# Patient Record
Sex: FEMALE | Race: WHITE | NOT HISPANIC OR LATINO | Employment: FULL TIME | URBAN - METROPOLITAN AREA
[De-identification: names, ages, dates, MRNs, and addresses within clinical notes are randomized per-mention and may not be internally consistent; named-entity substitution may affect disease eponyms.]

---

## 2017-05-30 ENCOUNTER — CONVERSION ENCOUNTER (OUTPATIENT)
Dept: RADIOLOGY | Facility: IMAGING CENTER | Age: 57
End: 2017-05-30

## 2018-05-24 ENCOUNTER — TRANSCRIBE ORDERS (OUTPATIENT)
Dept: NEUROSURGERY | Facility: CLINIC | Age: 58
End: 2018-05-24

## 2018-05-24 DIAGNOSIS — G56.01 CARPAL TUNNEL SYNDROME ON RIGHT: Primary | ICD-10-CM

## 2018-07-20 ENCOUNTER — OFFICE VISIT (OUTPATIENT)
Dept: OBGYN CLINIC | Facility: CLINIC | Age: 58
End: 2018-07-20
Payer: OTHER MISCELLANEOUS

## 2018-07-20 VITALS
BODY MASS INDEX: 27.64 KG/M2 | HEART RATE: 64 BPM | DIASTOLIC BLOOD PRESSURE: 73 MMHG | HEIGHT: 63 IN | WEIGHT: 156 LBS | SYSTOLIC BLOOD PRESSURE: 115 MMHG

## 2018-07-20 DIAGNOSIS — M79.645 PAIN OF LEFT MIDDLE FINGER: ICD-10-CM

## 2018-07-20 DIAGNOSIS — M65.332 TRIGGER FINGER, LEFT MIDDLE FINGER: Primary | ICD-10-CM

## 2018-07-20 PROCEDURE — 20550 NJX 1 TENDON SHEATH/LIGAMENT: CPT

## 2018-07-20 PROCEDURE — 99203 OFFICE O/P NEW LOW 30 MIN: CPT | Performed by: ORTHOPAEDIC SURGERY

## 2018-07-20 RX ORDER — APIXABAN 2.5 MG/1
2.5 TABLET, FILM COATED ORAL
Refills: 0 | Status: ON HOLD | COMMUNITY
Start: 2018-07-19 | End: 2018-11-02

## 2018-07-20 RX ORDER — LIDOCAINE HYDROCHLORIDE 10 MG/ML
0.5 INJECTION, SOLUTION INFILTRATION; PERINEURAL
Status: COMPLETED | OUTPATIENT
Start: 2018-07-20 | End: 2018-07-20

## 2018-07-20 RX ORDER — TRIAMCINOLONE ACETONIDE 40 MG/ML
20 INJECTION, SUSPENSION INTRA-ARTICULAR; INTRAMUSCULAR
Status: COMPLETED | OUTPATIENT
Start: 2018-07-20 | End: 2018-07-20

## 2018-07-20 RX ADMIN — LIDOCAINE HYDROCHLORIDE 0.5 ML: 10 INJECTION, SOLUTION INFILTRATION; PERINEURAL at 11:46

## 2018-07-20 RX ADMIN — TRIAMCINOLONE ACETONIDE 20 MG: 40 INJECTION, SUSPENSION INTRA-ARTICULAR; INTRAMUSCULAR at 11:46

## 2018-07-20 NOTE — PROGRESS NOTES
CHIEF COMPLAINT:  Chief Complaint   Patient presents with    Right Hand - Pain       SUBJECTIVE:  Adi Garcia is a 60y o  year old HD female who presents to the office after an injury at work on 6/14/18 to her left hand that caused her fingers to bend back  Pt complains of clicking and pain to her left long finger  Pt states that when her finger locks and she pulls it up is when she feels the pain  She also complains of numbness and tingling to her right hand and fingers  She wears a wrist brace that she feels helps with the symptoms  She is currently scheduled with Neurology in August regarding her carpal tunnel symptoms  PAST MEDICAL HISTORY:  History reviewed  No pertinent past medical history  PAST SURGICAL HISTORY:  No past surgical history on file  FAMILY HISTORY:  No family history on file  SOCIAL HISTORY:  Social History   Substance Use Topics    Smoking status: Not on file    Smokeless tobacco: Not on file    Alcohol use Not on file       MEDICATIONS:    Current Outpatient Prescriptions:     B Complex Vitamins (VITAMIN B COMPLEX PO), Take 1 capsule by mouth, Disp: , Rfl:     ELIQUIS 2 5 MG, , Disp: , Rfl: 0    ALLERGIES:  Allergies no known allergies    REVIEW OF SYSTEMS:  Review of Systems   Constitutional: Negative for chills, fever and unexpected weight change  HENT: Negative for hearing loss, nosebleeds and sore throat  Eyes: Negative for pain, redness and visual disturbance  Respiratory: Negative for cough, shortness of breath and wheezing  Cardiovascular: Negative for chest pain, palpitations and leg swelling  Gastrointestinal: Negative for abdominal pain, nausea and vomiting  Endocrine: Negative for polydipsia and polyuria  Genitourinary: Negative for dysuria and hematuria  Musculoskeletal: Negative for arthralgias, joint swelling and myalgias  Skin: Negative for rash and wound  Neurological: Negative for dizziness, numbness and headaches  Psychiatric/Behavioral: Negative for decreased concentration, dysphoric mood and suicidal ideas  The patient is not nervous/anxious  LABS:  HgA1c: No results found for: HGBA1C  BMP: No results found for: GLUCOSE, CALCIUM, NA, K, CO2, CL, BUN, CREATININE    _____________________________________________________  PHYSICAL EXAMINATION:  General: well developed and well nourished, alert, oriented times 3 and appears comfortable  Psychiatric: Normal  HEENT: Trachea Midline, No torticollis  Pulmonary: No audible wheezing or respiratory distress   Skin: No masses, erthema, lacerations, fluctation, ulcerations  Neurovascular: Sensation Intact to the Median, Ulnar, Radial Nerve, Motor Intact to the Median, Ulnar, Radial Nerve and Pulses Intact    MUSCULOSKELETAL EXAMINATION:    left long finger:  Positive palpable nodule over the A1 pulley  Positive tenderness to palpation over A1 pulley  Positive catching  Positive clicking     ___________________________________________________  STUDIES REVIEWED:  No studies reviewed         PROCEDURES PERFORMED:  Hand/upper extremity injection  Date/Time: 7/20/2018 11:46 AM  Consent given by: patient  Timeout: Immediately prior to procedure a time out was called to verify the correct patient, procedure, equipment, support staff and site/side marked as required   Supporting Documentation  Indications: pain and tendon swelling   Procedure Details  Condition:trigger finger Location: long finger - L long A1   Ultrasound guidance: no  Medications administered: 0 5 mL lidocaine 1 %; 20 mg triamcinolone acetonide 40 mg/mL  Patient tolerance: patient tolerated the procedure well with no immediate complications  Dressing:  Sterile dressing applied            _____________________________________________________  ASSESSMENT/PLAN:    Assessment:   Trigger Finger  left  long finger    Plan:     Pt may discontinue finger splint   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  Pt may make an apt with us at her leisure for her right carpal tunnel symptoms  Diagnoses and all orders for this visit:    Left Long Finger Trigger Finger        Follow Up:  Return in about 2 weeks (around 8/3/2018)  To Do Next Visit:  Re-evaluation of current issue    General Discussions:     Trigger FInger: The anatomy and physiology of trigger finger was discussed with the patient today in the office  Edema and increased contact pressure within the flexor tendons at the A1 pulley can cause pain, crepitation, and limitation of function  Treatment options include resting MP blocking splints to decrease edema, oral anti-inflammatory medications, home or formal therapy exercises, up to 2 steroid injections within the tendon sheath, or surgical release  While majority of patients do respond to conservative treatment, up to 20% may require surgical release           Scribe Attestation    I,:   [place] am acting as a scribe while in the presence of the attending physician :        I,:   Elijah Nyhan, MD personally performed the services described in this documentation    as scribed in my presence :

## 2018-07-20 NOTE — LETTER
July 20, 2018     Patient: Glo Muñoz   YOB: 1960   Date of Visit: 7/20/2018       To Whom it May Concern:    Glo Muñoz is under my professional care  She was seen in my office on 7/20/2018  She may return to work on light duty today  She may return to work on 7/23/2018 with out limitations  If you have any questions or concerns, please don't hesitate to call           Sincerely,          Bailey Vogel MD        CC: No Recipients

## 2018-08-03 ENCOUNTER — OFFICE VISIT (OUTPATIENT)
Dept: OBGYN CLINIC | Facility: CLINIC | Age: 58
End: 2018-08-03
Payer: OTHER MISCELLANEOUS

## 2018-08-03 VITALS
DIASTOLIC BLOOD PRESSURE: 76 MMHG | SYSTOLIC BLOOD PRESSURE: 113 MMHG | WEIGHT: 155 LBS | BODY MASS INDEX: 27.46 KG/M2 | HEIGHT: 63 IN | HEART RATE: 57 BPM

## 2018-08-03 DIAGNOSIS — M65.332 TRIGGER FINGER, LEFT MIDDLE FINGER: ICD-10-CM

## 2018-08-03 DIAGNOSIS — G56.01 CARPAL TUNNEL SYNDROME ON RIGHT: Primary | ICD-10-CM

## 2018-08-03 PROCEDURE — 99213 OFFICE O/P EST LOW 20 MIN: CPT | Performed by: ORTHOPAEDIC SURGERY

## 2018-08-03 NOTE — PROGRESS NOTES
CHIEF COMPLAINT:  Chief Complaint   Patient presents with    Left Hand - Follow-up       SUBJECTIVE:  Emanuel Nina is a 60y o  year old RHD female who presents to the office for a follow up for her left long finger trigger finger  Pt states that there is still clicking and locking, but the pain is resolved  Pt also has numbness and tingling in her right hand and fingers  Pt wears a brace at night with some relief  PAST MEDICAL HISTORY:  History reviewed  No pertinent past medical history  PAST SURGICAL HISTORY:  History reviewed  No pertinent surgical history  FAMILY HISTORY:  History reviewed  No pertinent family history  SOCIAL HISTORY:  Social History   Substance Use Topics    Smoking status: Never Smoker    Smokeless tobacco: Never Used    Alcohol use Not on file       MEDICATIONS:    Current Outpatient Prescriptions:     B Complex Vitamins (VITAMIN B COMPLEX PO), Take 1 capsule by mouth, Disp: , Rfl:     ELIQUIS 2 5 MG, , Disp: , Rfl: 0    ALLERGIES:  No Known Allergies    REVIEW OF SYSTEMS:  Review of Systems    VITALS:  Vitals:    08/03/18 0909   BP: 113/76   Pulse: 57       LABS:  HgA1c: No results found for: HGBA1C  BMP: No results found for: GLUCOSE, CALCIUM, NA, K, CO2, CL, BUN, CREATININE    _____________________________________________________  PHYSICAL EXAMINATION:  General: well developed and well nourished, alert, oriented times 3 and appears comfortable  Psychiatric: Normal  HEENT: Trachea Midline, No torticollis  Pulmonary: No audible wheezing or respiratory distress   Skin: No masses, erthema, lacerations, fluctation, ulcerations  Neurovascular: Sensation Intact to the Median, Ulnar, Radial Nerve, Motor Intact to the Median, Ulnar, Radial Nerve and Pulses Intact    MUSCULOSKELETAL EXAMINATION:    left long finger:  Positive palpable nodule over the A1 pulley  Negative tenderness to palpation over A1 pulley  Positive catching  Positive clicking        Right Carpal Tunnel Exam:    Negative thenar atrophy  Negative phalen's test  Positive carpal tunnel compression  Negative tinels over median nerve at the wrist   Opposition strength 5/5  Abduction strength 5/5       2 point discrimination is 5 mm throughout with the exception of 7mm on ulnar side of thumb and 6mm on both the ulnar and radial sides of small finger  Range of motion of the neck to the does not exacerbate symptoms in the hand     ___________________________________________________  STUDIES REVIEWED:  No studies reviewed  PROCEDURES PERFORMED:  Procedures  No Procedures performed today    _____________________________________________________  ASSESSMENT/PLAN:    Assessment:   left Trigger Finger  long finger   Right carpal tunnel syndrome    Plan:     Surgical intervention vs conservative treatment was discussed at length today  Pt was offered surgical intervention for both conditions: R ECTR and L LF trigger release  Pt  takes eliquis and was advised to reach out to her cardiologist to make sure she can stop the medication 5 days prior to procedure  Patient is going to think about surgical intervention and get back to the office  Patient to send EMG results to office        Diagnoses and all orders for this visit:    Carpal tunnel syndrome on right    Trigger finger, left middle finger            Follow Up:  Return for when she is ready to schedule surgerty  To Do Next Visit:  Re-evaluation of current issue    Operative Discussions:  Endoscopic Carpal Tunnel Release: The anatomy and physiology of carpal tunnel syndrome was discussed with the patient today  Increase pressure localized under the transverse carpal ligament can cause pain, numbness, tingling, or dysesthesias within the median nerve distribution as well as feelings of fatigue, clumsiness, or awkwardness  These symptoms typically occur at night and worse in the morning upon waking    Eventually, untreated carpal tunnel syndrome can result in weakness and permanent loss of muscle within the thenar compartment of the hand  Treatment options were discussed with the patient  Conservative treatment includes nocturnal resting splints to keep the nerve in a neutral position, ergonomic changes within the work or home environment, activity modification, and tendon gliding exercises  Vitamin B6 one tablet daily over the counter may helpful to reduce symptoms  Steroid injections within the carpal canal can help a majority of patients, however this is often self-limited in a majority of patients  Surgical intervention to divide the transverse carpal ligament typically results in a long-lasting relief of the patient's complaints, with the recurrence rate of less than 1%  The patient has elected to undergo an endoscopic carpal tunnel release  The single incision technique was discussed with the patient, which results in approximately a two-week recovery time less wound complications  In the postoperative period, light activities are allowed immediately, driving is allowed when narcotic medication has stopped, and the bandages may be removed and incision may get wet after 2 days  Heavy activities (lifting more than approximately 10 pounds) will be allowed after follow up appointment in 1-2 weeks  While night symptoms (waking from sleep, pain, and discomfort in the hands) generally improves rapidly, the numbness and tingling as well as the strength will slowly improve over weeks to months depending on the chronicity and severity of the carpal tunnel syndrome  Pillar pain and scar discomfort were discussed with the patient which are self-limiting conditions  The risks of bleeding and infection from the surgery are less than 1%  Risk of recurrence is approximately 0 5%  The risks of nerve injury or nerve damage or damage to the blood vessels is approximately 1 in 1200  The patient has an understanding of the above mentioned discussion  The risks and benefits of the procedure were explained to the patient, which include, but are not limited to: Bleeding, infection, recurrence, pain, scar, damage to tendons, damage to nerves, and damage to blood vessels, failure to give desired results and complications related to anesthesia  These risks, along with alternative conservative treatment options, and postoperative protocols were voiced back and understood by the patient  All questions were answered to the patient's satisfaction  The patient agrees to comply with a standard postoperative protocol, and is willing to proceed  Education was provided via written and auditory forms  There were no barriers to learning  Written handouts regarding wound care, incision and scar care, and general preoperative information was provided to the patient  Prior to surgery, the patient may be requested to stop all anti-inflammatory medications  Prophylactic aspirin, Plavix, and Coumadin may be allowed to be continued  Medications including vitamin E , ginkgo, and fish oil are requested to be stopped approximately one week prior to surgery    Trigger Finger Release: The anatomy and physiology of trigger finger was discussed with the patient today in the office  Edema and increased contact pressure within the flexor tendons at the A1 pulley can cause pain, crepitation, and limitation of function  Treatment options include resting MP blocking splints to decrease edema, oral anti-inflammatory medications, home or formal therapy exercises, up to 2 steroid injections or surgical release  While majority of patients do respond to conservative treatment, up to 20% may require surgical release  The patient has elected release of the trigger finger  The patient has elected to undergo a release of the A1 pulley (trigger finger)  A small incision will be made over the palmar aspect of the hand, the tendon sheath holding the flexor tendons will be released    In the postoperative period, light activities are allowed immediately, driving is allowed when narcotic medication has stopped, and the incision may get wet after 2 days  Heavy activities (lifting more than approximately 10 pounds) will be allowed after the follow up appointment in 1-2 weeks  While the pain and discomfort within the wrist typically improves rapidly, some residual discomfort may be present for up to 6 weeks  The nodule that is typically palpable in the palmar aspect of the hand will not be removed, as this would necessitate removal of a portion of the flexor tendon, however the catching, clicking, and locking should resolve  Approximate success rate is 98%  The risks and benefits of the procedure were explained to the patient, which include, but are not limited to: Bleeding, infection, recurrence, pain, scar, damage to tendons, damage to nerves, and damage to blood vessels, need for future surgery and complications related to anesthesia  If bony work is done, risks also include malunion and nonunion  These risks, along with alternative conservative treatment options, and postoperative protocols were voiced back and understood by the patient  All questions were answered to the patient's satisfaction  The patient agrees to comply with a standard postoperative protocol, and is willing to proceed  Education was provided via written and auditory forms  There were no barriers to learning  Written handouts regarding wound care, incision and scar care, and general preoperative information, as well as risks and benefits were provided to the patient      Scribe Attestation    I,:    am acting as a scribe while in the presence of the attending physician :        I,:    personally performed the services described in this documentation    as scribed in my presence :

## 2018-08-03 NOTE — LETTER
August 3, 2018     Patient: Felicitas Alston   YOB: 1960   Date of Visit: 8/3/2018       To Whom it May Concern:    Felicitas Alston is under my professional care  She was seen in my office on 8/3/2018  She will call our office to schedule apt to schedule her trigger finger surgery when she is ready  Pt may work with no limitations at this time  If you have any questions or concerns, please don't hesitate to call           Sincerely,          David Fraga MD        CC: No Recipients

## 2018-08-07 ENCOUNTER — TELEPHONE (OUTPATIENT)
Dept: NEUROSURGERY | Facility: CLINIC | Age: 58
End: 2018-08-07

## 2018-08-07 NOTE — TELEPHONE ENCOUNTER
08/07/2018-CALLED PT TO RESCHEDULE TODAY'S "NO SHOW" APPT, BUT PT ALREADY SEEING ANOTHER DOCTOR  SHE WILL CALL OUR OFFICE BACK IF SHE WISHES TO RESCHEDULE

## 2018-08-17 ENCOUNTER — OFFICE VISIT (OUTPATIENT)
Dept: OBGYN CLINIC | Facility: CLINIC | Age: 58
End: 2018-08-17
Payer: COMMERCIAL

## 2018-08-17 ENCOUNTER — OFFICE VISIT (OUTPATIENT)
Dept: OBGYN CLINIC | Facility: CLINIC | Age: 58
End: 2018-08-17
Payer: OTHER MISCELLANEOUS

## 2018-08-17 VITALS
WEIGHT: 152 LBS | HEIGHT: 63 IN | HEART RATE: 78 BPM | DIASTOLIC BLOOD PRESSURE: 73 MMHG | SYSTOLIC BLOOD PRESSURE: 110 MMHG | BODY MASS INDEX: 26.93 KG/M2

## 2018-08-17 VITALS
DIASTOLIC BLOOD PRESSURE: 73 MMHG | HEART RATE: 78 BPM | WEIGHT: 152 LBS | HEIGHT: 63 IN | SYSTOLIC BLOOD PRESSURE: 110 MMHG | BODY MASS INDEX: 26.93 KG/M2

## 2018-08-17 DIAGNOSIS — G56.01 CARPAL TUNNEL SYNDROME ON RIGHT: Primary | ICD-10-CM

## 2018-08-17 DIAGNOSIS — G56.01 CARPAL TUNNEL SYNDROME ON RIGHT: ICD-10-CM

## 2018-08-17 DIAGNOSIS — M65.332 TRIGGER MIDDLE FINGER OF LEFT HAND: Primary | ICD-10-CM

## 2018-08-17 PROBLEM — I82.409 DEEP VEIN THROMBOSIS (DVT) OF LOWER EXTREMITY (HCC): Status: ACTIVE | Noted: 2018-08-17

## 2018-08-17 PROCEDURE — 99213 OFFICE O/P EST LOW 20 MIN: CPT | Performed by: ORTHOPAEDIC SURGERY

## 2018-08-17 NOTE — LETTER
August 17, 2018     Patient: Dayna Benitez   YOB: 1960   Date of Visit: 8/17/2018       To Whom it May Concern:    Dayna Benitez is under my professional care  She was seen in my office on 8/17/2018  She has surgery scheduled on 8-29-18 for her left long finger  If you have any questions or concerns, please don't hesitate to call           Sincerely,          Ranjana Covington MD        CC: No Recipients

## 2018-08-17 NOTE — H&P
CHIEF COMPLAINT:  Chief Complaint   Patient presents with    Right Hand - Follow-up       SUBJECTIVE:  Dayna Benitez is a 60y o  year old RHD female who presents to go over EMG and re- evaluation of the right hand for carpal tunnel  She continues to have numbness and tingling in the right hand  The night wrist brace helps somewhat  The patient is also seeing us for a workman's compensation issue with the left hand, under a separate office note with the same date of service  PAST MEDICAL HISTORY:  History reviewed  No pertinent past medical history  PAST SURGICAL HISTORY:  Past Surgical History:   Procedure Laterality Date    CERVICAL DISC SURGERY  2009    PLATES & SCREWS     SECTION      X2    GASTRIC BYPASS  2017    LAP RINYGB SURGERY    KNEE SURGERY Bilateral     OTHER SURGICAL HISTORY      ARM SURGERIES    TONSILLECTOMY         FAMILY HISTORY:  Family History   Problem Relation Age of Onset    Stroke Mother     Alzheimer's disease Mother     Arthritis Mother     Coronary artery disease Mother    [place] Breast cancer Mother     Hypertension Father     Gout Father     Diabetes type II Father     Coronary artery disease Father        SOCIAL HISTORY:  Social History   Substance Use Topics    Smoking status: Light Tobacco Smoker     Types: Cigarettes    Smokeless tobacco: Never Used      Comment: NO PASSIVE SMOKE     Alcohol use Not on file       MEDICATIONS:    Current Outpatient Prescriptions:     B Complex Vitamins (VITAMIN B COMPLEX PO), Take 1 capsule by mouth, Disp: , Rfl:     ELIQUIS 2 5 MG, , Disp: , Rfl: 0    ALLERGIES:  No Known Allergies    REVIEW OF SYSTEMS:  Review of Systems   Constitutional: Negative for chills, fever and unexpected weight change  HENT: Negative for hearing loss, nosebleeds and sore throat  Eyes: Negative for pain, redness and visual disturbance  Respiratory: Negative for cough, shortness of breath and wheezing      Cardiovascular: Negative for chest pain, palpitations and leg swelling  Gastrointestinal: Negative for abdominal pain, nausea and vomiting  Endocrine: Negative for polydipsia and polyuria  Genitourinary: Negative for dysuria and hematuria  Musculoskeletal: Positive for arthralgias  Negative for joint swelling and myalgias  Skin: Negative for rash and wound  Neurological: Positive for numbness  Negative for dizziness and headaches  Psychiatric/Behavioral: Negative for decreased concentration and suicidal ideas  The patient is not nervous/anxious  VITALS:  Vitals:    08/17/18 1217   BP: 110/73   Pulse: 78       LABS:  HgA1c: No results found for: HGBA1C  BMP: No results found for: GLUCOSE, CALCIUM, NA, K, CO2, CL, BUN, CREATININE    _____________________________________________________  PHYSICAL EXAMINATION:  General: well developed and well nourished, alert, oriented times 3 and appears comfortable  Psychiatric: Normal  HEENT: Trachea Midline, No torticollis  Cardiac:  Regular rate and rhythm  Pulmonary: No respiratory distress  Lung sounds clear to auscultation  Skin: No masses, erthema, lacerations, fluctation, ulcerations  Neurovascular: Sensation Intact to the Median, Ulnar, Radial Nerve, Motor Intact to the Median, Ulnar, Radial Nerve and Pulses Intact    MUSCULOSKELETAL EXAMINATION:  Right Carpal Tunnel Exam:    Negative thenar atrophy  Positive phalen's test  Positive carpal tunnel compression  Negative tinels over median nerve at the wrist   Opposition strength 5/5  Abduction strength 5/5       ___________________________________________________  STUDIES REVIEWED:  EMG preformed on 5-17-18 at [facility] [place] showed right median nerve entrapment at flexor retinaculum with moderate severity        PROCEDURES PERFORMED:  Procedures  No Procedures performed today    _____________________________________________________  ASSESSMENT/PLAN:    Right Carpal Tunnel  *Right endoscopic carpal tunnel release  * We will do this at the same time as the trigger finger release (workman's comp issue) as this would be best for the patient to limit exposure to anesthesia  *OT ordered  *Consent obtained  * The patient takes Eliquis for history of DVTs  She has discussed stopping this medication prior to surgery  Her Primary Care provider  Both sergies can be done at the same time and the patient would like to proceed  The patient is aware that she would have limited use of both hands and will have help at home (daughter)  * We would like the patient to stop Eliquis 5 days before surgery  * Surgery medication instructions: You will stop eating and drinking at midnight the night before your surgery, but you may continue to take your normal medications with a small sip of water  In the morning on the day of your surgery, we would like you to take the following medications   Tylenol 500mg one tablet by mouth    After surgery, we would like you to take Tylenol 500 mg one tablet by mouth every 6 hours  (at breakfast, lunch and dinner) for 5-7 days after your surgery  Please take this medication EVERYDAY after surgery for 5-7 days, and not just as needed  Taking this medications after surgery will limit your need for prescription pain medication  We will also prescribe a narcotic pain medication for a limited time after surgery that you can take as needed for moderate or severe pain  Follow Up:  Return after surgery  To Do Next Visit:  Re-evaluation of current issue      Operative Discussions:  Endoscopic Carpal Tunnel Release: The anatomy and physiology of carpal tunnel syndrome was discussed with the patient today  Increase pressure localized under the transverse carpal ligament can cause pain, numbness, tingling, or dysesthesias within the median nerve distribution as well as feelings of fatigue, clumsiness, or awkwardness    These symptoms typically occur at night and worse in the morning upon waking  Eventually, untreated carpal tunnel syndrome can result in weakness and permanent loss of muscle within the thenar compartment of the hand  Treatment options were discussed with the patient  Conservative treatment includes nocturnal resting splints to keep the nerve in a neutral position, ergonomic changes within the work or home environment, activity modification, and tendon gliding exercises  Vitamin B6 one tablet daily over the counter may helpful to reduce symptoms  Steroid injections within the carpal canal can help a majority of patients, however this is often self-limited in a majority of patients  Surgical intervention to divide the transverse carpal ligament typically results in a long-lasting relief of the patient's complaints, with the recurrence rate of less than 1%  The patient has elected to undergo an endoscopic carpal tunnel release  The single incision technique was discussed with the patient, which results in approximately a two-week recovery time less wound complications  In the postoperative period, light activities are allowed immediately, driving is allowed when narcotic medication has stopped, and the bandages may be removed and incision may get wet after 2 days  Heavy activities (lifting more than approximately 10 pounds) will be allowed after follow up appointment in 1-2 weeks  While night symptoms (waking from sleep, pain, and discomfort in the hands) generally improves rapidly, the numbness and tingling as well as the strength will slowly improve over weeks to months depending on the chronicity and severity of the carpal tunnel syndrome  Pillar pain and scar discomfort were discussed with the patient which are self-limiting conditions  The risks of bleeding and infection from the surgery are less than 1%  Risk of recurrence is approximately 0 5%    The risks of nerve injury or nerve damage or damage to the blood vessels is approximately 1 in 1200  The patient has an understanding of the above mentioned discussion  The risks and benefits of the procedure were explained to the patient, which include, but are not limited to: Bleeding, infection, recurrence, pain, scar, damage to tendons, damage to nerves, and damage to blood vessels, failure to give desired results and complications related to anesthesia  These risks, along with alternative conservative treatment options, and postoperative protocols were voiced back and understood by the patient  All questions were answered to the patient's satisfaction  The patient agrees to comply with a standard postoperative protocol, and is willing to proceed  Education was provided via written and auditory forms  There were no barriers to learning  Written handouts regarding wound care, incision and scar care, and general preoperative information was provided to the patient  Prior to surgery, the patient may be requested to stop all anti-inflammatory medications  Prophylactic aspirin, Plavix, and Coumadin may be allowed to be continued  Medications including vitamin E , ginkgo, and fish oil are requested to be stopped approximately one week prior       Scribe Attestation    I,:   Danilo Roland PA-C am acting as a scribe while in the presence of the attending physician :        I,:   Lawyer Neno MD personally performed the services described in this documentation    as scribed in my presence :

## 2018-08-17 NOTE — PROGRESS NOTES
CHIEF COMPLAINT:  Chief Complaint   Patient presents with    Right Hand - Follow-up       SUBJECTIVE:  Aislinn Le is a 60y o  year old RHD female who presents to go over EMG and re- evaluation of the right hand for carpal tunnel  She continues to have numbness and tingling in the right hand  The night wrist brace helps somewhat  The patient is also seeing us for a workman's compensation issue with the left hand, under a separate office note with the same date of service  PAST MEDICAL HISTORY:  History reviewed  No pertinent past medical history  PAST SURGICAL HISTORY:  Past Surgical History:   Procedure Laterality Date    CERVICAL DISC SURGERY  2009    PLATES & SCREWS     SECTION      X2    GASTRIC BYPASS  2017    LAP RINYGB SURGERY    KNEE SURGERY Bilateral     OTHER SURGICAL HISTORY      ARM SURGERIES    TONSILLECTOMY         FAMILY HISTORY:  Family History   Problem Relation Age of Onset    Stroke Mother     Alzheimer's disease Mother     Arthritis Mother     Coronary artery disease Mother    [place] Breast cancer Mother     Hypertension Father     Gout Father     Diabetes type II Father     Coronary artery disease Father        SOCIAL HISTORY:  Social History   Substance Use Topics    Smoking status: Light Tobacco Smoker     Types: Cigarettes    Smokeless tobacco: Never Used      Comment: NO PASSIVE SMOKE     Alcohol use Not on file       MEDICATIONS:    Current Outpatient Prescriptions:     B Complex Vitamins (VITAMIN B COMPLEX PO), Take 1 capsule by mouth, Disp: , Rfl:     ELIQUIS 2 5 MG, , Disp: , Rfl: 0    ALLERGIES:  No Known Allergies    REVIEW OF SYSTEMS:  Review of Systems   Constitutional: Negative for chills, fever and unexpected weight change  HENT: Negative for hearing loss, nosebleeds and sore throat  Eyes: Negative for pain, redness and visual disturbance  Respiratory: Negative for cough, shortness of breath and wheezing      Cardiovascular: Negative for chest pain, palpitations and leg swelling  Gastrointestinal: Negative for abdominal pain, nausea and vomiting  Endocrine: Negative for polydipsia and polyuria  Genitourinary: Negative for dysuria and hematuria  Musculoskeletal: Positive for arthralgias  Negative for joint swelling and myalgias  Skin: Negative for rash and wound  Neurological: Positive for numbness  Negative for dizziness and headaches  Psychiatric/Behavioral: Negative for decreased concentration and suicidal ideas  The patient is not nervous/anxious  VITALS:  Vitals:    08/17/18 1217   BP: 110/73   Pulse: 78       LABS:  HgA1c: No results found for: HGBA1C  BMP: No results found for: GLUCOSE, CALCIUM, NA, K, CO2, CL, BUN, CREATININE    _____________________________________________________  PHYSICAL EXAMINATION:  General: well developed and well nourished, alert, oriented times 3 and appears comfortable  Psychiatric: Normal  HEENT: Trachea Midline, No torticollis  Cardiac:  Regular rate and rhythm  Pulmonary: No respiratory distress  Lung sounds clear to auscultation  Skin: No masses, erthema, lacerations, fluctation, ulcerations  Neurovascular: Sensation Intact to the Median, Ulnar, Radial Nerve, Motor Intact to the Median, Ulnar, Radial Nerve and Pulses Intact    MUSCULOSKELETAL EXAMINATION:  Right Carpal Tunnel Exam:    Negative thenar atrophy  Positive phalen's test  Positive carpal tunnel compression  Negative tinels over median nerve at the wrist   Opposition strength 5/5  Abduction strength 5/5       ___________________________________________________  STUDIES REVIEWED:  EMG preformed on 5-17-18 at [facility] [place] showed right median nerve entrapment at flexor retinaculum with moderate severity        PROCEDURES PERFORMED:  Procedures  No Procedures performed today    _____________________________________________________  ASSESSMENT/PLAN:    Right Carpal Tunnel  *Right endoscopic carpal tunnel release  * We will do this at the same time as the trigger finger release (workman's comp issue) as this would be best for the patient to limit exposure to anesthesia  *OT ordered  *Consent obtained  * The patient takes Eliquis for history of DVTs  She has discussed stopping this medication prior to surgery  Her Primary Care provider  Both sergies can be done at the same time and the patient would like to proceed  The patient is aware that she would have limited use of both hands and will have help at home (daughter)  * We would like the patient to stop Eliquis 5 days before surgery  * Surgery medication instructions: You will stop eating and drinking at midnight the night before your surgery, but you may continue to take your normal medications with a small sip of water  In the morning on the day of your surgery, we would like you to take the following medications   Tylenol 500mg one tablet by mouth    After surgery, we would like you to take Tylenol 500 mg one tablet by mouth every 6 hours  (at breakfast, lunch and dinner) for 5-7 days after your surgery  Please take this medication EVERYDAY after surgery for 5-7 days, and not just as needed  Taking this medications after surgery will limit your need for prescription pain medication  We will also prescribe a narcotic pain medication for a limited time after surgery that you can take as needed for moderate or severe pain  Follow Up:  Return after surgery  To Do Next Visit:  Re-evaluation of current issue      Operative Discussions:  Endoscopic Carpal Tunnel Release: The anatomy and physiology of carpal tunnel syndrome was discussed with the patient today  Increase pressure localized under the transverse carpal ligament can cause pain, numbness, tingling, or dysesthesias within the median nerve distribution as well as feelings of fatigue, clumsiness, or awkwardness    These symptoms typically occur at night and worse in the morning upon waking  Eventually, untreated carpal tunnel syndrome can result in weakness and permanent loss of muscle within the thenar compartment of the hand  Treatment options were discussed with the patient  Conservative treatment includes nocturnal resting splints to keep the nerve in a neutral position, ergonomic changes within the work or home environment, activity modification, and tendon gliding exercises  Vitamin B6 one tablet daily over the counter may helpful to reduce symptoms  Steroid injections within the carpal canal can help a majority of patients, however this is often self-limited in a majority of patients  Surgical intervention to divide the transverse carpal ligament typically results in a long-lasting relief of the patient's complaints, with the recurrence rate of less than 1%  The patient has elected to undergo an endoscopic carpal tunnel release  The single incision technique was discussed with the patient, which results in approximately a two-week recovery time less wound complications  In the postoperative period, light activities are allowed immediately, driving is allowed when narcotic medication has stopped, and the bandages may be removed and incision may get wet after 2 days  Heavy activities (lifting more than approximately 10 pounds) will be allowed after follow up appointment in 1-2 weeks  While night symptoms (waking from sleep, pain, and discomfort in the hands) generally improves rapidly, the numbness and tingling as well as the strength will slowly improve over weeks to months depending on the chronicity and severity of the carpal tunnel syndrome  Pillar pain and scar discomfort were discussed with the patient which are self-limiting conditions  The risks of bleeding and infection from the surgery are less than 1%  Risk of recurrence is approximately 0 5%    The risks of nerve injury or nerve damage or damage to the blood vessels is approximately 1 in 1200  The patient has an understanding of the above mentioned discussion  The risks and benefits of the procedure were explained to the patient, which include, but are not limited to: Bleeding, infection, recurrence, pain, scar, damage to tendons, damage to nerves, and damage to blood vessels, failure to give desired results and complications related to anesthesia  These risks, along with alternative conservative treatment options, and postoperative protocols were voiced back and understood by the patient  All questions were answered to the patient's satisfaction  The patient agrees to comply with a standard postoperative protocol, and is willing to proceed  Education was provided via written and auditory forms  There were no barriers to learning  Written handouts regarding wound care, incision and scar care, and general preoperative information was provided to the patient  Prior to surgery, the patient may be requested to stop all anti-inflammatory medications  Prophylactic aspirin, Plavix, and Coumadin may be allowed to be continued  Medications including vitamin E , ginkgo, and fish oil are requested to be stopped approximately one week prior       Scribe Attestation    I,:   Minnie Nicole PA-C am acting as a scribe while in the presence of the attending physician :        I,:   Bailey Vogel MD personally performed the services described in this documentation    as scribed in my presence :

## 2018-08-17 NOTE — PROGRESS NOTES
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,  in the right upper arm, and  in the left calf  The patient is being seen under her regular insurance for the carpal tunnel and under workmans' compensation for the left long trigger finger  This note is for the [employer] issue  PAST MEDICAL HISTORY:  History reviewed  No pertinent past medical history  PAST SURGICAL HISTORY:  Past Surgical History:   Procedure Laterality Date    CERVICAL DISC SURGERY  2009    PLATES & SCREWS     SECTION      X2    GASTRIC BYPASS  2017    LAP RINYGB SURGERY    KNEE SURGERY Bilateral     OTHER SURGICAL HISTORY      ARM SURGERIES    TONSILLECTOMY         FAMILY HISTORY:  Family History   Problem Relation Age of Onset    Stroke Mother     Alzheimer's disease Mother     Arthritis Mother     Coronary artery disease Mother    [facility] Breast cancer Mother     Hypertension Father     Gout Father     Diabetes type II Father     Coronary artery disease Father        SOCIAL HISTORY:  Social History   Substance Use Topics    Smoking status: Light Tobacco Smoker     Types: Cigarettes    Smokeless tobacco: Never Used      Comment: NO PASSIVE SMOKE     Alcohol use Not on file       MEDICATIONS:    Current Outpatient Prescriptions:     B Complex Vitamins (VITAMIN B COMPLEX PO), Take 1 capsule by mouth, Disp: , Rfl:     ELIQUIS 2 5 MG, , Disp: , Rfl: 0    ALLERGIES:  No Known Allergies    REVIEW OF SYSTEMS:  Review of Systems   Constitutional: Negative for chills, fever and unexpected weight change     HENT: Negative for hearing loss, nosebleeds and sore throat  Eyes: Negative for pain, redness and visual disturbance  Respiratory: Negative for cough, shortness of breath and wheezing  Cardiovascular: Negative for chest pain, palpitations and leg swelling  Gastrointestinal: Negative for abdominal pain, nausea and vomiting  Endocrine: Negative for polydipsia and polyuria  Genitourinary: Negative for dysuria and hematuria  Musculoskeletal: Positive for arthralgias and joint swelling  Negative for myalgias  Skin: Negative for rash and wound  Neurological: Positive for numbness  Negative for dizziness and headaches  Psychiatric/Behavioral: Negative for decreased concentration and suicidal ideas  The patient is not nervous/anxious  VITALS:  Vitals:    08/17/18 1145   BP: 110/73   Pulse: 78       LABS:  HgA1c: No results found for: HGBA1C  BMP: No results found for: GLUCOSE, CALCIUM, NA, K, CO2, CL, BUN, CREATININE    _____________________________________________________  PHYSICAL EXAMINATION:  General: well developed and well nourished, alert, oriented times 3 and appears comfortable  Psychiatric: Normal  HEENT: Trachea Midline, No torticollis  Cardiac:  Regular rate and rhythm  Pulmonary: No respiratory distress  Lung sounds clear to auscultation  Skin: No masses, erthema, lacerations, fluctation, ulcerations  Neurovascular: Sensation Intact to the Median, Ulnar, Radial Nerve, Motor Intact to the Median, Ulnar, Radial Nerve and Pulses Intact    MUSCULOSKELETAL EXAMINATION:  left long finger:  Positive palpable nodule over the A1 pulley  Positive tenderness to palpation over A1 pulley  Positive catching   Positive clicking       ___________________________________________________  STUDIES REVIEWED:  No studies reviewed    PROCEDURES PERFORMED:  Procedures  No Procedures performed today    _____________________________________________________  ASSESSMENT/PLAN:    Left long trigger finger  * Surgery:  Left long trigger finger release  * We will do this at the same time as the carpal tunnel release (non-workman's comp issue, see separate office note) as this would be best for the patient to limit exposure to anesthesia  *Consent obtained  * OT scheduled  She will need OT to help her recover from her surgery  * The patient takes Eliquis for history of DVTs  She has discussed stopping this medication prior to surgery with her Primary Care provider, and her PCP states that it is up to us  We would like the patient to stop Eliquis 5 days before surgery  * The patient is aware that she would have limited use of both hands and will have help at home (daughter)  * The patient has Tylenol at home and does not want me to send a prescription to her pharmacy  Surgery medication instructions: You will stop eating and drinking at midnight the night before your surgery, but you may continue to take your normal medications with a small sip of water  In the morning on the day of your surgery, we would like you to take the following medication   Tylenol 500mg one tablet by mouth    After surgery, we would like you to take Tylenol 500 mg one tablet by mouth every 6 hours  (at breakfast, lunch and dinner) for 5-7 days after your surgery  Please take this medication EVERYDAY after surgery for 5-7 days, and not just as needed  Taking this medications after surgery will limit your need for prescription pain medication  We will also prescribe a narcotic pain medication for a limited time after surgery that you can take as needed for moderate or severe pain  Other orders  -     Diet NPO; Sips with meds; Standing  -     Height and weight upon arrival; Standing  -     Void on call to OR; Standing  -     Insert peripheral IV; Standing      Follow Up:  Return after surgery  To Do Next Visit:  Re-evaluation of current issue      Operative Discussions:  Trigger Finger Release:  The anatomy and physiology of trigger finger was discussed with the patient today in the office  Edema and increased contact pressure within the flexor tendons at the A1 pulley can cause pain, crepitation, and limitation of function  Treatment options include resting MP blocking splints to decrease edema, oral anti-inflammatory medications, home or formal therapy exercises, up to 2 steroid injections or surgical release  While majority of patients do respond to conservative treatment, up to 20% may require surgical release  The patient has elected release of the trigger finger  The patient has elected to undergo a release of the A1 pulley (trigger finger)  A small incision will be made over the palmar aspect of the hand, the tendon sheath holding the flexor tendons will be released  In the postoperative period, light activities are allowed immediately, driving is allowed when narcotic medication has stopped, and the incision may get wet after 2 days  Heavy activities (lifting more than approximately 10 pounds) will be allowed after the follow up appointment in 1-2 weeks  While the pain and discomfort within the wrist typically improves rapidly, some residual discomfort may be present for up to 6 weeks  The nodule that is typically palpable in the palmar aspect of the hand will not be removed, as this would necessitate removal of a portion of the flexor tendon, however the catching, clicking, and locking should resolve  Approximate success rate is 98%  The risks and benefits of the procedure were explained to the patient, which include, but are not limited to: Bleeding, infection, recurrence, pain, scar, damage to tendons, damage to nerves, and damage to blood vessels, need for future surgery and complications related to anesthesia  If bony work is done, risks also include malunion and nonunion    These risks, along with alternative conservative treatment options, and postoperative protocols were voiced back and understood by the patient  All questions were answered to the patient's satisfaction  The patient agrees to comply with a standard postoperative protocol, and is willing to proceed  Education was provided via written and auditory forms  There were no barriers to learning  Written handouts regarding wound care, incision and scar care, and general preoperative information, as well as risks and benefits were provided to the patient      Scribe Attestation    I,:   Farheen Lim PA-C am acting as a scribe while in the presence of the attending physician :        I,:   Lashay Dash MD personally performed the services described in this documentation    as scribed in my presence :

## 2018-08-17 NOTE — PATIENT INSTRUCTIONS
Surgery medication instructions: You will stop eating and drinking at midnight the night before your surgery, but you may continue to take your normal medications with a small sip of water  In the morning on the day of your surgery, we would like you to take the following medication:   Tylenol 500mg one tablet by mouth    After surgery, we would like you to take Tylenol 500 mg one tablet by mouth every 6 hours  (at breakfast, lunch and dinner) for 5-7 days after your surgery  Please take this medication EVERYDAY after surgery for 5-7 days, and not just as needed  Taking this medications after surgery will limit your need for prescription pain medication  We will also prescribe a narcotic pain medication for a limited time after surgery that you can take as needed for moderate or severe pain  The narcotic pain medication may also have Tylenol in it  Please limit Tylenol usage to under 3,000mg a day

## 2018-08-21 ENCOUNTER — TELEPHONE (OUTPATIENT)
Dept: OBGYN CLINIC | Facility: CLINIC | Age: 58
End: 2018-08-21

## 2018-08-23 NOTE — PRE-PROCEDURE INSTRUCTIONS
Pre-Surgery Instructions:   Medication Instructions    B Complex Vitamins (VITAMIN B COMPLEX PO) Patient was instructed by Physician and understands   ELIQUIS 2 5 MG Patient was instructed by Physician and understands

## 2018-09-17 ENCOUNTER — ANESTHESIA EVENT (OUTPATIENT)
Dept: PERIOP | Facility: HOSPITAL | Age: 58
End: 2018-09-17
Payer: COMMERCIAL

## 2018-09-18 ENCOUNTER — ANESTHESIA (OUTPATIENT)
Dept: PERIOP | Facility: HOSPITAL | Age: 58
End: 2018-09-18
Payer: COMMERCIAL

## 2018-09-18 ENCOUNTER — HOSPITAL ENCOUNTER (OUTPATIENT)
Facility: HOSPITAL | Age: 58
Setting detail: OUTPATIENT SURGERY
Discharge: HOME | End: 2018-09-18
Attending: ORTHOPAEDIC SURGERY | Admitting: ORTHOPAEDIC SURGERY
Payer: COMMERCIAL

## 2018-09-18 ENCOUNTER — TELEPHONE (OUTPATIENT)
Dept: OBGYN CLINIC | Facility: CLINIC | Age: 58
End: 2018-09-18

## 2018-09-18 VITALS
HEART RATE: 63 BPM | SYSTOLIC BLOOD PRESSURE: 130 MMHG | RESPIRATION RATE: 15 BRPM | WEIGHT: 157.85 LBS | HEIGHT: 63 IN | OXYGEN SATURATION: 99 % | TEMPERATURE: 99.3 F | BODY MASS INDEX: 27.97 KG/M2 | DIASTOLIC BLOOD PRESSURE: 70 MMHG

## 2018-09-18 DIAGNOSIS — M65.332 TRIGGER MIDDLE FINGER OF LEFT HAND: Primary | ICD-10-CM

## 2018-09-18 PROCEDURE — 26055 INCISE FINGER TENDON SHEATH: CPT | Performed by: ORTHOPAEDIC SURGERY

## 2018-09-18 PROCEDURE — 29848 WRIST ENDOSCOPY/SURGERY: CPT | Performed by: ORTHOPAEDIC SURGERY

## 2018-09-18 RX ORDER — LIDOCAINE HYDROCHLORIDE 10 MG/ML
INJECTION, SOLUTION INFILTRATION; PERINEURAL AS NEEDED
Status: DISCONTINUED | OUTPATIENT
Start: 2018-09-18 | End: 2018-09-18 | Stop reason: SURG

## 2018-09-18 RX ORDER — FENTANYL CITRATE/PF 50 MCG/ML
50 SYRINGE (ML) INJECTION
Status: DISCONTINUED | OUTPATIENT
Start: 2018-09-18 | End: 2018-09-18 | Stop reason: HOSPADM

## 2018-09-18 RX ORDER — PROMETHAZINE HYDROCHLORIDE 25 MG/ML
25 INJECTION, SOLUTION INTRAMUSCULAR; INTRAVENOUS ONCE AS NEEDED
Status: DISCONTINUED | OUTPATIENT
Start: 2018-09-18 | End: 2018-09-18 | Stop reason: HOSPADM

## 2018-09-18 RX ORDER — MELATONIN
5000 3 TIMES DAILY
COMMUNITY

## 2018-09-18 RX ORDER — PROPOFOL 10 MG/ML
INJECTION, EMULSION INTRAVENOUS AS NEEDED
Status: DISCONTINUED | OUTPATIENT
Start: 2018-09-18 | End: 2018-09-18 | Stop reason: SURG

## 2018-09-18 RX ORDER — DIPHENHYDRAMINE HYDROCHLORIDE 50 MG/ML
12.5 INJECTION INTRAMUSCULAR; INTRAVENOUS ONCE AS NEEDED
Status: DISCONTINUED | OUTPATIENT
Start: 2018-09-18 | End: 2018-09-18 | Stop reason: HOSPADM

## 2018-09-18 RX ORDER — PROPOFOL 10 MG/ML
INJECTION, EMULSION INTRAVENOUS CONTINUOUS PRN
Status: DISCONTINUED | OUTPATIENT
Start: 2018-09-18 | End: 2018-09-18

## 2018-09-18 RX ORDER — FENTANYL CITRATE 50 UG/ML
INJECTION, SOLUTION INTRAMUSCULAR; INTRAVENOUS AS NEEDED
Status: DISCONTINUED | OUTPATIENT
Start: 2018-09-18 | End: 2018-09-18 | Stop reason: SURG

## 2018-09-18 RX ORDER — ONDANSETRON 2 MG/ML
4 INJECTION INTRAMUSCULAR; INTRAVENOUS EVERY 6 HOURS PRN
Status: DISCONTINUED | OUTPATIENT
Start: 2018-09-18 | End: 2018-09-18 | Stop reason: HOSPADM

## 2018-09-18 RX ORDER — MEPERIDINE HYDROCHLORIDE 50 MG/ML
12.5 INJECTION INTRAMUSCULAR; INTRAVENOUS; SUBCUTANEOUS ONCE AS NEEDED
Status: DISCONTINUED | OUTPATIENT
Start: 2018-09-18 | End: 2018-09-18 | Stop reason: HOSPADM

## 2018-09-18 RX ORDER — METOCLOPRAMIDE HYDROCHLORIDE 5 MG/ML
10 INJECTION INTRAMUSCULAR; INTRAVENOUS ONCE AS NEEDED
Status: DISCONTINUED | OUTPATIENT
Start: 2018-09-18 | End: 2018-09-18 | Stop reason: HOSPADM

## 2018-09-18 RX ORDER — SODIUM CHLORIDE, SODIUM LACTATE, POTASSIUM CHLORIDE, CALCIUM CHLORIDE 600; 310; 30; 20 MG/100ML; MG/100ML; MG/100ML; MG/100ML
75 INJECTION, SOLUTION INTRAVENOUS CONTINUOUS
Status: DISCONTINUED | OUTPATIENT
Start: 2018-09-18 | End: 2018-09-18 | Stop reason: HOSPADM

## 2018-09-18 RX ORDER — HYDROCODONE BITARTRATE AND ACETAMINOPHEN 5; 325 MG/1; MG/1
1 TABLET ORAL EVERY 6 HOURS PRN
Qty: 20 TABLET | Refills: 0 | Status: SHIPPED | OUTPATIENT
Start: 2018-09-18 | End: 2018-12-24

## 2018-09-18 RX ORDER — MIDAZOLAM HYDROCHLORIDE 1 MG/ML
INJECTION INTRAMUSCULAR; INTRAVENOUS AS NEEDED
Status: DISCONTINUED | OUTPATIENT
Start: 2018-09-18 | End: 2018-09-18 | Stop reason: SURG

## 2018-09-18 RX ORDER — SODIUM CHLORIDE, SODIUM LACTATE, POTASSIUM CHLORIDE, CALCIUM CHLORIDE 600; 310; 30; 20 MG/100ML; MG/100ML; MG/100ML; MG/100ML
INJECTION, SOLUTION INTRAVENOUS CONTINUOUS PRN
Status: DISCONTINUED | OUTPATIENT
Start: 2018-09-18 | End: 2018-09-18

## 2018-09-18 RX ADMIN — FENTANYL CITRATE 25 MCG: 50 INJECTION, SOLUTION INTRAMUSCULAR; INTRAVENOUS at 12:39

## 2018-09-18 RX ADMIN — SODIUM CHLORIDE, SODIUM LACTATE, POTASSIUM CHLORIDE, AND CALCIUM CHLORIDE: .6; .31; .03; .02 INJECTION, SOLUTION INTRAVENOUS at 12:07

## 2018-09-18 RX ADMIN — LIDOCAINE HYDROCHLORIDE 50 MG: 10 INJECTION, SOLUTION INFILTRATION; PERINEURAL at 12:39

## 2018-09-18 RX ADMIN — FENTANYL CITRATE 25 MCG: 50 INJECTION, SOLUTION INTRAMUSCULAR; INTRAVENOUS at 12:51

## 2018-09-18 RX ADMIN — PROPOFOL 100 MCG/KG/MIN: 10 INJECTION, EMULSION INTRAVENOUS at 12:39

## 2018-09-18 RX ADMIN — MIDAZOLAM HYDROCHLORIDE 2 MG: 1 INJECTION, SOLUTION INTRAMUSCULAR; INTRAVENOUS at 12:33

## 2018-09-18 RX ADMIN — PROPOFOL 60 MG: 10 INJECTION, EMULSION INTRAVENOUS at 12:45

## 2018-09-18 NOTE — OP NOTE
PATIENT NAME: Aislinn Le    MEDICAL RECORD NO:  [id number]    PROCEDURE DATE:  18    :  1960    SURGEON:  Alphonso Rhody D Haydee Dandy, M D , Ph D     Sonali Brochure: Libby Florentino DIAGNOSIS:  1) right carpal tunnel syndrome    2) left long finger trigger digit    POSTOPERATIVE DIAGNOSIS:  1) right carpal tunnel syndrome    2) left long finger trigger digit    PROCEDURE PERFORMED:  1) right endoscopic carpal tunnel release  2) left long finger trigger release    ANESTHESIA:  conscious sedation and local    COMPLICATIONS:   none    TOURNIQUET TIME: 3 minutes at 250 mmHg on the right    DISPOSITION: Patient was sent to the PACU in stable condition  INDICATION:  The patient is an 60 y o  female with clinical and/or electrodiagnostic evidence of right carpal tunnel syndrome and left long finger trigger digit     The patient had failed non-operative management and opted for right carpal tunnel release left long finger trigger release  After informing the patient of the risks and benefits of endoscopic carpal tunnel and trigger release, consent was obtained for surgical intervention  PROCEDURE: The patient was identified in the preoperative screening area  The consent form was signed and verified after identifying the correct operative site  The patient was taken to the operating room and underwent conscious sedation and local   The bilateral upper extremities were then prepped and draped in normal sterile fashion with Chlorhexidine solution  First, attention was brought to the left long finger trigger digit  A 1 5cm incision was made centered over the A-1 pulley on the palmar surface of the [address]  [facility] [address]  The subcutaneous tissue was dissected bluntly down to the level of the flexor sheath taking care to protect the neurovascular structures  The A-1 pulley was identified and was released from proximal to distal   Once release was completed the tendons were examined and found to be intact  (Note -  for multiple trigger releases a new sentence should begin:  The remaining digits underwent releases which were performed in a similar manner as above )  Following release(s), the tourniquet was deflated and the digits demonstrated good capillary refill and color  The patient demonstrated flexion and extension of the digit(s) without evidence of triggering  The wound(s) was irrigated with copious amounts of saline solution and was closed with nylon suture in an interrupted horizontal mattress fashion  Findings: There was no significant longitudinal degenerative tears of the FDS tendon  The synovium appeared Normaly thickened  and was left Undisturbed  The A-1 pulley was moderately thickened  Next, attention was turned to the right ECTR  The right arm was then elevated, exsanguinated with an Esmarch and the tourniquet placed about the brachium was insufflated to 250 mmHg  The Esmarch was removed  A transverse incision, approximately 1 cm in length, was made just proximal to the distal wrist crease and just ulnar to the median nerve  The subcutaneous tissue was dissected bluntly down to the level of the forearm fascia  A transverse split in the fascia was created by gently piercing the fascia with the tips of tenotomy scissors  The proximal portion of the fascia was released longitudinally into the forearm using tenotomy scissors  The distal fascia was left intact for insertion of dilators  The carpal canal was then dilated using sequentially increasing sized dilators  Once the canal was adequately dilated, the scope and canula tray were then introduced into the carpal canal  The transverse carpal ligament was covered with a thin layer of synovial tissue on its undersurface  The synovial layer was debrided to expose the transverse fibers and the distal edge of the ligament    Once clear visualization of the transverse carpal ligament was obtained, the knife blade was introduced into the canula tray and the ligament was then released from distal to proximal maintaining complete visualization throughout the procedure  Several passes of the knife blade were necessary in some areas to complete the release  Complete release was verified with the endoscopic camera in place clearly visualizing the  cut edges of the transverse carpal ligament with the overlying volar fatty tissue and palmaris brevis muscle fibers protruding through  The transverse carpal ligament was moderately thickended  The scope and cannula were then removed and the wound was irrigated with copious amounts of saline solution  The tourniquet was released at 3 minutes with good capillary refill and color in the digits  The small incisions were then repaired using #4-0 nylon sutures placed in an interrupted horizontal mattress fashion  The patient tolerated the procedure well  The incision(s) was/were dressed in a sterile soft bandage  There were no complications during the case  The patient was sent to the PACU in stable condition            David Fraga MD  09/18/18  1:03 PM

## 2018-09-18 NOTE — H&P
Eric Marroquin MD   Orthopedic Surgery    Carpal tunnel syndrome on right   Dx    Right Hand - Follow-up; Referred by Referral Self   Reason for Visit    H&P   Arsh Chu PA-C (Physician Assistant) [facility]      CHIEF COMPLAINT:        Chief Complaint   Patient presents with    Right Hand - Follow-up         SUBJECTIVE:  Nehal Kapoor is a 60y o  year old RHD female who presents to go over EMG and re- evaluation of the right hand for carpal tunnel  She continues to have numbness and tingling in the right hand  The night wrist brace helps somewhat  The patient is also seeing us for a workman's compensation issue with the left hand, under a separate office note with the same date of service  PAST MEDICAL HISTORY:  History reviewed  No pertinent past medical history       PAST SURGICAL HISTORY:            Past Surgical History:   Procedure Laterality Date    CERVICAL DISC SURGERY   2009     PLATES & SCREWS     SECTION         X2    GASTRIC BYPASS   2017     LAP RINYGB SURGERY    KNEE SURGERY Bilateral      OTHER SURGICAL HISTORY         ARM SURGERIES    TONSILLECTOMY             FAMILY HISTORY:            Family History   Problem Relation Age of Onset    Stroke Mother      Alzheimer's disease Mother      Arthritis Mother      Coronary artery disease Mother      Breast cancer Mother      Hypertension Father      Gout Father      Diabetes type II Father      Coronary artery disease Father           SOCIAL HISTORY:              Social History   Substance Use Topics    Smoking status: Light Tobacco Smoker       Types: Cigarettes    Smokeless tobacco: Never Used         Comment: NO PASSIVE SMOKE     Alcohol use Not on file         MEDICATIONS:     Current Outpatient Prescriptions:     B Complex Vitamins (VITAMIN B COMPLEX PO), Take 1 capsule by mouth, Disp: , Rfl:     ELIQUIS 2 5 MG, , Disp: , Rfl: 0     ALLERGIES:  No Known Allergies     REVIEW OF SYSTEMS:  Review of Systems   Constitutional: Negative for chills, fever and unexpected weight change  HENT: Negative for hearing loss, nosebleeds and sore throat  Eyes: Negative for pain, redness and visual disturbance  Respiratory: Negative for cough, shortness of breath and wheezing  Cardiovascular: Negative for chest pain, palpitations and leg swelling  Gastrointestinal: Negative for abdominal pain, nausea and vomiting  Endocrine: Negative for polydipsia and polyuria  Genitourinary: Negative for dysuria and hematuria  Musculoskeletal: Positive for arthralgias  Negative for joint swelling and myalgias  Skin: Negative for rash and wound  Neurological: Positive for numbness  Negative for dizziness and headaches  Psychiatric/Behavioral: Negative for decreased concentration and suicidal ideas  The patient is not nervous/anxious  VITALS:        Vitals:     08/17/18 1217   BP: 110/73   Pulse: 78         LABS:  HgA1c: No results found for: HGBA1C  BMP: No results found for: GLUCOSE, CALCIUM, NA, K, CO2, CL, BUN, CREATININE     _____________________________________________________  PHYSICAL EXAMINATION:  General: well developed and well nourished, alert, oriented times 3 and appears comfortable  Psychiatric: Normal  HEENT: Trachea Midline, No torticollis  Cardiac:  Regular rate and rhythm  Pulmonary: No respiratory distress  Lung sounds clear to auscultation  Skin: No masses, erthema, lacerations, fluctation, ulcerations  Neurovascular: Sensation Intact to the Median, Ulnar, Radial Nerve, Motor Intact to the Median, Ulnar, Radial Nerve and Pulses Intact     MUSCULOSKELETAL EXAMINATION:  Right Carpal Tunnel Exam:     Negative thenar atrophy  Positive phalen's test  Positive carpal tunnel compression  Negative tinels over median nerve at the wrist   Opposition strength 5/5    Abduction strength 5/5        ___________________________________________________  STUDIES REVIEWED:  EMG preformed on 5-17-18 at [facility] showed right median nerve entrapment at flexor retinaculum with moderate severity  PROCEDURES PERFORMED:  Procedures  No Procedures performed today     _____________________________________________________  ASSESSMENT/PLAN:     Right Carpal Tunnel  *Right endoscopic carpal tunnel release  * We will do this at the same time as the trigger finger release (workman's comp issue) as this would be best for the patient to limit exposure to anesthesia  *OT ordered  *Consent obtained  * The patient takes Eliquis for history of DVTs  She has discussed stopping this medication prior to surgery  Her Primary Care provider  Both sergies can be done at the same time and the patient would like to proceed  The patient is aware that she would have limited use of both hands and will have help at home (daughter)  * We would like the patient to stop Eliquis 5 days before surgery  * Surgery medication instructions: You will stop eating and drinking at midnight the night before your surgery, but you may continue to take your normal medications with a small sip of water  In the morning on the day of your surgery, we would like you to take the following medications  · Tylenol 500mg one tablet by mouth     After surgery, we would like you to take Tylenol 500 mg one tablet by mouth every 6 hours  (at breakfast, lunch and dinner) for 5-7 days after your surgery  Please take this medication EVERYDAY after surgery for 5-7 days, and not just as needed  Taking this medications after surgery will limit your need for prescription pain medication  We will also prescribe a narcotic pain medication for a limited time after surgery that you can take as needed for moderate or severe pain  Follow Up:  Return after surgery  To Do Next Visit:  Re-evaluation of current issue        Operative Discussions:  Endoscopic Carpal Tunnel Release:    The anatomy and physiology of carpal tunnel syndrome was discussed with the patient today  Increase pressure localized under the transverse carpal ligament can cause pain, numbness, tingling, or dysesthesias within the median nerve distribution as well as feelings of fatigue, clumsiness, or awkwardness  These symptoms typically occur at night and worse in the morning upon waking  Eventually, untreated carpal tunnel syndrome can result in weakness and permanent loss of muscle within the thenar compartment of the hand  Treatment options were discussed with the patient  Conservative treatment includes nocturnal resting splints to keep the nerve in a neutral position, ergonomic changes within the work or home environment, activity modification, and tendon gliding exercises  Vitamin B6 one tablet daily over the counter may helpful to reduce symptoms  Steroid injections within the carpal canal can help a majority of patients, however this is often self-limited in a majority of patients  Surgical intervention to divide the transverse carpal ligament typically results in a long-lasting relief of the patient's complaints, with the recurrence rate of less than 1%  The patient has elected to undergo an endoscopic carpal tunnel release  The single incision technique was discussed with the patient, which results in approximately a two-week recovery time less wound complications  In the postoperative period, light activities are allowed immediately, driving is allowed when narcotic medication has stopped, and the bandages may be removed and incision may get wet after 2 days  Heavy activities (lifting more than approximately 10 pounds) will be allowed after follow up appointment in 1-2 weeks    While night symptoms (waking from sleep, pain, and discomfort in the hands) generally improves rapidly, the numbness and tingling as well as the strength will slowly improve over weeks to months depending on the chronicity and severity of the carpal tunnel syndrome  Pillar pain and scar discomfort were discussed with the patient which are self-limiting conditions  The risks of bleeding and infection from the surgery are less than 1%  Risk of recurrence is approximately 0 5%  The risks of nerve injury or nerve damage or damage to the blood vessels is approximately 1 in 1200  The patient has an understanding of the above mentioned discussion  The risks and benefits of the procedure were explained to the patient, which include, but are not limited to: Bleeding, infection, recurrence, pain, scar, damage to tendons, damage to nerves, and damage to blood vessels, failure to give desired results and complications related to anesthesia  These risks, along with alternative conservative treatment options, and postoperative protocols were voiced back and understood by the patient  All questions were answered to the patient's satisfaction  The patient agrees to comply with a standard postoperative protocol, and is willing to proceed  Education was provided via written and auditory forms  There were no barriers to learning  Written handouts regarding wound care, incision and scar care, and general preoperative information was provided to the patient  Prior to surgery, the patient may be requested to stop all anti-inflammatory medications  Prophylactic aspirin, Plavix, and Coumadin may be allowed to be continued  Medications including vitamin E , ginkgo, and fish oil are requested to be stopped approximately one week prior             Scribe Attestation    I,:   Alondra Yi PA-C am acting as a scribe while in the presence of the attending physician :        I,:   Anson Rader MD personally performed the services described in this documentation    as scribed in my presence :

## 2018-09-18 NOTE — H&P
Sabina Maddox MD   Orthopedic Surgery   Carpal tunnel syndrome on right   Dx   Right Hand - Follow-up; Referred by Referral Self   Reason for Visit    H&P   Tanner Ramírez PA-C (Physician Assistant) [facility]      CHIEF COMPLAINT:      Chief Complaint   Patient presents with    Right Hand - Follow-up         SUBJECTIVE:  Gisele Carey is a 60y o  year old RHD female who presents to go over EMG and re- evaluation of the right hand for carpal tunnel  She continues to have numbness and tingling in the right hand  The night wrist brace helps somewhat  The patient is also seeing us for a workman's compensation issue with the left hand, under a separate office note with the same date of service           PAST MEDICAL HISTORY:  History reviewed   No pertinent past medical history      PAST SURGICAL HISTORY:        Past Surgical History:   Procedure Laterality Date    CERVICAL DISC SURGERY   2009     PLATES & SCREWS     SECTION         X2    GASTRIC BYPASS   2017     LAP RINYGB SURGERY    KNEE SURGERY Bilateral      OTHER SURGICAL HISTORY         ARM SURGERIES    TONSILLECTOMY             FAMILY HISTORY:        Family History   Problem Relation Age of Onset    Stroke Mother      Alzheimer's disease Mother      Arthritis Mother      Coronary artery disease Mother      Breast cancer Mother      Hypertension Father      Gout Father      Diabetes type II Father      Coronary artery disease Father           SOCIAL HISTORY:         Social History   Substance Use Topics    Smoking status: Light Tobacco Smoker       Types: Cigarettes    Smokeless tobacco: Never Used         Comment: NO PASSIVE SMOKE     Alcohol use Not on file         MEDICATIONS:     Current Outpatient Prescriptions:     B Complex Vitamins (VITAMIN B COMPLEX PO), Take 1 capsule by mouth, Disp: , Rfl:     ELIQUIS 2 5 MG, , Disp: , Rfl: 0     ALLERGIES:  No Known Allergies     REVIEW OF SYSTEMS:  Review of Systems   Constitutional: Negative for chills, fever and unexpected weight change  HENT: Negative for hearing loss, nosebleeds and sore throat  Eyes: Negative for pain, redness and visual disturbance  Respiratory: Negative for cough, shortness of breath and wheezing  Cardiovascular: Negative for chest pain, palpitations and leg swelling  Gastrointestinal: Negative for abdominal pain, nausea and vomiting  Endocrine: Negative for polydipsia and polyuria  Genitourinary: Negative for dysuria and hematuria  Musculoskeletal: Positive for arthralgias  Negative for joint swelling and myalgias  Skin: Negative for rash and wound  Neurological: Positive for numbness  Negative for dizziness and headaches  Psychiatric/Behavioral: Negative for decreased concentration and suicidal ideas  The patient is not nervous/anxious           VITALS:      Vitals:     08/17/18 1217   BP: 110/73   Pulse: 78         LABS:  HgA1c: No results found for: HGBA1C  BMP: No results found for: GLUCOSE, CALCIUM, NA, K, CO2, CL, BUN, CREATININE     _____________________________________________________  PHYSICAL EXAMINATION:  General: well developed and well nourished, alert, oriented times 3 and appears comfortable  Psychiatric: Normal  HEENT: Trachea Midline, No torticollis  Cardiac:  Regular rate and rhythm  Pulmonary: No respiratory distress  Lung sounds clear to auscultation  Skin: No masses, erthema, lacerations, fluctation, ulcerations  Neurovascular: Sensation Intact to the Median, Ulnar, Radial Nerve, Motor Intact to the Median, Ulnar, Radial Nerve and Pulses Intact     MUSCULOSKELETAL EXAMINATION:  Right Carpal Tunnel Exam:     Negative thenar atrophy  Positive phalen's test  Positive carpal tunnel compression  Negative tinels over median nerve at the wrist   Opposition strength 5/5    Abduction strength 5/5        ___________________________________________________  STUDIES REVIEWED:  EMG preformed on 5-17-18 at [facility] showed right median nerve entrapment at flexor retinaculum with moderate severity        PROCEDURES PERFORMED:  Procedures  No Procedures performed today     _____________________________________________________  ASSESSMENT/PLAN:     Right Carpal Tunnel  *Right endoscopic carpal tunnel release  * We will do this at the same time as the trigger finger release (workman's comp issue) as this would be best for the patient to limit exposure to anesthesia  *OT ordered  *Consent obtained  * The patient takes Eliquis for history of DVTs  She has discussed stopping this medication prior to surgery  Her Primary Care provider  Both sergies can be done at the same time and the patient would like to proceed  The patient is aware that she would have limited use of both hands and will have help at home (daughter)  * We would like the patient to stop Eliquis 5 days before surgery  * Surgery medication instructions: You will stop eating and drinking at midnight the night before your surgery, but you may continue to take your normal medications with a small sip of water  In the morning on the day of your surgery, we would like you to take the following medications  · Tylenol 500mg one tablet by mouth     After surgery, we would like you to take Tylenol 500 mg one tablet by mouth every 6 hours  (at breakfast, lunch and dinner) for 5-7 days after your surgery  Please take this medication EVERYDAY after surgery for 5-7 days, and not just as needed  Taking this medications after surgery will limit your need for prescription pain medication        We will also prescribe a narcotic pain medication for a limited time after surgery that you can take as needed for moderate or severe pain          Follow Up:  Return after surgery         To Do Next Visit:  Re-evaluation of current issue        Operative Discussions:  Endoscopic Carpal Tunnel Release:    The anatomy and physiology of carpal tunnel syndrome was discussed with the patient today  Increase pressure localized under the transverse carpal ligament can cause pain, numbness, tingling, or dysesthesias within the median nerve distribution as well as feelings of fatigue, clumsiness, or awkwardness  These symptoms typically occur at night and worse in the morning upon waking  Eventually, untreated carpal tunnel syndrome can result in weakness and permanent loss of muscle within the thenar compartment of the hand  Treatment options were discussed with the patient  Conservative treatment includes nocturnal resting splints to keep the nerve in a neutral position, ergonomic changes within the work or home environment, activity modification, and tendon gliding exercises  Vitamin B6 one tablet daily over the counter may helpful to reduce symptoms  Steroid injections within the carpal canal can help a majority of patients, however this is often self-limited in a majority of patients  Surgical intervention to divide the transverse carpal ligament typically results in a long-lasting relief of the patient's complaints, with the recurrence rate of less than 1%  The patient has elected to undergo an endoscopic carpal tunnel release  The single incision technique was discussed with the patient, which results in approximately a two-week recovery time less wound complications  In the postoperative period, light activities are allowed immediately, driving is allowed when narcotic medication has stopped, and the bandages may be removed and incision may get wet after 2 days  Heavy activities (lifting more than approximately 10 pounds) will be allowed after follow up appointment in 1-2 weeks    While night symptoms (waking from sleep, pain, and discomfort in the hands) generally improves rapidly, the numbness and tingling as well as the strength will slowly improve over weeks to months depending on the chronicity and severity of the carpal tunnel syndrome  Pillar pain and scar discomfort were discussed with the patient which are self-limiting conditions  The risks of bleeding and infection from the surgery are less than 1%  Risk of recurrence is approximately 0 5%  The risks of nerve injury or nerve damage or damage to the blood vessels is approximately 1 in 1200  The patient has an understanding of the above mentioned discussion  The risks and benefits of the procedure were explained to the patient, which include, but are not limited to: Bleeding, infection, recurrence, pain, scar, damage to tendons, damage to nerves, and damage to blood vessels, failure to give desired results and complications related to anesthesia  These risks, along with alternative conservative treatment options, and postoperative protocols were voiced back and understood by the patient  All questions were answered to the patient's satisfaction  The patient agrees to comply with a standard postoperative protocol, and is willing to proceed  Education was provided via written and auditory forms  There were no barriers to learning  Written handouts regarding wound care, incision and scar care, and general preoperative information was provided to the patient  Prior to surgery, the patient may be requested to stop all anti-inflammatory medications  Prophylactic aspirin, Plavix, and Coumadin may be allowed to be continued  Medications including vitamin E , ginkgo, and fish oil are requested to be stopped approximately one week prior       Scribe Attestation    I,:   Khadijah Pardo PA-C am acting as a scribe while in the presence of the attending physician :        I,:   Kathya Fields MD personally performed the services described in this documentation    as scribed in my presence  :

## 2018-09-18 NOTE — TELEPHONE ENCOUNTER
I spoke with Nida Wiseman, the surgery scheduler at [facility]  She says that "i authorized both through her insurance for less time out of work    whatever her comp denies will go through to her insurance, she was OK with that    I'm going to have billing try and seperate them best they can  she was OK with it and doing both today  we are going to bill to comp and just send to insurance what they deny "      Dr Letitia Bennett will proceed with both procedures today and write 2 separate surgery notes for today's procedures  He is aware of the plan

## 2018-09-18 NOTE — LETTER
[address]  [facility] [address]  Dept: [phone]    September 18, 2018     Patient: Tiffany Yi   YOB: 1960   Date of Visit: 8/17/2018       To Whom it May Concern:    Tiffany Yi is under my professional care  She was seen in the hospital for surgery on    09/18/18  She may return to work on 10/2/2018  She had surgery for a workman's compensation issue (trigger finger)  If you have any questions or concerns, please don't hesitate to call           Sincerely,          Marylou Steven PA-C

## 2018-09-18 NOTE — DISCHARGE INSTRUCTIONS
Post Operative Instructions    You have had surgery on your arm today, please read and follow the information below:  · Elevate your hand above your elbow during the next 24-48 hours to help with swelling  · Place your hand and arm over your head with motion at your shoulder three times a day  · Do not apply any cream/ointment/oil to your incisions including antibiotics  · Do not soak your hands in standing water (dishwater, tubs, Jacuzzi's, pools, etc ) until given permission (typically 2-3 weeks after injury)    Call the office if you notice any:  · Increased numbness or tingling of your hand or fingers that is not relieved with elevation  · Increasing pain that is not controlled with medication  · Difficulty chewing, breathing, swallowing  · Chest pains or shortness of breath  · Fever over 101 4 degrees  Bandage: Your therapist will remove your bandage at your first therapy appointment  Motion: Move fingers into a fist 5 times a day, DO NOT move any splinted fingers  Weight bearing status: Avoid heavy lifting (>5 pounds) with the extremity that was operated on until follow up appointment  Normal activities of daily living are OK  Ice: Ice for 10 minutes every hour as needed for swelling x 24 hours  Sling: No sling necessary  Pain medication:   After surgery, we would like you to take Tylenol 500 mg one tablet by mouth every 6 hours  (at breakfast, lunch and dinner) for 5-7 days after your surgery  Please take this medication EVERYDAY after surgery for 5-7 days, and not just as needed  Taking this medications after surgery will limit your need for prescription pain medication  We will also prescribe a narcotic pain medication for a limited time after surgery that you can take as needed for moderate or severe pain  The narcotic pain medication may also have Tylenol in it  Please limit Tylenol usage to under 3,000mg a day  Take Tylenol OR Norco, do not take both at the same time  Follow-up Appointment: 7-10 days with Dr Judy Godfrey: See location below of discharge paperwork    Please call the office if you have any questions or concerns regarding your post-operative care

## 2018-09-18 NOTE — ANESTHESIA PREPROCEDURE EVALUATION
Review of Systems/Medical History  Patient summary reviewed        Cardiovascular  DVT   Pulmonary  Smoker cigarette smoker  , Tobacco cessation counseling given ,        GI/Hepatic    Bariatric surgery,        Negative  ROS        Endo/Other  Negative endo/other ROS      GYN  Negative gynecology ROS          Hematology  Negative hematology ROS      Musculoskeletal  Negative musculoskeletal ROS        Neurology  Negative neurology ROS      Psychology   Negative psychology ROS              Physical Exam    Airway      TM Distance: >3 FB  Neck ROM: limited     Dental       Cardiovascular  Cardiovascular exam normal    Pulmonary  Pulmonary exam normal     Other Findings        Anesthesia Plan  ASA Score- 2     Anesthesia Type- IV sedation with anesthesia with ASA Monitors  Additional Monitors:   Airway Plan:         Plan Factors-    Induction- intravenous  Postoperative Plan-     Informed Consent- Anesthetic plan and risks discussed with patient  I personally reviewed this patient with the CRNA  Discussed and agreed on the Anesthesia Plan with the CRNA  Velma Dubose

## 2018-09-18 NOTE — H&P
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 to discuss surgery on her left long trigger finger This is a workman's compensation issue  She continues to have clicking and locking of the left long finger, despite a cortisone injection on 18  She takes eliquis due to history of DVT in  in right leg,  in the right upper arm, and  in the left calf  The patient is being seen under her regular insurance for the carpal tunnel and under workmans' compensation for the left long trigger finger  This note is for the [employer] issue  PAST MEDICAL HISTORY:  History reviewed  No pertinent past medical history  PAST SURGICAL HISTORY:  Past Surgical History:   Procedure Laterality Date    CERVICAL DISC SURGERY  2009    PLATES & SCREWS     SECTION      X2    GASTRIC BYPASS  2017    LAP RINYGB SURGERY    KNEE SURGERY Bilateral     OTHER SURGICAL HISTORY      ARM SURGERIES    TONSILLECTOMY         FAMILY HISTORY:  Family History   Problem Relation Age of Onset    Stroke Mother     Alzheimer's disease Mother     Arthritis Mother     Coronary artery disease Mother    Faisal Shah Breast cancer Mother     Hypertension Father     Gout Father     Diabetes type II Father     Coronary artery disease Father        SOCIAL HISTORY:  Social History   Substance Use Topics    Smoking status: Light Tobacco Smoker     Types: Cigarettes    Smokeless tobacco: Never Used      Comment: NO PASSIVE SMOKE     Alcohol use Not on file       MEDICATIONS:    Current Outpatient Prescriptions:     B Complex Vitamins (VITAMIN B COMPLEX PO), Take 1 capsule by mouth, Disp: , Rfl:     ELIQUIS 2 5 MG, , Disp: , Rfl: 0    ALLERGIES:  No Known Allergies    REVIEW OF SYSTEMS:  Review of Systems   Constitutional: Negative for chills, fever and unexpected weight change     HENT: Negative for hearing loss, nosebleeds and sore throat  Eyes: Negative for pain, redness and visual disturbance  Respiratory: Negative for cough, shortness of breath and wheezing  Cardiovascular: Negative for chest pain, palpitations and leg swelling  Gastrointestinal: Negative for abdominal pain, nausea and vomiting  Endocrine: Negative for polydipsia and polyuria  Genitourinary: Negative for dysuria and hematuria  Musculoskeletal: Positive for arthralgias and joint swelling  Negative for myalgias  Skin: Negative for rash and wound  Neurological: Positive for numbness  Negative for dizziness and headaches  Psychiatric/Behavioral: Negative for decreased concentration and suicidal ideas  The patient is not nervous/anxious  VITALS:  Vitals:    08/17/18 1145   BP: 110/73   Pulse: 78       LABS:  HgA1c: No results found for: HGBA1C  BMP: No results found for: GLUCOSE, CALCIUM, NA, K, CO2, CL, BUN, CREATININE    _____________________________________________________  PHYSICAL EXAMINATION:  General: well developed and well nourished, alert, oriented times 3 and appears comfortable  Psychiatric: Normal  HEENT: Trachea Midline, No torticollis  Cardiac:  Regular rate and rhythm  Pulmonary: No respiratory distress  Lung sounds clear to auscultation  Skin: No masses, erthema, lacerations, fluctation, ulcerations  Neurovascular: Sensation Intact to the Median, Ulnar, Radial Nerve, Motor Intact to the Median, Ulnar, Radial Nerve and Pulses Intact    MUSCULOSKELETAL EXAMINATION:  left long finger:  Positive palpable nodule over the A1 pulley  Positive tenderness to palpation over A1 pulley  Positive catching   Positive clicking       ___________________________________________________  STUDIES REVIEWED:  No studies reviewed    PROCEDURES PERFORMED:  Procedures  No Procedures performed today    _____________________________________________________  ASSESSMENT/PLAN:    Left long trigger finger  * Surgery:  Left long trigger finger release  * We will do this at the same time as the carpal tunnel release (non-workman's comp issue, see separate office note) as this would be best for the patient to limit exposure to anesthesia  *Consent obtained  * OT scheduled  She will need OT to help her recover from her surgery  * The patient takes Eliquis for history of DVTs  She has discussed stopping this medication prior to surgery with her Primary Care provider, and her PCP states that it is up to us  We would like the patient to stop Eliquis 5 days before surgery  * The patient is aware that she would have limited use of both hands and will have help at home (daughter)  * The patient has Tylenol at home and does not want me to send a prescription to her pharmacy  Surgery medication instructions: You will stop eating and drinking at midnight the night before your surgery, but you may continue to take your normal medications with a small sip of water  In the morning on the day of your surgery, we would like you to take the following medication   Tylenol 500mg one tablet by mouth    After surgery, we would like you to take Tylenol 500 mg one tablet by mouth every 6 hours  (at breakfast, lunch and dinner) for 5-7 days after your surgery  Please take this medication EVERYDAY after surgery for 5-7 days, and not just as needed  Taking this medications after surgery will limit your need for prescription pain medication  We will also prescribe a narcotic pain medication for a limited time after surgery that you can take as needed for moderate or severe pain  Other orders  -     Diet NPO; Sips with meds; Standing  -     Height and weight upon arrival; Standing  -     Void on call to OR; Standing  -     Insert peripheral IV; Standing      Follow Up:  Return after surgery  To Do Next Visit:  Re-evaluation of current issue      Operative Discussions:  Trigger Finger Release:  The anatomy and physiology of trigger finger was discussed with the patient today in the office  Edema and increased contact pressure within the flexor tendons at the A1 pulley can cause pain, crepitation, and limitation of function  Treatment options include resting MP blocking splints to decrease edema, oral anti-inflammatory medications, home or formal therapy exercises, up to 2 steroid injections or surgical release  While majority of patients do respond to conservative treatment, up to 20% may require surgical release  The patient has elected release of the trigger finger  The patient has elected to undergo a release of the A1 pulley (trigger finger)  A small incision will be made over the palmar aspect of the hand, the tendon sheath holding the flexor tendons will be released  In the postoperative period, light activities are allowed immediately, driving is allowed when narcotic medication has stopped, and the incision may get wet after 2 days  Heavy activities (lifting more than approximately 10 pounds) will be allowed after the follow up appointment in 1-2 weeks  While the pain and discomfort within the wrist typically improves rapidly, some residual discomfort may be present for up to 6 weeks  The nodule that is typically palpable in the palmar aspect of the hand will not be removed, as this would necessitate removal of a portion of the flexor tendon, however the catching, clicking, and locking should resolve  Approximate success rate is 98%  The risks and benefits of the procedure were explained to the patient, which include, but are not limited to: Bleeding, infection, recurrence, pain, scar, damage to tendons, damage to nerves, and damage to blood vessels, need for future surgery and complications related to anesthesia  If bony work is done, risks also include malunion and nonunion    These risks, along with alternative conservative treatment options, and postoperative protocols were voiced back and understood by the patient  All questions were answered to the patient's satisfaction  The patient agrees to comply with a standard postoperative protocol, and is willing to proceed  Education was provided via written and auditory forms  There were no barriers to learning  Written handouts regarding wound care, incision and scar care, and general preoperative information, as well as risks and benefits were provided to the patient      Scribe Attestation    I,:   Jalen Bustamante PA-C am acting as a scribe while in the presence of the attending physician :        I,:   Guicho Finch MD personally performed the services described in this documentation    as scribed in my presence :

## 2018-09-18 NOTE — ANESTHESIA POSTPROCEDURE EVALUATION
Post-Op Assessment Note      CV Status:  Stable    Mental Status:  Alert and awake    Hydration Status:  Stable    PONV Controlled:  None    Airway Patency:  Patent and adequate    Post Op Vitals Reviewed: Yes          Staff: Anesthesiologist, CRNA           BP   127/65   Temp  97 7   Pulse 68   Resp  14   SpO2   98%

## 2018-09-18 NOTE — H&P
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 who presents to discuss surgery on her left long trigger finger This is a workman's compensation issue  She continues to have clicking and locking of the left long finger, despite a cortisone injection on 18  She takes eliquis due to history of DVT in  in right leg,  in the right upper arm, and  in the left calf  The patient is being seen under her regular insurance for the carpal tunnel and under workmans' compensation for the left long trigger finger  This note is for the [employer] issue  PAST MEDICAL HISTORY:  History reviewed  No pertinent past medical history       PAST SURGICAL HISTORY:        Past Surgical History:   Procedure Laterality Date    CERVICAL DISC SURGERY   2009     PLATES & SCREWS     SECTION         X2    GASTRIC BYPASS   2017     LAP RINYGB SURGERY    KNEE SURGERY Bilateral      OTHER SURGICAL HISTORY         ARM SURGERIES    TONSILLECTOMY             FAMILY HISTORY:        Family History   Problem Relation Age of Onset    Stroke Mother      Alzheimer's disease Mother      Arthritis Mother      Coronary artery disease Mother     Elwyn Serge Breast cancer Mother      Hypertension Father      Gout Father      Diabetes type II Father      Coronary artery disease Father           SOCIAL HISTORY:         Social History   Substance Use Topics    Smoking status: Light Tobacco Smoker       Types: Cigarettes    Smokeless tobacco: Never Used         Comment: NO PASSIVE SMOKE     Alcohol use Not on file         MEDICATIONS:     Current Outpatient Prescriptions:     B Complex Vitamins (VITAMIN B COMPLEX PO), Take 1 capsule by mouth, Disp: , Rfl:     ELIQUIS 2 5 MG, , Disp: , Rfl: 0     ALLERGIES:  No Known Allergies     REVIEW OF SYSTEMS:  Review of Systems   Constitutional: Negative for chills, fever and unexpected weight change  HENT: Negative for hearing loss, nosebleeds and sore throat  Eyes: Negative for pain, redness and visual disturbance  Respiratory: Negative for cough, shortness of breath and wheezing  Cardiovascular: Negative for chest pain, palpitations and leg swelling  Gastrointestinal: Negative for abdominal pain, nausea and vomiting  Endocrine: Negative for polydipsia and polyuria  Genitourinary: Negative for dysuria and hematuria  Musculoskeletal: Positive for arthralgias and joint swelling  Negative for myalgias  Skin: Negative for rash and wound  Neurological: Positive for numbness  Negative for dizziness and headaches  Psychiatric/Behavioral: Negative for decreased concentration and suicidal ideas  The patient is not nervous/anxious  VITALS:      Vitals:     08/17/18 1145   BP: 110/73   Pulse: 78         LABS:  HgA1c: No results found for: HGBA1C  BMP: No results found for: GLUCOSE, CALCIUM, NA, K, CO2, CL, BUN, CREATININE     _____________________________________________________  PHYSICAL EXAMINATION:  General: well developed and well nourished, alert, oriented times 3 and appears comfortable  Psychiatric: Normal  HEENT: Trachea Midline, No torticollis  Cardiac:  Regular rate and rhythm  Pulmonary: No respiratory distress  Lung sounds clear to auscultation  Skin: No masses, erthema, lacerations, fluctation, ulcerations  Neurovascular: Sensation Intact to the Median, Ulnar, Radial Nerve, Motor Intact to the Median, Ulnar, Radial Nerve and Pulses Intact     MUSCULOSKELETAL EXAMINATION:  left long finger:  Positive palpable nodule over the A1 pulley  Positive tenderness to palpation over A1 pulley  Positive catching   Positive clicking        ___________________________________________________  STUDIES REVIEWED:  No studies reviewed     PROCEDURES PERFORMED:  Procedures  No Procedures performed today _____________________________________________________  ASSESSMENT/PLAN:     Left long trigger finger  * Surgery:  Left long trigger finger release  * We will do this at the same time as the carpal tunnel release (non-workman's comp issue, see separate office note) as this would be best for the patient to limit exposure to anesthesia  *Consent obtained  * OT scheduled  She will need OT to help her recover from her surgery  * The patient takes Eliquis for history of DVTs  She has discussed stopping this medication prior to surgery with her Primary Care provider, and her PCP states that it is up to us  We would like the patient to stop Eliquis 5 days before surgery  * The patient is aware that she would have limited use of both hands and will have help at home (daughter)  * The patient has Tylenol at home and does not want me to send a prescription to her pharmacy  Surgery medication instructions: You will stop eating and drinking at midnight the night before your surgery, but you may continue to take your normal medications with a small sip of water  In the morning on the day of your surgery, we would like you to take the following medication  · Tylenol 500mg one tablet by mouth     After surgery, we would like you to take Tylenol 500 mg one tablet by mouth every 6 hours  (at breakfast, lunch and dinner) for 5-7 days after your surgery  Please take this medication EVERYDAY after surgery for 5-7 days, and not just as needed  Taking this medications after surgery will limit your need for prescription pain medication  We will also prescribe a narcotic pain medication for a limited time after surgery that you can take as needed for moderate or severe pain  Other orders  -     Diet NPO; Sips with meds; Standing  -     Height and weight upon arrival; Standing  -     Void on call to OR; Standing  -     Insert peripheral IV; Standing        Follow Up:  Return after surgery          To Do Next Visit:  Re-evaluation of current issue        Operative Discussions:  Trigger Finger Release: The anatomy and physiology of trigger finger was discussed with the patient today in the office  Edema and increased contact pressure within the flexor tendons at the A1 pulley can cause pain, crepitation, and limitation of function  Treatment options include resting MP blocking splints to decrease edema, oral anti-inflammatory medications, home or formal therapy exercises, up to 2 steroid injections or surgical release  While majority of patients do respond to conservative treatment, up to 20% may require surgical release  The patient has elected release of the trigger finger  The patient has elected to undergo a release of the A1 pulley (trigger finger)  A small incision will be made over the palmar aspect of the hand, the tendon sheath holding the flexor tendons will be released  In the postoperative period, light activities are allowed immediately, driving is allowed when narcotic medication has stopped, and the incision may get wet after 2 days  Heavy activities (lifting more than approximately 10 pounds) will be allowed after the follow up appointment in 1-2 weeks  While the pain and discomfort within the wrist typically improves rapidly, some residual discomfort may be present for up to 6 weeks  The nodule that is typically palpable in the palmar aspect of the hand will not be removed, as this would necessitate removal of a portion of the flexor tendon, however the catching, clicking, and locking should resolve  Approximate success rate is 98%  The risks and benefits of the procedure were explained to the patient, which include, but are not limited to: Bleeding, infection, recurrence, pain, scar, damage to tendons, damage to nerves, and damage to blood vessels, need for future surgery and complications related to anesthesia  If bony work is done, risks also include malunion and nonunion    These risks, along with alternative conservative treatment options, and postoperative protocols were voiced back and understood by the patient  All questions were answered to the patient's satisfaction  The patient agrees to comply with a standard postoperative protocol, and is willing to proceed  Education was provided via written and auditory forms  There were no barriers to learning  Written handouts regarding wound care, incision and scar care, and general preoperative information, as well as risks and benefits were provided to the patient            Scribe Attestation    I,:   Francisco Gold PA-C am acting as a scribe while in the presence of the attending physician :        I,:   Waleska Gordon MD personally performed the services described in this documentation    as scribed in my presence :

## 2018-09-21 ENCOUNTER — EVALUATION (OUTPATIENT)
Dept: OCCUPATIONAL THERAPY | Facility: CLINIC | Age: 58
End: 2018-09-21
Payer: COMMERCIAL

## 2018-09-21 DIAGNOSIS — G56.01 RIGHT CARPAL TUNNEL SYNDROME: Primary | ICD-10-CM

## 2018-09-21 DIAGNOSIS — M65.332 TRIGGER MIDDLE FINGER OF LEFT HAND: ICD-10-CM

## 2018-09-21 PROCEDURE — G8991 OTHER PT/OT GOAL STATUS: HCPCS

## 2018-09-21 PROCEDURE — 97110 THERAPEUTIC EXERCISES: CPT

## 2018-09-21 PROCEDURE — 97166 OT EVAL MOD COMPLEX 45 MIN: CPT

## 2018-09-21 PROCEDURE — G8990 OTHER PT/OT CURRENT STATUS: HCPCS

## 2018-09-21 NOTE — PROGRESS NOTES
OT Evaluation     Today's date: 2018  Patient name: Emely Montano  : 1960  MRN: [id number]  Referring provider: Valeria Escalante MD  Dx:   Encounter Diagnosis     ICD-10-CM    1  Right carpal tunnel syndrome G56 01 OT Plan of Care Cert/Re-cert   2  Trigger middle finger of left hand M65 332 OT Plan of Care Cert/Re-cert       Start Time: 1400  Stop Time: 1445  Total time in clinic (min): 45 minutes    Assessment  Impairments: abnormal or restricted ROM, activity intolerance, impaired physical strength, lacks appropriate home exercise program and pain with function    Assessment details: Emely Montano is a 60 y o  female with a diagnosis of right carpal tunnel release and trigger finger release of left middle finger  A moderate complexity evaluation was completed today due to fluctuating pain and moderate amount of difficulty with completing self-care activities and homemaking duties  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  Patient was seen today for dressing change  Both incision sites appear to look healthy  Educated in caring for incision sites  Findings show an impairment with ROM, strength, edema, pain and functional completion of tasks  Patient will benefit from OT services to reach goals and return to work  Understanding of Dx/Px/POC: good   Prognosis: good    Goals  STG's In 2 weeks:  1  Patient will report a decreased pain level of 1/10   2  Patient will improve AROM of MCP flex > 80, PIP to 100, thumb abd > 80,   3  Patient will increase strength of wrist and forearm to 3+/5   4  Edema in right MCP's, DPC, and wrist will decrease by > 5 cm  LTG's In 4 weeks:  1  Patient will report a decreased pain level of 0/10   2  Patient will increase strength of B/L  > 40#, wrist flex/ext >4-/5 for lifting and carrying items  3  Patient will improve AROM of B/L wrist flex/ext > 50, UD > 30, RD > 20 for self-care completion and homemaking      Plan  Patient would benefit from: OT eval and skilled occupational therapy  Planned modality interventions: cryotherapy, fluidotherapy and thermotherapy: hydrocollator packs  Planned therapy interventions: patient education, stretching, strengthening, therapeutic exercise, manual therapy, dressing changes, home exercise program and therapeutic training  Frequency: 2x week  Duration in visits: 8  Duration in weeks: 4  Plan of Care beginning date: 2018  Plan of Care expiration date: 10/21/2018  Treatment plan discussed with: patient        Subjective Evaluation    History of Present Illness  Date of onset: 2018  Date of surgery: 2018  Mechanism of injury: surgery  Mechanism of injury: Rahat Holly is a 60y o  year old HD female who presents to the office after an injury at work on 18 to her left hand that caused her fingers to bend back  Pt complains of clicking and pain to her left long finger  Pt states that when her finger locks and she pulls it up is when she feels the pain  She also complains of numbness and tingling to her right hand and fingers  She wears a wrist brace that she feels helps with the symptoms  She is currently scheduled with Neurology in August regarding her carpal tunnel symptoms  Patient had an EMG study done and indicated CTS       Quality of life: fair    Pain  Current pain ratin  At best pain ratin  At worst pain ratin  Quality: sharp  Relieving factors: ice and rest  Aggravating factors: keyboarding and lifting    Social Support  Steps to enter house: yes  Stairs in house: yes   Lives in: apartment  Lives with: adult children    Hand dominance: right    Treatments  Current treatment: occupational therapy  Patient Goals  Patient goals for therapy: increased strength, independence with ADLs/IADLs, return to work, decreased edema, decreased pain and increased motion          Objective     Active Range of Motion     Left Wrist   Wrist flexion: 60 degrees   Wrist extension: 40 degrees   Radial deviation: 20 degrees Ulnar deviation: 30 degrees     Right Wrist   Wrist flexion: 40 degrees   Wrist extension: 30 degrees   Radial deviation: 10 degrees   Ulnar deviation: 20 degrees     Left Thumb   Flexion     MP: 55 degrees    DIP: 42 degrees  Radial abduction    CMC: 70 degrees    Right Thumb   Flexion     MP: 55    DIP: 60  Radial Abduction    CMC: 90  Opposition: Unable to oppose digit 1 to digit 5    Left Digits    Flexion   Index     MCP: 60    PIP: 90  Middle     MCP: 60    PIP: 90  Ring     MCP: 60    PIP: 90  Little     MCP: 60    PIP: 90  Extension   Index     MCP: 0    PIP: 0    DIP: 0  Middle     MCP: 0    PIP: 0    DIP: 0  Ring     MCP: 0    PIP: 0    DIP: 0  Little     MCP: 0    PIP: 0    DIP: 0    Right Digits   Flexion   Index     MCP: 70    PIP: 90  Middle     MCP: 70    PIP: 90  Ring     MCP: 70    PIP: 90  Little     MCP: 70    PIP: 90  Extension   Index     MCP: 0    PIP: 0    DIP: 0  Middle     MCP: 0    PIP: 0    DIP: 0  Ring     MCP: 0    PIP: 0    DIP: 0  Little     MCP: 0    PIP: 0    DIP: 0    Strength/Myotome Testing     Left Wrist/Hand   Wrist extension: 3+  Wrist flexion: 3+  Radial deviation: 3+  Ulnar deviation: 3+    Right Wrist/Hand   Wrist extension: 3-  Wrist flexion: 3-  Radial deviation: 3-  Ulnar deviation: 3-    Swelling     Left Wrist/Hand   Circumference MCP: 19 5 cm  Circumference wrist: 15 8 cm    Additional Swelling Details  Left DPC 19 cm    Right Wrist/Hand   Circumference MCP: 18 cm  Circumference wrist: 17 cm    Additional Swelling Details  Right DPC 20 7 cm      Flowsheet Rows      Most Recent Value   PT/OT G-Codes   Assessment Type  Evaluation   G code set  Other PT/OT Primary   Other PT Primary Current Status ()  CI   Other PT Primary Goal Status ()  CM        RE-EVAL 10/21/18  Precautions: No lifting over 10#    Daily Treatment Diary     Manual  9/21/18                                                   Exercise Diary  9/21/18       Dressing change completed       ROM MCP flex/ext  PIP flex/ext  DIP flex/ext Hold 5 sec   5  5  5       Finger ABD 5       Thumb ABD  Thumb MP/IP flex/ext 5  3/3       Wrist flex/ext        UD/RD        Sup/pron        Tendon glides                                                                                                            Modalities

## 2018-09-27 NOTE — PROGRESS NOTES
HPI:  Patient is a 60y o  year old *** female *** who presents with chief complaint of {Ortho [id number]}         ROS:   General: No fever, no chills, no weight loss, no weight gain  HEENT:  No loss of hearing, no nose bleeds, no sore throat  Eyes:  No eye pain, no red eyes, no visual disturbance  Respiratory:  No cough, no shortness of breath, no wheezing  Cardiovascular:  No chest pain, no palpitations, no edema  GI: No abdominal pain, no nausea, no vomiting  Endocrine: No frequent urination, no excessive thirst  Urinary:  No dysuria, no hematuria, no incontinence  Musculoskeletal: see HPI and PE  Skin:  No rash, no wounds  Neurological:  No dizziness, no headache, no numbness  Psychiatric:  No difficulty concentrating, no depression, no suicide thoughts, no anxiety  Review of all other systems is negative    PMH:  Past Medical History:   Diagnosis Date    DVT (deep venous thrombosis) ([facility] [address] )        PSH:  Past Surgical History:   Procedure Laterality Date    CERVICAL One Arch Demond SURGERY  2009    PLATES & SCREWS     SECTION      X2    GASTRIC BYPASS  2017    LAP RINYGB SURGERY    KNEE SURGERY Bilateral     OTHER SURGICAL HISTORY      ARM SURGERIES    [place] INCISE FINGER TENDON SHEATH Left 2018    Procedure: LONG TRIGGER FINGER RELEASE;  Surgeon: Oma Dennison MD;  [facility];  Service: Orthopedics    [place] WRIST Tonya Floresann LIG Right 2018    Procedure: ENDOSCOPIC CARPAL TUNNEL RELEASE;  Surgeon: Oma Dennison MD;  [facility];  Service: Orthopedics    TONSILLECTOMY         Medications:  Current Outpatient Prescriptions   Medication Sig Dispense Refill    B Complex Vitamins (VITAMIN B COMPLEX PO) Take 1 capsule by mouth      cholecalciferol (VITAMIN D3) 1,000 units tablet Take 1,000 Units by mouth 2 (two) times a day      ELIQUIS 2 5 MG 2 5 mg    0    HYDROcodone-acetaminophen (NORCO) 5-325 mg per tablet Take 1 tablet by mouth every 6 (six) hours as needed for pain for up to 20 doses For continuation of care Max Daily Amount: 4 tablets 20 tablet 0     No current facility-administered medications for this visit  Allergies:  No Known Allergies    Family History:  Family History   Problem Relation Age of Onset    Stroke Mother     Alzheimer's disease Mother     Arthritis Mother     Coronary artery disease Mother    Jose Rausch Breast cancer Mother    Jose Rausch Cancer Mother     Heart disease Mother     Hypertension Father     Gout Father     Diabetes type II Father     Coronary artery disease Father     Heart disease Father        Social History:  Social History     Occupational History    Not on file  Social History Main Topics    Smoking status: Light Tobacco Smoker     Packs/day: 0 25     Types: Cigarettes    Smokeless tobacco: Never Used      Comment: NO PASSIVE SMOKE     Alcohol use Yes      Comment: very rare social    Drug use: No    Sexual activity: Not Currently       Physical Exam:  General :  Alert, cooperative, no distress, appears stated age  There were no vitals taken for this visit  Head:  Normocephalic, without obvious abnormality, atraumatic   Eyes:  Conjunctiva/corneas clear, EOM's intact,   Ears: Both ears normal appearance, no hearing deficits  Nose: Nares normal, septum midline, no drainage    Neck: Supple,  trachea midline, no adenopathy, no tenderness, no mass   Back:   Symmetric, no curvature, ROM normal, no tenderness   Lungs:   Respirations unlabored   Chest Wall:  No tenderness or deformity   Extremities: Extremities normal, atraumatic, no cyanosis or edema      Pulses: 2+ and symmetric   Skin: Skin color, texture, turgor normal, no rashes or lesions      Neurologic: Normal           Ortho Exam    Imaging Studies: The following imaging studies were reviewed in office today  My findings are noted  Assessment  No diagnosis found        Plan:

## 2018-09-28 ENCOUNTER — OFFICE VISIT (OUTPATIENT)
Dept: OBGYN CLINIC | Facility: CLINIC | Age: 58
End: 2018-09-28

## 2018-09-28 ENCOUNTER — OFFICE VISIT (OUTPATIENT)
Dept: OCCUPATIONAL THERAPY | Facility: CLINIC | Age: 58
End: 2018-09-28
Payer: COMMERCIAL

## 2018-09-28 VITALS
DIASTOLIC BLOOD PRESSURE: 72 MMHG | SYSTOLIC BLOOD PRESSURE: 106 MMHG | WEIGHT: 157 LBS | HEIGHT: 63 IN | HEART RATE: 67 BPM | BODY MASS INDEX: 27.82 KG/M2

## 2018-09-28 DIAGNOSIS — M65.332 TRIGGER FINGER, LEFT MIDDLE FINGER: ICD-10-CM

## 2018-09-28 DIAGNOSIS — G56.01 CARPAL TUNNEL SYNDROME ON RIGHT: Primary | ICD-10-CM

## 2018-09-28 DIAGNOSIS — G56.01 RIGHT CARPAL TUNNEL SYNDROME: Primary | ICD-10-CM

## 2018-09-28 DIAGNOSIS — M65.332 TRIGGER MIDDLE FINGER OF LEFT HAND: ICD-10-CM

## 2018-09-28 PROCEDURE — 99024 POSTOP FOLLOW-UP VISIT: CPT | Performed by: ORTHOPAEDIC SURGERY

## 2018-09-28 PROCEDURE — 97110 THERAPEUTIC EXERCISES: CPT

## 2018-09-28 NOTE — PROGRESS NOTES
Daily Note     Today's date: 2018  Patient name: Katharina Cooper   : 1960  MRN: [id number]  Referring provider: Reva Galvan MD  Dx:   Encounter Diagnosis     ICD-10-CM    1  Right carpal tunnel syndrome G56 01    2  Trigger middle finger of left hand M65 [id number]                   Subjective: " My numbness and tingling is gone "      Objective: See treatment diary below    Manual  18                                                   Exercise Diary  18      Dressing change completed       ROM   MCP flex/ext  PIP flex/ext  DIP flex/ext Hold 5 sec   5  5  5 Hold 5 sec  B/L  10  10  10      Finger ABD 5 10      Thumb ABD  Thumb MP/IP flex/ext 5  3/3 10  10      Wrist flex/ext  5      UD/RD  5      Sup/pron  10      Tendon glides  5 positions 10x      Isometric ex  Finger ABD  Finger flex   B/L hands  3  3                                                                                                  Modalities                                        Assessment: Tolerated treatment well  Patient would benefit from continued OT  Sutures in both sites appear to be intact and healthy  Good understanding and carryover of HEP was evident  Right  strength was 30#  Plan: Continue per plan of care

## 2018-09-28 NOTE — PROGRESS NOTES
SUBJECTIVE:  Earl Lu is a 60y o  year old female who presents for follow up after surgery for right ECTR and left long finger trigger finger release done on  9/18/2018  Today patient has no clicking locking or pain in her left long finger  Pt has some soreness in the palm of her right hand  Pt states that she no longer wakes from sleep due to the numbness and tingling in her right hand and fingers  Pt states that she is very happy with the results of her procedures  VITALS:  Vitals:    09/28/18 1130   BP: 106/72   Pulse: 67       PHYSICAL EXAMINATION:  General: well developed and well nourished, alert, oriented times 3 and appears comfortable  Psychiatric: Normal     MUSCULOSKELETAL EXAMINATION:  Right wrist  Incision: clean and dry intact  Range of Motion: Pt is able to make a full composite fist, no pain with full flexion and extension of the wrist  Neurovascular status: Neuro intact, good cap refill     Left hand   Incision: clean and dry intact  Range of Motion: Pt is able to make a full fist composite  with no pain clicking or locking of her left long finger  Neurovascular status: Neuro intact, good cap refill       STUDIES REVIEWED:  No studies reviewed  PROCEDURES PERFORMED:  Procedures  No Procedures performed today      ASSESSMENT/PLAN:    S/P right ECTR done 9/18/18  * Sutures were removed today without complications  * Pt will follow up as needed    S/P left long finger trigger finger release done 9/18/18  * Sutures were removed today without complications  * Pt will follow up as needed       *pt received a note for work allowing he to return to work on 10/4/18 without restrictions    FOLLOW UP:  Return if symptoms worsen or fail to improve        TO DO AT NEXT VISIT:  Re-evaluation of current issue      Scribe Attestation    I,:   Slime Gee am acting as a scribe while in the presence of the attending physician :        I,:   Stephanie Nino MD personally performed the services described in this documentation    as scribed in my presence :

## 2018-10-30 ENCOUNTER — HOSPITAL ENCOUNTER (INPATIENT)
Facility: HOSPITAL | Age: 58
LOS: 2 days | Discharge: HOME | DRG: 244 | End: 2018-11-02
Attending: EMERGENCY MEDICINE | Admitting: INTERNAL MEDICINE
Payer: COMMERCIAL

## 2018-10-30 ENCOUNTER — APPOINTMENT (EMERGENCY)
Dept: RADIOLOGY | Facility: HOSPITAL | Age: 58
DRG: 244 | End: 2018-10-30
Payer: COMMERCIAL

## 2018-10-30 DIAGNOSIS — R55 SYNCOPE AND COLLAPSE: Primary | ICD-10-CM

## 2018-10-30 DIAGNOSIS — R00.1 BRADYCARDIA: ICD-10-CM

## 2018-10-30 DIAGNOSIS — R10.9 ABDOMINAL PAIN: ICD-10-CM

## 2018-10-30 DIAGNOSIS — I82.409 DEEP VEIN THROMBOSIS (DVT) OF LOWER EXTREMITY (HCC): ICD-10-CM

## 2018-10-30 DIAGNOSIS — I45.5 SINUS PAUSE: ICD-10-CM

## 2018-10-30 LAB
ALBUMIN SERPL BCP-MCNC: 3.4 G/DL (ref 3.5–5)
ALP SERPL-CCNC: 90 U/L (ref 46–116)
ALT SERPL W P-5'-P-CCNC: 22 U/L (ref 12–78)
ANION GAP SERPL CALCULATED.3IONS-SCNC: 4 MMOL/L (ref 4–13)
AST SERPL W P-5'-P-CCNC: 17 U/L (ref 5–45)
BASOPHILS # BLD AUTO: 0.06 THOUSANDS/ΜL (ref 0–0.1)
BASOPHILS NFR BLD AUTO: 1 % (ref 0–1)
BILIRUB SERPL-MCNC: 0.39 MG/DL (ref 0.2–1)
BUN SERPL-MCNC: 14 MG/DL (ref 5–25)
CALCIUM SERPL-MCNC: 8.8 MG/DL (ref 8.3–10.1)
CHLORIDE SERPL-SCNC: 107 MMOL/L (ref 100–108)
CO2 SERPL-SCNC: 28 MMOL/L (ref 21–32)
CREAT SERPL-MCNC: 0.64 MG/DL (ref 0.6–1.3)
EOSINOPHIL # BLD AUTO: 0.22 THOUSAND/ΜL (ref 0–0.61)
EOSINOPHIL NFR BLD AUTO: 3 % (ref 0–6)
ERYTHROCYTE [DISTWIDTH] IN BLOOD BY AUTOMATED COUNT: 12.2 % (ref 11.6–15.1)
GFR SERPL CREATININE-BSD FRML MDRD: 98 ML/MIN/1.73SQ M
GLUCOSE SERPL-MCNC: 167 MG/DL (ref 65–140)
HCT VFR BLD AUTO: 35 % (ref 34.8–46.1)
HGB BLD-MCNC: 11.7 G/DL (ref 11.5–15.4)
IMM GRANULOCYTES # BLD AUTO: 0.02 THOUSAND/UL (ref 0–0.2)
IMM GRANULOCYTES NFR BLD AUTO: 0 % (ref 0–2)
LACTATE SERPL-SCNC: 1.7 MMOL/L (ref 0.5–2)
LIPASE SERPL-CCNC: 223 U/L (ref 73–393)
LYMPHOCYTES # BLD AUTO: 1.94 THOUSANDS/ΜL (ref 0.6–4.47)
LYMPHOCYTES NFR BLD AUTO: 22 % (ref 14–44)
MCH RBC QN AUTO: 32.9 PG (ref 26.8–34.3)
MCHC RBC AUTO-ENTMCNC: 33.4 G/DL (ref 31.4–37.4)
MCV RBC AUTO: 98 FL (ref 82–98)
MONOCYTES # BLD AUTO: 0.42 THOUSAND/ΜL (ref 0.17–1.22)
MONOCYTES NFR BLD AUTO: 5 % (ref 4–12)
NEUTROPHILS # BLD AUTO: 6.08 THOUSANDS/ΜL (ref 1.85–7.62)
NEUTS SEG NFR BLD AUTO: 69 % (ref 43–75)
NRBC BLD AUTO-RTO: 0 /100 WBCS
PLATELET # BLD AUTO: 171 THOUSANDS/UL (ref 149–390)
PMV BLD AUTO: 11.4 FL (ref 8.9–12.7)
POTASSIUM SERPL-SCNC: 3.6 MMOL/L (ref 3.5–5.3)
PROT SERPL-MCNC: 6.9 G/DL (ref 6.4–8.2)
RBC # BLD AUTO: 3.56 MILLION/UL (ref 3.81–5.12)
SODIUM SERPL-SCNC: 139 MMOL/L (ref 136–145)
TROPONIN I SERPL-MCNC: <0.02 NG/ML
WBC # BLD AUTO: 8.74 THOUSAND/UL (ref 4.31–10.16)

## 2018-10-30 PROCEDURE — 84484 ASSAY OF TROPONIN QUANT: CPT | Performed by: EMERGENCY MEDICINE

## 2018-10-30 PROCEDURE — 93005 ELECTROCARDIOGRAM TRACING: CPT

## 2018-10-30 PROCEDURE — 99285 EMERGENCY DEPT VISIT HI MDM: CPT

## 2018-10-30 PROCEDURE — 80053 COMPREHEN METABOLIC PANEL: CPT | Performed by: EMERGENCY MEDICINE

## 2018-10-30 PROCEDURE — 83690 ASSAY OF LIPASE: CPT | Performed by: EMERGENCY MEDICINE

## 2018-10-30 PROCEDURE — 83605 ASSAY OF LACTIC ACID: CPT | Performed by: EMERGENCY MEDICINE

## 2018-10-30 PROCEDURE — 96360 HYDRATION IV INFUSION INIT: CPT

## 2018-10-30 PROCEDURE — 36415 COLL VENOUS BLD VENIPUNCTURE: CPT | Performed by: EMERGENCY MEDICINE

## 2018-10-30 PROCEDURE — 85025 COMPLETE CBC W/AUTO DIFF WBC: CPT | Performed by: EMERGENCY MEDICINE

## 2018-10-30 PROCEDURE — 74177 CT ABD & PELVIS W/CONTRAST: CPT

## 2018-10-30 RX ADMIN — IOHEXOL 100 ML: 350 INJECTION, SOLUTION INTRAVENOUS at 23:31

## 2018-10-30 RX ADMIN — SODIUM CHLORIDE 1000 ML: 0.9 INJECTION, SOLUTION INTRAVENOUS at 22:42

## 2018-10-31 PROBLEM — R93.89 ABNORMAL CT SCAN: Status: ACTIVE | Noted: 2018-10-31

## 2018-10-31 PROBLEM — R55 SYNCOPE: Status: ACTIVE | Noted: 2018-10-31

## 2018-10-31 LAB
ALBUMIN SERPL BCP-MCNC: 3.3 G/DL (ref 3.5–5)
ALP SERPL-CCNC: 83 U/L (ref 46–116)
ALT SERPL W P-5'-P-CCNC: 23 U/L (ref 12–78)
ANION GAP SERPL CALCULATED.3IONS-SCNC: 5 MMOL/L (ref 4–13)
AST SERPL W P-5'-P-CCNC: 17 U/L (ref 5–45)
ATRIAL RATE: 48 BPM
BACTERIA UR QL AUTO: ABNORMAL /HPF
BILIRUB SERPL-MCNC: 0.7 MG/DL (ref 0.2–1)
BILIRUB UR QL STRIP: NEGATIVE
BUN SERPL-MCNC: 12 MG/DL (ref 5–25)
CALCIUM SERPL-MCNC: 8.6 MG/DL (ref 8.3–10.1)
CHLORIDE SERPL-SCNC: 109 MMOL/L (ref 100–108)
CLARITY UR: ABNORMAL
CO2 SERPL-SCNC: 26 MMOL/L (ref 21–32)
COLOR UR: YELLOW
CORTIS SERPL-MCNC: 3.9 UG/DL
CREAT SERPL-MCNC: 0.43 MG/DL (ref 0.6–1.3)
ERYTHROCYTE [DISTWIDTH] IN BLOOD BY AUTOMATED COUNT: 12.4 % (ref 11.6–15.1)
GFR SERPL CREATININE-BSD FRML MDRD: 112 ML/MIN/1.73SQ M
GLUCOSE P FAST SERPL-MCNC: 90 MG/DL (ref 65–99)
GLUCOSE SERPL-MCNC: 90 MG/DL (ref 65–140)
GLUCOSE UR STRIP-MCNC: NEGATIVE MG/DL
HCT VFR BLD AUTO: 33.9 % (ref 34.8–46.1)
HGB BLD-MCNC: 11.2 G/DL (ref 11.5–15.4)
HGB UR QL STRIP.AUTO: ABNORMAL
HYALINE CASTS #/AREA URNS LPF: ABNORMAL /LPF
KETONES UR STRIP-MCNC: NEGATIVE MG/DL
LEUKOCYTE ESTERASE UR QL STRIP: NEGATIVE
MAGNESIUM SERPL-MCNC: 2.1 MG/DL (ref 1.6–2.6)
MCH RBC QN AUTO: 32.3 PG (ref 26.8–34.3)
MCHC RBC AUTO-ENTMCNC: 33 G/DL (ref 31.4–37.4)
MCV RBC AUTO: 98 FL (ref 82–98)
NITRITE UR QL STRIP: NEGATIVE
NON-SQ EPI CELLS URNS QL MICRO: ABNORMAL /HPF
P AXIS: 69 DEGREES
PH UR STRIP.AUTO: 6 [PH] (ref 4.5–8)
PLATELET # BLD AUTO: 177 THOUSANDS/UL (ref 149–390)
PMV BLD AUTO: 11.7 FL (ref 8.9–12.7)
POTASSIUM SERPL-SCNC: 3.7 MMOL/L (ref 3.5–5.3)
PR INTERVAL: 208 MS
PROT SERPL-MCNC: 6.2 G/DL (ref 6.4–8.2)
PROT UR STRIP-MCNC: NEGATIVE MG/DL
QRS AXIS: 70 DEGREES
QRSD INTERVAL: 86 MS
QT INTERVAL: 470 MS
QTC INTERVAL: 419 MS
RBC # BLD AUTO: 3.47 MILLION/UL (ref 3.81–5.12)
RBC #/AREA URNS AUTO: ABNORMAL /HPF
SODIUM SERPL-SCNC: 140 MMOL/L (ref 136–145)
SP GR UR STRIP.AUTO: <=1.005 (ref 1–1.03)
T WAVE AXIS: 53 DEGREES
TROPONIN I SERPL-MCNC: <0.02 NG/ML
TSH SERPL DL<=0.05 MIU/L-ACNC: 1.42 UIU/ML (ref 0.36–3.74)
UROBILINOGEN UR QL STRIP.AUTO: 0.2 E.U./DL
VENTRICULAR RATE: 48 BPM
WBC # BLD AUTO: 7.45 THOUSAND/UL (ref 4.31–10.16)
WBC #/AREA URNS AUTO: ABNORMAL /HPF

## 2018-10-31 PROCEDURE — 90682 RIV4 VACC RECOMBINANT DNA IM: CPT | Performed by: INTERNAL MEDICINE

## 2018-10-31 PROCEDURE — 84443 ASSAY THYROID STIM HORMONE: CPT | Performed by: INTERNAL MEDICINE

## 2018-10-31 PROCEDURE — 83735 ASSAY OF MAGNESIUM: CPT | Performed by: INTERNAL MEDICINE

## 2018-10-31 PROCEDURE — 86803 HEPATITIS C AB TEST: CPT | Performed by: HOSPITALIST

## 2018-10-31 PROCEDURE — 80053 COMPREHEN METABOLIC PANEL: CPT | Performed by: INTERNAL MEDICINE

## 2018-10-31 PROCEDURE — 99220 PR INITIAL OBSERVATION CARE/DAY 70 MINUTES: CPT | Performed by: INTERNAL MEDICINE

## 2018-10-31 PROCEDURE — 99226 PR SBSQ OBSERVATION CARE/DAY 35 MINUTES: CPT | Performed by: NURSE PRACTITIONER

## 2018-10-31 PROCEDURE — 85027 COMPLETE CBC AUTOMATED: CPT | Performed by: INTERNAL MEDICINE

## 2018-10-31 PROCEDURE — 99222 1ST HOSP IP/OBS MODERATE 55: CPT | Performed by: INTERNAL MEDICINE

## 2018-10-31 PROCEDURE — 84484 ASSAY OF TROPONIN QUANT: CPT | Performed by: INTERNAL MEDICINE

## 2018-10-31 PROCEDURE — 81001 URINALYSIS AUTO W/SCOPE: CPT

## 2018-10-31 PROCEDURE — 82533 TOTAL CORTISOL: CPT | Performed by: INTERNAL MEDICINE

## 2018-10-31 PROCEDURE — 99223 1ST HOSP IP/OBS HIGH 75: CPT | Performed by: INTERNAL MEDICINE

## 2018-10-31 PROCEDURE — 93010 ELECTROCARDIOGRAM REPORT: CPT | Performed by: INTERNAL MEDICINE

## 2018-10-31 PROCEDURE — 36415 COLL VENOUS BLD VENIPUNCTURE: CPT | Performed by: INTERNAL MEDICINE

## 2018-10-31 PROCEDURE — 93005 ELECTROCARDIOGRAM TRACING: CPT

## 2018-10-31 RX ORDER — PERPHENAZINE 4 MG/1
16 TABLET, FILM COATED ORAL 2 TIMES DAILY
Status: DISCONTINUED | OUTPATIENT
Start: 2018-10-31 | End: 2018-10-31

## 2018-10-31 RX ORDER — ATROPINE SULFATE 1 MG/ML
0.5 INJECTION, SOLUTION INTRAMUSCULAR; INTRAVENOUS; SUBCUTANEOUS AS NEEDED
Status: DISCONTINUED | OUTPATIENT
Start: 2018-10-31 | End: 2018-11-02 | Stop reason: HOSPADM

## 2018-10-31 RX ORDER — PALIPERIDONE 6 MG/1
6 TABLET, EXTENDED RELEASE ORAL DAILY
Status: DISCONTINUED | OUTPATIENT
Start: 2018-10-31 | End: 2018-10-31

## 2018-10-31 RX ORDER — MIRTAZAPINE 15 MG/1
15 TABLET, FILM COATED ORAL
Status: DISCONTINUED | OUTPATIENT
Start: 2018-10-31 | End: 2018-10-31

## 2018-10-31 RX ORDER — SODIUM CHLORIDE 9 MG/ML
100 INJECTION, SOLUTION INTRAVENOUS CONTINUOUS
Status: DISCONTINUED | OUTPATIENT
Start: 2018-10-31 | End: 2018-10-31

## 2018-10-31 RX ORDER — FERROUS SULFATE 325(65) MG
325 TABLET ORAL
COMMUNITY
End: 2020-06-23 | Stop reason: ALTCHOICE

## 2018-10-31 RX ORDER — HYDROCODONE BITARTRATE AND ACETAMINOPHEN 5; 325 MG/1; MG/1
1 TABLET ORAL EVERY 6 HOURS PRN
Status: DISCONTINUED | OUTPATIENT
Start: 2018-10-31 | End: 2018-11-02 | Stop reason: HOSPADM

## 2018-10-31 RX ORDER — HYDROXYZINE HYDROCHLORIDE 25 MG/1
25 TABLET, FILM COATED ORAL ONCE
Status: DISCONTINUED | OUTPATIENT
Start: 2018-10-31 | End: 2018-10-31

## 2018-10-31 RX ORDER — SODIUM CHLORIDE 9 MG/ML
75 INJECTION, SOLUTION INTRAVENOUS CONTINUOUS
Status: DISCONTINUED | OUTPATIENT
Start: 2018-10-31 | End: 2018-11-01

## 2018-10-31 RX ORDER — FLUPHENAZINE HYDROCHLORIDE 2.5 MG/1
10 TABLET ORAL
Status: DISCONTINUED | OUTPATIENT
Start: 2018-10-31 | End: 2018-10-31

## 2018-10-31 RX ORDER — CLONIDINE HYDROCHLORIDE 0.2 MG/1
0.2 TABLET ORAL ONCE
Status: COMPLETED | OUTPATIENT
Start: 2018-10-31 | End: 2018-10-31

## 2018-10-31 RX ADMIN — SODIUM CHLORIDE 100 ML/HR: 0.9 INJECTION, SOLUTION INTRAVENOUS at 16:55

## 2018-10-31 RX ADMIN — SODIUM CHLORIDE 75 ML/HR: 0.9 INJECTION, SOLUTION INTRAVENOUS at 17:23

## 2018-10-31 RX ADMIN — SODIUM CHLORIDE 100 ML/HR: 0.9 INJECTION, SOLUTION INTRAVENOUS at 05:13

## 2018-10-31 RX ADMIN — CLONIDINE HYDROCHLORIDE 0.2 MG: 0.2 TABLET ORAL at 00:44

## 2018-10-31 RX ADMIN — INFLUENZA A VIRUS A/MICHIGAN/45/2015 (H1N1) RECOMBINANT HEMAGGLUTININ ANTIGEN, INFLUENZA A VIRUS A/SINGAPORE/INFIMH-16-0019/2016 (H3N2) RECOMBINANT HEMAGGLUTININ ANTIGEN, INFLUENZA B VIRUS B/MARYLAND/15/2016 RECOMBINANT HEMAGGLUTININ ANTIGEN, AND INFLUENZA B VIRUS B/PHUKET/3073/2013 RECOMBINANT HEMAGGLUTININ ANTIGEN 0.5 ML: 45; 45; 45; 45 INJECTION INTRAMUSCULAR at 18:44

## 2018-10-31 NOTE — H&P
H&P- Jenna Viramontes Sunday 1960, 60 y o  female MRN: [id number]    Unit/Bed#: ED 29 Encounter: [id number]    Primary Care Provider: Bard Jonah MD   Date and time admitted to hospital: 10/30/2018 10:04 PM        Abnormal CT scan   Assessment & Plan    CT scan showing ? GE? Patient had one episode of vomiting and diarrhea and now is complaining of dull abdominal pain  Will give IVF and monitor with serial abdominal exams  Deep vein thrombosis (DVT) of lower extremity [facility])   Assessment & Plan    Patient has history of DVT in lower extremities as well as upper  No hematology work up as per family  On eliquis and managed by PCP  Will continue with eliquis  * Syncope   Assessment & Plan    Orhtostatics  Echo  Telemetry  Trop x3  VTE Prophylaxis: Heparin  / sequential compression device   Code Status: Full code  POLST: There is no POLST form on file for this patient (pre-hospital)  Discussion with family: Discussed with daughter at bedside  Anticipated Length of Stay:  Patient will be admitted on an Observation basis with an anticipated length of stay of  less 2 midnights  Justification for Hospital Stay: syncope  Total Time for Visit, including Counseling / Coordination of Care: 45 minutes  Greater than 50% of this total time spent on direct patient counseling and coordination of care  Chief Complaint:     Syncope  History of Present Illness:    Jenna Viramontes Sunday is a 60 y o  female who presents with a syncopal episode followed by nausea, vomiting and diarrhea  Patient states that she was at home knitting when she noted some abdominal pain, felt hungry, got something to eat and sat down  After this she does not remember passing out but her daughter states that she heard her head hit the kitchen counter and found her face down on the counter  Daughter claims she was unable to rouse her for "about 5 minutes" and that she did not have a pulse at that time either  Patient denies any preceding or current CP, palpitations or SOB  She does note abdominal pain in epigastrium and LLQ  CT scan shows finding in keeping with gastroenteritis  Review of Systems:    Review of Systems   Constitutional: Negative for activity change, appetite change, chills, diaphoresis, fatigue, fever and unexpected weight change  HENT: Negative  Respiratory: Negative  Cardiovascular: Negative  Gastrointestinal: Positive for abdominal pain, diarrhea and vomiting  Negative for abdominal distention, anal bleeding, blood in stool, constipation, nausea and rectal pain  Endocrine: Negative  Genitourinary: Negative  Musculoskeletal: Negative  Skin: Negative  Neurological: Positive for syncope  Negative for dizziness, tremors, seizures, facial asymmetry, speech difficulty, weakness, light-headedness, numbness and headaches  Psychiatric/Behavioral: Negative  Past Medical and Surgical History:     Past Medical History:   Diagnosis Date    DVT (deep venous thrombosis) ([facility] [address] )        Past Surgical History:   Procedure Laterality Date    CERVICAL One [address] Demond SURGERY  2009    PLATES & SCREWS     SECTION      X2    GASTRIC BYPASS  2017    LAP RINYGB SURGERY    KNEE SURGERY Bilateral     OTHER SURGICAL HISTORY      ARM SURGERIES    [place] INCISE FINGER TENDON SHEATH Left 2018    Procedure: LONG TRIGGER FINGER RELEASE;  Surgeon: Scott Hammond MD;  [facility];  Service: Orthopedics    [place] WRIST Rupal Small LIG Right 2018    Procedure: ENDOSCOPIC CARPAL TUNNEL RELEASE;  Surgeon: Scott Hammond MD;  [facility];  Service: Orthopedics    TONSILLECTOMY         Meds/Allergies:    Prior to Admission medications    Medication Sig Start Date End Date Taking?  Authorizing Provider   B Complex Vitamins (VITAMIN B COMPLEX PO) Take 1 capsule by mouth   Yes Historical Provider, MD   cholecalciferol (VITAMIN D3) 1,000 units tablet Take 1,000 Units by mouth 2 (two) times a day   Yes Historical Provider, MD   ELIQUTEOFILO 2 5 MG 2 5 mg   7/19/18  Yes Historical Provider, MD   HYDROcodone-acetaminophen (NORCO) 5-325 mg per tablet Take 1 tablet by mouth every 6 (six) hours as needed for pain for up to 20 doses For continuation of care Max Daily Amount: 4 tablets 9/18/18  Yes Snou Campo PA-C     I have reviewed home medications with patient personally  Allergies: No Known Allergies    Social History:     Marital Status: /Civil Union   Occupation: works as nursing aid at [employer]  Patient Pre-hospital Living Situation: lives with her daughter  Patient Pre-hospital Level of Mobility: fully mobile  Patient Pre-hospital Diet Restrictions: none  Substance Use History:   History   Alcohol Use    Yes     Comment: very rare social     History   Smoking Status    Light Tobacco Smoker    Packs/day: 0 25    Types: Cigarettes   Smokeless Tobacco    Never Used     Comment: NO PASSIVE SMOKE      History   Drug Use No       Family History:    Family History   Problem Relation Age of Onset    Stroke Mother     Alzheimer's disease Mother     Arthritis Mother     Coronary artery disease Mother    [facility] Breast cancer Mother     Cancer Mother     Heart disease Mother     Hypertension Father     Gout Father     Diabetes type II Father     Coronary artery disease Father     Heart disease Father      "alot of heart problems"  Physical Exam:     Vitals:   Blood Pressure: 116/58 (10/31/18 0143)  Pulse: 66 (10/31/18 0143)  Temperature: 97 5 °F (36 4 °C) (10/30/18 2212)  Temp Source: Oral (10/30/18 2212)  Respirations: 18 (10/31/18 0143)  Weight - Scale: 71 2 kg (156 lb 15 5 oz) (10/30/18 2212)  SpO2: 97 % (10/31/18 0143)    Physical Exam   Constitutional: No distress  HENT:   Head: Normocephalic  Mouth/Throat: No oropharyngeal exudate  Eyes: Right eye exhibits no discharge  Left eye exhibits no discharge   No scleral icterus  Neck: No JVD present  No tracheal deviation present  No thyromegaly present  Cardiovascular: Normal rate  Exam reveals no gallop and no friction rub  No murmur heard  Pulmonary/Chest: No respiratory distress  She has no wheezes  She has no rales  She exhibits no tenderness  Abdominal: Soft  She exhibits no distension and no mass  There is no tenderness  There is no rebound and no guarding  Musculoskeletal: She exhibits no edema, tenderness or deformity  Lymphadenopathy:     She has no cervical adenopathy  Neurological: She is alert  She displays normal reflexes  No cranial nerve deficit  She exhibits normal muscle tone  Coordination normal    Skin: Skin is warm  No rash noted  She is not diaphoretic  No erythema  No pallor  Psychiatric: She has a normal mood and affect  Additional Data:     Lab Results: I have personally reviewed pertinent reports  Results from last 7 days  Lab Units 10/30/18  2239   WBC Thousand/uL 8 74   HEMOGLOBIN g/dL 11 7   HEMATOCRIT % 35 0   PLATELETS Thousands/uL 171   NEUTROS ABS Thousands/µL 6 08   NEUTROS PCT % 69   LYMPHS PCT % 22   MONOS PCT % 5   EOS PCT % 3       Results from last 7 days  Lab Units 10/30/18  2239   SODIUM mmol/L 139   POTASSIUM mmol/L 3 6   CHLORIDE mmol/L 107   CO2 mmol/L 28   BUN mg/dL 14   CREATININE mg/dL 0 64   ANION GAP mmol/L 4   CALCIUM mg/dL 8 8   ALBUMIN g/dL 3 4*   TOTAL BILIRUBIN mg/dL 0 39   ALK PHOS U/L 90   ALT U/L 22   AST U/L 17                   Results from last 7 days  Lab Units 10/30/18  2241   LACTIC ACID mmol/L 1 7       Imaging: I have personally reviewed pertinent reports  CT abdomen pelvis with contrast   Final Result by Vinicius Costa MD (10/30 5129)      Fluid-filled loops of bowel throughout the abdomen in a nonspecific pattern that consent densities seen in the setting of gastroenteritis   There is moderate fluid-filled distention of the excluded portion of the stomach without evidence to suggest    afferent loop obstruction  Otherwise no acute CT abnormality in the abdomen or pelvis to account for the patient's symptoms  Workstation performed: [id number]             EKG, Pathology, and Other Studies Reviewed on Admission:   · EKG: none on chart  Will order  Allscripts / Epic Records Reviewed: Yes     ** Please Note: This note has been constructed using a voice recognition system   **

## 2018-10-31 NOTE — ASSESSMENT & PLAN NOTE
Patient has history of DVT in lower extremities as well as upper  No hematology work up as per family  On eliquis and managed by PCP  Will continue with eliquis

## 2018-10-31 NOTE — UTILIZATION REVIEW
[address] [facility] in the [facility] by Kameron Go for 2017  Network Utilization Review Department  Phone: [phone]; Fax [phone]  ATTENTION: The Network Utilization Review Department is now centralized for our 7 Facilities  Please call with any questions or concerns to [phone] and carefully follow the prompts so that you are directed to the right person  All voicemails are confidential  Fax any determinations, approvals, denials, and requests for initial or continue stay review clinical to [phone]  Due to HIGH CALL volume, it would be easier if you could please send faxed requests to expedite your requests and in part, help us provide discharge notifications faster   /////////////////////////////////////////////////////////////////////////////////////////////////////////////////    Initial Clinical Review    ADMIT OBS  On  10/31  @  0102    CHANGED to INPT status on 10/31  @  [phone]    ED: Date/Time/Mode of Arrival:   ED Arrival Information     Expected Arrival [address] [place] of Arrival Escorted By Service Admission Type    - 10/30/2018 22:03 [facility] [address]    Arrival Complaint    -        Chief Complaint:   Chief Complaint   Patient presents with    Syncope     pt had syncopal episode today and LOC for approx 5 mins per family  pt is reporting abd pain and nausea      History of Illness:    60 y o  female who presents with a syncopal episode followed by nausea, vomiting and diarrhea       Patient states that she was at home knitting when she noted some abdominal pain, felt hungry, got something to eat and sat down  After this she does not remember passing out but her daughter states that she heard her head hit the kitchen counter and found her face down on the counter  Daughter claims she was unable to rouse her for "about 5 minutes" and that she did not have a pulse at that time either  Patient denies any preceding or current CP, palpitations or SOB      ED Vital Signs:   97 5  55   18   105/61  100% on RA        wgt  71 2 kg (156 lb 15 5 oz)    Abnormal Labs/Diagnostic Test Results:   crt  0 64   0 43  Trop  <0 02   <0 02  Wbc  8 74  UA  Sg  <=1 005  ekg -  Sinus alvaro  CT scan shows finding in keeping with gastroenteritis       ED Treatment:   IVF NS 1 liter bolus then continuous @ 100 cc/hr;  Catapres po    Past Medical/Surgical History: Active Ambulatory Problems     Diagnosis Date Noted    Deep vein thrombosis (DVT) of lower extremity ([facility] [address] ) 08/17/2018    Trigger middle finger of left hand 08/17/2018    Carpal tunnel syndrome on right 08/17/2018     Resolved Ambulatory Problems     Diagnosis Date Noted    No Resolved Ambulatory Problems     Past Medical History:   Diagnosis Date    DVT (deep venous thrombosis) ([facility] [address] ) 2012       Admitting Diagnosis: Syncope [R55]    Assessment/Plan:  Abnormal CT scan   Assessment & Plan     CT scan showing ? GE? Patient had one episode of vomiting and diarrhea and now is complaining of dull abdominal pain  Will give IVF and monitor with serial abdominal exams        Deep vein thrombosis (DVT) of lower extremity [facility])   Assessment & Plan     Patient has history of DVT in lower extremities as well as upper  No hematology work up as per family  On eliquis and managed by PCP  Will continue with eliquis        * Syncope   Assessment & Plan     Orhtostatics  Echo  Telemetry  Trop x3           VTE Prophylaxis: Heparin  / sequential compression device     Patient will be admitted on an Observation basis with an anticipated length of stay of  less 2 midnights     Justification for Hospital Stay: syncope       Admission Orders:  Admit telemetry  NPO  Ortho bp's   Echo  Serial trops   Sequential compression device to b/l LE  IVF NS @ 100 cc/hr    Scheduled Meds:   Current Facility-Administered Medications:  atropine 0 5 mg Intravenous PRN Purujit Yana Chester MD    HYDROcodone-acetaminophen 1 tablet Oral Q6H PRN Flakito Garcia MD    nicotine 1 patch Transdermal Daily Flakito Garcia MD    sodium chloride 100 mL/hr Intravenous Continuous Efrain Hannah MD Last Rate: 100 mL/hr (10/31/18 0513)       10/31/2018  HR  56-65    RR 20    106/57   91/52     Tele =  SBrady w/pauses     96% RA sat    CARDIOLOGY  Syncope and sick sinus syndrome - Telemetry demonstrating significant sinus pauses with episodes of lightheadedness  At this point she meets criteria for permanent pacemaker  Electrophysiology has been consulted    We will also check an echocardiogram    -  Maintain transcutaneous pacer pads  - Atropine 0 5mg at bedside  - EP consult for PPM  - Check TSH, lyme  - Check echo to r/o structural disease and check EF  - Hold eliquis  - Maintain NPO  -  Ct    cc/h, possibly dehydrated

## 2018-10-31 NOTE — ED NOTES
Pt reports a new chest pain that she describes as dull and achy  The pt rates the pain 5/10  Pt is AAOx4 at this time  Paged NARENDRA Gaines, EVITA  10/31/18 3512

## 2018-10-31 NOTE — CONSULTS
Consultation - Electrophysiology-Cardiology (EP)   Anamaria Gamboa Sunday 60 y o  female MRN: [id number]  Unit/Bed#: [facility] 628-01 Encounter: [id number]      Inpatient consult to Electrophysiology  Consult performed by: Gertrude Cosby  Consult ordered by: Olivia Bunch          Assessment/Plan   1  SSS   * pt with symptomatic 4 4 second pause on tele, this was at 2AM but she was awake and attempting to eat during the pause when she became light headed   * multiple occasions show HR in the mid 40's this AM around 11 again she was awake for this    * given her hx she likely had a bradycardic event prompting her syncope, she does qualify for a DC PPM   * at this time her EF is unknown with echo pending    * she has a normal renal function    * on no AVNB's    * right handed    * no allergies to contrast dye    * no surgeries to left side of her chest or lymph nodes    * will add on for tomorrow    * keep NPO after midnight    * hold eliquis    * TSH normal    * no ECG abnormalities other then marked sinus alvaro    * no hx of tick bites     2  DVT    * found periop surrounding her gastric bypass    * hold eliquis tonight     3  HTN    History of Present Illness   Physician Requesting Consult: Artis Bojorquez MD  Reason for Consult / Principal Problem: bradycardia     HPI: Anamaria Gamboa Sunday is a 60y o  year old female with a history of morbid obesity s/p gastric bypass, HTN who is HOD#0 after presenting to [facility] ED after a syncopal episode  EP is being consulted for PPM evaluation  One day ago while in her home with her daughter patient walked from the living room to her kitchen looking at the time on the stove finding it to be 830pm  This is the last event she remembers prior to be aroused by her daughter who had just told the patient she sat down at the kitchen counter in a chair, put her head in her arms and became unresponsive   Daughter states that she attempted arousing her mother for a few minutes prior to the patient becoming awake  Daughter and patient deny any tonic clonic movement or post ictal state  No loss of bowel or bladder function  No tongue or lip biting  Patient denies any prodromal symptoms without any LH, flushing, dizziness, SOB  After the event occurred EMS was called bring patient to our facility  Once she was here she was found to be markedly bradycardic and experienced a 4 4 second pause around 2AM while attempting to eat a salad  Patient was lightheaded during this pause  Due to this an EP consult was placed for PPM evaluation  Prior to yesterdays episode this has never occurred in the past for the patient  She does have a family hx of MI's and CAD but no hx of SCD or sarcoidosis that she is aware of  No hx of MI, CAD or prior open heart surgeries  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 and is able to perform her job without any limitations  Review of Systems  ROS as noted above, otherwise 12 point review of systems was performed and is negative         Historical Information   Past Medical History:   Diagnosis Date    DVT (deep venous thrombosis) ([facility] [address] )      Past Surgical History:   Procedure Laterality Date    CERVICAL One [address] SURGERY  2009    PLATES & SCREWS     SECTION      X2    GASTRIC BYPASS  2017    LAP RINYGB SURGERY    KNEE SURGERY Bilateral     OTHER SURGICAL HISTORY      ARM SURGERIES    SD INCISE FINGER TENDON SHEATH Left 2018    Procedure: LONG TRIGGER FINGER RELEASE;  Surgeon: Rupert So MD;  Location: [facility];  Service: Orthopedics    SD WRIST [address] LIG Right 2018    Procedure: ENDOSCOPIC CARPAL TUNNEL RELEASE;  Surgeon: Rupert So MD;  [facility]: [facility];  Service: Orthopedics    TONSILLECTOMY       History   Alcohol Use    1 2 oz/week    1 Glasses of wine, 1 Standard drinks or equivalent per week     Comment: very rare social     History   Drug Use No     History   Smoking Status    Light Tobacco Smoker    Packs/day: 0 25    Types: Cigarettes   Smokeless Tobacco    Never Used     Comment: NO PASSIVE SMOKE      Family History:  Brother -  age 48 MI   Father -  age 59 from cardiac complications   Mother - alive - s/p CABG     Meds/Allergies   Hospital Medications: Current Facility-Administered Medications   Medication Dose Route Frequency    atropine injection 0 5 mg  0 5 mg Intravenous PRN    HYDROcodone-acetaminophen (NORCO) 5-325 mg per tablet 1 tablet  1 tablet Oral Q6H PRN    influenza vaccine, recombinant, quadrivalent (FLUBLOK) IM injection 0 5 mL  0 5 mL Intramuscular Prior to discharge    nicotine (NICODERM CQ) 7 mg/24hr TD 24 hr patch 1 patch  1 patch Transdermal Daily    sodium chloride 0 9 % infusion  75 mL/hr Intravenous Continuous     Home Medications:   Prescriptions Prior to Admission   Medication    B Complex Vitamins (VITAMIN B COMPLEX PO)    cholecalciferol (VITAMIN D3) 1,000 units tablet    ELIQUIS 2 5 MG    ferrous sulfate 325 (65 Fe) mg tablet    HYDROcodone-acetaminophen (NORCO) 5-325 mg per tablet       No Known Allergies    Objective   Vitals: Blood pressure 114/54, pulse 66, temperature 98 1 °F (36 7 °C), resp  rate 16, height 5' 3" (1 6 m), weight 72 kg (158 lb 11 7 oz), SpO2 98 %  Orthostatic Blood Pressures      Most Recent Value   Blood Pressure  114/54 filed at 10/31/2018 1630   Patient Position - Orthostatic VS  Sitting filed at 10/31/2018 1600            Intake/Output Summary (Last 24 hours) at 10/31/18 1743  Last data filed at 10/30/18 2326   Gross per 24 hour   Intake             1300 ml   Output                0 ml   Net             1300 ml       Invasive Devices     Peripheral Intravenous Line            Peripheral IV 10/30/18 Left Antecubital less than 1 day                Physical Exam   Constitutional: She is oriented to person, place, and time  She appears well-developed and well-nourished  HENT:   Head: Normocephalic and atraumatic     Eyes: Pupils are equal, round, and reactive to light  EOM are normal    Neck: Normal range of motion  Neck supple  Cardiovascular: Normal rate and regular rhythm  Pulmonary/Chest: Effort normal and breath sounds normal    Abdominal: Soft  Bowel sounds are normal    Musculoskeletal: Normal range of motion  Neurological: She is alert and oriented to person, place, and time  Skin: Skin is warm and dry  Psychiatric: She has a normal mood and affect  Lab Results: I have personally reviewed pertinent lab results  Results from last 7 days  Lab Units 10/31/18  0438 10/30/18  2239   WBC Thousand/uL 7 45 8 74   HEMOGLOBIN g/dL 11 2* 11 7   HEMATOCRIT % 33 9* 35 0   PLATELETS Thousands/uL 177 171       Results from last 7 days  Lab Units 10/31/18  0438 10/30/18  2239   SODIUM mmol/L 140 139   POTASSIUM mmol/L 3 7 3 6   CHLORIDE mmol/L 109* 107   CO2 mmol/L 26 28   BUN mg/dL 12 14   CREATININE mg/dL 0 43* 0 64   CALCIUM mg/dL 8 6 8 8           Results from last 7 days  Lab Units 10/31/18  0438   MAGNESIUM mg/dL 2 1       Imaging: I have personally reviewed pertinent reports  ECHO: No results found for this or any previous visit      CATH/STRESS TEST:  None     Tele:         EKG:

## 2018-10-31 NOTE — ASSESSMENT & PLAN NOTE
· CT scan showing:  Fluid-filled loops of bowel throughout the abdomen in a nonspecific pattern that consent densities seen in the setting of gastroenteritis  There is moderate fluid-filled distention of the excluded portion of the stomach without evidence to suggest afferent loop obstruction   Reportedly had emesis and diarrhea with dull abd pain on admission  · Patient was NPO for possible pacer since not being done today can eat will monitor for symptoms but currently without abd complaints  · Continue IVF

## 2018-10-31 NOTE — ED NOTES
meds not given to pt, scanned and confirmed with md that ordered on wrong pt     Reina Prior, RN  10/31/18 1417

## 2018-10-31 NOTE — ASSESSMENT & PLAN NOTE
· Suspected secondary to bradycardia with SSS  · Appreciate cardiology consultation  · Plan pacer tomorrow  · Continue close monitoring, pacer pads in place   · Echo pending  · Trop negative x 3  · Electrolytes stable  · VSS  · Close monitoring

## 2018-10-31 NOTE — CONSULTS
Cardiology   Danica Adames Sunday 60 y o  female MRN: [id number]  Unit/Bed#: ED 29 Encounter: [id number]    Assessment/Plan     AssessmentPlan:    >> Sinus pauses : Likely cause of syncope  - Monitor on tele  - Maintain transcutaneous pacer pads  - Atropine 0 5mg at bedside  - Currently asymptomatic  - EP consult for PPM  - Check TSH, lyme  - Check echo to r/o structural disease and check EF  - Check lyme abs  - Hold eliquis  - Maintain NPO    >> Syncope: as above  Echo to rule out other causes    >> Hypotension: Uncertain etiology  Check TSH and consider adrenal insufficiency - defer to medicine  No active signs of bleeding, sepsis  Ct  cc/h, possibly dehydrated    >> Abdominal pain:  Chronic per patient, CT shows gastritis  Defer to IM    >> DVT on eliquis:    Hold Eliquis today incase she needs PPM placement          Consult for:    History of Present Illness   HPI:  Danica Adames Sunday is a 60 y o  female who presents with an episode of loss of consciousness at around 8-8 30 pm tonight  She was sitting at the kitchen table when she lost consciousness, she does not remember what happened but her daughter states that she slumped over and passed out for "several minutes"  Her daughter panicked and called the family doc and family members who told her to call 911  When the patient was brought to the er she had abdominal pain and this was worked up with a CT scan  She also had 2 episodes of pauses on telemetry at ~2:45 am  Her blood pressure was on the low side and she received IVF  Currently she is asymptomatic, other than mild abdominal pain  Does not remember tick bites    Note: Chart says she received clonidine but this is was apparently entered in error, patient did not actually receive it    PMH: DM, HTN, HLD- resolved with gastric bypass 2 years ago   DVTs in both legs and arms prior to her surgery    PSH: Raghav en Y    FH: Mother had an MI, brother had premature CAD, HTN, DM    SH: ~25 py smoker, no etoh or drugs        ROS: Denies chest pain, shortness of breath, palpitations, orthopnea, PND, pedal edema,  presyncope, diaphoresis, nausea/vomiting, remaining ROS negative as above    EKG: sinus bradycardia- no ST-T changes, 2 episodes of 3 second pauses on telemetry          Historical Information   Past Medical History:   Diagnosis Date    DVT (deep venous thrombosis) ([facility] [address] )      Past Surgical History:   Procedure Laterality Date    CERVICAL One [address] SURGERY  2009    PLATES & SCREWS     SECTION      X2    GASTRIC BYPASS  2017    LAP RINYGB SURGERY    KNEE SURGERY Bilateral     OTHER SURGICAL HISTORY      ARM SURGERIES    SC INCISE FINGER TENDON SHEATH Left 2018    Procedure: LONG TRIGGER FINGER RELEASE;  Surgeon: Kandice Machado MD;  [facility];  Service: Orthopedics    SC WRIST [address] LIG Right 2018    Procedure: ENDOSCOPIC CARPAL TUNNEL RELEASE;  Surgeon: Kandice Machado MD;  [facility];  Service: Orthopedics    TONSILLECTOMY       Social History   History   Alcohol Use    Yes     Comment: very rare social     History   Drug Use No     History   Smoking Status    Light Tobacco Smoker    Packs/day: 0 25    Types: Cigarettes   Smokeless Tobacco    Never Used     Comment: NO PASSIVE SMOKE      Family History:   Family History   Problem Relation Age of Onset    Stroke Mother     Alzheimer's disease Mother     Arthritis Mother     Coronary artery disease Mother     Breast cancer Mother     Cancer Mother     Heart disease Mother     Hypertension Father     Gout Father     Diabetes type II Father     Coronary artery disease Father     Heart disease Father        Meds/Allergies   PTA meds:   Prior to Admission Medications   Prescriptions Last Dose Informant Patient Reported? Taking?    B Complex Vitamins (VITAMIN B COMPLEX PO)  Self Yes Yes   Sig: Take 1 capsule by mouth   ELIQUIS 2 5 MG  Self Yes Yes   Si 5 mg HYDROcodone-acetaminophen (NORCO) 5-325 mg per tablet   No Yes   Sig: Take 1 tablet by mouth every 6 (six) hours as needed for pain for up to 20 doses For continuation of care Max Daily Amount: 4 tablets   cholecalciferol (VITAMIN D3) 1,000 units tablet   Yes Yes   Sig: Take 1,000 Units by mouth 2 (two) times a day      Facility-Administered Medications: None     No Known Allergies    Objective   Vitals: Blood pressure 103/55, pulse 65, temperature 97 5 °F (36 4 °C), temperature source Oral, resp  rate (!) 26, weight 71 2 kg (156 lb 15 5 oz), SpO2 98 %  Orthostatic Blood Pressures      Most Recent Value   Blood Pressure  103/55 filed at 10/31/2018 0256   Patient Position - Orthostatic VS  Lying filed at 10/31/2018 0056            Intake/Output Summary (Last 24 hours) at 10/31/18 0411  Last data filed at 10/30/18 2326   Gross per 24 hour   Intake             1300 ml   Output                0 ml   Net             1300 ml       Invasive Devices     Peripheral Intravenous Line            Peripheral IV 10/30/18 Left Antecubital less than 1 day                Review of Systems:  Review of Systems    Physical Exam General:  AO x3, no acute distress  Cardiac:  S1-S2 normal  No murmurs, rubs or gallops, JVP: not seen  Lungs:  Clear to auscultation bilaterally, no wheezing or crackles  Abdomen:  Soft mild tenderness in epigastrium, no rebound, nondistended, positive bowel sounds  Extremities:  Warm, well perfused, pulses palpable, no ulcers or rashes   Skin: solitary red papule over right side of upper back, associated with itching  Neuro: Grossly nonfocal  Psych:  Normal affect      Lab Results: I have personally reviewed pertinent lab results  Imaging: I have personally reviewed pertinent reports  Melinda Valdez MD  Cardiology Fellow    ** Please Note: Fluency DirectDictation voice to text software may have been used in the creation of this document   **

## 2018-10-31 NOTE — ASSESSMENT & PLAN NOTE
CT scan showing ? GE? Patient had one episode of vomiting and diarrhea and now is complaining of dull abdominal pain  Will give IVF and monitor with serial abdominal exams

## 2018-10-31 NOTE — ED PROVIDER NOTES
History  Chief Complaint   Patient presents with    Syncope     pt had syncopal episode today and LOC for approx 5 mins per family  pt is reporting abd pain and nausea      Patient comes in for evaluation of syncopal episode  States was at home and was knitting when she felt abdominal pain; felt maybe she was hungry went to get cheese sat on the stool and her daughter heard her head go against the counter; patient never fell off the stool daughter states was a 5 minutes  With this the patient was not responsive  She states her lips are somewhat cyanotic she could not feel a pulse but does not know CPR so she did not do anything  She states that her mother spontaneously woke up after about 5 minutes; no stigmata of seizure  Patient's only complaint right now is left-sided abdominal pain  Left upper quadrant  Only prior history is gastric bypass prior  Denies any blood in stool  Denies any fevers chills  Notes nausea without vomiting  Notes left-sided abdominal pain that she has never had before  Denies any numbness tingling or weakness  Denies any difficulty with speech  Denies this ever happening before  Denies any cardiac history  On Eliquis            Prior to Admission Medications   Prescriptions Last Dose Informant Patient Reported? Taking?    B Complex Vitamins (VITAMIN B COMPLEX PO)  Self Yes Yes   Sig: Take 1 capsule by mouth   ELIQUIS 2 5 MG  Self Yes Yes   Si 5 mg     HYDROcodone-acetaminophen (NORCO) 5-325 mg per tablet   No Yes   Sig: Take 1 tablet by mouth every 6 (six) hours as needed for pain for up to 20 doses For continuation of care Max Daily Amount: 4 tablets   cholecalciferol (VITAMIN D3) 1,000 units tablet   Yes Yes   Sig: Take 1,000 Units by mouth 2 (two) times a day      Facility-Administered Medications: None       Past Medical History:   Diagnosis Date    DVT (deep venous thrombosis) ([address] )        Past Surgical History:   Procedure Laterality Date    CERVICAL DISC SURGERY  2009    PLATES & SCREWS     SECTION      X2    GASTRIC BYPASS  2017    LAP RINYGB SURGERY    KNEE SURGERY Bilateral     OTHER SURGICAL HISTORY      ARM SURGERIES    NV INCISE FINGER TENDON SHEATH Left 2018    Procedure: LONG TRIGGER FINGER RELEASE;  Surgeon: Lm Rooney MD;  Location: [facility];  Service: Orthopedics    NV WRIST Brett Greening LIG Right 2018    Procedure: ENDOSCOPIC CARPAL TUNNEL RELEASE;  Surgeon: Lm Rooney MD;  Location: [facility];  Service: Orthopedics    TONSILLECTOMY         Family History   Problem Relation Age of Onset    Stroke Mother     Alzheimer's disease Mother     Arthritis Mother     Coronary artery disease Mother     Breast cancer Mother     Cancer Mother     Heart disease Mother     Hypertension Father     Gout Father     Diabetes type II Father     Coronary artery disease Father     Heart disease Father      I have reviewed and agree with the history as documented  Social History   Substance Use Topics    Smoking status: Light Tobacco Smoker     Packs/day: 0 25     Types: Cigarettes    Smokeless tobacco: Never Used      Comment: NO PASSIVE SMOKE     Alcohol use Yes      Comment: very rare social        Review of Systems   Constitutional: Negative for activity change, appetite change, chills and fever  HENT: Negative for congestion, ear pain, rhinorrhea, sore throat and trouble swallowing  Eyes: Negative for photophobia and pain  Respiratory: Negative for cough, chest tightness, shortness of breath and wheezing  Cardiovascular: Positive for chest pain  Negative for palpitations  Gastrointestinal: Positive for abdominal pain and nausea  Negative for abdominal distention, constipation, diarrhea and vomiting  Genitourinary: Negative for difficulty urinating, dysuria, flank pain, hematuria and urgency  Musculoskeletal: Negative for arthralgias, back pain and joint swelling     Skin: Negative for color change, pallor and rash  Neurological: Positive for syncope and light-headedness  Negative for dizziness, seizures, weakness and headaches  Hematological: Negative for adenopathy  Psychiatric/Behavioral: Negative for confusion, hallucinations and self-injury  Physical Exam  ED Triage Vitals   Temperature Pulse Respirations Blood Pressure SpO2   10/30/18 2212 10/30/18 2212 10/30/18 2212 10/30/18 2212 10/30/18 2212   97 5 °F (36 4 °C) 55 18 105/61 100 %      Temp Source Heart Rate Source Patient Position - Orthostatic VS BP Location FiO2 (%)   10/30/18 2212 10/30/18 2212 10/30/18 2325 10/30/18 2325 --   Oral Monitor Lying Right arm       Pain Score       10/30/18 2212       8           Orthostatic Vital Signs  Vitals:    10/31/18 0256 10/31/18 0500 10/31/18 0600 10/31/18 0630   BP: 103/55 100/57 106/57 91/52   Pulse: 65 58 58 56   Patient Position - Orthostatic VS:           Physical Exam   Constitutional: She is oriented to person, place, and time  She appears well-developed and well-nourished  No distress  Slightly pale  Comfortable  Conversant  HENT:   Head: Normocephalic and atraumatic  Mouth/Throat: No oropharyngeal exudate  Eyes: EOM are normal  Right eye exhibits no discharge  Left eye exhibits no discharge  Full EOMI  Neck: Normal range of motion  Neck supple  No tracheal deviation present  No thyromegaly present  Cardiovascular: Normal rate and normal heart sounds  No murmur heard  Pulmonary/Chest: Effort normal and breath sounds normal  No respiratory distress  She has no wheezes  She has no rales  Clear bilateral    Abdominal: Soft  Bowel sounds are normal  She exhibits no distension  There is no tenderness  There is no rebound and no guarding  Moderate left-sided tenderness but no rebound or guarding  Very tender to light palpation   Musculoskeletal: Normal range of motion  She exhibits no edema, tenderness or deformity     No swelling lower extremities   Lymphadenopathy:     She has no cervical adenopathy  Neurological: She is alert and oriented to person, place, and time  No cranial nerve deficit  She exhibits normal muscle tone  GCS 15  Motor 5/5 and symmetrical   No dysarthria  No cranial nerve findings  Skin: Skin is warm  Capillary refill takes less than 2 seconds  No rash noted  She is not diaphoretic  Psychiatric: She has a normal mood and affect   Her behavior is normal        ED Medications  Medications   HYDROcodone-acetaminophen (NORCO) 5-325 mg per tablet 1 tablet (not administered)   nicotine (NICODERM CQ) 7 mg/24hr TD 24 hr patch 1 patch (not administered)   sodium chloride 0 9 % infusion (100 mL/hr Intravenous New Bag 10/31/18 0513)   atropine injection 0 5 mg (not administered)   sodium chloride 0 9 % bolus 1,000 mL (0 mL Intravenous Stopped 10/30/18 2326)   iohexol (OMNIPAQUE) 350 MG/ML injection (MULTI-DOSE) 100 mL (100 mL Intravenous Given 10/30/18 2331)   cloNIDine (CATAPRES) tablet 0 2 mg (0 2 mg Oral Given 10/31/18 0044)       Diagnostic Studies  Results Reviewed     Procedure Component Value Units Date/Time    Troponin I [[id number]]     Lab Status:  No result Specimen:  Blood     Comprehensive metabolic panel [[id number]]  (Abnormal) Collected:  10/31/18 0438    Lab Status:  Final result Specimen:  Blood from Arm, Left Updated:  10/31/18 0557     Sodium 140 mmol/L      Potassium 3 7 mmol/L      Chloride 109 (H) mmol/L      CO2 26 mmol/L      ANION GAP 5 mmol/L      BUN 12 mg/dL      Creatinine 0 43 (L) mg/dL      Glucose 90 mg/dL      Glucose, Fasting 90 mg/dL      Calcium 8 6 mg/dL      AST 17 U/L      ALT 23 U/L      Alkaline Phosphatase 83 U/L      Total Protein 6 2 (L) g/dL      Albumin 3 3 (L) g/dL      Total Bilirubin 0 70 mg/dL      eGFR 112 ml/min/1 73sq m     Narrative:         National Kidney Disease Education Program recommendations are as follows:  GFR calculation is accurate only with a steady state creatinine  Chronic Kidney disease less than 60 ml/min/1 73 sq  meters  Kidney failure less than 15 ml/min/1 73 sq  meters  Magnesium [[id number]]  (Normal) Collected:  10/31/18 0438    Lab Status:  Final result Specimen:  Blood from Arm, Left Updated:  10/31/18 0557     Magnesium 2 1 mg/dL     TSH, 3rd generation with Free T4 reflex [[id number]]  (Normal) Collected:  10/31/18 0446    Lab Status:  Final result Specimen:  Blood from Arm, Left Updated:  10/31/18 0550     TSH 3RD GENERATON 1 420 uIU/mL     Narrative:         Patients undergoing fluorescein dye angiography may retain small amounts of fluorescein in the body for 48-72 hours post procedure  Samples containing fluorescein can produce falsely depressed TSH values  If the patient had this procedure,a specimen should be resubmitted post fluorescein clearance  The recommended reference ranges for TSH during pregnancy are as follows:  First trimester 0 1 to 2 5 uIU/mL  Second trimester  0 2 to 3 0 uIU/mL  Third trimester 0 3 to 3 0 uIU/m      CBC (With Platelets) [[id number]]  (Abnormal) Collected:  10/31/18 0438    Lab Status:  Final result Specimen:  Blood from Arm, Left Updated:  10/31/18 0530     WBC 7 45 Thousand/uL      RBC 3 47 (L) Million/uL      Hemoglobin 11 2 (L) g/dL      Hematocrit 33 9 (L) %      MCV 98 fL      MCH 32 3 pg      MCHC 33 0 g/dL      RDW 12 4 %      Platelets 757 Thousands/uL      MPV 11 7 fL     Cortisol [[id number]] Collected:  10/31/18 0446    Lab Status:   In process Specimen:  Blood from Arm, Left Updated:  10/31/18 0521    Lyme disease, PCR [[id number]] Updated:  10/31/18 0520    Lab Status:  No result Specimen:  Blood from Arm, Left     Urine Microscopic [[id number]]  (Abnormal) Collected:  10/31/18 0139    Lab Status:  Final result Specimen:  Urine Updated:  10/31/18 0206     RBC, UA 2-4 (A) /hpf      WBC, UA 2-4 (A) /hpf      Epithelial Cells None Seen /hpf      Bacteria, UA Moderate (A) /hpf      Hyaline Casts, UA None Seen /lpf     ED Urine Macroscopic [[id number]]  (Abnormal) Collected:  10/31/18 0139    Lab Status:  Final result Specimen:  Urine Updated:  10/31/18 0136     Color, UA Yellow     Clarity, UA Slightly Cloudy     pH, UA 6 0     Leukocytes, UA Negative     Nitrite, UA Negative     Protein, UA Negative mg/dl      Glucose, UA Negative mg/dl      Ketones, UA Negative mg/dl      Urobilinogen, UA 0 2 E U /dl      Bilirubin, UA Negative     Blood, UA Trace (A)     Specific Ider, UA <=1 005    Narrative:       CLINITEK RESULT    Comprehensive metabolic panel [[id number]]  (Abnormal) Collected:  10/30/18 2239    Lab Status:  Final result Specimen:  Blood from Arm, Left Updated:  10/30/18 2308     Sodium 139 mmol/L      Potassium 3 6 mmol/L      Chloride 107 mmol/L      CO2 28 mmol/L      ANION GAP 4 mmol/L      BUN 14 mg/dL      Creatinine 0 64 mg/dL      Glucose 167 (H) mg/dL      Calcium 8 8 mg/dL      AST 17 U/L      ALT 22 U/L      Alkaline Phosphatase 90 U/L      Total Protein 6 9 g/dL      Albumin 3 4 (L) g/dL      Total Bilirubin 0 39 mg/dL      eGFR 98 ml/min/1 73sq m     Narrative:         National Kidney Disease Education Program recommendations are as follows:  GFR calculation is accurate only with a steady state creatinine  Chronic Kidney disease less than 60 ml/min/1 73 sq  meters  Kidney failure less than 15 ml/min/1 73 sq  meters      Lipase [[id number]]  (Normal) Collected:  10/30/18 2239    Lab Status:  Final result Specimen:  Blood from Arm, Left Updated:  10/30/18 2308     Lipase 223 u/L     Troponin I [[id number]]  (Normal) Collected:  10/30/18 2239    Lab Status:  Final result Specimen:  Blood from Arm, Left Updated:  10/30/18 2308     Troponin I <0 02 ng/mL     Lactic acid, plasma [[id number]]  (Normal) Collected:  10/30/18 2241    Lab Status:  Final result Specimen:  Blood from Arm, Left Updated:  10/30/18 2307     LACTIC ACID 1 7 mmol/L     Narrative:         Result may be elevated if tourniquet was used during collection  CBC and differential [[id number]]  (Abnormal) Collected:  10/30/18 2239    Lab Status:  Final result Specimen:  Blood from Arm, Left Updated:  10/30/18 2252     WBC 8 74 Thousand/uL      RBC 3 56 (L) Million/uL      Hemoglobin 11 7 g/dL      Hematocrit 35 0 %      MCV 98 fL      MCH 32 9 pg      MCHC 33 4 g/dL      RDW 12 2 %      MPV 11 4 fL      Platelets 643 Thousands/uL      nRBC 0 /100 WBCs      Neutrophils Relative 69 %      Immat GRANS % 0 %      Lymphocytes Relative 22 %      Monocytes Relative 5 %      Eosinophils Relative 3 %      Basophils Relative 1 %      Neutrophils Absolute 6 08 Thousands/µL      Immature Grans Absolute 0 02 Thousand/uL      Lymphocytes Absolute 1 94 Thousands/µL      Monocytes Absolute 0 42 Thousand/µL      Eosinophils Absolute 0 22 Thousand/µL      Basophils Absolute 0 06 Thousands/µL     POCT urinalysis dipstick [[id number]]     Lab Status:  No result                  CT abdomen pelvis with contrast   Final Result by Xavi Lovell MD (10/30 3759)      Fluid-filled loops of bowel throughout the abdomen in a nonspecific pattern that consent densities seen in the setting of gastroenteritis  There is moderate fluid-filled distention of the excluded portion of the stomach without evidence to suggest    afferent loop obstruction  Otherwise no acute CT abnormality in the abdomen or pelvis to account for the patient's symptoms  Workstation performed: [id number]               Procedures  Procedures      Phone Consults  ED Phone Contact    ED Course  ED Course as of Oct 31 0658   Tue Oct 30, 2018   2245 EKG shows sinus rhythm rate of 51  No acute ST or T-wave finding  Normal R-wave progression  Normal axis                            Initial Sepsis Screening     Row Name 10/31/18 0102                Is the patient's history suggestive of a new or worsening infection?  No  -EP        Suspected source of infection  --        Are two or more of the following signs & symptoms of infection both present and new to the patient? No  -EP        Indicate SIRS criteria  --        If the answer is yes to both questions, suspicion of sepsis is present  --        If severe sepsis is present AND tissue hypoperfusion perists in the hour after fluid resuscitation or lactate > 4, the patient meets criteria for SEPTIC SHOCK  --        Are any of the following organ dysfunction criteria present within 6 hours of suspected infection and SIRS criteria that are NOT considered to be chronic conditions? --        Organ dysfunction  --        Date of presentation of severe sepsis  --        Time of presentation of severe sepsis  --        Tissue hypoperfusion persists in the hour after crystalloid fluid administration, evidenced, by either:  --        Was hypotension present within one hour of the conclusion of crystalloid fluid administration?  --        Date of presentation of septic shock  --        Time of presentation of septic shock  --          User Key  (r) = Recorded By, (t) = Taken By, (c) = Cosigned By    [address] Name Provider Jessica Ellis MD Physician                  MDM  Number of Diagnoses or Management Options  Abdominal pain:   Syncope and collapse:   Diagnosis management comments: Abdominal pain in the setting of syncope  CT scan shows colitis no other findings  Troponin EKG unremarkable  Patient was admitted for telemetry due to syncope  Was called by nurse due to positive approximately 3-4 seconds that I reviewed on telemetry  I did discuss this with Medicine team   Cardiology then came down to bedside to evaluate  Patient admitted to Medicine      CritCare Time    Disposition  Final diagnoses:   Syncope and collapse   Abdominal pain     Time reflects when diagnosis was documented in both MDM as applicable and the Disposition within this note     Time User Action Codes Description Comment    10/31/2018  1:00 AM Jessie Menjivar Add [R55] Syncope and collapse 10/31/2018  1:00 AM Oren Heard Add [R10 9] Abdominal pain     10/31/2018  2:49 AM Nilay Crum Add [R00 1] Bradycardia     10/31/2018  4:27 AM aysha Courtney Melinda Add [I45 5] Sinus pause       ED Disposition     ED Disposition Condition Comment    Admit  Case was discussed with NARENDRA and the patient's admission status was agreed to be Admission Status: observation status to the service of Dr ADAIR   Follow-up Information    None         Patient's Medications   Discharge Prescriptions    No medications on file     No discharge procedures on file  ED Provider  Attending physically available and evaluated [place]  I managed the patient along with the ED Attending      Electronically Signed by         Petar Tamayo DO  10/31/18 6039

## 2018-11-01 ENCOUNTER — ANESTHESIA EVENT (INPATIENT)
Dept: NON INVASIVE DIAGNOSTICS | Facility: HOSPITAL | Age: 58
DRG: 244 | End: 2018-11-01
Payer: COMMERCIAL

## 2018-11-01 ENCOUNTER — APPOINTMENT (INPATIENT)
Dept: RADIOLOGY | Facility: HOSPITAL | Age: 58
DRG: 244 | End: 2018-11-01
Payer: COMMERCIAL

## 2018-11-01 LAB — HCV AB SER QL: NORMAL

## 2018-11-01 PROCEDURE — 71045 X-RAY EXAM CHEST 1 VIEW: CPT

## 2018-11-01 PROCEDURE — C1898 LEAD, PMKR, OTHER THAN TRANS: HCPCS

## 2018-11-01 PROCEDURE — 33208 INSRT HEART PM ATRIAL & VENT: CPT | Performed by: PHYSICIAN ASSISTANT

## 2018-11-01 PROCEDURE — 99232 SBSQ HOSP IP/OBS MODERATE 35: CPT | Performed by: NURSE PRACTITIONER

## 2018-11-01 PROCEDURE — 93005 ELECTROCARDIOGRAM TRACING: CPT

## 2018-11-01 PROCEDURE — C1785 PMKR, DUAL, RATE-RESP: HCPCS

## 2018-11-01 PROCEDURE — 93306 TTE W/DOPPLER COMPLETE: CPT

## 2018-11-01 PROCEDURE — C1892 INTRO/SHEATH,FIXED,PEEL-AWAY: HCPCS | Performed by: PHYSICIAN ASSISTANT

## 2018-11-01 PROCEDURE — 0JH606Z INSERTION OF PACEMAKER, DUAL CHAMBER INTO CHEST SUBCUTANEOUS TISSUE AND FASCIA, OPEN APPROACH: ICD-10-PCS | Performed by: INTERNAL MEDICINE

## 2018-11-01 PROCEDURE — 93306 TTE W/DOPPLER COMPLETE: CPT | Performed by: INTERNAL MEDICINE

## 2018-11-01 PROCEDURE — 02H63JZ INSERTION OF PACEMAKER LEAD INTO RIGHT ATRIUM, PERCUTANEOUS APPROACH: ICD-10-PCS | Performed by: INTERNAL MEDICINE

## 2018-11-01 PROCEDURE — 02HK3JZ INSERTION OF PACEMAKER LEAD INTO RIGHT VENTRICLE, PERCUTANEOUS APPROACH: ICD-10-PCS | Performed by: INTERNAL MEDICINE

## 2018-11-01 PROCEDURE — 33208 INSRT HEART PM ATRIAL & VENT: CPT | Performed by: INTERNAL MEDICINE

## 2018-11-01 RX ORDER — SODIUM CHLORIDE 9 MG/ML
INJECTION, SOLUTION INTRAVENOUS CONTINUOUS PRN
Status: DISCONTINUED | OUTPATIENT
Start: 2018-11-01 | End: 2018-11-02 | Stop reason: SURG

## 2018-11-01 RX ORDER — LIDOCAINE HYDROCHLORIDE 10 MG/ML
INJECTION, SOLUTION INFILTRATION; PERINEURAL CODE/TRAUMA/SEDATION MEDICATION
Status: COMPLETED | OUTPATIENT
Start: 2018-11-01 | End: 2018-11-01

## 2018-11-01 RX ORDER — GENTAMICIN SULFATE 40 MG/ML
INJECTION, SOLUTION INTRAMUSCULAR; INTRAVENOUS CODE/TRAUMA/SEDATION MEDICATION
Status: COMPLETED | OUTPATIENT
Start: 2018-11-01 | End: 2018-11-01

## 2018-11-01 RX ORDER — PROPOFOL 10 MG/ML
INJECTION, EMULSION INTRAVENOUS CONTINUOUS PRN
Status: DISCONTINUED | OUTPATIENT
Start: 2018-11-01 | End: 2018-11-02 | Stop reason: SURG

## 2018-11-01 RX ORDER — ACETAMINOPHEN 325 MG/1
650 TABLET ORAL EVERY 6 HOURS PRN
Status: DISCONTINUED | OUTPATIENT
Start: 2018-11-01 | End: 2018-11-02 | Stop reason: HOSPADM

## 2018-11-01 RX ADMIN — ACETAMINOPHEN 650 MG: 325 TABLET, FILM COATED ORAL at 22:47

## 2018-11-01 RX ADMIN — IOHEXOL 10 ML: 350 INJECTION, SOLUTION INTRAVENOUS at 17:30

## 2018-11-01 RX ADMIN — IOHEXOL 10 ML: 350 INJECTION, SOLUTION INTRAVENOUS at 17:03

## 2018-11-01 RX ADMIN — PROPOFOL 80 MCG/KG/MIN: 10 INJECTION, EMULSION INTRAVENOUS at 16:28

## 2018-11-01 RX ADMIN — HYDROCODONE BITARTRATE AND ACETAMINOPHEN 1 TABLET: 5; 325 TABLET ORAL at 18:38

## 2018-11-01 RX ADMIN — SODIUM CHLORIDE: 9 INJECTION, SOLUTION INTRAVENOUS at 16:22

## 2018-11-01 RX ADMIN — GENTAMICIN SULFATE 80 MG: 40 INJECTION, SOLUTION INTRAMUSCULAR; INTRAVENOUS at 17:38

## 2018-11-01 RX ADMIN — SODIUM CHLORIDE 75 ML/HR: 0.9 INJECTION, SOLUTION INTRAVENOUS at 10:00

## 2018-11-01 RX ADMIN — LIDOCAINE HYDROCHLORIDE 30 ML: 10 INJECTION, SOLUTION INFILTRATION; PERINEURAL at 16:53

## 2018-11-01 RX ADMIN — CEFAZOLIN SODIUM 2000 MG: 2 SOLUTION INTRAVENOUS at 16:27

## 2018-11-01 NOTE — ANESTHESIA POSTPROCEDURE EVALUATION
Post-Op Assessment Note      CV Status:  Stable    Mental Status:  Alert and awake    Hydration Status:  Euvolemic    PONV Controlled:  Controlled    Airway Patency:  Patent    Post Op Vitals Reviewed: Yes          Staff: CRNA, Anesthesiologist           /68 (11/01/18 1810)    Temp      Pulse 72 (11/01/18 1810)   Resp 18 (11/01/18 1810)    SpO2 96 % (11/01/18 1810)

## 2018-11-01 NOTE — PLAN OF CARE
Problem: DISCHARGE PLANNING - CARE MANAGEMENT  Goal: Discharge to post-acute care or home with appropriate resources  INTERVENTIONS:  - Conduct assessment to determine patient/family and health care team treatment goals, and need for post-acute services based on payer coverage, community resources, and patient preferences, and barriers to discharge  - Address psychosocial, clinical, and financial barriers to discharge as identified in assessment in conjunction with the patient/family and health care team  - Arrange appropriate level of post-acute services according to patient's   needs and preference and payer coverage in collaboration with the physician and health care team  - Communicate with and update the patient/family, physician, and health care team regarding progress on the discharge plan  - Arrange appropriate transportation to post-acute venues  - will follow for medically necessary resources on discharge  Outcome: Progressing

## 2018-11-01 NOTE — ASSESSMENT & PLAN NOTE
· Patient has history of DVT in lower extremities as well as upper  · No hematology work up as per family  · On eliquis and managed by PCP  · Will continue with eliquis

## 2018-11-01 NOTE — ED ATTENDING ATTESTATION
Sissy Wall MD, saw and evaluated the patient  I have discussed the patient with the resident/non-physician practitioner and agree with the resident's/non-physician practitioner's findings, Plan of Care, and MDM as documented in the resident's/non-physician practitioner's note, except where noted  All available labs and Radiology studies were reviewed  At this point I agree with the current assessment done in the Emergency Department    I have conducted an independent evaluation of this patient a history and physical is as follows:    THE PATIENT PRESENTS FOR COMPLAINTS OF A SYNCOPAL EPISODE PATIENT STATES SHE DEVELOPED SOME LEFT ABDOMINAL PAIN AND SHE SLUMPED OVER  NO SEIZURE ACTIVITY NO COMPLAINTS OF HEADACHE SHE DID NOT STRIKE HER HEAD NO CHEST PAIN OR PALPITATIONS PAST SURGICAL HISTORY OF GASTRIC BYPASS YEARS AGO  NO VOMITING  EXAM:   Const:   well appearing   NAD     HEENT:  NCAT    sclera anicteric conjunctiva pink   throat clear, MMM    Neck:   supple  no meningismus  no jvd   no bruits  no  midline tenderness   Lungs:   clear  CW non-tender   No creiptation  Heart:   RRR no m/g/r  Normal pulses  Abd:   soft MILD TENDERNESS IN THE LEFT MID ABDOMEN NO PULSATILE MASS nd pos bs   Ext:    normal nontender  No edema  Neruo:   CN 2 -12 intact  motor intact 5/5 sensory intact cerebellar intact       Gait normal    IMPRESSION:   SYNCOPE ABDOMINAL PAIN  PLAN:  CARDIAC WORKUP AND ABD CT       Critical Care Time  CritCare Time    Procedures

## 2018-11-01 NOTE — ASSESSMENT & PLAN NOTE
· CT scan showing:  Fluid-filled loops of bowel throughout the abdomen in a nonspecific pattern that consent densities seen in the setting of gastroenteritis  There is moderate fluid-filled distention of the excluded portion of the stomach without evidence to suggest afferent loop obstruction   Reportedly had emesis and diarrhea with dull abd pain on admission  · Denies symptoms at this time   · Monitor

## 2018-11-01 NOTE — RESTORATIVE TECHNICIAN NOTE
Restorative Specialist Mobility Note       Activity: Other (Comment) (Pt currently off the unit )       Stu BERKOWITZ, Restorative Technician, [employer]

## 2018-11-01 NOTE — SOCIAL WORK
Met with patient and explained CM program and CM role  Resides with daughter in a townhouse, [address], bedroom/bathroom 2nd floor  Independent prior to admission for ADL's and ambulation  Has a prescription plan  [address]  in [place]  She drives  DME: none  Denies [address] or IP rehab utilization  Denies mental health illness, IP or OP psyche care  Denies drug and/or alcohol abuse  Primary contact is daughter [facility] - [phone]  She does not have a POA  Daughter will provide transportation home    CM reviewed d/c planning process including the following: identifying help at home, patient preference for d/c planning needs, Discharge Lounge, [facility] Meds to Bed program, availability of treatment team to discuss questions or concerns patient and/or family may have regarding understanding medications and recognizing signs and symptoms once discharged  CM also encouraged patient to follow up with all recommended appointments after discharge  Patient advised of importance for patient and family to participate in managing patients medical well being  Patient/caregiver received discharge checklist  Content reviewed  Patient/caregiver encouraged to participate in discharge plan of care prior to discharge home

## 2018-11-01 NOTE — PROGRESS NOTES
Progress Note Alcon Salgado 1960, 60 y o  female MRN: [id number]    Unit/Bed#: [facility] 628-01 Encounter: [id number]    Primary Care Provider: Leonidas Martin MD   Date and time admitted to hospital: 10/30/2018 10:04 PM        Abnormal CT scan   Assessment & Plan    · CT scan showing:  Fluid-filled loops of bowel throughout the abdomen in a nonspecific pattern that consent densities seen in the setting of gastroenteritis  There is moderate fluid-filled distention of the excluded portion of the stomach without evidence to suggest afferent loop obstruction  Reportedly had emesis and diarrhea with dull abd pain on admission  · Denies symptoms at this time   · Monitor      Deep vein thrombosis (DVT) of lower extremity (HCC)   Assessment & Plan    · Patient has history of DVT in lower extremities as well as upper  · No hematology work up as per family  · On eliquis and managed by PCP  · Will continue with eliquis  VTE Pharmacologic Prophylaxis:   Pharmacologic: Apixaban (Eliquis)  Mechanical VTE Prophylaxis in Place: Yes    Patient Centered Rounds: I have performed bedside rounds with nursing staff today  Discussions with Specialists or Other Care Team Provider: cardiology    Education and Discussions with Family / Patient: patient    Time Spent for Care: 30 minutes  More than 50% of total time spent on counseling and coordination of care as described above  Current Length of Stay: 1 day(s)    Current Patient Status: Inpatient   Certification Statement: The patient will continue to require additional inpatient hospital stay due to plan for pacer     Discharge Plan: home likely in 24 hours after pacer placed, will need cardiology clearance first    Code Status: Level 1 - Full Code      Subjective:   Denies CP, SOB, HA, dizziness  No N/V  NPO for pacer   telem reviewed no further pauses noted    Objective:     Vitals:   Temp (24hrs), Av 4 °F (36 9 °C), Min:98 1 °F (36 7 °C), Max:99 °F (37 2 °C)    Temp:  [98 1 °F (36 7 °C)-99 °F (37 2 °C)] 99 °F (37 2 °C)  HR:  [44-77] 72  Resp:  [16-20] 18  BP: (105-120)/(60-73) 119/68  SpO2:  [96 %-98 %] 96 %  Body mass index is 28 12 kg/m²  Input and Output Summary (last 24 hours): Intake/Output Summary (Last 24 hours) at 11/01/18 1818  Last data filed at 11/01/18 1806   Gross per 24 hour   Intake             2405 ml   Output             1400 ml   Net             1005 ml       Physical Exam:     Physical Exam   Constitutional: She is oriented to person, place, and time  She appears well-developed and well-nourished  No distress  HENT:   Head: Normocephalic and atraumatic  Mouth/Throat: Oropharynx is clear and moist    Neck: Normal range of motion  Neck supple  Cardiovascular: Regular rhythm and intact distal pulses  Bradycardia present  No murmur heard  Pulmonary/Chest: Effort normal and breath sounds normal  No respiratory distress  Abdominal: Soft  Bowel sounds are normal  She exhibits no distension  There is no tenderness  Musculoskeletal: Normal range of motion  She exhibits no edema  Neurological: She is alert and oriented to person, place, and time  No cranial nerve deficit  Skin: Skin is warm and dry  Psychiatric: She has a normal mood and affect  Her behavior is normal    Vitals reviewed  Additional Data:     Labs:      Results from last 7 days  Lab Units 10/31/18  0438 10/30/18  2239   WBC Thousand/uL 7 45 8 74   HEMOGLOBIN g/dL 11 2* 11 7   HEMATOCRIT % 33 9* 35 0   PLATELETS Thousands/uL 177 171   NEUTROS PCT %  --  69   LYMPHS PCT %  --  22   MONOS PCT %  --  5   EOS PCT %  --  3       Results from last 7 days  Lab Units 10/31/18  0438   POTASSIUM mmol/L 3 7   CHLORIDE mmol/L 109*   CO2 mmol/L 26   BUN mg/dL 12   CREATININE mg/dL 0 43*   CALCIUM mg/dL 8 6   ALK PHOS U/L 83   ALT U/L 23   AST U/L 17           * I Have Reviewed All Lab Data Listed Above  * Additional Pertinent Lab Tests Reviewed:  All Labs For Current Hospital Admission Reviewed    Imaging:    Imaging Reports Reviewed Today Include: echo  Imaging Personally Reviewed by Myself Includes:  none    Recent Cultures (last 7 days):           Last 24 Hours Medication List:     Current Facility-Administered Medications:  atropine 0 5 mg Intravenous PRN Danny Rios MD    HYDROcodone-acetaminophen 1 tablet Oral Q6H PRN Nancy Monroe MD    nicotine 1 patch Transdermal Daily Nancy Monroe MD    sodium chloride 75 mL/hr Intravenous Continuous DANIELA Hall Last Rate: 75 mL/hr (11/01/18 1000)     Facility-Administered Medications Ordered in Other Encounters:  propofol  Intravenous Continuous PRN Amanda Temple CRNA Last Rate: Stopped (11/01/18 1806)   sodium chloride   Continuous PRN Jeffine Maverick, CRNA Last Rate: Stopped (11/01/18 1806)        Today, Patient Was Seen By: DANIELA Hall    ** Please Note: Dictation voice to text software may have been used in the creation of this document   **

## 2018-11-01 NOTE — ANESTHESIA PREPROCEDURE EVALUATION
Review of Systems/Medical History  Patient summary reviewed  Chart reviewed  No history of anesthetic complications     Cardiovascular  EKG reviewed, Exercise tolerance (METS): >4,  Dysrhythmias (symptomatic bradycardia) , DVT   Pulmonary  Smoker cigarette smoker  ,        GI/Hepatic  Negative GI/hepatic ROS          Negative  ROS        Endo/Other  Negative endo/other ROS      GYN  Negative gynecology ROS          Hematology    Coagulation disorder currently taking oral anticoagulants,    Musculoskeletal  Negative musculoskeletal ROS        Neurology  Negative neurology ROS      Psychology   Negative psychology ROS              Physical Exam    Airway    Mallampati score: I  TM Distance: >3 FB  Neck ROM: full     Dental   No notable dental hx     Cardiovascular      Pulmonary      Other Findings        Anesthesia Plan  ASA Score- 3     Anesthesia Type- IV sedation with anesthesia with ASA Monitors  Additional Monitors:   Airway Plan:         Plan Factors- Patient instructed to abstain from smoking on day of procedure  Patient did not smoke on day of surgery  Induction- intravenous  Postoperative Plan- Plan for postoperative opioid use  Informed Consent- Anesthetic plan and risks discussed with patient  I personally reviewed this patient with the CRNA  Discussed and agreed on the Anesthesia Plan with the CRNA  Rico Schofield

## 2018-11-01 NOTE — PROGRESS NOTES
Progress Note Diana Angelo Sunday 1960, 60 y o  female MRN: [id number]    Unit/Bed#: [facility] 628-01 Encounter: [id number]    Primary Care Provider: Zak Gee MD   Date and time admitted to hospital: 10/30/2018 10:04 PM        * Syncope   Assessment & Plan    · Suspected secondary to bradycardia with SSS  · Appreciate cardiology consultation  · Plan pacer tomorrow  · Continue close monitoring, pacer pads in place   · Echo pending  · Trop negative x 3  · Electrolytes stable  · VSS  · Close monitoring       Abnormal CT scan   Assessment & Plan    · CT scan showing:  Fluid-filled loops of bowel throughout the abdomen in a nonspecific pattern that consent densities seen in the setting of gastroenteritis  There is moderate fluid-filled distention of the excluded portion of the stomach without evidence to suggest afferent loop obstruction  Reportedly had emesis and diarrhea with dull abd pain on admission  · Patient was NPO for possible pacer since not being done today can eat will monitor for symptoms but currently without abd complaints  · Continue IVF     Deep vein thrombosis (DVT) of lower extremity ([address] )   Assessment & Plan    · Patient has history of DVT in lower extremities as well as upper  · No hematology work up as per family  · On eliquis and managed by PCP  · Will continue with eliquis  VTE Pharmacologic Prophylaxis:   Pharmacologic: Apixaban (Eliquis)  Mechanical VTE Prophylaxis in Place: No    Patient Centered Rounds: seen in ED, plan of care discussed with nursing    Discussions with Specialists or Other Care Team Provider: cardiology    Education and Discussions with Family / Patient: patient nd family at bedside    Time Spent for Care: 30 minutes  More than 50% of total time spent on counseling and coordination of care as described above      Current Length of Stay: 0 day(s)    Current Patient Status: Inpatient   Certification Statement: will equire another midnight needs a pacemeker    Discharge Plan: home when cleared by cardiology and medically stable    Code Status: Level 1 - Full Code      Subjective:   Reports fatigue  Denies CP or SOB  No further events of syncope noted  family at bedside  telem reviewed 4 sec pause noted cards aware  No abd  Pain  N/V/D    Objective:     Vitals:   Temp (24hrs), Av 8 °F (36 6 °C), Min:97 5 °F (36 4 °C), Max:98 1 °F (36 7 °C)    Temp:  [97 5 °F (36 4 °C)-98 1 °F (36 7 °C)] 98 1 °F (36 7 °C)  HR:  [50-78] 66  Resp:  [12-26] 16  BP: ()/(51-66) 114/54  SpO2:  [94 %-100 %] 98 %  Body mass index is 28 12 kg/m²  Input and Output Summary (last 24 hours): Intake/Output Summary (Last 24 hours) at 10/31/18 2111  Last data filed at 10/31/18 2001   Gross per 24 hour   Intake           2257 5 ml   Output                0 ml   Net           2257 5 ml       Physical Exam:     Physical Exam   Constitutional: She is oriented to person, place, and time  She appears well-developed and well-nourished  No distress  HENT:   Head: Normocephalic and atraumatic  Mouth/Throat: Oropharynx is clear and moist    Eyes: Pupils are equal, round, and reactive to light  Neck: Neck supple  Cardiovascular: Regular rhythm and intact distal pulses  Bradycardia present  Pulmonary/Chest: Effort normal and breath sounds normal    Abdominal: Soft  Bowel sounds are normal  She exhibits no distension  There is no tenderness  Musculoskeletal: Normal range of motion  She exhibits no edema or tenderness  Neurological: She is alert and oriented to person, place, and time  Skin: Skin is warm and dry  No erythema  Psychiatric: She has a normal mood and affect  Her behavior is normal    Vitals reviewed        Additional Data:     Labs:      Results from last 7 days  Lab Units 10/31/18  0438 10/30/18  2239   WBC Thousand/uL 7 45 8 74   HEMOGLOBIN g/dL 11 2* 11 7   HEMATOCRIT % 33 9* 35 0   PLATELETS Thousands/uL 177 171   NEUTROS PCT %  --  69   LYMPHS PCT % --  22   MONOS PCT %  --  5   EOS PCT %  --  3       Results from last 7 days  Lab Units 10/31/18  0438   SODIUM mmol/L 140   POTASSIUM mmol/L 3 7   CHLORIDE mmol/L 109*   CO2 mmol/L 26   BUN mg/dL 12   CREATININE mg/dL 0 43*   CALCIUM mg/dL 8 6   ALK PHOS U/L 83   ALT U/L 23   AST U/L 17           * I Have Reviewed All Lab Data Listed Above  * Additional Pertinent Lab Tests Reviewed: [address] Admission Reviewed    Imaging:    Imaging Reports Reviewed Today Include: CT abd  Imaging Personally Reviewed by Myself Includes:  none    Recent Cultures (last 7 days):           Last 24 Hours Medication List:     Current Facility-Administered Medications:  atropine 0 5 mg Intravenous PRN Debra Aaron MD    Deja Lay ON 2018] cefazolin 2,000 mg Intravenous Once Anthony Perez PA-C    HYDROcodone-acetaminophen 1 tablet Oral Q6H PRN Bea Mark MD    nicotine 1 patch Transdermal Daily Bea Mark MD    sodium chloride 75 mL/hr Intravenous Continuous DANIELA Sood Last Rate: 75 mL/hr (10/31/18 1723)        Today, Patient Was Seen By: DANIELA Massey    ** Please Note: Dictation voice to text software may have been used in the creation of this document   **

## 2018-11-02 ENCOUNTER — APPOINTMENT (INPATIENT)
Dept: RADIOLOGY | Facility: HOSPITAL | Age: 58
DRG: 244 | End: 2018-11-02
Payer: COMMERCIAL

## 2018-11-02 VITALS
HEART RATE: 63 BPM | BODY MASS INDEX: 28.12 KG/M2 | RESPIRATION RATE: 17 BRPM | HEIGHT: 63 IN | WEIGHT: 158.73 LBS | TEMPERATURE: 98.42 F | OXYGEN SATURATION: 99 % | DIASTOLIC BLOOD PRESSURE: 61 MMHG | SYSTOLIC BLOOD PRESSURE: 111 MMHG

## 2018-11-02 PROBLEM — D72.829 LEUKOCYTOSIS: Status: ACTIVE | Noted: 2018-11-02

## 2018-11-02 PROBLEM — I49.5 SICK SINUS SYNDROME (HCC): Status: ACTIVE | Noted: 2018-11-02

## 2018-11-02 LAB
ANION GAP SERPL CALCULATED.3IONS-SCNC: 5 MMOL/L (ref 4–13)
ATRIAL RATE: 60 BPM
BUN SERPL-MCNC: 12 MG/DL (ref 5–25)
CALCIUM SERPL-MCNC: 8.5 MG/DL (ref 8.3–10.1)
CHLORIDE SERPL-SCNC: 111 MMOL/L (ref 100–108)
CO2 SERPL-SCNC: 26 MMOL/L (ref 21–32)
CREAT SERPL-MCNC: 0.45 MG/DL (ref 0.6–1.3)
ERYTHROCYTE [DISTWIDTH] IN BLOOD BY AUTOMATED COUNT: 12.4 % (ref 11.6–15.1)
GFR SERPL CREATININE-BSD FRML MDRD: 110 ML/MIN/1.73SQ M
GLUCOSE SERPL-MCNC: 134 MG/DL (ref 65–140)
HCT VFR BLD AUTO: 32.7 % (ref 34.8–46.1)
HGB BLD-MCNC: 10.7 G/DL (ref 11.5–15.4)
MCH RBC QN AUTO: 32.4 PG (ref 26.8–34.3)
MCHC RBC AUTO-ENTMCNC: 32.7 G/DL (ref 31.4–37.4)
MCV RBC AUTO: 99 FL (ref 82–98)
P AXIS: 27 DEGREES
PLATELET # BLD AUTO: 158 THOUSANDS/UL (ref 149–390)
PMV BLD AUTO: 12.4 FL (ref 8.9–12.7)
POTASSIUM SERPL-SCNC: 3.3 MMOL/L (ref 3.5–5.3)
PR INTERVAL: 222 MS
QRS AXIS: 56 DEGREES
QRSD INTERVAL: 84 MS
QT INTERVAL: 432 MS
QTC INTERVAL: 432 MS
RBC # BLD AUTO: 3.3 MILLION/UL (ref 3.81–5.12)
SODIUM SERPL-SCNC: 142 MMOL/L (ref 136–145)
T WAVE AXIS: 51 DEGREES
VENTRICULAR RATE: 60 BPM
WBC # BLD AUTO: 12.09 THOUSAND/UL (ref 4.31–10.16)

## 2018-11-02 PROCEDURE — 93010 ELECTROCARDIOGRAM REPORT: CPT | Performed by: INTERNAL MEDICINE

## 2018-11-02 PROCEDURE — 99239 HOSP IP/OBS DSCHRG MGMT >30: CPT | Performed by: NURSE PRACTITIONER

## 2018-11-02 PROCEDURE — 80048 BASIC METABOLIC PNL TOTAL CA: CPT | Performed by: PHYSICIAN ASSISTANT

## 2018-11-02 PROCEDURE — 85027 COMPLETE CBC AUTOMATED: CPT | Performed by: PHYSICIAN ASSISTANT

## 2018-11-02 PROCEDURE — 71046 X-RAY EXAM CHEST 2 VIEWS: CPT

## 2018-11-02 RX ORDER — APIXABAN 2.5 MG/1
2.5 TABLET, FILM COATED ORAL 2 TIMES DAILY
Refills: 0
Start: 2018-11-03 | End: 2018-11-19 | Stop reason: SDUPTHER

## 2018-11-02 RX ORDER — POTASSIUM CHLORIDE 20 MEQ/1
40 TABLET, EXTENDED RELEASE ORAL ONCE
Status: COMPLETED | OUTPATIENT
Start: 2018-11-02 | End: 2018-11-02

## 2018-11-02 RX ADMIN — ACETAMINOPHEN 650 MG: 325 TABLET, FILM COATED ORAL at 07:34

## 2018-11-02 RX ADMIN — POTASSIUM CHLORIDE 40 MEQ: 1500 TABLET, EXTENDED RELEASE ORAL at 09:22

## 2018-11-02 RX ADMIN — HYDROCODONE BITARTRATE AND ACETAMINOPHEN 1 TABLET: 5; 325 TABLET ORAL at 08:40

## 2018-11-02 NOTE — ASSESSMENT & PLAN NOTE
· Suspected secondary to bradycardia with SSS  · Cardiology following  · Status post pacemaker  · No further episodes of bradycardia  · Echo EF 50% no regional wall abnormalities   · Trop negative x 3  · Electrolytes stable  · VSS  · Cleared by Cardiology may discharge home

## 2018-11-02 NOTE — ASSESSMENT & PLAN NOTE
· Suspected secondary to bradycardia with SSS  · Cardiology following  · Plan pace maker today  · Continue close monitoring, pacer pads in place   · Echo EF 50% no regional wall abnormalities   · Trop negative x 3  · Electrolytes stable  · VSS  · Close monitoring

## 2018-11-02 NOTE — RESTORATIVE TECHNICIAN NOTE
Restorative Specialist Mobility Note       Activity: Other (Comment), Up ad bucky (Pt is ambulating Independently per RN Lilia )       Bhumi BERKOWITZ, Restorative Technician,

## 2018-11-02 NOTE — DISCHARGE INSTRUCTIONS
START ELIQUIS ON NOVEMBER 3, 2018    Please refer to post pacemaker implantation discharge instructions and restrictions and your pacemaker booklet/temporary card  Keep incision dry for one week  Do not use lotions/powders/creams on incision  Remove outer bandage 48 hours after implantation  Leave underlying steri-strips in place, they will either fall off on their own or will be removed at 2 week follow up appointment  No overhead reaching/pushing/pulling/lifting greater than 5-10lbs with left arm for one month  Please call the office if you notice redness, swelling, bleeding, or drainage from incision or if you develop fevers  AFTER PACEMAKER CARE:    If you have any questions, please call [phone] to speak with a nurse (8:30am-4pm, or [phone] after hours)  For appointments, please call [phone]  WHAT YOU SHOULD KNOW:   A pacemaker is a small, battery-powered device that is placed under your skin in your upper chest area with wires placed through a vein that lead directly into the heart  It helps regulate your heart rate and prevent your heart from beating too slowly                  AFTER YOU LEAVE:     Medicines:     · Pain medicine: You may need medicine to take away or decrease pain  ¨ Learn how to take your medicine  Ask what medicine and how much you should take  Be sure you know how, when, and how often to take it  Usually Over the counter pain medicine is sufficient to control pain (Acetominophen or Ibuprofen) Ask your doctor if you may take these  If this does not control your pain, narcotic pain killers may be prescribed, please call if you need prescription  ¨ Do not wait until the pain is severe before you take your medicine  Tell caregivers if your pain does not decrease  ¨ Pain medicine can make you dizzy or sleepy  Prevent falls by calling someone when you get out of bed or if you need help  Take your medicine as directed    Call your healthcare provider if you think your medicine is not helping or if you have side effects  Tell him if you are allergic to any medicine  Follow up with your cardiologist after your procedure: You will need a follow-up visit approximately 2 weeks after you leave the hospital  Your cardiologist will check your wound and make sure that your pacemaker is working correctly  Follow the instructions to check your pacemaker: Your cardiologist or primary healthcare provider will check your pacemaker and the battery regularly  He will use a computer to check your pacemaker over the telephone or wireless device which will be given to you  Pacemaker batteries usually last 5 to 10 years  The pacemaker unit will be replaced when the battery gets low  This is a simpler procedure than the original one to implant your pacemaker  Wound care:  Keep your incision dry for one week  Sponge/tub baths are preferred, try to avoid a shower for 7 days  Do not use lotions/powders/creams on incision  Remove outer bandage 48 hours after implantation  Leave underlying steri-strips in place, they will either fall off on their own or will be removed at 2 week follow up appointment  Please call the office if you notice redness, swelling, bleeding, or drainage from incision or if you develop fevers  Activity:   · Arm movement and lifting:  Be careful using the arm on the side of your pacemaker  Do not move your arm for the first 24 hours after your procedure  Do not  lift your arm above your shoulder or lift more than 10 pounds for one month after your procedure  Avoid pushing, pulling, or repetitive arm movements for one month  This helps the leads stay in place and helps your wound heal  Ask your caregiver when you can drive after your procedure  You may move your arm side to side without lifting above your shoulder, and do not need to wear a sling at home     · Typically driving is allowed after 1 week post pacemaker if you are stable, however in some cases it may be longer and you should discuss with your doctor before proceeding  · Sports:  Ask your caregiver when it is okay to play tennis, golf, basketball, or any sport that requires you to lift your arms  Do not play full contact sports, such as football, that could damage your pacemaker  Ask your cardiologist or primary healthcare provider how much and what kinds of physical activity are safe for you  Living with a pacemaker:   · Tell all caregivers you have a pacemaker: This includes surgeons, radiologists, and medical technicians  You may want to wear a medical alert ID bracelet or necklace that states that you have a pacemaker  · Carry your pacemaker ID card: Make sure you receive a pacemaker ID card  Carry it with you at all times  It lists important information about your pacemaker  Show it to airport security if you travel  · Avoid electrical interference:  Avoid welding equipment and other equipment with large magnets or electric fields  These things could interfere with how your pacemaker works  Use your cell phone on the ear opposite from your pacemaker  Do not carry your cell phone in your shirt pocket over your chest      · Some Pacemakers are MRI safe  Ask you doctor if it is safe to proceed with MRI and let the radiologist and staff know you have a pacemaker  · Do not touch the skin around your pacemaker: This can cause damage to the lead wires or move the pacemaker unit from where it should be  Contact your cardiologist or primary healthcare provider if:   · The area around your pacemaker has increasing amount of pain after surgery  The pain should improve over first few days after implantation  · The skin around your stitches has increasing redness, swelling, or has drainage  This may mean that you have an infection  · You have a fever  · You have chills, a cough, and feel weak or achy  These are also signs of infection      · Your feet or ankles are more swollen than your baseline  · Your Heart rate is less than 50 beats per minute     Seek care immediately if:   · Your bandage becomes soaked with blood  · Your pacemaker is swelling rapidly    · Your stitches open up  · You feel your heart suddenly beating very slowly or quickly  · You become too weak or dizzy to stand, or you pass out  · Your arm or leg feels warm, tender, and painful  It may look swollen and red  · You have chest pain that does not go away with rest or medicine  · You feel lightheaded, short of breath, and have chest pain  · You cough up blood  © 2014 [address] is for End User's use only and may not be sold, redistributed or otherwise used for commercial purposes  All illustrations and images included in CareNotes® are the copyrighted property of [employer]  or [employer]  The above information is an  only  It is not intended as medical advice for individual conditions or treatments  Talk to your doctor, nurse or pharmacist before following any medical regimen to see if it is safe and effective for you  Pacemaker   WHAT YOU NEED TO KNOW:   A pacemaker is a small, battery-powered device that is implanted into your chest to help regulate your heart rate  DISCHARGE INSTRUCTIONS:   Medicines:   · Pain medicine: You may need medicine to take away or decrease pain  ¨ Learn how to take your medicine  Ask what medicine and how much you should take  Be sure you know how, when, and how often to take it  ¨ Do not wait until the pain is severe before you take your medicine  Tell caregivers if your pain does not decrease  ¨ Pain medicine can make you dizzy or sleepy  Prevent falls by calling someone when you get out of bed or if you need help  · Antibiotics: This medicine is given to fight or prevent an infection caused by bacteria   Always take your antibiotics exactly as ordered by your healthcare provider  Do not stop taking your medicine unless directed by your healthcare provider  Never save antibiotics or take leftover antibiotics that were given to you for another illness  · Take your medicine as directed  Contact your healthcare provider if you think your medicine is not helping or if you have side effects  Tell him or her if you are allergic to any medicine  Keep a list of the medicines, vitamins, and herbs you take  Include the amounts, and when and why you take them  Bring the list or the pill bottles to follow-up visits  Carry your medicine list with you in case of an emergency  Follow up with your cardiologist after your procedure: You may need a follow-up visit 7 to 10 days after you leave the hospital  Your cardiologist will check your wound and make sure that your pacemaker is working correctly  Follow the instructions to check your pacemaker: Your cardiologist or healthcare provider will check your pacemaker and the battery regularly, usually every 3 to 6 months  He may do this in his office  He may also use a computer to check your pacemaker over the telephone between visits  Pacemaker batteries usually last 5 to 8 years  The pacemaker unit will be replaced when the battery gets low  This is a simpler procedure than the original one to implant your pacemaker  Heart rate checks:   · You may need to use a heart monitor at home after your procedure  This device may be called an event monitor, Holter monitor, or mobile telemetry  Monitoring may be done for a period of time, such as a week, or at regular intervals until your heart rhythm is regular  · You may be taught how to check your pulse on your wrist or on your neck  This allows you to monitor how your pacemaker is working  A normal heart beats 50 to 70 times a minute when you are resting  Ask what your pulse should be (your pacemaker's set rate)    Wound care:  Keep your incisions clean and dry for 7 to 10 days after your procedure  Ask your healthcare provider how to care for your incisions and when you can shower or bathe  Activity:   · Arm movement and lifting:  Be careful using the arm on the side of your pacemaker  Do not move your arm for the first 24 hours after your procedure  Do not  lift your arm above your shoulder or lift more than 10 pounds for 6 weeks after your procedure  This helps the leads stay in place and helps your wound heal  Ask your healthcare provider when you can drive after your procedure  · Sports:  Ask your healthcare provider when it is okay to play tennis, golf, basketball, or any sport that requires you to lift your arms  Do not play full contact sports, such as football, that could damage your pacemaker  Ask your cardiologist or healthcare provider how much and what kinds of physical activity are safe for you  Living with a pacemaker:   · Tell all healthcare providers you have a pacemaker: This includes surgeons, radiologists, and medical technicians  You may want to wear a medical alert ID bracelet or necklace that states that you have a pacemaker  · Carry your pacemaker ID card: Make sure you receive a pacemaker ID card  Carry it with you at all times  It lists important information about your pacemaker  Show it to airport security if you travel  · Avoid electrical interference:  Avoid welding equipment, MRI machines, and other equipment with large magnets or electric fields  These things could interfere with how your pacemaker works  Use your cell phone on the ear opposite from your pacemaker  Do not carry your cell phone in your shirt pocket over your chest      · Do not touch the skin around your pacemaker: This can cause damage to the lead wires or move the pacemaker unit from where it should be  Contact your cardiologist or healthcare provider if:   · The area around your pacemaker is painful or tender after surgery       · The skin around your stitches is red, swollen, or has drainage  This may mean that you have an infection  · You have a fever  · You have chills, a cough, and feel weak or achy  These are also signs of infection  · Your feet or ankles are swollen  Seek care immediately if:   · Your bandage becomes soaked with blood  · Your stitches open up  · You feel your heart suddenly beating very slowly or quickly  · You become too weak or dizzy to stand, or you pass out  · Your arm or leg feels warm, tender, and painful  It may look swollen and red  You have chest pain that does not go away with rest or medicine  · You feel lightheaded, short of breath, and have chest pain  You cough up blood  © 2017 [address] Information is for End User's use only and may not be sold, redistributed or otherwise used for commercial purposes  All illustrations and images included in CareNotes® are the copyrighted property of [employer] Inc  or [employer]  The above information is an  only  It is not intended as medical advice for individual conditions or treatments  Talk to your doctor, nurse or pharmacist before following any medical regimen to see if it is safe and effective for you

## 2018-11-02 NOTE — ASSESSMENT & PLAN NOTE
· CT scan showing:  Fluid-filled loops of bowel throughout the abdomen in a nonspecific pattern that consent densities seen in the setting of gastroenteritis  There is moderate fluid-filled distention of the excluded portion of the stomach without evidence to suggest afferent loop obstruction  Reportedly had emesis and diarrhea with dull abd pain on admission  · Denies symptoms at this time, patient does report that she had some abdominal pain prior to admission and episode of diarrhea but she has not had any symptoms since admission  She does have history of gastric bypass 2 years ago    This appears to be incidental findings  · Monitor

## 2018-11-02 NOTE — PROGRESS NOTES
Pt running a slight temp 100 9  Pham Held PA-SAROJ with slim made aware  Pt just received tylenol for headache  No further orders at this time  Will continue to monitor and recheck later

## 2018-11-02 NOTE — ASSESSMENT & PLAN NOTE
· Patient with leukocytosis with a white count of 12 09 post pacemaker insertion  · In addition she had low-grade temp of a 100 9° at midnight, Afebrile this morning  · Patient feels well she is asymptomatic denies any URI symptoms or flu-like symptoms  · Continue Tylenol p r n  encourage oral hydration, encourage coughing and deep breathing suspect this may be atelectasis     Chest x-ray lungs are clear no signs of pneumonia

## 2018-11-02 NOTE — PROGRESS NOTES
DISCHARGE    Patient has attended 2 visits since start of care  Patient's last visit was on 9/28/18  Patient has not returned to clinic for further treatment  Patient self-discharged from therapy  Thank you for the referral  Please refer to patient's last assessment for most recent objective measurements

## 2018-11-02 NOTE — PROGRESS NOTES
Progress Note - Electrophysiology-Cardiology (EP)   Abdi Lim Sunday 60 y o  female MRN: [id number]  Unit/Bed#: [facility] 628-01 Encounter: [id number]      Assessment/Plan:   1  SSS    * s/p DC MDT PPM by Dr Joana Alcantara on 11-2-18   * today his site is soft without any ecchymosis or hematoma    * device was interrogated and found to be in appropriate function    * CXRs reviewed without any PTX with proper lead placement    * we discussed proper post site care to which the patient understood, he was also given written instructions    * pt will f/u in our device clinic in 2 weeks time for device check, this appointment was placed in [facility]      At this time the patient's rhythm issue is completely stable  We will sign off  OK for discharge  Subjective/Objective   Subjective:   Deb Campbell is a 60y o  year old female with a history of morbid obesity s/p gastric bypass, HTN who is HOD#2 after presenting to [facility] ED after a syncopal episode  EP was consulted for PPM evaluation  Since last seen by EP patient underwent DC MDT PPM implant by Dr Joana Alcantara  This AM she is a-pacing on tele and feels much improved with her fatigue  No pain whatsoever       Vitals: /61   Pulse 63   Temp 98 42 °F (36 9 °C)   Resp 17   Ht 5' 3" (1 6 m)   Wt 72 kg (158 lb 11 7 oz)   SpO2 99%   BMI 28 12 kg/m²   Vitals:    10/30/18 2212 10/31/18 1630   Weight: 71 2 kg (156 lb 15 5 oz) 72 kg (158 lb 11 7 oz)     Orthostatic Blood Pressures      Most Recent Value   Blood Pressure  111/61 filed at 11/02/2018 0703   Patient Position - Orthostatic VS  Sitting filed at 10/31/2018 1600            Intake/Output Summary (Last 24 hours) at 11/02/18 1318  Last data filed at 11/02/18 1025   Gross per 24 hour   Intake             1800 ml   Output             2650 ml   Net             -850 ml       Invasive Devices     Peripheral Intravenous Line            Peripheral IV 10/31/18 Left Forearm 1 day    Peripheral IV 10/31/18 Left Hand 1 day          Airway Non-Surgical Airway Oral pharyngeal airway 90 mm less than 1 day                            Scheduled Meds:  Current Facility-Administered Medications:  acetaminophen 650 mg Oral Q6H PRN Pham Hull PA-C   atropine 0 5 mg Intravenous PRN Sukhjinder Gómez MD   HYDROcodone-acetaminophen 1 tablet Oral Q6H PRN Rosy Dueñas MD   nicotine 1 patch Transdermal Daily Rosy Dueñas MD     Continuous Infusions:   PRN Meds:   acetaminophen    atropine    HYDROcodone-acetaminophen    Physical Exam:   GEN: NAD, alert and oriented, well appearing  SKIN: dry without significant lesions or rashes  HEENT: NCAT, PERRL, EOMs intact  NECK: No JVD or carotid bruits appreciated  CARDIOVASCULAR: RRR, normal S1, S2 without murmurs, rubs, or gallops appreciated  LUNGS: Clear to auscultation bilaterally without wheezes, rhonchi, or rales  ABDOMEN: Soft, nontender, nondistended  EXTREMITIES/VASCULAR: perfused without clubbing, cyanosis, or edema b/l  PSYCH: Normal mood and affect  NEURO: CN ll-Xll grossly intact                Lab Results: I have personally reviewed pertinent lab results  Results from last 7 days  Lab Units 11/02/18  0530 10/31/18  0438 10/30/18  2239   WBC Thousand/uL 12 09* 7 45 8 74   HEMOGLOBIN g/dL 10 7* 11 2* 11 7   HEMATOCRIT % 32 7* 33 9* 35 0   PLATELETS Thousands/uL 158 177 171       Results from last 7 days  Lab Units 11/02/18  0530 10/31/18  0438 10/30/18  2239   POTASSIUM mmol/L 3 3* 3 7 3 6   CHLORIDE mmol/L 111* 109* 107   CO2 mmol/L 26 26 28   BUN mg/dL 12 12 14   CREATININE mg/dL 0 45* 0 43* 0 64   CALCIUM mg/dL 8 5 8 6 8 8           Results from last 7 days  Lab Units 10/31/18  0438   MAGNESIUM mg/dL 2 1         Imaging: I have personally reviewed pertinent reports      Results for orders placed during the hospital encounter of 10/30/18   Echo complete with contrast if indicated    [address]  [place], [place] [address]  [phone]    Transthoracic Echocardiogram  2D, M-mode, Doppler, and Color Doppler    Study date:  2018    Patient: Lilly Oneill  MR number: [id number]  Account number: [de-identified]  : 1960  Age: 60 years  Gender: Female  Status: Inpatient  Location: Bedside  Height: 24 8 in  Weight: 343 2 lb  BP: 105/ 60 mmHg    Indications: Syncope, bradycardia  Diagnoses: R55  - Syncope and collapse    Sonographer:  FLAQUITO Astudillo  Primary Physician:  Toby Foster  Referring Physician:  Parag Castillo MD  Group:  [facility]  Cardiology Fellow: Aime Gavin MD  Interpreting Physician:  Randee Ferreira MD    SUMMARY    SUMMARY:  Patient was bradycardic throughout the procedure with heart rates averaging 45 bpm     LEFT VENTRICLE:  Systolic function was at the lower limits of normal  Ejection fraction was estimated to be 50 %  There were no regional wall motion abnormalities  There was no evidence of concentric hypertrophy  MITRAL VALVE:  There was mild regurgitation  TRICUSPID VALVE:  There was mild regurgitation  HISTORY: PRIOR HISTORY: DVT, gastric bypass, current smoker  PROCEDURE: The procedure was performed at the bedside  This was a routine study  The transthoracic approach was used  The study included complete 2D imaging, M-mode, complete spectral Doppler, and color Doppler  The heart rate was 47 bpm,  at the start of the study  Image quality was adequate  LEFT VENTRICLE: Size was normal  Systolic function was at the lower limits of normal  Ejection fraction was estimated to be 50 %  There were no regional wall motion abnormalities  Wall thickness was normal  There was no evidence of  concentric hypertrophy   DOPPLER: Left ventricular diastolic function parameters were normal     RIGHT VENTRICLE: The size was normal  Systolic function was normal  Wall thickness was normal     LEFT ATRIUM: Size was normal     RIGHT ATRIUM: Size was normal     MITRAL VALVE: Valve structure was normal  There was normal leaflet separation  DOPPLER: The transmitral velocity was within the normal range  There was no evidence for stenosis  There was mild regurgitation  AORTIC VALVE: The valve was trileaflet  Leaflets exhibited normal cuspal separation and sclerosis  DOPPLER: Transaortic velocity was within the normal range  There was no evidence for stenosis  There was no regurgitation  TRICUSPID VALVE: The valve structure was normal  There was normal leaflet separation  DOPPLER: The transtricuspid velocity was within the normal range  There was no evidence for stenosis  There was mild regurgitation  Pulmonary artery  systolic pressure was within the normal range  PULMONIC VALVE: Leaflets exhibited normal thickness, no calcification, and normal cuspal separation  DOPPLER: The transpulmonic velocity was within the normal range  There was no regurgitation  PERICARDIUM: There was no pericardial effusion  The pericardium was normal in appearance  AORTA: The root exhibited normal size  SYSTEM MEASUREMENT TABLES    2D  %FS: 28 58 %  Ao Diam: 2 93 cm  EDV(Teich): 84 92 ml  EF(Cube): 63 57 %  EF(Teich): 55 35 %  ESV(Cube): 29 79 ml  ESV(Teich): 37 91 ml  IVSd: 0 9 cm  LA Area: 17 12 cm2  LA Diam: 3 87 cm  LVIDd: 4 34 cm  LVIDs: 3 1 cm  LVPWd: 0 88 cm  RA Area: 17 59 cm2  SI(Cube): 41 92 ml/m2  SI(Teich): 37 91 ml/m2  SV(Cube): 51 98 ml  SV(Teich): 47 01 ml  rv diam: 4 13 cm    CW  AV Env  Ti: 340 11 ms  AV VTI: 31 98 cm  AV Vmax: 1 48 m/s  AV Vmean: 0 94 m/s  AV maxP 74 mmHg  AV meanP 02 mmHg  TR Vmax: 2 08 m/s  TR maxP 23 mmHg    MM  TAPSE: 2 33 cm    PW  E': 0 1 m/s  E/E': 8 55  LVOT Env  Ti: 362 29 ms  LVOT VTI: 23 55 cm  LVOT Vmax: 0 89 m/s  LVOT Vmean: 0 65 m/s  LVOT maxPG: 3 16 mmHg  LVOT meanP 84 mmHg  MV A Rivera: 0 55 m/s  MV Dec Maury: 3 87 m/s2  MV DecT: 217 32 ms  MV E Rivera: 0 84 m/s  MV E/A Ratio: 1 53    [facility]    Prepared and electronically signed by    Jeanine Krause MD  Signed 01-Nov-2018 11:04:01         EKG:

## 2018-11-02 NOTE — ASSESSMENT & PLAN NOTE
· Status post pacemaker placement postop day 1  · Heart rate 70-80s no further bradycardia noted  · Chest x-ray no pneumothorax post insertion  · Patient reports tenderness at site  · Follow-up with Cardiology after discharge

## 2018-11-02 NOTE — DISCHARGE SUMMARY
Discharge- Nathalie Davis Sunday 1960, 60 y o  female MRN: [id number]    Unit/Bed#: [address] 628-01 Encounter: [id number]    Primary Care Provider: Ashely Oneill MD   Date and time admitted to hospital: 10/30/2018 10:04 PM        * Syncope   Assessment & Plan    · Suspected secondary to bradycardia with SSS  · Cardiology following  · Status post pacemaker  · No further episodes of bradycardia  · Echo EF 50% no regional wall abnormalities   · Trop negative x 3  · Electrolytes stable  · VSS  · Cleared by Cardiology may discharge home       Leukocytosis   Assessment & Plan    · Patient with leukocytosis with a white count of 12 09 post pacemaker insertion  · In addition she had low-grade temp of a 100 9° at midnight, Afebrile this morning  · Patient feels well she is asymptomatic denies any URI symptoms or flu-like symptoms  · Continue Tylenol p r n  encourage oral hydration, encourage coughing and deep breathing suspect this may be atelectasis  Chest x-ray lungs are clear no signs of pneumonia     Sick sinus syndrome (HCC)   Assessment & Plan    · Status post pacemaker placement postop day 1  · Heart rate 70-80s no further bradycardia noted  · Chest x-ray no pneumothorax post insertion  · Patient reports tenderness at site  · Follow-up with Cardiology after discharge     Abnormal CT scan   Assessment & Plan    · CT scan showing:  Fluid-filled loops of bowel throughout the abdomen in a nonspecific pattern that consent densities seen in the setting of gastroenteritis  There is moderate fluid-filled distention of the excluded portion of the stomach without evidence to suggest afferent loop obstruction  Reportedly had emesis and diarrhea with dull abd pain on admission  · Denies symptoms at this time, patient does report that she had some abdominal pain prior to admission and episode of diarrhea but she has not had any symptoms since admission  She does have history of gastric bypass 2 years ago    This appears to be incidental findings  · Monitor      Deep vein thrombosis (DVT) of lower extremity (HCC)   Assessment & Plan    · Patient has history of DVT in lower extremities as well as upper  · No hematology work up as per family  · On eliquis and managed by PCP  · Will continue with eliquis  Discharging Physician / Practitioner: Kurt Hall  PCP: Vandana Salgado MD  Admission Date:   Admission Orders     Ordered        10/31/18 1657  Inpatient Admission  Once         10/31/18 0101  Place in Observation (expected length of stay for this patient is less than two midnights)  Once             Discharge Date: 11/02/18    Resolved Problems  Date Reviewed: 11/2/2018    None          Consultations During Hospital Stay:  · Cardiology    Procedures Performed:     · November 1, 2018 dual chamber permanent pacemaker implantation    Significant Findings / Test Results:     Xr Chest Portable  Result Date: 11/2/2018  Narrative: CHEST INDICATION:   Patient s/p Pacemaker/ICD Insertion  COMPARISON:  3/31/2011 EXAM PERFORMED/VIEWS:  XR CHEST PORTABLE FINDINGS:  Left-sided chest wall pacemaker is identified  Pacemaker leads are intact  Cardiomediastinal silhouette appears unremarkable  The lungs are clear  No pneumothorax or pleural effusion  Osseous structures appear within normal limits for patient age  Impression: No pneumothorax  Workstation performed: [id number]     Xr Chest 2 Views  Result Date: 11/2/2018  Narrative: CHEST INDICATION:   Status post pacemaker insertion  COMPARISON:  11/1/2018 EXAM PERFORMED/VIEWS:  XR CHEST PA & LATERAL  The frontal view was performed utilizing dual energy radiographic technique  FINDINGS:  Lateral view limited by superposition of patient's arm  Dual lead left chest wall pacer identified with tips projecting in the region of the right atrium and ventricle  No pneumothorax Cardiomediastinal silhouette appears unremarkable  The lungs are clear  No pneumothorax or pleural effusion  Paravertebral ossifications thoracic spine  Cervical fusion hardware  Impression: No pneumothorax following pacemaker insertion  Workstation performed: [id number]       Ct Abdomen Pelvis With Contrast  Result Date: 10/30/2018  Narrative: CT ABDOMEN AND PELVIS WITH IV CONTRAST INDICATION:   Left lower quadrant pain  Severe left-sided abdominal pain  Gastric bypass surgery  COMPARISON:  February 13, 2015 TECHNIQUE:  CT examination of the abdomen and pelvis was performed  Axial, sagittal, and coronal 2D reformatted images were created from the source data and submitted for interpretation  Radiation dose length product (DLP) for this visit:  422 47 mGy-cm   This examination, like all CT scans performed in the [facility], was performed utilizing techniques to minimize radiation dose exposure, including the use of iterative  reconstruction and automated exposure control  IV Contrast:  100 mL of iohexol (OMNIPAQUE) Enteric Contrast:  Enteric contrast was not administered  FINDINGS: ABDOMEN LOWER CHEST:  Bibasilar atelectasis  LIVER/BILIARY TREE:  Unremarkable  GALLBLADDER:  No calcified gallstones  No pericholecystic inflammatory change  SPLEEN:  Unremarkable  PANCREAS:  Unremarkable  ADRENAL GLANDS:  Unremarkable  KIDNEYS/URETERS:  Unremarkable  No hydronephrosis  STOMACH AND BOWEL:  Surgical changes of Raghav-en-Y gastric bypass surgery are noted  There is a moderate volume of fluid within the excluded portion of the stomach however, duodenum and remainder of the afferent loop are not distended or obstructed  Fluid-filled loops of small bowel are seen throughout the abdomen in a nonspecific pattern that can sometimes be seen in the setting of gastroenteritis  There is no evidence of bowel obstruction  No significant abnormal bowel wall thickening is noted  APPENDIX:  No findings to suggest appendicitis  ABDOMINOPELVIC CAVITY:  No ascites or free intraperitoneal air  No lymphadenopathy   VESSELS: Unremarkable for patient's age  PELVIS REPRODUCTIVE ORGANS:  Unremarkable for patient's age  URINARY BLADDER:  Unremarkable  ABDOMINAL WALL/INGUINAL REGIONS:  Unremarkable  OSSEOUS STRUCTURES:  No acute fracture or destructive osseous lesion  Impression: Fluid-filled loops of bowel throughout the abdomen in a nonspecific pattern that consent densities seen in the setting of gastroenteritis  There is moderate fluid-filled distention of the excluded portion of the stomach without evidence to suggest afferent loop obstruction  Otherwise no acute CT abnormality in the abdomen or pelvis to account for the patient's symptoms  Workstation performed: [id number]   ·     Incidental Findings:   · As noted above under CAT Scan abdomen     Test Results Pending at Discharge (will require follow up): · None     Outpatient Tests Requested:  · None    Complications:  None    Reason for Admission:  Syncope    Hospital Course:     Swati Irizarry Sunday is a 60 y o  female patient who originally presented to the hospital on 10/30/2018 due to episode of syncope at home  Patient was at home sitting at the table her daughter was in the same room her daughter turned around had her back to the patient when she heard a thud turned around and saw her mom with her head on the table  Daughter reported that she was unable to arouse her for about 5 min and also that she did not have a pulse at that time  Prior to the event she did not have any chest pain palpitations or shortness of breath  She did report abdominal pain left side of which was associated with diarrhea  She presented to the emergency department  CAT Scan of the abdomen shows a possible gastroenteritis  She had no further GI symptoms on admission  She denies fever chills  Troponins were negative x3  On telemetry she was noted to have a 4 sec pause, in addition she was noted to be bradycardic in the 40s  Cardiology was consulted    It was suspected that she had syncope secondary to sick sinus syndrome  On November 1st she underwent a dual chamber permanent pacemaker placement  Postprocedure heart rate remained between 70s to 80s  Device was interrogated and noted to be in appropriate function  Chest x-ray did not indicate any pneumothorax  She was given a dose of Ancef preprocedure  Postprocedure she did have a low-grade temperature of a 100 9° and also she had a mild leukocytosis of 12  Patient denied any URI or flu-like symptoms  Chest x-ray did not indicate any pneumonia  She did not have any cough or congestion she denied any urine symptoms  She had been for the most part sleeping and lying in bed for her hospital stay suspect this may be secondary to atelectasis although chest x-ray was clear  Recommend that she continue with coughing and deep breathing exercises along with activity as tolerated  She was considered medically stable for discharge  She will monitor for fever at home continue coughing and deep breathing exercises and ambulate    Please see above list of diagnoses and related plan for additional information  As tolerated  Condition at Discharge: stable     Discharge Day Visit / Exam:     Subjective:  Reports pain/tenderness at left anterior chest pacemaker site  Otherwise denies other types of chest pain, shortness of breath, nausea, vomiting, headache, dizziness  Tolerating diet  Voiding without difficulty  No abdominal pain denies constipation  Vitals: Blood Pressure: 111/61 (11/02/18 0703)  Pulse: 63 (11/02/18 0705)  Temperature: 98 42 °F (36 9 °C) (11/02/18 0703)  Temp Source: Oral (11/02/18 0315)  Respirations: 17 (11/02/18 0030)  Height: 5' 3" (160 cm) (10/31/18 1630)  Weight - Scale: 72 kg (158 lb 11 7 oz) (10/31/18 1630)  SpO2: 99 % (11/02/18 0705)  Exam:   Physical Exam   Constitutional: She is oriented to person, place, and time  She appears well-developed and well-nourished  No distress  HENT:   Head: Normocephalic and atraumatic  Healed scabbed right lower lip   Eyes: Pupils are equal, round, and reactive to light  Neck: Normal range of motion  Neck supple  Cardiovascular: Normal rate, regular rhythm and intact distal pulses  No murmur heard  Pulmonary/Chest: Effort normal and breath sounds normal  No respiratory distress  She has no wheezes  She has no rales  Abdominal: Soft  Bowel sounds are normal  She exhibits no distension  There is no tenderness  Musculoskeletal: Normal range of motion  She exhibits no edema  Neurological: She is alert and oriented to person, place, and time  No cranial nerve deficit  Skin: Skin is warm and dry  Dressing dry and intact left anterior chest   Psychiatric: She has a normal mood and affect  Her behavior is normal    Vitals reviewed  Discussion with Family:     Discharge instructions/Information to patient and family:   See after visit summary for information provided to patient and family  Provisions for Follow-Up Care:  See after visit summary for information related to follow-up care and any pertinent home health orders  Disposition:     Home    For Discharges to [facility] SNF:   · Not Applicable to this Patient - Not Applicable to this Patient    Planned Readmission:  Not anticipated     Discharge Statement:  I spent 40 minutes discharging the patient  This time was spent on the day of discharge  I had direct contact with the patient on the day of discharge  Greater than 50% of the total time was spent examining patient, answering all patient questions, arranging and discussing plan of care with patient as well as directly providing post-discharge instructions  Additional time then spent on discharge activities  Discharge Medications:  See after visit summary for reconciled discharge medications provided to patient and family        ** Please Note: This note has been constructed using a voice recognition system **

## 2018-11-05 LAB
ATRIAL RATE: 51 BPM
P AXIS: 64 DEGREES
PR INTERVAL: 188 MS
QRS AXIS: 56 DEGREES
QRSD INTERVAL: 82 MS
QT INTERVAL: 464 MS
QTC INTERVAL: 427 MS
T WAVE AXIS: 54 DEGREES
VENTRICULAR RATE: 51 BPM

## 2018-11-05 PROCEDURE — 93010 ELECTROCARDIOGRAM REPORT: CPT | Performed by: INTERNAL MEDICINE

## 2018-11-08 ENCOUNTER — HOSPITAL ENCOUNTER (OUTPATIENT)
Dept: RADIOLOGY | Facility: IMAGING CENTER | Age: 58
Discharge: HOME | End: 2018-11-08
Payer: COMMERCIAL

## 2018-11-08 ENCOUNTER — TRANSCRIBE ORDERS (OUTPATIENT)
Dept: ADMINISTRATIVE | Facility: HOSPITAL | Age: 58
End: 2018-11-08

## 2018-11-08 ENCOUNTER — OFFICE VISIT (OUTPATIENT)
Dept: FAMILY MEDICINE CLINIC | Facility: CLINIC | Age: 58
End: 2018-11-08
Payer: COMMERCIAL

## 2018-11-08 ENCOUNTER — TELEPHONE (OUTPATIENT)
Dept: FAMILY MEDICINE CLINIC | Facility: CLINIC | Age: 58
End: 2018-11-08

## 2018-11-08 VITALS
WEIGHT: 157 LBS | HEIGHT: 63 IN | DIASTOLIC BLOOD PRESSURE: 70 MMHG | RESPIRATION RATE: 16 BRPM | SYSTOLIC BLOOD PRESSURE: 110 MMHG | BODY MASS INDEX: 27.82 KG/M2 | OXYGEN SATURATION: 98 % | TEMPERATURE: 96.3 F | HEART RATE: 66 BPM

## 2018-11-08 DIAGNOSIS — R55 SYNCOPE, UNSPECIFIED SYNCOPE TYPE: Primary | ICD-10-CM

## 2018-11-08 DIAGNOSIS — Z09 FOLLOW-UP EXAM: ICD-10-CM

## 2018-11-08 DIAGNOSIS — R22.9 LOCAL SUPERFICIAL SWELLING, MASS OR LUMP: Primary | ICD-10-CM

## 2018-11-08 DIAGNOSIS — R22.9 LOCAL SUPERFICIAL SWELLING, MASS OR LUMP: ICD-10-CM

## 2018-11-08 PROCEDURE — 76705 ECHO EXAM OF ABDOMEN: CPT

## 2018-11-08 PROCEDURE — 99496 TRANSJ CARE MGMT HIGH F2F 7D: CPT | Performed by: FAMILY MEDICINE

## 2018-11-08 NOTE — PROGRESS NOTES
Assessment/Plan:    No problem-specific Assessment & Plan notes found for this encounter  Diagnoses and all orders for this visit:    Syncope, unspecified syncope type  Comments:  Recently diagnosed with bradycardia, , they put the pace make in , she is discharge last week , doing much better    Local superficial swelling, mass or lump  Comments:  / lipoma, will do the u/s and follow that  Orders:  -     Cancel: [address] MSK complete; Future    Follow-up exam  Comments:  recently discharge from hospital    Doing good  Subjective:      Patient ID: Ameena Roberts Sunday is a 60 y o  female  HPI    The following portions of the patient's history were reviewed and updated as appropriate: She  has a past medical history of DVT (deep venous thrombosis) ([facility] [address] ) (2012)  She has No Known Allergies  Review of Systems   Constitutional: Negative  HENT: Negative  Eyes: Negative  Respiratory: Negative  Cardiovascular: Negative  Gastrointestinal: Negative  Genitourinary: Negative  Psychiatric/Behavioral: Negative  Objective:      BP 90/60 (BP Location: Left arm, Patient Position: Sitting, Cuff Size: Large)   Pulse 66   Temp (!) 96 3 °F (35 7 °C) (Tympanic)   Resp 16   Ht 5' 3" (1 6 m)   Wt 71 2 kg (157 lb)   SpO2 98%   BMI 27 81 kg/m²          Physical Exam   Constitutional: She is oriented to person, place, and time  She appears well-developed  HENT:   Head: Normocephalic  Mouth/Throat: Oropharynx is clear and moist    Neck: Normal range of motion  Cardiovascular: Normal rate and regular rhythm  Pace make on the left upper chest, still has the bandage on  Pulmonary/Chest: Effort normal and breath sounds normal    Abdominal: Soft  Bowel sounds are normal    Neurological: She is alert and oriented to person, place, and time  Skin: Skin is warm  Psychiatric: She has a normal mood and affect

## 2018-11-08 NOTE — PROGRESS NOTES
U/s shows that its the cyst not lipoma, just keep an eye if the really become painful that follow with us

## 2018-11-08 NOTE — TELEPHONE ENCOUNTER
----- Message from Uriel Mancia MD sent at 11/8/2018  1:16 PM EST -----  U/s shows that its the cyst not lipoma, just keep an eye if the really become painful that follow with us

## 2018-11-16 ENCOUNTER — IN-CLINIC DEVICE VISIT (OUTPATIENT)
Dept: CARDIOLOGY CLINIC | Facility: CLINIC | Age: 58
End: 2018-11-16

## 2018-11-16 DIAGNOSIS — Z95.0 PRESENCE OF PERMANENT CARDIAC PACEMAKER: ICD-10-CM

## 2018-11-16 DIAGNOSIS — I49.5 SICK SINUS SYNDROME (HCC): Primary | ICD-10-CM

## 2018-11-16 PROCEDURE — 99024 POSTOP FOLLOW-UP VISIT: CPT | Performed by: INTERNAL MEDICINE

## 2018-11-19 DIAGNOSIS — I82.4Y9 DEEP VEIN THROMBOSIS (DVT) OF PROXIMAL LOWER EXTREMITY, UNSPECIFIED CHRONICITY, UNSPECIFIED LATERALITY (HCC): Primary | ICD-10-CM

## 2018-11-19 RX ORDER — APIXABAN 2.5 MG/1
2.5 TABLET, FILM COATED ORAL 2 TIMES DAILY
Qty: 60 TABLET | Refills: 2 | Status: SHIPPED | OUTPATIENT
Start: 2018-11-19 | End: 2019-02-26 | Stop reason: SDUPTHER

## 2018-11-23 ENCOUNTER — APPOINTMENT (EMERGENCY)
Dept: NON INVASIVE DIAGNOSTICS | Facility: HOSPITAL | Age: 58
End: 2018-11-23
Payer: COMMERCIAL

## 2018-11-23 ENCOUNTER — APPOINTMENT (EMERGENCY)
Dept: RADIOLOGY | Facility: HOSPITAL | Age: 58
End: 2018-11-23
Payer: COMMERCIAL

## 2018-11-23 ENCOUNTER — HOSPITAL ENCOUNTER (EMERGENCY)
Facility: HOSPITAL | Age: 58
Discharge: HOME | End: 2018-11-23
Attending: EMERGENCY MEDICINE | Admitting: EMERGENCY MEDICINE
Payer: COMMERCIAL

## 2018-11-23 VITALS
TEMPERATURE: 97.5 F | RESPIRATION RATE: 20 BRPM | BODY MASS INDEX: 28.34 KG/M2 | WEIGHT: 160 LBS | DIASTOLIC BLOOD PRESSURE: 73 MMHG | HEART RATE: 79 BPM | SYSTOLIC BLOOD PRESSURE: 124 MMHG | OXYGEN SATURATION: 99 %

## 2018-11-23 DIAGNOSIS — M79.641 RIGHT HAND PAIN: Primary | ICD-10-CM

## 2018-11-23 DIAGNOSIS — M79.89 SWELLING OF RIGHT HAND: ICD-10-CM

## 2018-11-23 LAB
ANION GAP SERPL CALCULATED.3IONS-SCNC: 6 MMOL/L (ref 4–13)
BASOPHILS # BLD AUTO: 0.08 THOUSANDS/ΜL (ref 0–0.1)
BASOPHILS NFR BLD AUTO: 1 % (ref 0–1)
BUN SERPL-MCNC: 17 MG/DL (ref 5–25)
CALCIUM SERPL-MCNC: 8.6 MG/DL (ref 8.3–10.1)
CHLORIDE SERPL-SCNC: 108 MMOL/L (ref 100–108)
CK SERPL-CCNC: 65 U/L (ref 26–192)
CO2 SERPL-SCNC: 28 MMOL/L (ref 21–32)
CREAT SERPL-MCNC: 0.54 MG/DL (ref 0.6–1.3)
CRP SERPL QL: <3 MG/L
EOSINOPHIL # BLD AUTO: 0.22 THOUSAND/ΜL (ref 0–0.61)
EOSINOPHIL NFR BLD AUTO: 2 % (ref 0–6)
ERYTHROCYTE [DISTWIDTH] IN BLOOD BY AUTOMATED COUNT: 12 % (ref 11.6–15.1)
ERYTHROCYTE [SEDIMENTATION RATE] IN BLOOD: 18 MM/HOUR (ref 0–20)
GFR SERPL CREATININE-BSD FRML MDRD: 104 ML/MIN/1.73SQ M
GLUCOSE SERPL-MCNC: 88 MG/DL (ref 65–140)
HCT VFR BLD AUTO: 35.8 % (ref 34.8–46.1)
HGB BLD-MCNC: 11.8 G/DL (ref 11.5–15.4)
IMM GRANULOCYTES # BLD AUTO: 0.04 THOUSAND/UL (ref 0–0.2)
IMM GRANULOCYTES NFR BLD AUTO: 0 % (ref 0–2)
LACTATE SERPL-SCNC: 0.7 MMOL/L (ref 0.5–2)
LYMPHOCYTES # BLD AUTO: 2.23 THOUSANDS/ΜL (ref 0.6–4.47)
LYMPHOCYTES NFR BLD AUTO: 20 % (ref 14–44)
MCH RBC QN AUTO: 32.8 PG (ref 26.8–34.3)
MCHC RBC AUTO-ENTMCNC: 33 G/DL (ref 31.4–37.4)
MCV RBC AUTO: 99 FL (ref 82–98)
MONOCYTES # BLD AUTO: 0.61 THOUSAND/ΜL (ref 0.17–1.22)
MONOCYTES NFR BLD AUTO: 6 % (ref 4–12)
NEUTROPHILS # BLD AUTO: 7.88 THOUSANDS/ΜL (ref 1.85–7.62)
NEUTS SEG NFR BLD AUTO: 71 % (ref 43–75)
NRBC BLD AUTO-RTO: 0 /100 WBCS
PLATELET # BLD AUTO: 198 THOUSANDS/UL (ref 149–390)
PMV BLD AUTO: 11.3 FL (ref 8.9–12.7)
POTASSIUM SERPL-SCNC: 4.1 MMOL/L (ref 3.5–5.3)
RBC # BLD AUTO: 3.6 MILLION/UL (ref 3.81–5.12)
SODIUM SERPL-SCNC: 142 MMOL/L (ref 136–145)
WBC # BLD AUTO: 11.06 THOUSAND/UL (ref 4.31–10.16)

## 2018-11-23 PROCEDURE — 83605 ASSAY OF LACTIC ACID: CPT | Performed by: EMERGENCY MEDICINE

## 2018-11-23 PROCEDURE — 86140 C-REACTIVE PROTEIN: CPT | Performed by: ORTHOPAEDIC SURGERY

## 2018-11-23 PROCEDURE — 80048 BASIC METABOLIC PNL TOTAL CA: CPT | Performed by: EMERGENCY MEDICINE

## 2018-11-23 PROCEDURE — 85025 COMPLETE CBC W/AUTO DIFF WBC: CPT | Performed by: EMERGENCY MEDICINE

## 2018-11-23 PROCEDURE — 73130 X-RAY EXAM OF HAND: CPT

## 2018-11-23 PROCEDURE — 82550 ASSAY OF CK (CPK): CPT | Performed by: EMERGENCY MEDICINE

## 2018-11-23 PROCEDURE — 93971 EXTREMITY STUDY: CPT | Performed by: SURGERY

## 2018-11-23 PROCEDURE — 36415 COLL VENOUS BLD VENIPUNCTURE: CPT | Performed by: EMERGENCY MEDICINE

## 2018-11-23 PROCEDURE — 99284 EMERGENCY DEPT VISIT MOD MDM: CPT

## 2018-11-23 PROCEDURE — 85652 RBC SED RATE AUTOMATED: CPT | Performed by: ORTHOPAEDIC SURGERY

## 2018-11-23 PROCEDURE — 96372 THER/PROPH/DIAG INJ SC/IM: CPT

## 2018-11-23 PROCEDURE — 93971 EXTREMITY STUDY: CPT

## 2018-11-23 RX ORDER — MORPHINE SULFATE 15 MG/1
7.5 TABLET ORAL EVERY 6 HOURS PRN
Qty: 6 TABLET | Refills: 0 | Status: SHIPPED | OUTPATIENT
Start: 2018-11-23 | End: 2018-12-03

## 2018-11-23 RX ORDER — KETOROLAC TROMETHAMINE 30 MG/ML
15 INJECTION, SOLUTION INTRAMUSCULAR; INTRAVENOUS ONCE
Status: COMPLETED | OUTPATIENT
Start: 2018-11-23 | End: 2018-11-23

## 2018-11-23 RX ADMIN — KETOROLAC TROMETHAMINE 15 MG: 30 INJECTION, SOLUTION INTRAMUSCULAR at 15:38

## 2018-11-23 NOTE — ED ATTENDING ATTESTATION
Suri Romero MD, saw and evaluated the patient  I have discussed the patient with the resident/non-physician practitioner and agree with the resident's/non-physician practitioner's findings, Plan of Care, and MDM as documented in the resident's/non-physician practitioner's note, except where noted  All available labs and Radiology studies were reviewed  At this point I agree with the current assessment done in the Emergency Department    I have conducted an independent evaluation of this patient a history and physical is as follows:    Right hand pain   Wrist to fingers    No injury  Had  Carpal tunnel surgery   Several months ago  On same hand   No injury  fever  On eliquis for prior le dvt  Exam mild swelling  Of right hand  But no redness or warmth   No fusiform swelling of digits         Normal pulses radial and ulnar    Normal cap refill  Normal sensation            Critical Care Time  CritCare Time    Procedures

## 2018-11-23 NOTE — ED PROVIDER NOTES
History  Chief Complaint   Patient presents with    Hand Problem     R hand immobility  +pain -injury     48-year-old female presents for right hand pain  Began yesterday with gradually worsening diffuse right hand pain including the wrist in all 5 digits  Sharp in nature, constant nonradiating  No associated numbness tingling or weakness  Limited use secondary to pain  She is right-hand dominant  She reports mild swelling towards the end of the day from the wrist into all digits on the right hand as well  Did not want to come in yesterday secondary to very to it being a holiday  She comes today with continued pain in the right hand  Took Tylenol 2 hours prior to arrival   She has a history of carpal tunnel release within the last 1 year  States this feels exactly the same  There are no tingling sensations  She attempted to wear her carpal tunnel splint yesterday but had no relief as well  She denies any trauma any fevers any redness to the skin, streaking redness, crush injuries, recent cuts or lacerations or infections to the hand  Prior to Admission Medications   Prescriptions Last Dose Informant Patient Reported? Taking?    B Complex Vitamins (VITAMIN B COMPLEX PO)  Self Yes No   Sig: Take 1 capsule by mouth   ELIQUIS 2 5 MG   No No   Sig: Take 1 tablet (2 5 mg total) by mouth 2 (two) times a day   HYDROcodone-acetaminophen (NORCO) 5-325 mg per tablet   No No   Sig: Take 1 tablet by mouth every 6 (six) hours as needed for pain for up to 20 doses For continuation of care Max Daily Amount: 4 tablets   cholecalciferol (VITAMIN D3) 1,000 units tablet   Yes No   Sig: Take 1,000 Units by mouth 2 (two) times a day   ferrous sulfate 325 (65 Fe) mg tablet   Yes No   Sig: Take 325 mg by mouth daily with breakfast      Facility-Administered Medications: None       Past Medical History:   Diagnosis Date    DVT (deep venous thrombosis) ([facility] [address] ) 2012       Past Surgical History:   Procedure Laterality Date    CARDIAC PACEMAKER PLACEMENT  2018    CERVICAL One [address] [facility] SURGERY  2009    PLATES & SCREWS     SECTION      X2    GASTRIC BYPASS  2017    LAP RINYGB SURGERY    KNEE SURGERY Bilateral     OTHER SURGICAL HISTORY      ARM SURGERIES    SD INCISE FINGER TENDON SHEATH Left 2018    Procedure: LONG TRIGGER FINGER RELEASE;  Surgeon: Maria T Rebolledo MD;  [facility];  Service: Orthopedics    SD WRIST Dirk Soles LIG Right 2018    Procedure: ENDOSCOPIC CARPAL TUNNEL RELEASE;  Surgeon: Maria T Rebolledo MD;  [facility];  Service: Orthopedics    TONSILLECTOMY         Family History   Problem Relation Age of Onset    Stroke Mother     Alzheimer's disease Mother     Arthritis Mother     Coronary artery disease Mother     Breast cancer Mother     Cancer Mother     Heart disease Mother     Hypertension Father     Gout Father     Diabetes type II Father     Coronary artery disease Father     Heart disease Father      I have reviewed and agree with the history as documented  Social History   Substance Use Topics    Smoking status: Former Smoker     Packs/day: 0 25     Types: Cigarettes    Smokeless tobacco: Never Used      Comment: NO PASSIVE SMOKE     Alcohol use 1 2 oz/week     1 Glasses of wine, 1 Standard drinks or equivalent per week      Comment: very rare social        Review of Systems   Constitutional: Negative for chills, fatigue and fever  HENT: Negative for congestion  Eyes: Negative for visual disturbance  Respiratory: Negative for chest tightness and shortness of breath  Cardiovascular: Negative for chest pain and palpitations  Gastrointestinal: Negative for abdominal pain, nausea and vomiting  Musculoskeletal: Positive for arthralgias and joint swelling  Negative for back pain, gait problem, myalgias, neck pain and neck stiffness  Skin: Negative for color change, pallor and rash     Neurological: Negative for dizziness, syncope, weakness, light-headedness, numbness and headaches  Physical Exam  ED Triage Vitals [11/23/18 1406]   Temperature Pulse Respirations Blood Pressure SpO2   97 5 °F (36 4 °C) 87 20 115/64 98 %      Temp Source Heart Rate Source Patient Position - Orthostatic VS BP Location FiO2 (%)   Tympanic Monitor Sitting Left arm --      Pain Score       Worst Possible Pain           Orthostatic Vital Signs  Vitals:    11/23/18 1406 11/23/18 1750   BP: 115/64 124/73   Pulse: 87 79   Patient Position - Orthostatic VS: Sitting Sitting       Physical Exam   Constitutional: She is oriented to person, place, and time  Vital signs are normal  She appears well-developed and well-nourished  She does not appear ill  No distress  HENT:   Head: Normocephalic and atraumatic  Head is without abrasion and without contusion  Right Ear: Tympanic membrane normal    Left Ear: Tympanic membrane normal    Nose: Nose normal    Mouth/Throat: Uvula is midline, oropharynx is clear and moist and mucous membranes are normal    Eyes: Pupils are equal, round, and reactive to light  Conjunctivae and EOM are normal    Neck: Trachea normal, normal range of motion, full passive range of motion without pain and phonation normal  Neck supple  No JVD present  No spinous process tenderness and no muscular tenderness present  Carotid bruit is not present  Normal range of motion present  No thyromegaly present  Cardiovascular: Normal rate, regular rhythm and intact distal pulses  Exam reveals no friction rub  No murmur heard  Cap refill less than 2 seconds  2+ radial pulse bilaterally  Pulmonary/Chest: Effort normal and breath sounds normal  No accessory muscle usage or stridor  No tachypnea  No respiratory distress  She has no decreased breath sounds  She has no wheezes  She has no rhonchi  She has no rales  She exhibits no tenderness, no crepitus, no edema and no retraction  Abdominal: Soft   Normal appearance and bowel sounds are normal  She exhibits no distension  There is no tenderness  There is no rigidity, no rebound, no guarding and no CVA tenderness  Musculoskeletal:        Right wrist: She exhibits swelling  She exhibits normal range of motion, no tenderness, no bony tenderness, no effusion, no crepitus, no deformity and no laceration  Left wrist: Normal         Right hand: She exhibits decreased range of motion, tenderness and swelling  She exhibits no bony tenderness, normal two-point discrimination, normal capillary refill, no deformity and no laceration  Normal sensation noted  Normal strength noted  Left hand: Normal  Normal sensation noted  Normal strength noted  No pain right hand with passive flexion/extension at wrist/fingers  Diffuse swelling in the right hand right wrist   No overlying skin changes  No pain or discomfort or swelling in the right forearm  Normal sensation the median, radial ulnar nerve distribution  Limited range of motion and strength secondary to pain  Compartments of the forearm and hand are soft  Lymphadenopathy:     She has no cervical adenopathy  Neurological: She is alert and oriented to person, place, and time  She has normal strength  No sensory deficit  GCS eye subscore is 4  GCS verbal subscore is 5  GCS motor subscore is 6  Normal sensation to median, radial and ulnar nerve distribution  Limited active flexion extension secondary to pain in the right hand  Mild reproduction of pain tineals testing on R   Skin: Skin is warm, dry and intact  No rash noted  She is not diaphoretic  Psychiatric: She has a normal mood and affect  Nursing note and vitals reviewed        ED Medications  Medications   ketorolac (TORADOL) injection 15 mg (15 mg Intramuscular Given 11/23/18 1538)       Diagnostic Studies  Results Reviewed     Procedure Component Value Units Date/Time    C-reactive protein [[id number]]  (Normal) Collected:  11/23/18 1616    Lab Status:  Final result Specimen:  Blood from Arm, Left Updated:  11/23/18 2121     CRP <3 0 mg/L     Sedimentation rate, automated [[id number]]  (Normal) Collected:  11/23/18 1616    Lab Status:  Final result Specimen:  Blood from Arm, Left Updated:  11/23/18 2006     Sed Rate 18 mm/hour     Lactic acid x2 Q2H [[id number]]  (Normal) Collected:  11/23/18 1616    Lab Status:  Final result Specimen:  Blood from Arm, Left Updated:  11/23/18 1652     LACTIC ACID 0 7 mmol/L     Narrative:         Result may be elevated if tourniquet was used during collection  Basic metabolic panel [[id number]]  (Abnormal) Collected:  11/23/18 1616    Lab Status:  Final result Specimen:  Blood from Arm, Left Updated:  11/23/18 1650     Sodium 142 mmol/L      Potassium 4 1 mmol/L      Chloride 108 mmol/L      CO2 28 mmol/L      ANION GAP 6 mmol/L      BUN 17 mg/dL      Creatinine 0 54 (L) mg/dL      Glucose 88 mg/dL      Calcium 8 6 mg/dL      eGFR 104 ml/min/1 73sq m     Narrative:         National Kidney Disease Education Program recommendations are as follows:  GFR calculation is accurate only with a steady state creatinine  Chronic Kidney disease less than 60 ml/min/1 73 sq  meters  Kidney failure less than 15 ml/min/1 73 sq  meters      CK (with reflex to MB) [[id number]]  (Normal) Collected:  11/23/18 1616    Lab Status:  Final result Specimen:  Blood from Arm, Left Updated:  11/23/18 1650     Total CK 65 U/L     CBC and differential [[id number]]  (Abnormal) Collected:  11/23/18 1616    Lab Status:  Final result Specimen:  Blood from Arm, Left Updated:  11/23/18 1625     WBC 11 06 (H) Thousand/uL      RBC 3 60 (L) Million/uL      Hemoglobin 11 8 g/dL      Hematocrit 35 8 %      MCV 99 (H) fL      MCH 32 8 pg      MCHC 33 0 g/dL      RDW 12 0 %      MPV 11 3 fL      Platelets 570 Thousands/uL      nRBC 0 /100 WBCs      Neutrophils Relative 71 %      Immat GRANS % 0 %      Lymphocytes Relative 20 %      Monocytes Relative 6 %      Eosinophils Relative 2 % Basophils Relative 1 %      Neutrophils Absolute 7 88 (H) Thousands/µL      Immature Grans Absolute 0 04 Thousand/uL      Lymphocytes Absolute 2 23 Thousands/µL      Monocytes Absolute 0 61 Thousand/µL      Eosinophils Absolute 0 22 Thousand/µL      Basophils Absolute 0 08 Thousands/µL     Lactic acid x2 Q2H [[id number]]     Lab Status:  No result Specimen:  Blood                  VAS upper limb venous duplex scan, unilateral/limited   Final Result by Marj Wahl MD (11/23 2215)      XR hand 3+ views RIGHT   ED Interpretation by Isamar Saavedra DO (11/23 7087)   No acute fracture      Final Result by Raul Wood MD (11/23 1635)      No acute osseous abnormality  Workstation performed: [id number]               Procedures  Procedures      Phone Consults  ED Phone Contact    ED Course  ED Course as of Nov 23 2316 Fri Nov 23, 2018 1830 Discussed w vascular tech who reports no findings of DVT  1859 Discussed with orthopedic surgery resident Charlotte England who is covering for Hand surgery  Will evaluate patient at bedside  2052 Evaluated by Orthopedic surgery resident at bedside  Will place an volar splint, recommends follow-up with her hand surgeon  placed in a volar splint by Orthopedics after further evaluation  Recommended follow-up with her hand surgeon by calling on Monday morning for an appointment to be seen in the next week  Discussed return precautions including numbness tingling any weakness any redness or worsening swelling  Patient verbalized understanding and agrees plan of care                          MDM  CritCare Time    Disposition  Final diagnoses:   Right hand pain   Swelling of right hand     Time reflects when diagnosis was documented in both MDM as applicable and the Disposition within this note     Time User Action Codes Description Comment    11/23/2018  7:37 PM Anitha Cummings Add [K02 277] Right hand pain     11/23/2018  8:52 PM Thomas Jacques Add [M79 89] Swelling of right hand       ED Disposition     ED Disposition Condition Comment    Discharge  [employer] Sunday discharge to home/self care  Condition at discharge: Good        Follow-up Information     Follow up With Specialties Details Why [address] [facility] Emergency Department Emergency Medicine Go to If symptoms worsen [address]  [phone]  ED, [address] [place] [address], [address], MD Family Medicine Schedule an appointment as soon as possible for a visit in 2 days For re-check [address] [place] [address]       Ryder Marquez MD Orthopedic Surgery, Orthopedics Call in 2 days For [facility]-check [facility] [place] [address]  [phone]             Discharge Medication List as of 11/23/2018  8:54 PM      START taking these medications    Details   morphine (MSIR) 15 mg tablet Take 0 5 tablets (7 5 mg total) by mouth every 6 (six) hours as needed for severe pain for up to 10 days Max Daily Amount: 30 mg, Starting Fri 11/23/2018, Until Mon 12/3/2018, Print         CONTINUE these medications which have NOT CHANGED    Details   B Complex Vitamins (VITAMIN B COMPLEX PO) Take 1 capsule by mouth, Historical Med      cholecalciferol (VITAMIN D3) 1,000 units tablet Take 1,000 Units by mouth 2 (two) times a day, Historical Med      ELIQUIS 2 5 MG Take 1 tablet (2 5 mg total) by mouth 2 (two) times a day, Starting Mon 11/19/2018, Normal      ferrous sulfate 325 (65 Fe) mg tablet Take 325 mg by mouth daily with breakfast, Historical Med      HYDROcodone-acetaminophen (NORCO) 5-325 mg per tablet Take 1 tablet by mouth every 6 (six) hours as needed for pain for up to 20 doses For continuation of care Max Daily Amount: 4 tablets, Starting Tue 9/18/2018, Print           No discharge procedures on file      ED Provider  Attending physically available and evaluated Esther Sunday  I managed the patient along with the ED Attending      Electronically Signed by         DO Letty  11/23/18 4107

## 2018-11-24 NOTE — CONSULTS
Orthopedics   Clarisse Diego  60 y o  female MRN: [id number]  Unit/Bed#: cardiology      Chief Complaint:   right wrist pain    HPI:   60 y o  right hand dominant female 2 months status post carpal tunnel release by Dr Venita Webb presenting with right wrist and hand pain  Patient states that 2 days ago she woke in the morning with swelling, pain, and decreased sensation in her long finger  Pain has remained over the last 2 days  She states that Tylenol is minimally improved her symptoms  Pain is worse in the morning after she wakes up from sleeping  Patient denies any recent trauma or overuse  She had a recent pacemaker implanted and has not been working recently  She denies any recent fevers or chills  She states that she did not come to the hospital yesterday because it was Thanksgiving  She states that her symptoms feel very similar to the symptoms she felt prior to her carpal tunnel release surgery  However she states that her symptoms quickly resolved following surgery        Review Of Systems:   · Skin: swelling over wrist, carpal tunnel surgery incision healed   · Neuro: See HPI  · Musculoskeletal: See HPI  · 14 point review of systems negative except as stated above     Past Medical History:   Past Medical History:   Diagnosis Date    DVT (deep venous thrombosis) ([facility] [address] )        Past Surgical History:   Past Surgical History:   Procedure Laterality Date    CARDIAC PACEMAKER PLACEMENT  2018    CERVICAL One [address] SURGERY  2009    PLATES & SCREWS     SECTION      X2    GASTRIC BYPASS  2017    LAP RINYGB SURGERY    KNEE SURGERY Bilateral     OTHER SURGICAL HISTORY      ARM SURGERIES    NM INCISE FINGER TENDON SHEATH Left 2018    Procedure: LONG TRIGGER FINGER RELEASE;  Surgeon: Willy Lima MD;  [facility]: [facility];  Service: Orthopedics    NM WRIST Jose Manuel Gildardo LIG Right 2018    Procedure: ENDOSCOPIC CARPAL TUNNEL RELEASE;  Surgeon: Willy Lima MD;  [facility];  Service: Orthopedics    TONSILLECTOMY         Family History:  Family history reviewed and non-contributory  Family History   Problem Relation Age of Onset    Stroke Mother     Alzheimer's disease Mother    Rima Rodriguez Arthritis Mother     Coronary artery disease Mother    Rima Rodriguez Breast cancer Mother     Cancer Mother     Heart disease Mother     Hypertension Father     Gout Father     Diabetes type II Father     Coronary artery disease Father     Heart disease Father        Social History:  Social History     Social History    Marital status: /Civil Union     Spouse name: N/A    Number of children: N/A    Years of education: N/A     Social History Main Topics    Smoking status: Former Smoker     Packs/day: 0 25     Types: Cigarettes    Smokeless tobacco: Never Used      Comment: NO PASSIVE SMOKE     Alcohol use 1 2 oz/week     1 Glasses of wine, 1 Standard drinks or equivalent per week      Comment: very rare social    Drug use: No    Sexual activity: Not Currently     Other Topics Concern    None     Social History Narrative    None       Allergies:   No Known Allergies        Labs:    0  Lab Value Date/Time   HCT 35 8 11/23/2018 1616   HCT 32 7 (L) 11/02/2018 0530   HCT 33 9 (L) 10/31/2018 0438   HCT 36 4 12/13/2015 2035   HGB 11 8 11/23/2018 1616   HGB 10 7 (L) 11/02/2018 0530   HGB 11 2 (L) 10/31/2018 0438   HGB 12 4 12/13/2015 2035   WBC 11 06 (H) 11/23/2018 1616   WBC 12 09 (H) 11/02/2018 0530   WBC 7 45 10/31/2018 0438   ESR 18 11/23/2018 1616       Meds:  No current facility-administered medications for this encounter       Current Outpatient Prescriptions:     B Complex Vitamins (VITAMIN B COMPLEX PO), Take 1 capsule by mouth, Disp: , Rfl:     cholecalciferol (VITAMIN D3) 1,000 units tablet, Take 1,000 Units by mouth 2 (two) times a day, Disp: , Rfl:     ELIQUIS 2 5 MG, Take 1 tablet (2 5 mg total) by mouth 2 (two) times a day, Disp: 60 tablet, Rfl: 2    ferrous sulfate 325 (65 Fe) mg tablet, Take 325 mg by mouth daily with breakfast, Disp: , Rfl:     HYDROcodone-acetaminophen (NORCO) 5-325 mg per tablet, Take 1 tablet by mouth every 6 (six) hours as needed for pain for up to 20 doses For continuation of care Max Daily Amount: 4 tablets, Disp: 20 tablet, Rfl: 0    morphine (MSIR) 15 mg tablet, Take 0 5 tablets (7 5 mg total) by mouth every 6 (six) hours as needed for severe pain for up to 10 days Max Daily Amount: 30 mg, Disp: 6 tablet, Rfl: 0    Blood Culture:   No results found for: BLOODCX    Wound Culture:   No results found for: WOUNDCULT    Ins and Outs:  No intake/output data recorded  Physical Exam:   /73 (BP Location: Left arm)   Pulse 79   Temp 97 5 °F (36 4 °C) (Tympanic)   Resp 20   Wt 72 6 kg (160 lb)   LMP 09/18/2014   SpO2 99%   BMI 28 34 kg/m²   Gen: Alert and oriented to person, place, time  HEENT: EOMI, eyes clear, moist mucus membranes, hearing intact  Respiratory: Bilateral chest rise  No audible wheezing found  Cardiovascular: no palpable arrhythmia  Abdomen: soft nontender/nondistended  Musculoskeletal: right upper extremity  · Skin intact, no erythema, no ecchymosis, swelling over volar aspect of wrist, well-healed carpal tunnel surgery incision, tenderness to palpation over volar radial aspect of hand, positive Tinel's over carpal tunnel, decreased sensation in median nerve distribution, sensation intact to light touch in ulnar and radial nerve distribution, weakly able to oppose thumb, retropulse thumb, abduct thumb, adduct and abduct finger    Radiology:   I personally reviewed the films  X-rays right wrist shows no fractures    Procedure:  Application volar slab splint  Patient was placed in a well-padded volar slab splint and secured with Ace bandage    _*_*_*_*_*_*_*_*_*_*_*_*_*_*_*_*_*_*_*_*_*_*_*_*_*_*_*_*_*_*_*_*_*_*_*_*_*_*_*_*_*      Assessment:  60 y  o female 2 months status post carpal tunnel release with return of pain, numbness, tingling in median nerve distribution    No concern for infection given no erythema and largely normal lab    Plan:   · Nonweightbearing right upper extremity in volar slab splint  · Elevate and ice as needed for pain relief  · Analgesia  · Follow-up with Dr Rowan Willams early this week for evaluation  · No urgent operative intervention indicated    Davian Duran MD

## 2018-11-24 NOTE — DISCHARGE INSTRUCTIONS
Continue with Tylenol and the medication provided today for any severe breakthrough pain  Return with any numbness tingling or weakness  Return with any worsening swelling any fevers any real redness that develops or any other concerning symptoms  Continue in the splint as provided today into your seen by her hand surgeon who should call immediately on Monday to be seen in the office for further evaluation  Arthralgia   WHAT YOU NEED TO KNOW:   Arthralgia is pain in one or more joints, with no inflammation  It may be short-term and get better within 6 to 8 weeks  Arthralgia can be an early sign of arthritis  Arthralgia may be caused by a medical condition, such as a hormone disorder or a tumor  It may also be caused by an infection or injury  DISCHARGE INSTRUCTIONS:   Medicines: The following medicines may  be ordered for you:  · Acetaminophen  decreases pain  Ask how much to take and how often to take it  Follow directions  Acetaminophen can cause liver damage if not taken correctly  · NSAIDs  decrease pain and prevent swelling  Ask your healthcare provider which medicine is right for you  Ask how much to take and when to take it  Take as directed  NSAIDs can cause stomach bleeding and kidney problems if not taken correctly  · Pain relief cream  decreases pain  Use this cream as directed  · Take your medicine as directed  Contact your healthcare provider if you think your medicine is not helping or if you have side effects  Tell him of her if you are allergic to any medicine  Keep a list of the medicines, vitamins, and herbs you take  Include the amounts, and when and why you take them  Bring the list or the pill bottles to follow-up visits  Carry your medicine list with you in case of an emergency  Follow up with your healthcare provider or specialist as directed:  Write down your questions so you remember to ask them during your visits  Self-care:   · Apply heat  to help decrease pain   Use a heating pad or heat wrap  Apply heat for 20 to 30 minutes every 2 hours for as many days as directed  · Rest  as much as possible  Avoid activities that cause joint pain  · Apply ice  to help decrease swelling and pain  Ice may also help prevent tissue damage  Use an ice pack, or put crushed ice in a plastic bag  Cover it with a towel and place it on your painful joint for 15 to 20 minutes every hour or as directed  · Support  the joint with a brace or elastic wrap as directed  · Elevate  your joint above the level of your heart as often as you can to help decrease swelling and pain  Prop your painful joint on pillows or blankets to keep it elevated comfortably  · Lose weight  if you are overweight  Extra weight can put pressure on your joints and cause more pain  Ask your healthcare provider how much you should weigh  Ask him to help you create a weight loss plan  · Exercise  regularly to help improve joint movement and to decrease pain  Ask about the best exercise plan for you  Low-impact exercises can help take the pressure off your joints  Examples are walking, swimming, and water aerobics  Physical therapy:  A physical therapist teaches you exercises to help improve movement and strength, and to decrease pain  Ask your healthcare provider if physical therapy is right for you  Contact your healthcare provider or specialist if:   · You have a fever  · You continue to have joint pain that cannot be relieved with heat, ice, or medicine  · You have pain and inflammation around your joint  · You have questions or concerns about your condition or care  Return to the emergency department if:   · You have sudden, severe pain when you move your joint  · You have a fever and shaking chills  · You cannot move your joint  · You lose feeling on the side of your body where you have the painful joint    © 2017 [employer] Information is for End User's use only and may not be sold, redistributed or otherwise used for commercial purposes  All illustrations and images included in CareNotes® are the copyrighted property of A D A M , Inc  or [employer]  The above information is an  only  It is not intended as medical advice for individual conditions or treatments  Talk to your doctor, nurse or pharmacist before following any medical regimen to see if it is safe and effective for you

## 2018-11-28 ENCOUNTER — OFFICE VISIT (OUTPATIENT)
Dept: OBGYN CLINIC | Facility: CLINIC | Age: 58
End: 2018-11-28

## 2018-11-28 VITALS
HEART RATE: 81 BPM | HEIGHT: 63 IN | SYSTOLIC BLOOD PRESSURE: 96 MMHG | DIASTOLIC BLOOD PRESSURE: 65 MMHG | BODY MASS INDEX: 28.35 KG/M2 | WEIGHT: 160 LBS

## 2018-11-28 DIAGNOSIS — G90.511 COMPLEX REGIONAL PAIN SYNDROME TYPE 1 OF RIGHT UPPER EXTREMITY: Primary | ICD-10-CM

## 2018-11-28 PROCEDURE — 99024 POSTOP FOLLOW-UP VISIT: CPT | Performed by: ORTHOPAEDIC SURGERY

## 2018-11-28 RX ORDER — GABAPENTIN 300 MG/1
300 CAPSULE ORAL DAILY
Qty: 30 CAPSULE | Refills: 1 | Status: SHIPPED | OUTPATIENT
Start: 2018-11-28 | End: 2018-12-24

## 2018-11-28 NOTE — PROGRESS NOTES
CHIEF COMPLAINT:  Chief Complaint   Patient presents with    Right Hand - Post-op       SUBJECTIVE:  Naman Paulson  is a 60y o  year old RHD female who presents to the office with swelling and pain in her right hand and fingers that began on 18  Patient states she woke in the morning and she was having pain and swelling with out having an injury  Patient was seen in the emergency department on 2018 where x-rays were taken and found no abnormalities ultrasound was also performed to check for a blood clot in her right upper extremity that was negative as well  Patient was placed into a volar wrist splint given a Toradol injection and prescribed morphine  Patient denies numbness and tingling  Patient had right ECT are performed 2018  Patient cannot take anti-inflammatories due to blood thinners        PAST MEDICAL HISTORY:  Past Medical History:   Diagnosis Date    DVT (deep venous thrombosis) ([facility] [address] )     Pacemaker 2018       PAST SURGICAL HISTORY:  Past Surgical History:   Procedure Laterality Date    CARDIAC PACEMAKER PLACEMENT  2018    CERVICAL [address] SURGERY  2009    PLATES & SCREWS     SECTION      X2    GASTRIC BYPASS  2017    LAP RINYGB SURGERY    KNEE SURGERY Bilateral     OTHER SURGICAL HISTORY      ARM SURGERIES    MS INCISE FINGER TENDON SHEATH Left 2018    Procedure: LONG TRIGGER FINGER RELEASE;  Surgeon: Naldo Cunningham MD;  Location: [facility];  Service: Orthopedics    MS WRIST Peggye Peaks LIG Right 2018    Procedure: ENDOSCOPIC CARPAL TUNNEL RELEASE;  Surgeon: Naldo Cunningham MD;  Location: [facility];  Service: Orthopedics    TONSILLECTOMY         FAMILY HISTORY:  Family History   Problem Relation Age of Onset    Stroke Mother     Alzheimer's disease Mother     Arthritis Mother     Coronary artery disease Mother     Breast cancer Mother     Cancer Mother     Heart disease Mother     Hypertension Father    [facility] Gout Father     Diabetes type II Father     Coronary artery disease Father     Heart disease Father        SOCIAL HISTORY:  Social History   Substance Use Topics    Smoking status: Former Smoker     Packs/day: 0 25     Types: Cigarettes    Smokeless tobacco: Never Used      Comment: NO PASSIVE SMOKE     Alcohol use 1 2 oz/week     1 Glasses of wine, 1 Standard drinks or equivalent per week      Comment: very rare social       MEDICATIONS:    Current Outpatient Prescriptions:     B Complex Vitamins (VITAMIN B COMPLEX PO), Take 1 capsule by mouth, Disp: , Rfl:     cholecalciferol (VITAMIN D3) 1,000 units tablet, Take 1,000 Units by mouth 2 (two) times a day, Disp: , Rfl:     ELIQUIS 2 5 MG, Take 1 tablet (2 5 mg total) by mouth 2 (two) times a day, Disp: 60 tablet, Rfl: 2    ferrous sulfate 325 (65 Fe) mg tablet, Take 325 mg by mouth daily with breakfast, Disp: , Rfl:     HYDROcodone-acetaminophen (NORCO) 5-325 mg per tablet, Take 1 tablet by mouth every 6 (six) hours as needed for pain for up to 20 doses For continuation of care Max Daily Amount: 4 tablets, Disp: 20 tablet, Rfl: 0    morphine (MSIR) 15 mg tablet, Take 0 5 tablets (7 5 mg total) by mouth every 6 (six) hours as needed for severe pain for up to 10 days Max Daily Amount: 30 mg, Disp: 6 tablet, Rfl: 0    gabapentin (NEURONTIN) 300 mg capsule, Take 1 capsule (300 mg total) by mouth daily at bed time, Disp: 30 capsule, Rfl: 1    ALLERGIES:  No Known Allergies    REVIEW OF SYSTEMS:  Review of Systems   Constitutional: Negative for chills, fever and unexpected weight change  HENT: Negative for hearing loss, nosebleeds and sore throat  Eyes: Negative for pain, redness and visual disturbance  Respiratory: Negative for cough, shortness of breath and wheezing  Cardiovascular: Negative for chest pain, palpitations and leg swelling  Gastrointestinal: Negative for abdominal pain, nausea and vomiting     Endocrine: Negative for polydipsia and polyuria  Genitourinary: Negative for dysuria and hematuria  Musculoskeletal: Positive for arthralgias, joint swelling and myalgias  Skin: Negative for rash and wound  Neurological: Negative for dizziness, numbness and headaches  Psychiatric/Behavioral: Negative for decreased concentration, dysphoric mood and suicidal ideas  The patient is not nervous/anxious          VITALS:  Vitals:    11/28/18 1025   BP: 96/65   Pulse: 81       LABS:  HgA1c: No results found for: HGBA1C  BMP:   Lab Results   Component Value Date    CALCIUM 8 6 11/23/2018    K 4 1 11/23/2018    CO2 28 11/23/2018     11/23/2018    BUN 17 11/23/2018    CREATININE 0 54 (L) 11/23/2018       _____________________________________________________  PHYSICAL EXAMINATION:  General: well developed and well nourished, alert, oriented times 3 and appears comfortable  Psychiatric: Normal  HEENT: Trachea Midline, No torticollis  Pulmonary: No audible wheezing or respiratory distress   Skin: No masses, erthema, lacerations, fluctation, ulcerations  Neurovascular: Sensation Intact to the Median, Ulnar, Radial Nerve, Motor Intact to the Median, Ulnar, Radial Nerve and Pulses Intact    MUSCULOSKELETAL EXAMINATION:  Right hand  Swelling noted of dorsal and volar aspect of hand as well as fingers  No erythema or ecchymosis  Loss of creases on back of hand  Unable to do full physical exam with range of motion due to pain    ___________________________________________________  STUDIES REVIEWED:  Images obtained on 10/23/18 of the right hand 3 views demonstrate no acute osseous abnormailites       PROCEDURES PERFORMED:  Procedures  No Procedures performed today    _____________________________________________________  ASSESSMENT/PLAN:    RSD of right upper extremity  * 300 mg gabapentin was prescribed to take at bedtime  * patient was advised to take over-the-counter Tylenol around the clock  * patient was advised to take vitamin C OTC  * physical therapy was prescribed to work on range of motion  * patient will discontinue splint and work on range of motion to help decrease swelling and discomfort  * MRI of right hand was ordered patient will follow up in the office after          Follow Up:  Return for MRI review        To Do Next Visit:  Re-evaluation of current issue review MRI      Scribe Attestation    I,:   Chun Becker am acting as a scribe while in the presence of the attending physician :        I,:   Malachi Magana MD personally performed the services described in this documentation    as scribed in my presence :

## 2018-11-28 NOTE — PATIENT INSTRUCTIONS
Take gabapentin as prescribed at night   Tylenol as directed around the clock  Vitamin C OTC  PT/OT as ordered  MRI- follow up after

## 2018-12-03 ENCOUNTER — EVALUATION (OUTPATIENT)
Dept: OCCUPATIONAL THERAPY | Facility: CLINIC | Age: 58
End: 2018-12-03
Payer: COMMERCIAL

## 2018-12-03 ENCOUNTER — TELEPHONE (OUTPATIENT)
Dept: OBGYN CLINIC | Facility: CLINIC | Age: 58
End: 2018-12-03

## 2018-12-03 DIAGNOSIS — M79.641 RIGHT HAND PAIN: Primary | ICD-10-CM

## 2018-12-03 PROCEDURE — 97166 OT EVAL MOD COMPLEX 45 MIN: CPT | Performed by: OCCUPATIONAL THERAPIST

## 2018-12-03 PROCEDURE — 97022 WHIRLPOOL THERAPY: CPT | Performed by: OCCUPATIONAL THERAPIST

## 2018-12-03 PROCEDURE — G8990 OTHER PT/OT CURRENT STATUS: HCPCS | Performed by: OCCUPATIONAL THERAPIST

## 2018-12-03 PROCEDURE — G8991 OTHER PT/OT GOAL STATUS: HCPCS | Performed by: OCCUPATIONAL THERAPIST

## 2018-12-03 NOTE — PROGRESS NOTES
OT Evaluation     Today's date: 12/3/2018  Patient name: Abdi Lu  : 1960  MRN: [id number]  Referring provider: Cleopatra Hurley MD  Dx:   Encounter Diagnosis     ICD-10-CM    1  Right hand pain M79 641                   Assessment  Assessment details: Referred for R hand pain and reduced motion, and a formal diagnosis of CRPS  No notable injury related to the symptoms which began late November  PMH significant for a R CTR in September, and a pace maker placement a week ago  She presents with no trophic symptoms, no color or temperature change, but does demonstrate edema and motor involvement in the form of digital stiffness  There is decreased FDS tendon gliding through the carpal tunnel also affecting her motion  See below for a detailed assessment  Impairments: abnormal or restricted ROM, activity intolerance, impaired physical strength and pain with function    Symptom irritability: highUnderstanding of Dx/Px/POC: good   Prognosis: good    Goals  STG: Patient will be compliant with home exercise program in 2 weeks  STG: Pain will be reduced by 25% in 4 weeks  STG: Inflammation/edema/joint effusion will be reduced by 25% and patient will be independent with self management techniques in 4 weeks  STG: Range of motion of hand will be improved by 25% in 4 weeks  STG: Range of motion of wrist will be improved by 25% in 4 weeks  LTG: Range of motion of hand and wrist will be improved by 75% in 6-8 weeks  LTG: Strength will be improved by 50% in 6-8 weeks  LTG: Performance in ADLs and IADLS will be improved to prior level of function with the affected extremity within 6 weeks  LTG: Performance in work activity will be improved to prior level of function with the affected extremity within 6 weeks  LTG: FOTO score increase by 20 points within 6 weeks  Plan  Plan details: Treatment to include modalities, manual therapy, PRE's, HEP, and orthotics as appropriate     Patient would benefit from: skilled OT and OT eval  Planned modality interventions: thermotherapy: hydrocollator packs, biofeedback, fluidotherapy and TENS  Planned therapy interventions: functional ROM exercises, home exercise program, manual therapy, patient education, neuromuscular re-education, sensory integrative techniques, graded motor and graded exercise  Other planned therapy interventions: Mirror therapy   Frequency: 2x week  Duration in weeks: 6  Treatment plan discussed with: patient        Subjective Evaluation    History of Present Illness  Date of onset: 11/20/2018  Mechanism of injury: Referred for R hand pain and reduced motion, and a formal diagnosis of CRPS  No notable injury related to the symptoms which began late November  PMH significant for a R CTR in September, and a pace maker placement a week ago  Recurrent probem    Pain  At worst pain rating: 10  Location: Palm, over transverse carpal ligament   Quality: discomfort, throbbing and tight  Progression: improved (Has improved with recent medication change)    Social Support    Employment status: working (CNA)  Hand dominance: right      Diagnostic Tests  Abnormal MRI: Pending, November 18th   Patient Goals  Patient goals for therapy: decreased pain and increased motion          Objective     Observations     Right Wrist/Hand   Positive for edema and effusion  Additional Observation Details  Increased density over the transverse carpal ligament, no excess superficial scar tissue  No trophic changes noted in the hand  Neurological Testing     Sensation     Wrist/Hand     Right   Intact: light touch    Comments   Right light touch: 2 83    Additional Neurological Details  Minimal hypersensitivity to the area, pain with pressure only       Active Range of Motion     Right Wrist   Wrist flexion: 43 degrees with pain  Wrist extension: 47 degrees with pain    Right Thumb   Flexion     MP: 41    DIP: 58  Palmar Abduction    CMC: 60  Radial Abduction    CMC: 62  Opposition: To all digits  Right Digits   Flexion   Index     MCP: 50    PIP: 65    DIP: 20  Middle     MCP: 51    PIP: 72    DIP: 21  Ring     MCP: 71    PIP: 78    DIP: 30  Little     MCP: 55    PIP: 83    DIP: 40    Additional Active Range of Motion Details  Reduced FDS gliding in all digits  Strength/Myotome Testing     Additional Strength Details  Increased density over the transverse carpal ligament, no excess superficial scar tissue  No trophic changes noted in the hand  Tests     Additional Tests Details  No provocative tests performed due to pain       Swelling     Left Wrist/Hand   Circumference wrist: 15 5 cm    Right Wrist/Hand   Figure 8: 40 cm  Circumference wrist: 16 5 cm

## 2018-12-04 NOTE — PROGRESS NOTES
Daily Note     Today's date: 12/3/2018  Patient name: Anmol Lu  : 1960  MRN: [id number]  Referring provider: Rah Berry MD  Dx:   Encounter Diagnosis     ICD-10-CM    1  Right hand pain M79 641                   Subjective: See eval        Objective: See treatment diary below  Assessment: Tolerated treatment fair  Patient would benefit from continued OT      Plan: Continue per plan of care       Precautions: CRPS    Specialty Daily Treatment Diary     Manual  12/3       STM as tolerated  5'       Digit ROM as tolerated                                     Exercise Diary  12/3       HEP: MNG, TG Issued       Mirror therapy: Wrist/digit ROM        Sensory bins                                                                                                                                                    Modalities 12/3       Fluido 10

## 2018-12-04 NOTE — TELEPHONE ENCOUNTER
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

## 2018-12-06 ENCOUNTER — OFFICE VISIT (OUTPATIENT)
Dept: OCCUPATIONAL THERAPY | Facility: CLINIC | Age: 58
End: 2018-12-06
Payer: COMMERCIAL

## 2018-12-06 DIAGNOSIS — M79.641 RIGHT HAND PAIN: Primary | ICD-10-CM

## 2018-12-06 PROCEDURE — 97110 THERAPEUTIC EXERCISES: CPT | Performed by: OCCUPATIONAL THERAPIST

## 2018-12-06 PROCEDURE — 97140 MANUAL THERAPY 1/> REGIONS: CPT | Performed by: OCCUPATIONAL THERAPIST

## 2018-12-06 PROCEDURE — 97022 WHIRLPOOL THERAPY: CPT | Performed by: OCCUPATIONAL THERAPIST

## 2018-12-06 NOTE — PROGRESS NOTES
Daily Note     Today's date: 2018  Patient name: Blanche Lu  : 1960  MRN: [id number]  Referring provider: Tyron Chawla MD  Dx:   Encounter Diagnosis     ICD-10-CM    1  Right hand pain M79 641                   Subjective: "Is this normal?"      Objective: See treatment diary below  Assessment: Tolerated treatment fair  Patient would benefit from continued OT Pain grossly  Improved today, tolerated all activities well  Plan: Continue per plan of care       Precautions: CRPS    Specialty Daily Treatment Diary     Manual  12/3 12/6      STM as tolerated  5' 10      Digit ROM as tolerated   10                                  Exercise Diary  12/3 12/6      HEP: MNG, TG Issued       Mirror therapy: Wrist/digit ROM        Sensory bins  4/4 min Rice/Noodles (soft, abrasive)                                                                                                                                                  Modalities 12/3 12/6      Fluido 10 10

## 2018-12-12 ENCOUNTER — OFFICE VISIT (OUTPATIENT)
Dept: OCCUPATIONAL THERAPY | Facility: CLINIC | Age: 58
End: 2018-12-12
Payer: COMMERCIAL

## 2018-12-12 DIAGNOSIS — M79.641 RIGHT HAND PAIN: Primary | ICD-10-CM

## 2018-12-12 PROCEDURE — 97150 GROUP THERAPEUTIC PROCEDURES: CPT | Performed by: OCCUPATIONAL THERAPIST

## 2018-12-12 PROCEDURE — 97022 WHIRLPOOL THERAPY: CPT | Performed by: OCCUPATIONAL THERAPIST

## 2018-12-12 PROCEDURE — 97140 MANUAL THERAPY 1/> REGIONS: CPT | Performed by: OCCUPATIONAL THERAPIST

## 2018-12-12 NOTE — PROGRESS NOTES
Daily Note     Today's date: 2018  Patient name: Nathalie Lu  : 1960  MRN: [id number]  Referring provider: Zaida Nguyen MD  Dx:   Encounter Diagnosis     ICD-10-CM    1  Right hand pain M79 641                   Subjective: "I go back to work tomorrow"      Objective: See treatment diary below  Assessment: Tolerated treatment fair  Patient would benefit from continued OT Much less pain, median nerve tension, tenderness today  Long finger is the most limited in regards to flexion, but it is able to achieve motion to the palm after mobs and stretch  Plan: Continue per plan of care       Precautions: CRPS    Specialty Daily Treatment Diary     Manual  12/3 12/6 12/12     STM as tolerated  5' 10 5'     Digit ROM as tolerated   10 5'     MNG   10'                         Exercise Diary  12/3 12/6 12/12     HEP: MNG, TG Issued       Mirror therapy: Wrist/digit ROM        Sensory bins  4/4 min Rice/Noodles (soft, abrasive)       Keypegs   1x     Wrist maze   10x     Coordination balls    3 mins                                                                                                                         Modalities 12/3 12/6 12/12     Fluido 10 10 10'

## 2018-12-18 ENCOUNTER — HOSPITAL ENCOUNTER (OUTPATIENT)
Dept: RADIOLOGY | Facility: HOSPITAL | Age: 58
Discharge: HOME | End: 2018-12-18
Payer: COMMERCIAL

## 2018-12-18 DIAGNOSIS — G90.511 COMPLEX REGIONAL PAIN SYNDROME TYPE 1 OF RIGHT UPPER EXTREMITY: ICD-10-CM

## 2018-12-18 PROCEDURE — 73218 MRI UPPER EXTREMITY W/O DYE: CPT

## 2018-12-20 ENCOUNTER — OFFICE VISIT (OUTPATIENT)
Dept: CARDIOLOGY CLINIC | Facility: CLINIC | Age: 58
End: 2018-12-20
Payer: COMMERCIAL

## 2018-12-20 ENCOUNTER — OFFICE VISIT (OUTPATIENT)
Dept: OCCUPATIONAL THERAPY | Facility: CLINIC | Age: 58
End: 2018-12-20
Payer: COMMERCIAL

## 2018-12-20 VITALS
HEIGHT: 63 IN | BODY MASS INDEX: 28.88 KG/M2 | WEIGHT: 163 LBS | DIASTOLIC BLOOD PRESSURE: 62 MMHG | SYSTOLIC BLOOD PRESSURE: 110 MMHG | HEART RATE: 67 BPM

## 2018-12-20 DIAGNOSIS — I49.5 SICK SINUS SYNDROME (HCC): Primary | ICD-10-CM

## 2018-12-20 DIAGNOSIS — R55 SYNCOPE, UNSPECIFIED SYNCOPE TYPE: ICD-10-CM

## 2018-12-20 DIAGNOSIS — I82.409 DEEP VEIN THROMBOSIS (DVT) OF LOWER EXTREMITY, UNSPECIFIED CHRONICITY, UNSPECIFIED LATERALITY, UNSPECIFIED VEIN (HCC): ICD-10-CM

## 2018-12-20 DIAGNOSIS — M79.641 RIGHT HAND PAIN: Primary | ICD-10-CM

## 2018-12-20 PROCEDURE — 93000 ELECTROCARDIOGRAM COMPLETE: CPT | Performed by: INTERNAL MEDICINE

## 2018-12-20 PROCEDURE — 97112 NEUROMUSCULAR REEDUCATION: CPT | Performed by: OCCUPATIONAL THERAPIST

## 2018-12-20 PROCEDURE — 97022 WHIRLPOOL THERAPY: CPT | Performed by: OCCUPATIONAL THERAPIST

## 2018-12-20 PROCEDURE — 97140 MANUAL THERAPY 1/> REGIONS: CPT | Performed by: OCCUPATIONAL THERAPIST

## 2018-12-20 PROCEDURE — 99214 OFFICE O/P EST MOD 30 MIN: CPT | Performed by: INTERNAL MEDICINE

## 2018-12-20 NOTE — PROGRESS NOTES
Daily Note     Today's date: 2018  Patient name: Marielle Lu  : 1960  MRN: [id number]  Referring provider: David Almeida MD  Dx:   Encounter Diagnosis     ICD-10-CM    1  Right hand pain M79 641                   Subjective: "It's OK I guess"      Objective: See treatment diary below  Assessment: Still very tender at volar wrist/ulnar side of carpal rows  Underwent MRI this week  Plan: Continue per plan of care       Precautions: CRPS    Specialty Daily Treatment Diary     Manual  12/3 12/6 12/12 12/20    STM as tolerated  5' 10 5'     Digit ROM as tolerated   10 5' 5    MNG   10' 5                        Exercise Diary  12/3 12/6 12/12 12/20    HEP: MNG, TG Issued       Mirror therapy: Wrist/digit ROM        Sensory bins  4/4 min Rice/Noodles (soft, abrasive)  4/4     Keypegs   1x     Wrist maze   10x     Coordination balls    3 mins                                                                                                                         Modalities 12/3 12/6 12/12     Fluido 10 10 10'

## 2018-12-20 NOTE — PROGRESS NOTES
Cardiology Follow Up    Vinay Echeverria Sunday 1960  [id number]  [address] [facility] [place]  [address]    1  Sick sinus syndrome (HCC)  POCT ECG   2  Syncope, unspecified syncope type     3  Deep vein thrombosis (DVT) of lower extremity, unspecified chronicity, unspecified laterality, unspecified vein (HCC)         Discussion/Summary:    1  SSS: s/p PPM  She is enrolled in our device clinic, continue follow up with them  2  DVT: continue Eliquis  3  Syncope: related to SSS above, PPM in place  4  HTN: BP is currently controlled  History of Present Illness:   50-year-old female  She comes to the office today for hospital follow-up  She was admitted to the [facility] at the end of October after syncopal episode  She was found to have sick sinus syndrome  Pacemaker was implanted  She had seen the device Clinic for her follow-up visit  Her device interrogation at that time was unremarkable  She has complaints that she has tightness against her 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  She works at a facility across the street from [facility]   Otherwise, she denies any chest discomfort or shortness of breath  She has no dizziness or lightheadedness  There are no further episodes of syncope  Her device interrogation in the middle of November showed a 60% atrial pacing threshold  History of a DVT  She had been on Coumadin previously, and is now on Eliquis  She has a history of a gastric bypass as well  She says there is a family history of coronary artery disease in her parents, although this was not necessarily at a young age  On a few occasions, she has eaten too much and she gets nauseous and has vomiting spells  With this, she feels palpitations, but does not feel these otherwise      Problem List     Deep vein thrombosis (DVT) of lower extremity (HCC)    Trigger middle finger of left hand    Carpal tunnel syndrome on right    Abnormal CT scan    Syncope    Sick sinus syndrome (HCC)    Leukocytosis        Past Medical History:   Diagnosis Date    DVT (deep venous thrombosis) ([facility] [address] ) 2012    Pacemaker 2018     Social History     Social History    Marital status: /Civil Union     Spouse name: N/A    Number of children: N/A    Years of education: N/A     Occupational History    Not on file       Social History Main Topics    Smoking status: Former Smoker     Packs/day: 0 25     Types: Cigarettes    Smokeless tobacco: Never Used      Comment: NO PASSIVE SMOKE     Alcohol use 1 2 oz/week     1 Glasses of wine, 1 Standard drinks or equivalent per week      Comment: very rare social    Drug use: No    Sexual activity: Not Currently     Other Topics Concern    Not on file     Social History Narrative    No narrative on file      Family History   Problem Relation Age of Onset    Stroke Mother     Alzheimer's disease Mother     Arthritis Mother     Coronary artery disease Mother    [de-identified] Breast cancer Mother     Cancer Mother     Heart disease Mother     Hypertension Father     Gout Father     Diabetes type II Father     Coronary artery disease Father     Heart disease Father      Past Surgical History:   Procedure Laterality Date    CARDIAC PACEMAKER PLACEMENT  2018    CERVICAL One Arch Demond SURGERY  2009    PLATES & SCREWS     SECTION      X2    GASTRIC BYPASS  2017    LAP RINYGB SURGERY    KNEE SURGERY Bilateral     OTHER SURGICAL HISTORY      ARM SURGERIES    [place] INCISE FINGER TENDON SHEATH Left 2018    Procedure: LONG TRIGGER FINGER RELEASE;  Surgeon: Malu Tariq MD;  [facility];  Service: Orthopedics    LA WRIST [place] LIG Right 2018    Procedure: ENDOSCOPIC CARPAL TUNNEL RELEASE;  Surgeon: Malu Tariq MD;  [facility];  Service: Orthopedics    TONSILLECTOMY         Current Outpatient Prescriptions:     B Complex Vitamins (VITAMIN B COMPLEX PO), Take 1 capsule by mouth, Disp: , Rfl:     cholecalciferol (VITAMIN D3) 1,000 units tablet, Take 1,000 Units by mouth 2 (two) times a day, Disp: , Rfl:     ELIQUIS 2 5 MG, Take 1 tablet (2 5 mg total) by mouth 2 (two) times a day, Disp: 60 tablet, Rfl: 2    ferrous sulfate 325 (65 Fe) mg tablet, Take 325 mg by mouth daily with breakfast, Disp: , Rfl:     gabapentin (NEURONTIN) 300 mg capsule, Take 1 capsule (300 mg total) by mouth daily at bed time (Patient not taking: Reported on 12/20/2018 ), Disp: 30 capsule, Rfl: 1    HYDROcodone-acetaminophen (NORCO) 5-325 mg per tablet, Take 1 tablet by mouth every 6 (six) hours as needed for pain for up to 20 doses For continuation of care Max Daily Amount: 4 tablets (Patient not taking: Reported on 12/20/2018 ), Disp: 20 tablet, Rfl: 0  No Known Allergies    Vitals:    12/20/18 1019   BP: 110/62   BP Location: Right arm   Patient Position: Sitting   Cuff Size: Standard   Pulse: 67   Weight: 73 9 kg (163 lb)   Height: 5' 3" (1 6 m)     Vitals:    12/20/18 1019   Weight: 73 9 kg (163 lb)      Height: 5' 3" (160 cm)   Body mass index is 28 87 kg/m²      Physical Exam:  GENERAL: Alert, well appearing, and in no distress  HEENT:  PERRL, EOMI, no scleral icterus, no conjunctival pallor  NECK:  Supple, No elevated JVP, no thyromegaly, no carotid bruits  HEART:  Regular rate and rhythm, normal S1/S2, no S3/S4, no murmur or rub  LUNGS:  Clear to auscultation bilaterally  ABDOMEN:  Soft, non-tender, positive bowel sounds, no rebound or guarding  EXTREMITIES:  No edema  VASCULAR:  Normal pedal pulses   NEURO: No focal deficits,  SKIN: Normal without suspicious lesions on exposed skin      ROS:  Positive for anxiety, joint pain, back pain, nausea, vomiting, palpitations  Except as noted in HPI, is otherwise reviewed in detail and a 12 point review of systems is negative  Labs:  Lab Results   Component Value Date    K 4 1 11/23/2018     11/23/2018    CREATININE 0 54 (L) 11/23/2018    BUN 17 11/23/2018    CO2 28 11/23/2018    ALT 23 10/31/2018    AST 17 10/31/2018    GLUF 90 10/31/2018    WBC 11 06 (H) 11/23/2018    HGB 11 8 11/23/2018    HCT 35 8 11/23/2018     11/23/2018       No results found for: CHOL  No results found for: LDLCALC  No results found for: HDL  No results found for: TRIG    Testing:  Echo 11/1/18:  LEFT VENTRICLE: Size was normal  Systolic function was at the lower limits of normal  Ejection fraction was estimated to be 50 %  There were no regional wall motion abnormalities  Wall thickness was normal  There was no evidence of  concentric hypertrophy  DOPPLER: Left ventricular diastolic function parameters were normal      RIGHT VENTRICLE: The size was normal  Systolic function was normal  Wall thickness was normal      LEFT ATRIUM: Size was normal      RIGHT ATRIUM: Size was normal      MITRAL VALVE: Valve structure was normal  There was normal leaflet separation  DOPPLER: The transmitral velocity was within the normal range  There was no evidence for stenosis  There was mild regurgitation      AORTIC VALVE: The valve was trileaflet  Leaflets exhibited normal cuspal separation and sclerosis  DOPPLER: Transaortic velocity was within the normal range  There was no evidence for stenosis  There was no regurgitation      TRICUSPID VALVE: The valve structure was normal  There was normal leaflet separation  DOPPLER: The transtricuspid velocity was within the normal range  There was no evidence for stenosis  There was mild regurgitation  Pulmonary artery  systolic pressure was within the normal range      PULMONIC VALVE: Leaflets exhibited normal thickness, no calcification, and normal cuspal separation  DOPPLER: The transpulmonic velocity was within the normal range  There was no regurgitation      PERICARDIUM: There was no pericardial effusion  The pericardium was normal in appearance      AORTA: The root exhibited normal size  EKG:  Sinus rhythm  68 beats per minute  Normal EKG

## 2018-12-21 ENCOUNTER — TELEPHONE (OUTPATIENT)
Dept: OBGYN CLINIC | Facility: HOSPITAL | Age: 58
End: 2018-12-21

## 2018-12-21 NOTE — TELEPHONE ENCOUNTER
Patient was diagnosed with RSD in the hand  She was susposed to f/u after the MRI but she has no appt scheduled  MRI showed " There is mild, diffuse tenosynovitis of the flexor tendons at the level of the carpal tunnel to the level of the metacarpophalangeal joints (series 5 images 7 through 26, and series 7 images 8 through 10 )     There is a 0 3 x 0 4 x 0 4 cm subcutaneous lesion on the volar side of the 3rd metacarpophalangeal joint, not fully characterized without IV contrast but most likely a ganglion cyst (series 5 image 28 )"    Please discuss with Dr Ranjan Villalta and schedule f/u or call the patient    Thank you

## 2018-12-21 NOTE — TELEPHONE ENCOUNTER
Caller:patient  Cb#:[phone]  Dr Macdonald      Patient called in requesting mri resuts   Please advise,thanks

## 2018-12-24 ENCOUNTER — OFFICE VISIT (OUTPATIENT)
Dept: FAMILY MEDICINE CLINIC | Facility: CLINIC | Age: 58
End: 2018-12-24
Payer: COMMERCIAL

## 2018-12-24 VITALS
RESPIRATION RATE: 16 BRPM | HEIGHT: 63 IN | OXYGEN SATURATION: 99 % | TEMPERATURE: 99.2 F | SYSTOLIC BLOOD PRESSURE: 120 MMHG | DIASTOLIC BLOOD PRESSURE: 80 MMHG | WEIGHT: 160 LBS | BODY MASS INDEX: 28.35 KG/M2 | HEART RATE: 78 BPM

## 2018-12-24 DIAGNOSIS — I49.5 SICK SINUS SYNDROME (HCC): ICD-10-CM

## 2018-12-24 DIAGNOSIS — J01.00 ACUTE NON-RECURRENT MAXILLARY SINUSITIS: Primary | ICD-10-CM

## 2018-12-24 PROCEDURE — 99214 OFFICE O/P EST MOD 30 MIN: CPT | Performed by: FAMILY MEDICINE

## 2018-12-24 RX ORDER — MULTIVITAMIN
1 CAPSULE ORAL DAILY
COMMUNITY

## 2018-12-24 RX ORDER — ASCORBIC ACID 100 MG
TABLET,CHEWABLE ORAL
COMMUNITY

## 2018-12-24 RX ORDER — FLUTICASONE PROPIONATE 50 MCG
2 SPRAY, SUSPENSION (ML) NASAL DAILY
Qty: 16 G | Refills: 0 | Status: SHIPPED | OUTPATIENT
Start: 2018-12-24 | End: 2021-03-29

## 2018-12-24 RX ORDER — GUAIFENESIN AND CODEINE PHOSPHATE 100; 10 MG/5ML; MG/5ML
5 SOLUTION ORAL 3 TIMES DAILY PRN
Qty: 120 ML | Refills: 0 | Status: SHIPPED | OUTPATIENT
Start: 2018-12-24 | End: 2019-03-05

## 2018-12-24 RX ORDER — AMOXICILLIN AND CLAVULANATE POTASSIUM 875; 125 MG/1; MG/1
1 TABLET, FILM COATED ORAL EVERY 12 HOURS SCHEDULED
Qty: 20 TABLET | Refills: 0 | Status: SHIPPED | OUTPATIENT
Start: 2018-12-24 | End: 2019-01-03

## 2018-12-24 RX ORDER — GLUCOSAMINE/D3/BOSWELLIA SERRA 1500MG-400
TABLET ORAL DAILY
COMMUNITY

## 2018-12-24 NOTE — LETTER
December 24, 2018     Patient: Danica Lu   YOB: 1960   Date of Visit: 12/24/2018       To Whom it May Concern:    Danica Lu is under my professional care  She was seen in my office on 12/24/2018  She may return to work on 12/28/18  If you have any questions or concerns, please don't hesitate to call           Sincerely,          Romaine Bynum MD        CC: No Recipients

## 2018-12-24 NOTE — PROGRESS NOTES
Assessment/Plan:     Diagnoses and all orders for this visit:    Acute non-recurrent maxillary sinusitis  Comments:  She was given Augmentin, Flonase and Robitussin with codeine  Encourage oral hydration  If symptoms worse call or come back  Orders:  -     guaifenesin-codeine (GUAIFENESIN AC) 100-10 MG/5ML liquid; Take 5 mL by mouth 3 (three) times a day as needed for cough  -     amoxicillin-clavulanate (AUGMENTIN) 875-125 mg per tablet; Take 1 tablet by mouth every 12 (twelve) hours for 10 days  -     fluticasone (FLONASE) 50 mcg/act nasal spray; 2 sprays into each nostril daily    Sick sinus syndrome (HCC)  Comments:  Status post pacemaker  She was told if any shortness of breath or weakness to go to emergency room  Other orders  -     Multiple Vitamin (MULTIVITAMIN) capsule; Take 1 capsule by mouth daily  -     Biotin 10 MG TABS; Take by mouth  -     Ascorbic Acid (VITAMIN C) 100 MG CHEW; Chew          There are no Patient Instructions on file for this visit  Return if symptoms worsen or fail to improve  Subjective:      Patient ID: Chris Watson Sunday is a 60 y o  female  Chief Complaint   Patient presents with    URI       She is here today with complaint of upper respiratory symptoms including sore throat, cough, postnasal drip and sinus pressure  She also complained of fever  She stated that she had pacemaker placed recently  She denies any shortness of breath or chest pain  The following portions of the patient's history were reviewed and updated as appropriate: allergies, current medications, past family history, past medical history, past social history, past surgical history and problem list     Review of Systems   Constitutional: Positive for fever  Negative for chills  HENT: Positive for congestion, postnasal drip, rhinorrhea, sinus pressure, sneezing and sore throat  Negative for trouble swallowing  Eyes: Negative for visual disturbance  Respiratory: Positive for cough  Negative for shortness of breath  Cardiovascular: Negative for chest pain, palpitations and leg swelling  Gastrointestinal: Negative for abdominal pain, constipation and diarrhea  Endocrine: Negative for cold intolerance and heat intolerance  Genitourinary: Negative for difficulty urinating and dysuria  Musculoskeletal: Negative for gait problem  Skin: Negative for rash  Neurological: Negative for dizziness, tremors, seizures and headaches  Hematological: Negative for adenopathy  Psychiatric/Behavioral: Negative for behavioral problems  Current Outpatient Prescriptions   Medication Sig Dispense Refill    Ascorbic Acid (VITAMIN C) 100 MG CHEW Chew      B Complex Vitamins (VITAMIN B COMPLEX PO) Take 1 capsule by mouth      Biotin 10 MG TABS Take by mouth      cholecalciferol (VITAMIN D3) 1,000 units tablet Take 1,000 Units by mouth 2 (two) times a day      ELIQUIS 2 5 MG Take 1 tablet (2 5 mg total) by mouth 2 (two) times a day 60 tablet 2    Multiple Vitamin (MULTIVITAMIN) capsule Take 1 capsule by mouth daily      amoxicillin-clavulanate (AUGMENTIN) 875-125 mg per tablet Take 1 tablet by mouth every 12 (twelve) hours for 10 days 20 tablet 0    ferrous sulfate 325 (65 Fe) mg tablet Take 325 mg by mouth daily with breakfast      fluticasone (FLONASE) 50 mcg/act nasal spray 2 sprays into each nostril daily 16 g 0    guaifenesin-codeine (GUAIFENESIN AC) 100-10 MG/5ML liquid Take 5 mL by mouth 3 (three) times a day as needed for cough 120 mL 0     No current facility-administered medications for this visit  Objective:    /80   Pulse 78   Temp 99 2 °F (37 3 °C) (Tympanic)   Resp 16   Ht 5' 3" (1 6 m)   Wt 72 6 kg (160 lb)   LMP 09/18/2014   SpO2 99%   BMI 28 34 kg/m²        Physical Exam   Constitutional: She is oriented to person, place, and time  She appears well-developed and well-nourished  HENT:   Head: Normocephalic and atraumatic     Right Ear: A middle ear effusion is present  Left Ear: A middle ear effusion is present  Mouth/Throat: Posterior oropharyngeal erythema present  Eyes: Pupils are equal, round, and reactive to light  EOM are normal    Neck: Normal range of motion  Neck supple  Cardiovascular: Normal rate, regular rhythm and normal heart sounds  Pulmonary/Chest: Effort normal and breath sounds normal    Abdominal: Soft  Bowel sounds are normal    Musculoskeletal: Normal range of motion  She exhibits no edema  Lymphadenopathy:     She has no cervical adenopathy  Neurological: She is alert and oriented to person, place, and time  No cranial nerve deficit  Skin: Skin is warm  Psychiatric: She has a normal mood and affect  Nursing note and vitals reviewed               Ernesto Hutchinson MD

## 2018-12-28 ENCOUNTER — TELEPHONE (OUTPATIENT)
Dept: FAMILY MEDICINE CLINIC | Facility: CLINIC | Age: 58
End: 2018-12-28

## 2018-12-28 ENCOUNTER — OFFICE VISIT (OUTPATIENT)
Dept: FAMILY MEDICINE CLINIC | Facility: CLINIC | Age: 58
End: 2018-12-28
Payer: COMMERCIAL

## 2018-12-28 VITALS
BODY MASS INDEX: 28.35 KG/M2 | TEMPERATURE: 98 F | OXYGEN SATURATION: 97 % | HEART RATE: 89 BPM | WEIGHT: 160 LBS | SYSTOLIC BLOOD PRESSURE: 122 MMHG | DIASTOLIC BLOOD PRESSURE: 80 MMHG | HEIGHT: 63 IN | RESPIRATION RATE: 16 BRPM

## 2018-12-28 DIAGNOSIS — J01.10 ACUTE NON-RECURRENT FRONTAL SINUSITIS: Primary | ICD-10-CM

## 2018-12-28 DIAGNOSIS — B00.9 HSV-1 INFECTION: ICD-10-CM

## 2018-12-28 PROCEDURE — 1036F TOBACCO NON-USER: CPT | Performed by: NURSE PRACTITIONER

## 2018-12-28 PROCEDURE — 99213 OFFICE O/P EST LOW 20 MIN: CPT | Performed by: NURSE PRACTITIONER

## 2018-12-28 RX ORDER — GABAPENTIN 300 MG/1
CAPSULE ORAL
Refills: 0 | COMMUNITY
Start: 2018-12-26 | End: 2019-03-05

## 2018-12-28 RX ORDER — AZITHROMYCIN 250 MG/1
TABLET, FILM COATED ORAL
Qty: 6 TABLET | Refills: 0 | Status: SHIPPED | OUTPATIENT
Start: 2018-12-28 | End: 2018-12-28 | Stop reason: CLARIF

## 2018-12-28 RX ORDER — VALACYCLOVIR HYDROCHLORIDE 500 MG/1
500 TABLET, FILM COATED ORAL 2 TIMES DAILY
Qty: 20 TABLET | Refills: 1 | Status: SHIPPED | OUTPATIENT
Start: 2018-12-28 | End: 2019-03-21 | Stop reason: SDUPTHER

## 2018-12-28 NOTE — PROGRESS NOTES
Assessment/Plan:      Diagnoses and all orders for this visit:    Acute non-recurrent frontal sinusitis  Comments: Instructed to finish the Augmentin and take Mucinex  Encouraged to increase fluids and use Tylenol for fever  Will give excuse to work for today  Orders:  -     Discontinue: azithromycin (ZITHROMAX) 250 mg tablet; Take 2 tablets today then 1 tablet daily x 4 days    HSV-1 infection  Comments:  Developed a cold sore  Will treat with valtrex for 10 days  May use ice for the burning and itch  Orders:  -     valACYclovir (VALTREX) 500 mg tablet; Take 1 tablet (500 mg total) by mouth 2 (two) times a day for 10 days    Other orders  -     gabapentin (NEURONTIN) 300 mg capsule;           Subjective:     Patient ID: Blanche Kauffman Sunday is a 60 y o  female here for cold symptoms    HPI  Running a fever at night, chills, congestion, cough, ear pain for about 5 days  Nasal drainage is green  Review of Systems   Constitutional: Positive for chills, fatigue and fever  HENT: Positive for congestion, ear pain, postnasal drip, rhinorrhea, sinus pressure and sore throat  Respiratory: Positive for cough  Cardiovascular: Negative  Skin: Negative  Cold sore on the upper lip   Allergic/Immunologic: Negative  Neurological: Positive for headaches  Psychiatric/Behavioral: Negative  Objective:     Physical Exam   Constitutional: She is oriented to person, place, and time  She appears well-developed and well-nourished  HENT:   Head: Normocephalic  Right Ear: Tympanic membrane is erythematous  A middle ear effusion is present  Left Ear: Tympanic membrane is erythematous  A middle ear effusion is present  Nose: Mucosal edema and rhinorrhea present  Right sinus exhibits maxillary sinus tenderness  Left sinus exhibits maxillary sinus tenderness  Mouth/Throat: Oral lesions (right upper lid ) present  Oropharyngeal exudate and posterior oropharyngeal erythema present     Neck: Normal range of motion  Neck supple  Cardiovascular: Normal rate, regular rhythm and normal heart sounds  Pulmonary/Chest: Effort normal and breath sounds normal    Lymphadenopathy:        Head (right side): Submental and submandibular adenopathy present  Head (left side): Submental and submandibular adenopathy present  Neurological: She is alert and oriented to person, place, and time  Skin: Skin is warm and dry  Psychiatric: She has a normal mood and affect   Her behavior is normal

## 2018-12-28 NOTE — TELEPHONE ENCOUNTER
Please call her and see if she needs to be seen  If so, get her in today  If not, give her the excuse

## 2018-12-28 NOTE — TELEPHONE ENCOUNTER
PC from pt, states she had a fever last night  She also still has greenish-yellow mucus,ear pain & chest pain  OK for work excuse today ?  Please advise pt [phone]

## 2018-12-28 NOTE — LETTER
December 28, 2018     Patient: Swati Irizarry Sunday   YOB: 1960   Date of Visit: 12/28/2018       To Whom it May Concern:    Swati Irizarry Sunday is under my professional care  She was seen in my office on 12/28/2018  She may return to work on Monday 12/31/2018  If you have any questions or concerns, please don't hesitate to call           Sincerely,          DANIELA Lam

## 2019-01-07 ENCOUNTER — OFFICE VISIT (OUTPATIENT)
Dept: OBGYN CLINIC | Facility: MEDICAL CENTER | Age: 59
End: 2019-01-07
Payer: COMMERCIAL

## 2019-01-07 VITALS
WEIGHT: 160 LBS | HEIGHT: 63 IN | HEART RATE: 87 BPM | BODY MASS INDEX: 28.35 KG/M2 | SYSTOLIC BLOOD PRESSURE: 113 MMHG | DIASTOLIC BLOOD PRESSURE: 72 MMHG

## 2019-01-07 DIAGNOSIS — M67.441 GANGLION CYST OF FINGER OF RIGHT HAND: ICD-10-CM

## 2019-01-07 DIAGNOSIS — Z98.890 STATUS POST CARPAL TUNNEL RELEASE: ICD-10-CM

## 2019-01-07 DIAGNOSIS — M79.641 RIGHT HAND PAIN: Primary | ICD-10-CM

## 2019-01-07 PROCEDURE — 99212 OFFICE O/P EST SF 10 MIN: CPT | Performed by: ORTHOPAEDIC SURGERY

## 2019-01-07 RX ORDER — AZITHROMYCIN 250 MG/1
TABLET, FILM COATED ORAL
Refills: 0 | COMMUNITY
Start: 2018-12-28 | End: 2019-01-07

## 2019-01-07 NOTE — PROGRESS NOTES
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 for follow up to right wrist and hand pain  She saw OT on 2018  She had a right endoscopic carpal tunnel release done on 2018, as well as a left long trigger finger release  She was out of work for an extended period of time due to a cardiac procedure that she has done  Initially, the patient was doing well at her post op visit on 18, however when she returned on 2018 she told us that she was seen in the ED on 18 due to a few reasons, including swelling and pain in her right hand  She was evaluated for a blood clot in the RUE, which was negative  She was given a wrist splint and pain medication  The patient can not take NSAIDS due to being on blood thinners  The patient states that the pain and swelling started on 2018 without any trauma  She woke up in the morning with the pain and swelling in the right hand  At the 18 visit, we diagnosed her with RSD  She has been taking gabapentin, vitamin C, and Tylenol  She was referred to OT and we ordered an MRI  She is here for the MRI results  She has been back to work without restrictions          PAST MEDICAL HISTORY:  Past Medical History:   Diagnosis Date    Carpal tunnel syndrome of right wrist     DVT (deep venous thrombosis) ([facility] [address] )     Pacemaker 2018    Trigger finger, left        PAST SURGICAL HISTORY:  Past Surgical History:   Procedure Laterality Date    CARDIAC PACEMAKER PLACEMENT  2018    CERVICAL One [address] Demond SURGERY  2009    PLATES & SCREWS     SECTION      X2    GASTRIC BYPASS  2017    LAP RINYGB SURGERY    KNEE SURGERY Bilateral     OTHER SURGICAL HISTORY      ARM SURGERIES    [place] INCISE FINGER TENDON SHEATH Left 2018    Procedure: LONG TRIGGER FINGER RELEASE;  Surgeon: Andria Jara MD;  [facility];  Service: Orthopedics    [place] WRIST [place] LIG Right 9/18/2018    Procedure: ENDOSCOPIC CARPAL TUNNEL RELEASE;  Surgeon: Francisco Moncada MD;  [facility];  Service: Orthopedics    TONSILLECTOMY         FAMILY HISTORY:  Family History   Problem Relation Age of Onset    Stroke Mother     Alzheimer's disease Mother     Arthritis Mother     Coronary artery disease Mother    [facility] Breast cancer Mother     Cancer Mother     Heart disease Mother     Hypertension Father     Gout Father     Diabetes type II Father     Coronary artery disease Father     Heart disease Father        SOCIAL HISTORY:  Social History   Substance Use Topics    Smoking status: Former Smoker     Packs/day: 0 25     Types: Cigarettes    Smokeless tobacco: Never Used      Comment: NO PASSIVE SMOKE     Alcohol use No      Comment: very rare social       MEDICATIONS:    Current Outpatient Prescriptions:     Ascorbic Acid (VITAMIN C) 100 MG CHEW, Chew, Disp: , Rfl:     B Complex Vitamins (VITAMIN B COMPLEX PO), Take 1 capsule by mouth, Disp: , Rfl:     Biotin 10 MG TABS, Take by mouth, Disp: , Rfl:     cholecalciferol (VITAMIN D3) 1,000 units tablet, Take 1,000 Units by mouth 2 (two) times a day, Disp: , Rfl:     ELIQUIS 2 5 MG, Take 1 tablet (2 5 mg total) by mouth 2 (two) times a day, Disp: 60 tablet, Rfl: 2    ferrous sulfate 325 (65 Fe) mg tablet, Take 325 mg by mouth daily with breakfast, Disp: , Rfl:     fluticasone (FLONASE) 50 mcg/act nasal spray, 2 sprays into each nostril daily, Disp: 16 g, Rfl: 0    gabapentin (NEURONTIN) 300 mg capsule, , Disp: , Rfl: 0    guaifenesin-codeine (GUAIFENESIN AC) 100-10 MG/5ML liquid, Take 5 mL by mouth 3 (three) times a day as needed for cough, Disp: 120 mL, Rfl: 0    Multiple Vitamin (MULTIVITAMIN) capsule, Take 1 capsule by mouth daily, Disp: , Rfl:     valACYclovir (VALTREX) 500 mg tablet, Take 1 tablet (500 mg total) by mouth 2 (two) times a day for 10 days, Disp: 20 tablet, Rfl: 1    ALLERGIES:  No Known Allergies    REVIEW OF SYSTEMS:  Review of Systems   Constitutional: Negative for chills, fever and unexpected weight change  HENT: Negative for hearing loss, nosebleeds and sore throat  Eyes: Negative for pain, redness and visual disturbance  Respiratory: Negative for cough, shortness of breath and wheezing  Cardiovascular: Negative for chest pain, palpitations and leg swelling  Gastrointestinal: Negative for abdominal pain, nausea and vomiting  Endocrine: Negative for polydipsia and polyuria  Genitourinary: Negative for dysuria and hematuria  Musculoskeletal: Negative for arthralgias, joint swelling and myalgias  Skin: Negative for rash and wound  Neurological: Negative for dizziness, numbness and headaches  Psychiatric/Behavioral: Negative for decreased concentration and suicidal ideas  The patient is not nervous/anxious  VITALS:  Vitals:    01/07/19 1031   BP: 113/72   Pulse: 87       LABS:  HgA1c: No results found for: HGBA1C  BMP:   Lab Results   Component Value Date    CALCIUM 8 6 11/23/2018    K 4 1 11/23/2018    CO2 28 11/23/2018     11/23/2018    BUN 17 11/23/2018    CREATININE 0 54 (L) 11/23/2018       _____________________________________________________  PHYSICAL EXAMINATION:  General: well developed and well nourished, alert, oriented times 3 and appears comfortable  Psychiatric: Normal  HEENT: Trachea Midline, No torticollis  Pulmonary: No audible wheezing or respiratory distress   Skin: No Erythema, No Lacerations, No Fluctuation, No Ulcerations  Neurovascular: Sensation Intact to the Median, Ulnar, Radial Nerve, Motor Intact to the Median, Ulnar, Radial Nerve and Pulses Intact    MUSCULOSKELETAL EXAMINATION:  Right hand  Palpable retinacular cyst on the 3rd MC palm  Surgical incision at the wrist is slightly firm  No discoloration of the hand  Sensation intact    Full composite fist   No skin wrinkle abnormalities  ___________________________________________________  STUDIES REVIEWED:  Images obtained on 12/18/2018 of the right hand without contrast  demonstrate mild diffuse tenosynovitis of the flexor tendons through the carpal tunnel to the MCP joints  There is a subcutaneous lesion on the volar side of the 3rd MCP which may be a ganglion cyst         PROCEDURES PERFORMED:  Procedures  No Procedures performed today    _____________________________________________________  ASSESSMENT/PLAN:    Right hand s/o right endoscopic carpal tunnel release   RSD resolved  Retinacular cyst over the long finger  * Patient is doing much better  Pain level is improved  Motion is better  * Return to activities as tolerated and fu prn  * No work restrictions  * Scar massage discussed  * Mri results were discussed      Follow Up:  Return if symptoms worsen or fail to improve          Scribe Attestation    I,:   Luis Felipe Montgomery PA-C am acting as a scribe while in the presence of the attending physician :        I,:   Ca Davila MD personally performed the services described in this documentation    as scribed in my presence :

## 2019-01-15 ENCOUNTER — TELEPHONE (OUTPATIENT)
Dept: OBGYN CLINIC | Facility: MEDICAL CENTER | Age: 59
End: 2019-01-15

## 2019-01-15 ENCOUNTER — TELEPHONE (OUTPATIENT)
Dept: FAMILY MEDICINE CLINIC | Facility: CLINIC | Age: 59
End: 2019-01-15

## 2019-01-15 NOTE — TELEPHONE ENCOUNTER
Patient  [phone]  Dr Rowan Willams    Patient's PCP office in [address] [place] [address] Ro is calling requesting that you call patient and let her know that she does not have cancer  Patient is stating that when she came in for her MRI results she was also told that she has cancer  PCP said that they didn't see anything relating to what she was saying  Please have someone reach out to patient to speak about this thank you

## 2019-01-15 NOTE — TELEPHONE ENCOUNTER
Pt called about her MRI that was ordered/addressed by her ortho provider Dr Jose Lopez  She is confused with the results and mentioned she was told she has cancer  I do not see that on report  I called dr Jose Lopez office and they will send him a message to call patient  Do you see anything to be concerned about?

## 2019-01-15 NOTE — TELEPHONE ENCOUNTER
Dr Esperanza Griffith called the patient and spoke to her  I was present for the conversation  The MRI and last note was reviewed  The patient was reassured that we did NOT diagnose her with cancer  The MRI showed a benign (non cancerous) ganglion cyst   All patient's questions were answered

## 2019-01-16 NOTE — TELEPHONE ENCOUNTER
Pt did talk to orthopedic and they reviewed test results and advised her to call them with any further concerns

## 2019-01-16 NOTE — TELEPHONE ENCOUNTER
Yes, there is a small lesion not fully characterized without IV contrast but most likely a ganglion cyst  She needs to follow up with  ortho

## 2019-02-08 DIAGNOSIS — J01.00 ACUTE NON-RECURRENT MAXILLARY SINUSITIS: Primary | ICD-10-CM

## 2019-02-08 RX ORDER — AZITHROMYCIN 250 MG/1
TABLET, FILM COATED ORAL
Qty: 6 TABLET | Refills: 0 | Status: SHIPPED | OUTPATIENT
Start: 2019-02-08 | End: 2019-02-12

## 2019-02-21 ENCOUNTER — HOSPITAL ENCOUNTER (EMERGENCY)
Facility: HOSPITAL | Age: 59
Discharge: HOME | End: 2019-02-21
Attending: EMERGENCY MEDICINE
Payer: COMMERCIAL

## 2019-02-21 ENCOUNTER — TELEPHONE (OUTPATIENT)
Dept: CARDIOLOGY CLINIC | Facility: CLINIC | Age: 59
End: 2019-02-21

## 2019-02-21 ENCOUNTER — APPOINTMENT (EMERGENCY)
Dept: RADIOLOGY | Facility: HOSPITAL | Age: 59
End: 2019-02-21
Payer: COMMERCIAL

## 2019-02-21 ENCOUNTER — OFFICE VISIT (OUTPATIENT)
Dept: CARDIOLOGY CLINIC | Facility: CLINIC | Age: 59
End: 2019-02-21
Payer: COMMERCIAL

## 2019-02-21 VITALS
DIASTOLIC BLOOD PRESSURE: 64 MMHG | BODY MASS INDEX: 28.74 KG/M2 | SYSTOLIC BLOOD PRESSURE: 105 MMHG | WEIGHT: 162.2 LBS | HEIGHT: 63 IN | HEART RATE: 66 BPM

## 2019-02-21 VITALS
TEMPERATURE: 97 F | OXYGEN SATURATION: 97 % | SYSTOLIC BLOOD PRESSURE: 137 MMHG | DIASTOLIC BLOOD PRESSURE: 70 MMHG | RESPIRATION RATE: 20 BRPM | HEART RATE: 58 BPM

## 2019-02-21 DIAGNOSIS — I82.409 DEEP VEIN THROMBOSIS (DVT) OF LOWER EXTREMITY, UNSPECIFIED CHRONICITY, UNSPECIFIED LATERALITY, UNSPECIFIED VEIN (HCC): ICD-10-CM

## 2019-02-21 DIAGNOSIS — R07.9 CHEST PAIN: Primary | ICD-10-CM

## 2019-02-21 DIAGNOSIS — I49.5 SICK SINUS SYNDROME (HCC): ICD-10-CM

## 2019-02-21 DIAGNOSIS — R07.1 CHEST PAIN ON BREATHING: Primary | ICD-10-CM

## 2019-02-21 LAB
ALBUMIN SERPL BCP-MCNC: 3.7 G/DL (ref 3.5–5)
ALP SERPL-CCNC: 100 U/L (ref 46–116)
ALT SERPL W P-5'-P-CCNC: 26 U/L (ref 12–78)
ANION GAP SERPL CALCULATED.3IONS-SCNC: 5 MMOL/L (ref 4–13)
AST SERPL W P-5'-P-CCNC: 20 U/L (ref 5–45)
ATRIAL RATE: 65 BPM
BASOPHILS # BLD AUTO: 0.08 THOUSANDS/ΜL (ref 0–0.1)
BASOPHILS NFR BLD AUTO: 1 % (ref 0–1)
BILIRUB SERPL-MCNC: 0.64 MG/DL (ref 0.2–1)
BUN SERPL-MCNC: 17 MG/DL (ref 5–25)
CALCIUM SERPL-MCNC: 9 MG/DL (ref 8.3–10.1)
CHLORIDE SERPL-SCNC: 106 MMOL/L (ref 100–108)
CO2 SERPL-SCNC: 29 MMOL/L (ref 21–32)
CREAT SERPL-MCNC: 0.59 MG/DL (ref 0.6–1.3)
EOSINOPHIL # BLD AUTO: 0.17 THOUSAND/ΜL (ref 0–0.61)
EOSINOPHIL NFR BLD AUTO: 2 % (ref 0–6)
ERYTHROCYTE [DISTWIDTH] IN BLOOD BY AUTOMATED COUNT: 12.3 % (ref 11.6–15.1)
GFR SERPL CREATININE-BSD FRML MDRD: 101 ML/MIN/1.73SQ M
GLUCOSE SERPL-MCNC: 107 MG/DL (ref 65–140)
HCT VFR BLD AUTO: 38.2 % (ref 34.8–46.1)
HGB BLD-MCNC: 12.4 G/DL (ref 11.5–15.4)
IMM GRANULOCYTES # BLD AUTO: 0.02 THOUSAND/UL (ref 0–0.2)
IMM GRANULOCYTES NFR BLD AUTO: 0 % (ref 0–2)
LYMPHOCYTES # BLD AUTO: 1.94 THOUSANDS/ΜL (ref 0.6–4.47)
LYMPHOCYTES NFR BLD AUTO: 24 % (ref 14–44)
MCH RBC QN AUTO: 31.8 PG (ref 26.8–34.3)
MCHC RBC AUTO-ENTMCNC: 32.5 G/DL (ref 31.4–37.4)
MCV RBC AUTO: 98 FL (ref 82–98)
MONOCYTES # BLD AUTO: 0.39 THOUSAND/ΜL (ref 0.17–1.22)
MONOCYTES NFR BLD AUTO: 5 % (ref 4–12)
NEUTROPHILS # BLD AUTO: 5.49 THOUSANDS/ΜL (ref 1.85–7.62)
NEUTS SEG NFR BLD AUTO: 68 % (ref 43–75)
NRBC BLD AUTO-RTO: 0 /100 WBCS
P AXIS: 67 DEGREES
PLATELET # BLD AUTO: 218 THOUSANDS/UL (ref 149–390)
PMV BLD AUTO: 11 FL (ref 8.9–12.7)
POTASSIUM SERPL-SCNC: 4 MMOL/L (ref 3.5–5.3)
PR INTERVAL: 216 MS
PROT SERPL-MCNC: 7.6 G/DL (ref 6.4–8.2)
QRS AXIS: 61 DEGREES
QRSD INTERVAL: 82 MS
QT INTERVAL: 400 MS
QTC INTERVAL: 416 MS
RBC # BLD AUTO: 3.9 MILLION/UL (ref 3.81–5.12)
SODIUM SERPL-SCNC: 140 MMOL/L (ref 136–145)
T WAVE AXIS: 39 DEGREES
TROPONIN I SERPL-MCNC: <0.02 NG/ML
TROPONIN I SERPL-MCNC: <0.02 NG/ML
VENTRICULAR RATE: 65 BPM
WBC # BLD AUTO: 8.09 THOUSAND/UL (ref 4.31–10.16)

## 2019-02-21 PROCEDURE — 36415 COLL VENOUS BLD VENIPUNCTURE: CPT

## 2019-02-21 PROCEDURE — 80053 COMPREHEN METABOLIC PANEL: CPT | Performed by: EMERGENCY MEDICINE

## 2019-02-21 PROCEDURE — 71046 X-RAY EXAM CHEST 2 VIEWS: CPT

## 2019-02-21 PROCEDURE — 84484 ASSAY OF TROPONIN QUANT: CPT | Performed by: EMERGENCY MEDICINE

## 2019-02-21 PROCEDURE — 93000 ELECTROCARDIOGRAM COMPLETE: CPT | Performed by: INTERNAL MEDICINE

## 2019-02-21 PROCEDURE — 99214 OFFICE O/P EST MOD 30 MIN: CPT | Performed by: INTERNAL MEDICINE

## 2019-02-21 PROCEDURE — 99285 EMERGENCY DEPT VISIT HI MDM: CPT

## 2019-02-21 PROCEDURE — 93005 ELECTROCARDIOGRAM TRACING: CPT

## 2019-02-21 PROCEDURE — 93010 ELECTROCARDIOGRAM REPORT: CPT | Performed by: INTERNAL MEDICINE

## 2019-02-21 PROCEDURE — 85025 COMPLETE CBC W/AUTO DIFF WBC: CPT | Performed by: EMERGENCY MEDICINE

## 2019-02-21 RX ORDER — ACETAMINOPHEN 325 MG/1
975 TABLET ORAL ONCE
Status: COMPLETED | OUTPATIENT
Start: 2019-02-21 | End: 2019-02-21

## 2019-02-21 RX ORDER — MORPHINE SULFATE 4 MG/ML
4 INJECTION, SOLUTION INTRAMUSCULAR; INTRAVENOUS ONCE
Status: DISCONTINUED | OUTPATIENT
Start: 2019-02-21 | End: 2019-02-21 | Stop reason: HOSPADM

## 2019-02-21 RX ADMIN — ACETAMINOPHEN 975 MG: 325 TABLET ORAL at 18:19

## 2019-02-21 NOTE — ED PROVIDER NOTES
History  Chief Complaint   Patient presents with    Chest Pain     pt sent from Cardiologist with substernal CP radiating down R arm  denies SOB  59 yo F presents to ED at request of her cardiologist for evaluation for chest pain  Pt says chest pain started around 10:30 AM as she was getting out of bed  Chest pain is R sided, feels sore, worse with moving her R arm and worse with taking deep breaths  Not exertional  Pt saw her cardiologist Dr Azalia Degroot today and had an unremarkable EKG but was told to go to the ED for further evaluation  She denies recent illness, fever/chills, abdominal pain, nausea/vomiting, URI symptoms, shortness of breath, lightheadedness, or dizziness  She denies lower extremity edema  She does have history of multiple DVTs but has been on Eliquis without missing any doses  Has not had DVT/PE while on anticoagulation before  Pt does not have history of known CAD  No risk factors for CAD  Former smoker  Prior to Admission Medications   Prescriptions Last Dose Informant Patient Reported? Taking?    Ascorbic Acid (VITAMIN C) 100 MG CHEW  Self Yes No   Sig: Chew   B Complex Vitamins (VITAMIN B COMPLEX PO)  Self Yes No   Sig: Take 1 capsule by mouth   Biotin 10 MG TABS  Self Yes No   Sig: Take by mouth   ELIQUIS 2 5 MG  Self No No   Sig: Take 1 tablet (2 5 mg total) by mouth 2 (two) times a day   Multiple Vitamin (MULTIVITAMIN) capsule  Self Yes No   Sig: Take 1 capsule by mouth daily   cholecalciferol (VITAMIN D3) 1,000 units tablet  Self Yes No   Sig: Take 1,000 Units by mouth 2 (two) times a day   ferrous sulfate 325 (65 Fe) mg tablet  Self Yes No   Sig: Take 325 mg by mouth daily with breakfast   fluticasone (FLONASE) 50 mcg/act nasal spray  Self No No   Si sprays into each nostril daily   Patient not taking: Reported on 2019   gabapentin (NEURONTIN) 300 mg capsule  Self Yes No   guaifenesin-codeine (GUAIFENESIN AC) 100-10 MG/5ML liquid  Self No No   Sig: Take 5 mL by mouth 3 (three) times a day as needed for cough   Patient not taking: Reported on 2019   valACYclovir (VALTREX) 500 mg tablet  Self No No   Sig: Take 1 tablet (500 mg total) by mouth 2 (two) times a day for 10 days      Facility-Administered Medications: None       Past Medical History:   Diagnosis Date    Carpal tunnel syndrome of right wrist     DVT (deep venous thrombosis) ([facility] [address] )     Pacemaker 2018    Trigger finger, left        Past Surgical History:   Procedure Laterality Date    CARDIAC PACEMAKER PLACEMENT  2018    CERVICAL [address] SURGERY  2009    PLATES & SCREWS     SECTION      X2    GASTRIC BYPASS  2017    LAP RINYGB SURGERY    KNEE SURGERY Bilateral     OTHER SURGICAL HISTORY      ARM SURGERIES    SD INCISE FINGER TENDON SHEATH Left 2018    Procedure: LONG TRIGGER FINGER RELEASE;  Surgeon: Austin Flanagan MD;  Location: [facility];  Service: Orthopedics    SD WRIST Hurtis Chelsie LIG Right 2018    Procedure: ENDOSCOPIC CARPAL TUNNEL RELEASE;  Surgeon: Austin Flanagan MD;  Location: [facility];  Service: Orthopedics    TONSILLECTOMY         Family History   Problem Relation Age of Onset    Stroke Mother     Alzheimer's disease Mother     Arthritis Mother     Coronary artery disease Mother     Breast cancer Mother     Cancer Mother     Heart disease Mother     Hypertension Father     Gout Father     Diabetes type II Father     Coronary artery disease Father     Heart disease Father      I have reviewed and agree with the history as documented      Social History     Tobacco Use    Smoking status: Former Smoker     Packs/day: 0 25     Types: Cigarettes    Smokeless tobacco: Never Used    Tobacco comment: NO PASSIVE SMOKE    Substance Use Topics    Alcohol use: No     Alcohol/week: 1 2 oz     Types: 1 Glasses of wine, 1 Standard drinks or equivalent per week     Comment: very rare social    Drug use: No        Review of Systems Constitutional: Negative for activity change, chills and fever  HENT: Negative for congestion, rhinorrhea, sore throat and trouble swallowing  Eyes: Negative for pain, discharge and visual disturbance  Respiratory: Negative for cough, chest tightness and shortness of breath  Cardiovascular: Positive for chest pain  Negative for palpitations and leg swelling  Gastrointestinal: Negative for abdominal pain, nausea and vomiting  Genitourinary: Negative for dysuria, frequency and urgency  Musculoskeletal: Negative for gait problem, neck pain and neck stiffness  Skin: Negative for color change, rash and wound  Neurological: Negative for dizziness, syncope, light-headedness and headaches  Physical Exam  ED Triage Vitals [02/21/19 1548]   Temperature Pulse Respirations Blood Pressure SpO2   (!) 97 °F (36 1 °C) 66 18 120/66 97 %      Temp Source Heart Rate Source Patient Position - Orthostatic VS BP Location FiO2 (%)   Tympanic Monitor Sitting Right arm --      Pain Score       Worst Possible Pain           Orthostatic Vital Signs  Vitals:    02/21/19 1713 02/21/19 1900 02/21/19 2000 02/21/19 2030   BP: 112/63 129/78 141/73 137/70   Pulse: 60 59 58 58   Patient Position - Orthostatic VS: Sitting  Sitting Sitting       Physical Exam   Constitutional: She is oriented to person, place, and time  She appears well-developed and well-nourished  No distress  HENT:   Head: Normocephalic and atraumatic  Mouth/Throat: Oropharynx is clear and moist    Eyes: Pupils are equal, round, and reactive to light  Conjunctivae and EOM are normal  Right eye exhibits no discharge  Left eye exhibits no discharge  Neck: Normal range of motion  Neck supple  Cardiovascular: Normal rate, regular rhythm and intact distal pulses  No murmur heard  R sided chest pain reproducible with palpation of R anterior chest and ROM of R arm   Pulmonary/Chest: Effort normal and breath sounds normal  No tachypnea   No respiratory distress  Abdominal: Soft  There is no tenderness  There is no rebound and no guarding  Musculoskeletal: Normal range of motion  She exhibits no edema or tenderness  Right lower leg: She exhibits no tenderness and no edema  Left lower leg: She exhibits no tenderness and no edema  Neurological: She is alert and oriented to person, place, and time  Skin: Skin is warm and dry  Capillary refill takes less than 2 seconds  Nursing note and vitals reviewed  ED Medications  Medications   acetaminophen (TYLENOL) tablet 975 mg (975 mg Oral Given 2/21/19 1819)       Diagnostic Studies  Results Reviewed     Procedure Component Value Units Date/Time    Troponin I [[id number]]  (Normal) Collected:  02/21/19 1928    Lab Status:  Final result Specimen:  Blood from Arm, Left Updated:  02/21/19 1957     Troponin I <0 02 ng/mL     Troponin I [[id number]]  (Normal) Collected:  02/21/19 1621    Lab Status:  Final result Specimen:  Blood from Arm, Left Updated:  02/21/19 1656     Troponin I <0 02 ng/mL     Comprehensive metabolic panel [[id number]]  (Abnormal) Collected:  02/21/19 1621    Lab Status:  Final result Specimen:  Blood from Arm, Left Updated:  02/21/19 1653     Sodium 140 mmol/L      Potassium 4 0 mmol/L      Chloride 106 mmol/L      CO2 29 mmol/L      ANION GAP 5 mmol/L      BUN 17 mg/dL      Creatinine 0 59 mg/dL      Glucose 107 mg/dL      Calcium 9 0 mg/dL      AST 20 U/L      ALT 26 U/L      Alkaline Phosphatase 100 U/L      Total Protein 7 6 g/dL      Albumin 3 7 g/dL      Total Bilirubin 0 64 mg/dL      eGFR 101 ml/min/1 73sq m     Narrative:       National Kidney Disease Education Program recommendations are as follows:  GFR calculation is accurate only with a steady state creatinine  Chronic Kidney disease less than 60 ml/min/1 73 sq  meters  Kidney failure less than 15 ml/min/1 73 sq  meters      CBC and differential [[id number]] Collected:  02/21/19 1621    Lab Status:  Final result Specimen:  Blood from Arm, Left Updated:  02/21/19 1633     WBC 8 09 Thousand/uL      RBC 3 90 Million/uL      Hemoglobin 12 4 g/dL      Hematocrit 38 2 %      MCV 98 fL      MCH 31 8 pg      MCHC 32 5 g/dL      RDW 12 3 %      MPV 11 0 fL      Platelets 290 Thousands/uL      nRBC 0 /100 WBCs      Neutrophils Relative 68 %      Immat GRANS % 0 %      Lymphocytes Relative 24 %      Monocytes Relative 5 %      Eosinophils Relative 2 %      Basophils Relative 1 %      Neutrophils Absolute 5 49 Thousands/µL      Immature Grans Absolute 0 02 Thousand/uL      Lymphocytes Absolute 1 94 Thousands/µL      Monocytes Absolute 0 39 Thousand/µL      Eosinophils Absolute 0 17 Thousand/µL      Basophils Absolute 0 08 Thousands/µL                  X-ray chest 2 views   Final Result by Citlaly Lamb DO (02/21 1657)   No acute cardiopulmonary disease  Workstation performed: [id number]               Procedures  Procedures      Phone Consults  ED Phone Contact    ED Course  ED Course as of Feb 22 1849   u Feb 21, 2019   1751 Spoke to cardiologist on call about pt  Agree with plan for delta troponin, no CTA PE indicated at this time            HEART Risk Score      Most Recent Value   History  0 Filed at: 02/22/2019 1848   ECG  0 Filed at: 02/22/2019 1848   Age  1 Filed at: 02/22/2019 1848   Risk Factors  1 Filed at: 02/22/2019 1848   Troponin  0 Filed at: 02/22/2019 1848   Heart Score Risk Calculator   History  0 Filed at: 02/22/2019 1848   ECG  0 Filed at: 02/22/2019 1848   Age  1 Filed at: 02/22/2019 1848   Risk Factors  1 Filed at: 02/22/2019 1848   Troponin  0 Filed at: 02/22/2019 1848   HEART Score  2 Filed at: 02/22/2019 1848   HEART Score  2 Filed at: 02/22/2019 4542                            MDM  Number of Diagnoses or Management Options  Chest pain:   Diagnosis management comments: EKG unremarkable, delta troponin negative   Pt not tachycardic, not hypoxic, no shortness of breath, no signs/symptoms of DVT, appropriately on anticoagulation so no indication for CTA PE at this time  Pt given tylenol with some improvement of chest pain  Offered morphine but pt declines, saying she wants to drive home on her own  Follow up with cardiology for outpatient stress test  Return precautions discussed  Disposition  Final diagnoses:   Chest pain     Time reflects when diagnosis was documented in both MDM as applicable and the Disposition within this note     Time User Action Codes Description Comment    2/21/2019  8:11 PM Ross Jones Add [R07 9] Chest pain       ED Disposition     ED Disposition Condition Date/Time Comment    Discharge Stable Thu Feb 21, 2019  8:11 PM Eloisa Bhandari Sunday discharge to home/self care              Follow-up Information     Follow up With Specialties Details Why [address] [facility] Emergency Department Emergency Medicine  As needed, If symptoms worsen [address]  [phone]  ED, [address] [place] [address], [id number]    Charlotte Deutsch MD Cardiology, Cardiology Imaging, Multidisciplinary Schedule an appointment as soon as possible for a visit  for follow up Marian Horan [address] [facility]  [phone]             Discharge Medication List as of 2/21/2019  8:12 PM      CONTINUE these medications which have NOT CHANGED    Details   Ascorbic Acid (VITAMIN C) 100 MG CHEW Chew, Historical Med      B Complex Vitamins (VITAMIN B COMPLEX PO) Take 1 capsule by mouth, Historical Med      Biotin 10 MG TABS Take by mouth, Historical Med      cholecalciferol (VITAMIN D3) 1,000 units tablet Take 1,000 Units by mouth 2 (two) times a day, Historical Med      ELIQUIS 2 5 MG Take 1 tablet (2 5 mg total) by mouth 2 (two) times a day, Starting Mon 11/19/2018, Normal      ferrous sulfate 325 (65 Fe) mg tablet Take 325 mg by mouth daily with breakfast, Historical Med      fluticasone (FLONASE) 50 mcg/act nasal spray 2 sprays into each nostril daily, Starting Mon 12/24/2018, Normal      gabapentin (NEURONTIN) 300 mg capsule Starting Wed 12/26/2018, Historical Med      guaifenesin-codeine (GUAIFENESIN AC) 100-10 MG/5ML liquid Take 5 mL by mouth 3 (three) times a day as needed for cough, Starting Mon 12/24/2018, Normal      Multiple Vitamin (MULTIVITAMIN) capsule Take 1 capsule by mouth daily, Historical Med      valACYclovir (VALTREX) 500 mg tablet Take 1 tablet (500 mg total) by mouth 2 (two) times a day for 10 days, Starting Fri 12/28/2018, Until Mon 1/7/2019, Normal           No discharge procedures on file  ED Provider  Attending physically available and evaluated Dayanara Callejas Sunday  I managed the patient along with the ED Attending      Electronically Signed by         Ish Donahue MD  02/22/19 5258

## 2019-02-21 NOTE — TELEPHONE ENCOUNTER
P/c states having CP, hurts when she chews, arm hurts when using it  Did advise to go to the ER  Pt does not want to go but would like to see the doctor  [facility] and she will go there today for 1420 OV

## 2019-02-21 NOTE — PROGRESS NOTES
Cardiology Follow Up    Marielle Huntington Sunday 1960  [id number]  [address] [facility] [place]  [address]    1  Chest pain on breathing  POCT ECG    Transfer to other facility   2  Sick sinus syndrome ([address] )     3  Deep vein thrombosis (DVT) of lower extremity, unspecified chronicity, unspecified laterality, unspecified vein (HCC)         Discussion/Summary:    Chest pain:  Unclear etiology  There are some reproducible components  She also has some pleuritic components  She does have a history of DVT  Recent viral illness  Potential etiologies include PE, ACS, musculoskeletal, pericarditis  Difficult to ascertain from the office which of these is the cause  Unremarkable ECG  Does not rule out unstable angina  Recommended ER evaluation with blood work including serial cardiac enzymes  Likely will require a CTA to exclude pulmonary embolism  If unremarkable, potential for discharge home depending on outcome of ER evaluation  Analgesia and we can perform an outpatient stress evaluation  Explained that she may require an observation admission for serial cardiac enzymes  She was agreeable and will go to [facility]  Interval history:  Call the office this afternoon because she was having chest pain  Was advised to go to the emergency room for evaluation, but patient reportedly said she would prefer to be seen in the office  Emergent office visit was made with me as I had availability today  Patient tells me that she awoke this morning with chest pain  It gets worse with breathing in deeply  Movement of her right arm also makes it worse  Since last visit, she has been reassigned to a different area of her facility  She works as a nursing assistant  She is no longer lifting many heavy patients, although she did help move some heavier patient's yesterday      She has not taken anything for the discomfort  Says since her bariatric surgery, when she takes Tylenol, she feels very tired  Been taking Eliquis without interruption  Has been on 2-1/2 mg twice a day  Was hungry this morning  Denies any nausea or diaphoresis  She did have a recent fall with injury of her left lower extremity  Also had recent viral illness with sinus infection both her and her daughter, this was about 2 weeks ago      Problem List     Deep vein thrombosis (DVT) of lower extremity (HCC)    Trigger middle finger of left hand    Carpal tunnel syndrome on right    Abnormal CT scan    Syncope    Sick sinus syndrome (HCC)    Leukocytosis        Past Medical History:   Diagnosis Date    Carpal tunnel syndrome of right wrist     DVT (deep venous thrombosis) ([facility] [address] ) 2012    Pacemaker 11/01/2018    Trigger finger, left      Social History     Socioeconomic History    Marital status: /Civil Union     Spouse name: Not on file    Number of children: Not on file    Years of education: Not on file    Highest education level: Not on file   Occupational History    Not on file   Social Needs    Financial resource strain: Not on file    Food insecurity:     Worry: Not on file     Inability: Not on file    Transportation needs:     Medical: Not on file     Non-medical: Not on file   Tobacco Use    Smoking status: Former Smoker     Packs/day: 0 25     Types: Cigarettes    Smokeless tobacco: Never Used    Tobacco comment: NO PASSIVE SMOKE    Substance and Sexual Activity    Alcohol use: No     Alcohol/week: 1 2 oz     Types: 1 Glasses of wine, 1 Standard drinks or equivalent per week     Comment: very rare social    Drug use: No    Sexual activity: Not Currently   Lifestyle    Physical activity:     Days per week: Not on file     Minutes per session: Not on file    Stress: Not on file   Relationships    Social connections:     Talks on phone: Not on file     Gets together: Not on file     Attends Yazidi service: Not on file     Active member of club or organization: Not on file     Attends meetings of clubs or organizations: Not on file     Relationship status: Not on file    Intimate partner violence:     Fear of current or ex partner: Not on file     Emotionally abused: Not on file     Physically abused: Not on file     Forced sexual activity: Not on file   Other Topics Concern    Not on file   Social History Narrative    Not on file      Family History   Problem Relation Age of Onset    Stroke Mother     Alzheimer's disease Mother     Arthritis Mother     Coronary artery disease Mother     Breast cancer Mother     Cancer Mother     Heart disease Mother     Hypertension Father     Gout Father     Diabetes type II Father     Coronary artery disease Father     Heart disease Father      Past Surgical History:   Procedure Laterality Date    CARDIAC PACEMAKER PLACEMENT  2018    CERVICAL [address] SURGERY  2009    PLATES & SCREWS     SECTION      X2    GASTRIC BYPASS  2017    LAP RINYGB SURGERY    KNEE SURGERY Bilateral     OTHER SURGICAL HISTORY      ARM SURGERIES    NV INCISE FINGER TENDON SHEATH Left 2018    Procedure: LONG TRIGGER FINGER RELEASE;  Surgeon: Ca Daivla MD;  Location: [facility];  Service: Orthopedics    NV WRIST [place]rop Bakari LIG Right 2018    Procedure: ENDOSCOPIC CARPAL TUNNEL RELEASE;  Surgeon: Ca Davila MD;  Location: [facility];  Service: Orthopedics    TONSILLECTOMY         Current Outpatient Medications:     Ascorbic Acid (VITAMIN C) 100 MG CHEW, Chew, Disp: , Rfl:     B Complex Vitamins (VITAMIN B COMPLEX PO), Take 1 capsule by mouth, Disp: , Rfl:     Biotin 10 MG TABS, Take by mouth, Disp: , Rfl:     cholecalciferol (VITAMIN D3) 1,000 units tablet, Take 1,000 Units by mouth 2 (two) times a day, Disp: , Rfl:     ELIQUIS 2 5 MG, Take 1 tablet (2 5 mg total) by mouth 2 (two) times a day, Disp: 60 tablet, Rfl: 2    ferrous sulfate 325 (65 Fe) mg tablet, Take 325 mg by mouth daily with breakfast, Disp: , Rfl:     Multiple Vitamin (MULTIVITAMIN) capsule, Take 1 capsule by mouth daily, Disp: , Rfl:     fluticasone (FLONASE) 50 mcg/act nasal spray, 2 sprays into each nostril daily (Patient not taking: Reported on 2/21/2019), Disp: 16 g, Rfl: 0    gabapentin (NEURONTIN) 300 mg capsule, , Disp: , Rfl: 0    guaifenesin-codeine (GUAIFENESIN AC) 100-10 MG/5ML liquid, Take 5 mL by mouth 3 (three) times a day as needed for cough (Patient not taking: Reported on 2/21/2019), Disp: 120 mL, Rfl: 0    valACYclovir (VALTREX) 500 mg tablet, Take 1 tablet (500 mg total) by mouth 2 (two) times a day for 10 days, Disp: 20 tablet, Rfl: 1  No Known Allergies    Vitals:    02/21/19 1438   BP: 105/64   BP Location: Left arm   Patient Position: Sitting   Cuff Size: Large   Pulse: 66   Weight: 73 6 kg (162 lb 3 2 oz)   Height: 5' 3" (1 6 m)     Vitals:    02/21/19 1438   Weight: 73 6 kg (162 lb 3 2 oz)      Height: 5' 3" (160 cm)   Body mass index is 28 73 kg/m²  Physical Exam:  GEN: Dayanara Callejas Sunday appears uncomfortable chest pain  HEENT: pupils equal, round, and reactive to light; extraocular muscles intact  NECK: supple, no carotid bruits   HEART: regular rhythm, normal S1 and S2, no murmurs, clicks, gallops or rubs   Chest:  Reproducible tenderness to pain  Pain with motion of her right arm  Pain with deep inspiration as well  LUNGS: clear to auscultation bilaterally; no wheezes, rales, or rhonchi   ABDOMEN: normal bowel sounds, soft, no tenderness, no distention  EXTREMITIES:  Left lower extremity ecchymoses  Varicose veins  NEURO: no focal findings   SKIN: normal without suspicious lesions on exposed skin      ROS:  Positive for anxiety, joint pain, back pain, nausea, vomiting, palpitations  Chest pain  Except as noted in HPI, is otherwise reviewed in detail and a 12 point review of systems is negative    ROS reviewed and is unchanged    Labs:  Lab Results   Component Value Date    K 4 1 11/23/2018     11/23/2018    CREATININE 0 54 (L) 11/23/2018    BUN 17 11/23/2018    CO2 28 11/23/2018    ALT 23 10/31/2018    AST 17 10/31/2018    GLUF 90 10/31/2018    WBC 11 06 (H) 11/23/2018    HGB 11 8 11/23/2018    HCT 35 8 11/23/2018     11/23/2018       No results found for: CHOL  No results found for: LDLCALC  No results found for: HDL  No results found for: TRIG    Testing:  Echo 11/1/18:  LEFT VENTRICLE: Size was normal  Systolic function was at the lower limits of normal  Ejection fraction was estimated to be 50 %  There were no regional wall motion abnormalities  Wall thickness was normal  There was no evidence of  concentric hypertrophy  DOPPLER: Left ventricular diastolic function parameters were normal      RIGHT VENTRICLE: The size was normal  Systolic function was normal  Wall thickness was normal      LEFT ATRIUM: Size was normal      RIGHT ATRIUM: Size was normal      MITRAL VALVE: Valve structure was normal  There was normal leaflet separation  DOPPLER: The transmitral velocity was within the normal range  There was no evidence for stenosis  There was mild regurgitation      AORTIC VALVE: The valve was trileaflet  Leaflets exhibited normal cuspal separation and sclerosis  DOPPLER: Transaortic velocity was within the normal range  There was no evidence for stenosis  There was no regurgitation      TRICUSPID VALVE: The valve structure was normal  There was normal leaflet separation  DOPPLER: The transtricuspid velocity was within the normal range  There was no evidence for stenosis  There was mild regurgitation  Pulmonary artery  systolic pressure was within the normal range      PULMONIC VALVE: Leaflets exhibited normal thickness, no calcification, and normal cuspal separation  DOPPLER: The transpulmonic velocity was within the normal range  There was no regurgitation      PERICARDIUM: There was no pericardial effusion  The pericardium was normal in appearance      AORTA: The root exhibited normal size  EKG:  Sinus rhythm  66 beats per minute  Normal EKG

## 2019-02-21 NOTE — ED ATTENDING ATTESTATION
Joe Garcia DO, saw and evaluated the patient  I have discussed the patient with the resident/non-physician practitioner and agree with the resident's/non-physician practitioner's findings, Plan of Care, and MDM as documented in the resident's/non-physician practitioner's note, except where noted  All available labs and Radiology studies were reviewed  At this point I agree with the current assessment done in the Emergency Department  I have conducted an independent evaluation of this patient a history and physical is as follows:    60 Year old female presenting with chest pain  Patient states that approximately  12 o'clock this afternoon, she began to experience right-sided chest pain with radiation down her right arm  No history of similar episodes of chest pain  Denies any recent surgeries  Denies any recent illness  Former smoker although she states she was 0 was a light smoker has not smoked in years  Denies any other cardiac risk factors  Patient was seen at her cardiologist's office referred emergency department for further evaluation and testing  Continues to have pain and discomfort currently  Patient is not tachycardic, tachypnic, or hypoxic  NO calf pain tenderness or asymmetry  EKG is normal sinus rhythm no acute ischemic changes, troponin negative  Will perform delta troponin and discussed with Cardiology  Discussed with Cardiology - Delta troponin negative  HEART score of 2  Will f/u as outpatient         Critical Care Time  Procedures

## 2019-02-26 DIAGNOSIS — I82.4Y9 DEEP VEIN THROMBOSIS (DVT) OF PROXIMAL LOWER EXTREMITY, UNSPECIFIED CHRONICITY, UNSPECIFIED LATERALITY (HCC): ICD-10-CM

## 2019-02-26 RX ORDER — APIXABAN 2.5 MG/1
2.5 TABLET, FILM COATED ORAL 2 TIMES DAILY
Qty: 60 TABLET | Refills: 2 | Status: SHIPPED | OUTPATIENT
Start: 2019-02-26 | End: 2019-05-21 | Stop reason: SDUPTHER

## 2019-02-28 ENCOUNTER — TELEPHONE (OUTPATIENT)
Dept: CARDIOLOGY CLINIC | Facility: CLINIC | Age: 59
End: 2019-02-28

## 2019-02-28 NOTE — TELEPHONE ENCOUNTER
Pt left msg on nurse line  Said she had a stress test this morning  Done at [facility] and said she would have them transfer record to our office

## 2019-03-04 ENCOUNTER — TRANSITIONAL CARE MANAGEMENT (OUTPATIENT)
Dept: FAMILY MEDICINE CLINIC | Facility: CLINIC | Age: 59
End: 2019-03-04

## 2019-03-05 ENCOUNTER — OFFICE VISIT (OUTPATIENT)
Dept: FAMILY MEDICINE CLINIC | Facility: CLINIC | Age: 59
End: 2019-03-05
Payer: COMMERCIAL

## 2019-03-05 VITALS
BODY MASS INDEX: 28.39 KG/M2 | TEMPERATURE: 98.2 F | HEART RATE: 65 BPM | OXYGEN SATURATION: 98 % | DIASTOLIC BLOOD PRESSURE: 68 MMHG | WEIGHT: 160.25 LBS | RESPIRATION RATE: 16 BRPM | SYSTOLIC BLOOD PRESSURE: 124 MMHG | HEIGHT: 63 IN

## 2019-03-05 DIAGNOSIS — Z12.11 COLON CANCER SCREENING: ICD-10-CM

## 2019-03-05 DIAGNOSIS — E78.49 OTHER HYPERLIPIDEMIA: ICD-10-CM

## 2019-03-05 DIAGNOSIS — R07.89 OTHER CHEST PAIN: Primary | ICD-10-CM

## 2019-03-05 DIAGNOSIS — R10.11 RIGHT UPPER QUADRANT ABDOMINAL PAIN: ICD-10-CM

## 2019-03-05 PROBLEM — L90.0 LICHEN SCLEROSUS: Status: ACTIVE | Noted: 2017-05-04

## 2019-03-05 PROCEDURE — 99496 TRANSJ CARE MGMT HIGH F2F 7D: CPT | Performed by: FAMILY MEDICINE

## 2019-03-05 RX ORDER — ATORVASTATIN CALCIUM 10 MG/1
10 TABLET, FILM COATED ORAL DAILY
COMMUNITY
Start: 2019-03-01 | End: 2019-03-29 | Stop reason: SDUPTHER

## 2019-03-05 RX ORDER — CLOBETASOL PROPIONATE 0.5 MG/G
CREAM TOPICAL
COMMUNITY
Start: 2017-05-18

## 2019-03-05 NOTE — PROGRESS NOTES
Assessment/Plan:     Diagnoses and all orders for this visit:    Other chest pain  Comments:  Was ruled out for acute coronary disease  She continues with chest pain  She is to follow up with cardiologist     Right upper quadrant abdominal pain  Comments:  I will check right upper quadrant ultrasound  Orders:  -     US right upper quadrant; Future    Colon cancer screening  -     Occult Blood, Fecal Immunochemical; Future    Other hyperlipidemia  Comments:  Continue on Lipitor  We will continue to monitor  Other orders  -     atorvastatin (LIPITOR) 10 mg tablet  -     clobetasol (TEMOVATE) 0 05 % cream; Apply to affected areas daily x 12 weeks, then twice weekly for maintenance          There are no Patient Instructions on file for this visit  Return in about 3 weeks (around 3/26/2019)  Subjective:      Patient ID: Tony Uriostegui is a 60 y o  female  Chief Complaint   Patient presents with    Transition of Care Management     [facility] f/u 2/27 to 3/1/19 chest pain       She is here today for follow-up post hospital for chest pain  She was admitted to [facility] was chest pain  Her troponin came back negative  She could not finish her stress test so she ended up getting a cardiac catheterization  She was told there was minimum blockage in 1 of her arteries  She was started on Lipitor and sent home in stable condition  She did not see her cardiologist yet  She continues with chest pain and right upper quadrant abdominal pain  She denies any relations to food  She denies any nausea, no vomiting  She denies any changes in bowel movement  She has history of gastric bypass  The following portions of the patient's history were reviewed and updated as appropriate: allergies, current medications, past family history, past medical history, past social history, past surgical history and problem list     Review of Systems   Constitutional: Negative for chills and fever     HENT: Negative for trouble swallowing  Eyes: Negative for visual disturbance  Respiratory: Negative for cough and shortness of breath  Cardiovascular: Positive for chest pain  Negative for palpitations and leg swelling  Gastrointestinal: Positive for abdominal pain  Negative for constipation and diarrhea  Endocrine: Negative for cold intolerance and heat intolerance  Genitourinary: Negative for difficulty urinating and dysuria  Musculoskeletal: Negative for gait problem  Skin: Negative for rash  Neurological: Negative for dizziness, tremors, seizures and headaches  Hematological: Negative for adenopathy  Psychiatric/Behavioral: Negative for behavioral problems  Current Outpatient Medications   Medication Sig Dispense Refill    Ascorbic Acid (VITAMIN C) 100 MG CHEW Chew      atorvastatin (LIPITOR) 10 mg tablet       B Complex Vitamins (VITAMIN B COMPLEX PO) Take 1 capsule by mouth      Biotin 10 MG TABS Take by mouth      cholecalciferol (VITAMIN D3) 1,000 units tablet Take 1,000 Units by mouth 2 (two) times a day      clobetasol (TEMOVATE) 0 05 % cream Apply to affected areas daily x 12 weeks, then twice weekly for maintenance      ELIQUIS 2 5 MG Take 1 tablet (2 5 mg total) by mouth 2 (two) times a day 60 tablet 2    ferrous sulfate 325 (65 Fe) mg tablet Take 325 mg by mouth daily with breakfast      Multiple Vitamin (MULTIVITAMIN) capsule Take 1 capsule by mouth daily      valACYclovir (VALTREX) 500 mg tablet Take 1 tablet (500 mg total) by mouth 2 (two) times a day for 10 days 20 tablet 1    fluticasone (FLONASE) 50 mcg/act nasal spray 2 sprays into each nostril daily (Patient not taking: Reported on 2/21/2019) 16 g 0     No current facility-administered medications for this visit          Objective:    /68 (BP Location: Left arm, Patient Position: Sitting, Cuff Size: Adult)   Pulse 65   Temp 98 2 °F (36 8 °C) (Tympanic)   Resp 16   Ht 5' 3" (1 6 m)   Wt 72 7 kg (160 lb 4 oz)   LMP 09/18/2014   SpO2 98%   BMI 28 39 kg/m²        Physical Exam   Constitutional: She is oriented to person, place, and time  She appears well-developed and well-nourished  HENT:   Head: Normocephalic and atraumatic  Eyes: Pupils are equal, round, and reactive to light  EOM are normal    Neck: Normal range of motion  Neck supple  Cardiovascular: Normal rate, regular rhythm and normal heart sounds  Pulmonary/Chest: Effort normal and breath sounds normal    Abdominal: Soft  Bowel sounds are normal  There is tenderness (Tenderness right upper quadrant, guarding but no rebound  )  There is guarding  There is no rebound  Musculoskeletal: Normal range of motion  She exhibits no edema  Lymphadenopathy:     She has no cervical adenopathy  Neurological: She is alert and oriented to person, place, and time  No cranial nerve deficit  Skin: Skin is warm  Psychiatric: She has a normal mood and affect  Nursing note and vitals reviewed               Romaine Bynum MD

## 2019-03-06 ENCOUNTER — TELEPHONE (OUTPATIENT)
Dept: CARDIOLOGY CLINIC | Facility: CLINIC | Age: 59
End: 2019-03-06

## 2019-03-06 ENCOUNTER — TELEPHONE (OUTPATIENT)
Dept: FAMILY MEDICINE CLINIC | Facility: CLINIC | Age: 59
End: 2019-03-06

## 2019-03-06 NOTE — TELEPHONE ENCOUNTER
Yes, please obtain the [place] records  If she had just plaque on cardiac cath, needs follow up with PCP as not cardiac related pain, and would defer to PCP re: return to work  Can schedule follow up, but again testing seems reassuring so more pressing is working with PCP to find cause of her pain and treat that

## 2019-03-06 NOTE — TELEPHONE ENCOUNTER
Request for medical records faxed to [facility]   I spoke with Eloisa Bhandari and gave her Dr Douglas Tejeda recommendations  Pt does have f/u appt with Dr Neli Nolan on 3/28

## 2019-03-06 NOTE — TELEPHONE ENCOUNTER
You saw Vignesh in office on 2/21, sent same day to ER with c/o CP  D/C'd from ER as nothing remarkable found  She states that last weds, she had another episode of cp at work and was sent to [facility] ER and then admitted  She said she had a stress test there which she reports couldn't be completed bc of too much pain  Also said she had a cardiac cath during admission,  which showed evidence of plaque and was started on lipitor  She saw her family doctor yesterday, still has cp, and RUQ pain   PCP ordered an US of gallbladder  She said she was supposed to go back to work today, and asked me if I thought she should go to work  I told her I couldn't make that decision and would need to be seen by you or check with her PCP since seen yesterday  I can send a request to medical records at [facility] for testing done there last week  Do you want her to come in again for f/u? Please advise

## 2019-03-06 NOTE — TELEPHONE ENCOUNTER
Pt called ,stated Dr Leanne Jimenez did not tell her if she could go back to work at her office appt  Yesterday  Pt states she is still having chest pressure  She is sched with cardiology 3/28/19 and her US sched 3/12/19  Discussed with Dr Leanne Jimenez pt referred to ER, suggested [facility]  Pt states she is not sure what she is going to do she has been at the ER multiple times  Again recommended ER for chest pressure  Pt will consider  Phone call ended

## 2019-03-12 ENCOUNTER — HOSPITAL ENCOUNTER (OUTPATIENT)
Dept: RADIOLOGY | Facility: IMAGING CENTER | Age: 59
Discharge: HOME | End: 2019-03-12
Payer: COMMERCIAL

## 2019-03-12 DIAGNOSIS — R10.11 RIGHT UPPER QUADRANT ABDOMINAL PAIN: ICD-10-CM

## 2019-03-12 PROCEDURE — 76705 ECHO EXAM OF ABDOMEN: CPT

## 2019-03-14 ENCOUNTER — TELEPHONE (OUTPATIENT)
Dept: FAMILY MEDICINE CLINIC | Facility: CLINIC | Age: 59
End: 2019-03-14

## 2019-03-14 ENCOUNTER — TRANSCRIBE ORDERS (OUTPATIENT)
Dept: ADMINISTRATIVE | Facility: HOSPITAL | Age: 59
End: 2019-03-14

## 2019-03-17 ENCOUNTER — OFFICE VISIT (OUTPATIENT)
Dept: URGENT CARE | Age: 59
End: 2019-03-17
Payer: COMMERCIAL

## 2019-03-17 VITALS
HEART RATE: 96 BPM | HEIGHT: 64 IN | SYSTOLIC BLOOD PRESSURE: 110 MMHG | BODY MASS INDEX: 26.63 KG/M2 | WEIGHT: 156 LBS | TEMPERATURE: 100 F | DIASTOLIC BLOOD PRESSURE: 70 MMHG | RESPIRATION RATE: 22 BRPM | OXYGEN SATURATION: 99 %

## 2019-03-17 DIAGNOSIS — R68.89 FLU-LIKE SYMPTOMS: Primary | ICD-10-CM

## 2019-03-17 PROCEDURE — S9088 SERVICES PROVIDED IN URGENT: HCPCS | Performed by: PHYSICIAN ASSISTANT

## 2019-03-17 PROCEDURE — S9088 SERVICES PROVIDED IN URGENT: HCPCS | Performed by: FAMILY MEDICINE

## 2019-03-17 PROCEDURE — 99213 OFFICE O/P EST LOW 20 MIN: CPT | Performed by: FAMILY MEDICINE

## 2019-03-17 PROCEDURE — 99213 OFFICE O/P EST LOW 20 MIN: CPT | Performed by: PHYSICIAN ASSISTANT

## 2019-03-17 PROCEDURE — 87631 RESP VIRUS 3-5 TARGETS: CPT | Performed by: PHYSICIAN ASSISTANT

## 2019-03-17 RX ORDER — OSELTAMIVIR PHOSPHATE 75 MG/1
75 CAPSULE ORAL EVERY 12 HOURS SCHEDULED
Qty: 10 CAPSULE | Refills: 0 | Status: SHIPPED | OUTPATIENT
Start: 2019-03-17 | End: 2019-03-22

## 2019-03-17 NOTE — PROGRESS NOTES
[address]        NAME: Ck Akers is a 60 y o  female  : 1960    MRN: [id number]  DATE: 2019  TIME: 4:59 PM    Assessment and Plan   Flu-like symptoms [R68 89]  1  Flu-like symptoms  Influenza A/B and RSV by PCR    oseltamivir (TAMIFLU) 75 mg capsule     Patient refusing ER transfer this time  Discussed testing for the flu and/or starting Tamiflu at length with the patient  Answered all questions  Patient  requested flu testing and Tamiflu at this time  Patient will do Tamiflu and conservative treatment with Tylenol if needed, lots of fluids  Patient verbalized good understanding to follow up with family doctor or go to the emergency department if any new or worsening symptoms    Patient Instructions     Patient declining ER transfer at this time  Discussed influenza testing and treatment with Tamiflu at length, discussed side effects  Answered all questions  Patient wants Tamiflu  Please call tomorrow for influenza testing results  Discontinue Tamiflu if negative  Take antivirals as prescribed  Stay hydrated with lots of water/fluids  Alternate Tylenol and Motrin if needed for fever/pain  Follow-up with PCP in the next few days for reexamination and to ensure resolution of symptoms  Go to the ED if any intractable fevers, unable to stay hydrated, abdominal pain, chest pain, shortness of breath, fatigue/weakness, new or worsening symptoms or other concerning symptoms  Chief Complaint     Chief Complaint   Patient presents with    Other     possible flu per pt         History of Present Illness       62-year-old female presents with generalized body aches, chills, fevers that got as high as 102, tiredness/fatigue and intermittent dry cough that started yesterday  Patient states at her work 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 with the flu and now she feels like she got the flu  She was told by her work to go get tested for the flu    States she did receive the flu vaccine  She denies any severe headaches, vision changes, numbness, tingling, weakness, shortness of breath, chest pain or other complaints  She just states she overall feels very tired and run down  Has been taking Tylenol for the pain in the fever but she has the fever just returns  Review of Systems   Review of Systems   Constitutional: Positive for chills, fatigue and fever  Negative for appetite change  HENT: Positive for congestion and rhinorrhea  Negative for ear pain, facial swelling, sinus pressure, sinus pain, sore throat, trouble swallowing and voice change  Eyes: Negative for discharge, itching and visual disturbance  Respiratory: Positive for cough  Negative for shortness of breath  Cardiovascular: Negative for chest pain  Gastrointestinal: Negative for abdominal pain, diarrhea, nausea and vomiting  Musculoskeletal: Positive for myalgias  Negative for back pain and neck pain  Skin: Negative for rash  Neurological: Negative for dizziness, syncope, weakness, numbness and headaches  All other systems reviewed and are negative          Current Medications       Current Outpatient Medications:     Ascorbic Acid (VITAMIN C) 100 MG CHEW, Chew, Disp: , Rfl:     atorvastatin (LIPITOR) 10 mg tablet, , Disp: , Rfl:     B Complex Vitamins (VITAMIN B COMPLEX PO), Take 1 capsule by mouth, Disp: , Rfl:     Biotin 10 MG TABS, Take by mouth, Disp: , Rfl:     cholecalciferol (VITAMIN D3) 1,000 units tablet, Take 1,000 Units by mouth 2 (two) times a day, Disp: , Rfl:     clobetasol (TEMOVATE) 0 05 % cream, Apply to affected areas daily x 12 weeks, then twice weekly for maintenance, Disp: , Rfl:     ELIQUIS 2 5 MG, Take 1 tablet (2 5 mg total) by mouth 2 (two) times a day, Disp: 60 tablet, Rfl: 2    ferrous sulfate 325 (65 Fe) mg tablet, Take 325 mg by mouth daily with breakfast, Disp: , Rfl:     fluticasone (FLONASE) 50 mcg/act nasal spray, 2 sprays into each nostril daily (Patient not taking: Reported on 2019), Disp: 16 g, Rfl: 0    Multiple Vitamin (MULTIVITAMIN) capsule, Take 1 capsule by mouth daily, Disp: , Rfl:     oseltamivir (TAMIFLU) 75 mg capsule, Take 1 capsule (75 mg total) by mouth every 12 (twelve) hours for 5 days, Disp: 10 capsule, Rfl: 0    valACYclovir (VALTREX) 500 mg tablet, Take 1 tablet (500 mg total) by mouth 2 (two) times a day for 10 days, Disp: 20 tablet, Rfl: 1    Current Allergies     Allergies as of 2019    (No Known Allergies)            The following portions of the patient's history were reviewed and updated as appropriate: allergies, current medications, past family history, past medical history, past social history, past surgical history and problem list      Past Medical History:   Diagnosis Date    Carpal tunnel syndrome of right wrist     Depressive disorder 2014    Diabetes ([facility] [address] ) 2014    D[facility] thrombosis) ([facility] )     Esophageal reflux 2011    Essential hypertension 2014    Hypothyroid 2014    Knee osteoarthritis 2014    Pacemaker 2018    Trigger finger, left        Past Surgical History:   Procedure Laterality Date    CARDIAC PACEMAKER PLACEMENT  2018    CERVICAL DISC SURGERY  2009    PLATES & SCREWS     SECTION      X2    GASTRIC BYPASS  2017    LAP RINYGB SURGERY    KNEE SURGERY Bilateral     OTHER SURGICAL HISTORY      ARM SURGERIES    [place] INCISE FINGER TENDON SHEATH Left 2018    Procedure: LONG TRIGGER FINGER RELEASE;  Surgeon: Murray Brandt MD;  [facility];  Service: Orthopedics    MN WRIST Richie Roni LIG Right 2018    Procedure: ENDOSCOPIC CARPAL TUNNEL RELEASE;  Surgeon: Murray Brandt MD;  [facility];  Service: Orthopedics    TONSILLECTOMY         Family History   Problem Relation Age of Onset    Stroke Mother     Alzheimer's disease Mother     Arthritis Mother     Coronary artery disease Mother  Breast cancer Mother     Cancer Mother     Heart disease Mother     Hypertension Father     Gout Father     Diabetes type II Father     Coronary artery disease Father     Heart disease Father          Medications have been verified  Objective   /70 (BP Location: Left arm, Patient Position: Sitting, Cuff Size: Standard)   Pulse 96   Temp 100 °F (37 8 °C) (Temporal)   Resp 22   Ht 5' 4" (1 626 m)   Wt 70 8 kg (156 lb)   LMP 09/18/2014   SpO2 99%   BMI 26 78 kg/m²        Physical Exam     Physical Exam   Constitutional: She is oriented to person, place, and time  She appears well-developed and well-nourished  No distress  HENT:   Head: Normocephalic and atraumatic  Right Ear: Tympanic membrane normal    Left Ear: Tympanic membrane normal    Mouth/Throat: Oropharynx is clear and moist    Eyes: Pupils are equal, round, and reactive to light  EOM are normal    No nystagmus   Neck: Normal range of motion  Neck supple  No meningeal signs   Cardiovascular: Normal rate, regular rhythm and normal heart sounds  Pulmonary/Chest: Effort normal and breath sounds normal  She has no wheezes  Abdominal: Soft  Bowel sounds are normal  There is no tenderness  There is no rebound  Musculoskeletal: Normal range of motion  Neurological: She is alert and oriented to person, place, and time  She has normal reflexes  Skin: Skin is warm and dry  Capillary refill takes less than 2 seconds  Psychiatric: She has a normal mood and affect  Her behavior is normal    Nursing note and vitals reviewed

## 2019-03-17 NOTE — LETTER
March 17, 2019     Patient: Mehreen Cohn   YOB: 1960   Date of Visit: 3/17/2019       To Whom It May Concern: It is my medical opinion that Mehreen Cohn may return to work on 3/19/19 or fever free for 24 hours  If you have any questions or concerns, please don't hesitate to call           Sincerely,        Clair Uriostegui PA-C    CC: No Recipients

## 2019-03-17 NOTE — PATIENT INSTRUCTIONS
Patient declining ER transfer at this time  Discussed influenza testing and treatment with Tamiflu at length, discussed side effects  Answered all questions  Patient wants Tamiflu  Please call tomorrow for influenza testing results  Discontinue Tamiflu if negative  Take antivirals as prescribed  Stay hydrated with lots of water/fluids  Alternate Tylenol and Motrin if needed for fever/pain  Follow-up with PCP in the next few days for reexamination and to ensure resolution of symptoms  Go to the ED if any intractable fevers, unable to stay hydrated, abdominal pain, chest pain, shortness of breath, fatigue/weakness, new or worsening symptoms or other concerning symptoms

## 2019-03-18 ENCOUNTER — APPOINTMENT (OUTPATIENT)
Dept: LAB | Facility: HOSPITAL | Age: 59
End: 2019-03-18
Attending: FAMILY MEDICINE
Payer: COMMERCIAL

## 2019-03-18 DIAGNOSIS — Z12.11 COLON CANCER SCREENING: ICD-10-CM

## 2019-03-18 LAB
FLUAV AG SPEC QL: NOT DETECTED
FLUBV AG SPEC QL: DETECTED
HEMOCCULT STL QL IA: NEGATIVE
RSV B RNA SPEC QL NAA+PROBE: NOT DETECTED

## 2019-03-18 PROCEDURE — G0328 FECAL BLOOD SCRN IMMUNOASSAY: HCPCS

## 2019-03-19 ENCOUNTER — TELEPHONE (OUTPATIENT)
Dept: FAMILY MEDICINE CLINIC | Facility: CLINIC | Age: 59
End: 2019-03-19

## 2019-03-19 ENCOUNTER — TELEPHONE (OUTPATIENT)
Dept: URGENT CARE | Age: 59
End: 2019-03-19

## 2019-03-19 NOTE — TELEPHONE ENCOUNTER
----- Message from Neil Johnson MD sent at 3/17/2019 10:13 AM EDT -----  Ultrasound showed liver slightly enlarged  No other significant pathology

## 2019-03-19 NOTE — TELEPHONE ENCOUNTER
Called patient and left voicemail requesting return call to discuss test results  Patient return call  Discussed positive influenza B results with patient  She is taking the Tamiflu  She states she is feeling slightly better    She verbalized good understanding to follow up with PCP or the ER if any new or worsening symptoms

## 2019-03-19 NOTE — TELEPHONE ENCOUNTER
Pt called asking for loyd, stating she went to [facility] urgent care on Sunday and she has Flu B, only started tamiflu yesterday  Head and stomach hurt      Said she was waiting to hear from you whether or not she has to come in on 3/28    [phone]

## 2019-03-19 NOTE — TELEPHONE ENCOUNTER
Pt aware of results  She has appt on 3/28/19 with you regarding the U/S  Would you still like to see her?

## 2019-03-20 NOTE — TELEPHONE ENCOUNTER
Her heart work up was negative  If she is feeling better then no, she can wait for her routine visit

## 2019-03-21 DIAGNOSIS — B00.9 HSV-1 INFECTION: ICD-10-CM

## 2019-03-22 RX ORDER — VALACYCLOVIR HYDROCHLORIDE 500 MG/1
500 TABLET, FILM COATED ORAL 2 TIMES DAILY
Qty: 20 TABLET | Refills: 1 | Status: SHIPPED | OUTPATIENT
Start: 2019-03-22 | End: 2019-04-18 | Stop reason: SDUPTHER

## 2019-03-28 ENCOUNTER — OFFICE VISIT (OUTPATIENT)
Dept: CARDIOLOGY CLINIC | Facility: CLINIC | Age: 59
End: 2019-03-28
Payer: COMMERCIAL

## 2019-03-28 ENCOUNTER — IN-CLINIC DEVICE VISIT (OUTPATIENT)
Dept: CARDIOLOGY CLINIC | Facility: CLINIC | Age: 59
End: 2019-03-28
Payer: COMMERCIAL

## 2019-03-28 VITALS
BODY MASS INDEX: 27.14 KG/M2 | WEIGHT: 159 LBS | DIASTOLIC BLOOD PRESSURE: 64 MMHG | HEIGHT: 64 IN | SYSTOLIC BLOOD PRESSURE: 108 MMHG | HEART RATE: 65 BPM

## 2019-03-28 DIAGNOSIS — I10 ESSENTIAL HYPERTENSION: Primary | ICD-10-CM

## 2019-03-28 DIAGNOSIS — Z95.0 CARDIAC PACEMAKER IN SITU: ICD-10-CM

## 2019-03-28 DIAGNOSIS — I49.5 SICK SINUS SYNDROME (HCC): ICD-10-CM

## 2019-03-28 DIAGNOSIS — I49.5 SICK SINUS SYNDROME (HCC): Primary | ICD-10-CM

## 2019-03-28 PROCEDURE — 93280 PM DEVICE PROGR EVAL DUAL: CPT | Performed by: INTERNAL MEDICINE

## 2019-03-28 PROCEDURE — 99214 OFFICE O/P EST MOD 30 MIN: CPT | Performed by: INTERNAL MEDICINE

## 2019-03-28 NOTE — PROGRESS NOTES
Results for orders placed or performed in visit on 03/28/19   Cardiac EP device report    Narrative    DEVICE INTERROGATED IN THE [place] OFFICE: BATTERY VOLTAGE ADEQUATE  AP 36%  0 1%  ALL LEAD PARAMETERS WITHIN NORMAL LIMITS  NO SIGNIFICANT HIGH RATE EPISODES  NO PROGRAMMING CHANGES MADE TO DEVICE PARAMETERS  NORMAL DEVICE FUNCTION   NC

## 2019-03-28 NOTE — PROGRESS NOTES
Cardiology Follow Up    Easton Castellano  1960  [id number]  [address] [facility] [id number] [facility] [id number] [phone]    1  Essential hypertension     2  Sick sinus syndrome (HCC)         Discussion/Summary:    1  Sick sinus syndrome: PM interrogation to be done today after the visit  Last was normal device function  2  Chest pain: Seems noncardiac  Negative cardiac catheterization with "plaque" per pt  Will obtain records  Continue atorvastatin  Since on Eliquis, will not start on aspirin  3  HTN: BP is controlled  4  NSVT: incidentally found on last device interrogation  If has more, will start on beta blocker now PPM in place  Previous History:  Sick sinus syndrome  Syncope with long pauses discovered in October, 2018  She had a pacemaker placed  Other history includes DVT for which she is on Eliquis  Gastric bypass previously  Interval History:  She had an urgent visit with me in February because of chest pain  She was referred to the emergency room, but had blood work done which was unremarkable was discharged home  Since then, she has had several episodes of chest pain  She had 1 when she was at work, and was taken to [facility]   There, she says a stress test was done and per report she was having pain during the stress test   Therefore, she underwent cardiac catheterization  She was told that she had mild plaque, but did not have any stent placed  She has been on atorvastatin  Occasional chest pains still, but these are similar in nature to what she has felt previously  No further syncope        Problem List     Deep vein thrombosis (DVT) of lower extremity (HCC)    Trigger middle finger of left hand    Carpal tunnel syndrome on right    Abnormal CT scan    Syncope    Sick sinus syndrome (HCC)    Leukocytosis        Past Medical History:   Diagnosis Date    Carpal tunnel syndrome of right wrist     Depressive disorder 12/2/2014    Diabetes ([facility] [address] ) 12/2/2014    DVT (deep venous thrombosis) ([facility] ) 2012    Esophageal reflux 5/23/2011    Essential hypertension 12/2/2014    Hypothyroid 12/2/2014    Knee osteoarthritis 12/2/2014    Pacemaker 11/01/2018    Trigger finger, left      Social History     Socioeconomic History    Marital status: /Civil Union     Spouse name: Not on file    Number of children: Not on file    Years of education: Not on file    Highest education level: Not on file   Occupational History    Not on file   Social Needs    Financial resource strain: Not on file    Food insecurity:     Worry: Not on file     Inability: Not on file    Transportation needs:     Medical: Not on file     Non-medical: Not on file   Tobacco Use    Smoking status: Former Smoker     Packs/day: 0 25     Types: Cigarettes    Smokeless tobacco: Never Used    Tobacco comment: NO PASSIVE SMOKE    Substance and Sexual Activity    Alcohol use: No     Alcohol/week: 1 2 oz     Types: 1 Glasses of wine, 1 Standard drinks or equivalent per week     Comment: very rare social    Drug use: No    Sexual activity: Not Currently   Lifestyle    Physical activity:     Days per week: Not on file     Minutes per session: Not on file    Stress: Not on file   Relationships    Social connections:     Talks on phone: Not on file     Gets together: Not on file     Attends Sikhism service: Not on file     Active member of club or organization: Not on file     Attends meetings of clubs or organizations: Not on file     Relationship status: Not on file    Intimate partner violence:     Fear of current or ex partner: Not on file     Emotionally abused: Not on file     Physically abused: Not on file     Forced sexual activity: Not on file   Other Topics Concern    Not on file   Social History Narrative    Not on file      Family History   Problem Relation Age of Onset    Stroke Mother  Alzheimer's disease Mother     Arthritis Mother     Coronary artery disease Mother    Joaquin Abt Breast cancer Mother     Cancer Mother     Heart disease Mother     Hypertension Father     Gout Father     Diabetes type II Father     Coronary artery disease Father     Heart disease Father      Past Surgical History:   Procedure Laterality Date    CARDIAC PACEMAKER PLACEMENT  2018    CERVICAL [address] SURGERY  2009    PLATES & SCREWS     SECTION      X2    GASTRIC BYPASS  2017    LAP RINYGB SURGERY    KNEE SURGERY Bilateral     OTHER SURGICAL HISTORY      ARM SURGERIES    [place] INCISE FINGER TENDON SHEATH Left 2018    Procedure: LONG TRIGGER FINGER RELEASE;  Surgeon: Rupert So MD;  [facility]: [facility];  Service: Orthopedics    UT WRIST Hero Narrow LIG Right 2018    Procedure: ENDOSCOPIC CARPAL TUNNEL RELEASE;  Surgeon: Rupert So MD;  [facility]: [facility];  Service: Orthopedics    TONSILLECTOMY         Current Outpatient Medications:     Ascorbic Acid (VITAMIN C) 100 MG CHEW, Chew, Disp: , Rfl:     atorvastatin (LIPITOR) 10 mg tablet, 10 mg daily , Disp: , Rfl:     B Complex Vitamins (VITAMIN B COMPLEX PO), Take 1 capsule by mouth, Disp: , Rfl:     Biotin 10 MG TABS, Take by mouth, Disp: , Rfl:     cholecalciferol (VITAMIN D3) 1,000 units tablet, Take 1,000 Units by mouth 2 (two) times a day, Disp: , Rfl:     clobetasol (TEMOVATE) 0 05 % cream, Apply to affected areas daily x 12 weeks, then twice weekly for maintenance, Disp: , Rfl:     ELIQUIS 2 5 MG, Take 1 tablet (2 5 mg total) by mouth 2 (two) times a day, Disp: 60 tablet, Rfl: 2    ferrous sulfate 325 (65 Fe) mg tablet, Take 325 mg by mouth daily with breakfast, Disp: , Rfl:     fluticasone (FLONASE) 50 mcg/act nasal spray, 2 sprays into each nostril daily, Disp: 16 g, Rfl: 0    Multiple Vitamin (MULTIVITAMIN) capsule, Take 1 capsule by mouth daily, Disp: , Rfl:     valACYclovir (VALTREX) 500 mg tablet, Take 1 tablet (500 mg total) by mouth 2 (two) times a day for 10 days, Disp: 20 tablet, Rfl: 1  No Known Allergies    Vitals:    03/28/19 1149   BP: 108/64   BP Location: Right arm   Patient Position: Sitting   Cuff Size: Large   Pulse: 65   Weight: 72 1 kg (159 lb)   Height: 5' 4" (1 626 m)     Vitals:    03/28/19 1149   Weight: 72 1 kg (159 lb)      Height: 5' 4" (162 6 cm)   Body mass index is 27 29 kg/m²  Physical Exam:  GEN: Ruth Prieto appears well, alert and oriented x 3, pleasant and cooperative   HEENT: pupils equal, round, and reactive to light; extraocular muscles intact  NECK: supple, no carotid bruits   HEART: regular rhythm, normal S1 and S2, no murmurs, clicks, gallops or rubs   LUNGS: clear to auscultation bilaterally; no wheezes, rales, or rhonchi   ABDOMEN: normal bowel sounds, soft, no tenderness, no distention  EXTREMITIES: peripheral pulses normal; no clubbing, cyanosis, or edema  NEURO: no focal findings   SKIN: normal without suspicious lesions on exposed skin    ROS:  Positive for anxiety, joint pain, back pain, nausea, vomiting, palpitations  Chest pain  Except as noted in HPI, is otherwise reviewed in detail and a 12 point review of systems is negative  ROS reviewed and is unchanged    Labs:  Lab Results   Component Value Date    K 4 0 02/21/2019     02/21/2019    CREATININE 0 59 (L) 02/21/2019    BUN 17 02/21/2019    CO2 29 02/21/2019    ALT 26 02/21/2019    AST 20 02/21/2019    GLUF 90 10/31/2018    WBC 8 09 02/21/2019    HGB 12 4 02/21/2019    HCT 38 2 02/21/2019     02/21/2019       No results found for: CHOL  No results found for: LDLCALC  No results found for: HDL  No results found for: TRIG    Testing:  Echo 11/1/18:  LEFT VENTRICLE: Size was normal  Systolic function was at the lower limits of normal  Ejection fraction was estimated to be 50 %  There were no regional wall motion abnormalities   Wall thickness was normal  There was no evidence of  concentric hypertrophy  DOPPLER: Left ventricular diastolic function parameters were normal      RIGHT VENTRICLE: The size was normal  Systolic function was normal  Wall thickness was normal      LEFT ATRIUM: Size was normal      RIGHT ATRIUM: Size was normal      MITRAL VALVE: Valve structure was normal  There was normal leaflet separation  DOPPLER: The transmitral velocity was within the normal range  There was no evidence for stenosis  There was mild regurgitation      AORTIC VALVE: The valve was trileaflet  Leaflets exhibited normal cuspal separation and sclerosis  DOPPLER: Transaortic velocity was within the normal range  There was no evidence for stenosis  There was no regurgitation      TRICUSPID VALVE: The valve structure was normal  There was normal leaflet separation  DOPPLER: The transtricuspid velocity was within the normal range  There was no evidence for stenosis  There was mild regurgitation  Pulmonary artery  systolic pressure was within the normal range      PULMONIC VALVE: Leaflets exhibited normal thickness, no calcification, and normal cuspal separation  DOPPLER: The transpulmonic velocity was within the normal range  There was no regurgitation      PERICARDIUM: There was no pericardial effusion  The pericardium was normal in appearance      AORTA: The root exhibited normal size

## 2019-03-29 DIAGNOSIS — E78.3 HYPERCHYLOMICRONEMIA: Primary | ICD-10-CM

## 2019-03-29 RX ORDER — ATORVASTATIN CALCIUM 10 MG/1
10 TABLET, FILM COATED ORAL DAILY
Qty: 90 TABLET | Refills: 2 | Status: SHIPPED | OUTPATIENT
Start: 2019-03-29 | End: 2019-09-26 | Stop reason: SDUPTHER

## 2019-04-05 ENCOUNTER — OFFICE VISIT (OUTPATIENT)
Dept: FAMILY MEDICINE CLINIC | Facility: CLINIC | Age: 59
End: 2019-04-05
Payer: COMMERCIAL

## 2019-04-05 VITALS
DIASTOLIC BLOOD PRESSURE: 72 MMHG | BODY MASS INDEX: 27.9 KG/M2 | HEART RATE: 70 BPM | TEMPERATURE: 96.8 F | OXYGEN SATURATION: 98 % | HEIGHT: 64 IN | SYSTOLIC BLOOD PRESSURE: 110 MMHG | WEIGHT: 163.4 LBS | RESPIRATION RATE: 16 BRPM

## 2019-04-05 DIAGNOSIS — B37.2 CANDIDA INFECTION OF FLEXURAL SKIN: Primary | ICD-10-CM
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
## 2019-04-05 DIAGNOSIS — L08.9 BUG BITE OF FACE WITH INFECTION, INITIAL ENCOUNTER: ICD-10-CM

## 2019-04-05 DIAGNOSIS — S00.86XA BUG BITE OF FACE WITH INFECTION, INITIAL ENCOUNTER: ICD-10-CM

## 2019-04-05 PROCEDURE — 1036F TOBACCO NON-USER: CPT | Performed by: NURSE PRACTITIONER

## 2019-04-05 PROCEDURE — 3008F BODY MASS INDEX DOCD: CPT | Performed by: NURSE PRACTITIONER

## 2019-04-05 PROCEDURE — 99213 OFFICE O/P EST LOW 20 MIN: CPT | Performed by: NURSE PRACTITIONER

## 2019-04-05 RX ORDER — NYSTATIN 100000 [USP'U]/G
POWDER TOPICAL 2 TIMES DAILY
Qty: 30 G | Refills: 1 | Status: SHIPPED | OUTPATIENT
Start: 2019-04-05 | End: 2020-01-07 | Stop reason: SDUPTHER

## 2019-04-05 RX ORDER — CEPHALEXIN 500 MG/1
500 CAPSULE ORAL EVERY 6 HOURS SCHEDULED
Qty: 40 CAPSULE | Refills: 0 | Status: SHIPPED | OUTPATIENT
Start: 2019-04-05 | End: 2019-04-15

## 2019-04-18 DIAGNOSIS — B00.9 HSV-1 INFECTION: ICD-10-CM

## 2019-04-19 RX ORDER — VALACYCLOVIR HYDROCHLORIDE 500 MG/1
500 TABLET, FILM COATED ORAL 2 TIMES DAILY
Qty: 20 TABLET | Refills: 1 | Status: SHIPPED | OUTPATIENT
Start: 2019-04-19 | End: 2020-05-12 | Stop reason: SDUPTHER

## 2019-05-14 ENCOUNTER — TRANSCRIBE ORDERS (OUTPATIENT)
Dept: ADMINISTRATIVE | Facility: HOSPITAL | Age: 59
End: 2019-05-14

## 2019-05-14 ENCOUNTER — APPOINTMENT (OUTPATIENT)
Dept: LAB | Age: 59
End: 2019-05-14
Payer: COMMERCIAL

## 2019-05-14 DIAGNOSIS — E78.2 MIXED HYPERLIPIDEMIA: ICD-10-CM

## 2019-05-14 DIAGNOSIS — E11.8 TYPE 2 DIABETES MELLITUS WITH COMPLICATION, UNSPECIFIED WHETHER LONG TERM INSULIN USE: ICD-10-CM

## 2019-05-14 DIAGNOSIS — D51.9 ANEMIA DUE TO VITAMIN B12 DEFICIENCY, UNSPECIFIED B12 DEFICIENCY TYPE: Primary | ICD-10-CM

## 2019-05-14 DIAGNOSIS — D51.9 ANEMIA DUE TO VITAMIN B12 DEFICIENCY, UNSPECIFIED B12 DEFICIENCY TYPE: ICD-10-CM

## 2019-05-14 DIAGNOSIS — E66.01 MORBID OBESITY (HCC): ICD-10-CM

## 2019-05-14 DIAGNOSIS — E55.9 VITAMIN D DEFICIENCY, UNSPECIFIED: ICD-10-CM

## 2019-05-14 DIAGNOSIS — Z98.84 BARIATRIC SURGERY STATUS: ICD-10-CM

## 2019-05-14 DIAGNOSIS — I10 ESSENTIAL HYPERTENSION, MALIGNANT: ICD-10-CM

## 2019-05-14 LAB
25(OH)D3 SERPL-MCNC: 29.7 NG/ML (ref 30–100)
ALBUMIN SERPL BCP-MCNC: 3.5 G/DL (ref 3.5–5)
ALP SERPL-CCNC: 94 U/L (ref 46–116)
ALT SERPL W P-5'-P-CCNC: 33 U/L (ref 12–78)
ANION GAP SERPL CALCULATED.3IONS-SCNC: 3 MMOL/L (ref 4–13)
AST SERPL W P-5'-P-CCNC: 22 U/L (ref 5–45)
BASOPHILS # BLD AUTO: 0.07 THOUSANDS/ΜL (ref 0–0.1)
BASOPHILS NFR BLD AUTO: 2 % (ref 0–1)
BILIRUB SERPL-MCNC: 0.78 MG/DL (ref 0.2–1)
BUN SERPL-MCNC: 12 MG/DL (ref 5–25)
CALCIUM SERPL-MCNC: 8.7 MG/DL (ref 8.3–10.1)
CHLORIDE SERPL-SCNC: 108 MMOL/L (ref 100–108)
CHOLEST SERPL-MCNC: 171 MG/DL (ref 50–200)
CO2 SERPL-SCNC: 30 MMOL/L (ref 21–32)
CREAT SERPL-MCNC: 0.55 MG/DL (ref 0.6–1.3)
EOSINOPHIL # BLD AUTO: 0.13 THOUSAND/ΜL (ref 0–0.61)
EOSINOPHIL NFR BLD AUTO: 3 % (ref 0–6)
ERYTHROCYTE [DISTWIDTH] IN BLOOD BY AUTOMATED COUNT: 12.6 % (ref 11.6–15.1)
EST. AVERAGE GLUCOSE BLD GHB EST-MCNC: 114 MG/DL
FERRITIN SERPL-MCNC: 48 NG/ML (ref 8–388)
FOLATE SERPL-MCNC: >20 NG/ML (ref 3.1–17.5)
GFR SERPL CREATININE-BSD FRML MDRD: 103 ML/MIN/1.73SQ M
GLUCOSE P FAST SERPL-MCNC: 92 MG/DL (ref 65–99)
HBA1C MFR BLD: 5.6 % (ref 4.2–6.3)
HCT VFR BLD AUTO: 37.8 % (ref 34.8–46.1)
HDLC SERPL-MCNC: 80 MG/DL (ref 40–60)
HGB BLD-MCNC: 12.4 G/DL (ref 11.5–15.4)
IMM GRANULOCYTES # BLD AUTO: 0.01 THOUSAND/UL (ref 0–0.2)
IMM GRANULOCYTES NFR BLD AUTO: 0 % (ref 0–2)
LDLC SERPL CALC-MCNC: 73 MG/DL (ref 0–100)
LYMPHOCYTES # BLD AUTO: 1.5 THOUSANDS/ΜL (ref 0.6–4.47)
LYMPHOCYTES NFR BLD AUTO: 32 % (ref 14–44)
MCH RBC QN AUTO: 32.5 PG (ref 26.8–34.3)
MCHC RBC AUTO-ENTMCNC: 32.8 G/DL (ref 31.4–37.4)
MCV RBC AUTO: 99 FL (ref 82–98)
MONOCYTES # BLD AUTO: 0.3 THOUSAND/ΜL (ref 0.17–1.22)
MONOCYTES NFR BLD AUTO: 6 % (ref 4–12)
NEUTROPHILS # BLD AUTO: 2.73 THOUSANDS/ΜL (ref 1.85–7.62)
NEUTS SEG NFR BLD AUTO: 57 % (ref 43–75)
NONHDLC SERPL-MCNC: 91 MG/DL
NRBC BLD AUTO-RTO: 0 /100 WBCS
PLATELET # BLD AUTO: 187 THOUSANDS/UL (ref 149–390)
PMV BLD AUTO: 12.1 FL (ref 8.9–12.7)
POTASSIUM SERPL-SCNC: 3.8 MMOL/L (ref 3.5–5.3)
PROT SERPL-MCNC: 7.3 G/DL (ref 6.4–8.2)
PTH-INTACT SERPL-MCNC: 60.1 PG/ML (ref 18.4–80.1)
RBC # BLD AUTO: 3.82 MILLION/UL (ref 3.81–5.12)
SODIUM SERPL-SCNC: 141 MMOL/L (ref 136–145)
TRIGL SERPL-MCNC: 92 MG/DL
TSH SERPL DL<=0.05 MIU/L-ACNC: 1.08 UIU/ML (ref 0.36–3.74)
VIT B12 SERPL-MCNC: 629 PG/ML (ref 100–900)
WBC # BLD AUTO: 4.74 THOUSAND/UL (ref 4.31–10.16)

## 2019-05-14 PROCEDURE — 80061 LIPID PANEL: CPT

## 2019-05-14 PROCEDURE — 82306 VITAMIN D 25 HYDROXY: CPT

## 2019-05-14 PROCEDURE — 82607 VITAMIN B-12: CPT

## 2019-05-14 PROCEDURE — 85025 COMPLETE CBC W/AUTO DIFF WBC: CPT

## 2019-05-14 PROCEDURE — 84443 ASSAY THYROID STIM HORMONE: CPT

## 2019-05-14 PROCEDURE — 83036 HEMOGLOBIN GLYCOSYLATED A1C: CPT

## 2019-05-14 PROCEDURE — 83970 ASSAY OF PARATHORMONE: CPT

## 2019-05-14 PROCEDURE — 82728 ASSAY OF FERRITIN: CPT

## 2019-05-14 PROCEDURE — 80053 COMPREHEN METABOLIC PANEL: CPT

## 2019-05-14 PROCEDURE — 82746 ASSAY OF FOLIC ACID SERUM: CPT

## 2019-05-14 PROCEDURE — 36415 COLL VENOUS BLD VENIPUNCTURE: CPT

## 2019-05-14 PROCEDURE — 84425 ASSAY OF VITAMIN B-1: CPT

## 2019-05-19 LAB — VIT B1 BLD-SCNC: 172 NMOL/L (ref 66.5–200)

## 2019-05-21 DIAGNOSIS — I82.4Y9 DEEP VEIN THROMBOSIS (DVT) OF PROXIMAL LOWER EXTREMITY, UNSPECIFIED CHRONICITY, UNSPECIFIED LATERALITY (HCC): ICD-10-CM

## 2019-05-23 ENCOUNTER — OFFICE VISIT (OUTPATIENT)
Dept: BARIATRICS | Facility: CLINIC | Age: 59
End: 2019-05-23
Payer: COMMERCIAL

## 2019-05-23 VITALS
HEIGHT: 64 IN | BODY MASS INDEX: 28.34 KG/M2 | HEART RATE: 76 BPM | SYSTOLIC BLOOD PRESSURE: 110 MMHG | WEIGHT: 166 LBS | DIASTOLIC BLOOD PRESSURE: 70 MMHG | RESPIRATION RATE: 16 BRPM | TEMPERATURE: 98.7 F

## 2019-05-23 DIAGNOSIS — E66.3 OVERWEIGHT: ICD-10-CM

## 2019-05-23 DIAGNOSIS — E55.9 VITAMIN D DEFICIENCY: ICD-10-CM

## 2019-05-23 DIAGNOSIS — Z98.84 BARIATRIC SURGERY STATUS: Primary | ICD-10-CM

## 2019-05-23 DIAGNOSIS — K91.2 POSTSURGICAL MALABSORPTION: ICD-10-CM

## 2019-05-23 PROCEDURE — 99214 OFFICE O/P EST MOD 30 MIN: CPT | Performed by: PHYSICIAN ASSISTANT

## 2019-05-23 RX ORDER — APIXABAN 2.5 MG/1
TABLET, FILM COATED ORAL
Qty: 60 TABLET | Refills: 2 | Status: SHIPPED | OUTPATIENT
Start: 2019-05-23 | End: 2019-09-09 | Stop reason: SDUPTHER

## 2019-06-22 ENCOUNTER — APPOINTMENT (EMERGENCY)
Dept: RADIOLOGY | Facility: HOSPITAL | Age: 59
End: 2019-06-22
Payer: COMMERCIAL

## 2019-06-22 ENCOUNTER — HOSPITAL ENCOUNTER (EMERGENCY)
Facility: HOSPITAL | Age: 59
Discharge: HOME | End: 2019-06-22
Attending: EMERGENCY MEDICINE
Payer: COMMERCIAL

## 2019-06-22 VITALS
RESPIRATION RATE: 20 BRPM | DIASTOLIC BLOOD PRESSURE: 79 MMHG | TEMPERATURE: 97.8 F | SYSTOLIC BLOOD PRESSURE: 127 MMHG | BODY MASS INDEX: 28.67 KG/M2 | OXYGEN SATURATION: 96 % | HEART RATE: 60 BPM | WEIGHT: 167 LBS

## 2019-06-22 DIAGNOSIS — R07.89 ATYPICAL CHEST PAIN: Primary | ICD-10-CM

## 2019-06-22 LAB
ALBUMIN SERPL BCP-MCNC: 3.6 G/DL (ref 3.5–5)
ALP SERPL-CCNC: 108 U/L (ref 46–116)
ALT SERPL W P-5'-P-CCNC: 35 U/L (ref 12–78)
ANION GAP SERPL CALCULATED.3IONS-SCNC: 6 MMOL/L (ref 4–13)
AST SERPL W P-5'-P-CCNC: 30 U/L (ref 5–45)
BASOPHILS # BLD AUTO: 0.07 THOUSANDS/ΜL (ref 0–0.1)
BASOPHILS NFR BLD AUTO: 1 % (ref 0–1)
BILIRUB SERPL-MCNC: 0.63 MG/DL (ref 0.2–1)
BUN SERPL-MCNC: 19 MG/DL (ref 5–25)
CALCIUM SERPL-MCNC: 9.1 MG/DL (ref 8.3–10.1)
CHLORIDE SERPL-SCNC: 111 MMOL/L (ref 100–108)
CO2 SERPL-SCNC: 27 MMOL/L (ref 21–32)
CREAT SERPL-MCNC: 0.53 MG/DL (ref 0.6–1.3)
EOSINOPHIL # BLD AUTO: 0.17 THOUSAND/ΜL (ref 0–0.61)
EOSINOPHIL NFR BLD AUTO: 3 % (ref 0–6)
ERYTHROCYTE [DISTWIDTH] IN BLOOD BY AUTOMATED COUNT: 12.5 % (ref 11.6–15.1)
GFR SERPL CREATININE-BSD FRML MDRD: 104 ML/MIN/1.73SQ M
GLUCOSE SERPL-MCNC: 60 MG/DL (ref 65–140)
HCT VFR BLD AUTO: 37.3 % (ref 34.8–46.1)
HGB BLD-MCNC: 12.4 G/DL (ref 11.5–15.4)
IMM GRANULOCYTES # BLD AUTO: 0.01 THOUSAND/UL (ref 0–0.2)
IMM GRANULOCYTES NFR BLD AUTO: 0 % (ref 0–2)
LYMPHOCYTES # BLD AUTO: 1.57 THOUSANDS/ΜL (ref 0.6–4.47)
LYMPHOCYTES NFR BLD AUTO: 27 % (ref 14–44)
MCH RBC QN AUTO: 32.4 PG (ref 26.8–34.3)
MCHC RBC AUTO-ENTMCNC: 33.2 G/DL (ref 31.4–37.4)
MCV RBC AUTO: 97 FL (ref 82–98)
MONOCYTES # BLD AUTO: 0.41 THOUSAND/ΜL (ref 0.17–1.22)
MONOCYTES NFR BLD AUTO: 7 % (ref 4–12)
NEUTROPHILS # BLD AUTO: 3.61 THOUSANDS/ΜL (ref 1.85–7.62)
NEUTS SEG NFR BLD AUTO: 62 % (ref 43–75)
NRBC BLD AUTO-RTO: 0 /100 WBCS
PLATELET # BLD AUTO: 183 THOUSANDS/UL (ref 149–390)
PMV BLD AUTO: 11.6 FL (ref 8.9–12.7)
POTASSIUM SERPL-SCNC: 4.3 MMOL/L (ref 3.5–5.3)
PROT SERPL-MCNC: 7.3 G/DL (ref 6.4–8.2)
RBC # BLD AUTO: 3.83 MILLION/UL (ref 3.81–5.12)
SODIUM SERPL-SCNC: 144 MMOL/L (ref 136–145)
TROPONIN I SERPL-MCNC: <0.02 NG/ML
TROPONIN I SERPL-MCNC: <0.02 NG/ML
WBC # BLD AUTO: 5.84 THOUSAND/UL (ref 4.31–10.16)

## 2019-06-22 PROCEDURE — 85025 COMPLETE CBC W/AUTO DIFF WBC: CPT | Performed by: EMERGENCY MEDICINE

## 2019-06-22 PROCEDURE — 99284 EMERGENCY DEPT VISIT MOD MDM: CPT | Performed by: EMERGENCY MEDICINE

## 2019-06-22 PROCEDURE — 36415 COLL VENOUS BLD VENIPUNCTURE: CPT

## 2019-06-22 PROCEDURE — 80053 COMPREHEN METABOLIC PANEL: CPT | Performed by: EMERGENCY MEDICINE

## 2019-06-22 PROCEDURE — 71046 X-RAY EXAM CHEST 2 VIEWS: CPT

## 2019-06-22 PROCEDURE — 93005 ELECTROCARDIOGRAM TRACING: CPT

## 2019-06-22 PROCEDURE — 96374 THER/PROPH/DIAG INJ IV PUSH: CPT

## 2019-06-22 PROCEDURE — 84484 ASSAY OF TROPONIN QUANT: CPT | Performed by: EMERGENCY MEDICINE

## 2019-06-22 PROCEDURE — 99285 EMERGENCY DEPT VISIT HI MDM: CPT

## 2019-06-22 RX ORDER — KETOROLAC TROMETHAMINE 30 MG/ML
15 INJECTION, SOLUTION INTRAMUSCULAR; INTRAVENOUS ONCE
Status: COMPLETED | OUTPATIENT
Start: 2019-06-22 | End: 2019-06-22

## 2019-06-22 RX ADMIN — KETOROLAC TROMETHAMINE 15 MG: 30 INJECTION, SOLUTION INTRAMUSCULAR at 16:09

## 2019-06-22 NOTE — ED PROVIDER NOTES
History  Chief Complaint   Patient presents with    Chest Pain     pt c/o chest pain starting yesterday that is getting worse  also reports nausea     Patient is a 44-year-old female presenting for sternal chest pain with radiation to her right chest wall and right neck  Patient has a history of sick sinus syndrome with pacemaker implantation, gastric surgery, DVT and PE on Eliquis  Patient states that her pain started yesterday afternoon at work, she does not remember exerting herself when the pain started  Pain continued to the night she took Tylenol with codeine that she states knocked her out to sleep  Patient woke up with pain and had it all day today, she presents to the emergency department as urgence of a co-worker  Patient states it feels like a pressure-like pain in her sternum and right chest area with radiation to her neck and right arm, pain is exacerbated with lifting right arm, turning her head to the right and is reproducible to palpation  Patient notes 1 episode of shortness breath while climbing stairs and denies shortness of breath he at rest   She denies acute lower extremity edema  She has nausea presently but is not vomiting and has a lack of appetite  Patient has a headache without vision changes or other neurological deficit  Daughter with patient has not noticed facial drooping or slurring her speech during the day today, patient and daughter deny weakness or unsteadiness on her feet  She denies recent illnesses, trauma or falls  Patient is taking her Eliquis as prescribed  Prior to Admission Medications   Prescriptions Last Dose Informant Patient Reported? Taking?    Ascorbic Acid (VITAMIN C) 100 MG CHEW  Self Yes No   Sig: Chew   B Complex Vitamins (VITAMIN B COMPLEX PO)  Self Yes No   Sig: Take 1 capsule by mouth   Biotin 10 MG TABS  Self Yes No   Sig: Take by mouth   ELIQUIS 2 5 MG   No No   Sig: take 1 tablet twice a day   Multiple Vitamin (MULTIVITAMIN) capsule Self Yes No   Sig: Take 1 capsule by mouth daily   atorvastatin (LIPITOR) 10 mg tablet   No No   Sig: Take 1 tablet (10 mg total) by mouth daily   cholecalciferol (VITAMIN D3) 1,000 units tablet  Self Yes No   Sig: Take 1,000 Units by mouth 2 (two) times a day   clobetasol (TEMOVATE) 0 05 % cream  Self Yes No   Sig: Apply to affected areas daily x 12 weeks, then twice weekly for maintenance   ferrous sulfate 325 (65 Fe) mg tablet  Self Yes No   Sig: Take 325 mg by mouth daily with breakfast   fluticasone (FLONASE) 50 mcg/act nasal spray  Self No No   Si sprays into each nostril daily   nystatin (MYCOSTATIN) powder   No No   Sig: Apply topically 2 (two) times a day   valACYclovir (VALTREX) 500 mg tablet   No No   Sig: Take 1 tablet (500 mg total) by mouth 2 (two) times a day for 10 days      Facility-Administered Medications: None       Past Medical History:   Diagnosis Date    Carpal tunnel syndrome of right wrist     Depressive disorder 2014    Diabetes ([facility] [address] [facility] ) 2014    DVT (deep venous thrombosis) ([facility] [address] )     Esophageal reflux 2011    Essential hypertension 2014    Grade 1 out of 6 intensity murmur 2019    Hypothyroid 2014    Knee osteoarthritis 2014    Pacemaker 2018    Trigger finger, left        Past Surgical History:   Procedure Laterality Date    CARDIAC PACEMAKER PLACEMENT  2018    CERVICAL DISC SURGERY  2009    PLATES & SCREWS     SECTION      X2    GASTRIC BYPASS  2017    LAP RINYGB SURGERY    KNEE SURGERY Bilateral     OTHER SURGICAL HISTORY      ARM SURGERIES    SC INCISE FINGER TENDON SHEATH Left 2018    Procedure: LONG TRIGGER FINGER RELEASE;  Surgeon: Juli Platt MD;  Location: [facility];  Service: Orthopedics    SC WRIST Hugo Fam LIG Right 2018    Procedure: ENDOSCOPIC CARPAL TUNNEL RELEASE;  Surgeon: Juli Platt MD;  Location: [facility];  Service: Orthopedics    TONSILLECTOMY Family History   Problem Relation Age of Onset    Stroke Mother     Alzheimer's disease Mother     Arthritis Mother     Coronary artery disease Mother    Luis Kennedy Breast cancer Mother     Cancer Mother     Heart disease Mother     Hypertension Father     Gout Father     Diabetes type II Father     Coronary artery disease Father     Heart disease Father      I have reviewed and agree with the history as documented  Social History     Tobacco Use    Smoking status: Former Smoker     Packs/day: 0 25     Types: Cigarettes    Smokeless tobacco: Never Used    Tobacco comment: NO PASSIVE SMOKE    Substance Use Topics    Alcohol use: No     Alcohol/week: 2 0 standard drinks     Types: 1 Glasses of wine, 1 Standard drinks or equivalent per week     Comment: very rare social    Drug use: No        Review of Systems   Constitutional: Negative for chills, diaphoresis, fatigue and fever  Respiratory: Negative for cough and shortness of breath  Cardiovascular: Positive for chest pain and palpitations  Gastrointestinal: Positive for constipation and nausea  Negative for abdominal pain, diarrhea and vomiting  Genitourinary: Negative for dysuria  Musculoskeletal: Positive for back pain and myalgias  Negative for neck pain and neck stiffness  Skin: Negative for color change  Neurological: Positive for light-headedness  Negative for seizures, syncope, weakness, numbness and headaches  All other systems reviewed and are negative        Physical Exam  ED Triage Vitals [06/22/19 1443]   Temperature Pulse Respirations Blood Pressure SpO2   97 8 °F (36 6 °C) 62 20 124/59 97 %      Temp Source Heart Rate Source Patient Position - Orthostatic VS BP Location FiO2 (%)   Oral -- -- -- --      Pain Score       Worst Possible Pain             Orthostatic Vital Signs  Vitals:    06/22/19 1545 06/22/19 1630 06/22/19 1800 06/22/19 1900   BP: 101/61 122/71 126/71 127/79   Pulse: 58 58 58 60       Physical Exam Constitutional: She is oriented to person, place, and time  She appears well-developed and well-nourished  Non-toxic appearance  She does not appear ill  No distress  HENT:   Head: Normocephalic and atraumatic  Eyes: Pupils are equal, round, and reactive to light  Conjunctivae and EOM are normal  Right eye exhibits no discharge  Left eye exhibits no discharge  No scleral icterus  Cardiovascular: Normal rate, regular rhythm and normal pulses  No murmur heard  Pulses:       Radial pulses are 2+ on the right side, and 2+ on the left side  Dorsalis pedis pulses are 2+ on the right side, and 2+ on the left side  Pulmonary/Chest: Effort normal  No accessory muscle usage  No tachypnea  No respiratory distress  She has no decreased breath sounds  She exhibits tenderness  Abdominal: Soft  She exhibits no distension  There is no tenderness  There is no rebound and no guarding  Musculoskeletal: She exhibits tenderness (Tenderness to R shoulder, clavicle, scapula; 5/5 strength in all extremities; sensation intact in all extremities)  She exhibits no edema or deformity  Neurological: She is alert and oriented to person, place, and time  No cranial nerve deficit or sensory deficit  She exhibits normal muscle tone  Coordination normal    Skin: Skin is warm  No pallor  Vitals reviewed        ED Medications  Medications   ketorolac (TORADOL) injection 15 mg (15 mg Intravenous Given 6/22/19 1609)       Diagnostic Studies  Results Reviewed     Procedure Component Value Units Date/Time    Troponin I [[id number]]  (Normal) Collected:  06/22/19 1807    Lab Status:  Final result Specimen:  Blood from Arm, Left Updated:  06/22/19 1844     Troponin I <0 02 ng/mL     Troponin I [[id number]]  (Normal) Collected:  06/22/19 1452    Lab Status:  Final result Specimen:  Blood from Arm, Left Updated:  06/22/19 1521     Troponin I <0 02 ng/mL     Comprehensive metabolic panel [[id number]]  (Abnormal) Collected:  06/22/19 1452    Lab Status:  Final result Specimen:  Blood from Arm, Left Updated:  06/22/19 1518     Sodium 144 mmol/L      Potassium 4 3 mmol/L      Chloride 111 mmol/L      CO2 27 mmol/L      ANION GAP 6 mmol/L      BUN 19 mg/dL      Creatinine 0 53 mg/dL      Glucose 60 mg/dL      Calcium 9 1 mg/dL      AST 30 U/L      ALT 35 U/L      Alkaline Phosphatase 108 U/L      Total Protein 7 3 g/dL      Albumin 3 6 g/dL      Total Bilirubin 0 63 mg/dL      eGFR 104 ml/min/1 73sq m     Narrative:       National Kidney Disease Foundation guidelines for Chronic Kidney Disease (CKD):     Stage 1 with normal or high GFR (GFR > 90 mL/min/1 73 square meters)    Stage 2 Mild CKD (GFR = 60-89 mL/min/1 73 square meters)    Stage 3A Moderate CKD (GFR = 45-59 mL/min/1 73 square meters)    Stage 3B Moderate CKD (GFR = 30-44 mL/min/1 73 square meters)    Stage 4 Severe CKD (GFR = 15-29 mL/min/1 73 square meters)    Stage 5 End Stage CKD (GFR <15 mL/min/1 73 square meters)  Note: GFR calculation is accurate only with a steady state creatinine    CBC and differential [[id number]] Collected:  06/22/19 1452    Lab Status:  Final result Specimen:  Blood from Arm, Left Updated:  06/22/19 1500     WBC 5 84 Thousand/uL      RBC 3 83 Million/uL      Hemoglobin 12 4 g/dL      Hematocrit 37 3 %      MCV 97 fL      MCH 32 4 pg      MCHC 33 2 g/dL      RDW 12 5 %      MPV 11 6 fL      Platelets 940 Thousands/uL      nRBC 0 /100 WBCs      Neutrophils Relative 62 %      Immat GRANS % 0 %      Lymphocytes Relative 27 %      Monocytes Relative 7 %      Eosinophils Relative 3 %      Basophils Relative 1 %      Neutrophils Absolute 3 61 Thousands/µL      Immature Grans Absolute 0 01 Thousand/uL      Lymphocytes Absolute 1 57 Thousands/µL      Monocytes Absolute 0 41 Thousand/µL      Eosinophils Absolute 0 17 Thousand/µL      Basophils Absolute 0 07 Thousands/µL                  XR chest 2 views   ED Interpretation by Deny Mejia DO (06/22 9958) No acute cardiopulmonary disease  Final Result by Nanette Jarquin MD (06/23 5385)      No acute cardiopulmonary disease  Findings are stable      Findings are consistent with emergency provider's preliminary reading               Workstation performed: [id number]               Procedures  ECG 12 Lead Documentation  Date/Time: 6/22/2019 5:01 PM  Performed by: Josephine Umanzor DO  Authorized by: Josephine Umanzor DO     Indications / Diagnosis:  Chest pressure  ECG reviewed by me, the ED Provider: yes    Patient location:  ED  Previous ECG:     Previous ECG:  Compared to current    Comparison ECG info:  2/21/19  Rate:     ECG rate:  62    ECG rate assessment: normal    Rhythm:     Rhythm: sinus rhythm    Ectopy:     Ectopy: none    QRS:     QRS axis:  Normal    QRS intervals:  Normal  Comments:      , decreased from previous of 216; QT/QTc 402/408; no ST elevations or depressions            ED Course  ED Course as of Jun 24 2102   Sat Jun 22, 2019   1628 Troponin I: <0 02         HEART Risk Score      Most Recent Value   History  1 Filed at: 06/22/2019 1658   ECG  0 Filed at: 06/22/2019 1658   Age  1 Filed at: 06/22/2019 1658   Risk Factors  1 [HLD, fhx] Filed at: 06/22/2019 1658   Troponin  0 Filed at: 06/22/2019 1658   Heart Score Risk Calculator   History  1 Filed at: 06/22/2019 1658   ECG  0 Filed at: 06/22/2019 1658   Age  1 Filed at: 06/22/2019 1658   Risk Factors  1 [HLD, fhx] Filed at: 06/22/2019 1658   Troponin  0 Filed at: 06/22/2019 1658   HEART Score  3 Filed at: 06/22/2019 1658   HEART Score  3 Filed at: 06/22/2019 1658                            MDM  Number of Diagnoses or Management Options  Atypical chest pain:   Diagnosis management comments: Delta troponin performed and was negative  Pain likely has MSK component with reproducibility  She was encouraged to follow-up with her cardiologist regarding her symptoms          Disposition  Final diagnoses:   Atypical chest pain     Time reflects when diagnosis was documented in both MDM as applicable and the Disposition within this note     Time User Action Codes Description Comment    6/22/2019  6:57 PM Russell Cevallos Add [R07 89] Atypical chest pain       ED Disposition     ED Disposition Condition Date/Time Comment    Discharge Stable Sat Jun 22, 2019  6:57 PM Bailey Keller discharge to home/self care              Follow-up Information     Follow up With Specialties Details Why Contact Info Additional Information    Radha Mckeon, [address]  Nurse Practitioner   [address]  [facility] [address]       [address] [facility], located within [facility] Schedule an appointment as soon as possible for a visit   [address] [facility] [address] [address]  [address], [facility] [address], [place], [place], [address] [facility] Emergency Department Emergency Medicine  If symptoms worsen [address]  [phone]  ED, [address], [place], [place], [address]          Discharge Medication List as of 6/22/2019  6:58 PM      CONTINUE these medications which have NOT CHANGED    Details   Ascorbic Acid (VITAMIN C) 100 MG CHEW Chew, Historical Med      atorvastatin (LIPITOR) 10 mg tablet Take 1 tablet (10 mg total) by mouth daily, Starting Fri 3/29/2019, Normal      B Complex Vitamins (VITAMIN B COMPLEX PO) Take 1 capsule by mouth, Historical Med      Biotin 10 MG TABS Take by mouth, Historical Med      cholecalciferol (VITAMIN D3) 1,000 units tablet Take 1,000 Units by mouth 2 (two) times a day, Historical Med      clobetasol (TEMOVATE) 0 05 % cream Apply to affected areas daily x 12 weeks, then twice weekly for maintenance, Historical Med      ELIQUIS 2 5 MG take 1 tablet twice a day, Normal      ferrous sulfate 325 (65 Fe) mg tablet Take 325 mg by mouth daily with breakfast, Historical Med fluticasone (FLONASE) 50 mcg/act nasal spray 2 sprays into each nostril daily, Starting Mon 12/24/2018, Normal      Multiple Vitamin (MULTIVITAMIN) capsule Take 1 capsule by mouth daily, Historical Med      nystatin (MYCOSTATIN) powder Apply topically 2 (two) times a day, Starting Fri 4/5/2019, Normal      valACYclovir (VALTREX) 500 mg tablet Take 1 tablet (500 mg total) by mouth 2 (two) times a day for 10 days, Starting Fri 4/19/2019, Until Mon 4/29/2019, Normal           No discharge procedures on file  ED Provider  Attending physically available and evaluated Erica Chavez I managed the patient along with the ED Attending      Electronically Signed by         Sukhjinder Patrick DO  06/24/19 2926

## 2019-06-22 NOTE — QUICK NOTE
Please see full note by Dr Torrez Messenger  61 yo woman presents with right upper chest/shoulder pain beginning yesterday  Exacerbated by raising the right arm above the head  Follows w/ cardiology since pacemaker placed for sick sinus syndrome  No fever, n/v, diaphoresis, shortness of breath  Exam: Afebrile  Appears anxious  Heart/lungs clear  Pain across chest wall with elevation of RUE  No skin lesions  No LE edema  Impression: Atypical chest pain    Plan: Cardiac labs  Check pacemaker  Toradol  Discharge w/ cardiology follow-up as HEART score is 3          Behzad Jones MD  Emergency Medicine Resident Physician  PGY-1

## 2019-06-22 NOTE — ED ATTENDING ATTESTATION
Trip Sheppard MD, saw and evaluated the patient  I have discussed the patient with the resident/non-physician practitioner and agree with the resident's/non-physician practitioner's findings, Plan of Care, and MDM as documented in the resident's/non-physician practitioner's note, except where noted  All available labs and Radiology studies were reviewed  I was present for key portions of any procedure(s) performed by the resident/non-physician practitioner and I was immediately available to provide assistance  At this point I agree with the current assessment done in the Emergency Department  I have conducted an independent evaluation of this patient a history and physical is as follows:   this is a 9year-old woman presented with chest pain  Patient complains of l right-sided chest pressure that radiates into her right arm is made worse by lifting her right arm  Pain has been constant since yesterday  The patient took Tylenol with codeine last night, but still had pain through the night  No shortness of breath or diaphoresis  Patient had a catheterization in February which did not show the reason for her chest discomfort  She has had pain like this before  Review of systems negative in 12 systems reviewed  On exam Vital signs were reviewed  Patient is awake, alert, interactive  The patient's pupils are equally round reactive to light  Oropharynx is clear with moist mucous membranes  Neck is supple and nontender with no adenopathy or JVD  Heart is regular with no murmurs, rubs, or gallops  Lungs are clear and equal with no wheezes, rales, or rhonchi  Abdomen is soft and nontender with no masses, rebound, or guarding  There is no CVA tenderness  The patient was completely exposed  There is no skin breakdown  There are no rashes or skin changes  Extremities are warm and well perfused with good pulses  The patient has normal strength, sensation, and cranial nerves   Pain reproducible with moving arm  Impression:  Atypical chest pain  Patient with heart score of 4    Will do delta troponin,  shared decision making  Critical Care Time  Procedures

## 2019-06-23 LAB
ATRIAL RATE: 62 BPM
P AXIS: 60 DEGREES
PR INTERVAL: 184 MS
QRS AXIS: 39 DEGREES
QRSD INTERVAL: 76 MS
QT INTERVAL: 402 MS
QTC INTERVAL: 408 MS
T WAVE AXIS: 49 DEGREES
VENTRICULAR RATE: 62 BPM

## 2019-06-23 PROCEDURE — 93010 ELECTROCARDIOGRAM REPORT: CPT | Performed by: INTERNAL MEDICINE

## 2019-06-24 ENCOUNTER — TRANSCRIBE ORDERS (OUTPATIENT)
Dept: ADMINISTRATIVE | Facility: HOSPITAL | Age: 59
End: 2019-06-24

## 2019-06-24 ENCOUNTER — TELEPHONE (OUTPATIENT)
Dept: CARDIOLOGY CLINIC | Facility: CLINIC | Age: 59
End: 2019-06-24

## 2019-06-24 ENCOUNTER — OFFICE VISIT (OUTPATIENT)
Dept: FAMILY MEDICINE CLINIC | Facility: CLINIC | Age: 59
End: 2019-06-24
Payer: COMMERCIAL

## 2019-06-24 VITALS
BODY MASS INDEX: 28.68 KG/M2 | TEMPERATURE: 99 F | WEIGHT: 168 LBS | OXYGEN SATURATION: 97 % | SYSTOLIC BLOOD PRESSURE: 120 MMHG | HEART RATE: 73 BPM | RESPIRATION RATE: 16 BRPM | HEIGHT: 64 IN | DIASTOLIC BLOOD PRESSURE: 70 MMHG

## 2019-06-24 DIAGNOSIS — R00.2 PALPITATION: ICD-10-CM

## 2019-06-24 DIAGNOSIS — Z12.31 ENCOUNTER FOR SCREENING MAMMOGRAM FOR MALIGNANT NEOPLASM OF BREAST: Primary | ICD-10-CM

## 2019-06-24 PROBLEM — R01.1: Status: ACTIVE | Noted: 2019-06-24

## 2019-06-24 PROCEDURE — 99214 OFFICE O/P EST MOD 30 MIN: CPT | Performed by: NURSE PRACTITIONER

## 2019-06-24 PROCEDURE — 3008F BODY MASS INDEX DOCD: CPT | Performed by: NURSE PRACTITIONER

## 2019-06-24 PROCEDURE — 3725F SCREEN DEPRESSION PERFORMED: CPT | Performed by: NURSE PRACTITIONER

## 2019-06-28 ENCOUNTER — REMOTE DEVICE CLINIC VISIT (OUTPATIENT)
Dept: CARDIOLOGY CLINIC | Facility: CLINIC | Age: 59
End: 2019-06-28
Payer: COMMERCIAL

## 2019-06-28 DIAGNOSIS — Z95.0 CARDIAC PACEMAKER IN SITU: Primary | ICD-10-CM

## 2019-06-28 PROCEDURE — 93294 REM INTERROG EVL PM/LDLS PM: CPT | Performed by: INTERNAL MEDICINE

## 2019-06-28 PROCEDURE — 93296 REM INTERROG EVL PM/IDS: CPT | Performed by: INTERNAL MEDICINE

## 2019-07-13 ENCOUNTER — HOSPITAL ENCOUNTER (OUTPATIENT)
Dept: RADIOLOGY | Facility: IMAGING CENTER | Age: 59
Discharge: HOME | End: 2019-07-13
Payer: COMMERCIAL

## 2019-07-13 VITALS — WEIGHT: 167 LBS | HEIGHT: 64 IN | BODY MASS INDEX: 28.51 KG/M2

## 2019-07-13 DIAGNOSIS — Z12.31 ENCOUNTER FOR SCREENING MAMMOGRAM FOR MALIGNANT NEOPLASM OF BREAST: ICD-10-CM

## 2019-07-13 PROCEDURE — 77067 SCR MAMMO BI INCL CAD: CPT

## 2019-07-15 ENCOUNTER — OFFICE VISIT (OUTPATIENT)
Dept: CARDIOLOGY CLINIC | Facility: CLINIC | Age: 59
End: 2019-07-15
Payer: COMMERCIAL

## 2019-07-15 ENCOUNTER — TELEPHONE (OUTPATIENT)
Dept: CARDIOLOGY CLINIC | Facility: CLINIC | Age: 59
End: 2019-07-15

## 2019-07-15 VITALS
BODY MASS INDEX: 28.24 KG/M2 | SYSTOLIC BLOOD PRESSURE: 102 MMHG | DIASTOLIC BLOOD PRESSURE: 60 MMHG | WEIGHT: 165.4 LBS | OXYGEN SATURATION: 97 % | HEIGHT: 64 IN | HEART RATE: 68 BPM

## 2019-07-15 DIAGNOSIS — R07.9 CHEST PAIN, UNSPECIFIED TYPE: Primary | ICD-10-CM

## 2019-07-15 DIAGNOSIS — I82.409 THROMBOEMBOLISM OF DEEP VEINS OF LOWER EXTREMITY, UNSPECIFIED LATERALITY (HCC): ICD-10-CM

## 2019-07-15 DIAGNOSIS — I49.5 SICK SINUS SYNDROME (HCC): ICD-10-CM

## 2019-07-15 DIAGNOSIS — K21.9 GASTROESOPHAGEAL REFLUX DISEASE, ESOPHAGITIS PRESENCE NOT SPECIFIED: ICD-10-CM

## 2019-07-15 PROCEDURE — 99215 OFFICE O/P EST HI 40 MIN: CPT | Performed by: NURSE PRACTITIONER

## 2019-07-15 RX ORDER — OMEPRAZOLE 40 MG/1
40 CAPSULE, DELAYED RELEASE ORAL DAILY
Qty: 30 CAPSULE | Refills: 0 | Status: SHIPPED | OUTPATIENT
Start: 2019-07-15 | End: 2019-10-15

## 2019-07-15 NOTE — PROGRESS NOTES
Cardiology Follow Up    Karissa Bustos  1960  [id number]  [facility]un[facility] [address]  [phone]    Hospital follow up for atypical Chest pain    Interval History:  Ms Karissa Bustos presented to the ED on 6/22/19 with chest pain  Gamina Cooper experienced sternal chest pressure, radiating to her neck and right arm, pain exacerbated with lifting her right arm, turning her head to the right and reproducible with palpation  Troponin <0 02  She was discharged home  Ms Karissa Bustos presents  Your office for a follow-up visit  Clement Jaeger to mid sternal chest pressure, 8/10, associated with lightheadedness and dizziness, 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  She denies radiation of pain  According to Katharina Cooper she has a stress test at [facility] she was not able to complete due to chest pain  Upon chart review stress test results not in the system  Katharina Cooper admits to stress and anxiety  Since quitting her job she is feeling better  Katharina Cooper is closing her eyes throughout this office visit  She appears to be falling asleep  Syncope   SSS sp PPM 6/28/19 interrogation showed AP 28%,  < 0 1%   No signigficant high rates  HTN  NSVT found on device interrogation  DVT   PE on Eliquis  Gastric surgery   55/70/99 TTE LV systolic function lower limits of normal, LVEF 50%, no regional wall motion abnormalities, mild MR, mild TR  Previous Tobacco abuse   Patient Active Problem List   Diagnosis    Deep venous thrombosis of upper extremity (HCC)    Trigger middle finger of left hand    Carpal tunnel syndrome on right    Abnormal CT scan    Syncope    Sick sinus syndrome (HCC)    Leukocytosis    Acute non-recurrent maxillary sinusitis    Acute non-recurrent frontal sinusitis    HSV-1 infection    Right hand pain    Status post carpal tunnel release    Ganglion cyst of finger of right hand    Right upper quadrant abdominal pain    Other chest pain    Depressive disorder    Diabetes ([facility] [address] )    Esophageal reflux    Essential hypertension    Fibrocystic breast disease    Gout    Hyperlipidemia    Hypothyroid    Knee osteoarthritis    Lichen sclerosus    Menopausal and postmenopausal disorder    Migraine    Neck pain    Peroneal tendonitis    Thromboembolism of deep veins of lower extremity (HCC)    Candida infection of flexural skin    Grade 1 out of 6 intensity murmur    Palpitation    Encounter for screening mammogram for malignant neoplasm of breast     Past Medical History:   Diagnosis Date    Carpal tunnel syndrome of right wrist     Chest pain     Depressive disorder 12/2/2014    [facility] 12/2/2014    DVT (deep venous thrombosis) ([facility] ) 2012    Esophageal reflux 5/23/2011    Essential hypertension 12/2/2014    Grade 1 out of 6 intensity murmur 6/24/2019    Hypothyroid 12/2/2014    Knee osteoarthritis 12/2/2014    Pacemaker 11/01/2018    Trigger finger, left      Social History     Socioeconomic History    Marital status: /Civil Union     Spouse name: Not on file    Number of children: Not on file    Years of education: Not on file    Highest education level: Not on file   Occupational History    Not on file   Social Needs    Financial resource strain: Not on file    Food insecurity:     Worry: Not on file     Inability: Not on file    Transportation needs:     Medical: Not on file     Non-medical: Not on file   Tobacco Use    Smoking status: Former Smoker     Packs/day: 0 25     Types: Cigarettes    Smokeless tobacco: Never Used    Tobacco comment: NO PASSIVE SMOKE    Substance and Sexual Activity    Alcohol use: No     Alcohol/week: 2 0 standard drinks     Types: 1 Glasses of wine, 1 Standard drinks or equivalent per week     Comment: very rare social    Drug use: No    Sexual activity: Not Currently   Lifestyle    Physical activity:     Days per week: Not on file     Minutes per session: Not on file    Stress: Not on file   Relationships    Social connections:     Talks on phone: Not on file     Gets together: Not on file     Attends Mosque service: Not on file     Active member of club or organization: Not on file     Attends meetings of clubs or organizations: Not on file     Relationship status: Not on file    Intimate partner violence:     Fear of current or ex partner: Not on file     Emotionally abused: Not on file     Physically abused: Not on file     Forced sexual activity: Not on file   Other Topics Concern    Not on file   Social History Narrative    Not on file      Family History   Problem Relation Age of Onset    Stroke Mother     Alzheimer's disease Mother     Arthritis Mother     Coronary artery disease Mother     Breast cancer Mother 77    Cancer Mother     Heart disease Mother     Hypertension Father     Gout Father     Diabetes type II Father     Coronary artery disease Father     Heart disease Father     No Known Problems Sister     No Known Problems Daughter     No Known Problems Maternal Grandmother     No Known Problems Maternal Grandfather     No Known Problems Paternal Grandmother     No Known Problems Paternal Grandfather     Prostate cancer Brother     No Known Problems Sister     No Known Problems Sister     No Known Problems Maternal Aunt     No Known Problems Maternal Aunt     No Known Problems Maternal Aunt     No Known Problems Maternal Aunt     Cancer Maternal Aunt     No Known Problems Paternal Aunt     No Known Problems Paternal Aunt      Past Surgical History:   Procedure Laterality Date    CARDIAC PACEMAKER PLACEMENT  2018    CERVICAL DISC SURGERY  2009    PLATES & SCREWS     SECTION      X2    GASTRIC BYPASS  2017    LAP RINYGB SURGERY    KNEE SURGERY Bilateral     OTHER SURGICAL HISTORY      ARM SURGERIES    KILO SALGUERO FINGER TENDON SHEATH Left 9/18/2018    Procedure: LONG TRIGGER FINGER RELEASE;  Surgeon: Marisa Bowen MD;  [facility];  Service: Orthopedics    [place] WRIST Chalice Pastel LIG Right 9/18/2018    Procedure: ENDOSCOPIC CARPAL TUNNEL RELEASE;  Surgeon: Marisa Bowen MD;  [facility];  Service: Orthopedics    TONSILLECTOMY         Current Outpatient Medications:     Ascorbic Acid (VITAMIN C) 100 MG CHEW, Chew, Disp: , Rfl:     atorvastatin (LIPITOR) 10 mg tablet, Take 1 tablet (10 mg total) by mouth daily, Disp: 90 tablet, Rfl: 2    B Complex Vitamins (VITAMIN B COMPLEX PO), Take 1 capsule by mouth, Disp: , Rfl:     Biotin 10 MG TABS, Take by mouth, Disp: , Rfl:     cholecalciferol (VITAMIN D3) 1,000 units tablet, Take 1,000 Units by mouth 2 (two) times a day, Disp: , Rfl:     clobetasol (TEMOVATE) 0 05 % cream, Apply to affected areas daily x 12 weeks, then twice weekly for maintenance, Disp: , Rfl:     ELIQUIS 2 5 MG, take 1 tablet twice a day, Disp: 60 tablet, Rfl: 2    ferrous sulfate 325 (65 Fe) mg tablet, Take 325 mg by mouth daily with breakfast, Disp: , Rfl:     fluticasone (FLONASE) 50 mcg/act nasal spray, 2 sprays into each nostril daily, Disp: 16 g, Rfl: 0    LYSINE PO, Take by mouth, Disp: , Rfl:     Multiple Vitamin (MULTIVITAMIN) capsule, Take 1 capsule by mouth daily, Disp: , Rfl:     nystatin (MYCOSTATIN) powder, Apply topically 2 (two) times a day, Disp: 30 g, Rfl: 1    valACYclovir (VALTREX) 500 mg tablet, Take 1 tablet (500 mg total) by mouth 2 (two) times a day for 10 days, Disp: 20 tablet, Rfl: 1  No Known Allergies    Labs:  Admission on 06/22/2019, Discharged on 06/22/2019   Component Date Value    WBC 06/22/2019 5 84     RBC 06/22/2019 3 83     Hemoglobin 06/22/2019 12 4     Hematocrit 06/22/2019 37 3     MCV 06/22/2019 97     [address] 06/22/2019 32 4     MCHC 06/22/2019 33 2     RDW 06/22/2019 12 5     MPV 06/22/2019 11 6     Platelets 94/35/6852 183     nRBC 06/22/2019 0     Neutrophils Relative 06/22/2019 62     Immat GRANS % 06/22/2019 0     Lymphocytes Relative 06/22/2019 27     Monocytes Relative 06/22/2019 7     Eosinophils Relative 06/22/2019 3     Basophils Relative 06/22/2019 1     Neutrophils Absolute 06/22/2019 3 61     Immature Grans Absolute 06/22/2019 0 01     Lymphocytes Absolute 06/22/2019 1 57     Monocytes Absolute 06/22/2019 0 41     Eosinophils Absolute 06/22/2019 0 17     Basophils Absolute 06/22/2019 0 07     Sodium 06/22/2019 144     Potassium 06/22/2019 4 3     Chloride 06/22/2019 111*    CO2 06/22/2019 27     ANION GAP 06/22/2019 6     BUN 06/22/2019 19     Creatinine 06/22/2019 0 53*    Glucose 06/22/2019 60*    Calcium 06/22/2019 9 1     AST 06/22/2019 30     ALT 06/22/2019 35     Alkaline Phosphatase 06/22/2019 108     Total Protein 06/22/2019 7 3     Albumin 06/22/2019 3 6     Total Bilirubin 06/22/2019 0 63     eGFR 06/22/2019 104     Troponin I 06/22/2019 <0 02     Troponin I 06/22/2019 <0 02     Ventricular Rate 06/22/2019 62     Atrial Rate 06/22/2019 62     MD Interval 06/22/2019 184     QRSD Interval 06/22/2019 76     QT Interval 06/22/2019 402     QTC Interval 06/22/2019 408     P Axis 06/22/2019 60     QRS Axis 06/22/2019 39     T Wave Axis 06/22/2019 49      Imaging: Xr Chest 2 Views    Result Date: 6/23/2019  Narrative: CHEST INDICATION:   Chest Pain  COMPARISON:  2/21/2019 chest x-ray EXAM PERFORMED/VIEWS:  XR CHEST PA & LATERAL Images: 4 FINDINGS: Dual lead left-sided ICD Cardiomediastinal silhouette appears unremarkable  The lungs are clear  No pneumothorax or pleural effusion  Osseous structures appear within normal limits for patient age  Impression: No acute cardiopulmonary disease   Findings are stable Findings are consistent with emergency provider's preliminary reading Workstation performed: [id number]     Mammo Screening Bilateral W Cad    Result Date: 7/15/2019  Narrative: DIAGNOSIS: Encounter for screening mammogram for malignant neoplasm of breast RELEVANT HISTORY: Family Breast Cancer History: History of breast cancer in Mother  Family Medical history: Family medical history includes breast cancer in mother  Personal History: No known relevant hormone history  No known relevant surgical history  No known relevant medical history  COMPARISONS: Prior mammograms dated: 05/30/2017, 09/04/2014, and 08/04/2011 INDICATION: Savita Sprague is a 60 y o  female presenting for screening mammography  TECHNIQUE: The current study was evaluated with Computer Aided Detection  TISSUE DENSITY: The breasts are almost entirely fatty  The patient is scheduled in a reminder system for screening mammography  8-10% of cancers will be missed on mammography  Management of a palpable abnormality must be based on clinical grounds  Patients will be notified of their results via letter from our facility  Accredited by [employer] and FDA  RISK ASSESSMENT: 5 Year Tyrer-Cuzick: 2 16 % 10 Year Tyrer-Cuzick: 4 51 % Lifetime Tyrer-Cuzick: 11 06 % FINDINGS: There are no suspicious masses, grouped microcalcifications or areas of architectural distortion  Impression: No mammographic evidence of malignancy  ASSESSMENT/BI-RADS CATEGORY: Left: 1 - Negative Right: 1 - Negative Overall: 1 - Negative RECOMMENDATION:      - Routine screening mammogram in 1 year for both breasts  Workstation ID: [id number]       Review of Systems:  Review of Systems   Respiratory: Positive for chest tightness  Cardiovascular: Positive for chest pain  Neurological: Positive for dizziness and light-headedness  Psychiatric/Behavioral: The patient is nervous/anxious  All other systems reviewed and are negative  Physical Exam:  Physical Exam   Constitutional: She is oriented to person, place, and time  She appears well-developed and well-nourished  HENT:   Head: Normocephalic  Eyes: Pupils are equal, round, and reactive to light  Cardiovascular: Normal rate, regular rhythm and normal heart sounds  Pulmonary/Chest: Effort normal and breath sounds normal    Abdominal: Soft  Bowel sounds are normal    Musculoskeletal: Normal range of motion  She exhibits no edema  Neurological: She is alert and oriented to person, place, and time  Skin: Skin is warm and dry  Capillary refill takes less than 2 seconds  Psychiatric:   Closing eyes throughout this visit, appears to be falling asleep    Vitals reviewed  Discussion/Summary:  1  Chest pain- occurring with activity, possibly related to anxiety, stress, and or GERD,  Lexiscan Nuclear stress test evaluate for ischemia  2  SSS sp PPM 6/28/19 interrogation showed AP 28%,  < 0 1%  No signigficant high rates- According to Fransisco Vasquez her HR was high with an epidose of CP,  I have asked her to transmit a device reading  I will inform the device clinic  3  Hx of DVT and PE on Eliquis 2 5mg BID  4   Hx Gastric surgery - possible GERD,  one month Omeprazole 40mg daily

## 2019-07-15 NOTE — TELEPHONE ENCOUNTER
Pt requested to fax most recent chest xray results to her new employer     AttnTrenny Yanez #[phone]

## 2019-07-15 NOTE — PATIENT INSTRUCTIONS
2gm sodium diet, low fat cholesterol diet, eating fresh is best, fresh fruit, fresh vegetables, lean protein, legumes and salt free nuts

## 2019-08-06 ENCOUNTER — HOSPITAL ENCOUNTER (OUTPATIENT)
Dept: NON INVASIVE DIAGNOSTICS | Facility: CLINIC | Age: 59
Discharge: HOME | End: 2019-08-06
Payer: COMMERCIAL

## 2019-08-06 DIAGNOSIS — R07.9 CHEST PAIN, UNSPECIFIED TYPE: ICD-10-CM

## 2019-08-06 LAB
CHEST PAIN STATEMENT: NORMAL
MAX DIASTOLIC BP: 72 MMHG
MAX HEART RATE: 103 BPM
MAX PREDICTED HEART RATE: 160 BPM
MAX. SYSTOLIC BP: 130 MMHG
PROTOCOL NAME: NORMAL
REASON FOR TERMINATION: NORMAL
TARGET HR FORMULA: NORMAL
TEST INDICATION: NORMAL
TIME IN EXERCISE PHASE: NORMAL

## 2019-08-06 PROCEDURE — 78452 HT MUSCLE IMAGE SPECT MULT: CPT

## 2019-08-06 PROCEDURE — 93017 CV STRESS TEST TRACING ONLY: CPT

## 2019-08-06 PROCEDURE — 93018 CV STRESS TEST I&R ONLY: CPT | Performed by: INTERNAL MEDICINE

## 2019-08-06 PROCEDURE — A9502 TC99M TETROFOSMIN: HCPCS

## 2019-08-06 PROCEDURE — 93016 CV STRESS TEST SUPVJ ONLY: CPT | Performed by: INTERNAL MEDICINE

## 2019-08-06 PROCEDURE — 78452 HT MUSCLE IMAGE SPECT MULT: CPT | Performed by: INTERNAL MEDICINE

## 2019-08-06 RX ADMIN — REGADENOSON 0.4 MG: 0.08 INJECTION, SOLUTION INTRAVENOUS at 14:06

## 2019-08-07 ENCOUNTER — TELEPHONE (OUTPATIENT)
Dept: CARDIOLOGY CLINIC | Facility: CLINIC | Age: 59
End: 2019-08-07

## 2019-08-07 NOTE — TELEPHONE ENCOUNTER
----- Message from [employer], [address] sent at 8/6/2019  3:54 PM EDT -----  Please call Rafael Calvillo and inform her the stres test was normal     Called pt and lft msg re: normal ST rslts

## 2019-08-20 ENCOUNTER — APPOINTMENT (OUTPATIENT)
Dept: LAB | Facility: IMAGING CENTER | Age: 59
End: 2019-08-20
Payer: COMMERCIAL

## 2019-08-20 ENCOUNTER — TRANSCRIBE ORDERS (OUTPATIENT)
Dept: ADMINISTRATIVE | Facility: HOSPITAL | Age: 59
End: 2019-08-20

## 2019-08-20 DIAGNOSIS — K91.2 POSTSURGICAL MALABSORPTION: ICD-10-CM

## 2019-08-20 DIAGNOSIS — Z01.818 OTHER SPECIFIED PRE-OPERATIVE EXAMINATION: Primary | ICD-10-CM

## 2019-08-20 DIAGNOSIS — E55.9 VITAMIN D DEFICIENCY: ICD-10-CM

## 2019-08-20 LAB — 25(OH)D3 SERPL-MCNC: 42.2 NG/ML (ref 30–100)

## 2019-08-20 PROCEDURE — 82306 VITAMIN D 25 HYDROXY: CPT

## 2019-08-20 PROCEDURE — 36415 COLL VENOUS BLD VENIPUNCTURE: CPT

## 2019-09-09 DIAGNOSIS — I82.4Y9 DEEP VEIN THROMBOSIS (DVT) OF PROXIMAL LOWER EXTREMITY, UNSPECIFIED CHRONICITY, UNSPECIFIED LATERALITY (HCC): ICD-10-CM

## 2019-09-09 RX ORDER — APIXABAN 2.5 MG/1
2.5 TABLET, FILM COATED ORAL 2 TIMES DAILY
Qty: 60 TABLET | Refills: 0 | Status: SHIPPED | OUTPATIENT
Start: 2019-09-09 | End: 2019-09-25 | Stop reason: SDUPTHER

## 2019-09-25 DIAGNOSIS — I82.4Y9 DEEP VEIN THROMBOSIS (DVT) OF PROXIMAL LOWER EXTREMITY, UNSPECIFIED CHRONICITY, UNSPECIFIED LATERALITY (HCC): ICD-10-CM

## 2019-09-25 RX ORDER — APIXABAN 2.5 MG/1
2.5 TABLET, FILM COATED ORAL 2 TIMES DAILY
Qty: 60 TABLET | Refills: 0 | Status: SHIPPED | OUTPATIENT
Start: 2019-09-25 | End: 2019-11-08 | Stop reason: SDUPTHER

## 2019-09-26 DIAGNOSIS — E78.3 HYPERCHYLOMICRONEMIA: ICD-10-CM

## 2019-09-26 RX ORDER — ATORVASTATIN CALCIUM 10 MG/1
TABLET, FILM COATED ORAL
Qty: 90 TABLET | Refills: 2 | Status: SHIPPED | OUTPATIENT
Start: 2019-09-26 | End: 2020-09-10

## 2019-09-30 ENCOUNTER — REMOTE DEVICE CLINIC VISIT (OUTPATIENT)
Dept: CARDIOLOGY CLINIC | Facility: CLINIC | Age: 59
End: 2019-09-30
Payer: COMMERCIAL

## 2019-09-30 DIAGNOSIS — Z95.0 CARDIAC PACEMAKER IN SITU: Primary | ICD-10-CM

## 2019-09-30 PROCEDURE — 93294 REM INTERROG EVL PM/LDLS PM: CPT | Performed by: INTERNAL MEDICINE

## 2019-09-30 PROCEDURE — 93296 REM INTERROG EVL PM/IDS: CPT | Performed by: INTERNAL MEDICINE

## 2019-09-30 NOTE — PROGRESS NOTES
Results for orders placed or performed in visit on 09/30/19   Cardiac EP device report    Narrative    CARELINK TRANSMISSION: BATTERY STATUS "OK"  AP 33%  0%  ALL AVAILABLE LEAD PARAMETERS WITHIN NORMAL LIMITS  NO SIGNIFICANT HIGH RATE EPISODES  NORMAL DEVICE FUNCTION   NC

## 2019-10-14 PROBLEM — J01.00 ACUTE NON-RECURRENT MAXILLARY SINUSITIS: Status: RESOLVED | Noted: 2018-12-24 | Resolved: 2019-10-14

## 2019-10-14 PROBLEM — J01.10 ACUTE NON-RECURRENT FRONTAL SINUSITIS: Status: RESOLVED | Noted: 2018-12-28 | Resolved: 2019-10-14

## 2019-10-15 ENCOUNTER — OFFICE VISIT (OUTPATIENT)
Dept: FAMILY MEDICINE CLINIC | Facility: CLINIC | Age: 59
End: 2019-10-15
Payer: COMMERCIAL

## 2019-10-15 VITALS
HEART RATE: 80 BPM | HEIGHT: 64 IN | WEIGHT: 175.6 LBS | BODY MASS INDEX: 29.98 KG/M2 | SYSTOLIC BLOOD PRESSURE: 124 MMHG | RESPIRATION RATE: 16 BRPM | TEMPERATURE: 97.9 F | DIASTOLIC BLOOD PRESSURE: 80 MMHG

## 2019-10-15 DIAGNOSIS — Z00.00 MEDICARE ANNUAL WELLNESS VISIT, INITIAL: Primary | ICD-10-CM

## 2019-10-15 DIAGNOSIS — M79.89 MASS OF SOFT TISSUE OF RIGHT UPPER EXTREMITY: ICD-10-CM

## 2019-10-15 DIAGNOSIS — Z23 NEED FOR INFLUENZA VACCINATION: ICD-10-CM

## 2019-10-15 PROBLEM — M79.641 RIGHT HAND PAIN: Status: RESOLVED | Noted: 2019-01-07 | Resolved: 2019-10-15

## 2019-10-15 PROBLEM — E56.9 VITAMIN DEFICIENCY: Status: ACTIVE | Noted: 2019-10-15

## 2019-10-15 PROBLEM — R07.89 OTHER CHEST PAIN: Status: RESOLVED | Noted: 2019-03-05 | Resolved: 2019-10-15

## 2019-10-15 PROBLEM — D50.9 IRON DEFICIENCY ANEMIA: Status: ACTIVE | Noted: 2019-10-15

## 2019-10-15 PROBLEM — Z86.718 HISTORY OF DVT (DEEP VEIN THROMBOSIS): Status: ACTIVE | Noted: 2019-10-15

## 2019-10-15 PROBLEM — M65.332 TRIGGER MIDDLE FINGER OF LEFT HAND: Status: RESOLVED | Noted: 2018-08-17 | Resolved: 2019-10-15

## 2019-10-15 PROBLEM — D72.829 LEUKOCYTOSIS: Status: RESOLVED | Noted: 2018-11-02 | Resolved: 2019-10-15

## 2019-10-15 PROBLEM — R55 SYNCOPE: Status: RESOLVED | Noted: 2018-10-31 | Resolved: 2019-10-15

## 2019-10-15 PROBLEM — R00.2 PALPITATION: Status: RESOLVED | Noted: 2019-06-24 | Resolved: 2019-10-15

## 2019-10-15 PROBLEM — M67.441 GANGLION CYST OF FINGER OF RIGHT HAND: Status: RESOLVED | Noted: 2019-01-07 | Resolved: 2019-10-15

## 2019-10-15 PROBLEM — G56.01 CARPAL TUNNEL SYNDROME ON RIGHT: Status: RESOLVED | Noted: 2018-08-17 | Resolved: 2019-10-15

## 2019-10-15 PROBLEM — R10.11 RIGHT UPPER QUADRANT ABDOMINAL PAIN: Status: RESOLVED | Noted: 2019-03-05 | Resolved: 2019-10-15

## 2019-10-15 PROCEDURE — 90471 IMMUNIZATION ADMIN: CPT

## 2019-10-15 PROCEDURE — 90682 RIV4 VACC RECOMBINANT DNA IM: CPT

## 2019-10-15 PROCEDURE — 3008F BODY MASS INDEX DOCD: CPT | Performed by: PHYSICIAN ASSISTANT

## 2019-10-15 PROCEDURE — G0438 PPPS, INITIAL VISIT: HCPCS | Performed by: PHYSICIAN ASSISTANT

## 2019-10-15 PROCEDURE — 99213 OFFICE O/P EST LOW 20 MIN: CPT | Performed by: PHYSICIAN ASSISTANT

## 2019-10-15 NOTE — PROGRESS NOTES
Assessment and Plan:     Problem List Items Addressed This Visit        Other    Medicare annual wellness visit, initial - Primary           Preventive health issues were discussed with patient, and age appropriate screening tests were ordered as noted in patient's After Visit Summary  Personalized health advice and appropriate referrals for health education or preventive services given if needed, as noted in patient's After Visit Summary       History of Present Illness:     Patient presents for Medicare Annual Wellness visit    Patient Care Team:  Dhruv Echavarria as PCP - General (Nurse Practitioner)     Problem List:     Patient Active Problem List   Diagnosis    Abnormal CT scan    Sick sinus syndrome ([facility] [address] [facility]    HSV-1 infection    Status post carpal tunnel release    Fibrocystic breast disease    Mixed hyperlipidemia    Knee osteoarthritis    Lichen sclerosus    Menopausal and postmenopausal disorder    Neck pain    Candida infection of flexural skin    Grade 1 out of 6 intensity murmur    Encounter for screening mammogram for malignant neoplasm of breast    History of DVT (deep vein thrombosis)    Iron deficiency anemia    Vitamin deficiency    Medicare annual wellness visit, initial      Past Medical and Surgical History:     Past Medical History:   Diagnosis Date    Carpal tunnel syndrome of right wrist     Chest pain     Depressive disorder 2014    Diabetes ([facility] [address] [facility] 2014    DVT (deep venous thrombosis) ([facility] [address] )     Esophageal reflux 2011    Essential hypertension 2014    Grade 1 out of 6 intensity murmur 2019    Hypothyroid 2014    Iron deficiency anemia 10/15/2019    Knee osteoarthritis 2014    Pacemaker 2018    Trigger finger, left      Past Surgical History:   Procedure Laterality Date    CARDIAC PACEMAKER PLACEMENT  2018    CERVICAL DISC SURGERY  2009    PLATES & SCREWS     SECTION      X2    GASTRIC BYPASS 02/26/2017    LAP RINYGB SURGERY    KNEE SURGERY Bilateral     OTHER SURGICAL HISTORY      ARM SURGERIES    GA INCISE FINGER TENDON SHEATH Left 9/18/2018    Procedure: LONG TRIGGER FINGER RELEASE;  Surgeon: Tapan Garcia MD;  [facility];  Service: Orthopedics    [place] WRIST Theresa Reek LIG Right 9/18/2018    Procedure: ENDOSCOPIC CARPAL TUNNEL RELEASE;  Surgeon: Tapan Garcia MD;  [facility];  Service: Orthopedics    TONSILLECTOMY        Family History:     Family History   Problem Relation Age of Onset    Stroke Mother     Alzheimer's disease Mother     Arthritis Mother     Coronary artery disease Mother     Breast cancer Mother 77    Cancer Mother     Heart disease Mother     Hypertension Father     Gout Father     Diabetes type II Father     Coronary artery disease Father     Heart disease Father     No Known Problems Sister     No Known Problems Daughter     No Known Problems Maternal Grandmother     No Known Problems Maternal Grandfather     No Known Problems Paternal Grandmother     No Known Problems Paternal Grandfather     Prostate cancer Brother     No Known Problems Sister     No Known Problems Sister     No Known Problems Maternal Aunt     No Known Problems Maternal Aunt     No Known Problems Maternal Aunt     No Known Problems Maternal Aunt     Cancer Maternal Aunt     No Known Problems Paternal Aunt     No Known Problems Paternal Aunt       Social History:     Social History     Socioeconomic History    Marital status: /Civil Union     Spouse name: None    Number of children: None    Years of education: None    Highest education level: None   Occupational History    None   Social Needs    Financial resource strain: None    Food insecurity:     Worry: None     Inability: None    Transportation needs:     Medical: None     Non-medical: None   Tobacco Use    Smoking status: Former Smoker     Packs/day: 0 25     Types: Cigarettes    Smokeless tobacco: Never Used    Tobacco comment: NO PASSIVE SMOKE    Substance and Sexual Activity    Alcohol use: No     Alcohol/week: 2 0 standard drinks     Types: 1 Glasses of wine, 1 Standard drinks or equivalent per week     Comment: very rare social    Drug use: No    Sexual activity: Not Currently   Lifestyle    Physical activity:     Days per week: None     Minutes per session: None    Stress: None   Relationships    Social connections:     Talks on phone: None     Gets together: None     Attends Restorationist service: None     Active member of club or organization: None     Attends meetings of clubs or organizations: None     Relationship status: None    Intimate partner violence:     Fear of current or ex partner: None     Emotionally abused: None     Physically abused: None     Forced sexual activity: None   Other Topics Concern    None   Social History Narrative    None       Medications and Allergies:     Current Outpatient Medications   Medication Sig Dispense Refill    Ascorbic Acid (VITAMIN C) 100 MG CHEW Chew      atorvastatin (LIPITOR) 10 mg tablet take 1 tablet by mouth once daily 90 tablet 2    B Complex Vitamins (VITAMIN B COMPLEX PO) Take 1 capsule by mouth      Biotin 10 MG TABS Take by mouth      cholecalciferol (VITAMIN D3) 1,000 units tablet Take 1,000 Units by mouth 2 (two) times a day      clobetasol (TEMOVATE) 0 05 % cream Apply to affected areas daily x 12 weeks, then twice weekly for maintenance      ELIQUIS 2 5 MG Take 1 tablet (2 5 mg total) by mouth 2 (two) times a day 60 tablet 0    ferrous sulfate 325 (65 Fe) mg tablet Take 325 mg by mouth daily with breakfast      fluticasone (FLONASE) 50 mcg/act nasal spray 2 sprays into each nostril daily (Patient taking differently: 2 sprays into each nostril as needed ) 16 g 0    LYSINE PO Take by mouth      Multiple Vitamin (MULTIVITAMIN) capsule Take 1 capsule by mouth daily      nystatin (MYCOSTATIN) powder Apply topically 2 (two) times a day (Patient taking differently: Apply topically as needed ) 30 g 1    valACYclovir (VALTREX) 500 mg tablet Take 1 tablet (500 mg total) by mouth 2 (two) times a day for 10 days (Patient not taking: Reported on 7/15/2019) 20 tablet 1     No current facility-administered medications for this visit  No Known Allergies   Immunizations:     Immunization History   Administered Date(s) Administered    Hep A, adult 08/08/2017    Hep A, ped/adol, 2 dose 01/04/2017    Hepatitis A 01/04/2017    INFLUENZA 10/05/2011, 11/22/2016, 11/22/2016, 10/23/2017, 10/23/2017    Influenza TIV (IM) 12/22/2014, 10/13/2015    Influenza, recombinant, quadrivalent,injectable, preservative free 10/31/2018    Pneumococcal Polysaccharide PPV23 02/25/2013, 01/17/2016    Tdap 04/21/2011, 04/10/2017    Zoster 01/17/2016      Health Maintenance:         Topic Date Due    Cervical Cancer Screening  05/24/1980    CRC Screening: FOBTx3/FIT  03/18/2020    MAMMOGRAM  07/13/2020    Hepatitis C Screening  Completed         Topic Date Due    HEPATITIS B VACCINES (1 of 3 - Risk 3-dose series) 05/24/1978    INFLUENZA VACCINE  07/01/2019      Medicare Health Risk Assessment:     /80 (BP Location: Left arm, Patient Position: Sitting, Cuff Size: Large)   Pulse 80   Temp 97 9 °F (36 6 °C) (Tympanic)   Resp 16   Ht 5' 4" (1 626 m)   Wt 79 7 kg (175 lb 9 6 oz)   LMP 09/18/2014   BMI 30 14 kg/m²      Edy Tovar is here for her Initial Wellness visit  Health Risk Assessment:   Patient rates overall health as fair  Patient feels that their physical health rating is much better  Eyesight was rated as same  Hearing was rated as same  Patient feels that their emotional and mental health rating is much better  Pain experienced in the last 7 days has been none  Patient states that she has experienced no weight loss or gain in last 6 months       Depression Screening:   PHQ-2 Score: 0  PHQ-9 Score: 0 Fall Risk Screening: In the past year, patient has experienced: no history of falling in past year      Urinary Incontinence Screening:   Patient has not leaked urine accidently in the last six months  Home Safety:  Patient does not have trouble with stairs inside or outside of their home  Patient has working smoke alarms and has no working carbon monoxide detector  Home safety hazards include: none  Nutrition:   Current diet is Regular  Medications:   Patient is currently taking over-the-counter supplements  OTC medications include: see medication list  Patient is able to manage medications  Activities of Daily Living (ADLs)/Instrumental Activities of Daily Living (IADLs):   Walk and transfer into and out of bed and chair?: Yes  Dress and groom yourself?: Yes    Bathe or shower yourself?: Yes    Feed yourself?  Yes  Do your laundry/housekeeping?: Yes  Manage your money, pay your bills and track your expenses?: Yes  Make your own meals?: Yes    Do your own shopping?: Yes    Previous Hospitalizations:   Any hospitalizations or ED visits within the last 12 months?: No      Advance Care Planning:   Living will: No    Durable POA for healthcare: No    Advanced directive: No    Advanced directive counseling given: Yes    Five wishes given: Yes      PREVENTIVE SCREENINGS      Cardiovascular Screening:    General: Screening Not Indicated and History Lipid Disorder      Diabetes Screening:     General: Screening Not Indicated and History Diabetes      Colorectal Cancer Screening:     General: Screening Current      Breast Cancer Screening:     General: Screening Current      Cervical Cancer Screening:    General: Risks and Benefits Discussed      Osteoporosis Screening:    General: Screening Not Indicated      Abdominal Aortic Aneurysm (AAA) Screening:        General: Screening Not Indicated      Lung Cancer Screening:     General: Screening Not Indicated      Hepatitis C Screening:    General: Screening Current      Samantha Medina PA-C

## 2019-10-15 NOTE — PROGRESS NOTES
Assessment/Plan:    Medicare wellness has been done as a separate visit    -ultrasound of upper arm mass ordered  -further treatment pending ultrasound results  Recommended a general surgeon evaluation but patient prefers to wait until the ultrasound is completed  -flu vaccine today  -5 wishes given    M*Bridestory software was used to dictate this note  It may contain errors with dictating incorrect words/spelling  Please contact provider directly for any questions  Diagnoses and all orders for this visit:    Medicare annual wellness visit, initial    Mass of soft tissue of right upper extremity  -     US head neck soft tissue; Future          Subjective:      Patient ID: Dagoberto Leyden is a 60 y o  female  Patient presents today for evaluation of a lump of her right upper arm that she has had over the last several months  She does notice some pain when she rolls on that side  She states that she had similar lumps on the same arm that have been removed  She states that they were fatty tissue tumors  She is unsure of the surgeon who removed the tumors  The following portions of the patient's history were reviewed and updated as appropriate:   She  has a past medical history of Carpal tunnel syndrome of right wrist, Chest pain, Depressive disorder (12/2/2014), Diabetes ([facility] [address] ) (12/2/2014), DVT (deep venous thrombosis) ([facility] [address] ) (2012), Esophageal reflux (5/23/2011), Essential hypertension (12/2/2014), Grade 1 out of 6 intensity murmur (6/24/2019), Hypothyroid (12/2/2014), Iron deficiency anemia (10/15/2019), Knee osteoarthritis (12/2/2014), Pacemaker (11/01/2018), and Trigger finger, left    She   Patient Active Problem List    Diagnosis Date Noted    History of DVT (deep vein thrombosis) 10/15/2019    Iron deficiency anemia 10/15/2019    Vitamin deficiency 10/15/2019    Medicare annual wellness visit, initial 10/15/2019    Mass of soft tissue of right upper extremity 10/15/2019    Grade 1 out of 6 intensity murmur 2019    Encounter for screening mammogram for malignant neoplasm of breast 2019    Candida infection of flexural skin 2019    Status post carpal tunnel release 2019    HSV-1 infection 2018    Sick sinus syndrome ([facility] [address] ) 2018    Abnormal CT scan     Lichen sclerosus     Fibrocystic breast disease 2014    Mixed hyperlipidemia 2014    Knee osteoarthritis 2014    Menopausal and postmenopausal disorder 2014    Neck pain 2014     She  has a past surgical history that includes Knee surgery (Bilateral);  section; Cervical disc surgery (2009); Tonsillectomy; Other surgical history; Gastric bypass (2017); pr wrist arthroscop,release xvers lig (Right, 2018); pr incise finger tendon sheath (Left, 2018); and Cardiac pacemaker placement (2018)  Her family history includes Alzheimer's disease in her mother; Arthritis in her mother; Breast cancer (age of onset: 77) in her mother; Cancer in her maternal aunt and mother; Coronary artery disease in her father and mother; Diabetes type II in her father; Gout in her father; Heart disease in her father and mother; Hypertension in her father; No Known Problems in her daughter, maternal aunt, maternal aunt, maternal aunt, maternal aunt, maternal grandfather, maternal grandmother, paternal aunt, paternal aunt, paternal grandfather, paternal grandmother, sister, sister, and sister; Prostate cancer in her brother; Stroke in her mother  She  reports that she has quit smoking  Her smoking use included cigarettes  She smoked 0 25 packs per day  She has never used smokeless tobacco  She reports that she does not drink alcohol or use drugs    Current Outpatient Medications   Medication Sig Dispense Refill    Ascorbic Acid (VITAMIN C) 100 MG CHEW Chew      atorvastatin (LIPITOR) 10 mg tablet take 1 tablet by mouth once daily 90 tablet 2    B Complex Vitamins (VITAMIN B COMPLEX PO) Take 1 capsule by mouth      Biotin 10 MG TABS Take by mouth      cholecalciferol (VITAMIN D3) 1,000 units tablet Take 1,000 Units by mouth 2 (two) times a day      clobetasol (TEMOVATE) 0 05 % cream Apply to affected areas daily x 12 weeks, then twice weekly for maintenance      ELIQUIS 2 5 MG Take 1 tablet (2 5 mg total) by mouth 2 (two) times a day 60 tablet 0    ferrous sulfate 325 (65 Fe) mg tablet Take 325 mg by mouth daily with breakfast      fluticasone (FLONASE) 50 mcg/act nasal spray 2 sprays into each nostril daily (Patient taking differently: 2 sprays into each nostril as needed ) 16 g 0    LYSINE PO Take by mouth      Multiple Vitamin (MULTIVITAMIN) capsule Take 1 capsule by mouth daily      nystatin (MYCOSTATIN) powder Apply topically 2 (two) times a day (Patient taking differently: Apply topically as needed ) 30 g 1    valACYclovir (VALTREX) 500 mg tablet Take 1 tablet (500 mg total) by mouth 2 (two) times a day for 10 days (Patient not taking: Reported on 7/15/2019) 20 tablet 1     No current facility-administered medications for this visit        Current Outpatient Medications on File Prior to Visit   Medication Sig    Ascorbic Acid (VITAMIN C) 100 MG CHEW Chew    atorvastatin (LIPITOR) 10 mg tablet take 1 tablet by mouth once daily    B Complex Vitamins (VITAMIN B COMPLEX PO) Take 1 capsule by mouth    Biotin 10 MG TABS Take by mouth    cholecalciferol (VITAMIN D3) 1,000 units tablet Take 1,000 Units by mouth 2 (two) times a day    clobetasol (TEMOVATE) 0 05 % cream Apply to affected areas daily x 12 weeks, then twice weekly for maintenance    ELIQUIS 2 5 MG Take 1 tablet (2 5 mg total) by mouth 2 (two) times a day    ferrous sulfate 325 (65 Fe) mg tablet Take 325 mg by mouth daily with breakfast    fluticasone (FLONASE) 50 mcg/act nasal spray 2 sprays into each nostril daily (Patient taking differently: 2 sprays into each nostril as needed )    LYSINE PO Take by mouth    Multiple Vitamin (MULTIVITAMIN) capsule Take 1 capsule by mouth daily    nystatin (MYCOSTATIN) powder Apply topically 2 (two) times a day (Patient taking differently: Apply topically as needed )    valACYclovir (VALTREX) 500 mg tablet Take 1 tablet (500 mg total) by mouth 2 (two) times a day for 10 days (Patient not taking: Reported on 7/15/2019)    [DISCONTINUED] omeprazole (PriLOSEC) 40 MG capsule Take 1 capsule (40 mg total) by mouth daily     No current facility-administered medications on file prior to visit  She has No Known Allergies       Review of Systems   Skin:        As stated in HPI         Objective:      /80 (BP Location: Left arm, Patient Position: Sitting, Cuff Size: Large)   Pulse 80   Temp 97 9 °F (36 6 °C) (Tympanic)   Resp 16   Ht 5' 4" (1 626 m)   Wt 79 7 kg (175 lb 9 6 oz)   LMP 09/18/2014   BMI 30 14 kg/m²          Physical Exam   Constitutional: She appears well-developed and well-nourished  No distress  HENT:   Head: Normocephalic and atraumatic  Right Ear: External ear normal    Left Ear: External ear normal    Mouth/Throat: Oropharynx is clear and moist    Neck: Neck supple  No thyromegaly present  Cardiovascular: Normal rate, regular rhythm and normal heart sounds  Exam reveals no gallop and no friction rub  No murmur heard  Pulmonary/Chest: Effort normal and breath sounds normal  No respiratory distress  She has no wheezes  She has no rales  Abdominal: Soft  Bowel sounds are normal  She exhibits no mass  There is no tenderness  Musculoskeletal: She exhibits no edema or deformity  Lymphadenopathy:     She has no cervical adenopathy  Neurological: She is alert  Skin: Skin is warm  Right upper arm:  She does have a palpable subcutaneous lesion which is about 1 cm or larger which is mildly tender of the posterior upper arm   Psychiatric: She has a normal mood and affect

## 2019-10-29 ENCOUNTER — OFFICE VISIT (OUTPATIENT)
Dept: CARDIOLOGY CLINIC | Facility: CLINIC | Age: 59
End: 2019-10-29
Payer: COMMERCIAL

## 2019-10-29 VITALS
BODY MASS INDEX: 29.88 KG/M2 | DIASTOLIC BLOOD PRESSURE: 74 MMHG | SYSTOLIC BLOOD PRESSURE: 108 MMHG | HEART RATE: 80 BPM | WEIGHT: 175 LBS | HEIGHT: 64 IN

## 2019-10-29 DIAGNOSIS — I49.5 SICK SINUS SYNDROME (HCC): Primary | ICD-10-CM

## 2019-10-29 PROCEDURE — 99214 OFFICE O/P EST MOD 30 MIN: CPT | Performed by: INTERNAL MEDICINE

## 2019-10-29 NOTE — PROGRESS NOTES
Cardiology Follow Up    Matthew Rush  1960  [id number]  [address] [facility] [place] [id number] [facility] [address] [phone]    1  Sick sinus syndrome (HCC)         Discussion/Summary:    1  Sick sinus syndrome: PPM in place, normal device function  2  HTN: BP well controlled    3  Chest pain, previously noted  Negative ischemic evaluation with cardiac cath at [facility] and nuclear stress test in August 2019 with us  Previous History:  Sick sinus syndrome  Syncope with long pauses discovered in October, 2018  She had a pacemaker placed  Other history includes DVT for which she is on Eliquis  Gastric bypass previously  Previously, had atypical chest pain  Ultimately had cardiac catheterization at [facility], only mild plaque was seen, no obstructive lesions  Interval History:  Returns for regular follow-up  She is without significant complaints today  Her daughter and  are present at the visit today  Continues to have easy bruising with the Eliquis for her DVT, but no major bleeding episodes  Has not had any recent chest pain  Gets occasionally dizzy or lightheaded when she bends over and stands up, but no syncope  Recent device interrogated heard from the end of September was reviewed, and showed no significant arrhythmias and normal device function        Problem List     Deep vein thrombosis (DVT) of lower extremity (HCC)    Trigger middle finger of left hand    Carpal tunnel syndrome on right    Abnormal CT scan    Syncope    Sick sinus syndrome (HCC)    Leukocytosis        Past Medical History:   Diagnosis Date    Carpal tunnel syndrome of right wrist     Chest pain     Depressive disorder 12/2/2014    Diabetes ([facility] [address] ) 12/2/2014    DVT (deep venous thrombosis) ([facility] [address] ) 2012    Esophageal reflux 5/23/2011    Essential hypertension 12/2/2014    Grade 1 out of 6 intensity murmur 2019    Hypothyroid 2014    Iron deficiency anemia 10/15/2019    Knee osteoarthritis 2014    Pacemaker 2018    Trigger finger, left      Social History     Tobacco Use    Smoking status: Former Smoker     Packs/day: 0 25     Types: Cigarettes    Smokeless tobacco: Never Used    Tobacco comment: NO PASSIVE SMOKE    Substance Use Topics    Alcohol use: No     Alcohol/week: 2 0 standard drinks     Types: 1 Glasses of wine, 1 Standard drinks or equivalent per week     Comment: very rare social     Family History   Problem Relation Age of Onset    Stroke Mother     Alzheimer's disease Mother     Arthritis Mother     Coronary artery disease Mother     Breast cancer Mother 77    Cancer Mother     Heart disease Mother     Hypertension Father     Gout Father     Diabetes type II Father     Coronary artery disease Father     Heart disease Father     No Known Problems Sister     No Known Problems Daughter     No Known Problems Maternal Grandmother     No Known Problems Maternal Grandfather     No Known Problems Paternal Grandmother     No Known Problems Paternal Grandfather     Prostate cancer Brother     No Known Problems Sister     No Known Problems Sister     No Known Problems Maternal Aunt     No Known Problems Maternal Aunt     No Known Problems Maternal Aunt     No Known Problems Maternal Aunt     Cancer Maternal Aunt     No Known Problems Paternal Aunt     No Known Problems Paternal Aunt      Past Surgical History:   Procedure Laterality Date    CARDIAC PACEMAKER PLACEMENT  2018    CERVICAL [address] SURGERY  2009    PLATES & SCREWS     SECTION      X2    GASTRIC BYPASS  2017    LAP RINYGB SURGERY    KNEE SURGERY Bilateral     OTHER SURGICAL HISTORY      ARM SURGERIES    IA INCISE FINGER TENDON SHEATH Left 2018    Procedure: LONG TRIGGER FINGER RELEASE;  Surgeon: Amadna Saba MD;  Location: [facility] OR;  Service: Orthopedics    PA WRIST [facility] LIG Right 9/18/2018    Procedure: ENDOSCOPIC CARPAL TUNNEL RELEASE;  Surgeon: Aditi Esquivel MD;  Location: [facility];  Service: Orthopedics    TONSILLECTOMY         Current Outpatient Medications:     Ascorbic Acid (VITAMIN C) 100 MG CHEW, Chew, Disp: , Rfl:     atorvastatin (LIPITOR) 10 mg tablet, take 1 tablet by mouth once daily, Disp: 90 tablet, Rfl: 2    B Complex Vitamins (VITAMIN B COMPLEX PO), Take 1 capsule by mouth, Disp: , Rfl:     Biotin 10 MG TABS, Take by mouth, Disp: , Rfl:     cholecalciferol (VITAMIN D3) 1,000 units tablet, Take 1,000 Units by mouth 2 (two) times a day, Disp: , Rfl:     clobetasol (TEMOVATE) 0 05 % cream, Apply to affected areas daily x 12 weeks, then twice weekly for maintenance, Disp: , Rfl:     ELIQUIS 2 5 MG, Take 1 tablet (2 5 mg total) by mouth 2 (two) times a day, Disp: 60 tablet, Rfl: 0    ferrous sulfate 325 (65 Fe) mg tablet, Take 325 mg by mouth daily with breakfast, Disp: , Rfl:     LYSINE PO, Take by mouth, Disp: , Rfl:     Multiple Vitamin (MULTIVITAMIN) capsule, Take 1 capsule by mouth daily, Disp: , Rfl:     fluticasone (FLONASE) 50 mcg/act nasal spray, 2 sprays into each nostril daily (Patient not taking: Reported on 10/29/2019), Disp: 16 g, Rfl: 0    nystatin (MYCOSTATIN) powder, Apply topically 2 (two) times a day (Patient not taking: Reported on 10/29/2019), Disp: 30 g, Rfl: 1    valACYclovir (VALTREX) 500 mg tablet, Take 1 tablet (500 mg total) by mouth 2 (two) times a day for 10 days (Patient not taking: Reported on 7/15/2019), Disp: 20 tablet, Rfl: 1  No Known Allergies    Vitals:    10/29/19 1319   BP: 108/74   BP Location: Right arm   Patient Position: Sitting   Cuff Size: Adult   Pulse: 80   Weight: 79 4 kg (175 lb)   Height: 5' 4" (1 626 m)     Vitals:    10/29/19 1319   Weight: 79 4 kg (175 lb)      Height: 5' 4" (162 6 cm)   Body mass index is 30 04 kg/m²      Physical Exam:  GEN: Efrem Reaves appears well, alert and oriented x 3, pleasant and cooperative   HEENT: pupils equal, round, and reactive to light; extraocular muscles intact  NECK: supple, no carotid bruits   HEART: regular rhythm, normal S1 and S2, no murmurs, clicks, gallops or rubs   LUNGS: clear to auscultation bilaterally; no wheezes, rales, or rhonchi   ABDOMEN: normal bowel sounds, soft, no tenderness, no distention  EXTREMITIES: peripheral pulses normal; no clubbing, cyanosis, or edema  NEURO: no focal findings   SKIN: normal without suspicious lesions on exposed skin    ROS:  Positive for anxiety, joint pain, back pain, nausea, vomiting, palpitations  Chest pain  Except as noted in HPI, is otherwise reviewed in detail and a 12 point review of systems is negative  ROS reviewed and is unchanged    Labs:  Lab Results   Component Value Date    K 4 3 06/22/2019     (H) 06/22/2019    CREATININE 0 53 (L) 06/22/2019    BUN 19 06/22/2019    CO2 27 06/22/2019    ALT 35 06/22/2019    AST 30 06/22/2019    GLUF 92 05/14/2019    HGBA1C 5 6 05/14/2019    WBC 5 84 06/22/2019    HGB 12 4 06/22/2019    HCT 37 3 06/22/2019     06/22/2019       No results found for: CHOL  Lab Results   Component Value Date    LDLCALC 73 05/14/2019     Lab Results   Component Value Date    HDL 80 (H) 05/14/2019     Lab Results   Component Value Date    TRIG 92 05/14/2019     Testing:  Stress test 8/6/19:  MYOCARDIAL PERFUSION IMAGING:  The image quality was fair  Rotating projection images reveal mild breast attenuation, mild diaphragmatic attenuation, and mild subdiaphragmatic activity  The TID ratio was 1 06      PERFUSION DEFECTS:  -  There was a moderate-sized, mildly severe, fixed myocardial perfusion defect of the basal inferior wall likely due to diaphragm attenuation      GATED SPECT:  The calculated left ventricular ejection fraction was 67 %  There was no diagnostic evidence for left ventricular regional abnormality      SUMMARY:  -  Stress results:  There was no chest pain during stress  -  ECG conclusions: The stress ECG was equivocal for ischemia  -  Perfusion imaging: There was a moderate-sized, mildly severe, fixed myocardial perfusion defect of the basal inferior wall likely due to diaphragm attenuation   -  Gated SPECT: The calculated left ventricular ejection fraction was 67 %  There was no diagnostic evidence for left ventricular regional abnormality      IMPRESSIONS: Normal study after pharmacologic vasodilation without reproduction of symptoms  Myocardial perfusion imaging was normal at rest and with stress  Echo 11/1/18:  LEFT VENTRICLE: Size was normal  Systolic function was at the lower limits of normal  Ejection fraction was estimated to be 50 %  There were no regional wall motion abnormalities  Wall thickness was normal  There was no evidence of  concentric hypertrophy  DOPPLER: Left ventricular diastolic function parameters were normal      RIGHT VENTRICLE: The size was normal  Systolic function was normal  Wall thickness was normal      LEFT ATRIUM: Size was normal      RIGHT ATRIUM: Size was normal      MITRAL VALVE: Valve structure was normal  There was normal leaflet separation  DOPPLER: The transmitral velocity was within the normal range  There was no evidence for stenosis  There was mild regurgitation      AORTIC VALVE: The valve was trileaflet  Leaflets exhibited normal cuspal separation and sclerosis  DOPPLER: Transaortic velocity was within the normal range  There was no evidence for stenosis  There was no regurgitation      TRICUSPID VALVE: The valve structure was normal  There was normal leaflet separation  DOPPLER: The transtricuspid velocity was within the normal range  There was no evidence for stenosis  There was mild regurgitation  Pulmonary artery  systolic pressure was within the normal range      PULMONIC VALVE: Leaflets exhibited normal thickness, no calcification, and normal cuspal separation   DOPPLER: The transpulmonic velocity was within the normal range  There was no regurgitation      PERICARDIUM: There was no pericardial effusion  The pericardium was normal in appearance      AORTA: The root exhibited normal size

## 2019-11-08 DIAGNOSIS — I82.4Y9 DEEP VEIN THROMBOSIS (DVT) OF PROXIMAL LOWER EXTREMITY, UNSPECIFIED CHRONICITY, UNSPECIFIED LATERALITY (HCC): ICD-10-CM

## 2019-11-08 NOTE — TELEPHONE ENCOUNTER
PT needs a refill of Eliquis and would like the refill for 90 days instead of 30  She is a [facility] patient

## 2019-11-08 NOTE — TELEPHONE ENCOUNTER
Per Dr An Rodriguez we can give 90 day supply    I changed the order to 90day supply  Please review    Last seen 10/15/19 Was taking 5 mg X 2 & 7.5 mg X 5    Now 5 mg X 3, &  7.5. Mg  4,  Hold tonight  Lab  2 weeks.

## 2019-11-10 DIAGNOSIS — I82.4Y9 DEEP VEIN THROMBOSIS (DVT) OF PROXIMAL LOWER EXTREMITY, UNSPECIFIED CHRONICITY, UNSPECIFIED LATERALITY (HCC): ICD-10-CM

## 2019-11-10 RX ORDER — APIXABAN 2.5 MG/1
TABLET, FILM COATED ORAL
Qty: 60 TABLET | Refills: 2 | Status: SHIPPED | OUTPATIENT
Start: 2019-11-10 | End: 2020-04-13 | Stop reason: SDUPTHER

## 2019-11-11 RX ORDER — APIXABAN 2.5 MG/1
2.5 TABLET, FILM COATED ORAL 2 TIMES DAILY
Qty: 180 TABLET | Refills: 0 | Status: SHIPPED | OUTPATIENT
Start: 2019-11-11 | End: 2019-12-09

## 2019-11-11 NOTE — TELEPHONE ENCOUNTER
When I saw her in October was only from [facility] wellness in a lump on her arm  It was not for a routine visit regarding her chronic medical problems    Please have her schedule an appointment for a routine follow-up

## 2019-12-10 ENCOUNTER — OFFICE VISIT (OUTPATIENT)
Dept: FAMILY MEDICINE CLINIC | Facility: CLINIC | Age: 59
End: 2019-12-10
Payer: COMMERCIAL

## 2019-12-10 VITALS
HEIGHT: 64 IN | SYSTOLIC BLOOD PRESSURE: 120 MMHG | HEART RATE: 83 BPM | DIASTOLIC BLOOD PRESSURE: 80 MMHG | RESPIRATION RATE: 16 BRPM | BODY MASS INDEX: 29.19 KG/M2 | WEIGHT: 171 LBS | OXYGEN SATURATION: 94 %

## 2019-12-10 DIAGNOSIS — M25.511 CHRONIC RIGHT SHOULDER PAIN: Primary | ICD-10-CM

## 2019-12-10 DIAGNOSIS — G89.29 CHRONIC RIGHT SHOULDER PAIN: Primary | ICD-10-CM
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
## 2019-12-10 PROCEDURE — 99213 OFFICE O/P EST LOW 20 MIN: CPT | Performed by: PHYSICIAN ASSISTANT

## 2019-12-10 NOTE — PROGRESS NOTES
Assessment/Plan: At the end of the visit when I was talking to her about the plan with proceeding with x-rays today along with scheduling physical therapy and referring to Orthopedic surgery she then mentioned about the fall at work  When I asked her the date of injury she states it was sometime in September but does not remember the exact date  In her phone on her calendar she did have September 19th was when she was seen by a [facility] provider for the work related injury  She does not have any [employer] information with her at this time  She states that she has not been talking to anyone at work or [employer] about the injury  She does not have a  at this time  She never received it deny letter  She just assumed that she could put this under her regular insurance  She wanted me to cancel this visit today after I evaluated her and started discussing the plan but explained that I could not cancel the visit at this time  I did advise her that she needs to check with her company about continue treatment for the right shoulder if indeed it has been determined does work related  I did explain to her that she could go outside the system after 90 days but unfortunately she does not know the exact date of injury at this time  All she knows it was sometime in September and was seen on September 19th  I did recommend she apply ice 3 times daily for 10 minutes and continue Tylenol as needed in the meantime  M*Modal software was used to dictate this note  It may contain errors with dictating incorrect words/spelling  Please contact provider directly for any questions  Diagnoses and all orders for this visit:    Chronic right shoulder pain    Work related injury          Subjective:      Patient ID: Efrem Reaves is a 60 y o  female  Right-handed white female presents today with right shoulder pain she has had since September    She states that she fell on her right side at work  She has been having the pain ever since  She does not remember the exact date of injury  She does work at [employer]  She states that she saw a workman's Comp provider at [facility] on September 19th and was told that her shoulder was fine  She has not seen anyone since that time  She is having difficulty elevating her right arm  She has been taking Tylenol as needed  The following portions of the patient's history were reviewed and updated as appropriate:   She  has a past medical history of Carpal tunnel syndrome of right wrist, Chest pain, Depressive disorder (12/2/2014), Diabetes ([address] ) (12/2/2014), DVT (deep venous thrombosis) ([address] ) (2012), Esophageal reflux (5/23/2011), Essential hypertension (12/2/2014), Grade 1 out of 6 intensity murmur (6/24/2019), Hypothyroid (12/2/2014), Iron deficiency anemia (10/15/2019), Knee osteoarthritis (12/2/2014), Pacemaker (11/01/2018), and Trigger finger, left    She   Patient Active Problem List    Diagnosis Date Noted    Chronic right shoulder pain 12/10/2019    Work related injury 12/10/2019    History of DVT (deep vein thrombosis) 10/15/2019    Iron deficiency anemia 10/15/2019    Vitamin deficiency 10/15/2019    Medicare annual wellness visit, initial 10/15/2019    Mass of soft tissue of right upper extremity 10/15/2019    Grade 1 out of 6 intensity murmur 06/24/2019    Encounter for screening mammogram for malignant neoplasm of breast 06/24/2019    Candida infection of flexural skin 04/05/2019    Status post carpal tunnel release 01/07/2019    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    Sick sinus syndrome ([address] ) 11/02/2018    Abnormal CT scan 11/15/4682    Lichen sclerosus 83/19/8909    Fibrocystic breast disease 12/02/2014    Mixed hyperlipidemia 12/02/2014    Knee osteoarthritis 12/02/2014    Menopausal and postmenopausal disorder 12/02/2014    Neck pain 12/02/2014     She  has a past surgical history that includes Knee surgery (Bilateral);  section; Cervical disc surgery (2009); Tonsillectomy; Other surgical history; Gastric bypass (2017); pr wrist arthroscop,release xvers lig (Right, 2018); pr incise finger tendon sheath (Left, 2018); and Cardiac pacemaker placement (2018)  Her family history includes Alzheimer's disease in her mother; Arthritis in her mother; Breast cancer (age of onset: 77) in her mother; Cancer in her maternal aunt and mother; Coronary artery disease in her father and mother; Diabetes type II in her father; Gout in her father; Heart disease in her father and mother; Hypertension in her father; No Known Problems in her daughter, maternal aunt, maternal aunt, maternal aunt, maternal aunt, maternal grandfather, maternal grandmother, paternal aunt, paternal aunt, paternal grandfather, paternal grandmother, sister, sister, and sister; Prostate cancer in her brother; Stroke in her mother  She  reports that she has quit smoking  Her smoking use included cigarettes  She smoked 0 25 packs per day  She has never used smokeless tobacco  She reports that she does not drink alcohol or use drugs    Current Outpatient Medications   Medication Sig Dispense Refill    Ascorbic Acid (VITAMIN C) 100 MG CHEW Chew      atorvastatin (LIPITOR) 10 mg tablet take 1 tablet by mouth once daily 90 tablet 2    B Complex Vitamins (VITAMIN B COMPLEX PO) Take 1 capsule by mouth      Biotin 10 MG TABS Take by mouth      cholecalciferol (VITAMIN D3) 1,000 units tablet Take 1,000 Units by mouth 2 (two) times a day      clobetasol (TEMOVATE) 0 05 % cream Apply to affected areas daily x 12 weeks, then twice weekly for maintenance      ELIQUIS 2 5 MG TAKE 1 TABLET BY MOUTH TWICE DAILY 60 tablet 2    ferrous sulfate 325 (65 Fe) mg tablet Take 325 mg by mouth daily with breakfast      LYSINE PO Take by mouth      Multiple Vitamin (MULTIVITAMIN) capsule Take 1 capsule by mouth daily      fluticasone (FLONASE) 50 mcg/act nasal spray 2 sprays into each nostril daily (Patient not taking: Reported on 10/29/2019) 16 g 0    nystatin (MYCOSTATIN) powder Apply topically 2 (two) times a day (Patient not taking: Reported on 10/29/2019) 30 g 1    valACYclovir (VALTREX) 500 mg tablet Take 1 tablet (500 mg total) by mouth 2 (two) times a day for 10 days (Patient not taking: Reported on 7/15/2019) 20 tablet 1     No current facility-administered medications for this visit  Current Outpatient Medications on File Prior to Visit   Medication Sig    Ascorbic Acid (VITAMIN C) 100 MG CHEW Chew    atorvastatin (LIPITOR) 10 mg tablet take 1 tablet by mouth once daily    B Complex Vitamins (VITAMIN B COMPLEX PO) Take 1 capsule by mouth    Biotin 10 MG TABS Take by mouth    cholecalciferol (VITAMIN D3) 1,000 units tablet Take 1,000 Units by mouth 2 (two) times a day    clobetasol (TEMOVATE) 0 05 % cream Apply to affected areas daily x 12 weeks, then twice weekly for maintenance    ELIQUIS 2 5 MG TAKE 1 TABLET BY MOUTH TWICE DAILY    ferrous sulfate 325 (65 Fe) mg tablet Take 325 mg by mouth daily with breakfast    LYSINE PO Take by mouth    Multiple Vitamin (MULTIVITAMIN) capsule Take 1 capsule by mouth daily    fluticasone (FLONASE) 50 mcg/act nasal spray 2 sprays into each nostril daily (Patient not taking: Reported on 10/29/2019)    nystatin (MYCOSTATIN) powder Apply topically 2 (two) times a day (Patient not taking: Reported on 10/29/2019)    valACYclovir (VALTREX) 500 mg tablet Take 1 tablet (500 mg total) by mouth 2 (two) times a day for 10 days (Patient not taking: Reported on 7/15/2019)     No current facility-administered medications on file prior to visit  She has No Known Allergies       Review of Systems   Musculoskeletal:        As stated in HPI         Objective:      /80 (BP Location: Left arm, Patient Position: Sitting, Cuff Size: Large)   Pulse 83   Resp 16   Ht 5' 4" (1 626 m)   Wt 77 6 kg (171 lb)   LMP 09/18/2014   SpO2 94%   BMI 29 35 kg/m²          Physical Exam   Constitutional: She appears well-developed and well-nourished  No distress  HENT:   Head: Normocephalic and atraumatic  Musculoskeletal:   Right shoulder:  She does have tenderness at the subacromial region  She does have pain with elevation above 90°  Cervical spine:  No tenderness, she does have reduced rotation bilaterally but she states this is chronic because of previous neck surgery  Neurological: She is alert  Skin: Skin is warm  Psychiatric: She has a normal mood and affect

## 2019-12-11 ENCOUNTER — OFFICE VISIT (OUTPATIENT)
Dept: URGENT CARE | Facility: MEDICAL CENTER | Age: 59
End: 2019-12-11
Payer: COMMERCIAL

## 2019-12-11 ENCOUNTER — TELEPHONE (OUTPATIENT)
Dept: FAMILY MEDICINE CLINIC | Facility: CLINIC | Age: 59
End: 2019-12-11

## 2019-12-11 VITALS
WEIGHT: 171 LBS | TEMPERATURE: 97.8 F | HEART RATE: 79 BPM | HEIGHT: 64 IN | SYSTOLIC BLOOD PRESSURE: 112 MMHG | RESPIRATION RATE: 18 BRPM | OXYGEN SATURATION: 98 % | BODY MASS INDEX: 29.19 KG/M2 | DIASTOLIC BLOOD PRESSURE: 73 MMHG

## 2019-12-11 DIAGNOSIS — R42 DIZZINESS AND GIDDINESS: Primary | ICD-10-CM

## 2019-12-11 PROCEDURE — 99213 OFFICE O/P EST LOW 20 MIN: CPT | Performed by: PHYSICIAN ASSISTANT

## 2019-12-11 PROCEDURE — S9088 SERVICES PROVIDED IN URGENT: HCPCS | Performed by: PHYSICIAN ASSISTANT

## 2019-12-11 NOTE — PATIENT INSTRUCTIONS
Dizziness  Referred to ER for evaluation  Follow up with PCP in 3-5 days  61 y/o female with PMH of pacemaker implanted 1 year ago presents c/o dizziness and feeling like she is going to pass out  States symptoms started 2 hours ago  Denies SOB, nausea, vomiting  Proceed to  ER if symptoms worsen  Dizziness   WHAT YOU NEED TO KNOW:   Dizziness is a feeling of being off balance or unsteady  Common causes of dizziness are an inner ear fluid imbalance or a lack of oxygen in your blood  Dizziness may be acute (lasts 3 days or less) or chronic (lasts longer than 3 days)  You may have dizzy spells that last from seconds to a few hours  DISCHARGE INSTRUCTIONS:   Return to the emergency department if:   · You have a headache and a stiff neck  · You have shaking chills and a fever  · You vomit over and over with no relief  · Your vomit or bowel movements are red or black  · You have pain in your chest, back, or abdomen  · You have numbness, especially in your face, arms, or legs  · You have trouble moving your arms or legs  · You are confused  Contact your healthcare provider if:   · You have a fever  · Your symptoms do not get better with treatment  · You have questions or concerns about your condition or care  Manage your symptoms:   · Do not drive  or operate heavy machinery when you are dizzy  · Get up slowly  from sitting or lying down  · Drink plenty of liquids  Liquids help prevent dehydration  Ask how much liquid to drink each day and which liquids are best for you  Follow up with your healthcare provider as directed:  Write down your questions so you remember to ask them during your visits  © 2017 [address]  Information is for End User's use only and may not be sold, redistributed or otherwise used for commercial purposes   All illustrations and images included in CareNotes® are the copyrighted property of [employer]  or [employer]  The above information is an  only  It is not intended as medical advice for individual conditions or treatments  Talk to your doctor, nurse or pharmacist before following any medical regimen to see if it is safe and effective for you

## 2019-12-11 NOTE — PROGRESS NOTES
[address]        NAME: Valentino Ganja is a 60 y o  female  : 1960    MRN: [id number]  DATE: 2019  TIME: 5:40 PM    Assessment and Plan   Dizziness and giddiness [R42]  1  Dizziness and giddiness           Patient Instructions     Dizziness  Referred to ER for evaluation  Follow up with PCP in 3-5 days  Proceed to  ER if symptoms worsen  Chief Complaint     Chief Complaint   Patient presents with    Dizziness     Pt states she became dizzy, lightheaded, with chest pressure  beginning at 3:30pm today   Nausea    Loss of Consciousness         History of Present Illness       59 y/o female with PMH of pacemaker implanted 1 year ago presents c/o dizziness and feeling like she is going to pass out  States symptoms started 2 hours ago  Denies SOB, nausea, vomiting      Review of Systems   Review of Systems   Constitutional: Negative  HENT: Negative  Eyes: Negative  Respiratory: Negative  Negative for cough, chest tightness, shortness of breath, wheezing and stridor  Cardiovascular: Negative  Negative for chest pain, palpitations and leg swelling  Neurological: Positive for dizziness           Current Medications       Current Outpatient Medications:     Ascorbic Acid (VITAMIN C) 100 MG CHEW, Chew, Disp: , Rfl:     atorvastatin (LIPITOR) 10 mg tablet, take 1 tablet by mouth once daily, Disp: 90 tablet, Rfl: 2    B Complex Vitamins (VITAMIN B COMPLEX PO), Take 1 capsule by mouth, Disp: , Rfl:     Biotin 10 MG TABS, Take by mouth, Disp: , Rfl:     cholecalciferol (VITAMIN D3) 1,000 units tablet, Take 1,000 Units by mouth 2 (two) times a day, Disp: , Rfl:     clobetasol (TEMOVATE) 0 05 % cream, Apply to affected areas daily x 12 weeks, then twice weekly for maintenance, Disp: , Rfl:     ELIQUIS 2 5 MG, TAKE 1 TABLET BY MOUTH TWICE DAILY, Disp: 60 tablet, Rfl: 2    ferrous sulfate 325 (65 Fe) mg tablet, Take 325 mg by mouth daily with breakfast, Disp: , Rfl:    fluticasone (FLONASE) 50 mcg/act nasal spray, 2 sprays into each nostril daily, Disp: 16 g, Rfl: 0    Multiple Vitamin (MULTIVITAMIN) capsule, Take 1 capsule by mouth daily, Disp: , Rfl:     nystatin (MYCOSTATIN) powder, Apply topically 2 (two) times a day, Disp: 30 g, Rfl: 1    valACYclovir (VALTREX) 500 mg tablet, Take 1 tablet (500 mg total) by mouth 2 (two) times a day for 10 days, Disp: 20 tablet, Rfl: 1    LYSINE PO, Take by mouth, Disp: , Rfl:     Current Allergies     Allergies as of 2019    (No Known Allergies)            The following portions of the patient's history were reviewed and updated as appropriate: allergies, current medications, past family history, past medical history, past social history, past surgical history and problem list      Past Medical History:   Diagnosis Date    Carpal tunnel syndrome of right wrist     Chest pain     Depressive disorder 2014    Diabetes ([facility] ) 2014    DVT (deep venous thrombosis) ([facility] )     Esophageal reflux 2011    Essential hypertension 2014    Grade 1 out of 6 intensity murmur 2019    Hypothyroid 2014    Iron deficiency anemia 10/15/2019    Knee osteoarthritis 2014    Pacemaker 2018    Trigger finger, left        Past Surgical History:   Procedure Laterality Date    CARDIAC PACEMAKER PLACEMENT  2018    CERVICAL [address] SURGERY  2009    PLATES & SCREWS     SECTION      X2    GASTRIC BYPASS  2017    LAP RINYGB SURGERY    KNEE SURGERY Bilateral     OTHER SURGICAL HISTORY      ARM SURGERIES    CO INCISE FINGER TENDON SHEATH Left 2018    Procedure: LONG TRIGGER FINGER RELEASE;  Surgeon: Edd Funez MD;  Location: [facility];  Service: Orthopedics    CO WRIST Nicola Henle LIG Right 2018    Procedure: ENDOSCOPIC CARPAL TUNNEL RELEASE;  Surgeon: Edd Funez MD;  Location: [facility];  Service: Orthopedics    TONSILLECTOMY         [facility] History   Problem Relation Age of Onset    Stroke Mother     Alzheimer's disease Mother     Arthritis Mother     Coronary artery disease Mother     Breast cancer Mother 77    Cancer Mother     Heart disease Mother     Hypertension Father     Gout Father     Diabetes type II Father     Coronary artery disease Father     Heart disease Father     No Known Problems Sister     No Known Problems Daughter     No Known Problems Maternal Grandmother     No Known Problems Maternal Grandfather     No Known Problems Paternal Grandmother     No Known Problems Paternal Grandfather     Prostate cancer Brother     No Known Problems Sister     No Known Problems Sister     No Known Problems Maternal Aunt     No Known Problems Maternal Aunt     No Known Problems Maternal Aunt     No Known Problems Maternal Aunt     Cancer Maternal Aunt     No Known Problems Paternal Aunt     No Known Problems Paternal Aunt          Medications have been verified  Objective   /73   Pulse 79   Temp 97 8 °F (36 6 °C) (Temporal)   Resp 18   Ht 5' 4" (1 626 m)   Wt 77 6 kg (171 lb)   LMP 09/18/2014   SpO2 98%   BMI 29 35 kg/m²        Physical Exam     Physical Exam   Constitutional: She is oriented to person, place, and time  She appears well-developed and well-nourished  No distress  HENT:   Head: Normocephalic and atraumatic  Neck: Normal range of motion  Neck supple  Cardiovascular: Normal rate and regular rhythm  Pulmonary/Chest: Effort normal and breath sounds normal  No stridor  No respiratory distress  She has no wheezes  She has no rales  She exhibits no tenderness  Lymphadenopathy:     She has no cervical adenopathy  Neurological: She is alert and oriented to person, place, and time  Skin: She is not diaphoretic  Patient refusing to go to ER   Patient states she has had same symptoms and has gone to the ER and nothing is found and refuses to go  Patient advised on risks of not going to ER including death   Patient verbalized understanding and signed AMA

## 2019-12-11 NOTE — TELEPHONE ENCOUNTER
Pc from pt feels sweaty,dizzy, chest pain, feeling like she is going to pass out  Pt is driving advised pt to go to the ER

## 2019-12-13 ENCOUNTER — TELEPHONE (OUTPATIENT)
Dept: CARDIOLOGY CLINIC | Facility: CLINIC | Age: 59
End: 2019-12-13

## 2019-12-13 ENCOUNTER — OFFICE VISIT (OUTPATIENT)
Dept: FAMILY MEDICINE CLINIC | Facility: CLINIC | Age: 59
End: 2019-12-13
Payer: COMMERCIAL

## 2019-12-13 VITALS
BODY MASS INDEX: 29.19 KG/M2 | OXYGEN SATURATION: 98 % | DIASTOLIC BLOOD PRESSURE: 72 MMHG | HEART RATE: 83 BPM | WEIGHT: 171 LBS | HEIGHT: 64 IN | TEMPERATURE: 97.8 F | RESPIRATION RATE: 16 BRPM | SYSTOLIC BLOOD PRESSURE: 128 MMHG

## 2019-12-13 DIAGNOSIS — J06.9 ACUTE UPPER RESPIRATORY INFECTION: Primary | ICD-10-CM

## 2019-12-13 DIAGNOSIS — K29.00 ACUTE SUPERFICIAL GASTRITIS WITHOUT HEMORRHAGE: ICD-10-CM

## 2019-12-13 PROCEDURE — 3008F BODY MASS INDEX DOCD: CPT | Performed by: PHYSICIAN ASSISTANT

## 2019-12-13 PROCEDURE — 99214 OFFICE O/P EST MOD 30 MIN: CPT | Performed by: PHYSICIAN ASSISTANT

## 2019-12-13 RX ORDER — FAMOTIDINE 20 MG/1
20 TABLET, FILM COATED ORAL DAILY
Qty: 90 TABLET | Refills: 0 | Status: SHIPPED | OUTPATIENT
Start: 2019-12-13 | End: 2020-03-09

## 2019-12-13 NOTE — PROGRESS NOTES
Assessment/Plan:    Note from the urgent care has been reviewed    -she has been advised to call 911 with any recurrent chest pain  -start Famotidine 20 mg daily  -recommend generic Sudafed from behind the pharmacy counter 30 mg 2 tablets every 6 hours as needed for congestion  -recommend follow-up in 6 weeks, sooner if any symptoms increase    [employer] software was used to dictate this note  It may contain errors with dictating incorrect words/spelling  Please contact provider directly for any questions  Diagnoses and all orders for this visit:    Acute upper respiratory infection    Acute superficial gastritis without hemorrhage  -     famotidine (PEPCID) 20 mg tablet; Take 1 tablet (20 mg total) by mouth daily          Subjective:      Patient ID: Cory Castaneda is a 60 y o  female  Patient presents today for follow-up from a recent visit at the urgent care  She was having some chest pain and dizziness at that time  She was advised by the provider at the urgent care that she needs to be further evaluated in the emergency room but patient left against medical advice and refused  She states  it has resolved  She does have continued nasal congestion  She has noticed some upper abdominal pain but denies any vomiting  Denies any diarrhea or blood in her stools  She has no history of reflux  She has been taking Sudafed PE over-the-counter for her nasal congestion with minimal relief    She states her father in law passed away unexpectedly in [place] and she is really upset because she does not have a visa nor does her  to be able to go to the       The following portions of the patient's history were reviewed and updated as appropriate:   She  has a past medical history of Carpal tunnel syndrome of right wrist, Chest pain, Depressive disorder (2014), Diabetes ([facility] ) (2014), DVT (deep venous thrombosis) ([facility] ) (), Esophageal reflux (2011), Essential hypertension (2014), Grade 1 out of 6 intensity murmur (2019), Hypothyroid (2014), Iron deficiency anemia (10/15/2019), Knee osteoarthritis (2014), Pacemaker (2018), and Trigger finger, left  She   Patient Active Problem List    Diagnosis Date Noted    Acute upper respiratory infection 2019    Acute superficial gastritis without hemorrhage 2019    Chronic right shoulder pain 12/10/2019    Work related injury 12/10/2019    History of DVT (deep vein thrombosis) 10/15/2019    Iron deficiency anemia 10/15/2019    Vitamin deficiency 10/15/2019    Medicare annual wellness visit, initial 10/15/2019    Mass of soft tissue of right upper extremity 10/15/2019    Grade 1 out of 6 intensity murmur 2019    Encounter for screening mammogram for malignant neoplasm of breast 2019    Candida infection of flexural skin 2019    Status post carpal tunnel release 2019    HSV-1 infection 2018    S[facility] sinus syndrome [facility] ) 2018    Abnormal CT scan     Lichen sclerosus     Fibrocystic breast disease 2014    Mixed hyperlipidemia 2014    Knee osteoarthritis 2014    Menopausal and postmenopausal disorder 2014    Neck pain 2014     She  has a past surgical history that includes Knee surgery (Bilateral);  section; Cervical disc surgery (2009); Tonsillectomy; Other surgical history; Gastric bypass (2017); pr wrist arthroscop,release xvers lig (Right, 2018); pr incise finger tendon sheath (Left, 2018); and Cardiac pacemaker placement (2018)    Her family history includes Alzheimer's disease in her mother; Arthritis in her mother; Breast cancer (age of onset: 77) in her mother; Cancer in her maternal aunt and mother; Coronary artery disease in her father and mother; Diabetes type II in her father; Gout in her father; Heart disease in her father and mother; Hypertension in her father; No Known Problems in her daughter, maternal aunt, maternal aunt, maternal aunt, maternal aunt, maternal grandfather, maternal grandmother, paternal aunt, paternal aunt, paternal grandfather, paternal grandmother, sister, sister, and sister; Prostate cancer in her brother; Stroke in her mother  She  reports that she has quit smoking  Her smoking use included cigarettes  She smoked 0 25 packs per day  She has never used smokeless tobacco  She reports that she does not drink alcohol or use drugs  Current Outpatient Medications   Medication Sig Dispense Refill    Ascorbic Acid (VITAMIN C) 100 MG CHEW Chew      atorvastatin (LIPITOR) 10 mg tablet take 1 tablet by mouth once daily 90 tablet 2    B Complex Vitamins (VITAMIN B COMPLEX PO) Take 1 capsule by mouth      Biotin 10 MG TABS Take by mouth      cholecalciferol (VITAMIN D3) 1,000 units tablet Take 1,000 Units by mouth 2 (two) times a day      clobetasol (TEMOVATE) 0 05 % cream Apply to affected areas daily x 12 weeks, then twice weekly for maintenance      ELIQUIS 2 5 MG TAKE 1 TABLET BY MOUTH TWICE DAILY 60 tablet 2    ferrous sulfate 325 (65 Fe) mg tablet Take 325 mg by mouth daily with breakfast      fluticasone (FLONASE) 50 mcg/act nasal spray 2 sprays into each nostril daily 16 g 0    LYSINE PO Take by mouth      Multiple Vitamin (MULTIVITAMIN) capsule Take 1 capsule by mouth daily      nystatin (MYCOSTATIN) powder Apply topically 2 (two) times a day 30 g 1    famotidine (PEPCID) 20 mg tablet Take 1 tablet (20 mg total) by mouth daily 90 tablet 0    valACYclovir (VALTREX) 500 mg tablet Take 1 tablet (500 mg total) by mouth 2 (two) times a day for 10 days 20 tablet 1     No current facility-administered medications for this visit        Current Outpatient Medications on File Prior to Visit   Medication Sig    Ascorbic Acid (VITAMIN C) 100 MG CHEW Chew    atorvastatin (LIPITOR) 10 mg tablet take 1 tablet by mouth once daily    B Complex Vitamins (VITAMIN B COMPLEX PO) Take 1 capsule by mouth    Biotin 10 MG TABS Take by mouth    cholecalciferol (VITAMIN D3) 1,000 units tablet Take 1,000 Units by mouth 2 (two) times a day    clobetasol (TEMOVATE) 0 05 % cream Apply to affected areas daily x 12 weeks, then twice weekly for maintenance    ELIQUIS 2 5 MG TAKE 1 TABLET BY MOUTH TWICE DAILY    ferrous sulfate 325 (65 Fe) mg tablet Take 325 mg by mouth daily with breakfast    fluticasone (FLONASE) 50 mcg/act nasal spray 2 sprays into each nostril daily    LYSINE PO Take by mouth    Multiple Vitamin (MULTIVITAMIN) capsule Take 1 capsule by mouth daily    nystatin (MYCOSTATIN) powder Apply topically 2 (two) times a day    valACYclovir (VALTREX) 500 mg tablet Take 1 tablet (500 mg total) by mouth 2 (two) times a day for 10 days     No current facility-administered medications on file prior to visit  She has No Known Allergies       Review of Systems   Constitutional: Negative for chills and fever  HENT: Positive for congestion  Respiratory: Negative for cough  Cardiovascular: Negative for chest pain  Gastrointestinal: Positive for abdominal pain and nausea  Negative for diarrhea and vomiting  Objective:      /72 (BP Location: Left arm, Patient Position: Sitting, Cuff Size: Standard)   Pulse 83   Temp 97 8 °F (36 6 °C) (Tympanic)   Resp 16   Ht 5' 4" (1 626 m)   Wt 77 6 kg (171 lb)   LMP 09/18/2014   SpO2 98%   BMI 29 35 kg/m²          Physical Exam   Constitutional: She appears well-developed and well-nourished  No distress  HENT:   Head: Normocephalic and atraumatic  Right Ear: External ear normal    Left Ear: External ear normal    Mouth/Throat: Oropharynx is clear and moist  No oropharyngeal exudate  Neck: Neck supple  Cardiovascular: Normal rate, regular rhythm and normal heart sounds  No murmur heard  Pulmonary/Chest: Effort normal and breath sounds normal  No respiratory distress   She has no wheezes  She has no rales  Abdominal: Soft  Bowel sounds are normal  There is tenderness (She does have mild tenderness in the epigastric region  )  Lymphadenopathy:     She has no cervical adenopathy  Neurological: She is alert  Skin: Skin is warm

## 2019-12-13 NOTE — TELEPHONE ENCOUNTER
Pt notified  I asked her to call if she develops any rapid heart rates, and to send a transmission if this happens  She is going to take this every 6 hours, but only as long as she needs it  Thanks

## 2019-12-13 NOTE — TELEPHONE ENCOUNTER
Pt called - she has symptoms of sinus drainage,  and was diagnosed with an upper respiratory infection  Her PCP prescribed Sudafed and pt is asking if this is OK to take  She has a Pacemaker for SSS- last [facility] transmission was 6/28/19 which revealed no significant high rate episodes, and she is due again 1/2/2020 for another remote check  Thank you Dr Darlyn Walker

## 2019-12-24 ENCOUNTER — HOSPITAL ENCOUNTER (OUTPATIENT)
Dept: RADIOLOGY | Facility: IMAGING CENTER | Age: 59
Discharge: HOME | End: 2019-12-24
Payer: COMMERCIAL

## 2019-12-24 ENCOUNTER — TELEPHONE (OUTPATIENT)
Dept: FAMILY MEDICINE CLINIC | Facility: CLINIC | Age: 59
End: 2019-12-24

## 2019-12-24 DIAGNOSIS — M79.89 MASS OF SOFT TISSUE OF RIGHT UPPER EXTREMITY: ICD-10-CM

## 2019-12-24 DIAGNOSIS — M79.89 MASS OF SOFT TISSUE OF RIGHT UPPER EXTREMITY: Primary | ICD-10-CM

## 2019-12-24 PROCEDURE — 76882 US LMTD JT/FCL EVL NVASC XTR: CPT

## 2019-12-24 NOTE — TELEPHONE ENCOUNTER
Vanessa from [address] [place] [address] called stating the order for the US schedule today 12/24/19 should be US extremity soft tissue     I put new order in with same DX code

## 2019-12-26 ENCOUNTER — TELEPHONE (OUTPATIENT)
Dept: FAMILY MEDICINE CLINIC | Facility: CLINIC | Age: 59
End: 2019-12-26

## 2019-12-26 NOTE — TELEPHONE ENCOUNTER
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

## 2019-12-26 NOTE — TELEPHONE ENCOUNTER
When I spoke to pt she would like to know if there is anything else she can take  She was taking the generic sudafed and her heart started racing and her cardiologist told her to stop it  She is coming in on Thursday 1/2/20 for you to check her arm and for her cold symptoms that are getting worse

## 2019-12-27 ENCOUNTER — OFFICE VISIT (OUTPATIENT)
Dept: URGENT CARE | Age: 59
End: 2019-12-27
Payer: COMMERCIAL

## 2019-12-27 VITALS
WEIGHT: 172.6 LBS | HEIGHT: 63 IN | RESPIRATION RATE: 18 BRPM | OXYGEN SATURATION: 98 % | HEART RATE: 81 BPM | TEMPERATURE: 97 F | DIASTOLIC BLOOD PRESSURE: 70 MMHG | SYSTOLIC BLOOD PRESSURE: 118 MMHG | BODY MASS INDEX: 30.58 KG/M2

## 2019-12-27 DIAGNOSIS — J06.9 ACUTE UPPER RESPIRATORY INFECTION: Primary | ICD-10-CM

## 2019-12-27 DIAGNOSIS — J01.40 ACUTE NON-RECURRENT PANSINUSITIS: ICD-10-CM

## 2019-12-27 DIAGNOSIS — N39.0 ACUTE URINARY TRACT INFECTION: ICD-10-CM

## 2019-12-27 DIAGNOSIS — N30.01 ACUTE CYSTITIS WITH HEMATURIA: ICD-10-CM

## 2019-12-27 LAB
SL AMB  POCT GLUCOSE, UA: ABNORMAL
SL AMB LEUKOCYTE ESTERASE,UA: ABNORMAL
SL AMB POCT BILIRUBIN,UA: ABNORMAL
SL AMB POCT BLOOD,UA: ABNORMAL
SL AMB POCT CLARITY,UA: CLEAR
SL AMB POCT COLOR,UA: YELLOW
SL AMB POCT KETONES,UA: 15
SL AMB POCT NITRITE,UA: ABNORMAL
SL AMB POCT PH,UA: 5
SL AMB POCT SPECIFIC GRAVITY,UA: 1.02
SL AMB POCT URINE PROTEIN: ABNORMAL
SL AMB POCT UROBILINOGEN: 0.2

## 2019-12-27 PROCEDURE — 87086 URINE CULTURE/COLONY COUNT: CPT | Performed by: FAMILY MEDICINE

## 2019-12-27 PROCEDURE — 99213 OFFICE O/P EST LOW 20 MIN: CPT | Performed by: FAMILY MEDICINE

## 2019-12-27 PROCEDURE — 81002 URINALYSIS NONAUTO W/O SCOPE: CPT | Performed by: FAMILY MEDICINE

## 2019-12-27 PROCEDURE — 87077 CULTURE AEROBIC IDENTIFY: CPT | Performed by: FAMILY MEDICINE

## 2019-12-27 PROCEDURE — S9088 SERVICES PROVIDED IN URGENT: HCPCS | Performed by: FAMILY MEDICINE

## 2019-12-27 RX ORDER — SULFAMETHOXAZOLE AND TRIMETHOPRIM 800; 160 MG/1; MG/1
1 TABLET ORAL EVERY 12 HOURS SCHEDULED
Qty: 20 TABLET | Refills: 0 | Status: SHIPPED | OUTPATIENT
Start: 2019-12-27 | End: 2020-01-06

## 2019-12-27 NOTE — PROGRESS NOTES
[facility]        NAME: Cindy Horton is a 60 y o  female  : 1960    MRN: [id number]  DATE: 2019  TIME: 9:27 AM    Assessment and Plan   Acute upper respiratory infection [J06 9]  1  Acute upper respiratory infection     2  Acute cystitis with hematuria  POCT urine dip    Urine culture   3  Acute non-recurrent pansinusitis  sulfamethoxazole-trimethoprim (BACTRIM DS) 800-160 mg per tablet   4  Acute urinary tract infection  sulfamethoxazole-trimethoprim (BACTRIM DS) 800-160 mg per tablet         Patient Instructions     Patient Instructions   Options discussed with patient  Bactrim twice a day until finished (please take probiotics)  Flonase nasal spray 1 spray in each nostril once or twice a day  Tylenol as needed  Increase fluids (cranberry juice)  Recheck/follow-up with family physician as scheduled  Please go to the hospital emergency department if needed  Follow up with PCP in 3-5 days  Proceed to  ER if symptoms worsen  Chief Complaint     Chief Complaint   Patient presents with    Earache     c/o ear pain, R > L ear  Regular PCP instructed her to take pseudophed for symtpoms, but her heart doctor told her to stop beccause she felt her heart racing  States head pressure/congestion with ear pain   Back Pain     c/o back pain x 2 weeks, thinks she may have a kidney infection  Has taken OTC Tylenol for back pain  History of Present Illness       Congestion, sinus pressure, ear pain; also right-sided mid back pain and problems with urination; patient states she has appoint with her family provider 2020      Review of Systems   Review of Systems   HENT: Positive for congestion, ear pain and sinus pressure  Respiratory: Negative  Cardiovascular: Negative  Genitourinary: Positive for dysuria  Musculoskeletal: Positive for back pain  Skin: Negative  Neurological: Negative            Current Medications       Current Outpatient Medications:     Ascorbic Acid (VITAMIN C) 100 MG CHEW, Chew, Disp: , Rfl:     atorvastatin (LIPITOR) 10 mg tablet, take 1 tablet by mouth once daily, Disp: 90 tablet, Rfl: 2    B Complex Vitamins (VITAMIN B COMPLEX PO), Take 1 capsule by mouth, Disp: , Rfl:     Biotin 10 MG TABS, Take by mouth, Disp: , Rfl:     cholecalciferol (VITAMIN D3) 1,000 units tablet, Take 1,000 Units by mouth 2 (two) times a day, Disp: , Rfl:     clobetasol (TEMOVATE) 0 05 % cream, Apply to affected areas daily x 12 weeks, then twice weekly for maintenance, Disp: , Rfl:     ELIQUIS 2 5 MG, TAKE 1 TABLET BY MOUTH TWICE DAILY, Disp: 60 tablet, Rfl: 2    famotidine (PEPCID) 20 mg tablet, Take 1 tablet (20 mg total) by mouth daily, Disp: 90 tablet, Rfl: 0    ferrous sulfate 325 (65 Fe) mg tablet, Take 325 mg by mouth daily with breakfast, Disp: , Rfl:     fluticasone (FLONASE) 50 mcg/act nasal spray, 2 sprays into each nostril daily, Disp: 16 g, Rfl: 0    LYSINE PO, Take by mouth, Disp: , Rfl:     Multiple Vitamin (MULTIVITAMIN) capsule, Take 1 capsule by mouth daily, Disp: , Rfl:     nystatin (MYCOSTATIN) powder, Apply topically 2 (two) times a day, Disp: 30 g, Rfl: 1    sulfamethoxazole-trimethoprim (BACTRIM DS) 800-160 mg per tablet, Take 1 tablet by mouth every 12 (twelve) hours for 20 doses, Disp: 20 tablet, Rfl: 0    valACYclovir (VALTREX) 500 mg tablet, Take 1 tablet (500 mg total) by mouth 2 (two) times a day for 10 days, Disp: 20 tablet, Rfl: 1    Current Allergies     Allergies as of 12/27/2019    (No Known Allergies)            The following portions of the patient's history were reviewed and updated as appropriate: allergies, current medications, past family history, past medical history, past social history, past surgical history and problem list      Past Medical History:   Diagnosis Date    Carpal tunnel syndrome of right wrist     Chest pain     Depressive disorder 12/2/2014    Diabetes ([facility] ) 12/2/2014    [facility]) [facility] )     Esophageal reflux 2011    Essential hypertension 2014    Grade 1 out of 6 intensity murmur 2019    Hypothyroid 2014    Iron deficiency anemia 10/15/2019    Knee osteoarthritis 2014    Pacemaker 2018    Trigger finger, left        Past Surgical History:   Procedure Laterality Date    CARDIAC PACEMAKER PLACEMENT  2018    CERVICAL DISC SURGERY  2009    PLATES & SCREWS     SECTION      X2    GASTRIC BYPASS  2017    LAP RINYGB SURGERY    KNEE SURGERY Bilateral     OTHER SURGICAL HISTORY      ARM SURGERIES    SD INCISE FINGER TENDON SHEATH Left 2018    Procedure: LONG TRIGGER FINGER RELEASE;  Surgeon: Julio Jack MD;  [facility];  Service: Orthopedics    SD WRIST Chance Rhianna LIG Right 2018    Procedure: ENDOSCOPIC CARPAL TUNNEL RELEASE;  Surgeon: Julio Jack MD;  [facility];  Service: Orthopedics    TONSILLECTOMY         Family History   Problem Relation Age of Onset    Stroke Mother     Alzheimer's disease Mother     Arthritis Mother     Coronary artery disease Mother     Breast cancer Mother 77    Cancer Mother     Heart disease Mother     Hypertension Father     Gout Father     Diabetes type II Father     Coronary artery disease Father     Heart disease Father     No Known Problems Sister     No Known Problems Daughter     No Known Problems Maternal Grandmother     No Known Problems Maternal Grandfather     No Known Problems Paternal Grandmother     No Known Problems Paternal Grandfather     Prostate cancer Brother     No Known Problems Sister     No Known Problems Sister     No Known Problems Maternal Aunt     No Known Problems Maternal Aunt     No Known Problems Maternal Aunt     No Known Problems Maternal Aunt     Cancer Maternal Aunt     No Known Problems Paternal Aunt     No Known Problems Paternal Aunt          Medications have been verified  Objective   /70 (BP Location: Left arm, Patient Position: Sitting, Cuff Size: Standard)   Pulse 81   Temp (!) 97 °F (36 1 °C) (Temporal)   Resp 18   Ht 5' 3 25" (1 607 m)   Wt 78 3 kg (172 lb 9 6 oz)   LMP 09/18/2014   SpO2 98%   BMI 30 33 kg/m²        Physical Exam     Physical Exam   Constitutional: She is oriented to person, place, and time  She appears well-developed and well-nourished  HENT:   Mouth/Throat: Oropharynx is clear and moist    Nasal congestion; discomfort over the sinuses; slight retraction of the eardrum   Neck: Normal range of motion  Neck supple  Cardiovascular: Normal rate, regular rhythm and normal heart sounds  Pulmonary/Chest: Effort normal and breath sounds normal    Musculoskeletal:   Tenderness over the right midback area   Neurological: She is alert and oriented to person, place, and time  No nuchal rigidity   Skin:   Fair color and turgor   Psychiatric: She has a normal mood and affect  Her behavior is normal    Nursing note and vitals reviewed

## 2019-12-27 NOTE — LETTER
December 27, 2019     Patient: Charlene Diego   YOB: 1960   Date of Visit: 12/27/2019       To Whom It May Concern: It is my medical opinion that Charlene Diego is unable to work today (12/27/2019)  If you have any questions or concerns, please don't hesitate to call           Sincerely,        Kodak Harrison DO    CC: No Recipients

## 2019-12-27 NOTE — PATIENT INSTRUCTIONS
Options discussed with patient  Bactrim twice a day until finished (please take probiotics)  Flonase nasal spray 1 spray in each nostril once or twice a day  Tylenol as needed  Increase fluids (cranberry juice)  Recheck/follow-up with family physician as scheduled  Please go to the hospital emergency department if needed

## 2019-12-29 LAB — BACTERIA UR CULT: ABNORMAL

## 2020-01-02 ENCOUNTER — HOSPITAL ENCOUNTER (OUTPATIENT)
Dept: RADIOLOGY | Facility: IMAGING CENTER | Age: 60
Discharge: HOME | End: 2020-01-02
Payer: COMMERCIAL

## 2020-01-02 ENCOUNTER — REMOTE DEVICE CLINIC VISIT (OUTPATIENT)
Dept: CARDIOLOGY CLINIC | Facility: CLINIC | Age: 60
End: 2020-01-02
Payer: COMMERCIAL

## 2020-01-02 ENCOUNTER — OFFICE VISIT (OUTPATIENT)
Dept: FAMILY MEDICINE CLINIC | Facility: CLINIC | Age: 60
End: 2020-01-02
Payer: COMMERCIAL

## 2020-01-02 ENCOUNTER — TRANSCRIBE ORDERS (OUTPATIENT)
Dept: ADMINISTRATIVE | Facility: HOSPITAL | Age: 60
End: 2020-01-02

## 2020-01-02 VITALS
WEIGHT: 167.8 LBS | TEMPERATURE: 99.3 F | DIASTOLIC BLOOD PRESSURE: 78 MMHG | RESPIRATION RATE: 16 BRPM | SYSTOLIC BLOOD PRESSURE: 118 MMHG | HEART RATE: 88 BPM | OXYGEN SATURATION: 96 % | HEIGHT: 63 IN | BODY MASS INDEX: 29.73 KG/M2

## 2020-01-02 DIAGNOSIS — G89.29 CHRONIC RIGHT SHOULDER PAIN: Primary | ICD-10-CM

## 2020-01-02 DIAGNOSIS — M25.511 CHRONIC RIGHT SHOULDER PAIN: ICD-10-CM

## 2020-01-02 DIAGNOSIS — Z95.0 CARDIAC PACEMAKER IN SITU: Primary | ICD-10-CM

## 2020-01-02 DIAGNOSIS — G89.29 CHRONIC RIGHT SHOULDER PAIN: ICD-10-CM

## 2020-01-02 DIAGNOSIS — M25.511 CHRONIC RIGHT SHOULDER PAIN: Primary | ICD-10-CM

## 2020-01-02 PROCEDURE — 73030 X-RAY EXAM OF SHOULDER: CPT

## 2020-01-02 PROCEDURE — 99213 OFFICE O/P EST LOW 20 MIN: CPT | Performed by: PHYSICIAN ASSISTANT

## 2020-01-02 PROCEDURE — 93294 REM INTERROG EVL PM/LDLS PM: CPT | Performed by: INTERNAL MEDICINE

## 2020-01-02 PROCEDURE — 93296 REM INTERROG EVL PM/IDS: CPT | Performed by: INTERNAL MEDICINE

## 2020-01-02 NOTE — PROGRESS NOTES
Results for orders placed or performed in visit on 01/02/20   Cardiac EP device report    Narrative    [facility] TRANSMISSION: BATTERY STATUS "OK"  AP 24%  0%  ALL AVAILABLE LEAD PARAMETERS WITHIN NORMAL LIMITS  NO SIGNIFICANT HIGH RATE EPISODES  NORMAL DEVICE FUNCTION   NC

## 2020-01-03 ENCOUNTER — TELEPHONE (OUTPATIENT)
Dept: FAMILY MEDICINE CLINIC | Facility: CLINIC | Age: 60
End: 2020-01-03

## 2020-01-03 ENCOUNTER — APPOINTMENT (EMERGENCY)
Dept: RADIOLOGY | Facility: HOSPITAL | Age: 60
End: 2020-01-03
Payer: COMMERCIAL

## 2020-01-03 ENCOUNTER — OFFICE VISIT (OUTPATIENT)
Dept: URGENT CARE | Age: 60
End: 2020-01-03
Payer: COMMERCIAL

## 2020-01-03 ENCOUNTER — HOSPITAL ENCOUNTER (EMERGENCY)
Facility: HOSPITAL | Age: 60
Discharge: HOME | End: 2020-01-03
Attending: EMERGENCY MEDICINE | Admitting: EMERGENCY MEDICINE
Payer: COMMERCIAL

## 2020-01-03 VITALS
RESPIRATION RATE: 16 BRPM | OXYGEN SATURATION: 95 % | SYSTOLIC BLOOD PRESSURE: 106 MMHG | HEART RATE: 68 BPM | DIASTOLIC BLOOD PRESSURE: 58 MMHG | TEMPERATURE: 97.7 F | BODY MASS INDEX: 29.58 KG/M2 | WEIGHT: 167 LBS

## 2020-01-03 VITALS
WEIGHT: 167 LBS | DIASTOLIC BLOOD PRESSURE: 70 MMHG | OXYGEN SATURATION: 97 % | HEIGHT: 63 IN | BODY MASS INDEX: 29.59 KG/M2 | HEART RATE: 77 BPM | RESPIRATION RATE: 16 BRPM | SYSTOLIC BLOOD PRESSURE: 100 MMHG | TEMPERATURE: 98.5 F

## 2020-01-03 DIAGNOSIS — D72.819 LEUKOPENIA: ICD-10-CM

## 2020-01-03 DIAGNOSIS — R51.9 ACUTE INTRACTABLE HEADACHE, UNSPECIFIED HEADACHE TYPE: Primary | ICD-10-CM

## 2020-01-03 DIAGNOSIS — R10.9 RIGHT FLANK PAIN: Primary | ICD-10-CM

## 2020-01-03 DIAGNOSIS — R50.9 FEVER AND CHILLS: ICD-10-CM

## 2020-01-03 LAB
ALBUMIN SERPL BCP-MCNC: 3.3 G/DL (ref 3.5–5)
ALP SERPL-CCNC: 90 U/L (ref 46–116)
ALT SERPL W P-5'-P-CCNC: 27 U/L (ref 12–78)
ANION GAP SERPL CALCULATED.3IONS-SCNC: 6 MMOL/L (ref 4–13)
AST SERPL W P-5'-P-CCNC: 25 U/L (ref 5–45)
BACTERIA UR QL AUTO: NORMAL /HPF
BASOPHILS # BLD AUTO: 0.03 THOUSANDS/ΜL (ref 0–0.1)
BASOPHILS NFR BLD AUTO: 1 % (ref 0–1)
BILIRUB SERPL-MCNC: 0.21 MG/DL (ref 0.2–1)
BILIRUB UR QL STRIP: NEGATIVE
BUN SERPL-MCNC: 10 MG/DL (ref 5–25)
CALCIUM SERPL-MCNC: 8.7 MG/DL (ref 8.3–10.1)
CHLORIDE SERPL-SCNC: 116 MMOL/L (ref 100–108)
CLARITY UR: CLEAR
CO2 SERPL-SCNC: 22 MMOL/L (ref 21–32)
COLOR UR: YELLOW
CREAT SERPL-MCNC: 0.71 MG/DL (ref 0.6–1.3)
EOSINOPHIL # BLD AUTO: 0.12 THOUSAND/ΜL (ref 0–0.61)
EOSINOPHIL NFR BLD AUTO: 4 % (ref 0–6)
ERYTHROCYTE [DISTWIDTH] IN BLOOD BY AUTOMATED COUNT: 12.6 % (ref 11.6–15.1)
GFR SERPL CREATININE-BSD FRML MDRD: 93 ML/MIN/1.73SQ M
GLUCOSE SERPL-MCNC: 90 MG/DL (ref 65–140)
GLUCOSE UR STRIP-MCNC: NEGATIVE MG/DL
HCT VFR BLD AUTO: 34.7 % (ref 34.8–46.1)
HGB BLD-MCNC: 11.4 G/DL (ref 11.5–15.4)
HGB UR QL STRIP.AUTO: ABNORMAL
HYALINE CASTS #/AREA URNS LPF: NORMAL /LPF
IMM GRANULOCYTES # BLD AUTO: 0.01 THOUSAND/UL (ref 0–0.2)
IMM GRANULOCYTES NFR BLD AUTO: 0 % (ref 0–2)
KETONES UR STRIP-MCNC: NEGATIVE MG/DL
LEUKOCYTE ESTERASE UR QL STRIP: NEGATIVE
LYMPHOCYTES # BLD AUTO: 0.98 THOUSANDS/ΜL (ref 0.6–4.47)
LYMPHOCYTES NFR BLD AUTO: 34 % (ref 14–44)
MCH RBC QN AUTO: 31.7 PG (ref 26.8–34.3)
MCHC RBC AUTO-ENTMCNC: 32.9 G/DL (ref 31.4–37.4)
MCV RBC AUTO: 96 FL (ref 82–98)
MONOCYTES # BLD AUTO: 0.42 THOUSAND/ΜL (ref 0.17–1.22)
MONOCYTES NFR BLD AUTO: 14 % (ref 4–12)
NEUTROPHILS # BLD AUTO: 1.35 THOUSANDS/ΜL (ref 1.85–7.62)
NEUTS SEG NFR BLD AUTO: 47 % (ref 43–75)
NITRITE UR QL STRIP: NEGATIVE
NON-SQ EPI CELLS URNS QL MICRO: NORMAL /HPF
NRBC BLD AUTO-RTO: 0 /100 WBCS
PH UR STRIP.AUTO: 5.5 [PH] (ref 4.5–8)
PLATELET # BLD AUTO: 150 THOUSANDS/UL (ref 149–390)
PMV BLD AUTO: 11 FL (ref 8.9–12.7)
POTASSIUM SERPL-SCNC: 3.7 MMOL/L (ref 3.5–5.3)
PROT SERPL-MCNC: 6.9 G/DL (ref 6.4–8.2)
PROT UR STRIP-MCNC: NEGATIVE MG/DL
RBC # BLD AUTO: 3.6 MILLION/UL (ref 3.81–5.12)
RBC #/AREA URNS AUTO: NORMAL /HPF
SODIUM SERPL-SCNC: 144 MMOL/L (ref 136–145)
SP GR UR STRIP.AUTO: 1.01 (ref 1–1.03)
UROBILINOGEN UR QL STRIP.AUTO: 0.2 E.U./DL
WBC # BLD AUTO: 2.91 THOUSAND/UL (ref 4.31–10.16)
WBC #/AREA URNS AUTO: NORMAL /HPF

## 2020-01-03 PROCEDURE — 85025 COMPLETE CBC W/AUTO DIFF WBC: CPT | Performed by: EMERGENCY MEDICINE

## 2020-01-03 PROCEDURE — 80053 COMPREHEN METABOLIC PANEL: CPT | Performed by: EMERGENCY MEDICINE

## 2020-01-03 PROCEDURE — 99284 EMERGENCY DEPT VISIT MOD MDM: CPT

## 2020-01-03 PROCEDURE — 74176 CT ABD & PELVIS W/O CONTRAST: CPT

## 2020-01-03 PROCEDURE — 99213 OFFICE O/P EST LOW 20 MIN: CPT | Performed by: FAMILY MEDICINE

## 2020-01-03 PROCEDURE — 96374 THER/PROPH/DIAG INJ IV PUSH: CPT

## 2020-01-03 PROCEDURE — 99284 EMERGENCY DEPT VISIT MOD MDM: CPT | Performed by: EMERGENCY MEDICINE

## 2020-01-03 PROCEDURE — 36415 COLL VENOUS BLD VENIPUNCTURE: CPT | Performed by: EMERGENCY MEDICINE

## 2020-01-03 PROCEDURE — S9088 SERVICES PROVIDED IN URGENT: HCPCS | Performed by: FAMILY MEDICINE

## 2020-01-03 PROCEDURE — 96375 TX/PRO/DX INJ NEW DRUG ADDON: CPT

## 2020-01-03 PROCEDURE — 81001 URINALYSIS AUTO W/SCOPE: CPT

## 2020-01-03 RX ORDER — MORPHINE SULFATE 4 MG/ML
4 INJECTION, SOLUTION INTRAMUSCULAR; INTRAVENOUS ONCE
Status: COMPLETED | OUTPATIENT
Start: 2020-01-03 | End: 2020-01-03

## 2020-01-03 RX ORDER — LIDOCAINE 50 MG/G
1 PATCH TOPICAL ONCE
Status: DISCONTINUED | OUTPATIENT
Start: 2020-01-03 | End: 2020-01-04 | Stop reason: HOSPADM

## 2020-01-03 RX ORDER — METHOCARBAMOL 500 MG/1
500 TABLET, FILM COATED ORAL ONCE
Status: COMPLETED | OUTPATIENT
Start: 2020-01-03 | End: 2020-01-03

## 2020-01-03 RX ORDER — ONDANSETRON 2 MG/ML
4 INJECTION INTRAMUSCULAR; INTRAVENOUS ONCE
Status: COMPLETED | OUTPATIENT
Start: 2020-01-03 | End: 2020-01-03

## 2020-01-03 RX ADMIN — METHOCARBAMOL TABLETS 500 MG: 500 TABLET, COATED ORAL at 22:50

## 2020-01-03 RX ADMIN — ONDANSETRON 4 MG: 2 INJECTION INTRAMUSCULAR; INTRAVENOUS at 20:12

## 2020-01-03 RX ADMIN — MORPHINE SULFATE 4 MG: 4 INJECTION INTRAVENOUS at 20:13

## 2020-01-03 RX ADMIN — LIDOCAINE 1 PATCH: 50 PATCH TOPICAL at 22:26

## 2020-01-03 NOTE — PROGRESS NOTES
[facility]        NAME: Arvin Salcido is a 60 y o  female  : 1960    MRN: [id number]  DATE: January 3, 2020  TIME: 5:00 PM    Assessment and Plan   Acute intractable headache, unspecified headache type [R51]  1  Acute intractable headache, unspecified headache type     2  Fever and chills           Patient Instructions     Patient Instructions   Patient was seen here 2019 for an urinary tract infection and a sinus infection and started on Bactrim DS b i d  for 10 days; patient states that she was feeling better, but started with headache, chills, and fever the past 2 days; recommended that patient go to the hospital emergency department by private vehicle for further evaluation and care (patient states she would like to eat first)      Chief Complaint     Chief Complaint   Patient presents with    Headache     Pt complaining of headache, fevers and chill x2 days  Has tried tylenol  She was seen on  for a sinus infection and UTI and has been taking Bactrim  History of Present Illness       Patient was seen here 2019 for a sinus infection and a urinary tract infection and given Bactrim DS b i d  for 10 days (patient given a copy of the urine culture results); patient states she was feeling better, but now has headache, chills, and fever for the past 2 days; patient was seen by her family physician yesterday for a right shoulder problem      Review of Systems   Review of Systems   Constitutional: Positive for chills and fever  Respiratory: Negative  Cardiovascular: Negative  Gastrointestinal: Negative  Musculoskeletal: Negative  Neurological: Positive for headaches           Current Medications       Current Outpatient Medications:     Ascorbic Acid (VITAMIN C) 100 MG CHEW, Chew, Disp: , Rfl:     atorvastatin (LIPITOR) 10 mg tablet, take 1 tablet by mouth once daily, Disp: 90 tablet, Rfl: 2    B Complex Vitamins (VITAMIN B COMPLEX PO), Take 1 capsule by mouth, Disp: , Rfl:     Biotin 10 MG TABS, Take by mouth, Disp: , Rfl:     cholecalciferol (VITAMIN D3) 1,000 units tablet, Take 1,000 Units by mouth 2 (two) times a day, Disp: , Rfl:     clobetasol (TEMOVATE) 0 05 % cream, Apply to affected areas daily x 12 weeks, then twice weekly for maintenance, Disp: , Rfl:     ELIQUIS 2 5 MG, TAKE 1 TABLET BY MOUTH TWICE DAILY, Disp: 60 tablet, Rfl: 2    famotidine (PEPCID) 20 mg tablet, Take 1 tablet (20 mg total) by mouth daily, Disp: 90 tablet, Rfl: 0    ferrous sulfate 325 (65 Fe) mg tablet, Take 325 mg by mouth daily with breakfast, Disp: , Rfl:     fluticasone (FLONASE) 50 mcg/act nasal spray, 2 sprays into each nostril daily, Disp: 16 g, Rfl: 0    LYSINE PO, Take by mouth, Disp: , Rfl:     Multiple Vitamin (MULTIVITAMIN) capsule, Take 1 capsule by mouth daily, Disp: , Rfl:     nystatin (MYCOSTATIN) powder, Apply topically 2 (two) times a day, Disp: 30 g, Rfl: 1    sulfamethoxazole-trimethoprim (BACTRIM DS) 800-160 mg per tablet, Take 1 tablet by mouth every 12 (twelve) hours for 20 doses, Disp: 20 tablet, Rfl: 0    valACYclovir (VALTREX) 500 mg tablet, Take 1 tablet (500 mg total) by mouth 2 (two) times a day for 10 days, Disp: 20 tablet, Rfl: 1    Current Allergies     Allergies as of 01/03/2020    (No Known Allergies)            The following portions of the patient's history were reviewed and updated as appropriate: allergies, current medications, past family history, past medical history, past social history, past surgical history and problem list      Past Medical History:   Diagnosis Date    Carpal tunnel syndrome of right wrist     Chest pain     Depressive disorder 12/2/2014    Diabetes [facility] ) 12/2/2014    D[facility] ([facility] ) 2012    Esophageal reflux 5/23/2011    Essential hypertension 12/2/2014    Grade 1 out of 6 intensity murmur 6/24/2019    Hypothyroid 12/2/2014    Iron deficiency anemia 10/15/2019    Knee osteoarthritis 2014    Pacemaker 2018    Trigger finger, left        Past Surgical History:   Procedure Laterality Date    CARDIAC PACEMAKER PLACEMENT  2018    CERVICAL DISC SURGERY  2009    PLATES & SCREWS     SECTION      X2    GASTRIC BYPASS  2017    LAP RINYGB SURGERY    KNEE SURGERY Bilateral     OTHER SURGICAL HISTORY      ARM SURGERIES    OK INCISE FINGER TENDON SHEATH Left 2018    Procedure: LONG TRIGGER FINGER RELEASE;  Surgeon: Rosaline Jon MD;  [facility]: [facility];  Service: Orthopedics    OK WRIST Cliffton Vivi LIG Right 2018    Procedure: ENDOSCOPIC CARPAL TUNNEL RELEASE;  Surgeon: Rosaline Jon MD;  [facility];  Service: Orthopedics    TONSILLECTOMY         Family History   Problem Relation Age of Onset    Stroke Mother     Alzheimer's disease Mother     Arthritis Mother     Coronary artery disease Mother     Breast cancer Mother 77    Cancer Mother     Heart disease Mother     Hypertension Father     Gout Father     Diabetes type II Father     Coronary artery disease Father     Heart disease Father     No Known Problems Sister     No Known Problems Daughter     No Known Problems Maternal Grandmother     No Known Problems Maternal Grandfather     No Known Problems Paternal Grandmother     No Known Problems Paternal Grandfather     Prostate cancer Brother     No Known Problems Sister     No Known Problems Sister     No Known Problems Maternal Aunt     No Known Problems Maternal Aunt     No Known Problems Maternal Aunt     No Known Problems Maternal Aunt     Cancer Maternal Aunt     No Known Problems Paternal Aunt     No Known Problems Paternal Aunt          Medications have been verified          Objective   /70 (BP Location: Left arm, Patient Position: Sitting)   Pulse 77   Temp 98 5 °F (36 9 °C) (Temporal)   Resp 16   Ht 5' 3" (1 6 m)   Wt 75 8 kg (167 lb)   LMP 2014   SpO2 97%   BMI 29 58 kg/m²        Physical Exam     Physical Exam   Constitutional: She is oriented to person, place, and time  She appears well-developed and well-nourished  HENT:   Mouth/Throat: Oropharynx is clear and moist    Neck: Normal range of motion  Neck supple  Cardiovascular: Normal rate, regular rhythm and normal heart sounds  Pulmonary/Chest: Effort normal and breath sounds normal    Neurological: She is alert and oriented to person, place, and time  Skin:   Fair color and turgor   Psychiatric: She has a normal mood and affect  Her behavior is normal    Nursing note and vitals reviewed

## 2020-01-03 NOTE — TELEPHONE ENCOUNTER
Called patient to [facility]  She said she went to [address] [facility] who gave her Bactrim  She was not very happy about recommendation    Advised go back to [facility] or to the ER

## 2020-01-03 NOTE — PATIENT INSTRUCTIONS
Patient was seen here 12/27/2019 for an urinary tract infection and a sinus infection and started on Bactrim DS b i d  for 10 days; patient states that she was feeling better, but started with headache, chills, and fever the past 2 days; recommended that patient go to the hospital emergency department by private vehicle for further evaluation and care (patient states she would like to eat first)

## 2020-01-03 NOTE — TELEPHONE ENCOUNTER
Patient said who gave her Bactrim on 12/27 and now has chills, cold symptoms, fever 101 2, feels very sick and has pain in right kidney area    Per internet these are symptoms Bactrim    What to do?    [phone]

## 2020-01-04 NOTE — ED PROVIDER NOTES
History  Chief Complaint   Patient presents with    Flank Pain     Patient reports having kidney infection and taking medication for it, since being on medication patient reports chills, fevers, and increasing back pain  Was told to come in to department for possible IV abx     Started having back pain on right flank few days before xmas  Went to Dr Dede Hannah at [facility] care now (PCP didn't have appt)  Saw blood in urine, culture with Staph saprophiticus  so started on abx for treatment of pyelo  Bactrim  Been taking BID since that time  Also with a sinus infection and that time  Yellow mucous draining which has since cleared  Still having chills  Measured a fever at home yesterday  No improvement in back pain with abx and tylenol  Still right flank  No  Reported pain in abdomen  No emesis,  Normal BM, though they are baseline always loose after the bypass  No blood in stool  Also  with a headache  Pain over frontal sinus  Pounding  No vision change  Runny nose  with clear discharge, was purulent before starting abx  Prior gastric bypass, can't have NSAIDs  On eloquis for blood clots legs and arm  Pacer placed 2018 for bradycardia          Prior to Admission Medications   Prescriptions Last Dose Informant Patient Reported? Taking?    Ascorbic Acid (VITAMIN C) 100 MG CHEW  Self Yes Yes   Sig: Chew   B Complex Vitamins (VITAMIN B COMPLEX PO)  Self Yes Yes   Sig: Take 1 capsule by mouth   Biotin 10 MG TABS  Self Yes Yes   Sig: Take by mouth   ELIQUIS 2 5 MG  Self No Yes   Sig: TAKE 1 TABLET BY MOUTH TWICE DAILY   LYSINE PO  Self Yes Yes   Sig: Take by mouth   Multiple Vitamin (MULTIVITAMIN) capsule  Self Yes Yes   Sig: Take 1 capsule by mouth daily   atorvastatin (LIPITOR) 10 mg tablet  Self No Yes   Sig: take 1 tablet by mouth once daily   cholecalciferol (VITAMIN D3) 1,000 units tablet  Self Yes Yes   Sig: Take 1,000 Units by mouth 2 (two) times a day   clobetasol (TEMOVATE) 0 05 % cream  Self Yes No Sig: Apply to affected areas daily x 12 weeks, then twice weekly for maintenance   famotidine (PEPCID) 20 mg tablet   No Yes   Sig: Take 1 tablet (20 mg total) by mouth daily   ferrous sulfate 325 (65 Fe) mg tablet  Self Yes Yes   Sig: Take 325 mg by mouth daily with breakfast   fluticasone (FLONASE) 50 mcg/act nasal spray  Self No Yes   Si sprays into each nostril daily   nystatin (MYCOSTATIN) powder  Self No Yes   Sig: Apply topically 2 (two) times a day   sulfamethoxazole-trimethoprim (BACTRIM DS) 800-160 mg per tablet   No Yes   Sig: Take 1 tablet by mouth every 12 (twelve) hours for 20 doses   valACYclovir (VALTREX) 500 mg tablet  Self No No   Sig: Take 1 tablet (500 mg total) by mouth 2 (two) times a day for 10 days      Facility-Administered Medications: None       Past Medical History:   Diagnosis Date    Carpal tunnel syndrome of right wrist     Chest pain     Depressive disorder 2014    Diabetes ([facility] [address] ) 2014    [facility]     Esophageal reflux 2011    Essential hypertension 2014    Grade 1 out of 6 intensity murmur 2019    Hypothyroid 2014    Iron deficiency anemia 10/15/2019    Knee osteoarthritis 2014    Pacemaker 2018    Trigger finger, left        Past Surgical History:   Procedure Laterality Date    CARDIAC PACEMAKER PLACEMENT  2018    CERVICAL DISC SURGERY  2009    PLATES & SCREWS     SECTION      X2    GASTRIC BYPASS  2017    LAP RINYGB SURGERY    KNEE SURGERY Bilateral     OTHER SURGICAL HISTORY      ARM SURGERIES    AK INCISE FINGER TENDON SHEATH Left 2018    Procedure: LONG TRIGGER FINGER RELEASE;  Surgeon: Benedict Jones MD;  Location: [facility];  Service: Orthopedics    AK WRIST Magdalena Lone LIG Right 2018    Procedure: ENDOSCOPIC CARPAL TUNNEL RELEASE;  Surgeon: Benedict Jones MD;  Location: [facility];  Service: Orthopedics    TONSILLECTOMY Family History   Problem Relation Age of Onset    Stroke Mother     Alzheimer's disease Mother     Arthritis Mother     Coronary artery disease Mother     Breast cancer Mother 77    Cancer Mother     Heart disease Mother     Hypertension Father     Gout Father     Diabetes type II Father     Coronary artery disease Father     Heart disease Father     No Known Problems Sister     No Known Problems Daughter     No Known Problems Maternal Grandmother     No Known Problems Maternal Grandfather     No Known Problems Paternal Grandmother     No Known Problems Paternal Grandfather     Prostate cancer Brother     No Known Problems Sister     No Known Problems Sister     No Known Problems Maternal Aunt     No Known Problems Maternal Aunt     No Known Problems Maternal Aunt     No Known Problems Maternal Aunt     Cancer Maternal Aunt     No Known Problems Paternal Aunt     No Known Problems Paternal Aunt      I have reviewed and agree with the history as documented  Social History     Tobacco Use    Smoking status: Former Smoker     Packs/day: 0 25     Types: Cigarettes    Smokeless tobacco: Never Used    Tobacco comment: quit "many years ago"   Substance Use Topics    Alcohol use: No     Alcohol/week: 2 0 standard drinks     Types: 1 Glasses of wine, 1 Standard drinks or equivalent per week     Comment: very rare social    Drug use: No        Review of Systems   Constitutional: Positive for chills and fever  Negative for activity change, appetite change, diaphoresis and fatigue  HENT: Positive for postnasal drip, rhinorrhea, sinus pressure and sinus pain  Negative for congestion, sneezing, sore throat, trouble swallowing and voice change  Eyes: Negative for visual disturbance  Respiratory: Negative for choking, chest tightness, shortness of breath, wheezing and stridor  Cardiovascular: Negative for chest pain, palpitations and leg swelling     Gastrointestinal: Positive for diarrhea  Negative for abdominal distention, abdominal pain, blood in stool, constipation, nausea and vomiting  Endocrine: Negative for polyuria  Genitourinary: Positive for flank pain  Negative for difficulty urinating, dyspareunia, dysuria, enuresis, frequency, hematuria and vaginal bleeding  Musculoskeletal: Negative for arthralgias, gait problem, neck pain and neck stiffness  Skin: Negative for color change and rash  Allergic/Immunologic: Negative for food allergies  Neurological: Positive for headaches  Negative for dizziness, tremors, speech difficulty, weakness and light-headedness  Psychiatric/Behavioral: Negative for confusion  The patient is not nervous/anxious  Physical Exam  ED Triage Vitals [01/03/20 1832]   Temperature Pulse Respirations Blood Pressure SpO2   97 7 °F (36 5 °C) 82 16 126/86 98 %      Temp Source Heart Rate Source Patient Position - Orthostatic VS BP Location FiO2 (%)   Oral Monitor Lying Left arm --      Pain Score       Worst Possible Pain             Orthostatic Vital Signs  Vitals:    01/03/20 1832 01/03/20 2100 01/03/20 2219   BP: 126/86 112/59 106/58   Pulse: 82 72 68   Patient Position - Orthostatic VS: Lying  Lying       Physical Exam   Constitutional: She is oriented to person, place, and time  She appears well-developed and well-nourished  No distress  HENT:   Head: Normocephalic and atraumatic  Right Ear: External ear normal    Left Ear: External ear normal    Nose: Nose normal    No pain over frontal, ethmoid or maxillary sinuses, clear rhinnorhea   Eyes: Pupils are equal, round, and reactive to light  Conjunctivae and EOM are normal  No scleral icterus  Neck: Normal range of motion  Neck supple  Cardiovascular: Normal rate, regular rhythm, normal heart sounds and intact distal pulses  No murmur heard  Pulmonary/Chest: Effort normal and breath sounds normal  No stridor  No respiratory distress  She has no wheezes  She has no rales     Abdominal: Soft  Bowel sounds are normal  She exhibits no distension and no mass  There is tenderness (right flank)  There is no rebound and no guarding  No hernia  Musculoskeletal: Normal range of motion  She exhibits no edema, tenderness or deformity  Lymphadenopathy:     She has no cervical adenopathy  Neurological: She is alert and oriented to person, place, and time  No cranial nerve deficit or sensory deficit  Skin: Skin is warm and dry  Capillary refill takes less than 2 seconds  No rash noted  She is not diaphoretic  Psychiatric: She has a normal mood and affect  Her behavior is normal  Judgment and thought content normal    Nursing note and vitals reviewed        ED Medications  Medications   morphine (PF) 4 mg/mL injection 4 mg (4 mg Intravenous Given 1/3/20 2013)   ondansetron (ZOFRAN) injection 4 mg (4 mg Intravenous Given 1/3/20 2012)   methocarbamol (ROBAXIN) tablet 500 mg (500 mg Oral Given 1/3/20 2250)       Diagnostic Studies  Results Reviewed     Procedure Component Value Units Date/Time    Comprehensive metabolic panel [[id number]]  (Abnormal) Collected:  01/03/20 2014    Lab Status:  Final result Specimen:  Blood from Arm, Right Updated:  01/03/20 2051     Sodium 144 mmol/L      Potassium 3 7 mmol/L      Chloride 116 mmol/L      CO2 22 mmol/L      ANION GAP 6 mmol/L      BUN 10 mg/dL      Creatinine 0 71 mg/dL      Glucose 90 mg/dL      Calcium 8 7 mg/dL      AST 25 U/L      ALT 27 U/L      Alkaline Phosphatase 90 U/L      Total Protein 6 9 g/dL      Albumin 3 3 g/dL      Total Bilirubin 0 21 mg/dL      eGFR 93 ml/min/1 73sq m     Narrative:       [place] guidelines for Chronic Kidney Disease (CKD):     Stage 1 with normal or high GFR (GFR > 90 mL/min/1 73 square meters)    Stage 2 Mild CKD (GFR = 60-89 mL/min/1 73 square meters)    Stage 3A Moderate CKD (GFR = 45-59 mL/min/1 73 square meters)    Stage 3B Moderate CKD (GFR = 30-44 mL/min/1 73 square meters)    Stage 4 Severe CKD (GFR = 15-29 mL/min/1 73 square meters)    Stage 5 End Stage CKD (GFR <15 mL/min/1 73 square meters)  Note: GFR calculation is accurate only with a steady state creatinine    CBC and differential [[id number]]  (Abnormal) Collected:  01/03/20 2014    Lab Status:  Final result Specimen:  Blood from Arm, Right Updated:  01/03/20 2022     WBC 2 91 Thousand/uL      RBC 3 60 Million/uL      Hemoglobin 11 4 g/dL      Hematocrit 34 7 %      MCV 96 fL      MCH 31 7 pg      MCHC 32 9 g/dL      RDW 12 6 %      MPV 11 0 fL      Platelets 830 Thousands/uL      nRBC 0 /100 WBCs      Neutrophils Relative 47 %      Immat GRANS % 0 %      Lymphocytes Relative 34 %      Monocytes Relative 14 %      Eosinophils Relative 4 %      Basophils Relative 1 %      Neutrophils Absolute 1 35 Thousands/µL      Immature Grans Absolute 0 01 Thousand/uL      Lymphocytes Absolute 0 98 Thousands/µL      Monocytes Absolute 0 42 Thousand/µL      Eosinophils Absolute 0 12 Thousand/µL      Basophils Absolute 0 03 Thousands/µL     Urine Microscopic [[id number]]  (Normal) Collected:  01/03/20 1900    Lab Status:  Final result Specimen:  Urine Updated:  01/03/20 1939     RBC, UA None Seen /hpf      WBC, UA None Seen /hpf      Epithelial Cells None Seen /hpf      Bacteria, UA None Seen /hpf      Hyaline Casts, UA None Seen /lpf     Urine Macroscopic, POC [[id number]]  (Abnormal) Collected:  01/03/20 1900    Lab Status:  Final result Specimen:  Urine Updated:  01/03/20 1858     Color, UA Yellow     Clarity, UA Clear     pH, UA 5 5     Leukocytes, UA Negative     Nitrite, UA Negative     Protein, UA Negative mg/dl      Glucose, UA Negative mg/dl      Ketones, UA Negative mg/dl      Urobilinogen, UA 0 2 E U /dl      Bilirubin, UA Negative     Blood, UA Trace     Specific Agenda, UA 1 010    Narrative:       [facility] RESULT                 CT abdomen pelvis wo contrast   Final Result by Dre Hardwick MD (01/03 2150)      No urinary calculi identified  No acute findings  Workstation performed: [id number]               Procedures  Procedures      ED Course                               MDM  Number of Diagnoses or Management Options  Right flank pain:   Diagnosis management comments: Urine with blood, no signs of infection    CT renal study without evidence of kidney stone, without cholecystitis, overall unremarkable study    CMP unremarkable    CBC with reduced WBC count    Treated with morphine and zofran with resolution of symptoms    Encouraged to follow with PCP in 2-3 days for reassessment of urine, and consideration of leukopenia        Disposition  Final diagnoses:   Right flank pain   Leukopenia     Time reflects when diagnosis was documented in both MDM as applicable and the Disposition within this note     Time User Action Codes Description Comment    1/3/2020 10:06 PM Mennie Becket Add [R10 9] Flank pain     1/3/2020 10:32 PM Mennie Becket Remove [R10 9] Flank pain     1/3/2020 10:32 PM Mennie Becket Add [R10 9] Right flank pain     1/5/2020 12:04 PM Mennie Becket Add [D72 819] Leukopenia       ED Disposition     ED Disposition Condition Date/Time Comment    Discharge Stable Fri Kirk 3, 2020 10:06 PM Charlene Diego discharge to home/self care              Follow-up Information     Follow up With Specialties Details Why Ashleigh Pineda MD Family Medicine Schedule an appointment as soon as possible for a visit in 3 days  [facility]            Discharge Medication List as of 1/3/2020 10:33 PM      CONTINUE these medications which have NOT CHANGED    Details   Ascorbic Acid (VITAMIN C) 100 MG CHEW Chew, Historical Med      atorvastatin (LIPITOR) 10 mg tablet take 1 tablet by mouth once daily, Normal      B Complex Vitamins (VITAMIN B COMPLEX PO) Take 1 capsule by mouth, Historical Med      Biotin 10 MG TABS Take by mouth, Historical Med      cholecalciferol (VITAMIN D3) 1,000 units tablet Take 1,000 Units by mouth 2 (two) times a day, Historical Med      ELIQUIS 2 5 MG TAKE 1 TABLET BY MOUTH TWICE DAILY, Normal      famotidine (PEPCID) 20 mg tablet Take 1 tablet (20 mg total) by mouth daily, Starting Fri 12/13/2019, Normal      ferrous sulfate 325 (65 Fe) mg tablet Take 325 mg by mouth daily with breakfast, Historical Med      fluticasone (FLONASE) 50 mcg/act nasal spray 2 sprays into each nostril daily, Starting Mon 12/24/2018, Normal      LYSINE PO Take by mouth, Historical Med      Multiple Vitamin (MULTIVITAMIN) capsule Take 1 capsule by mouth daily, Historical Med      nystatin (MYCOSTATIN) powder Apply topically 2 (two) times a day, Starting Fri 4/5/2019, Normal      sulfamethoxazole-trimethoprim (BACTRIM DS) 800-160 mg per tablet Take 1 tablet by mouth every 12 (twelve) hours for 20 doses, Starting Fri 12/27/2019, Until Mon 1/6/2020, Normal      clobetasol (TEMOVATE) 0 05 % cream Apply to affected areas daily x 12 weeks, then twice weekly for maintenance, Historical Med      valACYclovir (VALTREX) 500 mg tablet Take 1 tablet (500 mg total) by mouth 2 (two) times a day for 10 days, Starting Fri 4/19/2019, Until Wed 12/11/2019, Normal           No discharge procedures on file  ED Provider  Attending physically available and evaluated Jarrod George  MAKAYLA managed the patient along with the ED Attending      Electronically Signed by         Lawanna Apley, MD  01/05/20 6572

## 2020-01-04 NOTE — DISCHARGE INSTRUCTIONS
See your doctor on Monday to follow up flank pain and fevers  Let your doctor know that your labs showed a low white blood cell count while you were in the ED       Continue taking your prescribed Bactrim until that time

## 2020-01-04 NOTE — ED ATTENDING ATTESTATION
1/3/2020  IJuliet MD, saw and evaluated the patient  I have discussed the patient with the resident and agree with the resident's findings, Plan of Care, and MDM as documented in the resident's note, unless otherwise documented below  All available labs and Radiology studies were reviewed by myself  I was present for key portions of any procedure(s) performed by the resident and I was immediately available to provide assistance  I agree with the current assessment done in the Emergency Department  I have conducted an independent evaluation of this patient  Briefly, this is a 60 y o  female presenting with fevers and right lower back/flank pain  Patient started having dysuria and right sided flank pain just prior to Christmas and was seen at an Urgent Care  She was found to have a UTI with staph saprophiticus and was treated with a course of Bactrim  Despite taking antibiotics, she continued to have chills and a fever at home yesterday  She continues to have right lower flank/back pain  No abdominal pain otherwise, no nausea, no vomiting, no change in bowel movements, no blood in stool  No dysuria  Around the same time is being diagnosed with the UTI, patient also had sinus infection with mucus drainage, however, this is improved  She does report a mild headache with pain in the front of the head  No vision changes  No neck stiffness  Patient was counseled to return to emergency department due to persistent right-sided flank pain fevers, chills, given concern for possible failure of antibiotic therapy and now pyelonephritis  Physical Exam  Vitals:    01/03/20 1832   BP: 126/86   TempSrc: Oral   Pulse: 82   Resp: 16   Patient Position - Orthostatic VS: Lying   Temp: 97 7 °F (36 5 °C)       Constitutional:  Awake, alert, oriented  No acute distress  HEENT:  Normocephalic, atraumatic  Sclera anicteric, conjunctiva not injected  Moist oral mucosa    Cardiac:  Regular rate and rhythm, no murmurs, rubs, or gallops  2+ radial pulses  Respiratory:  Lungs are clear to auscultation bilaterally, no wheezes or rales  Abdomen:  Nondistended  Bowel sounds present  No tenderness to palpation  No rebound or guarding  There is tenderness with palpation along right CVA  There is no suprapubic tenderness  Extremities:  No deformities, no edema  Integument:  No rashes or exposed areas, cap refill less than 2 seconds  Neurologic:  Awake, alert, and oriented x3  Nonfocal exam   Psychiatric:  Normal affect    ED Course    16-year-old female presenting with fevers, chills, right-sided flank pain, in setting of recent treatment for urinary tract infection  Vital signs reviewed, afebrile at present  Differential diagnosis includes failure of antibiotic therapy with persistent infection, pyelonephritis, sepsis, nephrolithiasis, fevers and chills due to another source such as ongoing upper respiratory infection given history of recent sinusitis, versus another etiology of symptoms, although review of systems is essentially unrevealing other than the fevers, chills, rhinorrhea, mild frontal headache, and right flank pain  Basic labs reveals CBC with leukopenia with white blood cell count of 2 91 with monocyte predominance, anemia with hemoglobin of 11 4 which patient has had in the past   CMP is with intact renal function and essentially unremarkable  UA is with trace blood but no bacteria, or pyuria  It is not consistent with urinary tract infection  CT of abdomen and pelvis reveal no acute intra-abdominal pathology, no evidence of nephrolithiasis  Patient treated symptomatically with morphine and Zofran with improvement in symptoms  At this time, I suspect that patient may be suffering from an acute viral illness causing current leukopenia with monocyte predominance and patient's symptoms  Recommend close follow-up with primary care physician in 2-3 days for re-evaluation of symptoms and repeat labs  Return to emergency department with new or worsening symptoms      Clinical Impression  Final diagnoses:   Right flank pain   Leukopenia

## 2020-01-06 ENCOUNTER — APPOINTMENT (EMERGENCY)
Dept: RADIOLOGY | Facility: HOSPITAL | Age: 60
End: 2020-01-06
Payer: COMMERCIAL

## 2020-01-06 ENCOUNTER — HOSPITAL ENCOUNTER (EMERGENCY)
Facility: HOSPITAL | Age: 60
Discharge: HOME | End: 2020-01-06
Attending: EMERGENCY MEDICINE | Admitting: EMERGENCY MEDICINE
Payer: COMMERCIAL

## 2020-01-06 VITALS
HEIGHT: 64 IN | SYSTOLIC BLOOD PRESSURE: 104 MMHG | RESPIRATION RATE: 20 BRPM | DIASTOLIC BLOOD PRESSURE: 68 MMHG | BODY MASS INDEX: 30.05 KG/M2 | WEIGHT: 176 LBS | HEART RATE: 70 BPM | TEMPERATURE: 97.9 F | OXYGEN SATURATION: 95 %

## 2020-01-06 DIAGNOSIS — G44.59 HEADACHE SYNDROME, COMPLICATED: Primary | ICD-10-CM

## 2020-01-06 LAB
ATRIAL RATE: 69 BPM
P AXIS: 71 DEGREES
PR INTERVAL: 170 MS
QRS AXIS: 51 DEGREES
QRSD INTERVAL: 82 MS
QT INTERVAL: 382 MS
QTC INTERVAL: 409 MS
T WAVE AXIS: 43 DEGREES
VENTRICULAR RATE: 69 BPM

## 2020-01-06 PROCEDURE — 93005 ELECTROCARDIOGRAM TRACING: CPT

## 2020-01-06 PROCEDURE — 99284 EMERGENCY DEPT VISIT MOD MDM: CPT

## 2020-01-06 PROCEDURE — 93010 ELECTROCARDIOGRAM REPORT: CPT | Performed by: INTERNAL MEDICINE

## 2020-01-06 PROCEDURE — 96372 THER/PROPH/DIAG INJ SC/IM: CPT

## 2020-01-06 PROCEDURE — 96375 TX/PRO/DX INJ NEW DRUG ADDON: CPT

## 2020-01-06 PROCEDURE — 99284 EMERGENCY DEPT VISIT MOD MDM: CPT | Performed by: EMERGENCY MEDICINE

## 2020-01-06 PROCEDURE — 96365 THER/PROPH/DIAG IV INF INIT: CPT

## 2020-01-06 PROCEDURE — 96366 THER/PROPH/DIAG IV INF ADDON: CPT

## 2020-01-06 PROCEDURE — 70450 CT HEAD/BRAIN W/O DYE: CPT

## 2020-01-06 RX ORDER — METOCLOPRAMIDE HYDROCHLORIDE 5 MG/ML
10 INJECTION INTRAMUSCULAR; INTRAVENOUS ONCE
Status: COMPLETED | OUTPATIENT
Start: 2020-01-06 | End: 2020-01-06

## 2020-01-06 RX ORDER — MAGNESIUM SULFATE HEPTAHYDRATE 40 MG/ML
2 INJECTION, SOLUTION INTRAVENOUS ONCE
Status: COMPLETED | OUTPATIENT
Start: 2020-01-06 | End: 2020-01-06

## 2020-01-06 RX ORDER — HALOPERIDOL 5 MG/ML
5 INJECTION INTRAMUSCULAR ONCE
Status: COMPLETED | OUTPATIENT
Start: 2020-01-06 | End: 2020-01-06

## 2020-01-06 RX ADMIN — MAGNESIUM SULFATE HEPTAHYDRATE 2 G: 40 INJECTION, SOLUTION INTRAVENOUS at 02:25

## 2020-01-06 RX ADMIN — HALOPERIDOL LACTATE 5 MG: 5 INJECTION INTRAMUSCULAR at 03:10

## 2020-01-06 RX ADMIN — METOCLOPRAMIDE 10 MG: 5 INJECTION, SOLUTION INTRAMUSCULAR; INTRAVENOUS at 02:12

## 2020-01-06 NOTE — ED ATTENDING ATTESTATION
1/6/2020  IGinna MD, saw and evaluated the patient  I have discussed the patient with the resident/non-physician practitioner and agree with the resident's/non-physician practitioner's findings, Plan of Care, and MDM as documented in the resident's/non-physician practitioner's note, except where noted  All available labs and Radiology studies were reviewed  I was present for key portions of any procedure(s) performed by the resident/non-physician practitioner and I was immediately available to provide assistance  At this point I agree with the current assessment done in the Emergency Department  I have conducted an independent evaluation of this patient a history and physical is as follows:    ED Course     Emergency Department Note- Olinda Cuevas 60 y o  female MRN: [id number]    Unit/Bed#: ED 15 Encounter: [id number]    Olinda Cuevas is a 60 y o  female who presents with   Chief Complaint   Patient presents with    Headache     patient c/o headache and right sided chest pain  States "everyone at work has pneumonia, I don't know what this is"  Patient seen on Friday for similar symptoms         History of Present Illness   HPI:  Olinda Cuevas is a 60 y o  female who presents for evaluation of:  Persistent severe, bilateral, bifrontal headache since Friday  Her headache is taking her appetite away  Patient was here on Friday for similar symptoms  Review of Systems   Constitutional: Positive for chills and fever (last Thursday)  Cardiovascular: Negative for chest pain and palpitations  Gastrointestinal: Positive for nausea  Negative for vomiting  All other systems reviewed and are negative        Historical Information   Past Medical History:   Diagnosis Date    Carpal tunnel syndrome of right wrist     Chest pain     Depressive disorder 12/2/2014    Diabetes ([facility] [address] ) 12/2/2014    [facility]    Esophageal reflux 5/23/2011    Essential hypertension 2014    Grade 1 out of 6 intensity murmur 2019    Hypothyroid 2014    Iron deficiency anemia 10/15/2019    Knee osteoarthritis 2014    Pacemaker 2018    Trigger finger, left      Past Surgical History:   Procedure Laterality Date    CARDIAC PACEMAKER PLACEMENT  2018    CERVICAL [address] SURGERY  2009    PLATES & SCREWS     SECTION      X2    GASTRIC BYPASS  2017    LAP RINYGB SURGERY    KNEE SURGERY Bilateral     OTHER SURGICAL HISTORY      ARM SURGERIES    AR INCISE FINGER TENDON SHEATH Left 2018    Procedure: LONG TRIGGER FINGER RELEASE;  Surgeon: Marco Antonio Grajeda MD;  [facility];  Service: Orthopedics    AR WRIST Sidonie Shelling LIG Right 2018    Procedure: ENDOSCOPIC CARPAL TUNNEL RELEASE;  Surgeon: Marco Antonio Grajeda MD;  [facility];  Service: Orthopedics    TONSILLECTOMY       Social History   Social History     Substance and Sexual Activity   Alcohol Use No    Alcohol/week: 2 0 standard drinks    Types: 1 Glasses of wine, 1 Standard drinks or equivalent per week    Comment: very rare social     Social History     Substance and Sexual Activity   Drug Use No     Social History     Tobacco Use   Smoking Status Former Smoker    Packs/day: 0 25    Types: Cigarettes   Smokeless Tobacco Never Used   Tobacco Comment    quit "many years ago"     Family History: non-contributory    Meds/Allergies   all medications and allergies reviewed  No Known Allergies    Objective   First Vitals:   Blood Pressure: 109/67 (20)  Pulse: 73 (20)  Temperature: 97 9 °F (36 6 °C) (20)  Temp Source: Oral (20)  Respirations: 20 (20)  Height: 5' 4" (162 6 cm) (20)  Weight - Scale: 79 8 kg (176 lb) (20)  SpO2: 96 % (20)    Current Vitals:   Blood Pressure: 109/67 (20)  Pulse: 73 (20)  Temperature: 97 9 °F (36 6 °C) (20 7590)  Temp Source: Oral (20)  Respirations: 20 (20)  Height: 5' 4" (162 6 cm) (20)  Weight - Scale: 79 8 kg (176 lb) (20)  SpO2: 96 % (20)    No intake or output data in the 24 hours ending 20 0151    Invasive Devices     Peripheral Intravenous Line            Peripheral IV 20 Left Antecubital less than 1 day          Airway            Non-Surgical Airway Oral pharyngeal airway 90 mm 430 days                Physical Exam   Constitutional: She is oriented to person, place, and time  She appears well-developed and well-nourished  HENT:   Head: Normocephalic and atraumatic  Cardiovascular: Normal rate and regular rhythm  Pulmonary/Chest: Effort normal and breath sounds normal    Abdominal: Soft  Bowel sounds are normal    Musculoskeletal: Normal range of motion  She exhibits no deformity  Neurological: She is alert and oriented to person, place, and time  Skin: Capillary refill takes less than 2 seconds  Psychiatric: She has a normal mood and affect  Her behavior is normal  Judgment and thought content normal    Nursing note and vitals reviewed  Female in no distress    Medical Decision Makin  Complicated headache: plan pain control; CTH r/o anbnormality    No results found for this or any previous visit (from the past 36 hour(s))  CT head without contrast    (Results Pending)         Portions of the record may have been created with voice recognition software  Occasional wrong word or "sound a like" substitutions may have occurred due to the inherent limitations of voice recognition software  Read the chart carefully and recognize, using context, where substitutions have occurred          Critical Care Time  Procedures

## 2020-01-06 NOTE — ED PROVIDER NOTES
History  Chief Complaint   Patient presents with    Headache     patient c/o headache and right sided chest pain  States "everyone at work has pneumonia, I don't know what this is"  Patient seen on Friday for similar symptoms     21-year-old woman presents evaluation headache  Headache started 4 days ago  Is frontal   It does not radiate  It is worse with movement and relieved with rest severe  She has been for she has had migraines past but they have not lasted this long  Patient was recently treated for sinusitis  She had a similar headache fall being treated put has gotten worse  The rhinorrhea has improved  She denies neck pain  She denies change in vision  She does have photophobia  Denies numbness weakness, tingling in her extremities  She also complains of diffuse abdominal aching pain  She was seen here 2 days ago and had a CT of her abdomen and pelvis that was unremarkable  She was given IV morphine with improvement of abdominal pain and headache at that time  She denies fever, chills, vomiting  She is nauseous  Prior to Admission Medications   Prescriptions Last Dose Informant Patient Reported? Taking?    Ascorbic Acid (VITAMIN C) 100 MG CHEW  Self Yes No   Sig: Chew   B Complex Vitamins (VITAMIN B COMPLEX PO)  Self Yes No   Sig: Take 1 capsule by mouth   Biotin 10 MG TABS  Self Yes No   Sig: Take by mouth   ELIQUIS 2 5 MG  Self No No   Sig: TAKE 1 TABLET BY MOUTH TWICE DAILY   LYSINE PO  Self Yes No   Sig: Take by mouth   Multiple Vitamin (MULTIVITAMIN) capsule  Self Yes No   Sig: Take 1 capsule by mouth daily   atorvastatin (LIPITOR) 10 mg tablet  Self No No   Sig: take 1 tablet by mouth once daily   cholecalciferol (VITAMIN D3) 1,000 units tablet  Self Yes No   Sig: Take 1,000 Units by mouth 2 (two) times a day   clobetasol (TEMOVATE) 0 05 % cream  Self Yes No   Sig: Apply to affected areas daily x 12 weeks, then twice weekly for maintenance   famotidine (PEPCID) 20 mg tablet No No   Sig: Take 1 tablet (20 mg total) by mouth daily   ferrous sulfate 325 (65 Fe) mg tablet  Self Yes No   Sig: Take 325 mg by mouth daily with breakfast   fluticasone (FLONASE) 50 mcg/act nasal spray  Self No No   Si sprays into each nostril daily   nystatin (MYCOSTATIN) powder  Self No No   Sig: Apply topically 2 (two) times a day   sulfamethoxazole-trimethoprim (BACTRIM DS) 800-160 mg per tablet   No No   Sig: Take 1 tablet by mouth every 12 (twelve) hours for 20 doses   valACYclovir (VALTREX) 500 mg tablet  Self No No   Sig: Take 1 tablet (500 mg total) by mouth 2 (two) times a day for 10 days      Facility-Administered Medications: None       Past Medical History:   Diagnosis Date    Carpal tunnel syndrome of right wrist     Chest pain     Depressive disorder 2014    [facility] ([facility] [address] ) 2014    [facility] )     Esophageal reflux 2011    Essential hypertension 2014    Grade 1 out of 6 intensity murmur 2019    Hypothyroid 2014    Iron deficiency anemia 10/15/2019    Knee osteoarthritis 2014    Pacemaker 2018    Trigger finger, left        Past Surgical History:   Procedure Laterality Date    CARDIAC PACEMAKER PLACEMENT  2018    CERVICAL DISC SURGERY  2009    PLATES & SCREWS     SECTION      X2    GASTRIC BYPASS  2017    LAP RINYGB SURGERY    KNEE SURGERY Bilateral     OTHER SURGICAL HISTORY      ARM SURGERIES    IL INCISE FINGER TENDON SHEATH Left 2018    Procedure: LONG TRIGGER FINGER RELEASE;  Surgeon: Noelle Carrion MD;  [facility];  Service: Orthopedics    IL WRIST Jyoti Last LIG Right 2018    Procedure: ENDOSCOPIC CARPAL TUNNEL RELEASE;  Surgeon: Noelle Carrion MD;  [facility];  Service: Orthopedics    TONSILLECTOMY         Family History   Problem Relation Age of Onset    Stroke Mother     Alzheimer's disease Mother     Arthritis Mother    Norberto Arita Coronary artery disease Mother     Breast cancer Mother 77    Cancer Mother     Heart disease Mother     Hypertension Father     Gout Father     Diabetes type II Father     Coronary artery disease Father     Heart disease Father     No Known Problems Sister     No Known Problems Daughter     No Known Problems Maternal Grandmother     No Known Problems Maternal Grandfather     No Known Problems Paternal Grandmother     No Known Problems Paternal Grandfather     Prostate cancer Brother     No Known Problems Sister     No Known Problems Sister     No Known Problems Maternal Aunt     No Known Problems Maternal Aunt     No Known Problems Maternal Aunt     No Known Problems Maternal Aunt     Cancer Maternal Aunt     No Known Problems Paternal Aunt     No Known Problems Paternal Aunt      I have reviewed and agree with the history as documented  Social History     Tobacco Use    Smoking status: Former Smoker     Packs/day: 0 25     Types: Cigarettes    Smokeless tobacco: Never Used    Tobacco comment: quit "many years ago"   Substance Use Topics    Alcohol use: No     Alcohol/week: 2 0 standard drinks     Types: 1 Glasses of wine, 1 Standard drinks or equivalent per week     Comment: very rare social    Drug use: No        Review of Systems   Constitutional: Negative for appetite change, chills, diaphoresis, fatigue and fever  HENT: Negative for congestion, rhinorrhea and sore throat  Eyes: Positive for photophobia  Respiratory: Negative for cough, shortness of breath, wheezing and stridor  Cardiovascular: Negative for chest pain, palpitations and leg swelling  Gastrointestinal: Positive for abdominal pain  Negative for abdominal distention, constipation, diarrhea, nausea and vomiting  Endocrine: Negative for polydipsia and polyuria  Genitourinary: Negative for dysuria and hematuria  Musculoskeletal: Negative for back pain, neck pain and neck stiffness     Skin: Negative for pallor, rash and wound  Neurological: Positive for headaches  Negative for dizziness and light-headedness  Psychiatric/Behavioral: Negative for behavioral problems and confusion  All other systems reviewed and are negative  Physical Exam  ED Triage Vitals [01/06/20 0056]   Temperature Pulse Respirations Blood Pressure SpO2   97 9 °F (36 6 °C) 73 20 109/67 96 %      Temp Source Heart Rate Source Patient Position - Orthostatic VS BP Location FiO2 (%)   Oral Monitor -- -- --      Pain Score       Worst Possible Pain             Orthostatic Vital Signs  Vitals:    01/06/20 0056 01/06/20 0225 01/06/20 0330   BP: 109/67 99/57 104/68   Pulse: 73 67 70       Physical Exam   Constitutional: She is oriented to person, place, and time  She appears well-developed and well-nourished  No distress  HENT:   Head: Normocephalic and atraumatic  Eyes: Pupils are equal, round, and reactive to light  Conjunctivae and EOM are normal  No scleral icterus  Right eye exhibits no nystagmus  Left eye exhibits no nystagmus  Neck: Normal range of motion  Neck supple  Cardiovascular: Normal rate, regular rhythm, normal heart sounds and intact distal pulses  No murmur heard  Pulmonary/Chest: Effort normal and breath sounds normal  No stridor  No respiratory distress  She has no wheezes  She has no rales  She exhibits no tenderness  Abdominal: Soft  Bowel sounds are normal  She exhibits no distension and no mass  There is no tenderness  There is no rebound and no guarding  Musculoskeletal: Normal range of motion  She exhibits no edema, tenderness or deformity  Neurological: She is alert and oriented to person, place, and time  She has normal strength  No cranial nerve deficit or sensory deficit  She exhibits normal muscle tone  Coordination normal  GCS eye subscore is 4  GCS verbal subscore is 5  GCS motor subscore is 6  Skin: Skin is warm and dry  No rash noted  She is not diaphoretic  No erythema  No pallor  Psychiatric: She has a normal mood and affect  Her behavior is normal    Nursing note and vitals reviewed  ED Medications  Medications   magnesium sulfate 2 g/50 mL IVPB (premix) 2 g (0 g Intravenous Stopped 1/6/20 0419)   metoclopramide (REGLAN) injection 10 mg (10 mg Intravenous Given 1/6/20 0212)   haloperidol lactate (HALDOL) injection 5 mg (5 mg Intramuscular Given 1/6/20 0310)       Diagnostic Studies  Results Reviewed     None                 CT head without contrast   Final Result by Fanny Lacy MD (01/06 0249)      No acute intracranial abnormality  There is moderate microangiopathic change, progressed when compared with April 29, 2012  Workstation performed: [id number]               Procedures  Procedures      ED Course                               MDM  Number of Diagnoses or Management Options  Headache syndrome, complicated: new and requires workup  Diagnosis management comments: 9year-old woman presents with severe frontal headache  The headache is last for 4 days and gradually worsening  Patient appears moderately distressed  She has an otherwise benign physical exam   Neuro exam nonfocal   Unclear etiology  Concern for mass as the headache is somewhat positional and atypical   Will check CT head  Patient had extensive lab workup 2 days ago  Will not repeat  Will give IV Reglan and 2 g of Mag for symptoms  Reassess  Patient has mild improvement on re-evaluation  She is still symptomatic  Will give IM Haldol  Reassess  Patient's pain much improved on re-evaluation  She now only complains of mild abdominal soreness  Patient instructed to continue Tylenol as needed for headache  Work note provided  Return precautions discussed         Amount and/or Complexity of Data Reviewed  Clinical lab tests: reviewed  Tests in the radiology section of CPT®: ordered and reviewed  Decide to obtain previous medical records or to obtain history from someone other than the patient: yes  Obtain history from someone other than the patient: yes  Review and summarize past medical records: yes  Discuss the patient with other providers: yes  Independent visualization of images, tracings, or specimens: yes    Risk of Complications, Morbidity, and/or Mortality  Presenting problems: low  Diagnostic procedures: low  Management options: low    Patient Progress  Patient progress: stable        Disposition  Final diagnoses:   Headache syndrome, complicated     Time reflects when diagnosis was documented in both MDM as applicable and the Disposition within this note     Time User Action Codes Description Comment    1/6/2020  4:00 AM Jocelyne Drown Add [R51] Headache     1/6/2020  4:01 AM Benjamin Escalera [R51] Headache     1/6/2020  4:01 AM Jocelyne Drown Add [G44 59] Headache syndrome, complicated       ED Disposition     ED Disposition Condition Date/Time Comment    Discharge Stable Mon Jan 6, 2020  4:00 AM Dhaval Sharp discharge to home/self care              Follow-up Information     Follow up With Specialties Details Why Contact Info    Velia Muñoz MD Family Medicine   [facility] [address]  [phone]            Discharge Medication List as of 1/6/2020  4:03 AM      CONTINUE these medications which have NOT CHANGED    Details   Ascorbic Acid (VITAMIN C) 100 MG CHEW Chew, Historical Med      atorvastatin (LIPITOR) 10 mg tablet take 1 tablet by mouth once daily, Normal      B Complex Vitamins (VITAMIN B COMPLEX PO) Take 1 capsule by mouth, Historical Med      Biotin 10 MG TABS Take by mouth, Historical Med      cholecalciferol (VITAMIN D3) 1,000 units tablet Take 1,000 Units by mouth 2 (two) times a day, Historical Med      clobetasol (TEMOVATE) 0 05 % cream Apply to affected areas daily x 12 weeks, then twice weekly for maintenance, Historical Med      ELIQUIS 2 5 MG TAKE 1 TABLET BY MOUTH TWICE DAILY, Normal      famotidine (PEPCID) 20 mg tablet Take 1 tablet (20 mg total) by mouth daily, Starting Fri 12/13/2019, Normal      ferrous sulfate 325 (65 Fe) mg tablet Take 325 mg by mouth daily with breakfast, Historical Med      fluticasone (FLONASE) 50 mcg/act nasal spray 2 sprays into each nostril daily, Starting Mon 12/24/2018, Normal      LYSINE PO Take by mouth, Historical Med      Multiple Vitamin (MULTIVITAMIN) capsule Take 1 capsule by mouth daily, Historical Med      nystatin (MYCOSTATIN) powder Apply topically 2 (two) times a day, Starting Fri 4/5/2019, Normal      sulfamethoxazole-trimethoprim (BACTRIM DS) 800-160 mg per tablet Take 1 tablet by mouth every 12 (twelve) hours for 20 doses, Starting Fri 12/27/2019, Until Mon 1/6/2020, Normal      valACYclovir (VALTREX) 500 mg tablet Take 1 tablet (500 mg total) by mouth 2 (two) times a day for 10 days, Starting Fri 4/19/2019, Until Wed 12/11/2019, Normal           No discharge procedures on file  ED Provider  Attending physically available and evaluated Nancy Brisa BENAVIDES managed the patient along with the ED Attending      Electronically Signed by         Wei Rapp MD  01/06/20 9207

## 2020-01-07 ENCOUNTER — OFFICE VISIT (OUTPATIENT)
Dept: FAMILY MEDICINE CLINIC | Facility: CLINIC | Age: 60
End: 2020-01-07
Payer: COMMERCIAL

## 2020-01-07 VITALS
SYSTOLIC BLOOD PRESSURE: 106 MMHG | TEMPERATURE: 97.9 F | HEIGHT: 64 IN | WEIGHT: 167.8 LBS | RESPIRATION RATE: 16 BRPM | OXYGEN SATURATION: 97 % | DIASTOLIC BLOOD PRESSURE: 60 MMHG | BODY MASS INDEX: 28.65 KG/M2 | HEART RATE: 90 BPM

## 2020-01-07 DIAGNOSIS — D64.9 ANEMIA, UNSPECIFIED TYPE: ICD-10-CM

## 2020-01-07 DIAGNOSIS — B37.2 CANDIDA INFECTION OF FLEXURAL SKIN: ICD-10-CM

## 2020-01-07 DIAGNOSIS — D72.819 LEUKOPENIA, UNSPECIFIED TYPE: Primary | ICD-10-CM

## 2020-01-07 PROCEDURE — 3008F BODY MASS INDEX DOCD: CPT | Performed by: PHYSICIAN ASSISTANT

## 2020-01-07 PROCEDURE — 99213 OFFICE O/P EST LOW 20 MIN: CPT | Performed by: PHYSICIAN ASSISTANT

## 2020-01-07 PROCEDURE — 99051 MED SERV EVE/WKEND/HOLIDAY: CPT | Performed by: PHYSICIAN ASSISTANT

## 2020-01-07 RX ORDER — CLOBETASOL PROPIONATE 0.5 MG/G
CREAM TOPICAL
Qty: 30 G | Status: CANCELLED | OUTPATIENT
Start: 2020-01-07

## 2020-01-07 RX ORDER — NYSTATIN 100000 [USP'U]/G
POWDER TOPICAL 2 TIMES DAILY
Qty: 30 G | Refills: 0 | Status: SHIPPED | OUTPATIENT
Start: 2020-01-07 | End: 2022-06-04 | Stop reason: SDUPTHER

## 2020-01-07 NOTE — PROGRESS NOTES
Assessment/Plan:    -ER note has been reviewed  -recent CBC does reveal mild anemia and leukopenia  -recommend repeat CBC next month  -she has been advised to consult with her gynecologist for the clobetasol for the vaginal region  I did advise her that steroid creams are not recommended for long-term use for that area    M*Modal software was used to dictate this note  It may contain errors with dictating incorrect words/spelling  Please contact provider directly for any questions  Diagnoses and all orders for this visit:    Leukopenia, unspecified type  -     CBC and differential    Anemia, unspecified type  -     CBC and differential    Other orders  -     Cancel: clobetasol (TEMOVATE) 0 05 % cream          Subjective:      Patient ID: Babatunde Bernstein is a 60 y o  female  Patient presents today for follow-up from a recent ER visit 2 days ago for a headache, fever  She states her symptoms resolved  She states she is only here now because her white cell count was low at that time  She is also requesting a prescription for clobetasol to apply vaginally  She states that her gynecologist gave it to her in the past       The following portions of the patient's history were reviewed and updated as appropriate:   She  has a past medical history of Carpal tunnel syndrome of right wrist, Chest pain, Depressive disorder (12/2/2014), Diabetes ([facility] [address] ) (12/2/2014), DVT (deep venous thrombosis) ([facility] [address] ) (2012), Esophageal reflux (5/23/2011), Essential hypertension (12/2/2014), Grade 1 out of 6 intensity murmur (6/24/2019), Hypothyroid (12/2/2014), Iron deficiency anemia (10/15/2019), Knee osteoarthritis (12/2/2014), Pacemaker (11/01/2018), and Trigger finger, left    She   Patient Active Problem List    Diagnosis Date Noted    Leukopenia 01/07/2020    Anemia 01/07/2020    Acute upper respiratory infection 12/13/2019    Acute superficial gastritis without hemorrhage 12/13/2019    Chronic right shoulder pain 12/10/2019    Work related injury 12/10/2019    History of DVT (deep vein thrombosis) 10/15/2019    Iron deficiency anemia 10/15/2019    Vitamin deficiency 10/15/2019    Medicare annual wellness visit, initial 10/15/2019    Mass of soft tissue of right upper extremity 10/15/2019    Grade 1 out of 6 intensity murmur 2019    Encounter for screening mammogram for malignant neoplasm of breast 2019    Candida infection of flexural skin 2019    Status post carpal tunnel release 2019    HSV-1 infection 2018    Sick sinus syndrome ([facility] [address] ) 2018    Abnormal CT scan     Lichen sclerosus     Fibrocystic breast disease 2014    Mixed hyperlipidemia 2014    Knee osteoarthritis 2014    Menopausal and postmenopausal disorder 2014    Neck pain 2014     She  has a past surgical history that includes Knee surgery (Bilateral);  section; Cervical disc surgery (2009); Tonsillectomy; Other surgical history; Gastric bypass (2017); pr wrist arthroscop,release xvers lig (Right, 2018); pr incise finger tendon sheath (Left, 2018); and Cardiac pacemaker placement (2018)  Her family history includes Alzheimer's disease in her mother; Arthritis in her mother; Breast cancer (age of onset: 77) in her mother; Cancer in her maternal aunt and mother; Coronary artery disease in her father and mother; Diabetes type II in her father; Gout in her father; Heart disease in her father and mother; Hypertension in her father; No Known Problems in her daughter, maternal aunt, maternal aunt, maternal aunt, maternal aunt, maternal grandfather, maternal grandmother, paternal aunt, paternal aunt, paternal grandfather, paternal grandmother, sister, sister, and sister; Prostate cancer in her brother; Stroke in her mother  She  reports that she has quit smoking  Her smoking use included cigarettes  She smoked 0 25 packs per day  She has never used smokeless tobacco  She reports that she does not drink alcohol or use drugs  Current Outpatient Medications   Medication Sig Dispense Refill    Ascorbic Acid (VITAMIN C) 100 MG CHEW Chew      atorvastatin (LIPITOR) 10 mg tablet take 1 tablet by mouth once daily 90 tablet 2    B Complex Vitamins (VITAMIN B COMPLEX PO) Take 1 capsule by mouth      Biotin 10 MG TABS Take by mouth      cholecalciferol (VITAMIN D3) 1,000 units tablet Take 1,000 Units by mouth 2 (two) times a day      clobetasol (TEMOVATE) 0 05 % cream Apply to affected areas daily x 12 weeks, then twice weekly for maintenance      ELIQUIS 2 5 MG TAKE 1 TABLET BY MOUTH TWICE DAILY 60 tablet 2    famotidine (PEPCID) 20 mg tablet Take 1 tablet (20 mg total) by mouth daily 90 tablet 0    ferrous sulfate 325 (65 Fe) mg tablet Take 325 mg by mouth daily with breakfast      fluticasone (FLONASE) 50 mcg/act nasal spray 2 sprays into each nostril daily 16 g 0    LYSINE PO Take by mouth      Multiple Vitamin (MULTIVITAMIN) capsule Take 1 capsule by mouth daily      nystatin (MYCOSTATIN) powder Apply topically 2 (two) times a day 30 g 0    valACYclovir (VALTREX) 500 mg tablet Take 1 tablet (500 mg total) by mouth 2 (two) times a day for 10 days 20 tablet 1     No current facility-administered medications for this visit        Current Outpatient Medications on File Prior to Visit   Medication Sig    Ascorbic Acid (VITAMIN C) 100 MG CHEW Chew    atorvastatin (LIPITOR) 10 mg tablet take 1 tablet by mouth once daily    B Complex Vitamins (VITAMIN B COMPLEX PO) Take 1 capsule by mouth    Biotin 10 MG TABS Take by mouth    cholecalciferol (VITAMIN D3) 1,000 units tablet Take 1,000 Units by mouth 2 (two) times a day    clobetasol (TEMOVATE) 0 05 % cream Apply to affected areas daily x 12 weeks, then twice weekly for maintenance    ELIQUIS 2 5 MG TAKE 1 TABLET BY MOUTH TWICE DAILY    famotidine (PEPCID) 20 mg tablet Take 1 tablet (20 mg total) by mouth daily    ferrous sulfate 325 (65 Fe) mg tablet Take 325 mg by mouth daily with breakfast    fluticasone (FLONASE) 50 mcg/act nasal spray 2 sprays into each nostril daily    LYSINE PO Take by mouth    Multiple Vitamin (MULTIVITAMIN) capsule Take 1 capsule by mouth daily    [] sulfamethoxazole-trimethoprim (BACTRIM DS) 800-160 mg per tablet Take 1 tablet by mouth every 12 (twelve) hours for 20 doses    valACYclovir (VALTREX) 500 mg tablet Take 1 tablet (500 mg total) by mouth 2 (two) times a day for 10 days    [DISCONTINUED] nystatin (MYCOSTATIN) powder Apply topically 2 (two) times a day     No current facility-administered medications on file prior to visit  She has No Known Allergies       Review of Systems   Genitourinary:        Vaginal irritation         Objective:      /60 (BP Location: Right arm, Patient Position: Sitting, Cuff Size: Large)   Pulse 90   Temp 97 9 °F (36 6 °C) (Tympanic)   Resp 16   Ht 5' 4" (1 626 m)   Wt 76 1 kg (167 lb 12 8 oz)   LMP 2014   SpO2 97%   BMI 28 80 kg/m²          Physical Exam   Constitutional: She appears well-developed and well-nourished  No distress  HENT:   Head: Normocephalic and atraumatic     No further exam at this time

## 2020-01-07 NOTE — LETTER
January 7, 2020     Patient: Cory Casatneda   YOB: 1960   Date of Visit: 1/7/2020       To Whom it May Concern:    Cory Castaneda is under my professional care  She was seen in in the emergency room on 01/05/2020  She is able to return to work tomorrow  If you have any questions or concerns, please don't hesitate to call           Sincerely,          Samantha Medina PA-C        CC: No Recipients

## 2020-01-09 ENCOUNTER — TELEPHONE (OUTPATIENT)
Dept: FAMILY MEDICINE CLINIC | Facility: CLINIC | Age: 60
End: 2020-01-09

## 2020-01-09 NOTE — TELEPHONE ENCOUNTER
Pt is aware of results and agreed to follow up with physical therapy as scheduled    She will let us know if she wants to be referred to ortho once she starts therapy

## 2020-01-09 NOTE — TELEPHONE ENCOUNTER
----- Message from Alan Lee PA-C sent at 1/9/2020  8:37 AM EST -----  X-ray of the shoulder does not reveal any bony abnormalities  She needs to follow up with physical therapy which I do see she has an appointment on January 17th  I also recommended Orthopedic surgery but I did not seen appointment in the system

## 2020-01-21 ENCOUNTER — OFFICE VISIT (OUTPATIENT)
Dept: FAMILY MEDICINE CLINIC | Facility: CLINIC | Age: 60
End: 2020-01-21
Payer: COMMERCIAL

## 2020-01-21 VITALS
BODY MASS INDEX: 29.02 KG/M2 | RESPIRATION RATE: 16 BRPM | HEIGHT: 64 IN | TEMPERATURE: 98 F | DIASTOLIC BLOOD PRESSURE: 80 MMHG | OXYGEN SATURATION: 98 % | SYSTOLIC BLOOD PRESSURE: 124 MMHG | HEART RATE: 84 BPM | WEIGHT: 170 LBS

## 2020-01-21 DIAGNOSIS — K29.00 ACUTE SUPERFICIAL GASTRITIS WITHOUT HEMORRHAGE: Primary | ICD-10-CM

## 2020-01-21 DIAGNOSIS — H92.01 RIGHT EAR PAIN: ICD-10-CM

## 2020-01-21 DIAGNOSIS — I49.5 SICK SINUS SYNDROME (HCC): ICD-10-CM

## 2020-01-21 PROCEDURE — 1036F TOBACCO NON-USER: CPT | Performed by: PHYSICIAN ASSISTANT

## 2020-01-21 PROCEDURE — 99213 OFFICE O/P EST LOW 20 MIN: CPT | Performed by: PHYSICIAN ASSISTANT

## 2020-01-21 RX ORDER — OMEPRAZOLE 20 MG/1
20 CAPSULE, DELAYED RELEASE ORAL DAILY
Qty: 90 CAPSULE | Refills: 1 | Status: SHIPPED | OUTPATIENT
Start: 2020-01-21 | End: 2020-04-27

## 2020-01-21 NOTE — PROGRESS NOTES
Assessment/Plan:    Patient requested a urine pregnancy even though she is menopausal   The test was negative   -discontinue Famotidine 20 mg daily  -start omeprazole 20 mg daily at bedtime  -refer to gastroenterology for continued symptoms  -follow-up with Cardiology for sick sinus syndrome as scheduled in April   -advised that right ear does not reveal any infection at this time  Follow-up if symptoms persist    M*Modal software was used to dictate this note  It may contain errors with dictating incorrect words/spelling  Please contact provider directly for any questions  Diagnoses and all orders for this visit:    Acute superficial gastritis without hemorrhage  -     omeprazole (PriLOSEC) 20 mg delayed release capsule; Take 1 capsule (20 mg total) by mouth daily  -     Ambulatory referral to Gastroenterology; Future    Sick sinus syndrome (HCC)    Right ear pain          Subjective:      Patient ID: Candy Nicole is a 60 y o  female  Patient presents today for follow-up of gastritis  She has been taking the Famotidine 20 mg daily as directed with no improvement  She states that she was on omeprazole years ago prior to her gastric bypass surgery  She denies any nausea, vomiting or blood in her stools  She states that she does feel some dizziness when she bends forward  She is requesting a urine pregnancy test even though she has been menopausal   She does have an upcoming appointment with Cardiology in April for sick sinus syndrome        The following portions of the patient's history were reviewed and updated as appropriate:   She  has a past medical history of Carpal tunnel syndrome of right wrist, Chest pain, Depressive disorder (12/2/2014), Diabetes ([address] ) (12/2/2014), DVT (deep venous thrombosis) ([facility] [address] ) (2012), Esophageal reflux (5/23/2011), Essential hypertension (12/2/2014), Grade 1 out of 6 intensity murmur (6/24/2019), Hypothyroid (12/2/2014), Iron deficiency anemia (10/15/2019), Knee osteoarthritis (2014), Pacemaker (2018), and Trigger finger, left  She   Patient Active Problem List    Diagnosis Date Noted    Right ear pain 2020    Leukopenia 2020    Anemia 2020    Acute upper respiratory infection 2019    Acute superficial gastritis without hemorrhage 2019    Chronic right shoulder pain 12/10/2019    Work related injury 12/10/2019    History of DVT (deep vein thrombosis) 10/15/2019    Iron deficiency anemia 10/15/2019    Vitamin deficiency 10/15/2019    Medicare annual wellness visit, initial 10/15/2019    Mass of soft tissue of right upper extremity 10/15/2019    Grade 1 out of 6 intensity murmur 2019    Encounter for screening mammogram for malignant neoplasm of breast 2019    Candida infection of flexural skin 2019    Status post carpal tunnel release 2019    HSV-1 infection 2018    Sick sinus syndrome ([address] ) 2018    Abnormal CT scan     Lichen sclerosus     Fibrocystic breast disease 2014    Mixed hyperlipidemia 2014    Knee osteoarthritis 2014    Menopausal and postmenopausal disorder 2014    Neck pain 2014     She  has a past surgical history that includes Knee surgery (Bilateral);  section; Cervical disc surgery (2009); Tonsillectomy; Other surgical history; Gastric bypass (2017); pr wrist arthroscop,release xvers lig (Right, 2018); pr incise finger tendon sheath (Left, 2018); and Cardiac pacemaker placement (2018)    Her family history includes Alzheimer's disease in her mother; Arthritis in her mother; Breast cancer (age of onset: 77) in her mother; Cancer in her maternal aunt and mother; Coronary artery disease in her father and mother; Diabetes type II in her father; Gout in her father; Heart disease in her father and mother; Hypertension in her father; No Known Problems in her daughter, maternal aunt, maternal aunt, maternal aunt, maternal aunt, maternal grandfather, maternal grandmother, paternal aunt, paternal aunt, paternal grandfather, paternal grandmother, sister, sister, and sister; Prostate cancer in her brother; Stroke in her mother  She  reports that she has quit smoking  Her smoking use included cigarettes  She smoked 0 25 packs per day  She has never used smokeless tobacco  She reports that she does not drink alcohol or use drugs  Current Outpatient Medications   Medication Sig Dispense Refill    Ascorbic Acid (VITAMIN C) 100 MG CHEW Chew      atorvastatin (LIPITOR) 10 mg tablet take 1 tablet by mouth once daily 90 tablet 2    B Complex Vitamins (VITAMIN B COMPLEX PO) Take 1 capsule by mouth      Biotin 10 MG TABS Take by mouth      cholecalciferol (VITAMIN D3) 1,000 units tablet Take 1,000 Units by mouth 2 (two) times a day      clobetasol (TEMOVATE) 0 05 % cream Apply to affected areas daily x 12 weeks, then twice weekly for maintenance      ELIQUIS 2 5 MG TAKE 1 TABLET BY MOUTH TWICE DAILY 60 tablet 2    famotidine (PEPCID) 20 mg tablet Take 1 tablet (20 mg total) by mouth daily 90 tablet 0    ferrous sulfate 325 (65 Fe) mg tablet Take 325 mg by mouth daily with breakfast      fluticasone (FLONASE) 50 mcg/act nasal spray 2 sprays into each nostril daily 16 g 0    LYSINE PO Take by mouth      Multiple Vitamin (MULTIVITAMIN) capsule Take 1 capsule by mouth daily      nystatin (MYCOSTATIN) powder Apply topically 2 (two) times a day 30 g 0    omeprazole (PriLOSEC) 20 mg delayed release capsule Take 1 capsule (20 mg total) by mouth daily 90 capsule 1    valACYclovir (VALTREX) 500 mg tablet Take 1 tablet (500 mg total) by mouth 2 (two) times a day for 10 days 20 tablet 1     No current facility-administered medications for this visit        Current Outpatient Medications on File Prior to Visit   Medication Sig    Ascorbic Acid (VITAMIN C) 100 MG CHEW Chew    atorvastatin (LIPITOR) 10 mg tablet take 1 tablet by mouth once daily    B Complex Vitamins (VITAMIN B COMPLEX PO) Take 1 capsule by mouth    Biotin 10 MG TABS Take by mouth    cholecalciferol (VITAMIN D3) 1,000 units tablet Take 1,000 Units by mouth 2 (two) times a day    clobetasol (TEMOVATE) 0 05 % cream Apply to affected areas daily x 12 weeks, then twice weekly for maintenance    ELIQUIS 2 5 MG TAKE 1 TABLET BY MOUTH TWICE DAILY    famotidine (PEPCID) 20 mg tablet Take 1 tablet (20 mg total) by mouth daily    ferrous sulfate 325 (65 Fe) mg tablet Take 325 mg by mouth daily with breakfast    fluticasone (FLONASE) 50 mcg/act nasal spray 2 sprays into each nostril daily    LYSINE PO Take by mouth    Multiple Vitamin (MULTIVITAMIN) capsule Take 1 capsule by mouth daily    nystatin (MYCOSTATIN) powder Apply topically 2 (two) times a day    valACYclovir (VALTREX) 500 mg tablet Take 1 tablet (500 mg total) by mouth 2 (two) times a day for 10 days     No current facility-administered medications on file prior to visit  She has No Known Allergies       Review of Systems   HENT: Positive for ear pain (Complains of some pain on the right ear  )  Negative for congestion  Respiratory: Negative for cough  Musculoskeletal:        As stated in HPI   Neurological:        As stated in HPI         Objective:      /80 (BP Location: Left arm, Patient Position: Sitting, Cuff Size: Large)   Pulse 84   Temp 98 °F (36 7 °C) (Tympanic)   Resp 16   Ht 5' 4" (1 626 m)   Wt 77 1 kg (170 lb)   LMP 09/18/2014   SpO2 98%   BMI 29 18 kg/m²          Physical Exam   Constitutional: She appears well-developed and well-nourished  No distress  HENT:   Head: Normocephalic and atraumatic  Right Ear: External ear normal    Left Ear: External ear normal    Mouth/Throat: Oropharynx is clear and moist  No oropharyngeal exudate  Neck: Neck supple  Cardiovascular: Normal rate, regular rhythm and normal heart sounds     No murmur heard   Pulmonary/Chest: Effort normal and breath sounds normal  No respiratory distress  She has no wheezes  She has no rales  Abdominal: Soft  Bowel sounds are normal  There is tenderness (Mild tenderness in the epigastric region  )  Lymphadenopathy:     She has no cervical adenopathy

## 2020-03-07 DIAGNOSIS — K29.00 ACUTE SUPERFICIAL GASTRITIS WITHOUT HEMORRHAGE: ICD-10-CM

## 2020-03-09 RX ORDER — FAMOTIDINE 20 MG/1
TABLET, FILM COATED ORAL
Qty: 90 TABLET | Refills: 0 | Status: SHIPPED | OUTPATIENT
Start: 2020-03-09 | End: 2020-05-12

## 2020-03-13 ENCOUNTER — TRANSCRIBE ORDERS (OUTPATIENT)
Dept: ADMINISTRATIVE | Facility: HOSPITAL | Age: 60
End: 2020-03-13

## 2020-03-13 DIAGNOSIS — S80.02XD CONTUSION OF LEFT KNEE, SUBSEQUENT ENCOUNTER: Primary | ICD-10-CM

## 2020-03-13 DIAGNOSIS — S89.92XD LEFT KNEE INJURY, SUBSEQUENT ENCOUNTER: ICD-10-CM

## 2020-04-02 ENCOUNTER — TELEPHONE (OUTPATIENT)
Dept: FAMILY MEDICINE CLINIC | Facility: CLINIC | Age: 60
End: 2020-04-02

## 2020-04-02 DIAGNOSIS — Z98.84 BARIATRIC SURGERY STATUS: Primary | ICD-10-CM

## 2020-04-02 DIAGNOSIS — K91.2 POSTSURGICAL MALABSORPTION: ICD-10-CM

## 2020-04-07 ENCOUNTER — TELEPHONE (OUTPATIENT)
Dept: FAMILY MEDICINE CLINIC | Facility: CLINIC | Age: 60
End: 2020-04-07

## 2020-04-07 ENCOUNTER — TELEPHONE (OUTPATIENT)
Dept: RADIOLOGY | Facility: HOSPITAL | Age: 60
End: 2020-04-07

## 2020-04-10 ENCOUNTER — REMOTE DEVICE CLINIC VISIT (OUTPATIENT)
Dept: CARDIOLOGY CLINIC | Facility: CLINIC | Age: 60
End: 2020-04-10
Payer: COMMERCIAL

## 2020-04-10 DIAGNOSIS — Z95.0 PRESENCE OF CARDIAC PACEMAKER: Primary | ICD-10-CM

## 2020-04-10 PROCEDURE — 93296 REM INTERROG EVL PM/IDS: CPT | Performed by: INTERNAL MEDICINE

## 2020-04-10 PROCEDURE — 93294 REM INTERROG EVL PM/LDLS PM: CPT | Performed by: INTERNAL MEDICINE

## 2020-04-13 DIAGNOSIS — I82.4Y9 DEEP VEIN THROMBOSIS (DVT) OF PROXIMAL LOWER EXTREMITY, UNSPECIFIED CHRONICITY, UNSPECIFIED LATERALITY (HCC): ICD-10-CM

## 2020-04-14 ENCOUNTER — APPOINTMENT (OUTPATIENT)
Dept: RADIOLOGY | Facility: HOSPITAL | Age: 60
End: 2020-04-14
Payer: COMMERCIAL

## 2020-04-14 RX ORDER — APIXABAN 2.5 MG/1
2.5 TABLET, FILM COATED ORAL 2 TIMES DAILY
Qty: 60 TABLET | Refills: 2 | Status: SHIPPED | OUTPATIENT
Start: 2020-04-14 | End: 2020-05-12 | Stop reason: SDUPTHER

## 2020-04-24 DIAGNOSIS — K29.00 ACUTE SUPERFICIAL GASTRITIS WITHOUT HEMORRHAGE: ICD-10-CM

## 2020-04-27 RX ORDER — OMEPRAZOLE 20 MG/1
CAPSULE, DELAYED RELEASE ORAL
Qty: 90 CAPSULE | Refills: 0 | Status: SHIPPED | OUTPATIENT
Start: 2020-04-27 | End: 2020-06-23 | Stop reason: ALTCHOICE

## 2020-04-28 ENCOUNTER — HOSPITAL ENCOUNTER (OUTPATIENT)
Dept: RADIOLOGY | Facility: HOSPITAL | Age: 60
Discharge: HOME | End: 2020-04-28
Payer: COMMERCIAL

## 2020-04-28 DIAGNOSIS — S89.92XD LEFT KNEE INJURY, SUBSEQUENT ENCOUNTER: ICD-10-CM

## 2020-04-28 DIAGNOSIS — S80.02XD CONTUSION OF LEFT KNEE, SUBSEQUENT ENCOUNTER: ICD-10-CM

## 2020-04-28 PROCEDURE — 73721 MRI JNT OF LWR EXTRE W/O DYE: CPT

## 2020-05-06 ENCOUNTER — OFFICE VISIT (OUTPATIENT)
Dept: URGENT CARE | Age: 60
DRG: 384 | End: 2020-05-06
Payer: COMMERCIAL

## 2020-05-06 ENCOUNTER — APPOINTMENT (EMERGENCY)
Dept: RADIOLOGY | Facility: HOSPITAL | Age: 60
DRG: 384 | End: 2020-05-06
Payer: COMMERCIAL

## 2020-05-06 ENCOUNTER — HOSPITAL ENCOUNTER (EMERGENCY)
Facility: HOSPITAL | Age: 60
Discharge: HOME | DRG: 384 | End: 2020-05-06
Attending: EMERGENCY MEDICINE | Admitting: EMERGENCY MEDICINE
Payer: COMMERCIAL

## 2020-05-06 VITALS
HEART RATE: 68 BPM | DIASTOLIC BLOOD PRESSURE: 52 MMHG | TEMPERATURE: 98.4 F | OXYGEN SATURATION: 97 % | RESPIRATION RATE: 16 BRPM | SYSTOLIC BLOOD PRESSURE: 100 MMHG

## 2020-05-06 VITALS
DIASTOLIC BLOOD PRESSURE: 60 MMHG | OXYGEN SATURATION: 98 % | TEMPERATURE: 98 F | HEART RATE: 64 BPM | SYSTOLIC BLOOD PRESSURE: 107 MMHG | RESPIRATION RATE: 18 BRPM

## 2020-05-06 DIAGNOSIS — R11.0 NAUSEA: ICD-10-CM

## 2020-05-06 DIAGNOSIS — R42 DIZZINESS: Primary | ICD-10-CM

## 2020-05-06 DIAGNOSIS — R42 LIGHTHEADED: ICD-10-CM

## 2020-05-06 DIAGNOSIS — R05.9 COUGH: ICD-10-CM

## 2020-05-06 DIAGNOSIS — R50.9 FEVER, UNSPECIFIED FEVER CAUSE: Primary | ICD-10-CM

## 2020-05-06 DIAGNOSIS — R50.9 LOW GRADE FEVER: ICD-10-CM

## 2020-05-06 DIAGNOSIS — R07.9 CHEST PAIN: ICD-10-CM

## 2020-05-06 LAB
ALBUMIN SERPL BCP-MCNC: 3.6 G/DL (ref 3.5–5)
ALP SERPL-CCNC: 88 U/L (ref 46–116)
ALT SERPL W P-5'-P-CCNC: 25 U/L (ref 12–78)
ANION GAP SERPL CALCULATED.3IONS-SCNC: 7 MMOL/L (ref 4–13)
AST SERPL W P-5'-P-CCNC: 18 U/L (ref 5–45)
BASOPHILS # BLD AUTO: 0.07 THOUSANDS/ΜL (ref 0–0.1)
BASOPHILS NFR BLD AUTO: 1 % (ref 0–1)
BILIRUB SERPL-MCNC: 0.47 MG/DL (ref 0.2–1)
BUN SERPL-MCNC: 14 MG/DL (ref 5–25)
CALCIUM SERPL-MCNC: 9.1 MG/DL (ref 8.3–10.1)
CHLORIDE SERPL-SCNC: 112 MMOL/L (ref 100–108)
CO2 SERPL-SCNC: 23 MMOL/L (ref 21–32)
CREAT SERPL-MCNC: 0.5 MG/DL (ref 0.6–1.3)
EOSINOPHIL # BLD AUTO: 0.24 THOUSAND/ΜL (ref 0–0.61)
EOSINOPHIL NFR BLD AUTO: 3 % (ref 0–6)
ERYTHROCYTE [DISTWIDTH] IN BLOOD BY AUTOMATED COUNT: 11.8 % (ref 11.6–15.1)
GFR SERPL CREATININE-BSD FRML MDRD: 106 ML/MIN/1.73SQ M
GLUCOSE SERPL-MCNC: 82 MG/DL (ref 65–140)
HCT VFR BLD AUTO: 36.6 % (ref 34.8–46.1)
HGB BLD-MCNC: 12 G/DL (ref 11.5–15.4)
IMM GRANULOCYTES # BLD AUTO: 0.01 THOUSAND/UL (ref 0–0.2)
IMM GRANULOCYTES NFR BLD AUTO: 0 % (ref 0–2)
LYMPHOCYTES # BLD AUTO: 2.32 THOUSANDS/ΜL (ref 0.6–4.47)
LYMPHOCYTES NFR BLD AUTO: 33 % (ref 14–44)
MCH RBC QN AUTO: 32.3 PG (ref 26.8–34.3)
MCHC RBC AUTO-ENTMCNC: 32.8 G/DL (ref 31.4–37.4)
MCV RBC AUTO: 98 FL (ref 82–98)
MONOCYTES # BLD AUTO: 0.43 THOUSAND/ΜL (ref 0.17–1.22)
MONOCYTES NFR BLD AUTO: 6 % (ref 4–12)
NEUTROPHILS # BLD AUTO: 4.07 THOUSANDS/ΜL (ref 1.85–7.62)
NEUTS SEG NFR BLD AUTO: 57 % (ref 43–75)
NRBC BLD AUTO-RTO: 0 /100 WBCS
PLATELET # BLD AUTO: 183 THOUSANDS/UL (ref 149–390)
PMV BLD AUTO: 11.4 FL (ref 8.9–12.7)
POTASSIUM SERPL-SCNC: 3.8 MMOL/L (ref 3.5–5.3)
PROT SERPL-MCNC: 7.4 G/DL (ref 6.4–8.2)
RBC # BLD AUTO: 3.72 MILLION/UL (ref 3.81–5.12)
SODIUM SERPL-SCNC: 142 MMOL/L (ref 136–145)
TROPONIN I SERPL-MCNC: <0.02 NG/ML
TSH SERPL DL<=0.05 MIU/L-ACNC: 0.85 UIU/ML (ref 0.36–3.74)
WBC # BLD AUTO: 7.14 THOUSAND/UL (ref 4.31–10.16)

## 2020-05-06 PROCEDURE — 84484 ASSAY OF TROPONIN QUANT: CPT | Performed by: EMERGENCY MEDICINE

## 2020-05-06 PROCEDURE — 84443 ASSAY THYROID STIM HORMONE: CPT | Performed by: EMERGENCY MEDICINE

## 2020-05-06 PROCEDURE — S9088 SERVICES PROVIDED IN URGENT: HCPCS | Performed by: PHYSICIAN ASSISTANT

## 2020-05-06 PROCEDURE — 99285 EMERGENCY DEPT VISIT HI MDM: CPT | Performed by: EMERGENCY MEDICINE

## 2020-05-06 PROCEDURE — 36415 COLL VENOUS BLD VENIPUNCTURE: CPT | Performed by: EMERGENCY MEDICINE

## 2020-05-06 PROCEDURE — 85025 COMPLETE CBC W/AUTO DIFF WBC: CPT | Performed by: EMERGENCY MEDICINE

## 2020-05-06 PROCEDURE — 80053 COMPREHEN METABOLIC PANEL: CPT | Performed by: EMERGENCY MEDICINE

## 2020-05-06 PROCEDURE — U0003 INFECTIOUS AGENT DETECTION BY NUCLEIC ACID (DNA OR RNA); SEVERE ACUTE RESPIRATORY SYNDROME CORONAVIRUS 2 (SARS-COV-2) (CORONAVIRUS DISEASE [COVID-19]), AMPLIFIED PROBE TECHNIQUE, MAKING USE OF HIGH THROUGHPUT TECHNOLOGIES AS DESCRIBED BY CMS-2020-01-R: HCPCS | Performed by: PHYSICIAN ASSISTANT

## 2020-05-06 PROCEDURE — 93005 ELECTROCARDIOGRAM TRACING: CPT

## 2020-05-06 PROCEDURE — 99285 EMERGENCY DEPT VISIT HI MDM: CPT

## 2020-05-06 PROCEDURE — 71045 X-RAY EXAM CHEST 1 VIEW: CPT

## 2020-05-06 PROCEDURE — 99213 OFFICE O/P EST LOW 20 MIN: CPT | Performed by: PHYSICIAN ASSISTANT

## 2020-05-07 ENCOUNTER — APPOINTMENT (EMERGENCY)
Dept: RADIOLOGY | Facility: HOSPITAL | Age: 60
DRG: 384 | End: 2020-05-07
Payer: COMMERCIAL

## 2020-05-07 ENCOUNTER — HOSPITAL ENCOUNTER (INPATIENT)
Facility: HOSPITAL | Age: 60
LOS: 1 days | Discharge: HOME | DRG: 384 | End: 2020-05-08
Attending: EMERGENCY MEDICINE | Admitting: INTERNAL MEDICINE
Payer: COMMERCIAL

## 2020-05-07 ENCOUNTER — TELEPHONE (OUTPATIENT)
Dept: FAMILY MEDICINE CLINIC | Facility: CLINIC | Age: 60
End: 2020-05-07

## 2020-05-07 DIAGNOSIS — R13.10 ODYNOPHAGIA: ICD-10-CM

## 2020-05-07 DIAGNOSIS — K28.9 GASTROINTESTINAL ULCER: ICD-10-CM

## 2020-05-07 DIAGNOSIS — R07.9 CHEST PAIN, UNSPECIFIED TYPE: Primary | ICD-10-CM

## 2020-05-07 LAB
ALBUMIN SERPL BCP-MCNC: 3.9 G/DL (ref 3.5–5)
ALP SERPL-CCNC: 90 U/L (ref 46–116)
ALT SERPL W P-5'-P-CCNC: 29 U/L (ref 12–78)
ANION GAP SERPL CALCULATED.3IONS-SCNC: 6 MMOL/L (ref 4–13)
ANISOCYTOSIS BLD QL SMEAR: PRESENT
AST SERPL W P-5'-P-CCNC: 20 U/L (ref 5–45)
ATRIAL RATE: 64 BPM
ATRIAL RATE: 70 BPM
BASOPHILS # BLD MANUAL: 0.14 THOUSAND/UL (ref 0–0.1)
BASOPHILS NFR MAR MANUAL: 2 % (ref 0–1)
BILIRUB SERPL-MCNC: 0.57 MG/DL (ref 0.2–1)
BUN SERPL-MCNC: 14 MG/DL (ref 5–25)
CALCIUM SERPL-MCNC: 9.5 MG/DL (ref 8.3–10.1)
CHLORIDE SERPL-SCNC: 112 MMOL/L (ref 100–108)
CO2 SERPL-SCNC: 26 MMOL/L (ref 21–32)
CREAT SERPL-MCNC: 0.69 MG/DL (ref 0.6–1.3)
D DIMER PPP FEU-MCNC: 0.37 UG/ML FEU
EOSINOPHIL # BLD MANUAL: 0.07 THOUSAND/UL (ref 0–0.4)
EOSINOPHIL NFR BLD MANUAL: 1 % (ref 0–6)
ERYTHROCYTE [DISTWIDTH] IN BLOOD BY AUTOMATED COUNT: 11.8 % (ref 11.6–15.1)
GFR SERPL CREATININE-BSD FRML MDRD: 95 ML/MIN/1.73SQ M
GLUCOSE SERPL-MCNC: 150 MG/DL (ref 65–140)
HCT VFR BLD AUTO: 38.4 % (ref 34.8–46.1)
HGB BLD-MCNC: 12.6 G/DL (ref 11.5–15.4)
LIPASE SERPL-CCNC: 214 U/L (ref 73–393)
LYMPHOCYTES # BLD AUTO: 0.14 THOUSAND/UL (ref 0.6–4.47)
LYMPHOCYTES # BLD AUTO: 2 % (ref 14–44)
MCH RBC QN AUTO: 31.9 PG (ref 26.8–34.3)
MCHC RBC AUTO-ENTMCNC: 32.8 G/DL (ref 31.4–37.4)
MCV RBC AUTO: 97 FL (ref 82–98)
MONOCYTES # BLD AUTO: 0.07 THOUSAND/UL (ref 0–1.22)
MONOCYTES NFR BLD: 1 % (ref 4–12)
NEUTROPHILS # BLD MANUAL: 6.37 THOUSAND/UL (ref 1.85–7.62)
NEUTS SEG NFR BLD AUTO: 90 % (ref 43–75)
NRBC BLD AUTO-RTO: 0 /100 WBCS
P AXIS: 66 DEGREES
P AXIS: 76 DEGREES
PLATELET # BLD AUTO: 188 THOUSANDS/UL (ref 149–390)
PLATELET BLD QL SMEAR: ADEQUATE
PMV BLD AUTO: 11.6 FL (ref 8.9–12.7)
POLYCHROMASIA BLD QL SMEAR: PRESENT
POTASSIUM SERPL-SCNC: 4 MMOL/L (ref 3.5–5.3)
PR INTERVAL: 164 MS
PROT SERPL-MCNC: 7.9 G/DL (ref 6.4–8.2)
QRS AXIS: 45 DEGREES
QRS AXIS: 62 DEGREES
QRSD INTERVAL: 80 MS
QRSD INTERVAL: 80 MS
QT INTERVAL: 396 MS
QT INTERVAL: 414 MS
QTC INTERVAL: 408 MS
QTC INTERVAL: 447 MS
RBC # BLD AUTO: 3.95 MILLION/UL (ref 3.81–5.12)
RBC MORPH BLD: PRESENT
SARS-COV-2 RNA RESP QL NAA+PROBE: NEGATIVE
SODIUM SERPL-SCNC: 144 MMOL/L (ref 136–145)
T WAVE AXIS: 45 DEGREES
T WAVE AXIS: 58 DEGREES
TROPONIN I SERPL-MCNC: <0.02 NG/ML
VARIANT LYMPHS # BLD AUTO: 4 %
VENTRICULAR RATE: 64 BPM
VENTRICULAR RATE: 70 BPM
WBC # BLD AUTO: 7.08 THOUSAND/UL (ref 4.31–10.16)

## 2020-05-07 PROCEDURE — 80053 COMPREHEN METABOLIC PANEL: CPT | Performed by: EMERGENCY MEDICINE

## 2020-05-07 PROCEDURE — 93010 ELECTROCARDIOGRAM REPORT: CPT | Performed by: INTERNAL MEDICINE

## 2020-05-07 PROCEDURE — 99285 EMERGENCY DEPT VISIT HI MDM: CPT | Performed by: EMERGENCY MEDICINE

## 2020-05-07 PROCEDURE — 85007 BL SMEAR W/DIFF WBC COUNT: CPT | Performed by: EMERGENCY MEDICINE

## 2020-05-07 PROCEDURE — 70491 CT SOFT TISSUE NECK W/DYE: CPT

## 2020-05-07 PROCEDURE — 99222 1ST HOSP IP/OBS MODERATE 55: CPT | Performed by: INTERNAL MEDICINE

## 2020-05-07 PROCEDURE — 96361 HYDRATE IV INFUSION ADD-ON: CPT

## 2020-05-07 PROCEDURE — 99285 EMERGENCY DEPT VISIT HI MDM: CPT

## 2020-05-07 PROCEDURE — 83036 HEMOGLOBIN GLYCOSYLATED A1C: CPT | Performed by: STUDENT IN AN ORGANIZED HEALTH CARE EDUCATION/TRAINING PROGRAM

## 2020-05-07 PROCEDURE — 71260 CT THORAX DX C+: CPT

## 2020-05-07 PROCEDURE — 93005 ELECTROCARDIOGRAM TRACING: CPT

## 2020-05-07 PROCEDURE — 96374 THER/PROPH/DIAG INJ IV PUSH: CPT

## 2020-05-07 PROCEDURE — C9113 INJ PANTOPRAZOLE SODIUM, VIA: HCPCS | Performed by: EMERGENCY MEDICINE

## 2020-05-07 PROCEDURE — 96375 TX/PRO/DX INJ NEW DRUG ADDON: CPT

## 2020-05-07 PROCEDURE — NC001 PR NO CHARGE: Performed by: INTERNAL MEDICINE

## 2020-05-07 PROCEDURE — 85379 FIBRIN DEGRADATION QUANT: CPT | Performed by: EMERGENCY MEDICINE

## 2020-05-07 PROCEDURE — 85027 COMPLETE CBC AUTOMATED: CPT | Performed by: EMERGENCY MEDICINE

## 2020-05-07 PROCEDURE — U0003 INFECTIOUS AGENT DETECTION BY NUCLEIC ACID (DNA OR RNA); SEVERE ACUTE RESPIRATORY SYNDROME CORONAVIRUS 2 (SARS-COV-2) (CORONAVIRUS DISEASE [COVID-19]), AMPLIFIED PROBE TECHNIQUE, MAKING USE OF HIGH THROUGHPUT TECHNOLOGIES AS DESCRIBED BY CMS-2020-01-R: HCPCS | Performed by: EMERGENCY MEDICINE

## 2020-05-07 PROCEDURE — 36415 COLL VENOUS BLD VENIPUNCTURE: CPT | Performed by: EMERGENCY MEDICINE

## 2020-05-07 PROCEDURE — 84484 ASSAY OF TROPONIN QUANT: CPT | Performed by: EMERGENCY MEDICINE

## 2020-05-07 PROCEDURE — 83690 ASSAY OF LIPASE: CPT | Performed by: EMERGENCY MEDICINE

## 2020-05-07 RX ORDER — ASCORBIC ACID 500 MG
250 TABLET ORAL DAILY
Status: DISCONTINUED | OUTPATIENT
Start: 2020-05-08 | End: 2020-05-08 | Stop reason: HOSPADM

## 2020-05-07 RX ORDER — LIDOCAINE HYDROCHLORIDE 20 MG/ML
15 SOLUTION OROPHARYNGEAL ONCE
Status: COMPLETED | OUTPATIENT
Start: 2020-05-07 | End: 2020-05-07

## 2020-05-07 RX ORDER — SUCRALFATE ORAL 1 G/10ML
1000 SUSPENSION ORAL 2 TIMES DAILY
Status: DISCONTINUED | OUTPATIENT
Start: 2020-05-08 | End: 2020-05-08 | Stop reason: HOSPADM

## 2020-05-07 RX ORDER — ACETAMINOPHEN 325 MG/1
975 TABLET ORAL ONCE
Status: COMPLETED | OUTPATIENT
Start: 2020-05-07 | End: 2020-05-07

## 2020-05-07 RX ORDER — ONDANSETRON 2 MG/ML
4 INJECTION INTRAMUSCULAR; INTRAVENOUS EVERY 6 HOURS PRN
Status: DISCONTINUED | OUTPATIENT
Start: 2020-05-07 | End: 2020-05-08 | Stop reason: HOSPADM

## 2020-05-07 RX ORDER — FERROUS SULFATE 325(65) MG
325 TABLET ORAL
Status: DISCONTINUED | OUTPATIENT
Start: 2020-05-08 | End: 2020-05-08 | Stop reason: HOSPADM

## 2020-05-07 RX ORDER — PANTOPRAZOLE SODIUM 40 MG/1
40 INJECTION, POWDER, FOR SOLUTION INTRAVENOUS EVERY 12 HOURS SCHEDULED
Status: DISCONTINUED | OUTPATIENT
Start: 2020-05-08 | End: 2020-05-08 | Stop reason: HOSPADM

## 2020-05-07 RX ORDER — PANTOPRAZOLE SODIUM 40 MG/1
40 TABLET, DELAYED RELEASE ORAL ONCE
Status: DISCONTINUED | OUTPATIENT
Start: 2020-05-07 | End: 2020-05-07

## 2020-05-07 RX ORDER — PANTOPRAZOLE SODIUM 40 MG/1
40 INJECTION, POWDER, FOR SOLUTION INTRAVENOUS ONCE
Status: COMPLETED | OUTPATIENT
Start: 2020-05-07 | End: 2020-05-07

## 2020-05-07 RX ORDER — MULTIVIT WITH MINERALS/LUTEIN
TABLET ORAL
Status: CANCELLED | OUTPATIENT
Start: 2020-05-07

## 2020-05-07 RX ORDER — SODIUM CHLORIDE, SODIUM LACTATE, POTASSIUM CHLORIDE, CALCIUM CHLORIDE 600; 310; 30; 20 MG/100ML; MG/100ML; MG/100ML; MG/100ML
100 INJECTION, SOLUTION INTRAVENOUS CONTINUOUS
Status: DISCONTINUED | OUTPATIENT
Start: 2020-05-07 | End: 2020-05-08 | Stop reason: HOSPADM

## 2020-05-07 RX ORDER — ATORVASTATIN CALCIUM 10 MG/1
10 TABLET, FILM COATED ORAL
Status: DISCONTINUED | OUTPATIENT
Start: 2020-05-08 | End: 2020-05-08 | Stop reason: HOSPADM

## 2020-05-07 RX ORDER — SUCRALFATE ORAL 1 G/10ML
1000 SUSPENSION ORAL ONCE
Status: DISCONTINUED | OUTPATIENT
Start: 2020-05-07 | End: 2020-05-07

## 2020-05-07 RX ORDER — BIOTIN 10 MG
1 TABLET ORAL DAILY
Status: DISCONTINUED | OUTPATIENT
Start: 2020-05-08 | End: 2020-05-08 | Stop reason: CLARIF

## 2020-05-07 RX ORDER — CHOLECALCIFEROL (VITAMIN D3) 10 MCG
1 TABLET ORAL DAILY
Status: DISCONTINUED | OUTPATIENT
Start: 2020-05-08 | End: 2020-05-08 | Stop reason: HOSPADM

## 2020-05-07 RX ORDER — MORPHINE SULFATE 4 MG/ML
4 INJECTION, SOLUTION INTRAMUSCULAR; INTRAVENOUS ONCE
Status: COMPLETED | OUTPATIENT
Start: 2020-05-07 | End: 2020-05-07

## 2020-05-07 RX ORDER — ONDANSETRON 2 MG/ML
4 INJECTION INTRAMUSCULAR; INTRAVENOUS ONCE
Status: COMPLETED | OUTPATIENT
Start: 2020-05-07 | End: 2020-05-07

## 2020-05-07 RX ORDER — MELATONIN
1000 2 TIMES DAILY
Status: CANCELLED | OUTPATIENT
Start: 2020-05-07

## 2020-05-07 RX ORDER — MELATONIN
1000 2 TIMES DAILY
Status: DISCONTINUED | OUTPATIENT
Start: 2020-05-07 | End: 2020-05-08 | Stop reason: HOSPADM

## 2020-05-07 RX ADMIN — ONDANSETRON 4 MG: 2 INJECTION INTRAMUSCULAR; INTRAVENOUS at 15:54

## 2020-05-07 RX ADMIN — PANTOPRAZOLE SODIUM 40 MG: 40 INJECTION, POWDER, FOR SOLUTION INTRAVENOUS at 18:49

## 2020-05-07 RX ADMIN — IOHEXOL 100 ML: 350 INJECTION, SOLUTION INTRAVENOUS at 17:29

## 2020-05-07 RX ADMIN — ACETAMINOPHEN 975 MG: 325 TABLET ORAL at 14:45

## 2020-05-07 RX ADMIN — SODIUM CHLORIDE 1000 ML: 0.9 INJECTION, SOLUTION INTRAVENOUS at 14:42

## 2020-05-07 RX ADMIN — LIDOCAINE HYDROCHLORIDE 15 ML: 20 SOLUTION ORAL; TOPICAL at 18:40

## 2020-05-07 RX ADMIN — APIXABAN 2.5 MG: 2.5 TABLET, FILM COATED ORAL at 23:24

## 2020-05-07 RX ADMIN — SODIUM CHLORIDE, SODIUM LACTATE, POTASSIUM CHLORIDE, AND CALCIUM CHLORIDE 100 ML/HR: .6; .31; .03; .02 INJECTION, SOLUTION INTRAVENOUS at 23:24

## 2020-05-07 RX ADMIN — MORPHINE SULFATE 4 MG: 4 INJECTION INTRAVENOUS at 17:11

## 2020-05-07 RX ADMIN — MELATONIN 1000 UNITS: at 23:24

## 2020-05-08 ENCOUNTER — ANESTHESIA EVENT (INPATIENT)
Dept: GASTROENTEROLOGY | Facility: HOSPITAL | Age: 60
DRG: 384 | End: 2020-05-08
Payer: COMMERCIAL

## 2020-05-08 ENCOUNTER — APPOINTMENT (INPATIENT)
Dept: GASTROENTEROLOGY | Facility: HOSPITAL | Age: 60
DRG: 384 | End: 2020-05-08
Payer: COMMERCIAL

## 2020-05-08 ENCOUNTER — ANESTHESIA (INPATIENT)
Dept: GASTROENTEROLOGY | Facility: HOSPITAL | Age: 60
DRG: 384 | End: 2020-05-08
Payer: COMMERCIAL

## 2020-05-08 ENCOUNTER — TELEPHONE (OUTPATIENT)
Dept: URGENT CARE | Age: 60
End: 2020-05-08

## 2020-05-08 VITALS
DIASTOLIC BLOOD PRESSURE: 54 MMHG | OXYGEN SATURATION: 97 % | HEIGHT: 64 IN | BODY MASS INDEX: 30.15 KG/M2 | HEART RATE: 73 BPM | WEIGHT: 176.59 LBS | RESPIRATION RATE: 18 BRPM | SYSTOLIC BLOOD PRESSURE: 100 MMHG | TEMPERATURE: 97.9 F

## 2020-05-08 PROBLEM — R73.03 PRE-DIABETES: Chronic | Status: ACTIVE | Noted: 2020-05-08

## 2020-05-08 PROBLEM — R07.9 CHEST PAIN: Status: ACTIVE | Noted: 2019-03-05

## 2020-05-08 LAB
ALBUMIN SERPL BCP-MCNC: 3.4 G/DL (ref 3.5–5)
ALP SERPL-CCNC: 83 U/L (ref 46–116)
ALT SERPL W P-5'-P-CCNC: 24 U/L (ref 12–78)
ANION GAP SERPL CALCULATED.3IONS-SCNC: 7 MMOL/L (ref 4–13)
AST SERPL W P-5'-P-CCNC: 14 U/L (ref 5–45)
BILIRUB SERPL-MCNC: 0.61 MG/DL (ref 0.2–1)
BUN SERPL-MCNC: 14 MG/DL (ref 5–25)
CALCIUM SERPL-MCNC: 9.4 MG/DL (ref 8.3–10.1)
CHLORIDE SERPL-SCNC: 110 MMOL/L (ref 100–108)
CO2 SERPL-SCNC: 24 MMOL/L (ref 21–32)
CREAT SERPL-MCNC: 0.56 MG/DL (ref 0.6–1.3)
ERYTHROCYTE [DISTWIDTH] IN BLOOD BY AUTOMATED COUNT: 11.8 % (ref 11.6–15.1)
EST. AVERAGE GLUCOSE BLD GHB EST-MCNC: 117 MG/DL
GFR SERPL CREATININE-BSD FRML MDRD: 102 ML/MIN/1.73SQ M
GLUCOSE SERPL-MCNC: 158 MG/DL (ref 65–140)
HBA1C MFR BLD: 5.7 %
HCT VFR BLD AUTO: 35.9 % (ref 34.8–46.1)
HGB BLD-MCNC: 12 G/DL (ref 11.5–15.4)
MCH RBC QN AUTO: 32.5 PG (ref 26.8–34.3)
MCHC RBC AUTO-ENTMCNC: 33.4 G/DL (ref 31.4–37.4)
MCV RBC AUTO: 97 FL (ref 82–98)
PLATELET # BLD AUTO: 170 THOUSANDS/UL (ref 149–390)
PMV BLD AUTO: 12 FL (ref 8.9–12.7)
POTASSIUM SERPL-SCNC: 3.6 MMOL/L (ref 3.5–5.3)
PROT SERPL-MCNC: 7.1 G/DL (ref 6.4–8.2)
RBC # BLD AUTO: 3.69 MILLION/UL (ref 3.81–5.12)
SARS-COV-2 RNA SPEC QL NAA+PROBE: NOT DETECTED
SODIUM SERPL-SCNC: 141 MMOL/L (ref 136–145)
WBC # BLD AUTO: 9.02 THOUSAND/UL (ref 4.31–10.16)

## 2020-05-08 PROCEDURE — 99238 HOSP IP/OBS DSCHRG MGMT 30/<: CPT | Performed by: INTERNAL MEDICINE

## 2020-05-08 PROCEDURE — NC001 PR NO CHARGE: Performed by: INTERNAL MEDICINE

## 2020-05-08 PROCEDURE — 0DJ08ZZ INSPECTION OF UPPER INTESTINAL TRACT, VIA NATURAL OR ARTIFICIAL OPENING ENDOSCOPIC: ICD-10-PCS | Performed by: INTERNAL MEDICINE

## 2020-05-08 PROCEDURE — 80053 COMPREHEN METABOLIC PANEL: CPT | Performed by: STUDENT IN AN ORGANIZED HEALTH CARE EDUCATION/TRAINING PROGRAM

## 2020-05-08 PROCEDURE — 43235 EGD DIAGNOSTIC BRUSH WASH: CPT | Performed by: INTERNAL MEDICINE

## 2020-05-08 PROCEDURE — 92610 EVALUATE SWALLOWING FUNCTION: CPT

## 2020-05-08 PROCEDURE — C9113 INJ PANTOPRAZOLE SODIUM, VIA: HCPCS | Performed by: STUDENT IN AN ORGANIZED HEALTH CARE EDUCATION/TRAINING PROGRAM

## 2020-05-08 PROCEDURE — 85027 COMPLETE CBC AUTOMATED: CPT | Performed by: STUDENT IN AN ORGANIZED HEALTH CARE EDUCATION/TRAINING PROGRAM

## 2020-05-08 PROCEDURE — 99222 1ST HOSP IP/OBS MODERATE 55: CPT | Performed by: INTERNAL MEDICINE

## 2020-05-08 PROCEDURE — 3044F HG A1C LEVEL LT 7.0%: CPT | Performed by: INTERNAL MEDICINE

## 2020-05-08 RX ORDER — LIDOCAINE HYDROCHLORIDE 20 MG/ML
15 SOLUTION OROPHARYNGEAL 4 TIMES DAILY PRN
Qty: 100 ML | Refills: 0 | Status: SHIPPED | OUTPATIENT
Start: 2020-05-08 | End: 2020-06-23 | Stop reason: ALTCHOICE

## 2020-05-08 RX ORDER — DILTIAZEM HYDROCHLORIDE 60 MG/1
60 TABLET, FILM COATED ORAL EVERY 6 HOURS SCHEDULED
Status: DISCONTINUED | OUTPATIENT
Start: 2020-05-08 | End: 2020-05-08 | Stop reason: HOSPADM

## 2020-05-08 RX ORDER — PROPOFOL 10 MG/ML
INJECTION, EMULSION INTRAVENOUS AS NEEDED
Status: DISCONTINUED | OUTPATIENT
Start: 2020-05-08 | End: 2020-05-08 | Stop reason: SURG

## 2020-05-08 RX ORDER — PANTOPRAZOLE SODIUM 40 MG/1
40 TABLET, DELAYED RELEASE ORAL DAILY
Qty: 60 TABLET | Refills: 0 | Status: SHIPPED | OUTPATIENT
Start: 2020-05-08 | End: 2020-06-05 | Stop reason: SDUPTHER

## 2020-05-08 RX ORDER — LIDOCAINE HYDROCHLORIDE 20 MG/ML
15 SOLUTION OROPHARYNGEAL 4 TIMES DAILY PRN
Status: DISCONTINUED | OUTPATIENT
Start: 2020-05-08 | End: 2020-05-08 | Stop reason: HOSPADM

## 2020-05-08 RX ORDER — LIDOCAINE HYDROCHLORIDE 10 MG/ML
INJECTION, SOLUTION EPIDURAL; INFILTRATION; INTRACAUDAL; PERINEURAL AS NEEDED
Status: DISCONTINUED | OUTPATIENT
Start: 2020-05-08 | End: 2020-05-08 | Stop reason: SURG

## 2020-05-08 RX ORDER — NITROGLYCERIN 0.4 MG/1
0.4 TABLET SUBLINGUAL
Status: DISCONTINUED | OUTPATIENT
Start: 2020-05-08 | End: 2020-05-08 | Stop reason: HOSPADM

## 2020-05-08 RX ORDER — SUCRALFATE ORAL 1 G/10ML
1 SUSPENSION ORAL 4 TIMES DAILY
Qty: 420 ML | Refills: 0 | Status: SHIPPED | OUTPATIENT
Start: 2020-05-08 | End: 2020-05-20 | Stop reason: SDUPTHER

## 2020-05-08 RX ADMIN — MELATONIN 1000 UNITS: at 17:43

## 2020-05-08 RX ADMIN — OXYCODONE HYDROCHLORIDE AND ACETAMINOPHEN 250 MG: 500 TABLET ORAL at 08:23

## 2020-05-08 RX ADMIN — DILTIAZEM HYDROCHLORIDE 60 MG: 60 TABLET, FILM COATED ORAL at 11:16

## 2020-05-08 RX ADMIN — SODIUM CHLORIDE, SODIUM LACTATE, POTASSIUM CHLORIDE, AND CALCIUM CHLORIDE 100 ML/HR: .6; .31; .03; .02 INJECTION, SOLUTION INTRAVENOUS at 11:24

## 2020-05-08 RX ADMIN — SUCRALFATE 1000 MG: 1 SUSPENSION ORAL at 17:44

## 2020-05-08 RX ADMIN — APIXABAN 2.5 MG: 2.5 TABLET, FILM COATED ORAL at 17:43

## 2020-05-08 RX ADMIN — ATORVASTATIN CALCIUM 10 MG: 10 TABLET, FILM COATED ORAL at 17:43

## 2020-05-08 RX ADMIN — NITROGLYCERIN 0.4 MG: 0.4 TABLET SUBLINGUAL at 06:59

## 2020-05-08 RX ADMIN — Medication 1 TABLET: at 08:24

## 2020-05-08 RX ADMIN — SUCRALFATE 1000 MG: 1 SUSPENSION ORAL at 08:23

## 2020-05-08 RX ADMIN — DILTIAZEM HYDROCHLORIDE 60 MG: 60 TABLET, FILM COATED ORAL at 06:58

## 2020-05-08 RX ADMIN — FERROUS SULFATE TAB 325 MG (65 MG ELEMENTAL FE) 325 MG: 325 (65 FE) TAB at 06:58

## 2020-05-08 RX ADMIN — LIDOCAINE HYDROCHLORIDE 100 MG: 10 INJECTION, SOLUTION EPIDURAL; INFILTRATION; INTRACAUDAL; PERINEURAL at 14:09

## 2020-05-08 RX ADMIN — PANTOPRAZOLE SODIUM 40 MG: 40 INJECTION, POWDER, FOR SOLUTION INTRAVENOUS at 08:23

## 2020-05-08 RX ADMIN — MELATONIN 1000 UNITS: at 08:24

## 2020-05-08 RX ADMIN — PROPOFOL 150 MG: 10 INJECTION, EMULSION INTRAVENOUS at 14:09

## 2020-05-08 RX ADMIN — Medication 1 CAPSULE: at 08:24

## 2020-05-11 ENCOUNTER — TRANSITIONAL CARE MANAGEMENT (OUTPATIENT)
Dept: FAMILY MEDICINE CLINIC | Facility: CLINIC | Age: 60
End: 2020-05-11

## 2020-05-11 ENCOUNTER — TELEPHONE (OUTPATIENT)
Dept: FAMILY MEDICINE CLINIC | Facility: CLINIC | Age: 60
End: 2020-05-11

## 2020-05-12 ENCOUNTER — OFFICE VISIT (OUTPATIENT)
Dept: FAMILY MEDICINE CLINIC | Facility: CLINIC | Age: 60
End: 2020-05-12
Payer: COMMERCIAL

## 2020-05-12 ENCOUNTER — TELEPHONE (OUTPATIENT)
Dept: FAMILY MEDICINE CLINIC | Facility: CLINIC | Age: 60
End: 2020-05-12

## 2020-05-12 ENCOUNTER — TELEPHONE (OUTPATIENT)
Dept: GASTROENTEROLOGY | Facility: CLINIC | Age: 60
End: 2020-05-12

## 2020-05-12 VITALS
HEART RATE: 81 BPM | HEIGHT: 64 IN | TEMPERATURE: 97.8 F | RESPIRATION RATE: 16 BRPM | DIASTOLIC BLOOD PRESSURE: 80 MMHG | WEIGHT: 174.2 LBS | SYSTOLIC BLOOD PRESSURE: 120 MMHG | OXYGEN SATURATION: 99 % | BODY MASS INDEX: 29.74 KG/M2

## 2020-05-12 DIAGNOSIS — K25.3 ACUTE GASTRIC ULCER WITHOUT HEMORRHAGE OR PERFORATION: ICD-10-CM

## 2020-05-12 DIAGNOSIS — B00.9 HSV-1 INFECTION: ICD-10-CM

## 2020-05-12 DIAGNOSIS — R13.10 DYSPHAGIA, UNSPECIFIED TYPE: Primary | ICD-10-CM

## 2020-05-12 DIAGNOSIS — I82.4Y9 DEEP VEIN THROMBOSIS (DVT) OF PROXIMAL LOWER EXTREMITY, UNSPECIFIED CHRONICITY, UNSPECIFIED LATERALITY (HCC): ICD-10-CM

## 2020-05-12 DIAGNOSIS — R73.9 HYPERGLYCEMIA: ICD-10-CM

## 2020-05-12 PROBLEM — J06.9 ACUTE UPPER RESPIRATORY INFECTION: Status: RESOLVED | Noted: 2019-12-13 | Resolved: 2020-05-12

## 2020-05-12 PROBLEM — R73.03 PRE-DIABETES: Chronic | Status: RESOLVED | Noted: 2020-05-08 | Resolved: 2020-05-12

## 2020-05-12 PROCEDURE — 99496 TRANSJ CARE MGMT HIGH F2F 7D: CPT | Performed by: PHYSICIAN ASSISTANT

## 2020-05-12 PROCEDURE — 1111F DSCHRG MED/CURRENT MED MERGE: CPT | Performed by: PHYSICIAN ASSISTANT

## 2020-05-12 RX ORDER — VALACYCLOVIR HYDROCHLORIDE 500 MG/1
500 TABLET, FILM COATED ORAL 2 TIMES DAILY
Qty: 6 TABLET | Refills: 2 | Status: SHIPPED | OUTPATIENT
Start: 2020-05-12 | End: 2021-01-12

## 2020-05-12 RX ORDER — APIXABAN 2.5 MG/1
2.5 TABLET, FILM COATED ORAL 2 TIMES DAILY
Qty: 180 TABLET | Refills: 0 | Status: SHIPPED | OUTPATIENT
Start: 2020-05-12 | End: 2020-08-03

## 2020-05-14 ENCOUNTER — TELEPHONE (OUTPATIENT)
Dept: GASTROENTEROLOGY | Facility: CLINIC | Age: 60
End: 2020-05-14

## 2020-05-18 ENCOUNTER — TELEPHONE (OUTPATIENT)
Dept: GASTROENTEROLOGY | Facility: CLINIC | Age: 60
End: 2020-05-18

## 2020-05-20 DIAGNOSIS — K28.9 GASTROINTESTINAL ULCER: ICD-10-CM

## 2020-05-20 RX ORDER — SUCRALFATE ORAL 1 G/10ML
1 SUSPENSION ORAL 4 TIMES DAILY
Qty: 1200 ML | Refills: 5 | Status: SHIPPED | OUTPATIENT
Start: 2020-05-20 | End: 2020-09-24

## 2020-06-05 ENCOUNTER — OFFICE VISIT (OUTPATIENT)
Dept: GASTROENTEROLOGY | Facility: CLINIC | Age: 60
End: 2020-06-05
Payer: COMMERCIAL

## 2020-06-05 VITALS
SYSTOLIC BLOOD PRESSURE: 111 MMHG | WEIGHT: 179.4 LBS | HEART RATE: 66 BPM | TEMPERATURE: 97.4 F | DIASTOLIC BLOOD PRESSURE: 71 MMHG | HEIGHT: 64 IN | BODY MASS INDEX: 30.63 KG/M2

## 2020-06-05 DIAGNOSIS — K28.9 GASTROINTESTINAL ULCER: ICD-10-CM

## 2020-06-05 DIAGNOSIS — K25.3 ACUTE GASTRIC ULCER WITHOUT HEMORRHAGE OR PERFORATION: Primary | ICD-10-CM

## 2020-06-05 DIAGNOSIS — Z11.59 SCREENING FOR VIRAL DISEASE: ICD-10-CM

## 2020-06-05 DIAGNOSIS — Z86.010 PERSONAL HISTORY OF COLONIC POLYPS: ICD-10-CM

## 2020-06-05 PROBLEM — K29.00 ACUTE SUPERFICIAL GASTRITIS WITHOUT HEMORRHAGE: Status: RESOLVED | Noted: 2019-12-13 | Resolved: 2020-06-05

## 2020-06-05 PROBLEM — Z86.0100 HISTORY OF COLON POLYPS: Status: ACTIVE | Noted: 2020-06-05

## 2020-06-05 PROCEDURE — 99213 OFFICE O/P EST LOW 20 MIN: CPT | Performed by: PHYSICIAN ASSISTANT

## 2020-06-05 PROCEDURE — 3008F BODY MASS INDEX DOCD: CPT | Performed by: PHYSICIAN ASSISTANT

## 2020-06-05 PROCEDURE — 1036F TOBACCO NON-USER: CPT | Performed by: PHYSICIAN ASSISTANT

## 2020-06-05 PROCEDURE — 3044F HG A1C LEVEL LT 7.0%: CPT | Performed by: PHYSICIAN ASSISTANT

## 2020-06-05 PROCEDURE — 3078F DIAST BP <80 MM HG: CPT | Performed by: PHYSICIAN ASSISTANT

## 2020-06-05 PROCEDURE — 1111F DSCHRG MED/CURRENT MED MERGE: CPT | Performed by: PHYSICIAN ASSISTANT

## 2020-06-05 PROCEDURE — 3074F SYST BP LT 130 MM HG: CPT | Performed by: PHYSICIAN ASSISTANT

## 2020-06-05 RX ORDER — POLYETHYLENE GLYCOL 3350 17 G/17G
POWDER, FOR SOLUTION ORAL
Qty: 238 G | Refills: 0 | Status: SHIPPED | OUTPATIENT
Start: 2020-06-05 | End: 2020-08-11

## 2020-06-05 RX ORDER — PANTOPRAZOLE SODIUM 40 MG/1
40 TABLET, DELAYED RELEASE ORAL
Qty: 60 TABLET | Refills: 2 | Status: SHIPPED | OUTPATIENT
Start: 2020-06-05 | End: 2020-09-24

## 2020-06-09 ENCOUNTER — APPOINTMENT (OUTPATIENT)
Dept: LAB | Age: 60
End: 2020-06-09
Payer: COMMERCIAL

## 2020-06-09 DIAGNOSIS — K25.3 ACUTE GASTRIC ULCER WITHOUT HEMORRHAGE OR PERFORATION: ICD-10-CM

## 2020-06-09 PROCEDURE — 36415 COLL VENOUS BLD VENIPUNCTURE: CPT

## 2020-06-09 PROCEDURE — 86677 HELICOBACTER PYLORI ANTIBODY: CPT

## 2020-06-10 ENCOUNTER — ANESTHESIA EVENT (OUTPATIENT)
Dept: GASTROENTEROLOGY | Facility: MEDICAL CENTER | Age: 60
End: 2020-06-10

## 2020-06-10 LAB
H PYLORI IGG SER IA-ACNC: 0.56 INDEX VALUE (ref 0–0.79)
H PYLORI IGM SER-ACNC: <9 UNITS (ref 0–8.9)

## 2020-06-15 ENCOUNTER — TELEPHONE (OUTPATIENT)
Dept: GASTROENTEROLOGY | Facility: MEDICAL CENTER | Age: 60
End: 2020-06-15

## 2020-06-16 ENCOUNTER — TELEPHONE (OUTPATIENT)
Dept: FAMILY MEDICINE CLINIC | Facility: CLINIC | Age: 60
End: 2020-06-16

## 2020-06-16 ENCOUNTER — TELEPHONE (OUTPATIENT)
Dept: CARDIOLOGY CLINIC | Facility: CLINIC | Age: 60
End: 2020-06-16

## 2020-06-17 DIAGNOSIS — Z11.59 SCREENING FOR VIRAL DISEASE: ICD-10-CM

## 2020-06-17 PROCEDURE — U0003 INFECTIOUS AGENT DETECTION BY NUCLEIC ACID (DNA OR RNA); SEVERE ACUTE RESPIRATORY SYNDROME CORONAVIRUS 2 (SARS-COV-2) (CORONAVIRUS DISEASE [COVID-19]), AMPLIFIED PROBE TECHNIQUE, MAKING USE OF HIGH THROUGHPUT TECHNOLOGIES AS DESCRIBED BY CMS-2020-01-R: HCPCS

## 2020-06-19 ENCOUNTER — OFFICE VISIT (OUTPATIENT)
Dept: BARIATRICS | Facility: CLINIC | Age: 60
End: 2020-06-19
Payer: COMMERCIAL

## 2020-06-19 VITALS
WEIGHT: 178 LBS | HEIGHT: 64 IN | HEART RATE: 64 BPM | DIASTOLIC BLOOD PRESSURE: 64 MMHG | BODY MASS INDEX: 30.39 KG/M2 | TEMPERATURE: 97.3 F | SYSTOLIC BLOOD PRESSURE: 118 MMHG

## 2020-06-19 DIAGNOSIS — K25.3 ACUTE GASTRIC ULCER WITHOUT HEMORRHAGE OR PERFORATION: ICD-10-CM

## 2020-06-19 DIAGNOSIS — E66.9 OBESITY, CLASS I, BMI 30-34.9: Primary | ICD-10-CM

## 2020-06-19 DIAGNOSIS — Z98.84 BARIATRIC SURGERY STATUS: ICD-10-CM

## 2020-06-19 DIAGNOSIS — K91.2 POSTSURGICAL MALABSORPTION: ICD-10-CM

## 2020-06-19 LAB — SARS-COV-2 RNA SPEC QL NAA+PROBE: NOT DETECTED

## 2020-06-19 PROCEDURE — 3074F SYST BP LT 130 MM HG: CPT | Performed by: PHYSICIAN ASSISTANT

## 2020-06-19 PROCEDURE — 3078F DIAST BP <80 MM HG: CPT | Performed by: PHYSICIAN ASSISTANT

## 2020-06-19 PROCEDURE — 99214 OFFICE O/P EST MOD 30 MIN: CPT | Performed by: PHYSICIAN ASSISTANT

## 2020-06-19 PROCEDURE — 3044F HG A1C LEVEL LT 7.0%: CPT | Performed by: PHYSICIAN ASSISTANT

## 2020-06-19 PROCEDURE — 1111F DSCHRG MED/CURRENT MED MERGE: CPT | Performed by: PHYSICIAN ASSISTANT

## 2020-06-19 PROCEDURE — 1036F TOBACCO NON-USER: CPT | Performed by: PHYSICIAN ASSISTANT

## 2020-06-19 PROCEDURE — 3008F BODY MASS INDEX DOCD: CPT | Performed by: PHYSICIAN ASSISTANT

## 2020-06-19 RX ORDER — TOPIRAMATE 25 MG/1
25 CAPSULE, COATED PELLETS ORAL
Qty: 30 CAPSULE | Refills: 1 | Status: SHIPPED | OUTPATIENT
Start: 2020-06-19 | End: 2020-07-17

## 2020-06-23 ENCOUNTER — HOSPITAL ENCOUNTER (OUTPATIENT)
Dept: GASTROENTEROLOGY | Facility: HOSPITAL | Age: 60
Setting detail: OUTPATIENT SURGERY
Discharge: HOME | End: 2020-06-23
Payer: COMMERCIAL

## 2020-06-23 VITALS
HEART RATE: 61 BPM | RESPIRATION RATE: 16 BRPM | SYSTOLIC BLOOD PRESSURE: 95 MMHG | BODY MASS INDEX: 31.01 KG/M2 | HEIGHT: 63 IN | WEIGHT: 175 LBS | DIASTOLIC BLOOD PRESSURE: 60 MMHG | OXYGEN SATURATION: 96 % | TEMPERATURE: 96.8 F

## 2020-06-23 DIAGNOSIS — K25.3 ACUTE GASTRIC ULCER WITHOUT HEMORRHAGE OR PERFORATION: ICD-10-CM

## 2020-06-23 DIAGNOSIS — Z86.010 PERSONAL HISTORY OF COLONIC POLYPS: ICD-10-CM

## 2020-06-23 PROCEDURE — 88305 TISSUE EXAM BY PATHOLOGIST: CPT | Performed by: PATHOLOGY

## 2020-06-23 PROCEDURE — 45385 COLONOSCOPY W/LESION REMOVAL: CPT | Performed by: INTERNAL MEDICINE

## 2020-06-23 PROCEDURE — NC001 PR NO CHARGE: Performed by: INTERNAL MEDICINE

## 2020-06-23 PROCEDURE — 43239 EGD BIOPSY SINGLE/MULTIPLE: CPT | Performed by: INTERNAL MEDICINE

## 2020-06-23 RX ORDER — PROPOFOL 10 MG/ML
INJECTION, EMULSION INTRAVENOUS AS NEEDED
Status: DISCONTINUED | OUTPATIENT
Start: 2020-06-23 | End: 2020-06-23 | Stop reason: SURG

## 2020-06-23 RX ORDER — SODIUM CHLORIDE 9 MG/ML
75 INJECTION, SOLUTION INTRAVENOUS CONTINUOUS
Status: CANCELLED | OUTPATIENT
Start: 2020-06-23

## 2020-06-23 RX ORDER — LIDOCAINE HYDROCHLORIDE 10 MG/ML
INJECTION, SOLUTION EPIDURAL; INFILTRATION; INTRACAUDAL; PERINEURAL AS NEEDED
Status: DISCONTINUED | OUTPATIENT
Start: 2020-06-23 | End: 2020-06-23 | Stop reason: SURG

## 2020-06-23 RX ORDER — SODIUM CHLORIDE 9 MG/ML
INJECTION, SOLUTION INTRAVENOUS CONTINUOUS PRN
Status: DISCONTINUED | OUTPATIENT
Start: 2020-06-23 | End: 2020-06-23 | Stop reason: SURG

## 2020-06-23 RX ORDER — SODIUM CHLORIDE 9 MG/ML
75 INJECTION, SOLUTION INTRAVENOUS CONTINUOUS
Status: DISCONTINUED | OUTPATIENT
Start: 2020-06-23 | End: 2020-06-27 | Stop reason: HOSPADM

## 2020-06-23 RX ADMIN — SODIUM CHLORIDE 75 ML/HR: 0.9 INJECTION, SOLUTION INTRAVENOUS at 10:27

## 2020-06-23 RX ADMIN — SODIUM CHLORIDE: 0.9 INJECTION, SOLUTION INTRAVENOUS at 10:27

## 2020-06-23 RX ADMIN — PROPOFOL 100 MG: 10 INJECTION, EMULSION INTRAVENOUS at 10:35

## 2020-06-23 RX ADMIN — LIDOCAINE HYDROCHLORIDE 50 MG: 10 INJECTION, SOLUTION EPIDURAL; INFILTRATION; INTRACAUDAL; PERINEURAL at 10:35

## 2020-06-23 RX ADMIN — PROPOFOL 50 MG: 10 INJECTION, EMULSION INTRAVENOUS at 11:03

## 2020-06-23 RX ADMIN — PROPOFOL 20 MG: 10 INJECTION, EMULSION INTRAVENOUS at 10:59

## 2020-06-23 RX ADMIN — PROPOFOL 50 MG: 10 INJECTION, EMULSION INTRAVENOUS at 10:52

## 2020-06-23 RX ADMIN — PROPOFOL 30 MG: 10 INJECTION, EMULSION INTRAVENOUS at 10:47

## 2020-06-23 RX ADMIN — PROPOFOL 30 MG: 10 INJECTION, EMULSION INTRAVENOUS at 10:41

## 2020-06-23 RX ADMIN — PROPOFOL 50 MG: 10 INJECTION, EMULSION INTRAVENOUS at 10:56

## 2020-06-29 ENCOUNTER — ANESTHESIA (OUTPATIENT)
Dept: GASTROENTEROLOGY | Facility: MEDICAL CENTER | Age: 60
End: 2020-06-29

## 2020-07-01 ENCOUNTER — TELEPHONE (OUTPATIENT)
Dept: GASTROENTEROLOGY | Facility: MEDICAL CENTER | Age: 60
End: 2020-07-01

## 2020-07-01 NOTE — TELEPHONE ENCOUNTER
----- Message from Lamonte George MD sent at 6/25/2020  4:36 PM EDT -----  Please call patient :  Biopsies in the stomach were unremarkable  Colon polyps were benign but precancerous  Repeat colonoscopy in 3 years

## 2020-07-07 ENCOUNTER — TELEPHONE (OUTPATIENT)
Dept: FAMILY MEDICINE CLINIC | Facility: CLINIC | Age: 60
End: 2020-07-07

## 2020-07-07 ENCOUNTER — APPOINTMENT (OUTPATIENT)
Dept: LAB | Facility: IMAGING CENTER | Age: 60
End: 2020-07-07
Payer: COMMERCIAL

## 2020-07-07 ENCOUNTER — TRANSCRIBE ORDERS (OUTPATIENT)
Dept: ADMINISTRATIVE | Facility: HOSPITAL | Age: 60
End: 2020-07-07

## 2020-07-07 DIAGNOSIS — Z11.59 SCREENING EXAMINATION FOR POLIOMYELITIS: Primary | ICD-10-CM

## 2020-07-07 DIAGNOSIS — Z11.59 SCREENING EXAMINATION FOR POLIOMYELITIS: ICD-10-CM

## 2020-07-07 DIAGNOSIS — Z98.84 BARIATRIC SURGERY STATUS: ICD-10-CM

## 2020-07-07 DIAGNOSIS — K91.2 POSTSURGICAL MALABSORPTION: ICD-10-CM

## 2020-07-07 LAB
25(OH)D3 SERPL-MCNC: 48.4 NG/ML (ref 30–100)
ALBUMIN SERPL BCP-MCNC: 3.9 G/DL (ref 3.5–5)
ALP SERPL-CCNC: 94 U/L (ref 46–116)
ALT SERPL W P-5'-P-CCNC: 25 U/L (ref 12–78)
ANION GAP SERPL CALCULATED.3IONS-SCNC: 6 MMOL/L (ref 4–13)
AST SERPL W P-5'-P-CCNC: 21 U/L (ref 5–45)
BILIRUB SERPL-MCNC: 0.64 MG/DL (ref 0.2–1)
BUN SERPL-MCNC: 18 MG/DL (ref 5–25)
CALCIUM SERPL-MCNC: 9.8 MG/DL (ref 8.3–10.1)
CHLORIDE SERPL-SCNC: 113 MMOL/L (ref 100–108)
CO2 SERPL-SCNC: 23 MMOL/L (ref 21–32)
CREAT SERPL-MCNC: 0.75 MG/DL (ref 0.6–1.3)
ERYTHROCYTE [DISTWIDTH] IN BLOOD BY AUTOMATED COUNT: 12.3 % (ref 11.6–15.1)
FERRITIN SERPL-MCNC: 46 NG/ML (ref 8–388)
FOLATE SERPL-MCNC: >20 NG/ML (ref 3.1–17.5)
GFR SERPL CREATININE-BSD FRML MDRD: 86 ML/MIN/1.73SQ M
GLUCOSE P FAST SERPL-MCNC: 96 MG/DL (ref 65–99)
HCT VFR BLD AUTO: 37.9 % (ref 34.8–46.1)
HCV AB SER QL: NORMAL
HGB BLD-MCNC: 12.5 G/DL (ref 11.5–15.4)
IRON SATN MFR SERPL: 15 %
IRON SERPL-MCNC: 55 UG/DL (ref 50–170)
MCH RBC QN AUTO: 32.6 PG (ref 26.8–34.3)
MCHC RBC AUTO-ENTMCNC: 33 G/DL (ref 31.4–37.4)
MCV RBC AUTO: 99 FL (ref 82–98)
PLATELET # BLD AUTO: 207 THOUSANDS/UL (ref 149–390)
PMV BLD AUTO: 13 FL (ref 8.9–12.7)
POTASSIUM SERPL-SCNC: 3.7 MMOL/L (ref 3.5–5.3)
PROT SERPL-MCNC: 7.5 G/DL (ref 6.4–8.2)
PTH-INTACT SERPL-MCNC: 45 PG/ML (ref 18.4–80.1)
RBC # BLD AUTO: 3.84 MILLION/UL (ref 3.81–5.12)
SODIUM SERPL-SCNC: 142 MMOL/L (ref 136–145)
TIBC SERPL-MCNC: 368 UG/DL (ref 250–450)
VIT B12 SERPL-MCNC: 811 PG/ML (ref 100–900)
WBC # BLD AUTO: 5.56 THOUSAND/UL (ref 4.31–10.16)

## 2020-07-07 PROCEDURE — 84630 ASSAY OF ZINC: CPT

## 2020-07-07 PROCEDURE — 82525 ASSAY OF COPPER: CPT

## 2020-07-07 PROCEDURE — 80053 COMPREHEN METABOLIC PANEL: CPT

## 2020-07-07 PROCEDURE — 82728 ASSAY OF FERRITIN: CPT

## 2020-07-07 PROCEDURE — 86803 HEPATITIS C AB TEST: CPT

## 2020-07-07 PROCEDURE — 83970 ASSAY OF PARATHORMONE: CPT

## 2020-07-07 PROCEDURE — 83550 IRON BINDING TEST: CPT

## 2020-07-07 PROCEDURE — 82607 VITAMIN B-12: CPT

## 2020-07-07 PROCEDURE — 84590 ASSAY OF VITAMIN A: CPT

## 2020-07-07 PROCEDURE — 36415 COLL VENOUS BLD VENIPUNCTURE: CPT

## 2020-07-07 PROCEDURE — 85027 COMPLETE CBC AUTOMATED: CPT

## 2020-07-07 PROCEDURE — 83540 ASSAY OF IRON: CPT

## 2020-07-07 PROCEDURE — 82306 VITAMIN D 25 HYDROXY: CPT

## 2020-07-07 PROCEDURE — 82746 ASSAY OF FOLIC ACID SERUM: CPT

## 2020-07-07 PROCEDURE — 84425 ASSAY OF VITAMIN B-1: CPT

## 2020-07-07 NOTE — TELEPHONE ENCOUNTER
PC from patient stating her workers compensation doctor has released her and sent her back to full duty starting tomorrow  She called them to let them know she is still having L knee pain and cannot go back to work tomorrow  They advised her to follow up with us her PCP  Per Samantha, patient was referred to Orthopedics at [phone] to have her knee evaluated and have them advise her  Also notified her she may utilize [facility] for any acute pain if she needs medical treatment today

## 2020-07-08 ENCOUNTER — TELEPHONE (OUTPATIENT)
Dept: FAMILY MEDICINE CLINIC | Facility: CLINIC | Age: 60
End: 2020-07-08

## 2020-07-08 ENCOUNTER — TELEPHONE (OUTPATIENT)
Dept: OBGYN CLINIC | Facility: HOSPITAL | Age: 60
End: 2020-07-08

## 2020-07-08 NOTE — TELEPHONE ENCOUNTER
New Patient  Dr Rebecca Contreras  # [phone]     Patient called stating her appt with DR Rebecca Contreras on 07 09 was cancelled because she can not obtain her records      Per patient, she had two knee surgeries in 2002 and 2004    Patient states DR Rebecca Contreras @ [facility] did one surgery and a Dr Cintia Brownlee @ [facility] did another surgery      Patient is not sure what kind of surgery she had, but confirmed she did not have a knee replacement    Patient states she talked to [address] [facility] and [address] [place] [address],[address] who can not locate her records      Patient wants to know how to move forward

## 2020-07-08 NOTE — TELEPHONE ENCOUNTER
My understanding is that patient is in some kind of litigation fro her problem  I do not see patient who is in process of litigation

## 2020-07-10 ENCOUNTER — REMOTE DEVICE CLINIC VISIT (OUTPATIENT)
Dept: CARDIOLOGY CLINIC | Facility: CLINIC | Age: 60
End: 2020-07-10
Payer: COMMERCIAL

## 2020-07-10 DIAGNOSIS — Z95.0 CARDIAC PACEMAKER IN SITU: Primary | ICD-10-CM

## 2020-07-10 LAB
COPPER SERPL-MCNC: 112 UG/DL (ref 72–166)
ZINC SERPL-MCNC: 73 UG/DL (ref 56–134)

## 2020-07-10 PROCEDURE — 93294 REM INTERROG EVL PM/LDLS PM: CPT | Performed by: INTERNAL MEDICINE

## 2020-07-10 PROCEDURE — 93296 REM INTERROG EVL PM/IDS: CPT | Performed by: INTERNAL MEDICINE

## 2020-07-10 NOTE — PROGRESS NOTES
Results for orders placed or performed in visit on 07/10/20   Cardiac EP device report    Narrative    CARELINK TRANSMISSION: BATTERY STATUS "OK"  AP 32%  0%  ALL AVAILABLE LEAD PARAMETERS WITHIN NORMAL LIMITS  NO SIGNIFICANT HIGH RATE EPISODES  NORMAL DEVICE FUNCTION  NC       Current Outpatient Medications:     Ascorbic Acid (VITAMIN C) 100 MG CHEW, Chew, Disp: , Rfl:     atorvastatin (LIPITOR) 10 mg tablet, take 1 tablet by mouth once daily, Disp: 90 tablet, Rfl: 2    B Complex Vitamins (VITAMIN B COMPLEX PO), Take 1 capsule by mouth, Disp: , Rfl:     Biotin 10 MG TABS, Take by mouth, Disp: , Rfl:     bisacodyl (DULCOLAX) 5 mg EC tablet, Take as directed by the office  , Disp: 2 tablet, Rfl: 0    Calcium Carbonate (CALCI-CHEW PO), Take 500 mg by mouth 2 (two) times a day, Disp: , Rfl:     cholecalciferol (VITAMIN D3) 1,000 units tablet, Take 5,000 Units by mouth 2 (two) times a day , Disp: , Rfl:     clobetasol (TEMOVATE) 0 05 % cream, Apply to affected areas daily x 12 weeks, then twice weekly for maintenance, Disp: , Rfl:     ELIQUIS 2 5 MG, Take 1 tablet (2 5 mg total) by mouth 2 (two) times a day, Disp: 180 tablet, Rfl: 0    fluticasone (FLONASE) 50 mcg/act nasal spray, 2 sprays into each nostril daily (Patient taking differently: 2 sprays into each nostril as needed ), Disp: 16 g, Rfl: 0    LYSINE PO, Take by mouth, Disp: , Rfl:     Multiple Vitamin (MULTIVITAMIN) capsule, Take 1 capsule by mouth daily 2 chewies daily, Disp: , Rfl:     nystatin (MYCOSTATIN) powder, Apply topically 2 (two) times a day (Patient taking differently: Apply topically 2 (two) times a day as needed ), Disp: 30 g, Rfl: 0    pantoprazole (PROTONIX) 40 mg tablet, Take 1 tablet (40 mg total) by mouth 2 (two) times a day before meals, Disp: 60 tablet, Rfl: 2    polyethylene glycol (GLYCOLAX) 17 GM/SCOOP powder, Take as directed by the office  , Disp: 238 g, Rfl: 0    sucralfate (CARAFATE) 1 g/10 mL suspension, Take 10 mL (1 g total) by mouth 4 (four) times a day (Patient taking differently: Take 1 g by mouth 4 (four) times a day (with meals and at bedtime) ), Disp: 1200 mL, Rfl: 5    topiramate (TOPAMAX) 25 mg sprinkle capsule, Take 1 capsule (25 mg total) by mouth daily at bedtime, Disp: 30 capsule, Rfl: 1    valACYclovir (VALTREX) 500 mg tablet, Take 1 tablet (500 mg total) by mouth 2 (two) times a day for 3 days (Patient taking differently: Take 500 mg by mouth 2 (two) times a day as needed ), Disp: 6 tablet, Rfl: 2

## 2020-07-11 LAB — VIT A SERPL-MCNC: 47.7 UG/DL (ref 22–69.5)

## 2020-07-12 LAB — VIT B1 BLD-SCNC: 112.7 NMOL/L (ref 66.5–200)

## 2020-07-14 ENCOUNTER — OFFICE VISIT (OUTPATIENT)
Dept: FAMILY MEDICINE CLINIC | Facility: CLINIC | Age: 60
End: 2020-07-14
Payer: COMMERCIAL

## 2020-07-14 ENCOUNTER — TELEPHONE (OUTPATIENT)
Dept: GASTROENTEROLOGY | Facility: AMBULARY SURGERY CENTER | Age: 60
End: 2020-07-14

## 2020-07-14 VITALS
WEIGHT: 175.25 LBS | RESPIRATION RATE: 16 BRPM | SYSTOLIC BLOOD PRESSURE: 110 MMHG | OXYGEN SATURATION: 98 % | TEMPERATURE: 97.8 F | HEART RATE: 70 BPM | BODY MASS INDEX: 31.05 KG/M2 | HEIGHT: 63 IN | DIASTOLIC BLOOD PRESSURE: 70 MMHG

## 2020-07-14 DIAGNOSIS — K25.3 ACUTE GASTRIC ULCER WITHOUT HEMORRHAGE OR PERFORATION: ICD-10-CM

## 2020-07-14 DIAGNOSIS — Z86.718 HISTORY OF DVT (DEEP VEIN THROMBOSIS): ICD-10-CM

## 2020-07-14 DIAGNOSIS — D50.9 IRON DEFICIENCY ANEMIA, UNSPECIFIED IRON DEFICIENCY ANEMIA TYPE: ICD-10-CM

## 2020-07-14 DIAGNOSIS — Z12.31 ENCOUNTER FOR SCREENING MAMMOGRAM FOR MALIGNANT NEOPLASM OF BREAST: Primary | ICD-10-CM

## 2020-07-14 PROBLEM — R73.9 HYPERGLYCEMIA: Status: RESOLVED | Noted: 2020-05-12 | Resolved: 2020-07-14

## 2020-07-14 PROCEDURE — 99214 OFFICE O/P EST MOD 30 MIN: CPT | Performed by: PHYSICIAN ASSISTANT

## 2020-07-14 PROCEDURE — 1036F TOBACCO NON-USER: CPT | Performed by: PHYSICIAN ASSISTANT

## 2020-07-14 PROCEDURE — 3044F HG A1C LEVEL LT 7.0%: CPT | Performed by: PHYSICIAN ASSISTANT

## 2020-07-14 PROCEDURE — 3078F DIAST BP <80 MM HG: CPT | Performed by: PHYSICIAN ASSISTANT

## 2020-07-14 PROCEDURE — 3074F SYST BP LT 130 MM HG: CPT | Performed by: PHYSICIAN ASSISTANT

## 2020-07-14 PROCEDURE — 3008F BODY MASS INDEX DOCD: CPT | Performed by: PHYSICIAN ASSISTANT

## 2020-07-14 NOTE — TELEPHONE ENCOUNTER
Pt called again wanting to know if she should still be taking the protonix twice a day   Pt states she will be going to work soon if someone can leave a message on her phone

## 2020-07-14 NOTE — PROGRESS NOTES
Assessment/Plan:    Patient has been advised to notify the GI specialist regarding the pantoprazole 40 mg twice daily that she has been continuing as per the recommendation of the bariatric specialist   I did advise her that long-term affects include kidney, cardiac, progression of her osteoporosis especially on a high dose of 40 mg twice daily   -continue follow-up with the bariatric specialist as advised  -recent lab results have been reviewed  -continue Eliquis as directed  -continue over-the-counter Slow iron daily as directed  -patient will schedule her mammogram  -recommend follow-up in November December  I would recommend a lipid panel at that time    BMI Counseling: Body mass index is 31 04 kg/m²  The BMI is above normal  Nutrition recommendations include reducing portion sizes and decreasing overall calorie intake  Exercise recommendations include exercising 3-5 times per week  patient states she cannot exercise at this time due to chronic knee pain  She does have an upcoming appointment with an orthopedic specialist as per the recommendation of her   I have spent 25 minutes with Patient  today in which greater than 50% of this time was spent in counseling/coordination of care regarding Diagnostic results, Prognosis, Risks and benefits of tx options, Intructions for management, Patient and family education, Importance of tx compliance, Risk factor reductions and Impressions  M*Modal software was used to dictate this note  It may contain errors with dictating incorrect words/spelling  Please contact provider directly for any questions  Diagnoses and all orders for this visit:    Encounter for screening mammogram for malignant neoplasm of breast  -     Mammo screening bilateral w cad;  Future    Acute gastric ulcer without hemorrhage or perforation    History of DVT (deep vein thrombosis)    BMI 31 0-31 9,adult    Iron deficiency anemia, unspecified iron deficiency anemia type    Other orders  -     Ferrous Sulfate  (45 Fe) MG TBCR; Take 1 tablet by mouth daily          Subjective:      Patient ID: Oscar Martel is a 63 y o  female  Patient presents today for a follow-up for her gastric also  She states that she did have a follow-up endoscopy but she was not informed of the results  She states the GI specialist told her to discontinue the pantoprazole 40 mg twice daily  She states her bariatric specialist told her to continue on the pantoprazole  I did ask her if the specialist realize that she is on the 40 mg twice daily and she states that she is not sure  She states her appetite has improved  She is not having much abdominal pain at this time  She does continue on over-the-counter iron once daily  She continues on her blood thinner/Eliquis for her history of DVT  The following portions of the patient's history were reviewed and updated as appropriate:   She  has a past medical history of Anemia, Arthritis, Carpal tunnel syndrome of right wrist, Chest pain, Colon polyp, Depression, DVT (deep venous thrombosis) ([facility] [address] ) (2012), Dysphagia, Esophageal reflux (5/23/2011), Grade 1 out of 6 intensity murmur (6/24/2019), Heart murmur, History of transfusion (1980), Pacemaker (11/01/2018), Pneumonia, Psychiatric disorder, and Trigger finger, left    She   Patient Active Problem List    Diagnosis Date Noted    BMI 31 0-31 9,adult 07/14/2020    History of colon polyps 06/05/2020    Acute gastric ulcer without hemorrhage or perforation 05/12/2020    Dysphagia 05/07/2020    Right ear pain 01/21/2020    Leukopenia 01/07/2020    Anemia 01/07/2020    Chronic right shoulder pain 12/10/2019    Work related injury 12/10/2019    History of DVT (deep vein thrombosis) 10/15/2019    Iron deficiency anemia 10/15/2019    Vitamin deficiency 10/15/2019    Medicare annual wellness visit, initial 10/15/2019    Mass of soft tissue of right upper extremity 10/15/2019    Grade 1 out of 6 intensity murmur 2019    Encounter for screening mammogram for malignant neoplasm of breast 2019    Candida infection of flexural skin 2019    Chest pain 2019    Status post carpal tunnel release 2019    HSV-1 infection 2018    Sick sinus syndrome ([facility] [address] ) 2018    Abnormal CT scan     Lichen sclerosus     Fibrocystic breast disease 2014    Mixed hyperlipidemia 2014    Knee osteoarthritis 2014    Menopausal and postmenopausal disorder 2014    Neck pain 2014     She  has a past surgical history that includes Knee surgery (Bilateral);  section; Cervical disc surgery (2009); Tonsillectomy; Other surgical history; Gastric bypass (2017); pr wrist arthroscop,release xvers lig (Right, 2018); pr incise finger tendon sheath (Left, 2018); Cardiac pacemaker placement (2018); Upper gastrointestinal endoscopy; Colonoscopy; Knee arthroscopy; and Back surgery  Her family history includes Alzheimer's disease in her mother; Arthritis in her mother; Breast cancer (age of onset: 77) in her mother; Cancer in her maternal aunt and mother; Coronary artery disease in her father and mother; Diabetes type II in her father; Gout in her father; Heart disease in her father and mother; Hypertension in her father; No Known Problems in her daughter, maternal aunt, maternal aunt, maternal aunt, maternal aunt, maternal grandfather, maternal grandmother, paternal aunt, paternal aunt, paternal grandfather, paternal grandmother, sister, sister, and sister; Prostate cancer in her brother; Stroke in her mother  She  reports that she has quit smoking  Her smoking use included cigarettes  She smoked 0 25 packs per day  She has never used smokeless tobacco  She reports that she does not drink alcohol or use drugs    Current Outpatient Medications   Medication Sig Dispense Refill    Ascorbic Acid (VITAMIN C) 100 MG CHEW Chew      atorvastatin (LIPITOR) 10 mg tablet take 1 tablet by mouth once daily 90 tablet 2    B Complex Vitamins (VITAMIN B COMPLEX PO) Take 1 capsule by mouth      Biotin 10 MG TABS Take by mouth      Calcium Carbonate (CALCI-CHEW PO) Take 500 mg by mouth 2 (two) times a day      cholecalciferol (VITAMIN D3) 1,000 units tablet Take 5,000 Units by mouth 2 (two) times a day       clobetasol (TEMOVATE) 0 05 % cream Apply to affected areas daily x 12 weeks, then twice weekly for maintenance      ELIQUIS 2 5 MG Take 1 tablet (2 5 mg total) by mouth 2 (two) times a day 180 tablet 0    Ferrous Sulfate  (45 Fe) MG TBCR Take 1 tablet by mouth daily      fluticasone (FLONASE) 50 mcg/act nasal spray 2 sprays into each nostril daily (Patient taking differently: 2 sprays into each nostril as needed ) 16 g 0    LYSINE PO Take by mouth      Multiple Vitamin (MULTIVITAMIN) capsule Take 1 capsule by mouth daily 2 chewies daily      nystatin (MYCOSTATIN) powder Apply topically 2 (two) times a day (Patient taking differently: Apply topically 2 (two) times a day as needed ) 30 g 0    pantoprazole (PROTONIX) 40 mg tablet Take 1 tablet (40 mg total) by mouth 2 (two) times a day before meals 60 tablet 2    sucralfate (CARAFATE) 1 g/10 mL suspension Take 10 mL (1 g total) by mouth 4 (four) times a day (Patient taking differently: Take 1 g by mouth 4 (four) times a day (with meals and at bedtime) ) 1200 mL 5    topiramate (TOPAMAX) 25 mg sprinkle capsule Take 1 capsule (25 mg total) by mouth daily at bedtime 30 capsule 1    bisacodyl (DULCOLAX) 5 mg EC tablet Take as directed by the office  (Patient not taking: Reported on 7/14/2020) 2 tablet 0    polyethylene glycol (GLYCOLAX) 17 GM/SCOOP powder Take as directed by the office   (Patient not taking: Reported on 7/14/2020) 238 g 0    valACYclovir (VALTREX) 500 mg tablet Take 1 tablet (500 mg total) by mouth 2 (two) times a day for 3 days (Patient taking differently: Take 500 mg by mouth 2 (two) times a day as needed ) 6 tablet 2     No current facility-administered medications for this visit  Current Outpatient Medications on File Prior to Visit   Medication Sig    Ascorbic Acid (VITAMIN C) 100 MG CHEW Chew    atorvastatin (LIPITOR) 10 mg tablet take 1 tablet by mouth once daily    B Complex Vitamins (VITAMIN B COMPLEX PO) Take 1 capsule by mouth    Biotin 10 MG TABS Take by mouth    Calcium Carbonate (CALCI-CHEW PO) Take 500 mg by mouth 2 (two) times a day    cholecalciferol (VITAMIN D3) 1,000 units tablet Take 5,000 Units by mouth 2 (two) times a day     clobetasol (TEMOVATE) 0 05 % cream Apply to affected areas daily x 12 weeks, then twice weekly for maintenance    ELIQUIS 2 5 MG Take 1 tablet (2 5 mg total) by mouth 2 (two) times a day    Ferrous Sulfate  (45 Fe) MG TBCR Take 1 tablet by mouth daily    fluticasone (FLONASE) 50 mcg/act nasal spray 2 sprays into each nostril daily (Patient taking differently: 2 sprays into each nostril as needed )    LYSINE PO Take by mouth    Multiple Vitamin (MULTIVITAMIN) capsule Take 1 capsule by mouth daily 2 chewies daily    nystatin (MYCOSTATIN) powder Apply topically 2 (two) times a day (Patient taking differently: Apply topically 2 (two) times a day as needed )    pantoprazole (PROTONIX) 40 mg tablet Take 1 tablet (40 mg total) by mouth 2 (two) times a day before meals    sucralfate (CARAFATE) 1 g/10 mL suspension Take 10 mL (1 g total) by mouth 4 (four) times a day (Patient taking differently: Take 1 g by mouth 4 (four) times a day (with meals and at bedtime) )    topiramate (TOPAMAX) 25 mg sprinkle capsule Take 1 capsule (25 mg total) by mouth daily at bedtime    bisacodyl (DULCOLAX) 5 mg EC tablet Take as directed by the office  (Patient not taking: Reported on 7/14/2020)    polyethylene glycol (GLYCOLAX) 17 GM/SCOOP powder Take as directed by the office   (Patient not taking: Reported on 7/14/2020)    valACYclovir (VALTREX) 500 mg tablet Take 1 tablet (500 mg total) by mouth 2 (two) times a day for 3 days (Patient taking differently: Take 500 mg by mouth 2 (two) times a day as needed )     No current facility-administered medications on file prior to visit  She has No Known Allergies       Review of Systems   Constitutional: Negative  Respiratory: Negative for cough and shortness of breath  Cardiovascular: Negative for chest pain  Gastrointestinal: Negative for abdominal pain, nausea and vomiting  Objective:      /70 (BP Location: Left arm, Patient Position: Sitting, Cuff Size: Adult)   Pulse 70   Temp 97 8 °F (36 6 °C) (Tympanic)   Resp 16   Ht 5' 3" (1 6 m)   Wt 79 5 kg (175 lb 4 oz)   LMP 09/18/2014   SpO2 98%   BMI 31 04 kg/m²          Physical Exam   Constitutional: She appears well-developed and well-nourished  No distress  HENT:   Head: Normocephalic and atraumatic  Right Ear: External ear normal    Left Ear: External ear normal    Neck: Neck supple  Cardiovascular: Normal rate, regular rhythm and normal heart sounds  No murmur heard  Pulmonary/Chest: Effort normal and breath sounds normal  No respiratory distress  She has no wheezes  She has no rales  Abdominal: Soft  Bowel sounds are normal  There is no tenderness  Lymphadenopathy:     She has no cervical adenopathy  Neurological: She is alert  Skin: Skin is warm  Psychiatric: She has a normal mood and affect

## 2020-07-14 NOTE — TELEPHONE ENCOUNTER
Reviewed 6/23/20 EGD/Colonoscopy results:    Biopsies in the stomach were unremarkable  Colon polyps were benign but precancerous  Repeat colonoscopy in 3 years  Patient stated her PCP has concerns of her being on Protonix 40 mg po twice a day long term  Message forwarded to provider to ask recommendation re: continuing Protonix at this time

## 2020-07-14 NOTE — TELEPHONE ENCOUNTER
Patients GI provider:  Dr Roz Hoskins    Number to return call: [phone]    Reason for call: Pt calling requesting to speak with someone to discuss term or length of taking pantoprazole     Scheduled procedure/appointment date if applicable: Apt/procedure n/a

## 2020-07-15 ENCOUNTER — TELEPHONE (OUTPATIENT)
Dept: FAMILY MEDICINE CLINIC | Facility: CLINIC | Age: 60
End: 2020-07-15

## 2020-07-15 NOTE — TELEPHONE ENCOUNTER
Protonix 2 a day for 40mg and her GI dr told her she could stop it but she has a lot left so wants to know if she could just take 1 a day to use them up and they don't go to [employer]  Please call pt to advise   [phone]

## 2020-07-16 DIAGNOSIS — K29.00 ACUTE SUPERFICIAL GASTRITIS WITHOUT HEMORRHAGE: ICD-10-CM

## 2020-07-16 RX ORDER — OMEPRAZOLE 20 MG/1
CAPSULE, DELAYED RELEASE ORAL
Qty: 90 CAPSULE | Refills: 1 | OUTPATIENT
Start: 2020-07-16

## 2020-07-16 NOTE — TELEPHONE ENCOUNTER
Pt called to see about the omeprazole she was on before  I explained to her she should just be taking the protonix  She cut the 40 mg in half and has about 40 days worth of medication   She will call when she needs the new refill

## 2020-07-16 NOTE — TELEPHONE ENCOUNTER
Samantha consulted with me on the emails she received from patients specialtists regarding protonix  Patients Gi specialist was ok with her stopping the medication all together and he Bariatric specialist wanted her to continue on the protonix 40 mg  Ameena Shearer would like her to stay on the 20 mg daily  Pt is aware and agreed to continue on the protonix 20 mg daily  She did just receive a new refill for the 40 mg but she will try to split them in half  She does not want to have to pay for a new prescription

## 2020-07-16 NOTE — TELEPHONE ENCOUNTER
Thanks for the update  There has been some confusion about her Protonix  She states that bariatric so once her on the medication so what I did was reduce the dose down to 20 mg once daily

## 2020-07-17 ENCOUNTER — OFFICE VISIT (OUTPATIENT)
Dept: BARIATRICS | Facility: CLINIC | Age: 60
End: 2020-07-17
Payer: COMMERCIAL

## 2020-07-17 VITALS
WEIGHT: 176 LBS | DIASTOLIC BLOOD PRESSURE: 70 MMHG | SYSTOLIC BLOOD PRESSURE: 130 MMHG | HEART RATE: 85 BPM | HEIGHT: 64 IN | BODY MASS INDEX: 30.05 KG/M2 | TEMPERATURE: 98.2 F

## 2020-07-17 DIAGNOSIS — E66.9 OBESITY, CLASS I, BMI 30-34.9: Primary | ICD-10-CM

## 2020-07-17 DIAGNOSIS — Z98.84 BARIATRIC SURGERY STATUS: ICD-10-CM

## 2020-07-17 PROCEDURE — 3078F DIAST BP <80 MM HG: CPT | Performed by: PHYSICIAN ASSISTANT

## 2020-07-17 PROCEDURE — 3044F HG A1C LEVEL LT 7.0%: CPT | Performed by: PHYSICIAN ASSISTANT

## 2020-07-17 PROCEDURE — 3075F SYST BP GE 130 - 139MM HG: CPT | Performed by: PHYSICIAN ASSISTANT

## 2020-07-17 PROCEDURE — 1036F TOBACCO NON-USER: CPT | Performed by: PHYSICIAN ASSISTANT

## 2020-07-17 PROCEDURE — 3008F BODY MASS INDEX DOCD: CPT | Performed by: PHYSICIAN ASSISTANT

## 2020-07-17 PROCEDURE — 99213 OFFICE O/P EST LOW 20 MIN: CPT | Performed by: PHYSICIAN ASSISTANT

## 2020-07-17 RX ORDER — TOPIRAMATE 50 MG/1
50 TABLET, FILM COATED ORAL DAILY
Qty: 30 TABLET | Refills: 2 | Status: SHIPPED | OUTPATIENT
Start: 2020-07-17 | End: 2020-09-24

## 2020-07-17 NOTE — PROGRESS NOTES
Assessment/Plan:     Diagnoses and all orders for this visit:    Obesity, Class I, BMI 30-34 9  · Continued/Maintain healthy weight loss with good nutrition intakes  · Adequate hydration with at least 64oz  fluid intake  · Follow diet as discussed  · Follow vitamin and mineral recommendations as reviewed with you  · Exercise as tolerated  · START Topamax 50mg once daily  2 month follow up for dose adjustment  2-3lb weight loss since starting medication  Bariatric surgery status  -s/p Raghav-En-Y Gastric Bypass with Dr Gearlean Spurling in 2017  Presents to the office today for 1 moth follow up regarding weight loss medication regimen  · Ulcer Risk assessment:  · Discussion about strict AVOIDANCE of alcohol, NSAIDS, prednisone, and nicotine of all forms  · Continue Protonix as prescribed by GI- last EGD showed improvement of ulcer       Subjective:      Patient ID: Ephraim Escalante is a 63 y o  female  HPI Patient presenting for 1 month follow up regarding start of Topamax for weight loss  Denies any symptoms  States that she has lost 2-3lbs since last visit and has had a decrease in food cravings  Will meet with dietitian next week to further review healthy lifestyle choices  The following portions of the patient's history were reviewed and updated as appropriate: allergies, current medications, past family history, past medical history, past social history, past surgical history and problem list     Review of Systems   Constitutional: Positive for appetite change (Decreased)  Negative for fever and unexpected weight change  HENT:        + heartburn   Psychiatric/Behavioral: Negative for agitation and behavioral problems  The patient is not nervous/anxious  Objective:      Ht 5' 3" (1 6 m)   LMP 09/18/2014   BMI 31 04 kg/m²          Physical Exam   Constitutional: She is oriented to person, place, and time  She appears well-developed and well-nourished  No distress     HENT:   Head: Normocephalic and atraumatic  Eyes: Pupils are equal, round, and reactive to light  Conjunctivae and EOM are normal    Wearing glasses on exam   Neck: Normal range of motion  Neck supple  Cardiovascular: Normal rate  Pulmonary/Chest: Effort normal  No respiratory distress  Neurological: She is alert and oriented to person, place, and time  Skin: Skin is warm and dry  No rash noted  She is not diaphoretic  No erythema  No pallor  Psychiatric: She has a normal mood and affect   Her behavior is normal

## 2020-07-21 ENCOUNTER — CLINICAL SUPPORT (OUTPATIENT)
Dept: BARIATRICS | Facility: CLINIC | Age: 60
End: 2020-07-21

## 2020-07-21 VITALS — WEIGHT: 173.8 LBS | BODY MASS INDEX: 29.83 KG/M2

## 2020-07-21 DIAGNOSIS — E66.3 OVERWEIGHT: Primary | ICD-10-CM

## 2020-07-21 DIAGNOSIS — Z98.84 BARIATRIC SURGERY STATUS: ICD-10-CM

## 2020-07-21 PROCEDURE — RECHECK: Performed by: DIETITIAN, REGISTERED

## 2020-07-21 NOTE — PROGRESS NOTES
Bariatric Behavioral Health Evaluation    Presenting Problem: 63year old female ( 1960) here for behavioral health evaluation to establish care  Patient had RNY in 2017 at [facility] and reports some weight regain  Is the patient seeking Bariatric Surgery Eval? No    Realizes Post- Op Requirements? Somewhat    Pre-morbid level of function and history of present illness: Patient reports that she has been struggling with her weight since she was a kallie in high school  Psychiatric/Psychological Treatment Diagnosis: Patient denies any mental health diagnosis or treatment  Patient educated on the benefits of outpatient therapy to the bariatric patient to help with behavioral changes for weight loss and weight maintenance, resource list provided  Outpatient Counselor No     Psychiatrist No     Have you had Inpatient Treatment? No    Family Constellation (include relationship with each and Psych/Med HX)    Mother  obesity, Father  obesity and tobacco use, Siblings  obesity, history of addiction and tobacco use and Children  obesity    Additional comments/stressors related to family/relationships/peer support: Patient denies her  as her support  Physical/Psychological Assessment:     Appearance: appropriate  Sociability: average  Affect: appropriate  Mood: calm  Thought Process: coherent  Speech: normal  Content: no impairment  Orientation: person  Yes , place  Yes , time  Yes , normal attention span  Yes , normal memory  Yes   and normal judgement  Yes   Insight: emotional  good    Risk Assessment:     none    Recommendations: Patient is working with JESÚS Howe and JESÚS Stevenson Sick  Patient will continue to take Topamax and will call back to follow-up with RD/SW as needed  Risk of Harm to Self or Others: Patient denies SI or HI    Observation:     Interviews This interview only      Access to weapons no     Based on the previous information, the client presents the following risk of harm to self or others: low     Note: Patient denies any mental health diagnosis or treatment  Patient educated on the benefits of outpatient therapy to the bariatric patient to help with behavioral changes for weight loss and weight maintenance, resource list provided  Patient denies any history of alcohol or drug abuse  Patient denies any tobacco use, quit about 2 years ago  Patient reports that she drinks very rarely  Patient educated on the impact of nicotine and alcohol on the post bariatric patient  BARIATRIC SURGERY EDUCATION CHECKLIST    I have received education related to my bariatric surgery process and understand:    Patients may be required to complete a psychiatric evaluation and receive clearance for surgery from their psychiatrist     Patients who undergo weight loss surgery are at higher risk of increased mental health concerns and suicide attempts  Patients may be required to complete a full substance abuse evaluation and then complete all treatment recommendations prior to surgery  If diagnosis of abuse/dependence results, patient may be required to remain sober for one (1) year before having bariatric surgery  Patients on psychiatric medications should check with their provider to discuss psychiatric medications and the changes in absorption  Patient should discuss all time release medications with provider and take all medications as prescribed  The recommendation is that there is no use of  any tobacco products, Hookah or  vapes for the bariatric post-operation patient  Bariatric surgery patients should not consume alcohol as a post-operative patient as it may increase risk of numerous health conditions including but not limited to alcohol abuse and ulcers  There is a possibility of weight regain if patient does not follow all program guidelines and recommendations      Bariatric surgery patients should exercise thirty (30) to sixty (60) minutes per day to maintain post-surgical weight loss  Research indicates that bariatric patients are more successful when they see a therapist for up to two (2) years post-op  Patients will follow all medical and dietary recommendations provided  Patient will keep all scheduled appointments and follow up with their physician for a minimum of five (5) years  Patient will take all vitamins as recommended  Post-operative vitamins are life-long  Patient reviewed Bariatric Surgery Education Checklist and agrees they have received education on these issues

## 2020-07-21 NOTE — PROGRESS NOTES
Bariatric Nutrition Assessment Note    Type of surgery    Gastric bypass: laparoscopic by Dr Katy Gonsales at [facility]  Surgery Date: 2/6/2017  3 5 years  post-op  Surgeon: Pt is now following up with Dr Horatio Boast  63 y o   female     Wt with BMI of 25: 142 9lbs  Pre-Op Excess Wt: 241lbs prior to RNY in 2017=98lbs excess body weight  Wt 78 8 kg (173 lb 12 8 oz)   LMP 09/18/2014   BMI 29 83 kg/m²    4  2# wt loss since starting Topamax  [facility] Equation: At sedentary activity level: For weight maintenance: 1606 kcal/day  For 1 lb/wk wt loss: 1106 kcal/day  Protein: 1 0-1 2 g/kg IBW= 65-78g/day    Weight History   Onset of Obesity: Adult: started gaining weight in high school  Family history of obesity: Yes: sister and niece also had bariatric surgery  Wt Loss Attempts: Counseling with  MD  Exercise  Meal Replacements (Medifast, Slim Fast, etc )  Nutrition Counseling with RD  OTC meds/supplements  Self Created Diets (Portion Control, Healthy Food Choices, etc )  Patient has tried the above for 6 months or more with insufficient weight loss or weight regain, which is why patient has requested to be evaluated for weight loss surgery today  Maximum Wt Lost: Pt weighed 241lbs prior to surgery and reached a low weight [id number] in October 2018  Pt has since regained approx 30lbs      Review of History and Medications   Past Medical History:   Diagnosis Date    Anemia     iron def    Arthritis     knees    Carpal tunnel syndrome of right wrist     Chest pain     Colon polyp     Depression     at times    DVT (deep venous thrombosis) ([facility] [address] ) 2012    Dysphagia     Esophageal reflux 5/23/2011    Grade 1 out of 6 intensity murmur 6/24/2019    Heart murmur     History of transfusion 1980    Pacemaker 11/01/2018    Pneumonia     age 15    Psychiatric disorder     Trigger finger, left      Past Surgical History:   Procedure Laterality Date    BACK SURGERY      CARDIAC PACEMAKER PLACEMENT  2018    CERVICAL [address] SURGERY  2009    PLATES & SCREWS     SECTION      X2    COLONOSCOPY      GASTRIC BYPASS  2017    LAP RINYGB SURGERY    KNEE ARTHROSCOPY      KNEE SURGERY Bilateral     OTHER SURGICAL HISTORY      ARM SURGERIES    VT INCISE FINGER TENDON SHEATH Left 2018    Procedure: LONG TRIGGER FINGER RELEASE;  Surgeon: Nathan Welsh MD;  Location: [facility];  Service: Orthopedics    VT WRIST Deborra Levo LIG Right 2018    Procedure: ENDOSCOPIC CARPAL TUNNEL RELEASE;  Surgeon: Nathan Welsh MD;  [facility]: [facility];  Service: Orthopedics    TONSILLECTOMY      UPPER GASTROINTESTINAL ENDOSCOPY       Social History     Socioeconomic History    Marital status: /Civil Union     Spouse name: Not on file    Number of children: Not on file    Years of education: Not on file    Highest education level: Not on file   Occupational History    Not on file   Social Needs    Financial resource strain: Not on file    Food insecurity:     Worry: Not on file     Inability: Not on file    Transportation needs:     Medical: Not on file     Non-medical: Not on file   Tobacco Use    Smoking status: Former Smoker     Packs/day: 0 25     Types: Cigarettes    Smokeless tobacco: Never Used    Tobacco comment: quit "many years ago"   Substance and Sexual Activity    Alcohol use: Never     Alcohol/week: 2 0 standard drinks     Types: 1 Glasses of wine, 1 Standard drinks or equivalent per week     Frequency: Never     Comment: very rare social    Drug use: No    Sexual activity: Not Currently   Lifestyle    Physical activity:     Days per week: Not on file     Minutes per session: Not on file    Stress: Not on file   Relationships    Social connections:     Talks on phone: Not on file     Gets together: Not on file     Attends Denominational service: Not on file     Active member of club or organization: Not on file     Attends meetings of clubs or organizations: Not on file     Relationship status: Not on file    Intimate partner violence:     Fear of current or ex partner: Not on file     Emotionally abused: Not on file     Physically abused: Not on file     Forced sexual activity: Not on file   Other Topics Concern    Not on file   Social History Narrative    Not on file       Current Outpatient Medications:     Ascorbic Acid (VITAMIN C) 100 MG CHEW, Chew, Disp: , Rfl:     atorvastatin (LIPITOR) 10 mg tablet, take 1 tablet by mouth once daily, Disp: 90 tablet, Rfl: 2    B Complex Vitamins (VITAMIN B COMPLEX PO), Take 1 capsule by mouth, Disp: , Rfl:     Biotin 10 MG TABS, Take by mouth, Disp: , Rfl:     bisacodyl (DULCOLAX) 5 mg EC tablet, Take as directed by the office  , Disp: 2 tablet, Rfl: 0    Calcium Carbonate (CALCI-CHEW PO), Take 500 mg by mouth 2 (two) times a day, Disp: , Rfl:     cholecalciferol (VITAMIN D3) 1,000 units tablet, Take 5,000 Units by mouth 2 (two) times a day , Disp: , Rfl:     clobetasol (TEMOVATE) 0 05 % cream, Apply to affected areas daily x 12 weeks, then twice weekly for maintenance, Disp: , Rfl:     ELIQUIS 2 5 MG, Take 1 tablet (2 5 mg total) by mouth 2 (two) times a day, Disp: 180 tablet, Rfl: 0    Ferrous Sulfate  (45 Fe) MG TBCR, Take 1 tablet by mouth daily, Disp: , Rfl:     fluticasone (FLONASE) 50 mcg/act nasal spray, 2 sprays into each nostril daily (Patient taking differently: 2 sprays into each nostril as needed ), Disp: 16 g, Rfl: 0    LYSINE PO, Take by mouth, Disp: , Rfl:     Multiple Vitamin (MULTIVITAMIN) capsule, Take 1 capsule by mouth daily 2 chewies daily, Disp: , Rfl:     nystatin (MYCOSTATIN) powder, Apply topically 2 (two) times a day (Patient taking differently: Apply topically 2 (two) times a day as needed ), Disp: 30 g, Rfl: 0    pantoprazole (PROTONIX) 40 mg tablet, Take 1 tablet (40 mg total) by mouth 2 (two) times a day before meals, Disp: 60 tablet, Rfl: 2    polyethylene glycol (GLYCOLAX) 17 GM/SCOOP powder, Take as directed by the office  , Disp: 238 g, Rfl: 0    sucralfate (CARAFATE) 1 g/10 mL suspension, Take 10 mL (1 g total) by mouth 4 (four) times a day (Patient taking differently: Take 1 g by mouth 4 (four) times a day (with meals and at bedtime) ), Disp: 1200 mL, Rfl: 5    topiramate (TOPAMAX) 50 MG tablet, Take 1 tablet (50 mg total) by mouth daily, Disp: 30 tablet, Rfl: 2    valACYclovir (VALTREX) 500 mg tablet, Take 1 tablet (500 mg total) by mouth 2 (two) times a day for 3 days (Patient taking differently: Take 500 mg by mouth 2 (two) times a day as needed ), Disp: 6 tablet, Rfl: 2  Food Intake and Lifestyle Assessment   Food Intake Assessment completed via usual diet recall  Wakes around 10:00-11:00am  Breakfast: 11:00-12:00: one egg with cheese, 3 cup coffee  Snack: none   Lunch: 4:30-5:30pm:  [employer] Breakfast biscuit package  Snack: none  Dinner: 8:00-8:30pm: small salad, greek yogurt or small applesauce  Snack: 12:00-1:00am: one small chicken leg or wing in air fryer  2:30am Bed  Beverage intake: water, sugar free beverages and coffee/tea  Protein supplement: [employer] sometimes at work or [employer] 30g protein powder with water  Estimated protein intake per day: 60-80g  Estimated fluid intake per day: >64oz  Meals eaten away from home: rarely  Typical meal pattern: 3 meals per day and 0-2 snacks per day  Eating Behaviors:Pt currently reports very small appetite and small portion sizes  Food allergies or intolerances: No Known Allergies  Cultural or Zoroastrianism considerations: cannot tolerate rice since surgery   is from [place] and likes fried foods      Physical Assessment  Physical Activity  Types of exercise: None  Current physical limitations: torn meniscus in need of knee replacement per pt    Psychosocial Assessment   Support systems: spouse and children  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 [facility]  Nutrition Diagnosis  Diagnosis: Overweight / Obesity (NC-3 3) and Altered GI function (NC-1 4)  Related to: Physical inactivity and Altered GI function  As Evidenced by: BMI >25 and Unintentional weight gain     Nutrition Prescription: Recommend the following diet  Regular    Interventions and Teaching   Discussed pre-op and post-op nutrition guidelines  Patient educated and handouts provided  Capacity of post-surgery stomach  Adequate hydration:  Discussed importance of 30/60 rule, which pt is not currently following  Sugar and fat restriction to decrease "dumping syndrome"  Expected weight loss  Weight loss plateaus/ possibility of weight regain  Exercise  Nutrition considerations after surgery  Meal planning and preparation:  Demonstrated bariatric plate planning and portion sizes  Appropriate carbohydrate, protein, and fat intake, and food/fluid choices to maximize safe weight loss, nutrient intake, and tolerance:  Discussed importance of adequate protein, calories, and carbohydrates for healthy weight loss  Instructed pt to review list of lean protein foods and post-operative nutrition goals in chapter three of bariatric manual   Techniques for self monitoring and keeping daily food journal:  Demonstrated how to use Khush ermelinda  Potential for food intolerance after surgery, and ways to deal with them including: lactose intolerance, nausea, reflux, vomiting, diarrhea, food intolerance, appetite changes, gas  Vitamin / Mineral supplementation:   Pt is currently taking:  Vitamin C 100mg  B-Complex  10mg Biotin  Calcium Carbonate 500mg BID  5000 IU Vitamin D3 BID  45mg FeSO4 daily  Multivitamin OTC chewy/gummy ([employer] brand) two per day  Reviewed vitamin page in chapter four of bariatric manual as well as bariatric formula vitamin comparison charts and provided samples    Discussed with pt that is she switches to a high potency bariatric-formula multivitamin this will take care of her vitamin C, B-complex, biotin, and 45mg Iron  Nutrition vitamin/mineral bloodwork done 7/7/2020 and WNL  Patient provided with gym coupon for [employer] Assessment: No    Education provided to: patient  Barriers to learning: No barriers identified  Readiness to change: preparation and action  Prior research on procedure: discussed with provider and previous wt  loss surgery  Comprehension: needs reinforcement and verbalizes understanding   Expected Compliance: good    Recommendations  Pt is an appropriate candidate for surgery  Not applicable  Pt is seen today to establish care at our practice and for medical weight management for post-operative weight regain      Evaluation / Monitoring  Dietitian to Monitor: Eating pattern as discussed Tolerance of nutrition prescription Body weight Lab values Physical activity    Goals  Food journal, Exercise 30 minutes 5 times per week, Complete lession plans 1-6 and Eat 3 meals per day    Time Spent:   1 Hour

## 2020-07-29 ENCOUNTER — APPOINTMENT (OUTPATIENT)
Dept: LAB | Age: 60
End: 2020-07-29
Payer: COMMERCIAL

## 2020-07-29 ENCOUNTER — APPOINTMENT (OUTPATIENT)
Dept: RADIOLOGY | Age: 60
End: 2020-07-29
Payer: COMMERCIAL

## 2020-07-29 ENCOUNTER — TRANSCRIBE ORDERS (OUTPATIENT)
Dept: ADMINISTRATIVE | Age: 60
End: 2020-07-29

## 2020-07-29 DIAGNOSIS — Z01.818 PREOPERATIVE TESTING: Primary | ICD-10-CM

## 2020-07-29 DIAGNOSIS — Z01.818 PREOPERATIVE TESTING: ICD-10-CM

## 2020-07-29 LAB
ALBUMIN SERPL BCP-MCNC: 3.8 G/DL (ref 3.5–5)
ALP SERPL-CCNC: 93 U/L (ref 46–116)
ALT SERPL W P-5'-P-CCNC: 30 U/L (ref 12–78)
ANION GAP SERPL CALCULATED.3IONS-SCNC: 7 MMOL/L (ref 4–13)
AST SERPL W P-5'-P-CCNC: 23 U/L (ref 5–45)
ATRIAL RATE: 66 BPM
BACTERIA UR QL AUTO: ABNORMAL /HPF
BASOPHILS # BLD AUTO: 0.06 THOUSANDS/ΜL (ref 0–0.1)
BASOPHILS NFR BLD AUTO: 1 % (ref 0–1)
BILIRUB SERPL-MCNC: 0.62 MG/DL (ref 0.2–1)
BILIRUB UR QL STRIP: NEGATIVE
BUN SERPL-MCNC: 20 MG/DL (ref 5–25)
CALCIUM SERPL-MCNC: 9.3 MG/DL (ref 8.3–10.1)
CHLORIDE SERPL-SCNC: 114 MMOL/L (ref 100–108)
CLARITY UR: CLEAR
CO2 SERPL-SCNC: 21 MMOL/L (ref 21–32)
COLOR UR: YELLOW
CREAT SERPL-MCNC: 0.73 MG/DL (ref 0.6–1.3)
EOSINOPHIL # BLD AUTO: 0.17 THOUSAND/ΜL (ref 0–0.61)
EOSINOPHIL NFR BLD AUTO: 4 % (ref 0–6)
ERYTHROCYTE [DISTWIDTH] IN BLOOD BY AUTOMATED COUNT: 12.3 % (ref 11.6–15.1)
GFR SERPL CREATININE-BSD FRML MDRD: 89 ML/MIN/1.73SQ M
GLUCOSE P FAST SERPL-MCNC: 109 MG/DL (ref 65–99)
GLUCOSE UR STRIP-MCNC: NEGATIVE MG/DL
HCT VFR BLD AUTO: 38.8 % (ref 34.8–46.1)
HGB BLD-MCNC: 12.5 G/DL (ref 11.5–15.4)
HGB UR QL STRIP.AUTO: NEGATIVE
HYALINE CASTS #/AREA URNS LPF: ABNORMAL /LPF
IMM GRANULOCYTES # BLD AUTO: 0.02 THOUSAND/UL (ref 0–0.2)
IMM GRANULOCYTES NFR BLD AUTO: 0 % (ref 0–2)
INR PPP: 1.08 (ref 0.84–1.19)
KETONES UR STRIP-MCNC: NEGATIVE MG/DL
LEUKOCYTE ESTERASE UR QL STRIP: ABNORMAL
LYMPHOCYTES # BLD AUTO: 1.18 THOUSANDS/ΜL (ref 0.6–4.47)
LYMPHOCYTES NFR BLD AUTO: 26 % (ref 14–44)
MCH RBC QN AUTO: 32.3 PG (ref 26.8–34.3)
MCHC RBC AUTO-ENTMCNC: 32.2 G/DL (ref 31.4–37.4)
MCV RBC AUTO: 100 FL (ref 82–98)
MONOCYTES # BLD AUTO: 0.28 THOUSAND/ΜL (ref 0.17–1.22)
MONOCYTES NFR BLD AUTO: 6 % (ref 4–12)
NEUTROPHILS # BLD AUTO: 2.8 THOUSANDS/ΜL (ref 1.85–7.62)
NEUTS SEG NFR BLD AUTO: 63 % (ref 43–75)
NITRITE UR QL STRIP: NEGATIVE
NON-SQ EPI CELLS URNS QL MICRO: ABNORMAL /HPF
NRBC BLD AUTO-RTO: 0 /100 WBCS
P AXIS: 84 DEGREES
PH UR STRIP.AUTO: 5.5 [PH]
PLATELET # BLD AUTO: 197 THOUSANDS/UL (ref 149–390)
PMV BLD AUTO: 12.1 FL (ref 8.9–12.7)
POTASSIUM SERPL-SCNC: 3.6 MMOL/L (ref 3.5–5.3)
PR INTERVAL: 170 MS
PROT SERPL-MCNC: 7.6 G/DL (ref 6.4–8.2)
PROT UR STRIP-MCNC: NEGATIVE MG/DL
PROTHROMBIN TIME: 14 SECONDS (ref 11.6–14.5)
QRS AXIS: 48 DEGREES
QRSD INTERVAL: 80 MS
QT INTERVAL: 412 MS
QTC INTERVAL: 431 MS
RBC # BLD AUTO: 3.87 MILLION/UL (ref 3.81–5.12)
RBC #/AREA URNS AUTO: ABNORMAL /HPF
SODIUM SERPL-SCNC: 142 MMOL/L (ref 136–145)
SP GR UR STRIP.AUTO: 1.02 (ref 1–1.03)
T WAVE AXIS: 46 DEGREES
UROBILINOGEN UR QL STRIP.AUTO: 0.2 E.U./DL
VENTRICULAR RATE: 66 BPM
WBC # BLD AUTO: 4.51 THOUSAND/UL (ref 4.31–10.16)
WBC #/AREA URNS AUTO: ABNORMAL /HPF

## 2020-07-29 PROCEDURE — 36415 COLL VENOUS BLD VENIPUNCTURE: CPT

## 2020-07-29 PROCEDURE — 85610 PROTHROMBIN TIME: CPT

## 2020-07-29 PROCEDURE — 80053 COMPREHEN METABOLIC PANEL: CPT

## 2020-07-29 PROCEDURE — 85025 COMPLETE CBC W/AUTO DIFF WBC: CPT

## 2020-07-29 PROCEDURE — 93010 ELECTROCARDIOGRAM REPORT: CPT | Performed by: INTERNAL MEDICINE

## 2020-07-29 PROCEDURE — 71046 X-RAY EXAM CHEST 2 VIEWS: CPT

## 2020-07-29 PROCEDURE — 93005 ELECTROCARDIOGRAM TRACING: CPT

## 2020-07-29 PROCEDURE — 81001 URINALYSIS AUTO W/SCOPE: CPT | Performed by: ORTHOPAEDIC SURGERY

## 2020-08-03 DIAGNOSIS — I82.4Y9 DEEP VEIN THROMBOSIS (DVT) OF PROXIMAL LOWER EXTREMITY, UNSPECIFIED CHRONICITY, UNSPECIFIED LATERALITY (HCC): ICD-10-CM

## 2020-08-03 PROBLEM — Z01.818 PREOP EXAMINATION: Status: ACTIVE | Noted: 2020-08-03

## 2020-08-03 RX ORDER — ACETAMINOPHEN 500 MG
1000 TABLET ORAL EVERY 6 HOURS PRN
COMMUNITY
Start: 2020-07-24 | End: 2020-08-03

## 2020-08-03 RX ORDER — APIXABAN 2.5 MG/1
TABLET, FILM COATED ORAL
Qty: 180 TABLET | Refills: 0 | Status: SHIPPED | OUTPATIENT
Start: 2020-08-03 | End: 2020-11-02

## 2020-08-06 ENCOUNTER — CONSULT (OUTPATIENT)
Dept: FAMILY MEDICINE CLINIC | Facility: CLINIC | Age: 60
End: 2020-08-06
Payer: COMMERCIAL

## 2020-08-06 VITALS
RESPIRATION RATE: 16 BRPM | BODY MASS INDEX: 29.71 KG/M2 | SYSTOLIC BLOOD PRESSURE: 114 MMHG | WEIGHT: 174 LBS | TEMPERATURE: 98.5 F | OXYGEN SATURATION: 98 % | HEART RATE: 76 BPM | HEIGHT: 64 IN | DIASTOLIC BLOOD PRESSURE: 70 MMHG

## 2020-08-06 DIAGNOSIS — K25.3 ACUTE GASTRIC ULCER WITHOUT HEMORRHAGE OR PERFORATION: ICD-10-CM

## 2020-08-06 DIAGNOSIS — Z01.818 PREOP EXAMINATION: Primary | ICD-10-CM

## 2020-08-06 DIAGNOSIS — D50.9 IRON DEFICIENCY ANEMIA, UNSPECIFIED IRON DEFICIENCY ANEMIA TYPE: ICD-10-CM

## 2020-08-06 DIAGNOSIS — M17.12 OSTEOARTHRITIS OF LEFT KNEE, UNSPECIFIED OSTEOARTHRITIS TYPE: ICD-10-CM

## 2020-08-06 DIAGNOSIS — Z86.718 HISTORY OF DVT (DEEP VEIN THROMBOSIS): ICD-10-CM

## 2020-08-06 PROCEDURE — 3725F SCREEN DEPRESSION PERFORMED: CPT | Performed by: PHYSICIAN ASSISTANT

## 2020-08-06 PROCEDURE — 3074F SYST BP LT 130 MM HG: CPT | Performed by: PHYSICIAN ASSISTANT

## 2020-08-06 PROCEDURE — 3008F BODY MASS INDEX DOCD: CPT | Performed by: PHYSICIAN ASSISTANT

## 2020-08-06 PROCEDURE — 3044F HG A1C LEVEL LT 7.0%: CPT | Performed by: PHYSICIAN ASSISTANT

## 2020-08-06 PROCEDURE — 1036F TOBACCO NON-USER: CPT | Performed by: PHYSICIAN ASSISTANT

## 2020-08-06 PROCEDURE — 99214 OFFICE O/P EST MOD 30 MIN: CPT | Performed by: PHYSICIAN ASSISTANT

## 2020-08-06 PROCEDURE — 3078F DIAST BP <80 MM HG: CPT | Performed by: PHYSICIAN ASSISTANT

## 2020-08-06 NOTE — PROGRESS NOTES
Assessment/Plan:    Patient is an acceptable risk for the proposed procedure  Labs and EKG have been reviewed  Patient stated that she was told by the surgeon that she did not need to discontinue her Eliquis prior to the arthroscopy     Diagnoses and all orders for this visit:    Preop examination    Osteoarthritis of left knee, unspecified osteoarthritis type    Acute gastric ulcer without hemorrhage or perforation    History of DVT (deep vein thrombosis)    Iron deficiency anemia, unspecified iron deficiency anemia type    Other orders  -     acetaminophen (TYLENOL) 500 mg tablet; Take 1,000 mg by mouth every 6 (six) hours as needed  -     Lysine HCl POWD; Take by mouth          Subjective:      Patient ID: Oscar Martel is a 63 y o  female  Pre-Op Visit (Brief): The patient is being seen for a preoperative visit  The procedure is arthroscopy of the left knee with Dr Cindy Fuentes in [place]  The indication for surgery is left knee pain/osteoarthritis      Surgical Risk Assessment:   Prior Anesthesia: Yes   Prior adverse reaction to general anesthesia:No      Pertinent Past Medical History  Neck osteoarthrosis: No  Seizure disorder:No  Asthma:No  Angina:No  Arrhythmia:No  CAD:No  CAD without prior MI:No  CAD without recent PCI:No  CHF:No  Chronic liver disease:No  Acute hepatitis::No  Coagulation delay:No  Primary hypercoagulable state:No  Secondary hypercoagulable state:No  pulmonary embolism:No  DVT:Yes 2012  Use anticoagulants:Yes   Diabetes:No  Insulin use:No  Thyroid disease:No  TMJ osteoarthrosis:No  Wear dentures:Yes   CVA:No  COPD:No  NATALIA:No  Renal disease:No  Low serum albumin:No  Obesity:No    Exercise Capacity  Are you able to walk two flights of stairs::Yes   Are you able to walk four blocks without symptoms:Yes     Lifestyle Factors  Alcohol use:No  Tobacco use:No  Illegal drug use:No    Symptoms  Easy bleeding:Yes   Easy bruising:Yes   Frequent nosebleeds:No  Chest pain:No  Cough:No  Dyspnea:No  Edema:No  Palpitations:No  Wheezing:No    Pertinent Family History:  Any family members with the following problems:  Rx to anesthesia:Yes sister Rx unknown  Aneurysm:Yes mother brain  Bleeding problems:No  Sudden early deaths:No  Ischemic heart disease:Yes both parents  Stroke: Yes mother      The following portions of the patient's history were reviewed and updated as appropriate:   She  has a past medical history of Anemia, Arthritis, Carpal tunnel syndrome of right wrist, Chest pain, Colon polyp, Depression, DVT (deep venous thrombosis) ([facility] [address] ) (2012), Dysphagia, Esophageal reflux (5/23/2011), Grade 1 out of 6 intensity murmur (6/24/2019), Heart murmur, History of transfusion (1980), Pacemaker (11/01/2018), Pneumonia, Psychiatric disorder, and Trigger finger, left    She   Patient Active Problem List    Diagnosis Date Noted    Preop examination 08/03/2020    BMI 31 0-31 9,adult 07/14/2020    History of colon polyps 06/05/2020    Acute gastric ulcer without hemorrhage or perforation 05/12/2020    Dysphagia 05/07/2020    Right ear pain 01/21/2020    Leukopenia 01/07/2020    Anemia 01/07/2020    Chronic right shoulder pain 12/10/2019    Work related injury 12/10/2019    History of DVT (deep vein thrombosis) 10/15/2019    Iron deficiency anemia 10/15/2019    Vitamin deficiency 10/15/2019    Medicare annual wellness visit, initial 10/15/2019    Mass of soft tissue of right upper extremity 10/15/2019    Grade 1 out of 6 intensity murmur 06/24/2019    Encounter for screening mammogram for malignant neoplasm of breast 06/24/2019    Candida infection of flexural skin 04/05/2019    Chest pain 03/05/2019    Status post carpal tunnel release 01/07/2019    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    Sick sinus syndrome ([facility] [address] ) 11/02/2018    Abnormal CT scan 37/10/0236    Lichen sclerosus 98/71/1296    Fibrocystic breast disease 12/02/2014    Mixed hyperlipidemia 12/02/2014    Knee osteoarthritis 2014    Menopausal and postmenopausal disorder 2014    Neck pain 2014     She  has a past surgical history that includes Knee surgery (Bilateral);  section; Cervical disc surgery (2009); Tonsillectomy; Other surgical history; Gastric bypass (2017); pr wrist arthroscop,release xvers lig (Right, 2018); pr incise finger tendon sheath (Left, 2018); Cardiac pacemaker placement (2018); Upper gastrointestinal endoscopy; Colonoscopy; Knee arthroscopy; and Back surgery  Her family history includes Alzheimer's disease in her mother; Arthritis in her mother; Breast cancer (age of onset: 77) in her mother; Cancer in her maternal aunt and mother; Coronary artery disease in her father and mother; Diabetes type II in her father; Gout in her father; Heart disease in her father and mother; Hypertension in her father; No Known Problems in her daughter, maternal aunt, maternal aunt, maternal aunt, maternal aunt, maternal grandfather, maternal grandmother, paternal aunt, paternal aunt, paternal grandfather, paternal grandmother, sister, sister, and sister; Prostate cancer in her brother; Stroke in her mother  She  reports that she has quit smoking  Her smoking use included cigarettes  She smoked 0 25 packs per day  She has never used smokeless tobacco  She reports that she does not drink alcohol or use drugs    Current Outpatient Medications   Medication Sig Dispense Refill    Ascorbic Acid (VITAMIN C) 100 MG CHEW Chew      atorvastatin (LIPITOR) 10 mg tablet take 1 tablet by mouth once daily 90 tablet 2    B Complex Vitamins (VITAMIN B COMPLEX PO) Take 1 capsule by mouth      Biotin 10 MG TABS Take by mouth      Calcium Carbonate (CALCI-CHEW PO) Take 500 mg by mouth 2 (two) times a day      cholecalciferol (VITAMIN D3) 1,000 units tablet Take 5,000 Units by mouth 2 (two) times a day       clobetasol (TEMOVATE) 0 05 % cream Apply to affected areas daily x 12 weeks, then twice weekly for maintenance      Eliquis 2 5 MG take 1 tablet by mouth twice a day 180 tablet 0    Ferrous Sulfate  (45 Fe) MG TBCR Take 1 tablet by mouth daily      fluticasone (FLONASE) 50 mcg/act nasal spray 2 sprays into each nostril daily (Patient taking differently: 2 sprays into each nostril as needed ) 16 g 0    Multiple Vitamin (MULTIVITAMIN) capsule Take 1 capsule by mouth daily 2 chewies daily      pantoprazole (PROTONIX) 40 mg tablet Take 1 tablet (40 mg total) by mouth 2 (two) times a day before meals 60 tablet 2    topiramate (TOPAMAX) 50 MG tablet Take 1 tablet (50 mg total) by mouth daily 30 tablet 2    bisacodyl (DULCOLAX) 5 mg EC tablet Take as directed by the office  (Patient not taking: Reported on 8/6/2020) 2 tablet 0    Lysine HCl POWD Take by mouth      LYSINE PO Take by mouth      nystatin (MYCOSTATIN) powder Apply topically 2 (two) times a day (Patient taking differently: Apply topically 2 (two) times a day as needed ) 30 g 0    polyethylene glycol (GLYCOLAX) 17 GM/SCOOP powder Take as directed by the office  (Patient not taking: Reported on 8/6/2020) 238 g 0    sucralfate (CARAFATE) 1 g/10 mL suspension Take 10 mL (1 g total) by mouth 4 (four) times a day (Patient taking differently: Take 1 g by mouth 4 (four) times a day (with meals and at bedtime) ) 1200 mL 5    valACYclovir (VALTREX) 500 mg tablet Take 1 tablet (500 mg total) by mouth 2 (two) times a day for 3 days (Patient taking differently: Take 500 mg by mouth 2 (two) times a day as needed ) 6 tablet 2     No current facility-administered medications for this visit        Current Outpatient Medications on File Prior to Visit   Medication Sig    Ascorbic Acid (VITAMIN C) 100 MG CHEW Chew    atorvastatin (LIPITOR) 10 mg tablet take 1 tablet by mouth once daily    B Complex Vitamins (VITAMIN B COMPLEX PO) Take 1 capsule by mouth    Biotin 10 MG TABS Take by mouth    Calcium Carbonate (CALCI-CHEW PO) Take 500 mg by mouth 2 (two) times a day    cholecalciferol (VITAMIN D3) 1,000 units tablet Take 5,000 Units by mouth 2 (two) times a day     clobetasol (TEMOVATE) 0 05 % cream Apply to affected areas daily x 12 weeks, then twice weekly for maintenance    Eliquis 2 5 MG take 1 tablet by mouth twice a day    Ferrous Sulfate  (45 Fe) MG TBCR Take 1 tablet by mouth daily    fluticasone (FLONASE) 50 mcg/act nasal spray 2 sprays into each nostril daily (Patient taking differently: 2 sprays into each nostril as needed )    Multiple Vitamin (MULTIVITAMIN) capsule Take 1 capsule by mouth daily 2 chewies daily    pantoprazole (PROTONIX) 40 mg tablet Take 1 tablet (40 mg total) by mouth 2 (two) times a day before meals    topiramate (TOPAMAX) 50 MG tablet Take 1 tablet (50 mg total) by mouth daily    bisacodyl (DULCOLAX) 5 mg EC tablet Take as directed by the office  (Patient not taking: Reported on 8/6/2020)    Lysine HCl POWD Take by mouth    LYSINE PO Take by mouth    nystatin (MYCOSTATIN) powder Apply topically 2 (two) times a day (Patient taking differently: Apply topically 2 (two) times a day as needed )    polyethylene glycol (GLYCOLAX) 17 GM/SCOOP powder Take as directed by the office  (Patient not taking: Reported on 8/6/2020)    sucralfate (CARAFATE) 1 g/10 mL suspension Take 10 mL (1 g total) by mouth 4 (four) times a day (Patient taking differently: Take 1 g by mouth 4 (four) times a day (with meals and at bedtime) )    valACYclovir (VALTREX) 500 mg tablet Take 1 tablet (500 mg total) by mouth 2 (two) times a day for 3 days (Patient taking differently: Take 500 mg by mouth 2 (two) times a day as needed )     No current facility-administered medications on file prior to visit  She has No Known Allergies       Review of Systems      Objective:      /70 (BP Location: Left arm, Patient Position: Sitting, Cuff Size: Standard)   Pulse 76   Temp 98 5 °F (36 9 °C) (Tympanic)   Resp 16  5' 4" (1 626 m)   Wt 78 9 kg (174 lb)   LMP 09/18/2014   SpO2 98%   BMI 29 87 kg/m²          Physical Exam   Constitutional: She appears well-developed  She does not appear ill  No distress  HENT:   Head: Normocephalic and atraumatic  Right Ear: Tympanic membrane, external ear and ear canal normal    Left Ear: Tympanic membrane, external ear and ear canal normal    Neck: Neck supple  No thyromegaly present  Cardiovascular: Normal rate, regular rhythm and normal heart sounds  Exam reveals no gallop and no friction rub  No murmur heard  Pulmonary/Chest: Effort normal and breath sounds normal  No respiratory distress  She has no wheezes  She has no rales  Abdominal: Soft  Bowel sounds are normal  She exhibits no mass  There is no abdominal tenderness  Musculoskeletal:         General: No swelling or deformity  Lymphadenopathy:     She has no cervical adenopathy  Neurological: She is alert  Skin: Skin is warm     Psychiatric: Mood normal

## 2020-08-10 ENCOUNTER — TELEPHONE (OUTPATIENT)
Dept: CARDIOLOGY CLINIC | Facility: CLINIC | Age: 60
End: 2020-08-10

## 2020-08-10 ENCOUNTER — TELEPHONE (OUTPATIENT)
Dept: FAMILY MEDICINE CLINIC | Facility: CLINIC | Age: 60
End: 2020-08-10

## 2020-08-10 NOTE — TELEPHONE ENCOUNTER
Phone call from Dr Pickens [facility] office, they are requesting pre op clearance be faxed to them at [phone]  OV note faxed & conf rcvd

## 2020-08-11 ENCOUNTER — TELEPHONE (OUTPATIENT)
Dept: CARDIOLOGY CLINIC | Facility: CLINIC | Age: 60
End: 2020-08-11

## 2020-08-11 ENCOUNTER — OFFICE VISIT (OUTPATIENT)
Dept: CARDIOLOGY CLINIC | Facility: CLINIC | Age: 60
End: 2020-08-11
Payer: COMMERCIAL

## 2020-08-11 VITALS
WEIGHT: 173 LBS | DIASTOLIC BLOOD PRESSURE: 60 MMHG | HEART RATE: 68 BPM | HEIGHT: 64 IN | BODY MASS INDEX: 29.53 KG/M2 | SYSTOLIC BLOOD PRESSURE: 104 MMHG | TEMPERATURE: 97.3 F

## 2020-08-11 DIAGNOSIS — Z01.810 PRE-OPERATIVE CARDIOVASCULAR EXAMINATION: Primary | ICD-10-CM

## 2020-08-11 DIAGNOSIS — Z86.718 HISTORY OF DVT (DEEP VEIN THROMBOSIS): ICD-10-CM

## 2020-08-11 DIAGNOSIS — M17.10 KNEE OSTEOARTHRITIS: ICD-10-CM

## 2020-08-11 DIAGNOSIS — I49.5 SICK SINUS SYNDROME (HCC): ICD-10-CM

## 2020-08-11 DIAGNOSIS — Z95.0 CARDIAC PACEMAKER IN SITU: ICD-10-CM

## 2020-08-11 PROCEDURE — 1036F TOBACCO NON-USER: CPT | Performed by: INTERNAL MEDICINE

## 2020-08-11 PROCEDURE — 3044F HG A1C LEVEL LT 7.0%: CPT | Performed by: INTERNAL MEDICINE

## 2020-08-11 PROCEDURE — 3008F BODY MASS INDEX DOCD: CPT | Performed by: INTERNAL MEDICINE

## 2020-08-11 PROCEDURE — 99213 OFFICE O/P EST LOW 20 MIN: CPT | Performed by: INTERNAL MEDICINE

## 2020-08-11 PROCEDURE — 3074F SYST BP LT 130 MM HG: CPT | Performed by: INTERNAL MEDICINE

## 2020-08-11 PROCEDURE — 3078F DIAST BP <80 MM HG: CPT | Performed by: INTERNAL MEDICINE

## 2020-08-11 NOTE — PROGRESS NOTES
[facility] CARDIOLOGY Phan Russell  [facility] [address] [facility] [address]  Dept: [phone]                                            Cardiology Office Follow Up  Christian Nicole, 63 y o  female  YOB: 1960  MRN: [id number] Encounter: [id number]      PCP - Estrellita Anderson PA-C    Assessment  1  Preoperative cardiovascular examination  2  SSS s/p Medtronic dual chamber pacemaker  3  Hyperlipidemia  4  Left knee medial meniscus tear, osteoarthritis  5  H/o multiple DVTs  · on long term anticoagulation  · Had nosebleeds on full dose eliquis, so is on reduced dose at 2 5 bid through PCP    Plan  Pre-operative cardiovascular evaluation  · Planned procedure: Left knee arthoscopic repair (David Maganason - Fax # [phone])  · Active cardiac complaints: None  · Functional status: Active  Used to walk 45 minutes on treadmill prior to injury without symptoms  · No h/o CAD, heart failure  · Vitals - reviewed and stable  · EKG - 7/29/20 - personally reviewed - NSR, low voltage  · Nuclear stress test August 2019 - LVEF 67%, moderate size Ruthy fixed perfusion defect of inferior wall likely related to diaphragm artifact  No significant ischemia  · Okay to proceed with planned surgery as low-moderate cardiac risk for a moderate risk surgery  · She is medically as optimized as able from the cardiac standpoint    SSS s/p medtronic dual chamber pacemaker  · Last device check on 07/10/2020 showed 32% A paced, 0% V pacing  Normal device function, no significant high rate episodes    Hyperlipidemia  · Last lipid panel 05/2019 showed total cholesterol 171, triglycerides 92, HDL 80, LDL 73  · Continue atorvastatin 10 mg    ECG today -  No results found for this visit on 08/11/20  No orders of the defined types were placed in this encounter  No follow-ups on file      History of Present Illness   19-year-old female comes in for an urgent/same-day appointment for preoperative cardiovascular clearance for an upcoming left knee arthroscopic surgery in 2 days (on 20)  She routinely follows with Dr Darling Almazan, but is seeing me today for an urgent appointment due to the same    She reports no active complains of chest pain, shortness of breath, palpitations, dizziness or lightheadedness  In early , patient had a mechanical fall, while working at the [facility], where she apparently tripped on the bed cord and fell on her left knee  He subsequently had a torn medial meniscus and osteoarthritis, and needs surgical repair for the same  She has a prior history of sick sinus syndrome, and had a pacemaker placement in 2018  She has been doing well since then, and has not had any further symptoms of dizziness or lightheadedness, syncope    She has had problems with prior chest pain and has undergone cardiac catheterization at [facility] as well as a nuclear stress test at [facility], which were without any significant abnormalities      Historical Information   Past Medical History:   Diagnosis Date    Anemia     iron def    Arthritis     knees    Carpal tunnel syndrome of right wrist     Chest pain     Colon polyp     Depression     at times    DVT (deep venous thrombosis) ([facility] [address] )     Dysphagia     Esophageal reflux 2011    Grade 1 out of 6 intensity murmur 2019    Heart murmur     History of transfusion 1980    Pacemaker 2018    Pneumonia     age 15    Psychiatric disorder     Trigger finger, left      Past Surgical History:   Procedure Laterality Date    BACK SURGERY      CARDIAC PACEMAKER PLACEMENT  2018    CERVICAL [address] SURGERY  2009    PLATES & SCREWS     SECTION      X2    COLONOSCOPY      GASTRIC BYPASS  2017    LAP RINYGB SURGERY    KNEE ARTHROSCOPY      KNEE SURGERY Bilateral     OTHER SURGICAL HISTORY      ARM SURGERIES    MN INCISE FINGER TENDON SHEATH Left 2018    Procedure: LONG TRIGGER FINGER RELEASE; Surgeon: Jacqueline Gunderson MD;  Location: [facility];  Service: Orthopedics    SD WRIST Jacquelyn Velasco LIG Right 9/18/2018    Procedure: ENDOSCOPIC CARPAL TUNNEL RELEASE;  Surgeon: Jacqueline Gunderson MD;  Location: [facility];  Service: Orthopedics    TONSILLECTOMY      UPPER GASTROINTESTINAL ENDOSCOPY       Family History   Problem Relation Age of Onset    Stroke Mother     Alzheimer's disease Mother     Arthritis Mother     Coronary artery disease Mother     Breast cancer Mother 77    Cancer Mother     Heart disease Mother     Hypertension Father     Gout Father     Diabetes type II Father     Coronary artery disease Father     Heart disease Father     No Known Problems Sister     No Known Problems Daughter     No Known Problems Maternal Grandmother     No Known Problems Maternal Grandfather     No Known Problems Paternal Grandmother     No Known Problems Paternal Grandfather     Prostate cancer Brother     No Known Problems Sister     No Known Problems Sister     No Known Problems Maternal Aunt     No Known Problems Maternal Aunt     No Known Problems Maternal Aunt     No Known Problems Maternal Aunt     Cancer Maternal Aunt     No Known Problems Paternal Aunt     No Known Problems Paternal Aunt      Current Outpatient Medications on File Prior to Visit   Medication Sig Dispense Refill    Ascorbic Acid (VITAMIN C) 100 MG CHEW Chew      atorvastatin (LIPITOR) 10 mg tablet take 1 tablet by mouth once daily 90 tablet 2    B Complex Vitamins (VITAMIN B COMPLEX PO) Take 1 capsule by mouth      Biotin 10 MG TABS Take by mouth      Calcium Carbonate (CALCI-CHEW PO) Take 500 mg by mouth 2 (two) times a day      cholecalciferol (VITAMIN D3) 1,000 units tablet Take 5,000 Units by mouth 2 (two) times a day       clobetasol (TEMOVATE) 0 05 % cream Apply to affected areas daily x 12 weeks, then twice weekly for maintenance      Ferrous Sulfate  (45 Fe) MG TBCR Take 1 tablet by mouth daily      Lysine HCl POWD Take by mouth      Multiple Vitamin (MULTIVITAMIN) capsule Take 1 capsule by mouth daily 2 chewies daily      pantoprazole (PROTONIX) 40 mg tablet Take 1 tablet (40 mg total) by mouth 2 (two) times a day before meals 60 tablet 2    topiramate (TOPAMAX) 50 MG tablet Take 1 tablet (50 mg total) by mouth daily 30 tablet 2    valACYclovir (VALTREX) 500 mg tablet Take 1 tablet (500 mg total) by mouth 2 (two) times a day for 3 days (Patient taking differently: Take 500 mg by mouth 2 (two) times a day as needed ) 6 tablet 2    bisacodyl (DULCOLAX) 5 mg EC tablet Take as directed by the office  (Patient not taking: Reported on 8/6/2020) 2 tablet 0    Eliquis 2 5 MG take 1 tablet by mouth twice a day (Patient not taking: Reported on 8/11/2020) 180 tablet 0    fluticasone (FLONASE) 50 mcg/act nasal spray 2 sprays into each nostril daily (Patient not taking: Reported on 8/11/2020) 16 g 0    nystatin (MYCOSTATIN) powder Apply topically 2 (two) times a day (Patient not taking: Reported on 8/11/2020) 30 g 0    sucralfate (CARAFATE) 1 g/10 mL suspension Take 10 mL (1 g total) by mouth 4 (four) times a day (Patient not taking: Reported on 8/11/2020) 1200 mL 5    [DISCONTINUED] LYSINE PO Take by mouth      [DISCONTINUED] polyethylene glycol (GLYCOLAX) 17 GM/SCOOP powder Take as directed by the office  (Patient not taking: Reported on 8/6/2020) 238 g 0     No current facility-administered medications on file prior to visit        No Known Allergies  Social History     Socioeconomic History    Marital status: /Civil Union     Spouse name: None    Number of children: None    Years of education: None    Highest education level: None   Occupational History    None   Social Needs    Financial resource strain: None    Food insecurity     Worry: None     Inability: None    Transportation needs     Medical: None     Non-medical: None   Tobacco Use    Smoking status: Former Smoker Packs/day: 0 25     Types: Cigarettes    Smokeless tobacco: Never Used    Tobacco comment: quit "many years ago"   Substance and Sexual Activity    Alcohol use: Never     Alcohol/week: 2 0 standard drinks     Types: 1 Glasses of wine, 1 Standard drinks or equivalent per week     Frequency: Never     Comment: very rare social    Drug use: No    Sexual activity: Not Currently   Lifestyle    Physical activity     Days per week: None     Minutes per session: None    Stress: None   Relationships    Social connections     Talks on phone: None     Gets together: None     Attends Christian service: None     Active member of club or organization: None     Attends meetings of clubs or organizations: None     Relationship status: None    Intimate partner violence     Fear of current or ex partner: None     Emotionally abused: None     Physically abused: None     Forced sexual activity: None   Other Topics Concern    None   Social History Narrative    None        Review of Systems   All other systems reviewed and are negative  Vitals:  Vitals:    08/11/20 1116   BP: 104/60   BP Location: Left arm   Cuff Size: Standard   Pulse: 68   Temp: (!) 97 3 °F (36 3 °C)   Weight: 78 5 kg (173 lb)   Height: 5' 4" (1 626 m)     BMI - Body mass index is 29 7 kg/m²  Wt Readings from Last 7 Encounters:   08/11/20 78 5 kg (173 lb)   08/06/20 78 9 kg (174 lb)   07/21/20 78 8 kg (173 lb 12 8 oz)   07/17/20 79 8 kg (176 lb)   07/14/20 79 5 kg (175 lb 4 oz)   06/23/20 79 4 kg (175 lb)   06/19/20 80 7 kg (178 lb)       Physical Exam  Vitals signs and nursing note reviewed  Constitutional:       General: She is not in acute distress  Appearance: She is well-developed  HENT:      Head: Normocephalic and atraumatic  Eyes:      General: No scleral icterus  Conjunctiva/sclera: Conjunctivae normal    Neck:      Vascular: No JVD  Cardiovascular:      Rate and Rhythm: Normal rate and regular rhythm        Heart sounds: Normal heart sounds  No murmur  No friction rub  No gallop  Pulmonary:      Effort: Pulmonary effort is normal  No respiratory distress  Breath sounds: Normal breath sounds  No rales  Abdominal:      General: There is no distension  Palpations: Abdomen is soft  Tenderness: There is no abdominal tenderness  Musculoskeletal:         General: No tenderness  Right lower leg: No edema  Left lower leg: No edema  Comments: Left upper chest wall cardiac device noted in-situ  No tenderness or swelling noted  Skin:     General: Skin is warm  Neurological:      General: No focal deficit present  Mental Status: She is alert and oriented to person, place, and time  Mental status is at baseline  Psychiatric:         Mood and Affect: Mood normal          Behavior: Behavior normal              Labs:  CBC:   Lab Results   Component Value Date    WBC 4 51 07/29/2020    RBC 3 87 07/29/2020    HGB 12 5 07/29/2020    HCT 38 8 07/29/2020     (H) 07/29/2020     07/29/2020    RDW 12 3 07/29/2020       CMP:   Lab Results   Component Value Date    K 3 6 07/29/2020     (H) 07/29/2020    CO2 21 07/29/2020    BUN 20 07/29/2020    CREATININE 0 73 07/29/2020    EGFR 89 07/29/2020    CALCIUM 9 3 07/29/2020    AST 23 07/29/2020    ALT 30 07/29/2020    ALKPHOS 93 07/29/2020       Magnesium:  Lab Results   Component Value Date    MG 2 1 10/31/2018       Lipid Profile:   Lab Results   Component Value Date    HDL 80 (H) 05/14/2019    TRIG 92 05/14/2019    LDLCALC 73 05/14/2019       Thyroid Studies:   Lab Results   Component Value Date    IDG8QTEJEBLK 0 848 05/06/2020       No components found for: [facility] [place]    Lab Results   Component Value Date    INR 1 08 07/29/2020   5    Imaging: Xr Chest Pa & Lateral    Result Date: 7/29/2020  Narrative: CHEST INDICATION:   Z01 818: Encounter for other preprocedural examination   COMPARISON:  5/6/2020 EXAM PERFORMED/VIEWS:  XR CHEST PA & LATERAL  The frontal view was performed utilizing dual energy radiographic technique  FINDINGS: Cardiomediastinal silhouette appears unremarkable  Left pacer and cervical ACDF  The lungs are clear  No pneumothorax or pleural effusion  Osseous structures appear within normal limits for patient age  Impression: No acute cardiopulmonary disease  Workstation performed: [id number]       Cardiac testing:   Results for orders placed during the hospital encounter of 10/30/18   Echo complete with contrast if indicated    Narrative [address]  [place], [address] [facility]  [phone]    Transthoracic Echocardiogram  2D, M-mode, Doppler, and Color Doppler    Study date:  2018    Patient: Connie France  MR number: [id number]  Account number: [de-identified]  : 1960  Age: 60 years  Gender: Female  Status: Inpatient  Location: Bedside  Height: 24 8 in  Weight: 343 2 lb  BP: 105/ 60 mmHg    Indications: Syncope, bradycardia  Diagnoses: R55  - Syncope and collapse    Sonographer:  FLAQUITO Mclain  Primary Physician:  Erich Sinclair  Referring Physician:  Elizabeth Hdez MD  Group:  [facility]  Cardiology Fellow: Annetta Ramirez MD  Interpreting Physician:  Medina Velazquez MD    SUMMARY    SUMMARY:  Patient was bradycardic throughout the procedure with heart rates averaging 45 bpm     LEFT VENTRICLE:  Systolic function was at the lower limits of normal  Ejection fraction was estimated to be 50 %  There were no regional wall motion abnormalities  There was no evidence of concentric hypertrophy  MITRAL VALVE:  There was mild regurgitation  TRICUSPID VALVE:  There was mild regurgitation  HISTORY: PRIOR HISTORY: DVT, gastric bypass, current smoker  PROCEDURE: The procedure was performed at the bedside  This was a routine study  The transthoracic approach was used   The study included complete 2D imaging, M-mode, complete spectral Doppler, and color Doppler  The heart rate was 47 bpm,  at the start of the study  Image quality was adequate  LEFT VENTRICLE: Size was normal  Systolic function was at the lower limits of normal  Ejection fraction was estimated to be 50 %  There were no regional wall motion abnormalities  Wall thickness was normal  There was no evidence of  concentric hypertrophy  DOPPLER: Left ventricular diastolic function parameters were normal     RIGHT VENTRICLE: The size was normal  Systolic function was normal  Wall thickness was normal     LEFT ATRIUM: Size was normal     RIGHT ATRIUM: Size was normal     MITRAL VALVE: Valve structure was normal  There was normal leaflet separation  DOPPLER: The transmitral velocity was within the normal range  There was no evidence for stenosis  There was mild regurgitation  AORTIC VALVE: The valve was trileaflet  Leaflets exhibited normal cuspal separation and sclerosis  DOPPLER: Transaortic velocity was within the normal range  There was no evidence for stenosis  There was no regurgitation  TRICUSPID VALVE: The valve structure was normal  There was normal leaflet separation  DOPPLER: The transtricuspid velocity was within the normal range  There was no evidence for stenosis  There was mild regurgitation  Pulmonary artery  systolic pressure was within the normal range  PULMONIC VALVE: Leaflets exhibited normal thickness, no calcification, and normal cuspal separation  DOPPLER: The transpulmonic velocity was within the normal range  There was no regurgitation  PERICARDIUM: There was no pericardial effusion  The pericardium was normal in appearance  AORTA: The root exhibited normal size      SYSTEM MEASUREMENT TABLES    2D  %FS: 28 58 %  Ao Diam: 2 93 cm  EDV(Teich): 84 92 ml  EF(Cube): 63 57 %  EF(Teich): 55 35 %  ESV(Cube): 29 79 ml  ESV(Teich): 37 91 ml  IVSd: 0 9 cm  LA Area: 17 12 cm2  LA Diam: 3 87 cm  LVIDd: 4 34 cm  LVIDs: 3 1 cm  LVPWd: 0 88 cm  RA Area: 17 59 cm2  SI(Cube): 41 92 ml/m2  SI(Teich): 37 91 ml/m2  SV(Cube): 51 98 ml  SV(Teich): 47 01 ml  rv diam: 4 13 cm    CW  AV Env  Ti: 340 11 ms  AV VTI: 31 98 cm  AV Vmax: 1 48 m/s  AV Vmean: 0 94 m/s  AV maxP 74 mmHg  AV meanP 02 mmHg  TR Vmax: 2 08 m/s  TR maxP 23 mmHg    MM  TAPSE: 2 33 cm    PW  E': 0 1 m/s  E/E': 8 55  LVOT Env  Ti: 362 29 ms  LVOT VTI: 23 55 cm  LVOT Vmax: 0 89 m/s  LVOT Vmean: 0 65 m/s  LVOT maxPG: 3 16 mmHg  LVOT meanP 84 mmHg  MV A [place]: 0 55 m/s  MV Dec [place]: 3 87 m/s2  MV DecT: 217 32 ms  MV E [place]: 0 84 m/s  MV E/A Ratio: 1 53    [employer] Accredited Echocardi[facility]    Prepared and electronically signed by    Waleska Escobar MD  Signed 2018 11:04:01       No results found for this or any previous visit  No results found for this or any previous visit  No results found for this or any previous visit

## 2020-09-10 DIAGNOSIS — E78.3 HYPERCHYLOMICRONEMIA: ICD-10-CM

## 2020-09-10 RX ORDER — ATORVASTATIN CALCIUM 10 MG/1
TABLET, FILM COATED ORAL
Qty: 90 TABLET | Refills: 2 | Status: SHIPPED | OUTPATIENT
Start: 2020-09-10 | End: 2021-06-17

## 2020-09-24 ENCOUNTER — OFFICE VISIT (OUTPATIENT)
Dept: BARIATRICS | Facility: CLINIC | Age: 60
End: 2020-09-24
Payer: COMMERCIAL

## 2020-09-24 VITALS
TEMPERATURE: 97.6 F | BODY MASS INDEX: 29.71 KG/M2 | SYSTOLIC BLOOD PRESSURE: 118 MMHG | WEIGHT: 174 LBS | HEIGHT: 64 IN | DIASTOLIC BLOOD PRESSURE: 62 MMHG | HEART RATE: 72 BPM

## 2020-09-24 DIAGNOSIS — E66.3 OVERWEIGHT: Primary | ICD-10-CM

## 2020-09-24 DIAGNOSIS — Z98.84 BARIATRIC SURGERY STATUS: ICD-10-CM

## 2020-09-24 PROBLEM — Z48.815 ENCOUNTER FOR SURGICAL AFTERCARE FOLLOWING SURGERY OF DIGESTIVE SYSTEM: Status: ACTIVE | Noted: 2020-09-24

## 2020-09-24 PROBLEM — R07.9 CHEST PAIN: Status: RESOLVED | Noted: 2019-03-05 | Resolved: 2020-09-24

## 2020-09-24 PROCEDURE — 3074F SYST BP LT 130 MM HG: CPT | Performed by: PHYSICIAN ASSISTANT

## 2020-09-24 PROCEDURE — 99213 OFFICE O/P EST LOW 20 MIN: CPT | Performed by: PHYSICIAN ASSISTANT

## 2020-09-24 PROCEDURE — 1036F TOBACCO NON-USER: CPT | Performed by: PHYSICIAN ASSISTANT

## 2020-09-24 PROCEDURE — 3078F DIAST BP <80 MM HG: CPT | Performed by: PHYSICIAN ASSISTANT

## 2020-09-24 RX ORDER — TOPIRAMATE 100 MG/1
100 TABLET, FILM COATED ORAL
Qty: 30 TABLET | Refills: 1 | Status: SHIPPED | OUTPATIENT
Start: 2020-09-24 | End: 2020-11-23 | Stop reason: SDUPTHER

## 2020-09-24 NOTE — ASSESSMENT & PLAN NOTE
-s/p Raghav-En-Y Gastric Bypass with Dr Montez Rosario   in 2017     Initial:241 lb  INITIAL weight when starting topiramate: 176 lb  Current:174 lb  EWL: 68%  Narendra:166 lb-May 2019  Current BMI is Body mass index is 29 87 kg/m²      Tolerating a regular diet-yes

## 2020-09-24 NOTE — PROGRESS NOTES
Assessment/Plan:    Encounter for surgical aftercare following surgery of digestive system  -s/p Raghav-En-Y Gastric Bypass with Dr Roxie Rico   in 2017     Initial:241 lb  INITIAL weight when starting topiramate: 176 lb  Current:174 lb  EWL: 68%  Narendra:166 lb-May 2019  Current BMI is Body mass index is 29 87 kg/m²  Tolerating a regular diet-yes      Overweight  She is down a net 2 lb from starting the lower dose of topiramate-has met with RD between visits  She does note some decrease in cravings on 50 mg dose    Renal function is acceptable by labs  A 24 hour diet recall  Was obtained from the patient and advised on diet  She is currently going to PT after left knee meniscus surgery at the end of August-advised to follow their advice re: same    Once she is able to increase her exercise this will likely help her weight loss as well  She denies paraesthesias, memory issues, confusion  No history of kidney stones or glaucoma  Advised on importance of good hydration on medication as well  PLAN: increase topiramate to 100 mg at hs  Will see her back in 2 months to monitor weight and tolerance to medication/diet  She is agreeable to the plan  Diagnoses and all orders for this visit:    Overweight  -     topiramate (TOPAMAX) 100 mg tablet; Take 1 tablet (100 mg total) by mouth daily at bedtime    Bariatric surgery status  -     topiramate (TOPAMAX) 100 mg tablet; Take 1 tablet (100 mg total) by mouth daily at bedtime          Subjective:      Patient ID: Easton Castellano is a 63 y o  female  She is here in follow-up to assess diet and weight loss on current regimen of topiramate 50 mg at bedtime  Weight is down a net 2 lb since her July provider visit  She is trying to watch a healthy diet  She continues to take vitamins  She is currently getting PT after left knee meniscus surgery and going to PT so exercise is otherwise limited   She feels medication is helping with her cravings  The following portions of the patient's history were reviewed and updated as appropriate: allergies, current medications, past family history, past medical history, past social history, past surgical history and problem list     Review of Systems   Constitutional: Negative for chills and fever  Unexpected weight change: planned weight loss  Respiratory: Negative for cough, shortness of breath and wheezing  Cardiovascular: Negative for chest pain and palpitations  Gastrointestinal: Negative for abdominal pain, constipation, diarrhea, nausea and vomiting  Neurological: Negative for numbness  Psychiatric/Behavioral: Suicidal ideas: no complait of anxiety or depression  Objective:      /62 (BP Location: Left arm, Patient Position: Sitting)   Pulse 72   Temp 97 6 °F (36 4 °C)   Ht 5' 4" (1 626 m)   Wt 78 9 kg (174 lb)   LMP 09/18/2014   BMI 29 87 kg/m²          Physical Exam  Constitutional:       Comments: overweight   Pulmonary:      Effort: Pulmonary effort is normal    Neurological:      Mental Status: She is alert and oriented to person, place, and time     Psychiatric:         Mood and Affect: Mood normal

## 2020-09-24 NOTE — PATIENT INSTRUCTIONS
It was great to meet you today  Increase topiramate to 100 mg at bedtime daily  Follow-up in 2 months  Continue healthy diet and exercise as tolerated (follow PT advice for this)    If you develop any abdominal pain associated with fever,chills, nausea/vomting-let us know/go to ER if severe

## 2020-09-24 NOTE — ASSESSMENT & PLAN NOTE
She is down a net 2 lb from starting the lower dose of topiramate-has met with RD between visits  She does note some decrease in cravings on 50 mg dose    Renal function is acceptable by labs  A 24 hour diet recall  Was obtained from the patient and advised on diet  She is currently going to PT after left knee meniscus surgery at the end of August-advised to follow their advice re: same    Once she is able to increase her exercise this will likely help her weight loss as well  She denies paraesthesias, memory issues, confusion  No history of kidney stones or glaucoma  Advised on importance of good hydration on medication as well  PLAN: increase topiramate to 100 mg at hs  Will see her back in 2 months to monitor weight and tolerance to medication/diet  She is agreeable to the plan

## 2020-10-13 ENCOUNTER — IN-CLINIC DEVICE VISIT (OUTPATIENT)
Dept: CARDIOLOGY CLINIC | Facility: CLINIC | Age: 60
End: 2020-10-13
Payer: COMMERCIAL

## 2020-10-13 DIAGNOSIS — Z95.0 CARDIAC PACEMAKER IN SITU: Primary | ICD-10-CM

## 2020-10-13 PROCEDURE — 93280 PM DEVICE PROGR EVAL DUAL: CPT | Performed by: INTERNAL MEDICINE

## 2020-10-27 ENCOUNTER — OFFICE VISIT (OUTPATIENT)
Dept: CARDIOLOGY CLINIC | Facility: CLINIC | Age: 60
End: 2020-10-27
Payer: COMMERCIAL

## 2020-10-27 VITALS
DIASTOLIC BLOOD PRESSURE: 62 MMHG | TEMPERATURE: 97.7 F | HEIGHT: 64 IN | BODY MASS INDEX: 29.02 KG/M2 | HEART RATE: 82 BPM | WEIGHT: 170 LBS | OXYGEN SATURATION: 98 % | SYSTOLIC BLOOD PRESSURE: 112 MMHG

## 2020-10-27 DIAGNOSIS — I49.5 SICK SINUS SYNDROME (HCC): Primary | ICD-10-CM

## 2020-10-27 PROCEDURE — 99214 OFFICE O/P EST MOD 30 MIN: CPT | Performed by: INTERNAL MEDICINE

## 2020-11-01 ENCOUNTER — OFFICE VISIT (OUTPATIENT)
Dept: URGENT CARE | Age: 60
End: 2020-11-01
Payer: COMMERCIAL

## 2020-11-01 VITALS
OXYGEN SATURATION: 99 % | WEIGHT: 168 LBS | BODY MASS INDEX: 28.68 KG/M2 | DIASTOLIC BLOOD PRESSURE: 77 MMHG | SYSTOLIC BLOOD PRESSURE: 128 MMHG | HEIGHT: 64 IN | HEART RATE: 78 BPM | TEMPERATURE: 97.4 F | RESPIRATION RATE: 18 BRPM

## 2020-11-01 DIAGNOSIS — I82.4Y9 DEEP VEIN THROMBOSIS (DVT) OF PROXIMAL LOWER EXTREMITY, UNSPECIFIED CHRONICITY, UNSPECIFIED LATERALITY (HCC): ICD-10-CM

## 2020-11-01 DIAGNOSIS — R39.9 UTI SYMPTOMS: Primary | ICD-10-CM

## 2020-11-01 LAB
SL AMB  POCT GLUCOSE, UA: ABNORMAL
SL AMB LEUKOCYTE ESTERASE,UA: ABNORMAL
SL AMB POCT BILIRUBIN,UA: ABNORMAL
SL AMB POCT BLOOD,UA: ABNORMAL
SL AMB POCT CLARITY,UA: CLEAR
SL AMB POCT COLOR,UA: YELLOW
SL AMB POCT KETONES,UA: ABNORMAL
SL AMB POCT NITRITE,UA: ABNORMAL
SL AMB POCT PH,UA: 6
SL AMB POCT SPECIFIC GRAVITY,UA: 1
SL AMB POCT URINE PROTEIN: ABNORMAL
SL AMB POCT UROBILINOGEN: 0.2

## 2020-11-01 PROCEDURE — 81002 URINALYSIS NONAUTO W/O SCOPE: CPT | Performed by: NURSE PRACTITIONER

## 2020-11-01 PROCEDURE — 87086 URINE CULTURE/COLONY COUNT: CPT | Performed by: NURSE PRACTITIONER

## 2020-11-01 PROCEDURE — S9088 SERVICES PROVIDED IN URGENT: HCPCS | Performed by: NURSE PRACTITIONER

## 2020-11-01 PROCEDURE — 99213 OFFICE O/P EST LOW 20 MIN: CPT | Performed by: NURSE PRACTITIONER

## 2020-11-01 RX ORDER — NITROFURANTOIN 25; 75 MG/1; MG/1
100 CAPSULE ORAL 2 TIMES DAILY
Qty: 10 CAPSULE | Refills: 0 | Status: SHIPPED | OUTPATIENT
Start: 2020-11-01 | End: 2020-12-05

## 2020-11-02 LAB — BACTERIA UR CULT: NORMAL

## 2020-11-02 RX ORDER — APIXABAN 2.5 MG/1
TABLET, FILM COATED ORAL
Qty: 180 TABLET | Refills: 0 | Status: SHIPPED | OUTPATIENT
Start: 2020-11-02 | End: 2020-11-13 | Stop reason: SDUPTHER

## 2020-11-04 ENCOUNTER — TELEPHONE (OUTPATIENT)
Dept: URGENT CARE | Age: 60
End: 2020-11-04

## 2020-11-13 ENCOUNTER — OFFICE VISIT (OUTPATIENT)
Dept: FAMILY MEDICINE CLINIC | Facility: CLINIC | Age: 60
End: 2020-11-13
Payer: COMMERCIAL

## 2020-11-13 ENCOUNTER — LAB (OUTPATIENT)
Dept: LAB | Facility: IMAGING CENTER | Age: 60
End: 2020-11-13
Payer: COMMERCIAL

## 2020-11-13 VITALS
RESPIRATION RATE: 16 BRPM | OXYGEN SATURATION: 98 % | WEIGHT: 171 LBS | SYSTOLIC BLOOD PRESSURE: 138 MMHG | HEART RATE: 90 BPM | DIASTOLIC BLOOD PRESSURE: 84 MMHG | HEIGHT: 64 IN | BODY MASS INDEX: 29.19 KG/M2 | TEMPERATURE: 97.9 F

## 2020-11-13 DIAGNOSIS — D50.9 IRON DEFICIENCY ANEMIA, UNSPECIFIED IRON DEFICIENCY ANEMIA TYPE: ICD-10-CM

## 2020-11-13 DIAGNOSIS — Z86.718 HISTORY OF DVT (DEEP VEIN THROMBOSIS): ICD-10-CM

## 2020-11-13 DIAGNOSIS — I82.4Y9 DEEP VEIN THROMBOSIS (DVT) OF PROXIMAL LOWER EXTREMITY, UNSPECIFIED CHRONICITY, UNSPECIFIED LATERALITY (HCC): ICD-10-CM

## 2020-11-13 DIAGNOSIS — E78.2 MIXED HYPERLIPIDEMIA: ICD-10-CM

## 2020-11-13 DIAGNOSIS — Z00.00 MEDICARE ANNUAL WELLNESS VISIT, SUBSEQUENT: Primary | ICD-10-CM

## 2020-11-13 DIAGNOSIS — I49.5 SICK SINUS SYNDROME (HCC): ICD-10-CM

## 2020-11-13 LAB
CHOLEST SERPL-MCNC: 187 MG/DL (ref 50–200)
HDLC SERPL-MCNC: 80 MG/DL
LDLC SERPL CALC-MCNC: 92 MG/DL (ref 0–100)
NONHDLC SERPL-MCNC: 107 MG/DL
TRIGL SERPL-MCNC: 74 MG/DL

## 2020-11-13 PROCEDURE — 3725F SCREEN DEPRESSION PERFORMED: CPT | Performed by: PHYSICIAN ASSISTANT

## 2020-11-13 PROCEDURE — 3008F BODY MASS INDEX DOCD: CPT | Performed by: PHYSICIAN ASSISTANT

## 2020-11-13 PROCEDURE — 99214 OFFICE O/P EST MOD 30 MIN: CPT | Performed by: PHYSICIAN ASSISTANT

## 2020-11-13 PROCEDURE — G0439 PPPS, SUBSEQ VISIT: HCPCS | Performed by: PHYSICIAN ASSISTANT

## 2020-11-13 PROCEDURE — 1036F TOBACCO NON-USER: CPT | Performed by: PHYSICIAN ASSISTANT

## 2020-11-13 PROCEDURE — 80061 LIPID PANEL: CPT | Performed by: PHYSICIAN ASSISTANT

## 2020-11-13 PROCEDURE — 36415 COLL VENOUS BLD VENIPUNCTURE: CPT | Performed by: PHYSICIAN ASSISTANT

## 2020-11-13 RX ORDER — APIXABAN 2.5 MG/1
2.5 TABLET, FILM COATED ORAL 2 TIMES DAILY
Qty: 180 TABLET | Refills: 0 | Status: SHIPPED | OUTPATIENT
Start: 2020-11-13 | End: 2021-06-09 | Stop reason: SDUPTHER

## 2020-11-13 RX ORDER — APIXABAN 2.5 MG/1
2.5 TABLET, FILM COATED ORAL 2 TIMES DAILY
Qty: 180 TABLET | Refills: 0 | Status: SHIPPED | OUTPATIENT
Start: 2020-11-13 | End: 2020-11-13 | Stop reason: SDUPTHER

## 2020-11-16 ENCOUNTER — TELEPHONE (OUTPATIENT)
Dept: FAMILY MEDICINE CLINIC | Facility: CLINIC | Age: 60
End: 2020-11-16

## 2020-11-22 DIAGNOSIS — Z98.84 BARIATRIC SURGERY STATUS: ICD-10-CM

## 2020-11-22 DIAGNOSIS — E66.3 OVERWEIGHT: ICD-10-CM

## 2020-11-23 RX ORDER — TOPIRAMATE 100 MG/1
TABLET, FILM COATED ORAL
Qty: 30 TABLET | Refills: 1 | OUTPATIENT
Start: 2020-11-23

## 2020-12-02 ENCOUNTER — OFFICE VISIT (OUTPATIENT)
Dept: BARIATRICS | Facility: CLINIC | Age: 60
End: 2020-12-02
Payer: COMMERCIAL

## 2020-12-02 VITALS
SYSTOLIC BLOOD PRESSURE: 118 MMHG | DIASTOLIC BLOOD PRESSURE: 70 MMHG | TEMPERATURE: 96.6 F | HEART RATE: 89 BPM | WEIGHT: 166.5 LBS | HEIGHT: 64 IN | BODY MASS INDEX: 28.42 KG/M2 | RESPIRATION RATE: 20 BRPM

## 2020-12-02 DIAGNOSIS — E66.3 OVERWEIGHT: Primary | ICD-10-CM

## 2020-12-02 PROCEDURE — 3008F BODY MASS INDEX DOCD: CPT | Performed by: PHYSICIAN ASSISTANT

## 2020-12-02 PROCEDURE — 99213 OFFICE O/P EST LOW 20 MIN: CPT | Performed by: PHYSICIAN ASSISTANT

## 2020-12-02 PROCEDURE — 1036F TOBACCO NON-USER: CPT | Performed by: PHYSICIAN ASSISTANT

## 2020-12-05 ENCOUNTER — OFFICE VISIT (OUTPATIENT)
Dept: URGENT CARE | Facility: MEDICAL CENTER | Age: 60
End: 2020-12-05
Payer: COMMERCIAL

## 2020-12-05 VITALS
HEIGHT: 64 IN | DIASTOLIC BLOOD PRESSURE: 76 MMHG | SYSTOLIC BLOOD PRESSURE: 114 MMHG | TEMPERATURE: 97.1 F | RESPIRATION RATE: 18 BRPM | OXYGEN SATURATION: 100 % | HEART RATE: 67 BPM | BODY MASS INDEX: 28 KG/M2 | WEIGHT: 164 LBS

## 2020-12-05 DIAGNOSIS — R30.0 DYSURIA: Primary | ICD-10-CM

## 2020-12-05 LAB
SL AMB  POCT GLUCOSE, UA: NEGATIVE
SL AMB LEUKOCYTE ESTERASE,UA: ABNORMAL
SL AMB POCT BILIRUBIN,UA: NEGATIVE
SL AMB POCT BLOOD,UA: ABNORMAL
SL AMB POCT CLARITY,UA: ABNORMAL
SL AMB POCT COLOR,UA: YELLOW
SL AMB POCT KETONES,UA: NEGATIVE
SL AMB POCT NITRITE,UA: NEGATIVE
SL AMB POCT PH,UA: 6.5
SL AMB POCT SPECIFIC GRAVITY,UA: 1.01
SL AMB POCT URINE PROTEIN: 2000
SL AMB POCT UROBILINOGEN: 0.2

## 2020-12-05 PROCEDURE — 99213 OFFICE O/P EST LOW 20 MIN: CPT | Performed by: PHYSICIAN ASSISTANT

## 2020-12-05 PROCEDURE — 87077 CULTURE AEROBIC IDENTIFY: CPT | Performed by: PHYSICIAN ASSISTANT

## 2020-12-05 PROCEDURE — 87186 SC STD MICRODIL/AGAR DIL: CPT | Performed by: PHYSICIAN ASSISTANT

## 2020-12-05 PROCEDURE — 87086 URINE CULTURE/COLONY COUNT: CPT | Performed by: PHYSICIAN ASSISTANT

## 2020-12-05 PROCEDURE — S9088 SERVICES PROVIDED IN URGENT: HCPCS | Performed by: PHYSICIAN ASSISTANT

## 2020-12-05 PROCEDURE — 81002 URINALYSIS NONAUTO W/O SCOPE: CPT | Performed by: PHYSICIAN ASSISTANT

## 2020-12-05 RX ORDER — NITROFURANTOIN 25; 75 MG/1; MG/1
100 CAPSULE ORAL 2 TIMES DAILY
Qty: 14 CAPSULE | Refills: 0 | Status: SHIPPED | OUTPATIENT
Start: 2020-12-05 | End: 2020-12-12

## 2020-12-07 ENCOUNTER — TELEPHONE (OUTPATIENT)
Dept: URGENT CARE | Facility: MEDICAL CENTER | Age: 60
End: 2020-12-07

## 2020-12-07 LAB — BACTERIA UR CULT: ABNORMAL

## 2020-12-17 DIAGNOSIS — E66.3 OVERWEIGHT: ICD-10-CM

## 2020-12-17 DIAGNOSIS — Z98.84 BARIATRIC SURGERY STATUS: ICD-10-CM

## 2020-12-17 RX ORDER — TOPIRAMATE 100 MG/1
100 TABLET, FILM COATED ORAL
Qty: 90 TABLET | Refills: 0 | Status: SHIPPED | OUTPATIENT
Start: 2020-12-17 | End: 2020-12-29

## 2020-12-29 ENCOUNTER — TELEPHONE (OUTPATIENT)
Dept: BARIATRICS | Facility: CLINIC | Age: 60
End: 2020-12-29

## 2020-12-29 DIAGNOSIS — E66.3 OVERWEIGHT: Primary | ICD-10-CM

## 2020-12-29 RX ORDER — TOPIRAMATE 25 MG/1
25 TABLET ORAL
Qty: 5 TABLET | Refills: 0 | Status: SHIPPED | OUTPATIENT
Start: 2020-12-29 | End: 2020-12-31

## 2020-12-31 DIAGNOSIS — E66.3 OVERWEIGHT: ICD-10-CM

## 2020-12-31 RX ORDER — TOPIRAMATE 25 MG/1
TABLET ORAL
Qty: 5 TABLET | Refills: 0 | OUTPATIENT
Start: 2020-12-31

## 2021-01-06 NOTE — TELEPHONE ENCOUNTER
Since I was not the original prescriber and have not evaluated her for this problem she does need to be seen prior to prescribing  The pantoprazole

## 2021-01-06 NOTE — TELEPHONE ENCOUNTER
Per victor manuel she is to schedule an appt to discuss it   Called patient to schedule appt and she is upset    Does not  Know when she can get here, was very frustrated and I recommended she check her schedule and call us to schedule

## 2021-01-06 NOTE — TELEPHONE ENCOUNTER
Gabrielle Pichardo looked back at her previous medications and I do not see it on her med list  Did you want to fill this for her?

## 2021-01-06 NOTE — TELEPHONE ENCOUNTER
Phone call from pt, states she was advised to call us when she is out of her Pantoprazole 20mg 1 daily  She mentioned that she originally was prescribed this by another provider  (med not on list) Please call to E[facility] KNIGHT  Any ques, call pt at [phone]

## 2021-01-12 ENCOUNTER — APPOINTMENT (EMERGENCY)
Dept: RADIOLOGY | Facility: HOSPITAL | Age: 61
End: 2021-01-12
Payer: COMMERCIAL

## 2021-01-12 ENCOUNTER — HOSPITAL ENCOUNTER (OUTPATIENT)
Facility: HOSPITAL | Age: 61
Setting detail: OBSERVATION
Discharge: HOME | End: 2021-01-13
Attending: EMERGENCY MEDICINE | Admitting: INTERNAL MEDICINE
Payer: COMMERCIAL

## 2021-01-12 DIAGNOSIS — R07.9 CHEST PAIN, UNSPECIFIED TYPE: Primary | ICD-10-CM

## 2021-01-12 DIAGNOSIS — Z95.0 PACEMAKER: ICD-10-CM

## 2021-01-12 PROBLEM — F32.A DEPRESSION: Status: ACTIVE | Noted: 2021-01-12

## 2021-01-12 LAB
ALBUMIN SERPL BCP-MCNC: 4.3 G/DL (ref 3.5–5)
ALP SERPL-CCNC: 100 U/L (ref 46–116)
ALT SERPL W P-5'-P-CCNC: 22 U/L (ref 12–78)
ANION GAP SERPL CALCULATED.3IONS-SCNC: 5 MMOL/L (ref 4–13)
AST SERPL W P-5'-P-CCNC: 15 U/L (ref 5–45)
BASOPHILS # BLD AUTO: 0.07 THOUSANDS/ΜL (ref 0–0.1)
BASOPHILS NFR BLD AUTO: 1 % (ref 0–1)
BILIRUB SERPL-MCNC: 0.71 MG/DL (ref 0.2–1)
BUN SERPL-MCNC: 5 MG/DL (ref 5–25)
CALCIUM SERPL-MCNC: 9.4 MG/DL (ref 8.3–10.1)
CHLORIDE SERPL-SCNC: 109 MMOL/L (ref 100–108)
CO2 SERPL-SCNC: 28 MMOL/L (ref 21–32)
CREAT SERPL-MCNC: 0.6 MG/DL (ref 0.6–1.3)
EOSINOPHIL # BLD AUTO: 0.09 THOUSAND/ΜL (ref 0–0.61)
EOSINOPHIL NFR BLD AUTO: 1 % (ref 0–6)
ERYTHROCYTE [DISTWIDTH] IN BLOOD BY AUTOMATED COUNT: 12 % (ref 11.6–15.1)
GFR SERPL CREATININE-BSD FRML MDRD: 99 ML/MIN/1.73SQ M
GLUCOSE SERPL-MCNC: 92 MG/DL (ref 65–140)
HCT VFR BLD AUTO: 40.1 % (ref 34.8–46.1)
HGB BLD-MCNC: 13 G/DL (ref 11.5–15.4)
IMM GRANULOCYTES # BLD AUTO: 0.02 THOUSAND/UL (ref 0–0.2)
IMM GRANULOCYTES NFR BLD AUTO: 0 % (ref 0–2)
LYMPHOCYTES # BLD AUTO: 2.37 THOUSANDS/ΜL (ref 0.6–4.47)
LYMPHOCYTES NFR BLD AUTO: 31 % (ref 14–44)
MCH RBC QN AUTO: 32 PG (ref 26.8–34.3)
MCHC RBC AUTO-ENTMCNC: 32.4 G/DL (ref 31.4–37.4)
MCV RBC AUTO: 99 FL (ref 82–98)
MONOCYTES # BLD AUTO: 0.36 THOUSAND/ΜL (ref 0.17–1.22)
MONOCYTES NFR BLD AUTO: 5 % (ref 4–12)
NEUTROPHILS # BLD AUTO: 4.63 THOUSANDS/ΜL (ref 1.85–7.62)
NEUTS SEG NFR BLD AUTO: 62 % (ref 43–75)
NRBC BLD AUTO-RTO: 0 /100 WBCS
PLATELET # BLD AUTO: 189 THOUSANDS/UL (ref 149–390)
PMV BLD AUTO: 12 FL (ref 8.9–12.7)
POTASSIUM SERPL-SCNC: 3.4 MMOL/L (ref 3.5–5.3)
PROT SERPL-MCNC: 7.4 G/DL (ref 6.4–8.2)
RBC # BLD AUTO: 4.06 MILLION/UL (ref 3.81–5.12)
SODIUM SERPL-SCNC: 142 MMOL/L (ref 136–145)
TROPONIN I SERPL-MCNC: <0.02 NG/ML
TROPONIN I SERPL-MCNC: <0.02 NG/ML
WBC # BLD AUTO: 7.54 THOUSAND/UL (ref 4.31–10.16)

## 2021-01-12 PROCEDURE — 80053 COMPREHEN METABOLIC PANEL: CPT | Performed by: EMERGENCY MEDICINE

## 2021-01-12 PROCEDURE — 84484 ASSAY OF TROPONIN QUANT: CPT | Performed by: EMERGENCY MEDICINE

## 2021-01-12 PROCEDURE — 99285 EMERGENCY DEPT VISIT HI MDM: CPT | Performed by: EMERGENCY MEDICINE

## 2021-01-12 PROCEDURE — 71045 X-RAY EXAM CHEST 1 VIEW: CPT

## 2021-01-12 PROCEDURE — 93005 ELECTROCARDIOGRAM TRACING: CPT

## 2021-01-12 PROCEDURE — 36415 COLL VENOUS BLD VENIPUNCTURE: CPT | Performed by: EMERGENCY MEDICINE

## 2021-01-12 PROCEDURE — 99285 EMERGENCY DEPT VISIT HI MDM: CPT

## 2021-01-12 PROCEDURE — 99219 PR INITIAL OBSERVATION CARE/DAY 50 MINUTES: CPT | Performed by: INTERNAL MEDICINE

## 2021-01-12 PROCEDURE — 84484 ASSAY OF TROPONIN QUANT: CPT | Performed by: STUDENT IN AN ORGANIZED HEALTH CARE EDUCATION/TRAINING PROGRAM

## 2021-01-12 PROCEDURE — 85025 COMPLETE CBC W/AUTO DIFF WBC: CPT | Performed by: EMERGENCY MEDICINE

## 2021-01-12 RX ORDER — MELATONIN
5000 DAILY
Status: DISCONTINUED | OUTPATIENT
Start: 2021-01-13 | End: 2021-01-13 | Stop reason: HOSPADM

## 2021-01-12 RX ORDER — ASPIRIN 325 MG
325 TABLET ORAL ONCE
Status: COMPLETED | OUTPATIENT
Start: 2021-01-12 | End: 2021-01-12

## 2021-01-12 RX ORDER — ACETAMINOPHEN 325 MG/1
650 TABLET ORAL EVERY 6 HOURS PRN
Status: DISCONTINUED | OUTPATIENT
Start: 2021-01-12 | End: 2021-01-13 | Stop reason: HOSPADM

## 2021-01-12 RX ORDER — ATORVASTATIN CALCIUM 20 MG/1
10 TABLET, FILM COATED ORAL
Status: DISCONTINUED | OUTPATIENT
Start: 2021-01-13 | End: 2021-01-13 | Stop reason: HOSPADM

## 2021-01-12 RX ADMIN — APIXABAN 2.5 MG: 2.5 TABLET, FILM COATED ORAL at 23:03

## 2021-01-12 RX ADMIN — ASPIRIN 325 MG ORAL TABLET 325 MG: 325 PILL ORAL at 20:39

## 2021-01-12 NOTE — Clinical Note
Case was discussed with MILTON senior and the patient's admission status was agreed to be Admission Status: observation status to the service of Dr Jose Echavarria

## 2021-01-13 VITALS
TEMPERATURE: 97.4 F | DIASTOLIC BLOOD PRESSURE: 60 MMHG | OXYGEN SATURATION: 96 % | RESPIRATION RATE: 17 BRPM | BODY MASS INDEX: 26.58 KG/M2 | SYSTOLIC BLOOD PRESSURE: 136 MMHG | WEIGHT: 150 LBS | HEART RATE: 70 BPM | HEIGHT: 63 IN

## 2021-01-13 PROBLEM — R07.9 CHEST PAIN: Status: RESOLVED | Noted: 2019-03-05 | Resolved: 2021-01-13

## 2021-01-13 LAB
ALBUMIN SERPL BCP-MCNC: 3.5 G/DL (ref 3.5–5)
ALP SERPL-CCNC: 86 U/L (ref 46–116)
ALT SERPL W P-5'-P-CCNC: 19 U/L (ref 12–78)
ANION GAP SERPL CALCULATED.3IONS-SCNC: 4 MMOL/L (ref 4–13)
AST SERPL W P-5'-P-CCNC: 17 U/L (ref 5–45)
ATRIAL RATE: 62 BPM
ATRIAL RATE: 64 BPM
ATRIAL RATE: 76 BPM
BASOPHILS # BLD AUTO: 0.06 THOUSANDS/ΜL (ref 0–0.1)
BASOPHILS NFR BLD AUTO: 1 % (ref 0–1)
BILIRUB SERPL-MCNC: 0.59 MG/DL (ref 0.2–1)
BUN SERPL-MCNC: 9 MG/DL (ref 5–25)
CALCIUM SERPL-MCNC: 9 MG/DL (ref 8.3–10.1)
CHLORIDE SERPL-SCNC: 111 MMOL/L (ref 100–108)
CO2 SERPL-SCNC: 28 MMOL/L (ref 21–32)
CREAT SERPL-MCNC: 0.58 MG/DL (ref 0.6–1.3)
EOSINOPHIL # BLD AUTO: 0.15 THOUSAND/ΜL (ref 0–0.61)
EOSINOPHIL NFR BLD AUTO: 2 % (ref 0–6)
ERYTHROCYTE [DISTWIDTH] IN BLOOD BY AUTOMATED COUNT: 12 % (ref 11.6–15.1)
GFR SERPL CREATININE-BSD FRML MDRD: 100 ML/MIN/1.73SQ M
GLUCOSE SERPL-MCNC: 93 MG/DL (ref 65–140)
HCT VFR BLD AUTO: 35.1 % (ref 34.8–46.1)
HGB BLD-MCNC: 11.4 G/DL (ref 11.5–15.4)
IMM GRANULOCYTES # BLD AUTO: 0.01 THOUSAND/UL (ref 0–0.2)
IMM GRANULOCYTES NFR BLD AUTO: 0 % (ref 0–2)
LYMPHOCYTES # BLD AUTO: 2.42 THOUSANDS/ΜL (ref 0.6–4.47)
LYMPHOCYTES NFR BLD AUTO: 38 % (ref 14–44)
MAGNESIUM SERPL-MCNC: 2.3 MG/DL (ref 1.6–2.6)
MCH RBC QN AUTO: 32 PG (ref 26.8–34.3)
MCHC RBC AUTO-ENTMCNC: 32.5 G/DL (ref 31.4–37.4)
MCV RBC AUTO: 99 FL (ref 82–98)
MONOCYTES # BLD AUTO: 0.41 THOUSAND/ΜL (ref 0.17–1.22)
MONOCYTES NFR BLD AUTO: 6 % (ref 4–12)
NEUTROPHILS # BLD AUTO: 3.37 THOUSANDS/ΜL (ref 1.85–7.62)
NEUTS SEG NFR BLD AUTO: 53 % (ref 43–75)
NRBC BLD AUTO-RTO: 0 /100 WBCS
P AXIS: 24 DEGREES
P AXIS: 25 DEGREES
P AXIS: 62 DEGREES
PHOSPHATE SERPL-MCNC: 4.3 MG/DL (ref 2.3–4.1)
PLATELET # BLD AUTO: 158 THOUSANDS/UL (ref 149–390)
PMV BLD AUTO: 11.7 FL (ref 8.9–12.7)
POTASSIUM SERPL-SCNC: 3.1 MMOL/L (ref 3.5–5.3)
PR INTERVAL: 118 MS
PR INTERVAL: 170 MS
PR INTERVAL: 248 MS
PROT SERPL-MCNC: 6.5 G/DL (ref 6.4–8.2)
QRS AXIS: 24 DEGREES
QRS AXIS: 26 DEGREES
QRS AXIS: 43 DEGREES
QRSD INTERVAL: 72 MS
QRSD INTERVAL: 78 MS
QRSD INTERVAL: 80 MS
QT INTERVAL: 418 MS
QT INTERVAL: 420 MS
QT INTERVAL: 424 MS
QTC INTERVAL: 430 MS
QTC INTERVAL: 433 MS
QTC INTERVAL: 470 MS
RBC # BLD AUTO: 3.56 MILLION/UL (ref 3.81–5.12)
SODIUM SERPL-SCNC: 143 MMOL/L (ref 136–145)
T WAVE AXIS: 27 DEGREES
T WAVE AXIS: 31 DEGREES
T WAVE AXIS: 51 DEGREES
TROPONIN I SERPL-MCNC: <0.02 NG/ML
TROPONIN I SERPL-MCNC: <0.02 NG/ML
VENTRICULAR RATE: 62 BPM
VENTRICULAR RATE: 64 BPM
VENTRICULAR RATE: 76 BPM
WBC # BLD AUTO: 6.42 THOUSAND/UL (ref 4.31–10.16)

## 2021-01-13 PROCEDURE — 36415 COLL VENOUS BLD VENIPUNCTURE: CPT | Performed by: STUDENT IN AN ORGANIZED HEALTH CARE EDUCATION/TRAINING PROGRAM

## 2021-01-13 PROCEDURE — 93010 ELECTROCARDIOGRAM REPORT: CPT | Performed by: INTERNAL MEDICINE

## 2021-01-13 PROCEDURE — 83735 ASSAY OF MAGNESIUM: CPT | Performed by: STUDENT IN AN ORGANIZED HEALTH CARE EDUCATION/TRAINING PROGRAM

## 2021-01-13 PROCEDURE — 84484 ASSAY OF TROPONIN QUANT: CPT | Performed by: STUDENT IN AN ORGANIZED HEALTH CARE EDUCATION/TRAINING PROGRAM

## 2021-01-13 PROCEDURE — 93005 ELECTROCARDIOGRAM TRACING: CPT

## 2021-01-13 PROCEDURE — 85025 COMPLETE CBC W/AUTO DIFF WBC: CPT | Performed by: STUDENT IN AN ORGANIZED HEALTH CARE EDUCATION/TRAINING PROGRAM

## 2021-01-13 PROCEDURE — 80053 COMPREHEN METABOLIC PANEL: CPT | Performed by: STUDENT IN AN ORGANIZED HEALTH CARE EDUCATION/TRAINING PROGRAM

## 2021-01-13 PROCEDURE — 84100 ASSAY OF PHOSPHORUS: CPT | Performed by: STUDENT IN AN ORGANIZED HEALTH CARE EDUCATION/TRAINING PROGRAM

## 2021-01-13 PROCEDURE — 99217 PR OBSERVATION CARE DISCHARGE MANAGEMENT: CPT | Performed by: INTERNAL MEDICINE

## 2021-01-13 RX ORDER — POTASSIUM CHLORIDE 20 MEQ/1
40 TABLET, EXTENDED RELEASE ORAL ONCE
Status: COMPLETED | OUTPATIENT
Start: 2021-01-13 | End: 2021-01-13

## 2021-01-13 RX ADMIN — POTASSIUM CHLORIDE 40 MEQ: 1500 TABLET, EXTENDED RELEASE ORAL at 11:01

## 2021-01-13 RX ADMIN — Medication 5000 UNITS: at 09:55

## 2021-01-13 RX ADMIN — APIXABAN 2.5 MG: 2.5 TABLET, FILM COATED ORAL at 09:54

## 2021-01-13 RX ADMIN — POTASSIUM CHLORIDE 40 MEQ: 1500 TABLET, EXTENDED RELEASE ORAL at 09:55

## 2021-01-13 NOTE — H&P
INTERNAL MEDICINE RESIDENCY ADMISSION H&P     Name: Kyle Galindo   Age & Sex: 63 y o  female   MRN: [id number]  Unit/Bed#: ED 08   Encounter: [id number]  Primary Care Provider: Petar Zuluaga PA-C    Code Status: Level 1 - Full Code  Admission Status: OBSERVATION  Disposition: Patient requires Med/Surg with Telemetry    Admit to team: SOD Team C     ASSESSMENT/PLAN     Principal Problem:    Chest pain  Active Problems:    Sick sinus syndrome (HCC)    Mixed hyperlipidemia    History of DVT (deep vein thrombosis)    Depression      Depression  Assessment & Plan  Patient states that she has been depressed since about 1/4/2021  It seems as though this may be caused by family issues involving her  and daughter  She states that she is feeling depressed, is unable to fall asleep, appetite has decreased, and she has lost weight  Denies SI/HI  She has no intent on committing suicide  Daughter was in the room and their relationship seems quite tense with constant arguing  I suggested that she follow up with her PCP regarding this  History of DVT (deep vein thrombosis)  Assessment & Plan  Recurrent with last DVT in 2012  Currently on eliquis 2 5 mg BID  Mixed hyperlipidemia  Assessment & Plan  Stable  Continue atorvastatin 10 mg    Sick sinus syndrome [facility])  Assessment & Plan  S/p pacemaker 11/2018  Currently in NSR  Will interrogate pacer    * Chest pain  Assessment & Plan  Patient is a able to point to location of pain (right parasternal area) and pain is reproducible with palpation  · Likely musculoskeletal in nature; Less likely ACS  · Patient admitted for ACS rule out  · Initial troponin -ve and EKG NSR with no changes from previous  Will continue to trend  · Will provide symptomatic relief with tylenol and voltaren gel  · Vitals wnl             VTE Pharmacologic Prophylaxis: Reason for no pharmacologic prophylaxis On eliquis  VTE Mechanical Prophylaxis: sequential compression device    CHIEF COMPLAINT     Chief Complaint   Patient presents with    Chest Pain     pt reports chest pressure that started an hour ago, reports it radiates up neck, down right arm and some pain in back, reports h/o of pain like this before her pacemaker was placed       HISTORY OF PRESENT ILLNESS     Patient is a 63year old female with a past medical history significant for SSS s/p pacer, DVT on eliquis, HLD, and s/p bariatric surgery  She presented with chest pain for the past hour and states she has not had this before  She states that the pain is pressure like, located in the center of her chest, and radiates up her neck and down her right arm  She states that she had some associated lightheadedness, but did not pass out  She denies any diaphoresis  This event happened after a heated argument with her daughter  Patient was resting in a chair when she felt quite emotional  Her pain has decreased significantly since coming in to the ED and she states that she only has some pain when she leans forward  While in the room, daughter would often cut off her mother while she was talking  Daughter had previously told the ED resident that mother was suicidal, however the mother denied that  Patient denies any suicidal ideation and has no intent on committing suicide  Daughter and mother often had arguments while I was in the room, stating that they had a heated argument this morning  REVIEW OF SYSTEMS     Review of Systems   Constitutional: Negative  HENT: Negative  Respiratory: Negative  Cardiovascular: Positive for chest pain  Gastrointestinal: Negative  Genitourinary: Negative  Musculoskeletal: Negative  Skin: Negative  Neurological: Negative  Psychiatric/Behavioral: The patient is nervous/anxious        OBJECTIVE     Vitals:    01/12/21 1904 01/12/21 1906 01/12/21 2009   BP:   122/69   BP Location:   Right arm   Pulse: 60  66   Resp: 18  18   Temp:  (!) 97 4 °F (36 3 °C)    TempSrc: Oral    SpO2: 97%  98%      Temperature:   Temp (24hrs), Av 4 °F (36 3 °C), Min:97 4 °F (36 3 °C), Max:97 4 °F (36 3 °C)    Temperature: (!) 97 4 °F (36 3 °C)  Intake & Output:  I/O     None        Weights: There is no height or weight on file to calculate BMI  Weight (last 2 days)     None        Physical Exam  Constitutional:       Appearance: Normal appearance  HENT:      Head: Normocephalic and atraumatic  Nose: Nose normal       Mouth/Throat:      Mouth: Mucous membranes are moist       Pharynx: Oropharynx is clear  Eyes:      Extraocular Movements: Extraocular movements intact  Conjunctiva/sclera: Conjunctivae normal    Neck:      Musculoskeletal: Normal range of motion  Cardiovascular:      Rate and Rhythm: Normal rate and regular rhythm  Heart sounds: No murmur  No friction rub  No gallop  Pulmonary:      Effort: Pulmonary effort is normal  No respiratory distress  Breath sounds: Normal breath sounds  No stridor  No wheezing, rhonchi or rales  Chest:      Chest wall: No tenderness  Abdominal:      General: Bowel sounds are normal  There is no distension  Palpations: Abdomen is soft  There is no mass  Tenderness: There is no abdominal tenderness  There is no guarding or rebound  Hernia: No hernia is present  Musculoskeletal: Normal range of motion  Skin:     General: Skin is warm and dry  Neurological:      General: No focal deficit present  Mental Status: She is alert and oriented to person, place, and time     Psychiatric:         Mood and Affect: Mood normal        PAST MEDICAL HISTORY     Past Medical History:   Diagnosis Date    Anemia     iron def    Arthritis     knees    Carpal tunnel syndrome of right wrist     Chest pain     Colon polyp     Depression     at times    [facility]     Dysphagia     Esophageal reflux 2011    Grade 1 out of 6 intensity murmur 2019    Heart murmur     History of transfusion 1980    Pacemaker 2018    Pneumonia     age 11    Psychiatric disorder     Trigger finger, left      PAST SURGICAL HISTORY     Past Surgical History:   Procedure Laterality Date    BACK SURGERY      CARDIAC PACEMAKER PLACEMENT  2018    CERVICAL [address] SURGERY  2009    PLATES & SCREWS     SECTION      X2    COLONOSCOPY      GASTRIC BYPASS  2017    LAP RINYGB SURGERY    KNEE ARTHROSCOPY      KNEE SURGERY Bilateral     KNEE SURGERY Left 2020    OTHER SURGICAL HISTORY      ARM SURGERIES    VA INCISE FINGER TENDON SHEATH Left 2018    Procedure: LONG TRIGGER FINGER RELEASE;  Surgeon: Aditi Esquivel MD;  Location: [facility];  Service: Orthopedics    VA WRIST [facility] LIG Right 2018    Procedure: ENDOSCOPIC CARPAL TUNNEL RELEASE;  Surgeon: Aditi Esquivel MD;  Location: [facility];  Service: Orthopedics    TONSILLECTOMY      UPPER GASTROINTESTINAL ENDOSCOPY       SOCIAL & FAMILY HISTORY     Social History     Substance and Sexual Activity   Alcohol Use Never    Alcohol/week: 2 0 standard drinks    Types: 1 Glasses of wine, 1 Standard drinks or equivalent per week    Frequency: Never    Comment: very rare social     Social History     Substance and Sexual Activity   Drug Use No     Social History     Tobacco Use   Smoking Status Former Smoker    Packs/day: 0 25    Types: Cigarettes   Smokeless Tobacco Never Used   Tobacco Comment    quit "many years ago"     Family History   Problem Relation Age of Onset    Stroke Mother     Alzheimer's disease Mother     Arthritis Mother     Coronary artery disease Mother     Breast cancer Mother 77    Cancer Mother     Heart disease Mother     Hypertension Father     Gout Father     Diabetes type II Father     Coronary artery disease Father     Heart disease Father     No Known Problems Sister     No Known Problems Daughter     No Known Problems Maternal Grandmother  No Known Problems Maternal Grandfather     No Known Problems Paternal Grandmother     No Known Problems Paternal Grandfather     Prostate cancer Brother     No Known Problems Sister     No Known Problems Sister     No Known Problems Maternal Aunt     No Known Problems Maternal Aunt     No Known Problems Maternal Aunt     No Known Problems Maternal Aunt     Cancer Maternal Aunt     No Known Problems Paternal Aunt     No Known Problems Paternal Aunt      LABORATORY DATA     Labs: I have personally reviewed pertinent reports  Results from last 7 days   Lab Units 01/12/21  1933   WBC Thousand/uL 7 54   HEMOGLOBIN g/dL 13 0   HEMATOCRIT % 40 1   PLATELETS Thousands/uL 189   NEUTROS PCT % 62   MONOS PCT % 5      Results from last 7 days   Lab Units 01/12/21  1933   POTASSIUM mmol/L 3 4*   CHLORIDE mmol/L 109*   CO2 mmol/L 28   BUN mg/dL 5   CREATININE mg/dL 0 60   CALCIUM mg/dL 9 4   ALK PHOS U/L 100   ALT U/L 22   AST U/L 15                      Results from last 7 days   Lab Units 01/12/21  1933   TROPONIN I ng/mL <0 02     Micro:  Lab Results   Component Value Date    URINECX >100,000 cfu/ml Klebsiella pneumoniae (A) 12/05/2020    URINECX <10,000 cfu/ml  11/01/2020    URINECX >100,000 cfu/ml Staphylococcus saprophyticus (A) 12/27/2019     IMAGING & DIAGNOSTIC TESTS     Imaging: I have personally reviewed pertinent reports  No results found  EKG, Pathology, and Other Studies: I have personally reviewed pertinent reports  ALLERGIES   No Known Allergies  MEDICATIONS PRIOR TO ARRIVAL     Prior to Admission medications    Medication Sig Start Date End Date Taking?  Authorizing Provider   Ascorbic Acid (VITAMIN C) 100 MG CHEW Chew   Yes Historical Provider, MD   atorvastatin (LIPITOR) 10 mg tablet take 1 tablet by mouth once daily 9/10/20  Yes Sharri Luque MD   B Complex Vitamins (VITAMIN B COMPLEX PO) Take 1 capsule by mouth   Yes Historical Provider, MD   Biotin 10 MG TABS Take by mouth daily Yes Historical Provider, MD   Calcium Carbonate (CALCI-CHEW PO) Take 500 mg by mouth 2 (two) times a day   Yes Historical Provider, MD   cholecalciferol (VITAMIN D3) 1,000 units tablet Take 5,000 Units by mouth 2 (two) times a day    Yes Historical Provider, MD   Eliquis 2 5 MG Take 1 tablet (2 5 mg total) by mouth 2 (two) times a day 11/13/20  Yes Samantha Medina PA-C   Ferrous Sulfate  (45 Fe) MG TBCR Take 1 tablet by mouth daily   Yes Historical Provider, MD   Lysine HCl POWD Take by mouth   Yes Historical Provider, MD   Multiple Vitamin (MULTIVITAMIN) capsule Take 1 capsule by mouth daily 2 chewies daily   Yes Historical Provider, MD   clobetasol (TEMOVATE) 0 05 % cream Apply to affected areas daily x 12 weeks, then twice weekly for maintenance 5/18/17   Historical Provider, MD   fluticasone (FLONASE) 50 mcg/act nasal spray 2 sprays into each nostril daily 12/24/18   Srinivasa Hahn MD   nystatin (MYCOSTATIN) powder Apply topically 2 (two) times a day 1/7/20   Samantha Medina PA-C   bisacodyl (DULCOLAX) 5 mg EC tablet Take as directed by the office   6/5/20 1/12/21  Peace Eubanks PA-C   valACYclovir (VALTREX) 500 mg tablet Take 1 tablet (500 mg total) by mouth 2 (two) times a day for 3 days  Patient taking differently: Take 500 mg by mouth 2 (two) times a day as needed  5/12/20 1/12/21  Samantha Medina PA-C     MEDICATIONS ADMINISTERED IN LAST 24 HOURS     Medication Administration - last 24 hours from 01/11/2021 2247 to 01/12/2021 2247       Date/Time Order Dose Route Action Action by     01/12/2021 2039 aspirin tablet 325 mg 325 mg Oral Given Ruthy Nichole RN        CURRENT MEDICATIONS     Current Facility-Administered Medications   Medication Dose Route Frequency Provider Last Rate    acetaminophen  650 mg Oral Q6H PRN Carine Hidalgo MD      apixaban  2 5 mg Oral BID Carine Hidalgo MD      [START ON 1/13/2021] atorvastatin  10 mg Oral Daily With Shayna Stevens MD      [START ON 1/13/2021] cholecalciferol  5,000 Units Oral Daily Renuka Tamayo MD          acetaminophen, 650 mg, Q6H PRN        Admission Time  I spent 30 minutes admitting the patient  This involved direct patient contact where I performed a full history and physical, reviewing previous records, and reviewing laboratory and other diagnostic studies  Portions of the record may have been created with voice recognition software  Occasional wrong word or "sound a like" substitutions may have occurred due to the inherent limitations of voice recognition software    Read the chart carefully and recognize, using context, where substitutions have occurred     ==  Renuka Tamayo MD  [address]  Internal Medicine Residency PGY-2

## 2021-01-13 NOTE — DISCHARGE SUMMARY
[facility] Discharge Summary - Medical Sixto Slaughter 63 y o  female MRN: [id number]    [address] [facility]  Room / Bed: ED 08/ED 08 Encounter: [id number]    BRIEF OVERVIEW    Admitting Provider: June Gardiner MD  Discharge Provider: No att  providers found  Primary Care Physician at Discharge: Shayla Smith PA-C    Discharge To: Home      Admission Date: 1/12/2021     Discharge Date: 1/13/2021 12:07 PM    Primary Discharge Diagnosis  Principal Problem (Resolved):    Chest pain  Active Problems:    Sick sinus syndrome (HCC)    Mixed hyperlipidemia    History of DVT (deep vein thrombosis)    Depression    * Chest pain  Assessment & Plan  · Likely musculoskeletal in nature  · Unlikely ACS given negative troponin x 3 and NSR on EKG with no changes from previous    Depression  Assessment & Plan  Patient states that she has been depressed since about 1/4/2021  It seems as though this may be caused by family issues involving her  and daughter  She states that she is feeling depressed, is unable to fall asleep, appetite has decreased, and she has lost weight  Denies SI/HI  She has no intent on committing suicide  I suggested that she follow up with her PCP regarding this       History of DVT (deep vein thrombosis)  Assessment & Plan  Recurrent with last DVT in 2012    Currently on eliquis 2 5 mg BID       Mixed hyperlipidemia  Assessment & Plan  Stable  Continue atorvastatin 10 mg     Sick sinus syndrome [facility])  Assessment & Plan  S/p pacemaker 11/2018  Currently in NSR       Consulting Providers   n/a       Therapeutic Operative Procedures Performed  n/a    Diagnostic Procedures Performed  n/a    Discharge Disposition: Home/Self Care  Discharged With Lines: no    Test Results Pending at Discharge: none    Outpatient Follow-Up  PCP    Code Status: Level 1 - Full Code  Advance Directive and Living Will: <no information>  Power of :    POLST:      Medications   See after visit summary for reconciled discharge medications provided to patient and family  Allergies  No Known Allergies  Discharge Diet: regular diet  Activity restrictions: none    [address] [facility] Course    Nicole Olivera is a 63year old female with a past medical history significant for SSS s/p pacer, DVT on eliquis, HLD, and s/p bariatric surgery  She presented at [facility]  with chest pain that started an hr PTA  States she has not had chest pain before  She states that the pain was pressure like, located in the center of her chest  Non radiating pain, but notes neck pain and shoulder pain with movement  She states that she had some associated lightheadedness, but did not pass out  She denied any diaphoresis or nausea  This event happened after a heated argument with her daughter  Patient was resting in a chair when she felt quite emotional  Notes feeling depressed lately, but denies SI/HI/hallucinations  AVVS on admission  Pt was monitored overnight  Troponin negative x 3 with normal EKG x 3  No events on telemetry overnight  CXR unremarkable  Labs were all wnl except for K of 3 1 which was repleted  Pt was seen and examined this morning  Her chest pain decreased significantly since coming in to the hospital and she states that now she only has some mild pain, 2/10 in severity  No SOB, palpitation, peripheral edema, coughing  Etiology of chest pain unlikely to be ACS  Chest pain was reproducible on physical exam and was thought to musculoskeletal in nature and possibly provoked by stress  Pt was deemed stable for discharge home with close follow up with PCP  Pt was also advised to follow up with PCP regarding stress/depression she has been experiencing lately  VS: Temp 97 4, HR 70, RR 17, /60, SpO2 96% on RA    Physical Exam:    Constitutional:       Appearance: Normal appearance  HENT:      Head: Normocephalic and atraumatic        Nose: Nose normal       Mouth/Throat:      Mouth: Mucous membranes are moist       Pharynx: Oropharynx is clear  Eyes:      Extraocular Movements: Extraocular movements intact  Conjunctiva/sclera: Conjunctivae normal    Neck:      Musculoskeletal: Normal range of motion  Cardiovascular:      Rate and Rhythm: Normal rate and regular rhythm  Heart sounds: No murmur  No friction rub  No gallop  Pulmonary:      Effort: Pulmonary effort is normal  No respiratory distress  Breath sounds: Normal breath sounds  No stridor  No wheezing, rhonchi or rales  Chest:      Chest wall: Mild tenderness to palpation over anterior chest wall  Abdominal:      General: Bowel sounds are normal  There is no distension  Palpations: Abdomen is soft  There is no mass  Tenderness: There is no abdominal tenderness  There is no guarding or rebound  Hernia: No hernia is present  Musculoskeletal: Normal range of motion  Skin:     General: Skin is warm and dry  Neurological:      General: No focal deficit present  Mental Status: She is alert and oriented to person, place, and time  Psychiatric:         Mood and Affect: Mood normal          Presenting Problem/History of Present Illness  Principal Problem (Resolved):    Chest pain  Active Problems:    Sick sinus syndrome (HCC)    Mixed hyperlipidemia    History of DVT (deep vein thrombosis)    Depression      Discharge Condition: good      Discharge  Statement   I spent 45 minutes minutes discharging the patient  This time was spent on the day of discharge  I had direct contact with the patient on the day of discharge  Additional documentation is required if more than 30 minutes were spent on discharge

## 2021-01-13 NOTE — ASSESSMENT & PLAN NOTE
Patient is a able to point to location of pain (right parasternal area) and pain is reproducible with palpation  · Likely musculoskeletal in nature; Less likely ACS  · Patient admitted for ACS rule out  · Initial troponin -ve and EKG NSR with no changes from previous  Will continue to trend  · Will provide symptomatic relief with tylenol and voltaren gel  · Vitals wnl

## 2021-01-13 NOTE — ASSESSMENT & PLAN NOTE
Patient states that she has been depressed since about 1/4/2021  It seems as though this may be caused by family issues involving her  and daughter  She states that she is feeling depressed, is unable to fall asleep, appetite has decreased, and she has lost weight  Denies SI/HI  She has no intent on committing suicide  Daughter was in the room and their relationship seems quite tense with constant arguing  I suggested that she follow up with her PCP regarding this

## 2021-01-13 NOTE — ED ATTENDING ATTESTATION
1/12/2021  I, Betty Oates MD, saw and evaluated the patient  I have discussed the patient with the resident/non-physician practitioner and agree with the resident's/non-physician practitioner's findings, Plan of Care, and MDM as documented in the resident's/non-physician practitioner's note, except where noted  All available labs and Radiology studies were reviewed  I was present for key portions of any procedure(s) performed by the resident/non-physician practitioner and I was immediately available to provide assistance  At this point I agree with the current assessment done in the Emergency Department  I have conducted an independent evaluation of this patient a history and physical is as follows:    OA: 62 y/o f with h/o SSS s/p PPM, HTN, DVT on eliquis who presents with + substernal CP described as pressure that radiates form her R chest into her R neck  Pain worsens with movement, most notably to the R arm  +SOB, initially with diaphoresis, now resolved  + mild nausea, mildly decreased PO intake but she states that this is from her stress  No vomiting  No fevers/chills  No cough  No falls/trauma   + family history of previous cardiac history in family, strong history of MI  Also notes she has increased stress at home  PE, well developed f, mildly anxious, VSS, Nc/AT, MMM, neck supple/FROM, - JVD, RR, lungs CTAB, abd soft, NT/ND, +BS, -r/g, - LE edema, - calf ttp, + 2 distal pulses and capillary refill < 2 sec, AAO  A/p CP, history of SSS with pacemaker, interrogate device, cardiac workup, EKG, CXR, labs, treat accordingly, admit       ED Course         Critical Care Time  Procedures

## 2021-01-13 NOTE — ED PROVIDER NOTES
History  Chief Complaint   Patient presents with    Chest Pain     pt reports chest pressure that started an hour ago, reports it radiates up neck, down right arm and some pain in back, reports h/o of pain like this before her pacemaker was placed      This is a 63year old female with a history of sick sinus syndrome s/p dual chamber pacemaker, and hyperlipidemia that presents to the ED for chest pain  The chest pain began one hour ago while the patient was sitting on the couch  Patient says that the pain is located substernal and radiates up her neck and towards her right arm  Patient describes the pain as a crushing chest pain  Pain is exacerbated by exertion  Patient has associated nausea  Patient has not taken anything for her pain, and she is not prescribed any nitroglycerin or aspirin at home as she is on Eliquis for a history of recurrent DVT  Patient denies SOB, fevers, chills, or diaphoresis  Patient describes to me that she has had some stressors at home as there is a tenuous family issue currently  Prior to Admission Medications   Prescriptions Last Dose Informant Patient Reported? Taking?    Ascorbic Acid (VITAMIN C) 100 MG CHEW 1/12/2021 at Unknown time Self Yes Yes   Sig: Chew   B Complex Vitamins (VITAMIN B COMPLEX PO) 1/12/2021 at Unknown time Self Yes Yes   Sig: Take 1 capsule by mouth   Biotin 10 MG TABS 1/12/2021 at Unknown time Self Yes Yes   Sig: Take by mouth daily    Calcium Carbonate (CALCI-CHEW PO) 1/12/2021 at Unknown time Self Yes Yes   Sig: Take 500 mg by mouth 2 (two) times a day   Eliquis 2 5 MG 1/12/2021 at Unknown time  No Yes   Sig: Take 1 tablet (2 5 mg total) by mouth 2 (two) times a day   Ferrous Sulfate  (45 Fe) MG TBCR 1/12/2021 at Unknown time Self Yes Yes   Sig: Take 1 tablet by mouth daily   Lysine HCl POWD 1/12/2021 at Unknown time Self Yes Yes   Sig: Take by mouth   Multiple Vitamin (MULTIVITAMIN) capsule 1/12/2021 at Unknown time Self Yes Yes   Sig: Take 1 capsule by mouth daily 2 chewies daily   atorvastatin (LIPITOR) 10 mg tablet 2021 at Unknown time Self No Yes   Sig: take 1 tablet by mouth once daily   cholecalciferol (VITAMIN D3) 1,000 units tablet 2021 at Unknown time Self Yes Yes   Sig: Take 5,000 Units by mouth 2 (two) times a day    clobetasol (TEMOVATE) 0 05 % cream More than a month at Unknown time Self Yes No   Sig: Apply to affected areas daily x 12 weeks, then twice weekly for maintenance   fluticasone (FLONASE) 50 mcg/act nasal spray More than a month at Unknown time Self No No   Si sprays into each nostril daily   nystatin (MYCOSTATIN) powder More than a month at Unknown time Self No No   Sig: Apply topically 2 (two) times a day      Facility-Administered Medications: None       Past Medical History:   Diagnosis Date    Anemia     iron def    Arthritis     knees    Carpal tunnel syndrome of right wrist     Chest pain     Colon polyp     Depression     at times    DVT [facility]deep venous thrombosis) ([facility] [address] [facility]     Dysphagia     Esophageal reflux 2011    Grade 1 out of 6 intensity murmur 2019    Heart murmur     History of transfusion 1980    Pacemaker 2018    Pneumonia     age 15    Psychiatric disorder     Trigger finger, left        Past Surgical History:   Procedure Laterality Date    BACK SURGERY      CARDIAC PACEMAKER PLACEMENT  2018    CERVICAL [address] SURGERY  2009    PLATES & SCREWS     SECTION      X2    COLONOSCOPY      GASTRIC BYPASS  2017    LAP RINYGB SURGERY    KNEE ARTHROSCOPY      KNEE SURGERY Bilateral     KNEE SURGERY Left 2020    OTHER SURGICAL HISTORY      ARM SURGERIES    IL INCISE FINGER TENDON SHEATH Left 2018    Procedure: LONG TRIGGER FINGER RELEASE;  Surgeon: Rui Tobar MD;  [facility];  Service: Orthopedics    IL WRIST Ebbie Aw LIG Right 2018    Procedure: ENDOSCOPIC CARPAL TUNNEL RELEASE;  Surgeon: Des Donahue Jenn Vazquez MD;  [facility];  Service: Orthopedics    TONSILLECTOMY      UPPER GASTROINTESTINAL ENDOSCOPY         Family History   Problem Relation Age of Onset    Stroke Mother     Alzheimer's disease Mother     Arthritis Mother     Coronary artery disease Mother     Breast cancer Mother 77    Cancer Mother     Heart disease Mother     Hypertension Father     Gout Father     Diabetes type II Father     Coronary artery disease Father     Heart disease Father     No Known Problems Sister     No Known Problems Daughter     No Known Problems Maternal Grandmother     No Known Problems Maternal Grandfather     No Known Problems Paternal Grandmother     No Known Problems Paternal Grandfather     Prostate cancer Brother     No Known Problems Sister     No Known Problems Sister     No Known Problems Maternal Aunt     No Known Problems Maternal Aunt     No Known Problems Maternal Aunt     No Known Problems Maternal Aunt     Cancer Maternal Aunt     No Known Problems Paternal Aunt     No Known Problems Paternal Aunt      I have reviewed and agree with the history as documented  E-Cigarette/Vaping    E-Cigarette Use Never User      E-Cigarette/Vaping Substances    Nicotine No     THC No     CBD No     Flavoring No      Social History     Tobacco Use    Smoking status: Former Smoker     Packs/day: 0 25     Types: Cigarettes    Smokeless tobacco: Never Used    Tobacco comment: quit "many years ago"   Substance Use Topics    Alcohol use: Never     Alcohol/week: 2 0 standard drinks     Types: 1 Glasses of wine, 1 Standard drinks or equivalent per week     Frequency: Never     Comment: very rare social    Drug use: No        Review of Systems   Constitutional: Negative for chills and fever  HENT: Negative for hearing loss  Eyes: Negative for visual disturbance  Respiratory: Negative for shortness of breath  Cardiovascular: Positive for chest pain     Gastrointestinal: Positive for nausea  Negative for abdominal pain, constipation, diarrhea and vomiting  Genitourinary: Negative for difficulty urinating  Musculoskeletal: Negative for myalgias  Skin: Negative for color change  Neurological: Negative for dizziness and headaches  Psychiatric/Behavioral: Negative for agitation  Physical Exam  ED Triage Vitals   Temperature Pulse Respirations Blood Pressure SpO2   01/12/21 1906 01/12/21 1904 01/12/21 1904 01/12/21 2009 01/12/21 1904   (!) 97 4 °F (36 3 °C) 60 18 122/69 97 %      Temp Source Heart Rate Source Patient Position - Orthostatic VS BP Location FiO2 (%)   01/12/21 1906 01/12/21 1904 01/12/21 2009 01/12/21 2009 --   Oral Monitor Sitting Right arm       Pain Score       01/12/21 1904       7             Orthostatic Vital Signs  Vitals:    01/12/21 1904 01/12/21 2009   BP:  122/69   Pulse: 60 66   Patient Position - Orthostatic VS:  Sitting       Physical Exam  Vitals signs and nursing note reviewed  Constitutional:       General: She is not in acute distress  Appearance: Normal appearance  She is well-developed  She is not ill-appearing  HENT:      Head: Normocephalic and atraumatic  Right Ear: External ear normal       Left Ear: External ear normal    Neck:      Musculoskeletal: Normal range of motion and neck supple  Cardiovascular:      Rate and Rhythm: Normal rate and regular rhythm  Heart sounds: Normal heart sounds  No murmur  No friction rub  No gallop  Pulmonary:      Effort: Pulmonary effort is normal  No respiratory distress  Breath sounds: Normal breath sounds  No stridor  No wheezing  Abdominal:      General: Bowel sounds are normal  There is no distension  Palpations: Abdomen is soft  Tenderness: There is no abdominal tenderness  Musculoskeletal: Normal range of motion  Skin:     General: Skin is warm and dry  Neurological:      Mental Status: She is alert and oriented to person, place, and time  Psychiatric:         Mood and Affect: Mood normal          Behavior: Behavior normal          ED Medications  Medications   atorvastatin (LIPITOR) tablet 10 mg (has no administration in time range)   cholecalciferol (VITAMIN D3) tablet 5,000 Units (has no administration in time range)   apixaban (ELIQUIS) tablet 2 5 mg (has no administration in time range)   acetaminophen (TYLENOL) tablet 650 mg (has no administration in time range)   aspirin tablet 325 mg (325 mg Oral Given 1/12/21 2039)       Diagnostic Studies  Results Reviewed     Procedure Component Value Units Date/Time    Troponin I [[id number]]     Lab Status: No result Specimen: Blood     Troponin I [[id number]]     Lab Status: No result Specimen: Blood     Troponin I [[id number]]  (Normal) Collected: 01/12/21 1933    Lab Status: Final result Specimen: Blood from Arm, Right Updated: 01/12/21 2020     Troponin I <0 02 ng/mL     Comprehensive metabolic panel [[id number]]  (Abnormal) Collected: 01/12/21 1933    Lab Status: Final result Specimen: Blood from Arm, Right Updated: 01/12/21 2016     Sodium 142 mmol/L      Potassium 3 4 mmol/L      Chloride 109 mmol/L      CO2 28 mmol/L      ANION GAP 5 mmol/L      BUN 5 mg/dL      Creatinine 0 60 mg/dL      Glucose 92 mg/dL      Calcium 9 4 mg/dL      AST 15 U/L      ALT 22 U/L      Alkaline Phosphatase 100 U/L      Total Protein 7 4 g/dL      Albumin 4 3 g/dL      Total Bilirubin 0 71 mg/dL      eGFR 99 ml/min/1 73sq m     Narrative:      Catie guidelines for Chronic Kidney Disease (CKD):     Stage 1 with normal or high GFR (GFR > 90 mL/min/1 73 square meters)    Stage 2 Mild CKD (GFR = 60-89 mL/min/1 73 square meters)    Stage 3A Moderate CKD (GFR = 45-59 mL/min/1 73 square meters)    Stage 3B Moderate CKD (GFR = 30-44 mL/min/1 73 square meters)    Stage 4 Severe CKD (GFR = 15-29 mL/min/1 73 square meters)    Stage 5 End Stage CKD (GFR <15 mL/min/1 73 square meters)  Note: GFR calculation is accurate only with a steady state creatinine    CBC and differential [[id number]]  (Abnormal) Collected: 01/12/21 1933    Lab Status: Final result Specimen: Blood from Arm, Right Updated: 01/12/21 1943     WBC 7 54 Thousand/uL      RBC 4 06 Million/uL      Hemoglobin 13 0 g/dL      Hematocrit 40 1 %      MCV 99 fL      MCH 32 0 pg      MCHC 32 4 g/dL      RDW 12 0 %      MPV 12 0 fL      Platelets 527 Thousands/uL      nRBC 0 /100 WBCs      Neutrophils Relative 62 %      Immat GRANS % 0 %      Lymphocytes Relative 31 %      Monocytes Relative 5 %      Eosinophils Relative 1 %      Basophils Relative 1 %      Neutrophils Absolute 4 63 Thousands/µL      Immature Grans Absolute 0 02 Thousand/uL      Lymphocytes Absolute 2 37 Thousands/µL      Monocytes Absolute 0 36 Thousand/µL      Eosinophils Absolute 0 09 Thousand/µL      Basophils Absolute 0 07 Thousands/µL                  X-ray chest 1 view portable    (Results Pending)         Procedures  Procedures      ED Course  ED Course as of Jan 12 2230   Tue Jan 12, 2021   1903 Procedure Note: EKG  Date/Time: 01/12/21 7:04 PM   Interpreted by: Cindy Morgan   Indications / Diagnosis: CP  ECG reviewed by me, the ED Provider: yes   The EKG demonstrates:  Rhythm: normal sinus  Intervals: normal intervals  Axis: normal axis  QRS/Blocks: normal QRS  ST Changes: No acute ST Changes, no STD/BRITANY                      HEART Risk Score      Most Recent Value   Heart Score Risk Calculator   History  1 Filed at: 01/12/2021 2040   ECG  0 Filed at: 01/12/2021 2040   Age  1 Filed at: 01/12/2021 2040   Risk Factors  2 Filed at: 01/12/2021 2040   Troponin  0 Filed at: 01/12/2021 2040   HEART Score  4 Filed at: 01/12/2021 2040                                MDM  Number of Diagnoses or Management Options  Chest pain, unspecified type:   Pacemaker:   Diagnosis management comments: 63year old female with history of pacemaker for sick sinus syndrome that presents to the ED for chest pain  At this time differential includes ACS vs  Angina vs  Pneumonia vs  Pneumothorax  Will order cardiac workup including CBC, CMP, troponin and EKG, and chest x-ray to evaluate  Patient will likely require admission for ACS rule out due to multiple cardiac risk factors  Patient daughter described the patient has had to her while in the emergency department that she could go home and overdose on her narcotic pain medications  I talked to the patient on her own and she denied any suicidal ideation and denied any plans for suicide or any homicide ideation  I discussed with the admitting team these findings and with no that the patient's daughter concerns for suicidal ideation in the patient      Disposition  Final diagnoses:   Chest pain, unspecified type   Pacemaker     Time reflects when diagnosis was documented in both MDM as applicable and the Disposition within this note     Time User Action Codes Description Comment    1/12/2021 10:30 PM Sahara Muhammad [R07 9] Chest pain, unspecified type     1/12/2021 10:30 PM Sahara Muhammad [Z95 0] Pacemaker       ED Disposition     ED Disposition Condition Date/Time Comment    Admit Stable Tue Jan 12, 2021  8:56 PM Case was discussed with SOD senior and the patient's admission status was agreed to be Admission Status: observation status to the service of Dr Hussain Reynolds  Follow-up Information    None         Patient's Medications   Discharge Prescriptions    No medications on file     No discharge procedures on file  PDMP Review     None           ED Provider  Attending physically available and evaluated Sixto Slaughter I managed the patient along with the ED Attending      Electronically Signed by         Margret Dacosta MD  01/12/21 7030

## 2021-01-13 NOTE — UTILIZATION REVIEW
Initial Clinical Review    Admission: Date/Time/Statement:   Admission Orders (From admission, onward)     Ordered        01/12/21 2058  Place in Observation  Once                   Orders Placed This Encounter   Procedures    Place in Observation     Standing Status:   Standing     Number of Occurrences:   1     Order Specific Question:   Level of Care     Answer:   Med Surg [16]     ED Arrival Information     Expected Arrival Acuity Means of Arrival Escorted By Service Admission Type    - 1/12/2021 18:50 Emergent Wheelchair Self General Medicine Emergency    Arrival Complaint    chest pain        Chief Complaint   Patient presents with    Chest Pain     pt reports chest pressure that started an hour ago, reports it radiates up neck, down right arm and some pain in back, reports h/o of pain like this before her pacemaker was placed      Assessment/Plan:       63year old female presents to ed from home for evaluation and treatment of chest pain which began 1 hour ago at rest   On arrival she describes pain as crushing and associated with nausea  PMHX: DVT on eliquis, sick sinus, dual chamber pacemaker  Clinical assessment significant for troponin wnl  Potassium 3 4, Ekg normal  Treated in ed with aspirin and eliquis  Admit to observation for chest pain    Plan includes: telemetry,trend Ekg with troponin, kdur      ED Triage Vitals   01/12/21 1906 01/12/21 1904 01/12/21 1904 01/12/21 2009 01/12/21 1904   (!) 97 4 °F (36 3 °C) 60 18 122/69 97 %      Oral Monitor         Pain Score       7          01/13/21 68 kg (150 lb)     Additional Vital Signs:    Date/Time  Temp  Pulse  Resp  BP  MAP (mmHg)  SpO2  O2 Device   01/13/21 0856  --  70  17  105/53  --  96 %  None (Room air)   01/13/21 0600  --  60  14  104/55  75  95 %  --   01/13/21 0129  --  64  16  121/57  --  94 %  None (Room air)   01/13/21 0100  --  68  17  107/55  77  97 %  None (Room air)   01/12/21 2009  --  66  18  122/69  --  98 %  None (Room air) Pertinent Labs/Diagnostic Test Results:       ED Course as of Jan 12 2230   Tue Jan 12, 2021   1903 Procedure Note: EKG  Date/Time: 01/12/21 7:04 PM     Indications / Diagnosis: CP     The EKG demonstrates:  Rhythm: normal sinus  Intervals: normal intervals  Axis: normal axis  QRS/Blocks: normal QRS  ST Changes: No acute ST Changes, no STD/BRITANY                X-ray chest 1 view portable    (01/13 0847)      No acute cardiopulmonary disease                 Results from last 7 days   Lab Units 01/13/21  0445 01/12/21  1933   WBC Thousand/uL 6 42 7 54   HEMOGLOBIN g/dL 11 4* 13 0   HEMATOCRIT % 35 1 40 1   PLATELETS Thousands/uL 158 189   NEUTROS ABS Thousands/µL 3 37 4 63         Results from last 7 days   Lab Units 01/13/21  0445 01/12/21  1933   SODIUM mmol/L 143 142   POTASSIUM mmol/L 3 1* 3 4*   CHLORIDE mmol/L 111* 109*   CO2 mmol/L 28 28   ANION GAP mmol/L 4 5   BUN mg/dL 9 5   CREATININE mg/dL 0 58* 0 60   EGFR ml/min/1 73sq m 100 99   CALCIUM mg/dL 9 0 9 4   MAGNESIUM mg/dL 2 3  --    PHOSPHORUS mg/dL 4 3*  --      Results from last 7 days   Lab Units 01/13/21  0445 01/12/21  1933   AST U/L 17 15   ALT U/L 19 22   ALK PHOS U/L 86 100   TOTAL PROTEIN g/dL 6 5 7 4   ALBUMIN g/dL 3 5 4 3   TOTAL BILIRUBIN mg/dL 0 59 0 71         Results from last 7 days   Lab Units 01/13/21  0445 01/12/21  1933   GLUCOSE RANDOM mg/dL 93 92           Results from last 7 days   Lab Units 01/13/21  0546 01/13/21  0234 01/12/21  2307 01/12/21  1933   TROPONIN I ng/mL <0 02 <0 02 <0 02 <0 02       ED Treatment:   Medication Administration from 01/12/2021 1850 to 01/13/2021 0900       Date/Time Order Dose Route Action     01/12/2021 2039 aspirin tablet 325 mg 325 mg Oral Given     01/12/2021 2303 apixaban (ELIQUIS) tablet 2 5 mg 2 5 mg Oral Given        Past Medical History:   Diagnosis Date    Anemia     iron def    Arthritis     knees    Carpal tunnel syndrome of right wrist     Chest pain     Colon polyp     Depression     at times    DVT (deep venous thrombosis) ([facility] [address] ) 2012    Dysphagia     Esophageal reflux 5/23/2011    Grade 1 out of 6 intensity murmur 6/24/2019    Heart murmur     History of transfusion 1980    Pacemaker 11/01/2018    Pneumonia     age 15    Psychiatric disorder     Trigger finger, left      Present on Admission:   Sick sinus syndrome (HCC)   Mixed hyperlipidemia   Depression   Chest pain      Admitting Diagnosis: No admission diagnoses are documented for this encounter  Age/Sex: 63 y o  female     Admission Orders:  Scheduled Medications:  apixaban, 2 5 mg, Oral, BID  atorvastatin, 10 mg, Oral, Daily With Dinner  cholecalciferol, 5,000 Units, Oral, Daily  potassium chloride, 40 mEq, Oral, Once    Followed by  potassium chloride, 40 mEq, Oral, Once      Continuous IV Infusions:     PRN Meds:  acetaminophen, 650 mg, Oral, Q6H PRN        IP CONSULT TO CASE MANAGEMENT    Network Utilization Review Department  ATTENTION: Please call with any questions or concerns to [phone] and carefully listen to the prompts so that you are directed to the right person  All voicemails are confidential   Keri Morrow all requests for admission clinical reviews, approved or denied determinations and any other requests to dedicated fax number below belonging to the campus where the patient is receiving treatment   List of dedicated fax numbers for the Facilities:  [address] DENIALS (Administrative/Medical Necessity) [phone]   [address] (Maternity/NICU/Pediatrics) [phone]   [address] [place] [address] Dr Lalo Kelley 7469 (Elvia Loaiza "[address]) [address] [facility] [phone] [address] [facility] Anurag En Herman [address] [place] [address] [phone]

## 2021-01-29 ENCOUNTER — REMOTE DEVICE CLINIC VISIT (OUTPATIENT)
Dept: CARDIOLOGY CLINIC | Facility: CLINIC | Age: 61
End: 2021-01-29
Payer: COMMERCIAL

## 2021-01-29 DIAGNOSIS — Z95.0 CARDIAC PACEMAKER IN SITU: Primary | ICD-10-CM

## 2021-01-29 PROCEDURE — 93296 REM INTERROG EVL PM/IDS: CPT | Performed by: INTERNAL MEDICINE

## 2021-01-29 PROCEDURE — 93294 REM INTERROG EVL PM/LDLS PM: CPT | Performed by: INTERNAL MEDICINE

## 2021-01-29 NOTE — PROGRESS NOTES
Results for orders placed or performed in visit on 01/29/21   Cardiac EP device report    Narrative    MDT DUAL PM/ ACTIVE SYSTEM IS MRI CONDITIONAL  CARELINK TRANSMISSION: BATTERY VOLTAGE ADEQUATE (12 5 YRS)  AP-29%, <0 1%  ALL AVAILABLE LEAD PARAMETERS WITHIN NORMAL LIMITS  8 SVT-ST EPISODES ON 1/21/21 @ 154-162 BPM MAX DURATION 7 2 MINS  PT ON ELIQUIS  NORMAL DEVICE FUNCTION   GV

## 2021-01-31 ENCOUNTER — APPOINTMENT (OUTPATIENT)
Dept: RADIOLOGY | Facility: CLINIC | Age: 61
End: 2021-01-31
Payer: COMMERCIAL

## 2021-01-31 ENCOUNTER — OFFICE VISIT (OUTPATIENT)
Dept: URGENT CARE | Facility: CLINIC | Age: 61
End: 2021-01-31
Payer: COMMERCIAL

## 2021-01-31 VITALS
RESPIRATION RATE: 18 BRPM | OXYGEN SATURATION: 100 % | SYSTOLIC BLOOD PRESSURE: 78 MMHG | WEIGHT: 154 LBS | HEART RATE: 68 BPM | DIASTOLIC BLOOD PRESSURE: 42 MMHG | TEMPERATURE: 96.2 F | BODY MASS INDEX: 27.29 KG/M2 | HEIGHT: 63 IN

## 2021-01-31 DIAGNOSIS — S82.831A OTHER CLOSED FRACTURE OF PROXIMAL END OF RIGHT FIBULA, INITIAL ENCOUNTER: ICD-10-CM
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
## 2021-01-31 PROCEDURE — 73610 X-RAY EXAM OF ANKLE: CPT

## 2021-01-31 PROCEDURE — 29505 APPLICATION LONG LEG SPLINT: CPT | Performed by: NURSE PRACTITIONER

## 2021-01-31 PROCEDURE — S9088 SERVICES PROVIDED IN URGENT: HCPCS | Performed by: NURSE PRACTITIONER

## 2021-01-31 PROCEDURE — 99213 OFFICE O/P EST LOW 20 MIN: CPT | Performed by: NURSE PRACTITIONER

## 2021-01-31 PROCEDURE — 73590 X-RAY EXAM OF LOWER LEG: CPT

## 2021-01-31 NOTE — PATIENT INSTRUCTIONS
There is a fracture of the fibula  Leg splint applied  Keep splint in place until seen by Ortho  Ice every 3-4 hours for 20 minutes  Tylenol as needed for pain  Elevate the leg  Monitor for signs of good circulation  If you develops any increased pain, numbness and tingling, change in skin color including purplish or blue discoloration of the toes go directly to the ER  Leg Fracture   WHAT YOU NEED TO KNOW:   A leg fracture is a break in a bone in your leg  DISCHARGE INSTRUCTIONS:   Call your local emergency number (911 in the [address] [place] [address],[address]) if:   · You suddenly feel lightheaded and short of breath  · You have chest pain when you take a deep breath or cough  · You cough up blood  Return to the emergency department if:   · Your leg feels warm, tender, and painful  It may look swollen and red  · The pain in your leg gets worse even after you rest and take medicine  · Your cast gets wet or damaged  · Your leg or toes are numb  · Your leg becomes swollen, cold, or blue  Call your doctor or bone specialist if:   · You have a fever  · Your cast or brace is too tight  · You notice new blood stains or a bad smell coming from under the cast     · You have new or worsening trouble moving your leg  · You have questions or concerns about your condition or care  Medicines: You may need any of the following:  · Prescription pain medicine  may be given  Ask your healthcare provider how to take this medicine safely  Some prescription pain medicines contain acetaminophen  Do not take other medicines that contain acetaminophen without talking to your healthcare provider  Too much acetaminophen may cause liver damage  Prescription pain medicine may cause constipation  Ask your healthcare provider how to prevent or treat constipation  · NSAIDs , such as ibuprofen, help decrease swelling, pain, and fever  NSAIDs can cause stomach bleeding or kidney problems in certain people   If you take blood thinner medicine, always ask your healthcare provider if NSAIDs are safe for you  Always read the medicine label and follow directions  · Acetaminophen  decreases pain and fever  It is available without a doctor's order  Ask how much to take and how often to take it  Follow directions  Read the labels of all other medicines you are using to see if they also contain acetaminophen, or ask your doctor or pharmacist  Acetaminophen can cause liver damage if not taken correctly  Do not use more than 4 grams (4,000 milligrams) total of acetaminophen in one day  · Take your medicine as directed  Contact your healthcare provider if you think your medicine is not helping or if you have side effects  Tell him of her if you are allergic to any medicine  Keep a list of the medicines, vitamins, and herbs you take  Include the amounts, and when and why you take them  Bring the list or the pill bottles to follow-up visits  Carry your medicine list with you in case of an emergency  Cast or splint care:   · Ask when it is okay to take a bath or shower  Do not let your cast or splint get wet  Before you bathe, cover the cast or splint with a plastic bag  Tape the bag to your skin above the cast or splint to seal out water  Keep your leg out of the water in case the bag breaks  If a plaster cast gets wet and soft, call your healthcare provider  · Check the skin around the cast or brace every day  You may put lotion on any red or sore areas  · Do not  push down or lean on the cast or brace  · Do not  put a sharp or pointed object inside the cast to scratch an itch  Self-care:   · Rest  your leg as directed and avoid activities that cause leg pain  · Apply ice  on your leg for 15 to 20 minutes every hour or as directed  Use an ice pack, or put crushed ice in a plastic bag  Cover it with a towel before you apply it  Ice helps prevent tissue damage and decreases swelling and pain      · Elevate  your leg above the level of your heart as often as you can  This will help decrease swelling and pain  Prop your leg on pillows or blankets to keep it elevated comfortably  · Use crutches or a walker  as directed  Crutches will help you walk and take some weight off your leg while it heals  · Physical therapy  may be recommended  A physical therapist teaches you exercises to help improve movement and strength, and to decrease pain  Follow up with your doctor or bone specialist as directed: You may need to return to have your splint or cast removed  You may need an x-ray of your leg to check how well the bone has healed  Write down your questions so you remember to ask them during your visits  © Copyright [address] Information is for End User's use only and may not be sold, redistributed or otherwise used for commercial purposes  All illustrations and images included in CareNotes® are the copyrighted property of A D A M , Inc  or [facility] Lisa Boss   The above information is an  only  It is not intended as medical advice for individual conditions or treatments  Talk to your doctor, nurse or pharmacist before following any medical regimen to see if it is safe and effective for you

## 2021-01-31 NOTE — LETTER
Jan 31, 2021     Patient: Nicole Olivera   YOB: 1960   Date of Visit: 1/31/2021       To Whom It May Concern: It is my medical opinion that Nicole Olivera will remain out of work until cleared by ortho  If you have any questions or concerns, please don't hesitate to call           Sincerely,        DANIELA Rivers    CC: No Recipients

## 2021-01-31 NOTE — PROGRESS NOTES
[facility]        NAME: Arty Claude is a 63 y o  female  : 1960    MRN: [id number]  DATE: 2021  TIME: 3:38 PM    Assessment and Plan   Fall, initial encounter [W19  XXXA]  1  Fall, initial encounter  XR ankle 3+ vw right    XR tibia fibula 2 vw right   2  Other closed fracture of proximal end of right fibula, initial encounter  Splint application    Ambulatory referral to Orthopedic Surgery     Patient's blood pressure was rechecked and was 90/58  She is denying any symptoms  No lightheaded or dizziness  She states fall with mechanical and she slipped  She is not dizzy prior to falling  Discussed with her she has any symptoms of low blood pressure including dizziness, lightheadedness she needs go directly to the ER  She verbalized understanding  Patient Instructions     Patient Instructions      There is a fracture of the fibula  Leg splint applied  Keep splint in place until seen by Ortho  Ice every 3-4 hours for 20 minutes  Tylenol as needed for pain  Elevate the leg  Monitor for signs of good circulation  If you develops any increased pain, numbness and tingling, change in skin color including purplish or blue discoloration of the toes go directly to the ER  Leg Fracture   WHAT YOU NEED TO KNOW:   A leg fracture is a break in a bone in your leg  DISCHARGE INSTRUCTIONS:   Call your local emergency number (911 in the [address] [place] [address],[address]) if:   · You suddenly feel lightheaded and short of breath  · You have chest pain when you take a deep breath or cough  · You cough up blood  Return to the emergency department if:   · Your leg feels warm, tender, and painful  It may look swollen and red  · The pain in your leg gets worse even after you rest and take medicine  · Your cast gets wet or damaged  · Your leg or toes are numb  · Your leg becomes swollen, cold, or blue  Call your doctor or bone specialist if:   · You have a fever      · Your cast or brace is too tight  · You notice new blood stains or a bad smell coming from under the cast     · You have new or worsening trouble moving your leg  · You have questions or concerns about your condition or care  Medicines: You may need any of the following:  · Prescription pain medicine  may be given  Ask your healthcare provider how to take this medicine safely  Some prescription pain medicines contain acetaminophen  Do not take other medicines that contain acetaminophen without talking to your healthcare provider  Too much acetaminophen may cause liver damage  Prescription pain medicine may cause constipation  Ask your healthcare provider how to prevent or treat constipation  · NSAIDs , such as ibuprofen, help decrease swelling, pain, and fever  NSAIDs can cause stomach bleeding or kidney problems in certain people  If you take blood thinner medicine, always ask your healthcare provider if NSAIDs are safe for you  Always read the medicine label and follow directions  · Acetaminophen  decreases pain and fever  It is available without a doctor's order  Ask how much to take and how often to take it  Follow directions  Read the labels of all other medicines you are using to see if they also contain acetaminophen, or ask your doctor or pharmacist  Acetaminophen can cause liver damage if not taken correctly  Do not use more than 4 grams (4,000 milligrams) total of acetaminophen in one day  · Take your medicine as directed  Contact your healthcare provider if you think your medicine is not helping or if you have side effects  Tell him of her if you are allergic to any medicine  Keep a list of the medicines, vitamins, and herbs you take  Include the amounts, and when and why you take them  Bring the list or the pill bottles to follow-up visits  Carry your medicine list with you in case of an emergency  Cast or splint care:   · Ask when it is okay to take a bath or shower   Do not let your cast or splint get wet  Before you bathe, cover the cast or splint with a plastic bag  Tape the bag to your skin above the cast or splint to seal out water  Keep your leg out of the water in case the bag breaks  If a plaster cast gets wet and soft, call your healthcare provider  · Check the skin around the cast or brace every day  You may put lotion on any red or sore areas  · Do not  push down or lean on the cast or brace  · Do not  put a sharp or pointed object inside the cast to scratch an itch  Self-care:   · Rest  your leg as directed and avoid activities that cause leg pain  · Apply ice  on your leg for 15 to 20 minutes every hour or as directed  Use an ice pack, or put crushed ice in a plastic bag  Cover it with a towel before you apply it  Ice helps prevent tissue damage and decreases swelling and pain  · Elevate  your leg above the level of your heart as often as you can  This will help decrease swelling and pain  Prop your leg on pillows or blankets to keep it elevated comfortably  · Use crutches or a walker  as directed  Crutches will help you walk and take some weight off your leg while it heals  · Physical therapy  may be recommended  A physical therapist teaches you exercises to help improve movement and strength, and to decrease pain  Follow up with your doctor or bone specialist as directed: You may need to return to have your splint or cast removed  You may need an x-ray of your leg to check how well the bone has healed  Write down your questions so you remember to ask them during your visits  © Copyright [address] Information is for End User's use only and may not be sold, redistributed or otherwise used for commercial purposes  All illustrations and images included in CareNotes® are the copyrighted property of [employer]  or [facility] Lisa Boss   The above information is an  only  It is not intended as medical advice for individual conditions or treatments  Talk to your doctor, nurse or pharmacist before following any medical regimen to see if it is safe and effective for you  Chief Complaint     Chief Complaint   Patient presents with    Leg Pain     Patient c/o RLE pain after falling on driveway today  History of Present Illness   Harmony Moreau presents to the clinic c/o      This is a 18-year-old female here today with complaints right lower leg pain  She states this evening she fell in her driveway  She states her leg went under her  She is having pain over the right lateral aspect the shin  She is also having tenderness over her ankle  She has pain with ambulation  She used ice  No numbness or tingling  She states continues have pain  No pain in the foot  Fall was mechanical   She states she slipped on ice  Review of Systems   Review of Systems   Constitutional: Negative  Respiratory: Negative  Cardiovascular: Negative  Musculoskeletal: Positive for arthralgias  Right shin and ankle pain    Neurological: Negative  Psychiatric/Behavioral: Negative            Current Medications     Long-Term Medications   Medication Sig Dispense Refill    Ascorbic Acid (VITAMIN C) 100 MG CHEW Chew      atorvastatin (LIPITOR) 10 mg tablet take 1 tablet by mouth once daily 90 tablet 2    Biotin 10 MG TABS Take by mouth daily       cholecalciferol (VITAMIN D3) 1,000 units tablet Take 5,000 Units by mouth 2 (two) times a day       clobetasol (TEMOVATE) 0 05 % cream Apply to affected areas daily x 12 weeks, then twice weekly for maintenance      Eliquis 2 5 MG Take 1 tablet (2 5 mg total) by mouth 2 (two) times a day 180 tablet 0    fluticasone (FLONASE) 50 mcg/act nasal spray 2 sprays into each nostril daily 16 g 0    Multiple Vitamin (MULTIVITAMIN) capsule Take 1 capsule by mouth daily 2 chewies daily      nystatin (MYCOSTATIN) powder Apply topically 2 (two) times a day 30 g 0       Current Allergies     Allergies as of 01/31/2021    (No Known Allergies)            The following portions of the patient's history were reviewed and updated as appropriate: allergies, current medications, past family history, past medical history, past social history, past surgical history and problem list     Objective   BP (!) 78/42 Comment: manual  Pulse 68   Temp (!) 96 2 °F (35 7 °C) (Temporal)   Resp 18   Ht 5' 3" (1 6 m)   Wt 69 9 kg (154 lb)   LMP 09/18/2014   SpO2 100%   BMI 27 28 kg/m²          Physical Exam     Physical Exam  Vitals signs and nursing note reviewed  Constitutional:       Appearance: Normal appearance  Cardiovascular:      Rate and Rhythm: Normal rate and regular rhythm  Pulmonary:      Effort: Pulmonary effort is normal  No respiratory distress  Breath sounds: Normal breath sounds  No stridor  No wheezing or rhonchi  Musculoskeletal:      Comments: Right leg:    Right knee: No tenderness to palpate over the knee joint  Full range of motion of the knee  No swelling noted  No bruising noted  Right leg: Tenderness to palpate over the lateral aspect of the shin  There is mild swelling noted  There is some faint bruising  There is no tenderness to palpate behind the calf  Right ankle:  Tenderness to palpate over the medial and lateral aspect of the ankle  There is mild swelling over the medial aspect of the ankle  No bruising noted  No laxity ankle joint  Neurological:      General: No focal deficit present  Mental Status: She is alert and oriented to person, place, and time  Psychiatric:         Mood and Affect: Mood normal          Behavior: Behavior normal          Thought Content: Thought content normal          Judgment: Judgment normal           TIb fib xray-  There is a nondisplaced fracture of the fibula        Right ankle no acute abnormality    Splint application    Date/Time: 1/31/2021 3:11 PM  Performed by: Beryle Mandril, CRNP  Authorized by: Adiel Devine Universal Protocol:  Consent given by: patient      Pre-procedure details:     Sensation:  Normal  Procedure details:     Laterality:  Right    Location:  Leg    Leg:  R lower leg    Splint type:  Long leg    Supplies:  Elastic bandage and Ortho-Glass  Post-procedure details:     Pain:  Improved    Sensation:  Normal    Skin color:  Pink    Patient tolerance of procedure:   Tolerated well, no immediate complications  Comments:       Pedal, post tibial and popliteal pulse +2 skin was warm

## 2021-02-03 ENCOUNTER — TELEPHONE (OUTPATIENT)
Dept: URGENT CARE | Facility: CLINIC | Age: 61
End: 2021-02-03

## 2021-02-03 NOTE — TELEPHONE ENCOUNTER
Attempted to call patient discussed that there is a fracture in her ankle along with a fibula fracture  There is no answer  Unable to leave message  She does have follow-up with Orthopedics tomorrow

## 2021-02-04 ENCOUNTER — OFFICE VISIT (OUTPATIENT)
Dept: OBGYN CLINIC | Facility: MEDICAL CENTER | Age: 61
End: 2021-02-04
Payer: COMMERCIAL

## 2021-02-04 VITALS
WEIGHT: 154 LBS | DIASTOLIC BLOOD PRESSURE: 78 MMHG | SYSTOLIC BLOOD PRESSURE: 121 MMHG | BODY MASS INDEX: 27.29 KG/M2 | HEART RATE: 80 BPM | HEIGHT: 63 IN

## 2021-02-04 DIAGNOSIS — S82.864A CLOSED NONDISPLACED MAISONNEUVE FRACTURE OF RIGHT LOWER EXTREMITY, INITIAL ENCOUNTER: Primary | ICD-10-CM

## 2021-02-04 DIAGNOSIS — S82.831A OTHER CLOSED FRACTURE OF PROXIMAL END OF RIGHT FIBULA, INITIAL ENCOUNTER: ICD-10-CM

## 2021-02-04 DIAGNOSIS — S82.54XA NONDISPLACED FRACTURE OF MEDIAL MALLEOLUS OF RIGHT TIBIA, INITIAL ENCOUNTER FOR CLOSED FRACTURE: ICD-10-CM

## 2021-02-04 PROCEDURE — 99214 OFFICE O/P EST MOD 30 MIN: CPT | Performed by: PHYSICAL MEDICINE & REHABILITATION

## 2021-02-04 PROCEDURE — 1111F DSCHRG MED/CURRENT MED MERGE: CPT | Performed by: PHYSICAL MEDICINE & REHABILITATION

## 2021-02-04 NOTE — LETTER
To Whom It May Concern,    Yanna Layne is under my professional care  She was seen in my office on February 4, 2021  She is out of work until cleared by a physician  If you have any questions or concerns, please do not hesitate to call          Sincerely,          Taylor Rodgers, DO

## 2021-02-04 NOTE — PROGRESS NOTES
1  Closed nondisplaced Maisonneuve fracture of right lower extremity, initial encounter  Ambulatory referral to Orthopedic Surgery   2  Other closed fracture of proximal end of right fibula, initial encounter     3  Nondisplaced fracture of medial malleolus of right tibia, initial encounter for closed fracture       Orders Placed This Encounter   Procedures    Ambulatory referral to Orthopedic Surgery        Imaging Studies (I personally reviewed images in PACS and report):  Right ankle and tibia/fibula x-rays most recent to this encounter reviewed  These images show a medial malleolus fracture with associated syndesmosis space widening  There is a fracture of the proximal fibula consistent with a Maisonneuve fracture  The patient's pertinent emergency department/urgent care notes were reviewed  CPT 76182  A splint from another health care provider was removed today  Impression:  Right lower extremity pain secondary to Maisonneuve fracture with DOI as 1/31/2021  Patient has a history of osteoporosis  The patient will remain non-weightbearing and we placed her into a neutral splint  We will have her see Dr Lindsey Hurley  I provided her with a note that 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  No follow-ups on file  HPI:  Arvin Salcido is a 63 y o  female  who presents for evaluation of   Chief Complaint   Patient presents with    Right Ankle - Pain       Onset/Mechanism: 1/31/2021- the patient slipped on ice and her leg externally rotated "behind her butt "  Location: Throughout her leg and into the ankle  Radiation: As above  Provocative: Everything  Severity: Hurts a lot  Associated Symptoms: Swelling/bruising and a lot of pain  Feels weak due to pain  Treatment so far: Splint and crutches-NWB  Review of Systems   Constitutional: Positive for activity change  Negative for fever  HENT: Negative for sore throat  Eyes: Negative for visual disturbance     Respiratory: Negative for shortness of breath  Cardiovascular: Negative for chest pain  Gastrointestinal: Negative for abdominal pain  Endocrine: Negative for polydipsia  Genitourinary: Negative for difficulty urinating  Musculoskeletal: Positive for arthralgias, gait problem and joint swelling  Skin: Positive for color change  Negative for rash  Allergic/Immunologic: Negative for immunocompromised state  Neurological: Negative for numbness  Hematological: Does not bruise/bleed easily  Psychiatric/Behavioral: Positive for sleep disturbance  Negative for confusion         Following history reviewed and updated:  Past Medical History:   Diagnosis Date    Anemia     iron def    Arthritis     knees    Carpal tunnel syndrome of right wrist     Chest pain     Colon polyp     Depression     at times    DVT (deep venous thrombosis) ([facility] [address] )     Dysphagia     Esophageal reflux 2011    Grade 1 out of 6 intensity murmur 2019    Heart murmur     History of transfusion 1980    Pacemaker 2018    Pneumonia     age 15    Psychiatric disorder     Trigger finger, left      Past Surgical History:   Procedure Laterality Date    BACK SURGERY      CARDIAC PACEMAKER PLACEMENT  2018    CERVICAL [address] SURGERY  2009    PLATES & SCREWS     SECTION      X2    COLONOSCOPY      GASTRIC BYPASS  2017    LAP RINYGB SURGERY    KNEE ARTHROSCOPY      KNEE SURGERY Bilateral     KNEE SURGERY Left 2020    OTHER SURGICAL HISTORY      ARM SURGERIES    AK INCISE FINGER TENDON SHEATH Left 2018    Procedure: LONG TRIGGER FINGER RELEASE;  Surgeon: Zaina Brooks MD;  Location: [facility];  Service: Orthopedics    AK WRIST L[place] LIG Right 2018    Procedure: ENDOSCOPIC CARPAL TUNNEL RELEASE;  Surgeon: Zaina Brooks MD;  Location: [facility];  Service: Orthopedics    TONSILLECTOMY      UPPER GASTROINTESTINAL ENDOSCOPY       Social History   Social History     Substance and Sexual Activity   Alcohol Use Never    Alcohol/week: 2 0 standard drinks    Types: 1 Glasses of wine, 1 Standard drinks or equivalent per week    Frequency: Never    Comment: very rare social     Social History     Substance and Sexual Activity   Drug Use No     Social History     Tobacco Use   Smoking Status Current Every Day Smoker    Packs/day: 0 25    Types: Cigarettes   Smokeless Tobacco Never Used   Tobacco Comment    quit "many years ago"     Family History   Problem Relation Age of Onset    Stroke Mother     Alzheimer's disease Mother     Arthritis Mother     Coronary artery disease Mother     Breast cancer Mother 77    Cancer Mother     Heart disease Mother     Hypertension Father     Gout Father     Diabetes type II Father     Coronary artery disease Father     Heart disease Father     No Known Problems Sister     No Known Problems Daughter     No Known Problems Maternal Grandmother     No Known Problems Maternal Grandfather     No Known Problems Paternal Grandmother     No Known Problems Paternal Grandfather     Prostate cancer Brother     No Known Problems Sister     No Known Problems Sister     No Known Problems Maternal Aunt     No Known Problems Maternal Aunt     No Known Problems Maternal Aunt     No Known Problems Maternal Aunt     Cancer Maternal Aunt     No Known Problems Paternal Aunt     No Known Problems Paternal Aunt      No Known Allergies     Constitutional:  /78 (BP Location: Right arm, Patient Position: Sitting, Cuff Size: Standard)   Pulse 80   Ht 5' 3" (1 6 m)   Wt 69 9 kg (154 lb)   LMP 09/18/2014   BMI 27 28 kg/m²    General: NAD  Eyes: Anicteric sclerae  Neck: Supple  Lungs: Unlabored breathing  Cardiovascular: No lower extremity edema  Skin: Intact without erythema  Neurologic: Sensation intact to light touch  Psychiatric: Mood and affect are appropriate      Right Ankle Exam     Tenderness   The patient is experiencing tenderness in the medial malleolus and proximal fibula  Swelling: moderate    Range of Motion   Dorsiflexion: abnormal   Plantar flexion: abnormal   Eversion: abnormal   Inversion: abnormal     Other   Erythema: absent  Scars: absent  Sensation: normal  Pulse: present     Comments:  Positive Kleiger's and Hopkin's test   She is warm and well perfused  Her compartments are all soft and compressible  Sensory-motor testing is within normal limits               Procedures

## 2021-02-05 ENCOUNTER — OFFICE VISIT (OUTPATIENT)
Dept: OBGYN CLINIC | Facility: CLINIC | Age: 61
End: 2021-02-05
Payer: COMMERCIAL

## 2021-02-05 ENCOUNTER — APPOINTMENT (OUTPATIENT)
Dept: RADIOLOGY | Facility: AMBULARY SURGERY CENTER | Age: 61
End: 2021-02-05
Attending: ORTHOPAEDIC SURGERY
Payer: COMMERCIAL

## 2021-02-05 VITALS — BODY MASS INDEX: 27.29 KG/M2 | WEIGHT: 154 LBS | HEIGHT: 63 IN

## 2021-02-05 DIAGNOSIS — S82.864A CLOSED NONDISPLACED MAISONNEUVE FRACTURE OF RIGHT LOWER EXTREMITY, INITIAL ENCOUNTER: ICD-10-CM

## 2021-02-05 PROCEDURE — 73610 X-RAY EXAM OF ANKLE: CPT

## 2021-02-05 PROCEDURE — 1111F DSCHRG MED/CURRENT MED MERGE: CPT | Performed by: ORTHOPAEDIC SURGERY

## 2021-02-05 PROCEDURE — 99213 OFFICE O/P EST LOW 20 MIN: CPT | Performed by: ORTHOPAEDIC SURGERY

## 2021-02-05 NOTE — PROGRESS NOTES
ALIREZA Eubanks  Attending, Orthopaedic Surgery  [facility] [address] [facility] [address] [facility]        ORTHOPAEDIC FOOT AND ANKLE CLINIC VISIT     Assessment:     Encounter Diagnosis   Name Primary?  Closed nondisplaced Maisonneuve fracture of right lower extremity, initial encounter               Plan:   · The patient verbalized understanding of exam findings and treatment plan  We engaged in the shared decision-making process and treatment options were discussed at length with the patient  Surgical and conservative management discussed today along with risks and benefits  · New xrays of the right lower ankle stress view was obtained that shows no widening of the ligaments show that the fracture is stable  · A high tide cam boot was placed on the patient today with a weight bearing as tolerated status with her crutches with the emphasis that she should be full weight bearing by her next visit in a week for her xrays  · Compression stocking prescription provided today  · At the next visit 3 views right ankle weight bearing  Return in about 1 week (around 2/12/2021) for right ankle with weight bearing xrays   History of Present Illness:   Chief Complaint:   Chief Complaint   Patient presents with    Right Ankle - Pain     Alexis Toney is a 63 y o  female who is being seen for right  Lower leg    She was seen in the care now on 01/31/2021 after she sustained a slip and fall in her driveway  She notes that the right lower leg went under need her  Pain is localized at medial aspect of the ankle with minimal radiating and described as sharp and severe as far proximally as the lateral aspect of the fibula  Patient denies numbness, tingling or radicular pain  Denies history of neuropathy  Patient does smoke-  1/4 pack a day, does not have diabetes and does take blood thinners-   Eliquis    Patient denies family history of anesthesia complications and has not had any complications with anesthesia  Pain/symptom timing:  Worse during the day when active  Pain/symptom context:  Worse with activites and work  Pain/symptom modifying factors:  Rest makes better, activities make worse  Pain/symptom associated signs/symptoms: none    Prior treatment   · NSAIDsNo    · Injections No   · Bracing/Orthotics Yes   · Physical Therapy No     Orthopedic Surgical History:    see below    Past Medical, Surgical and Social History:  Past Medical History:  has a past medical history of Anemia, Arthritis, Carpal tunnel syndrome of right wrist, Chest pain, Colon polyp, Depression, DVT (deep venous thrombosis) ([facility] [address] ) (), Dysphagia, Esophageal reflux (2011), Grade 1 out of 6 intensity murmur (2019), Heart murmur, History of transfusion (), Pacemaker (2018), Pneumonia, Psychiatric disorder, and Trigger finger, left  Problem List: does not have any pertinent problems on file  Past Surgical History:  has a past surgical history that includes Knee surgery (Bilateral);  section; Cervical disc surgery (2009); Tonsillectomy; Other surgical history; Gastric bypass (2017); pr wrist arthroscop,release xvers lig (Right, 2018); pr incise finger tendon sheath (Left, 2018); Cardiac pacemaker placement (2018); Upper gastrointestinal endoscopy; Colonoscopy; Knee arthroscopy; Back surgery; and Knee surgery (Left, 2020)    Family History: family history includes Alzheimer's disease in her mother; Arthritis in her mother; Breast cancer (age of onset: 77) in her mother; Cancer in her maternal aunt and mother; Coronary artery disease in her father and mother; Diabetes type II in her father; Gout in her father; Heart disease in her father and mother; Hypertension in her father; No Known Problems in her daughter, maternal aunt, maternal aunt, maternal aunt, maternal aunt, maternal grandfather, maternal grandmother, paternal aunt, paternal aunt, paternal grandfather, paternal grandmother, sister, sister, and sister; Prostate cancer in her brother; Stroke in her mother  Social History:  reports that she has been smoking cigarettes  She has been smoking about 0 25 packs per day  She has never used smokeless tobacco  She reports that she does not drink alcohol or use drugs  Current Medications: has a current medication list which includes the following prescription(s): vitamin c, atorvastatin, b complex vitamins, biotin, calcium carbonate, cholecalciferol, clobetasol, eliquis, ferrous sulfate er, fluticasone, lysine hcl, multivitamin, and nystatin  Allergies: has No Known Allergies  Review of Systems:  General- denies fever/chills  HEENT- denies hearing loss or sore throat  Eyes- denies eye pain or visual disturbances, denies red eyes  Respiratory- denies cough or SOB  Cardio- denies chest pain or palpitations  GI- denies abdominal pain  Endocrine- denies urinary frequency  Urinary- denies pain with urination  Musculoskeletal- Negative except noted above  Skin- denies rashes or wounds  Neurological- denies dizziness or headache  Psychiatric- denies anxiety or difficulty concentrating    Physical Exam:   Ht 5' 3" (1 6 m)   Wt 69 9 kg (154 lb)   LMP 09/18/2014   BMI 27 28 kg/m²   General/Constitutional: No apparent distress: well-nourished and well developed  Eyes: normal ocular motion  Cardio: RRR, Normal S1S2, No m/r/g  Lymphatic: No appreciable lymphadenopathy  Respiratory: Non-labored breathing, CTA b/l no w/c/r  Vascular: No edema, swelling or tenderness, except as noted in detailed exam   Integumentary: No impressive skin lesions present, except as noted in detailed exam   Neuro: No ataxia or tremors noted  Psych: Normal mood and affect, oriented to person, place and time  Appropriate affect  Musculoskeletal: Normal, except as noted in detailed exam and in HPI      Examination    Right    Gait Non- weight bearing in wheelchair today   Musculoskeletal Tender to palpation at proximal fibula and medial malleolus    Skin Normal       Nails Normal    Range of Motion  10 degrees dorsiflexion, 15 degrees plantarflexion passive  Subtalar motion: n/a    Stability Stable    Muscle Strength 5/5 tibialis anterior  5/5 gastrocnemius-soleus  5/5 posterior tibialis  5/5 peroneal/eversion strength  5/5 EHL  5/5 FHL    Neurologic Normal    Sensation Intact to light touch throughout sural, saphenous, superficial peroneal, deep peroneal and medial/lateral plantar nerve distributions  [place]-Wil 5 07 filament (10g) testing deferred  Cardiovascular Brisk capillary refill < 2 seconds,intact DP and PT pulses    Special Tests None      Imaging Studies:   3  views of the right ankle stress views were taken, reviewed and interpreted independently that demonstrate no fracture or dislocation  Reviewed by me personally  Jeremy Hull Lachman, MD  Foot & Ankle Surgery   Department [address]      I personally performed the service  Jeremy Hull Lachman, MD    Scribe Attestation    I,:  Obi Davila am acting as a scribe while in the presence of the attending physician :       I,:  Ricardo Frazier MD personally performed the services described in this documentation    as scribed in my presence :

## 2021-02-05 NOTE — PATIENT INSTRUCTIONS
Weightbearing as tolerated in CAM boot  Do not need to wear the boot for sleep or showering but should wear it any time you are walking on it  Recommend taking the following supplements: Vitamin D 4000 units per day and Calcium 1200 mg per day  This will help with bone healing  Avoid NSAIDs like Motrin/Aleve/Ibuprofen  Avoid steroids/nicotine products/ rheumatoid medications like DMARDs if possible  Aspirin BID for blood clot prevention  Script provided      Knee high compression stocking 20/30mm HG of pressure

## 2021-02-12 ENCOUNTER — APPOINTMENT (OUTPATIENT)
Dept: RADIOLOGY | Facility: AMBULARY SURGERY CENTER | Age: 61
End: 2021-02-12
Attending: ORTHOPAEDIC SURGERY
Payer: COMMERCIAL

## 2021-02-12 ENCOUNTER — OFFICE VISIT (OUTPATIENT)
Dept: OBGYN CLINIC | Facility: CLINIC | Age: 61
End: 2021-02-12
Payer: COMMERCIAL

## 2021-02-12 VITALS
SYSTOLIC BLOOD PRESSURE: 107 MMHG | BODY MASS INDEX: 26.29 KG/M2 | HEIGHT: 64 IN | HEART RATE: 94 BPM | DIASTOLIC BLOOD PRESSURE: 71 MMHG | WEIGHT: 154 LBS

## 2021-02-12 DIAGNOSIS — S82.831A OTHER CLOSED FRACTURE OF PROXIMAL END OF RIGHT FIBULA, INITIAL ENCOUNTER: ICD-10-CM

## 2021-02-12 DIAGNOSIS — S82.864A CLOSED NONDISPLACED MAISONNEUVE FRACTURE OF RIGHT LOWER EXTREMITY, INITIAL ENCOUNTER: ICD-10-CM

## 2021-02-12 DIAGNOSIS — S82.864A CLOSED NONDISPLACED MAISONNEUVE FRACTURE OF RIGHT LOWER EXTREMITY, INITIAL ENCOUNTER: Primary | ICD-10-CM

## 2021-02-12 PROCEDURE — 1111F DSCHRG MED/CURRENT MED MERGE: CPT | Performed by: ORTHOPAEDIC SURGERY

## 2021-02-12 PROCEDURE — 73610 X-RAY EXAM OF ANKLE: CPT

## 2021-02-12 PROCEDURE — 99213 OFFICE O/P EST LOW 20 MIN: CPT | Performed by: ORTHOPAEDIC SURGERY

## 2021-02-12 NOTE — PATIENT INSTRUCTIONS
Weightbearing as tolerated in CAM boot  Do not need to wear the boot for sleep or showering but should wear it any time you are walking on it  Recommend taking the following supplements: Vitamin D 4000 units per day and Calcium 1200 mg per day  This will help with bone healing  Avoid NSAIDs like Motrin/Aleve/Ibuprofen  Avoid steroids/nicotine products/ rheumatoid medications like DMARDs if possible  Eliquis for blood clot prevention      Knee high compression stocking 20/30mm HG of pressure

## 2021-02-12 NOTE — PROGRESS NOTES
ALIREZA Copeland  Attending, Orthopaedic Surgery  [facility] [address] [facility]      ORTHOPAEDIC FOOT AND ANKLE CLINIC VISIT     Assessment:     Encounter Diagnoses   Name Primary?  Closed nondisplaced Maisonneuve fracture of right lower extremity, initial encounter Yes    Other closed fracture of proximal end of right fibula, initial encounter             Plan:   · The patient verbalized understanding of exam findings and treatment plan  We engaged in the shared decision-making process and treatment options were discussed at length with the patient  Surgical and conservative management discussed today along with risks and benefits  · At this time she is to walk normally in the boot, if it's difficult then suggested to purchase an even up to place on the other sneaker  · Continue with the Eliquis and the compression stocking to help with swelling  · Continue to sleep in the boot  Can take off for hygiene  Return in about 2 weeks (around 2/26/2021)  History of Present Illness:   Chief Complaint:   Chief Complaint   Patient presents with    Right Ankle - Follow-up, Fracture     Mari Young is a 63 y o  female who is being seen in follow-up for Right closed nondisplaced maisonneuve fracture with no mortise gapping  When we last saw she we recommended to WBAT in a high tide cam boot  Pain has improved  Residual pain is localized at medial aspect of the ankle with minimal radiating and described as sharp and severe        Pain/symptom timing:  Worse during the day when active  Pain/symptom context:  Worse with activites and work  Pain/symptom modifying factors:  Rest makes better, activities make worse  Pain/symptom associated signs/symptoms: none    Prior treatment   · NSAIDsNo Just tylenol   · Injections No   · Bracing/Orthotics Yes    · Physical Therapy No     Orthopedic Surgical History:   See below    Past Medical, Surgical and Social History:  Past Medical History: has a past medical history of Anemia, Arthritis, Carpal tunnel syndrome of right wrist, Chest pain, Colon polyp, Depression, DVT (deep venous thrombosis) ([facility] [address] ) (), Dysphagia, Esophageal reflux (2011), Grade 1 out of 6 intensity murmur (2019), Heart murmur, History of transfusion (), Pacemaker (2018), Pneumonia, Psychiatric disorder, and Trigger finger, left  Problem List: does not have any pertinent problems on file  Past Surgical History:  has a past surgical history that includes Knee surgery (Bilateral);  section; Cervical disc surgery (2009); Tonsillectomy; Other surgical history; Gastric bypass (2017); pr wrist arthroscop,release xvers lig (Right, 2018); pr incise finger tendon sheath (Left, 2018); Cardiac pacemaker placement (2018); Upper gastrointestinal endoscopy; Colonoscopy; Knee arthroscopy; Back surgery; and Knee surgery (Left, 2020)  Family History: family history includes Alzheimer's disease in her mother; Arthritis in her mother; Breast cancer (age of onset: 77) in her mother; Cancer in her maternal aunt and mother; Coronary artery disease in her father and mother; Diabetes type II in her father; Gout in her father; Heart disease in her father and mother; Hypertension in her father; No Known Problems in her daughter, maternal aunt, maternal aunt, maternal aunt, maternal aunt, maternal grandfather, maternal grandmother, paternal aunt, paternal aunt, paternal grandfather, paternal grandmother, sister, sister, and sister; Prostate cancer in her brother; Stroke in her mother  Social History:  reports that she has been smoking cigarettes  She has been smoking about 0 25 packs per day  She has never used smokeless tobacco  She reports that she does not drink alcohol or use drugs    Current Medications: has a current medication list which includes the following prescription(s): vitamin c, atorvastatin, b complex vitamins, biotin, calcium carbonate, cholecalciferol, clobetasol, eliquis, ferrous sulfate er, fluticasone, lysine hcl, multivitamin, and nystatin  Allergies: has No Known Allergies  Review of Systems:  General- denies fever/chills  HEENT- denies hearing loss or sore throat  Eyes- denies eye pain or visual disturbances, denies red eyes  Respiratory- denies cough or SOB  Cardio- denies chest pain or palpitations  GI- denies abdominal pain  Endocrine- denies urinary frequency  Urinary- denies pain with urination  Musculoskeletal- Negative except noted above  Skin- denies rashes or wounds  Neurological- denies dizziness or headache  Psychiatric- denies anxiety or difficulty concentrating    Physical Exam:   /71   Pulse 94   Ht 5' 4" (1 626 m)   Wt 69 9 kg (154 lb)   LMP 09/18/2014   BMI 26 43 kg/m²   General/Constitutional: No apparent distress: well-nourished and well developed  Eyes: normal ocular motion  Lymphatic: No appreciable lymphadenopathy  Respiratory: Non-labored breathing  Vascular: No edema, swelling or tenderness, except as noted in detailed exam   Integumentary: No impressive skin lesions present, except as noted in detailed exam   Neuro: No ataxia or tremors noted  Psych: Normal mood and affect, oriented to person, place and time  Appropriate affect  Musculoskeletal: Normal, except as noted in detailed exam and in HPI  Examination    Right    Gait Antalgic   Musculoskeletal Tender to palpation at mild tenderness at the fibular head    Skin Normal       Nails Normal    Range of Motion  Not tested today    Stability Stable    Muscle Strength Intact  5/5 EHL  5/5 FHL    Neurologic Normal    Sensation Intact to light touch throughout sural, saphenous, superficial peroneal, deep peroneal and medial/lateral plantar nerve distributions  [place]-Wil 5 07 filament (10g) testing deferred      Cardiovascular Brisk capillary refill < 2 seconds,intact DP and PT pulses    Special Tests None      Imaging Studies: 3 views of the Right ankle were obtained, reviewed and interpreted independently which demonstrate no interval displacement of ankle mortise  Reviewed by me personally  Virgel Lefevre Lachman, MD  Foot & Ankle Surgery   Department [place] [address]      I personally performed the service  Virgel Lefevre Lachman, MD    Scribe Attestation    I,:  Salvador Rueda am acting as a scribe while in the presence of the attending physician :       I,:  Pascale Rodriguez MD personally performed the services described in this documentation    as scribed in my presence :

## 2021-02-23 ENCOUNTER — OFFICE VISIT (OUTPATIENT)
Dept: OBGYN CLINIC | Facility: CLINIC | Age: 61
End: 2021-02-23
Payer: COMMERCIAL

## 2021-02-23 VITALS — WEIGHT: 154 LBS | HEIGHT: 64 IN | BODY MASS INDEX: 26.29 KG/M2

## 2021-02-23 DIAGNOSIS — S82.831A OTHER CLOSED FRACTURE OF PROXIMAL END OF RIGHT FIBULA, INITIAL ENCOUNTER: Primary | ICD-10-CM

## 2021-02-23 PROCEDURE — 99213 OFFICE O/P EST LOW 20 MIN: CPT | Performed by: ORTHOPAEDIC SURGERY

## 2021-02-23 NOTE — PROGRESS NOTES
ALIREZA Miller  Attending, Orthopaedic Surgery  [facility] [address] [facility] [address] [facility]      ORTHOPAEDIC FOOT AND ANKLE CLINIC VISIT     Assessment:     Encounter Diagnosis   Name Primary?  Other closed fracture of proximal end of right fibula, initial encounter Yes            Plan:   · The patient verbalized understanding of exam findings and treatment plan  We engaged in the shared decision-making process and treatment options were discussed at length with the patient  Surgical and conservative management discussed today along with risks and benefits  · Continue WBAT in cam boot  · Continue compression stocking for swelling control   · Begin PT at this time   · Continue vitamin D and calcium  · Home dose of eliquis for DVT ppx  Return in about 3 weeks (around 3/16/2021) for repeat WB xray and plan to wean out of cam boot  History of Present Illness:   Chief Complaint:   Chief Complaint   Patient presents with    Right Foot - Follow-up     Dhaval Sharp is a 63 y o  female who is being seen in follow-up for Right maisonneuve injury, which was stable on x-ray  When we last saw she we recommended WBAT in cam boot  Pain has  improved  Residual pain is localized at lateral ankle with minimal radiating and described as sharp and mild        Pain/symptom timing:  Worse during the day when active  Pain/symptom context:  Worse with activites and work  Pain/symptom modifying factors:  Rest makes better, activities make worse  Pain/symptom associated signs/symptoms: none    Prior treatment   · NSAIDsNo   · Injections No   · Bracing/Orthotics Yes    · Physical Therapy No     Orthopedic Surgical History:   See below     Past Medical, Surgical and Social History:  Past Medical History:  has a past medical history of Anemia, Arthritis, Carpal tunnel syndrome of right wrist, Chest pain, Colon polyp, Depression, DVT (deep venous thrombosis) ([facility] [address] ) (2012), Dysphagia, Esophageal reflux (5/23/2011), Grade 1 out of 6 intensity murmur (2019), Heart murmur, History of transfusion (), Pacemaker (2018), Pneumonia, Psychiatric disorder, and Trigger finger, left  Problem List: does not have any pertinent problems on file  Past Surgical History:  has a past surgical history that includes Knee surgery (Bilateral);  section; Cervical disc surgery (2009); Tonsillectomy; Other surgical history; Gastric bypass (2017); pr wrist arthroscop,release xvers lig (Right, 2018); pr incise finger tendon sheath (Left, 2018); Cardiac pacemaker placement (2018); Upper gastrointestinal endoscopy; Colonoscopy; Knee arthroscopy; Back surgery; and Knee surgery (Left, 2020)  Family History: family history includes Alzheimer's disease in her mother; Arthritis in her mother; Breast cancer (age of onset: 77) in her mother; Cancer in her maternal aunt and mother; Coronary artery disease in her father and mother; Diabetes type II in her father; Gout in her father; Heart disease in her father and mother; Hypertension in her father; No Known Problems in her daughter, maternal aunt, maternal aunt, maternal aunt, maternal aunt, maternal grandfather, maternal grandmother, paternal aunt, paternal aunt, paternal grandfather, paternal grandmother, sister, sister, and sister; Prostate cancer in her brother; Stroke in her mother  Social History:  reports that she has been smoking cigarettes  She has been smoking about 0 25 packs per day  She has never used smokeless tobacco  She reports that she does not drink alcohol or use drugs  Current Medications: has a current medication list which includes the following prescription(s): vitamin c, atorvastatin, b complex vitamins, biotin, calcium carbonate, cholecalciferol, clobetasol, eliquis, ferrous sulfate er, fluticasone, lysine hcl, multivitamin, and nystatin  Allergies: has No Known Allergies       Review of Systems:  General- denies fever/chills  HEENT- denies hearing loss or sore throat  Eyes- denies eye pain or visual disturbances, denies red eyes  Respiratory- denies cough or SOB  Cardio- denies chest pain or palpitations  GI- denies abdominal pain  Endocrine- denies urinary frequency  Urinary- denies pain with urination  Musculoskeletal- Negative except noted above  Skin- denies rashes or wounds  Neurological- denies dizziness or headache  Psychiatric- denies anxiety or difficulty concentrating    Physical Exam:   Ht 5' 4" (1 626 m)   Wt 69 9 kg (154 lb)   LMP 09/18/2014   BMI 26 43 kg/m²   General/Constitutional: No apparent distress: well-nourished and well developed  Eyes: normal ocular motion  Lymphatic: No appreciable lymphadenopathy  Respiratory: Non-labored breathing  Vascular: No edema, swelling or tenderness, except as noted in detailed exam   Integumentary: No impressive skin lesions present, except as noted in detailed exam   Neuro: No ataxia or tremors noted  Psych: Normal mood and affect, oriented to person, place and time  Appropriate affect  Musculoskeletal: Normal, except as noted in detailed exam and in HPI  Examination    Right    Gait Ambulating in cam boot   Musculoskeletal Tender to palpation at ATFL and proximal fibula fracture site     Skin Normal       Nails Normal    Range of Motion  10 degrees dorsiflexion, 30 degrees plantarflexion  Subtalar motion: normal    Stability Stable    Muscle Strength 5/5 tibialis anterior  5/5 gastrocnemius-soleus  5/5 posterior tibialis  5/5 peroneal/eversion strength  5/5 EHL  5/5 FHL    Neurologic Normal    Sensation  Intact to light touch throughout sural, saphenous, superficial peroneal, deep peroneal and medial/lateral plantar nerve distributions  [place]-Wil 5 07 filament (10g) testing  deferred      Cardiovascular Brisk capillary refill < 2 seconds,intact DP and PT pulses    Special Tests None      Imaging Studies:   No new imaging    Scribe Attestation    I,: Amaury Traore PA-C am acting as a scribe while in the presence of the attending physician :       I,:  Miranda Chacko MD personally performed the services described in this documentation    as scribed in my presence :               Jude Sing Lachman, MD  Foot & Ankle Surgery   Department of [address] [place] [address]      I personally performed the service  Jude Sing Lachman, MD

## 2021-02-23 NOTE — PATIENT INSTRUCTIONS
Continue weight bearing in cam boot for another 3 weeks (total of 6 weeks in boot)    Continue taking the following supplements: Vitamin D 4000 units per day and Calcium 1200 mg per day  This will help with bone healing       Begin PT at this time    Continue compression stocking for swelling control

## 2021-03-03 ENCOUNTER — APPOINTMENT (OUTPATIENT)
Dept: RADIOLOGY | Age: 61
End: 2021-03-03
Payer: COMMERCIAL

## 2021-03-03 ENCOUNTER — OFFICE VISIT (OUTPATIENT)
Dept: URGENT CARE | Age: 61
End: 2021-03-03
Payer: COMMERCIAL

## 2021-03-03 VITALS
DIASTOLIC BLOOD PRESSURE: 53 MMHG | OXYGEN SATURATION: 97 % | TEMPERATURE: 97.7 F | RESPIRATION RATE: 20 BRPM | HEART RATE: 82 BPM | SYSTOLIC BLOOD PRESSURE: 98 MMHG

## 2021-03-03 DIAGNOSIS — S52.502A CLOSED FRACTURE OF DISTAL END OF LEFT RADIUS, UNSPECIFIED FRACTURE MORPHOLOGY, INITIAL ENCOUNTER: ICD-10-CM

## 2021-03-03 DIAGNOSIS — T14.90XA INJURY: Primary | ICD-10-CM

## 2021-03-03 DIAGNOSIS — T14.90XA INJURY: ICD-10-CM

## 2021-03-03 PROCEDURE — 73110 X-RAY EXAM OF WRIST: CPT

## 2021-03-03 PROCEDURE — 99213 OFFICE O/P EST LOW 20 MIN: CPT | Performed by: PHYSICIAN ASSISTANT

## 2021-03-03 PROCEDURE — S9088 SERVICES PROVIDED IN URGENT: HCPCS | Performed by: PHYSICIAN ASSISTANT

## 2021-03-03 PROCEDURE — 29125 APPL SHORT ARM SPLINT STATIC: CPT | Performed by: PHYSICIAN ASSISTANT

## 2021-03-03 NOTE — PROGRESS NOTES
[employer] [employer] [facility]        NAME: Mari Young is a 63 y o  female  : 1960    MRN: [id number]  DATE: March 3, 2021  TIME: 4:21 PM    Assessment and Plan   Injury Loryana   1  Injury  XR wrist 3+ vw left   2  Closed fracture of distal end of left radius, unspecified fracture morphology, initial encounter  Ambulatory referral to Orthopedic Surgery         Patient Instructions       X-ray reveals distal radius fracture and styloid  Fracture of ulna  Callus formation present to show some healing as this fracture is a month old  Maintain splint  Tylenol and ibuprofen as needed for pain  Follow-up with orthopedics   [phone]  Follow up with PCP in 3-5 days  Proceed to  ER if symptoms worsen  Chief Complaint     Chief Complaint   Patient presents with    Wrist Injury     pt states fell on 21 and injured left wrist, has been wearing an old wrist brace but wrist is still painful and swollen  History of Present Illness        Patient had originally tripped and fell on the end of January  She had fractured her ankle at that time and been treated for that  She was in a Cam boot and fell again on   At that fall she injured her  Left wrist has been having pain since  She thought it was just a bruising weight get better but it is continuing to be painful and swollen  She put a wrist brace on it but is not helping  She denies any numbness or tingling  She has seen orthopedics for her ankle but has not mentioned it to some about the wrist pain  Review of Systems   Review of Systems   Constitutional: Negative  HENT: Negative  Respiratory: Negative  Cardiovascular: Negative  Gastrointestinal: Negative  Genitourinary: Negative  Musculoskeletal: Positive for joint swelling  Neurological: Negative  Hematological: Negative  Psychiatric/Behavioral: Negative            Current Medications       Current Outpatient Medications:     Ascorbic Acid (VITAMIN C) 100 MG CHEW, Chew, Disp: , Rfl:     atorvastatin (LIPITOR) 10 mg tablet, take 1 tablet by mouth once daily, Disp: 90 tablet, Rfl: 2    B Complex Vitamins (VITAMIN B COMPLEX PO), Take 1 capsule by mouth, Disp: , Rfl:     Biotin 10 MG TABS, Take by mouth daily , Disp: , Rfl:     Calcium Carbonate (CALCI-CHEW PO), Take 500 mg by mouth 2 (two) times a day, Disp: , Rfl:     cholecalciferol (VITAMIN D3) 1,000 units tablet, Take 5,000 Units by mouth 2 (two) times a day , Disp: , Rfl:     clobetasol (TEMOVATE) 0 05 % cream, Apply to affected areas daily x 12 weeks, then twice weekly for maintenance, Disp: , Rfl:     Eliquis 2 5 MG, Take 1 tablet (2 5 mg total) by mouth 2 (two) times a day, Disp: 180 tablet, Rfl: 0    Ferrous Sulfate  (45 Fe) MG TBCR, Take 1 tablet by mouth daily, Disp: , Rfl:     fluticasone (FLONASE) 50 mcg/act nasal spray, 2 sprays into each nostril daily, Disp: 16 g, Rfl: 0    Lysine HCl POWD, Take by mouth, Disp: , Rfl:     Multiple Vitamin (MULTIVITAMIN) capsule, Take 1 capsule by mouth daily 2 chewies daily, Disp: , Rfl:     nystatin (MYCOSTATIN) powder, Apply topically 2 (two) times a day, Disp: 30 g, Rfl: 0    Current Allergies     Allergies as of 03/03/2021    (No Known Allergies)            The following portions of the patient's history were reviewed and updated as appropriate: allergies, current medications, past family history, past medical history, past social history, past surgical history and problem list      Past Medical History:   Diagnosis Date    Anemia     iron def    Arthritis     knees    Carpal tunnel syndrome of right wrist     Chest pain     Colon polyp     Depression     at times    DVT (deep venous thrombosis) ([facility] [address] ) 2012    Dysphagia     Esophageal reflux 5/23/2011    Grade 1 out of 6 intensity murmur 6/24/2019    Heart murmur     History of transfusion 1980    Pacemaker 11/01/2018    Pneumonia     age 15    Psychiatric disorder     Trigger finger, left        Past Surgical History:   Procedure Laterality Date    BACK SURGERY      CARDIAC PACEMAKER PLACEMENT  2018    CERVICAL One [address] [facility] SURGERY  2009    PLATES & SCREWS     SECTION      X2    COLONOSCOPY      GASTRIC BYPASS  2017    LAP RINYGB SURGERY    KNEE ARTHROSCOPY      KNEE SURGERY Bilateral     KNEE SURGERY Left 2020    OTHER SURGICAL HISTORY      ARM SURGERIES    ME INCISE FINGER TENDON SHEATH Left 2018    Procedure: LONG TRIGGER FINGER RELEASE;  Surgeon: Marlo Conn MD;  [facility];  Service: Orthopedics    ME WRIST Janny Lopez LIG Right 2018    Procedure: ENDOSCOPIC CARPAL TUNNEL RELEASE;  Surgeon: Marlo Conn MD;  [facility];  Service: Orthopedics    TONSILLECTOMY      UPPER GASTROINTESTINAL ENDOSCOPY         Family History   Problem Relation Age of Onset    Stroke Mother     Alzheimer's disease Mother     Arthritis Mother     Coronary artery disease Mother     Breast cancer Mother 77    Cancer Mother     Heart disease Mother     Hypertension Father     Gout Father     Diabetes type II Father     Coronary artery disease Father     Heart disease Father     No Known Problems Sister     No Known Problems Daughter     No Known Problems Maternal Grandmother     No Known Problems Maternal Grandfather     No Known Problems Paternal Grandmother     No Known Problems Paternal Grandfather     Prostate cancer Brother     No Known Problems Sister     No Known Problems Sister     No Known Problems Maternal Aunt     No Known Problems Maternal Aunt     No Known Problems Maternal Aunt     No Known Problems Maternal Aunt     Cancer Maternal Aunt     No Known Problems Paternal Aunt     No Known Problems Paternal Aunt          Medications have been verified          Objective   BP 98/53   Pulse 82   Temp 97 7 °F (36 5 °C)   Resp 20   LMP 2014   SpO2 97%          Physical Exam Physical Exam  Vitals signs and nursing note reviewed  Constitutional:       General: She is not in acute distress  Appearance: Normal appearance  She is not ill-appearing, toxic-appearing or diaphoretic  HENT:      Head: Normocephalic and atraumatic  Cardiovascular:      Rate and Rhythm: Normal rate and regular rhythm  Pulses: Normal pulses  Pulmonary:      Effort: Pulmonary effort is normal    Musculoskeletal:         General: Swelling and tenderness present  Comments:   Tenderness to palpation over distal radius  Possitive swelling over wrist   Wrist range of motion limited due to pain  Sensation intact  Full flexion-extension of all digits  Skin:     General: Skin is warm and dry  Neurological:      General: No focal deficit present  Mental Status: She is alert and oriented to person, place, and time  Sensory: No sensory deficit  Psychiatric:         Mood and Affect: Mood normal          Behavior: Behavior normal        Splint application    Date/Time: 3/3/2021 4:21 PM  Performed by: Dalton Stone PA-C  Authorized by: Dalton Stone PA-C   Universal Protocol:  Consent: Verbal consent obtained    Risks and benefits: risks, benefits and alternatives were discussed  Consent given by: patient      Pre-procedure details:     Sensation:  Normal  Procedure details:     Laterality:  Left    Location:  Wrist    Wrist:  L wrist    Splint type:  Sugar tong    Supplies:  Cotton padding and fiberglass

## 2021-03-03 NOTE — PATIENT INSTRUCTIONS
Maintain splint  Tylenol and ibuprofen as needed for pain  Follow-up with orthopedics   [phone]  ER symptoms worsen

## 2021-03-05 ENCOUNTER — EVALUATION (OUTPATIENT)
Dept: PHYSICAL THERAPY | Age: 61
End: 2021-03-05
Payer: COMMERCIAL

## 2021-03-05 DIAGNOSIS — S82.811D CLOSED TORUS FRACTURE OF PROXIMAL END OF RIGHT FIBULA WITH ROUTINE HEALING, SUBSEQUENT ENCOUNTER: Primary | ICD-10-CM

## 2021-03-05 PROCEDURE — 97110 THERAPEUTIC EXERCISES: CPT | Performed by: PHYSICAL THERAPIST

## 2021-03-05 PROCEDURE — 97161 PT EVAL LOW COMPLEX 20 MIN: CPT | Performed by: PHYSICAL THERAPIST

## 2021-03-05 NOTE — PROGRESS NOTES
PT Evaluation     Today's date: 3/5/2021  Patient name: Cindy Horton  : 1960  MRN: [id number]  Referring provider: Jacquelin Tiwari PA-C  Dx:   Encounter Diagnosis     ICD-10-CM    1  Closed torus fracture of proximal end of right fibula with routine healing, subsequent encounter  [id number] Ambulatory referral to Physical Therapy       Start Time: 1020  Stop Time: 1100  Total time in clinic (min): 40 minutes    Assessment  Assessment details: Cindy Horton is a 63 y o  female who presents with complaints of Closed torus fracture of proximal end of right fibula with routine healing, subsequent encounter  (primary encounter diagnosis)  No further referral appears necessary at this time based upon examination results  Patient is presenting with decreased strength, AROM, and gait abnormality leading to limitations with walking, standing, driving, and stair navigation  Prognosis is good given HEP compliance and PT 1-2/wk  Positive prognostic indicators include positive attitude toward recovery  Please contact me if you have any questions or recommendations  Thank you for the opportunity to share in [facility]       Impairments: abnormal gait, abnormal muscle firing, abnormal muscle tone, abnormal or restricted ROM, abnormal movement, impaired balance, impaired physical strength, lacks appropriate home exercise program, pain with function and poor posture     Symptom irritability: moderateUnderstanding of Dx/Px/POC: good   Prognosis: good    Plan  Patient would benefit from: skilled physical therapy  Planned therapy interventions: joint mobilization, manual therapy, patient education, postural training, strengthening, stretching, therapeutic activities, therapeutic exercise, home exercise program, neuromuscular re-education, flexibility and functional ROM exercises  Frequency: 2x week  Duration in weeks: 12  Treatment plan discussed with: patient        Subjective Evaluation    History of Present Illness  Mechanism of injury: Patient reports 21 that she was visiting a friend and slipped on ice with RLE moving behind her leading to RLE fibular fracture  Presenting in CAM walker WBAT  Will see MD in 2 weeks and once cleared, may begin transitioning out of boot  Also presenting with left wrist fracture which will be splinted in 3 days  Pain described as soreness lateral ankle region  Quality of life: good    Pain  Current pain ratin  At best pain ratin  At worst pain ratin  Progression: no change    Social Support  Steps to enter house: yes (bilateral railing)  Stairs in house: yes (bilateral railing)   Lives in: multiple-level home    Treatments  Previous treatment: immobilization  Current treatment: immobilization  Patient Goals  Patient goals for therapy: decreased pain, increased motion, improved balance, increased strength and independence with ADLs/IADLs          Objective    GAIT: Cam walker RLE  Squat assess: 50% depth in boot  SLS: RLE: n/t, LLE: n/t           MMT    Hip         R          L   Flex  5 5   Extn  5 5   Abd  5 5                 MMT         AROM         PROM    Knee      R          L         R          L           R         L   Flex  5 5 105 WFL n/t n/t   Extn  4 4 WNL WNL                           MMT          AROM    Ankle         R          L          R         L   PF 2 5 30 55   DF  4 5 3 7   DF bent knee 4 5 7 10   EHL 5 5     Inv  4 5 12 24   Ever  4 5 5 13   Post Tib   4 5     Foot Int  4 5       Palpation: fibular head  Sensation (pinprick): intact    Ankle:   anterior draw =  -     TTP: ATFL, fib head, lateral malleolus    Precautions: WBAT until cleared by MD to transition out of boot, pacemaker    Manuals 3 5            Ankle range FB                                                   Neuro Re-Ed                                                                                                        Ther Ex 3 5            Supine calf stretch 5x10"            PF/DF AROM 2x10            Inv AROM 2x10                                                                             Ther Activity                                       Gait Training                                       Modalities                                         Short Term:  1  Pt will report decreased levels of pain by at least 2 subjective ratings in 4 weeks  2  Pt will demonstrate improved ROM by at least 10 degrees in 4 weeks  3  Pt will demonstrate improved strength by 1/2 grade in 4 weeks  4  Pt will be able to walk > 10 minutes without AD in 6 weeks  Long Term:   1  Pt will be independent in their HEP in 8 weeks  2  Pt will be pain free with IADL's in 8 weeks  3  Pt will demonstrate improved FOTO, > 26 in 8 weeks    4  Pt will be able to perform normal stair navigation in 8 weeks

## 2021-03-08 ENCOUNTER — OFFICE VISIT (OUTPATIENT)
Dept: OBGYN CLINIC | Facility: MEDICAL CENTER | Age: 61
End: 2021-03-08
Payer: COMMERCIAL

## 2021-03-08 VITALS
HEIGHT: 64 IN | SYSTOLIC BLOOD PRESSURE: 99 MMHG | BODY MASS INDEX: 26.29 KG/M2 | HEART RATE: 86 BPM | DIASTOLIC BLOOD PRESSURE: 63 MMHG | WEIGHT: 154 LBS

## 2021-03-08 DIAGNOSIS — S52.602A CLOSED FRACTURE OF DISTAL ENDS OF LEFT RADIUS AND ULNA, INITIAL ENCOUNTER: Primary | ICD-10-CM

## 2021-03-08 DIAGNOSIS — S52.502A CLOSED FRACTURE OF DISTAL ENDS OF LEFT RADIUS AND ULNA, INITIAL ENCOUNTER: Primary | ICD-10-CM

## 2021-03-08 PROCEDURE — 25600 CLTX DST RDL FX/EPHYS SEP WO: CPT | Performed by: ORTHOPAEDIC SURGERY

## 2021-03-08 PROCEDURE — 99213 OFFICE O/P EST LOW 20 MIN: CPT | Performed by: ORTHOPAEDIC SURGERY

## 2021-03-08 NOTE — PROGRESS NOTES
CHIEF COMPLAINT:  Chief Complaint   Patient presents with    Left Wrist - Pain       SUBJECTIVE:  Yanna Tillman is a 63y o  year old RHD female who presents for evaluation of left wrist pain  On 2021 patient lost her balance and fell in her bedroom at home  She had immediate onset of pain in the left wrist   For the past 4 weeks she has been treating this with Ace wrap and a brace that she had at home  Her pain has minimally improved over time so she proceeded to urgent care on 2021 where she was placed into a sugar-tong splint after x-rays were taken and advised to follow-up with Orthopedics  Patient has been taking Tylenol for pain, she is unable to take ibuprofen due to taking Eliquis  Of note patient is currently being treated by Dr Jerri Cartwright for a right Maisonneuve fracture of RLE sustained on 2021  Patient offers no additional complaints at this time        PAST MEDICAL HISTORY:  Past Medical History:   Diagnosis Date    Anemia     iron def    Arthritis     knees    Carpal tunnel syndrome of right wrist     Chest pain     Colon polyp     Depression     at times    DVT (deep venous thrombosis) ([facility] [address] )     Dysphagia     Esophageal reflux 2011    Grade 1 out of 6 intensity murmur 2019    Heart murmur     History of transfusion 1980    Pacemaker 2018    Pneumonia     age 15    Psychiatric disorder     Trigger finger, left        PAST SURGICAL HISTORY:  Past Surgical History:   Procedure Laterality Date    BACK SURGERY      CARDIAC PACEMAKER PLACEMENT  2018    CERVICAL One [address] SURGERY  2009    PLATES & SCREWS     SECTION      X2    COLONOSCOPY      GASTRIC BYPASS  2017    LAP RINYGB SURGERY    KNEE ARTHROSCOPY      KNEE SURGERY Bilateral     KNEE SURGERY Left 2020    OTHER SURGICAL HISTORY      ARM SURGERIES    CO INCISE FINGER TENDON SHEATH Left 2018    Procedure: LONG TRIGGER FINGER RELEASE;  Surgeon: Giovany Velazquez Lang Brennan MD;  [facility];  Service: Orthopedics    VA WRIST Piper Rands LIG Right 9/18/2018    Procedure: ENDOSCOPIC CARPAL TUNNEL RELEASE;  Surgeon: Verona Batres MD;  [facility];  Service: Orthopedics    TONSILLECTOMY      UPPER GASTROINTESTINAL ENDOSCOPY         FAMILY HISTORY:  Family History   Problem Relation Age of Onset    Stroke Mother     Alzheimer's disease Mother     Arthritis Mother     Coronary artery disease Mother     Breast cancer Mother 77    Cancer Mother     Heart disease Mother     Hypertension Father     Gout Father     Diabetes type II Father     Coronary artery disease Father     Heart disease Father     No Known Problems Sister     No Known Problems Daughter     No Known Problems Maternal Grandmother     No Known Problems Maternal Grandfather     No Known Problems Paternal Grandmother     No Known Problems Paternal Grandfather     Prostate cancer Brother     No Known Problems Sister     No Known Problems Sister     No Known Problems Maternal Aunt     No Known Problems Maternal Aunt     No Known Problems Maternal Aunt     No Known Problems Maternal Aunt     Cancer Maternal Aunt     No Known Problems Paternal Aunt     No Known Problems Paternal Aunt        SOCIAL HISTORY:  Social History     Tobacco Use    Smoking status: Current Every Day Smoker     Packs/day: 0 25     Types: Cigarettes    Smokeless tobacco: Never Used    Tobacco comment: quit "many years ago"   Substance Use Topics    Alcohol use: Never     Alcohol/week: 2 0 standard drinks     Types: 1 Glasses of wine, 1 Standard drinks or equivalent per week     Frequency: Never     Comment: very rare social    Drug use: No       MEDICATIONS:    Current Outpatient Medications:     Ascorbic Acid (VITAMIN C) 100 MG CHEW, Chew, Disp: , Rfl:     atorvastatin (LIPITOR) 10 mg tablet, take 1 tablet by mouth once daily, Disp: 90 tablet, Rfl: 2    B Complex Vitamins (VITAMIN B COMPLEX PO), Take 1 capsule by mouth, Disp: , Rfl:     Biotin 10 MG TABS, Take by mouth daily , Disp: , Rfl:     Calcium Carbonate (CALCI-CHEW PO), Take 500 mg by mouth 2 (two) times a day, Disp: , Rfl:     cholecalciferol (VITAMIN D3) 1,000 units tablet, Take 5,000 Units by mouth 2 (two) times a day , Disp: , Rfl:     clobetasol (TEMOVATE) 0 05 % cream, Apply to affected areas daily x 12 weeks, then twice weekly for maintenance, Disp: , Rfl:     Eliquis 2 5 MG, Take 1 tablet (2 5 mg total) by mouth 2 (two) times a day, Disp: 180 tablet, Rfl: 0    Ferrous Sulfate  (45 Fe) MG TBCR, Take 1 tablet by mouth daily, Disp: , Rfl:     fluticasone (FLONASE) 50 mcg/act nasal spray, 2 sprays into each nostril daily, Disp: 16 g, Rfl: 0    Lysine HCl POWD, Take by mouth, Disp: , Rfl:     Multiple Vitamin (MULTIVITAMIN) capsule, Take 1 capsule by mouth daily 2 chewies daily, Disp: , Rfl:     nystatin (MYCOSTATIN) powder, Apply topically 2 (two) times a day, Disp: 30 g, Rfl: 0    ALLERGIES:  No Known Allergies    REVIEW OF SYSTEMS:  Review of Systems   Constitutional: Negative for appetite change and unexpected weight change  HENT: Negative for congestion and trouble swallowing  Eyes: Negative for visual disturbance  Respiratory: Negative for cough and shortness of breath  Cardiovascular: Negative for chest pain and palpitations  Gastrointestinal: Negative for nausea and vomiting  Endocrine: Negative for cold intolerance and heat intolerance  Musculoskeletal: Negative for gait problem and myalgias  Skin: Negative for rash         VITALS:  Vitals:    03/08/21 1123   BP: 99/63   Pulse: 86       LABS:  HgA1c:   Lab Results   Component Value Date    HGBA1C 5 7 (H) 05/07/2020     BMP:   Lab Results   Component Value Date    CALCIUM 9 0 01/13/2021    K 3 1 (L) 01/13/2021    CO2 28 01/13/2021     (H) 01/13/2021    BUN 9 01/13/2021    CREATININE 0 58 (L) 01/13/2021 _____________________________________________________  PHYSICAL EXAMINATION:  General: well developed and well nourished, alert, oriented times 3 and appears comfortable  Psychiatric: Normal  HEENT: Trachea Midline, No torticollis  Pulmonary: No audible wheezing or strider  Cardiovascular: No discernable arrhythmia   Skin: No masses, erythema, lacerations, fluctation, ulcerations  Neurovascular: Sensation Intact to the Median, Ulnar, Radial Nerve, Motor Intact to the Median, Ulnar, Radial Nerve and Pulses Intact    MUSCULOSKELETAL EXAMINATION:  Left wrist  Skin intact, no open lesions  No erythema or ecchymosis  TTP over distal radius  ROM decreased secondary to pain  Sensation intact to light touch  Brisk capillary refill    ___________________________________________________  STUDIES REVIEWED:  Images obtained on 3/3/2021 of the left wrist 3 views demonstrate nondisplaced distal radius and ulnar styloid fractures, consistent with the reported history of fall one month ago  There is a well-circumscribed lucent lesion identified within the scaphoid bone, 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       PROCEDURES PERFORMED:  Fracture / Dislocation Treatment    Date/Time: 3/8/2021 11:44 AM  Performed by: Amanda Saba MD  Authorized by: Amanda Saba MD     Patient Location:  Clinic  Verbal consent obtained?: Yes    Risks and benefits: Risks, benefits and alternatives were discussed    Consent given by:  Patient  Patient states understanding of procedure being performed: Yes    Radiology Images displayed and confirmed   If images not available, report reviewed: Yes    Patient identity confirmed:  Verbally with patient  Time out: Immediately prior to the procedure a time out was called    Injury location:  Wrist  Location details:  Left wrist  Injury type:  Fracture  Fracture type: distal radius and ulnar styloid    Fracture type: distal radius and ulnar styloid    Neurovascular status: Neurovascularly intact    Local anesthesia used?: No    Manipulation performed?: No    Immobilization:  Cast  Cast type:  Short arm  Supplies used:  Fiberglass and cotton padding  Neurovascular status: Neurovascularly intact    Patient tolerance:  Patient tolerated the procedure well with no immediate complications      _____________________________________________________  ASSESSMENT/PLAN:    Nondisplaced left distal radius and ulnar styloid fractures sustained on 2/4/2021  X-rays reviewed  Patient placed in a short-arm cast in the office today  Patient tolerated this procedure well with no immediate complications  · Cast care instructions and precautions discussed with the patient and also provided in the AVS   Patient may take Tylenol as needed for pain  They should elevate the extremity as needed for swelling  · Contact the office with any further questions or concerns prior to next follow-up  · Follow-up in 3 weeks for cast removal and repeat x-rays  Follow Up:  Return in about 3 weeks (around 3/29/2021) for FRACTURE CARE  To Do Next Visit:  X-rays of the  left  wrist and Cast off    General Discussions:  Fracture - Nonoperative Care: The physiology of a fractured bone was discussed with the patient today  With non-displaced or minimally displaced fractures, conservative treatment such as casting or splinting often results in a functional recovery  Typically, these fractures are immobilized in either a cast or splint depending on the pattern  Radiographs are typically taken at intervals throughout the fracture healing to ensure that reduction or alignment is not lost   If the fracture loses its alignment, surgical intervention may be required to stabilize it  Medical conditions such as diabetes, osteoporosis, vitamin D deficiency, and a history of or exposure to smoking may delay or prevent fracture healing   Options between cast/splint immobilization and surgical treatment were offered and the risks and benefits of both were discussed         Scribe Attestation    I,:  Ludwig Goldman am acting as a scribe while in the presence of the attending physician :       I,:  Ivone Allison MD personally performed the services described in this documentation    as scribed in my presence :

## 2021-03-08 NOTE — PATIENT INSTRUCTIONS
Cast Care Tips     Keep Cast Dry  o Cover when showering  Make sure water does not run down the limb into the cover  - Trash bag  with medical tape or cast cover  - If upper extremity is casted, hold above your head to keep water from cover opening   Avoid scratching/putting objects in the cast, or sliding/shifting your limb inside the cast  o No - pens, pencils, hangers, etc   - Instead - tap the surface of the cast using you hands or fingertips  - Use a blow dryer on the cool setting to blow air into the cast  o Scratching can cause an unreachable break in the skin, or if something gets stuck against your skin, it can lead to skin irritation and infection   Things to look out for  o Pain - The injury site is protected, it should no longer cause pain  o Paresthesia - Numbness or tingling sensations can be indicative of pressure on a nerve, and/or inflammation  o Pulse - Poor circulation might be caused by swelling or cast being wrapped too tight   Indicators include change in color of fingers or toes (blue or pale), numbness, and/or skin being cold to touch  o Pressure - Feeling of being too tight without visible signs of swelling  o Swelling - Diminished appearance of joint creases, bulging appearance either above (closer to the torso) or below (farther from the torso) the cast   If any of these things happen:   o Elevate the cast above the heart  - Sit with your arm above your heart or lay down with your leg elevated (i e  propped on pillows, the arm of the couch, etc )  - If your upper extremity is casted, hold the opposite shoulder  o If symptoms do not subside, or worsen even after taking the aforementioned measures, contact the Physician's office, or seek immediate medical attention   Call for cast check if:  o The cast feels loose   - Two or more fingers fit in either end of cast  o Cast gets wet  o Cast starts to smell  o Something gets stuck inside the cast  o You experience any, or all, of the things to look out for    Driving Precautions - Depending on your type of cast, affected side, and personal conditions, driving may be discouraged  Please follow guidelines set by your Doctor  Call the office if you have any questions

## 2021-03-10 ENCOUNTER — OFFICE VISIT (OUTPATIENT)
Dept: PHYSICAL THERAPY | Age: 61
End: 2021-03-10
Payer: COMMERCIAL

## 2021-03-10 DIAGNOSIS — S82.811D CLOSED TORUS FRACTURE OF PROXIMAL END OF RIGHT FIBULA WITH ROUTINE HEALING, SUBSEQUENT ENCOUNTER: Primary | ICD-10-CM

## 2021-03-10 PROCEDURE — 97140 MANUAL THERAPY 1/> REGIONS: CPT | Performed by: PHYSICAL THERAPIST

## 2021-03-10 PROCEDURE — 97110 THERAPEUTIC EXERCISES: CPT | Performed by: PHYSICAL THERAPIST

## 2021-03-10 NOTE — PROGRESS NOTES
Daily Note     Today's date: 3/10/2021  Patient name: Nick Pollock  : 1960  MRN: [id number]  Referring provider: David Hand PA-C  Dx:   Encounter Diagnosis     ICD-10-CM    1  Closed torus fracture of proximal end of right fibula with routine healing, subsequent encounter  [id number]               Subjective: Patient reports doing well overall  Good compliance with use of CAM walker, also received cast on Left forearm  Objective: See treatment diary below    Assessment: Tolerated treatment well  Patient demonstrated fatigue post treatment and demonstrates improved ankle and knee mobility  Held prone exercises due to pacemaker  Plan: Continue per plan of care       Precautions: WBAT until cleared by MD to transition out of boot, pacemaker     Manuals 3 5  3 8                   Ankle range FB  FB                                                                                           Neuro Re-Ed                                                                                                                                                                                               Ther Ex 3 5  3 8                   Supine calf stretch 5x10"  5x15"                   PF/DF AROM 2x10  HEP                   Inv AROM 2x10  HEP                    SLR flexion    3x10                    PF    2x15, YTB                    clam shell   3x10                                                                   Ther Activity                                                                       Gait Training                                                                       Modalities

## 2021-03-12 ENCOUNTER — APPOINTMENT (OUTPATIENT)
Dept: PHYSICAL THERAPY | Age: 61
End: 2021-03-12
Payer: COMMERCIAL

## 2021-03-16 ENCOUNTER — APPOINTMENT (OUTPATIENT)
Dept: RADIOLOGY | Facility: AMBULARY SURGERY CENTER | Age: 61
End: 2021-03-16
Attending: ORTHOPAEDIC SURGERY
Payer: COMMERCIAL

## 2021-03-16 ENCOUNTER — OFFICE VISIT (OUTPATIENT)
Dept: OBGYN CLINIC | Facility: CLINIC | Age: 61
End: 2021-03-16

## 2021-03-16 VITALS — BODY MASS INDEX: 26.29 KG/M2 | HEIGHT: 64 IN | WEIGHT: 154 LBS

## 2021-03-16 DIAGNOSIS — S52.502A CLOSED FRACTURE OF DISTAL ENDS OF LEFT RADIUS AND ULNA, INITIAL ENCOUNTER: ICD-10-CM

## 2021-03-16 DIAGNOSIS — S52.502A CLOSED FRACTURE OF DISTAL ENDS OF LEFT RADIUS AND ULNA, INITIAL ENCOUNTER: Primary | ICD-10-CM

## 2021-03-16 DIAGNOSIS — S52.602A CLOSED FRACTURE OF DISTAL ENDS OF LEFT RADIUS AND ULNA, INITIAL ENCOUNTER: ICD-10-CM

## 2021-03-16 DIAGNOSIS — S52.602A CLOSED FRACTURE OF DISTAL ENDS OF LEFT RADIUS AND ULNA, INITIAL ENCOUNTER: Primary | ICD-10-CM

## 2021-03-16 PROCEDURE — 99024 POSTOP FOLLOW-UP VISIT: CPT | Performed by: ORTHOPAEDIC SURGERY

## 2021-03-16 PROCEDURE — 73610 X-RAY EXAM OF ANKLE: CPT

## 2021-03-16 NOTE — PATIENT INSTRUCTIONS
You may now begin weaning your boot and transitioning to a sneaker  It is important to do this gradually to avoid aggravating the healing process  1  Today, you may come out of the boot into a sneaker for 2 hours  2  Tomorrow, you may come out of the boot into a sneaker for 4 hours,  3  The next day, you may come out of the boot into a sneaker for 6 hours  4  Continue this (adding 2 hours per day) as you tolerate  For example, if you do 6 hours out of the boot into a sneaker and your foot swells more than usual at night and it is difficult to control the discomfort, do not advance to 8 hours the next day, stay at 6 hours until you are able to tolerate it  Elevation, Ice and tylenol and staying off of it at night will be important to aide in this transition out of the boot  Swelling and soreness are normal as you begin to do more with the injured leg      Continue PT    Continue compression stocking for swelling control    Continue vitamin D and calcium supplementation     Avoid NSAIDs, recommend tylenol as needed for pain

## 2021-03-16 NOTE — LETTER
March 16, 2021     Patient: Trent Figueroa   YOB: 1960   Date of Visit: 3/16/2021       To Whom it May Concern:    Trent Figueroa is under my professional care  She was seen in my office on 3/16/2021  She is not cleared to return to work at this time in regards to her right ankle  We will see her back in 6 weeks for reevaluation and update her work status at that time  If you have any questions or concerns, please don't hesitate to call           Sincerely,          Jason Das MD        CC: No Recipients

## 2021-03-16 NOTE — PROGRESS NOTES
ALIREZA Spence  Attending, Orthopaedic Surgery  [facility] [address] [facility]      ORTHOPAEDIC FOOT AND ANKLE CLINIC VISIT     Assessment:     Encounter Diagnosis   Name Primary?  Closed fracture of distal ends of left radius and ulna, initial encounter Yes            Plan:   · The patient verbalized understanding of exam findings and treatment plan  We engaged in the shared decision-making process and treatment options were discussed at length with the patient  Surgical and conservative management discussed today along with risks and benefits  · Begin weaning out of cam boot and into a supportive sneaker at this time  · Continue PT  · Continue vitamin D and calcium supplementation  · Continue compression stocking for swelling control  · May DC aspirin at this time  · Tylenol as needed for pain, avoid NSAIDs  Return in about 6 weeks (around 4/27/2021)  with repeat weight bearing x-ray of right ankle      History of Present Illness:   Chief Complaint:   Chief Complaint   Patient presents with    Right Ankle - Follow-up     Trent Figueroa is a 63 y o  female who is being seen in follow-up for Right proximal fibula fracture, with stable ankle x-rays (negative for maissoneuve injury)  When we last saw she we recommended WBAT in cam boot and PT  Pain has improved   Residual pain is localized at lateral ankle with minimal radiating and described as sharp and mild     Pain/symptom timing:  Worse during the day when active  Pain/symptom context:  Worse with activites and work  Pain/symptom modifying factors:  Rest makes better, activities make worse  Pain/symptom associated signs/symptoms: none    Prior treatment   · NSAIDsYes   · Injections No   · Bracing/Orthotics Yes    · Physical Therapy Yes     Orthopedic Surgical History:   See below     Past Medical, Surgical and Social History:  Past Medical History:  has a past medical history of Anemia, Arthritis, Carpal tunnel syndrome of right wrist, Chest pain, Colon polyp, Depression, DVT (deep venous thrombosis) ([facility] [address] ) (), Dysphagia, Esophageal reflux (2011), Grade 1 out of 6 intensity murmur (2019), Heart murmur, History of transfusion (), Pacemaker (2018), Pneumonia, Psychiatric disorder, and Trigger finger, left  Problem List: does not have any pertinent problems on file  Past Surgical History:  has a past surgical history that includes Knee surgery (Bilateral);  section; Cervical disc surgery (2009); Tonsillectomy; Other surgical history; Gastric bypass (2017); pr wrist arthroscop,release xvers lig (Right, 2018); pr incise finger tendon sheath (Left, 2018); Cardiac pacemaker placement (2018); Upper gastrointestinal endoscopy; Colonoscopy; Knee arthroscopy; Back surgery; and Knee surgery (Left, 2020)  Family History: family history includes Alzheimer's disease in her mother; Arthritis in her mother; Breast cancer (age of onset: 77) in her mother; Cancer in her maternal aunt and mother; Coronary artery disease in her father and mother; Diabetes type II in her father; Gout in her father; Heart disease in her father and mother; Hypertension in her father; No Known Problems in her daughter, maternal aunt, maternal aunt, maternal aunt, maternal aunt, maternal grandfather, maternal grandmother, paternal aunt, paternal aunt, paternal grandfather, paternal grandmother, sister, sister, and sister; Prostate cancer in her brother; Stroke in her mother  Social History:  reports that she has been smoking cigarettes  She has been smoking about 0 25 packs per day  She has never used smokeless tobacco  She reports that she does not drink alcohol or use drugs    Current Medications: has a current medication list which includes the following prescription(s): vitamin c, atorvastatin, b complex vitamins, biotin, calcium carbonate, cholecalciferol, clobetasol, eliquis, ferrous sulfate er, fluticasone, lysine hcl, multivitamin, and nystatin  Allergies: has No Known Allergies  Review of Systems:  General- denies fever/chills  HEENT- denies hearing loss or sore throat  Eyes- denies eye pain or visual disturbances, denies red eyes  Respiratory- denies cough or SOB  Cardio- denies chest pain or palpitations  GI- denies abdominal pain  Endocrine- denies urinary frequency  Urinary- denies pain with urination  Musculoskeletal- Negative except noted above  Skin- denies rashes or wounds  Neurological- denies dizziness or headache  Psychiatric- denies anxiety or difficulty concentrating    Physical Exam:   Ht 5' 4" (1 626 m)   Wt 69 9 kg (154 lb)   LMP 09/18/2014   BMI 26 43 kg/m²   General/Constitutional: No apparent distress: well-nourished and well developed  Eyes: normal ocular motion  Lymphatic: No appreciable lymphadenopathy  Respiratory: Non-labored breathing  Vascular: No edema, swelling or tenderness, except as noted in detailed exam   Integumentary: No impressive skin lesions present, except as noted in detailed exam   Neuro: No ataxia or tremors noted  Psych: Normal mood and affect, oriented to person, place and time  Appropriate affect  Musculoskeletal: Normal, except as noted in detailed exam and in HPI  Examination    Right    Gait Normal   Musculoskeletal Nontender to palpation     Skin Normal       Nails Normal    Range of Motion  20 degrees dorsiflexion, 40 degrees plantarflexion  Subtalar motion: normal    Stability Stable    Muscle Strength 5/5 tibialis anterior  5/5 gastrocnemius-soleus  5/5 posterior tibialis  5/5 peroneal/eversion strength  5/5 EHL  5/5 FHL    Neurologic Normal    Sensation  Intact to light touch throughout sural, saphenous, superficial peroneal, deep peroneal and medial/lateral plantar nerve distributions  Elida-Wil 5 07 filament (10g) testing  deferred      Cardiovascular Brisk capillary refill < 2 seconds,intact DP and PT pulses    Special Tests Negative anterior drawer      Imaging Studies:   3 views of the Right ankle were obtained, reviewed and interpreted independently which demonstrate ankle mortise is stable without medial clear space widening    Reviewed by me personally  Scribe Attestation    I,:  Phi Joseph PA-C am acting as a scribe while in the presence of the attending physician :       I,:  Alison Ball MD personally performed the services described in this documentation    as scribed in my presence :             Heron Nevin Lachman, MD  Foot & Ankle Surgery   Department of [address] [place] [address]      I personally performed the service  Heron Nevin Lachman, MD

## 2021-03-17 ENCOUNTER — OFFICE VISIT (OUTPATIENT)
Dept: FAMILY MEDICINE CLINIC | Facility: CLINIC | Age: 61
End: 2021-03-17
Payer: COMMERCIAL

## 2021-03-17 ENCOUNTER — OFFICE VISIT (OUTPATIENT)
Dept: PHYSICAL THERAPY | Age: 61
End: 2021-03-17
Payer: COMMERCIAL

## 2021-03-17 VITALS
HEIGHT: 64 IN | DIASTOLIC BLOOD PRESSURE: 60 MMHG | TEMPERATURE: 97.6 F | HEART RATE: 94 BPM | WEIGHT: 156 LBS | RESPIRATION RATE: 16 BRPM | BODY MASS INDEX: 26.63 KG/M2 | OXYGEN SATURATION: 97 % | SYSTOLIC BLOOD PRESSURE: 112 MMHG

## 2021-03-17 DIAGNOSIS — S82.811D CLOSED TORUS FRACTURE OF PROXIMAL END OF RIGHT FIBULA WITH ROUTINE HEALING, SUBSEQUENT ENCOUNTER: Primary | ICD-10-CM

## 2021-03-17 DIAGNOSIS — S52.602A CLOSED FRACTURE OF DISTAL ENDS OF LEFT RADIUS AND ULNA, INITIAL ENCOUNTER: Primary | ICD-10-CM

## 2021-03-17 DIAGNOSIS — S82.54XA NONDISPLACED FRACTURE OF MEDIAL MALLEOLUS OF RIGHT TIBIA, INITIAL ENCOUNTER FOR CLOSED FRACTURE: ICD-10-CM

## 2021-03-17 DIAGNOSIS — M80.00XA OSTEOPOROSIS WITH PATHOLOGICAL FRACTURE, INITIAL ENCOUNTER: ICD-10-CM

## 2021-03-17 DIAGNOSIS — S82.864A CLOSED NONDISPLACED MAISONNEUVE FRACTURE OF RIGHT LOWER EXTREMITY, INITIAL ENCOUNTER: ICD-10-CM

## 2021-03-17 DIAGNOSIS — E78.2 MIXED HYPERLIPIDEMIA: ICD-10-CM

## 2021-03-17 DIAGNOSIS — S52.502A CLOSED FRACTURE OF DISTAL ENDS OF LEFT RADIUS AND ULNA, INITIAL ENCOUNTER: Primary | ICD-10-CM

## 2021-03-17 PROCEDURE — 97140 MANUAL THERAPY 1/> REGIONS: CPT

## 2021-03-17 PROCEDURE — 99214 OFFICE O/P EST MOD 30 MIN: CPT | Performed by: PHYSICIAN ASSISTANT

## 2021-03-17 PROCEDURE — 97110 THERAPEUTIC EXERCISES: CPT

## 2021-03-17 PROCEDURE — 4004F PT TOBACCO SCREEN RCVD TLK: CPT | Performed by: PHYSICIAN ASSISTANT

## 2021-03-17 NOTE — PROGRESS NOTES
Assessment/Plan:      -Continue follow-up with Orthopedic surgery as advised   -continue calcium and vitamin-D supplements as advised   - I did recommend referral to Rheumatology because she may be a good candidate for Forteo   - message Dr Kat Briceno via tiger text to see if she would feel comfortable ordering a DEXA scan prior to her visit in preparation for her appointment on July 9th  - CMP and lipid panel ordered   - further treatment recommendations pending lab results    I have spent 25 minutes with Patient and family today in which greater than 50% of this time was spent in counseling/coordination of care regarding Diagnostic results, Prognosis, Risks and benefits of tx options, Intructions for management, Patient and family education, Importance of tx compliance, Risk factor reductions and Impressions  M*Modal software was used to dictate this note  It may contain errors with dictating incorrect words/spelling  Please contact provider directly for any questions  BMI Counseling: Body mass index is 26 78 kg/m²  The BMI is above normal  Nutrition recommendations include reducing portion sizes and decreasing overall calorie intake  Exercise recommendations include exercising 3-5 times per week  Diagnoses and all orders for this visit:    Closed fracture of distal ends of left radius and ulna, initial encounter  -     DXA bone density spine hip and pelvis; Future  -     Ambulatory referral to Rheumatology; Future    Closed nondisplaced Maisonneuve fracture of right lower extremity, initial encounter  -     Ambulatory referral to Rheumatology; Future    Nondisplaced fracture of medial malleolus of right tibia, initial encounter for closed fracture  -     Ambulatory referral to Rheumatology; Future    Mixed hyperlipidemia          Subjective:      Patient ID: Valentino Ganja is a 63 y o  female  Patient presents today for routine follow-up for hyperlipidemia    She is also following with Orthopedic surgery because of the fall and sustained multiple fractures in her right leg and wrist   She has never been tested for osteoporosis  She states that the orthopedic surgeon advised her to start taking calcium and vitamin-D  She states there is a family history of osteoporosis  The following portions of the patient's history were reviewed and updated as appropriate:   She  has a past medical history of Anemia, Arthritis, Carpal tunnel syndrome of right wrist, Chest pain, Colon polyp, Depression, DVT (deep venous thrombosis) ([facility] [address] ) (2012), Dysphagia, Esophageal reflux (5/23/2011), Grade 1 out of 6 intensity murmur (6/24/2019), Heart murmur, History of transfusion (1980), Pacemaker (11/01/2018), Pneumonia, Psychiatric disorder, and Trigger finger, left    She   Patient Active Problem List    Diagnosis Date Noted    Closed fracture of left distal radius and ulna 03/17/2021    Nondisplaced fracture of medial malleolus of right tibia, initial encounter for closed fracture 02/04/2021    Closed nondisplaced Maisonneuve's fracture of right leg 02/04/2021    Closed fracture of upper end of right fibula 02/04/2021    Depression 01/12/2021    Overweight 09/24/2020    Bariatric surgery status 09/24/2020    Encounter for surgical aftercare following surgery of digestive system 09/24/2020    Preop examination 08/03/2020    History of colon polyps 06/05/2020    Acute gastric ulcer without hemorrhage or perforation 05/12/2020    Dysphagia 05/07/2020    Right ear pain 01/21/2020    Leukopenia 01/07/2020    Anemia 01/07/2020    Chronic right shoulder pain 12/10/2019    Work related injury 12/10/2019    History of DVT (deep vein thrombosis) 10/15/2019    Iron deficiency anemia 10/15/2019    Vitamin deficiency 10/15/2019    Medicare annual wellness visit, subsequent 10/15/2019    Mass of soft tissue of right upper extremity 10/15/2019    Grade 1 out of 6 intensity murmur 06/24/2019    Encounter for screening mammogram for malignant neoplasm of breast 2019    Candida infection of flexural skin 2019    Status post carpal tunnel release 2019    HSV-1 infection 2018    Sick sinus syndrome ([facility] [address] ) 2018    Abnormal CT scan     Lichen sclerosus     Fibrocystic breast disease 2014    Mixed hyperlipidemia 2014    Knee osteoarthritis 2014    Menopausal and postmenopausal disorder 2014    Neck pain 2014     She  has a past surgical history that includes Knee surgery (Bilateral);  section; Cervical disc surgery (2009); Tonsillectomy; Other surgical history; Gastric bypass (2017); pr wrist arthroscop,release xvers lig (Right, 2018); pr incise finger tendon sheath (Left, 2018); Cardiac pacemaker placement (2018); Upper gastrointestinal endoscopy; Colonoscopy; Knee arthroscopy; Back surgery; and Knee surgery (Left, 2020)  Her family history includes Alzheimer's disease in her mother; Arthritis in her mother; Breast cancer (age of onset: 77) in her mother; Cancer in her maternal aunt and mother; Coronary artery disease in her father and mother; Diabetes type II in her father; Gout in her father; Heart disease in her father and mother; Hypertension in her father; No Known Problems in her daughter, maternal aunt, maternal aunt, maternal aunt, maternal aunt, maternal grandfather, maternal grandmother, paternal aunt, paternal aunt, paternal grandfather, paternal grandmother, sister, sister, and sister; Prostate cancer in her brother; Stroke in her mother  She  reports that she has been smoking cigarettes  She has been smoking about 0 25 packs per day  She has never used smokeless tobacco  She reports that she does not drink alcohol or use drugs    Current Outpatient Medications   Medication Sig Dispense Refill    Ascorbic Acid (VITAMIN C) 100 MG CHEW Chew      atorvastatin (LIPITOR) 10 mg tablet take 1 tablet by mouth once daily 90 tablet 2    B Complex Vitamins (VITAMIN B COMPLEX PO) Take 1 capsule by mouth      Biotin 10 MG TABS Take by mouth daily       Calcium Carbonate (CALCI-CHEW PO) Take 500 mg by mouth 2 (two) times a day      cholecalciferol (VITAMIN D3) 1,000 units tablet Take 5,000 Units by mouth 2 (two) times a day       clobetasol (TEMOVATE) 0 05 % cream Apply to affected areas daily x 12 weeks, then twice weekly for maintenance      Eliquis 2 5 MG Take 1 tablet (2 5 mg total) by mouth 2 (two) times a day 180 tablet 0    Ferrous Sulfate  (45 Fe) MG TBCR Take 1 tablet by mouth daily      Lysine HCl POWD Take by mouth      Multiple Vitamin (MULTIVITAMIN) capsule Take 1 capsule by mouth daily 2 chewies daily      nystatin (MYCOSTATIN) powder Apply topically 2 (two) times a day 30 g 0    fluticasone (FLONASE) 50 mcg/act nasal spray 2 sprays into each nostril daily (Patient not taking: Reported on 3/17/2021) 16 g 0     No current facility-administered medications for this visit        Current Outpatient Medications on File Prior to Visit   Medication Sig    Ascorbic Acid (VITAMIN C) 100 MG CHEW Chew    atorvastatin (LIPITOR) 10 mg tablet take 1 tablet by mouth once daily    B Complex Vitamins (VITAMIN B COMPLEX PO) Take 1 capsule by mouth    Biotin 10 MG TABS Take by mouth daily     Calcium Carbonate (CALCI-CHEW PO) Take 500 mg by mouth 2 (two) times a day    cholecalciferol (VITAMIN D3) 1,000 units tablet Take 5,000 Units by mouth 2 (two) times a day     clobetasol (TEMOVATE) 0 05 % cream Apply to affected areas daily x 12 weeks, then twice weekly for maintenance    Eliquis 2 5 MG Take 1 tablet (2 5 mg total) by mouth 2 (two) times a day    Ferrous Sulfate  (45 Fe) MG TBCR Take 1 tablet by mouth daily    Lysine HCl POWD Take by mouth    Multiple Vitamin (MULTIVITAMIN) capsule Take 1 capsule by mouth daily 2 chewies daily    nystatin (MYCOSTATIN) powder Apply topically 2 (two) times a day    fluticasone (FLONASE) 50 mcg/act nasal spray 2 sprays into each nostril daily (Patient not taking: Reported on 3/17/2021)     No current facility-administered medications on file prior to visit  She has No Known Allergies       Review of Systems   Constitutional: Negative  Respiratory: Negative for cough and shortness of breath  Cardiovascular: Negative for chest pain  Gastrointestinal: Negative for abdominal pain and blood in stool  Musculoskeletal:         As stated in HPI         Objective:      /60 (BP Location: Left arm, Patient Position: Sitting, Cuff Size: Standard)   Pulse 94   Temp 97 6 °F (36 4 °C) (Tympanic)   Resp 16   Ht 5' 4" (1 626 m)   Wt 70 8 kg (156 lb)   LMP 09/18/2014   SpO2 97%   BMI 26 78 kg/m²          Physical Exam  Constitutional:       General: She is not in acute distress  Appearance: Normal appearance  She is well-developed  She is not ill-appearing, toxic-appearing or diaphoretic  HENT:      Head: Normocephalic and atraumatic  Right Ear: Tympanic membrane, ear canal and external ear normal       Left Ear: Tympanic membrane, ear canal and external ear normal    Neck:      Musculoskeletal: Neck supple  Thyroid: No thyromegaly  Cardiovascular:      Rate and Rhythm: Normal rate and regular rhythm  Heart sounds: Normal heart sounds  No murmur  No friction rub  No gallop  Pulmonary:      Effort: Pulmonary effort is normal  No respiratory distress  Breath sounds: Normal breath sounds  No wheezing or rales  Abdominal:      General: Bowel sounds are normal       Palpations: Abdomen is soft  There is no mass  Tenderness: There is no abdominal tenderness  Musculoskeletal:         General: No deformity  Lymphadenopathy:      Cervical: No cervical adenopathy  Skin:     General: Skin is warm  Neurological:      General: No focal deficit present  Mental Status: She is alert     Psychiatric: Mood and Affect: Mood normal

## 2021-03-17 NOTE — PROGRESS NOTES
Daily Note     Today's date: 3/17/2021  Patient name: Olinda Cuevas  : 1960  MRN: [id number]  Referring provider: Carla Jacobson PA-C  Dx:   Encounter Diagnosis     ICD-10-CM    1  Closed torus fracture of proximal end of right fibula with routine healing, subsequent encounter  X98 545I                   Subjective: pt reports she started weaning out of boot 2 hrs/day yesterday and did ok with min discomfort and pt states she understand MD boot weaning protocol      Objective: See treatment diary below      Assessment: pt amb in B shoes without AD and antalgic gait, pt's gait with decreased R anterolateral wt shift, progressed to biodex st shift and nustep with min discomfort, min swelling noted R foot/ankle      Plan: Continue per plan of care  Progress treatment as tolerated         Precautions: WBAT until cleared by MD to transition out of boot, pacemaker     Manuals 3 5  3 8  3/17/21                 Ankle range FB  FB  VK                                                                                         Neuro Re-Ed      3/17/21                 biodex      wt shift 5' up to 100                                                                                                                                                                 Ther Ex 3 5  3 8  3/17/21                 Supine calf stretch 5x10"  5x15"  5x20"                 PF/DF AROM 2x10  HEP                   Inv AROM 2x10  HEP                    SLR flexion    3x10  3x10                  PF    2x15, YTB  ytb 3x12                  clam shell   3x10  3x10                  Nu Step seat 7      5'                                         Ther Activity                                                                       Gait Training                                                                       Modalities

## 2021-03-26 ENCOUNTER — APPOINTMENT (OUTPATIENT)
Dept: LAB | Age: 61
End: 2021-03-26
Attending: PHYSICIAN ASSISTANT
Payer: COMMERCIAL

## 2021-03-26 ENCOUNTER — OFFICE VISIT (OUTPATIENT)
Dept: PHYSICAL THERAPY | Age: 61
End: 2021-03-26
Payer: COMMERCIAL

## 2021-03-26 ENCOUNTER — TRANSCRIBE ORDERS (OUTPATIENT)
Dept: ADMINISTRATIVE | Age: 61
End: 2021-03-26

## 2021-03-26 DIAGNOSIS — S82.811D CLOSED TORUS FRACTURE OF PROXIMAL END OF RIGHT FIBULA WITH ROUTINE HEALING, SUBSEQUENT ENCOUNTER: Primary | ICD-10-CM

## 2021-03-26 DIAGNOSIS — E78.2 MIXED HYPERLIPIDEMIA: Primary | ICD-10-CM

## 2021-03-26 LAB
ALBUMIN SERPL BCP-MCNC: 3.7 G/DL (ref 3.5–5)
ALP SERPL-CCNC: 84 U/L (ref 46–116)
ALT SERPL W P-5'-P-CCNC: 33 U/L (ref 12–78)
ANION GAP SERPL CALCULATED.3IONS-SCNC: 4 MMOL/L (ref 4–13)
AST SERPL W P-5'-P-CCNC: 19 U/L (ref 5–45)
BILIRUB SERPL-MCNC: 0.84 MG/DL (ref 0.2–1)
BUN SERPL-MCNC: 13 MG/DL (ref 5–25)
CALCIUM SERPL-MCNC: 9.4 MG/DL (ref 8.3–10.1)
CHLORIDE SERPL-SCNC: 109 MMOL/L (ref 100–108)
CHOLEST SERPL-MCNC: 169 MG/DL (ref 50–200)
CO2 SERPL-SCNC: 29 MMOL/L (ref 21–32)
CREAT SERPL-MCNC: 0.5 MG/DL (ref 0.6–1.3)
GFR SERPL CREATININE-BSD FRML MDRD: 105 ML/MIN/1.73SQ M
GLUCOSE P FAST SERPL-MCNC: 100 MG/DL (ref 65–99)
HDLC SERPL-MCNC: 88 MG/DL
LDLC SERPL CALC-MCNC: 66 MG/DL (ref 0–100)
NONHDLC SERPL-MCNC: 81 MG/DL
POTASSIUM SERPL-SCNC: 3.4 MMOL/L (ref 3.5–5.3)
PROT SERPL-MCNC: 6.9 G/DL (ref 6.4–8.2)
SODIUM SERPL-SCNC: 142 MMOL/L (ref 136–145)
TRIGL SERPL-MCNC: 74 MG/DL

## 2021-03-26 PROCEDURE — 80061 LIPID PANEL: CPT | Performed by: PHYSICIAN ASSISTANT

## 2021-03-26 PROCEDURE — 80053 COMPREHEN METABOLIC PANEL: CPT | Performed by: PHYSICIAN ASSISTANT

## 2021-03-26 PROCEDURE — 97140 MANUAL THERAPY 1/> REGIONS: CPT

## 2021-03-26 PROCEDURE — 97110 THERAPEUTIC EXERCISES: CPT

## 2021-03-26 PROCEDURE — 36415 COLL VENOUS BLD VENIPUNCTURE: CPT | Performed by: PHYSICIAN ASSISTANT

## 2021-03-26 NOTE — PROGRESS NOTES
Daily Note     Today's date: 3/26/2021  Patient name: Paulette Joseph  : 1960  MRN: [id number]  Referring provider: Daja Orozco PA-C  Dx:   Encounter Diagnosis     ICD-10-CM    1  Closed torus fracture of proximal end of right fibula with routine healing, subsequent encounter  Y83 574J                   Subjective: pt reports going down steps and her sneaker ot stuck and she lost her balance but was able to recover without falling, pt reports she was a little sore after that but ok      Objective: See treatment diary below      Assessment: pt's R ankle/foot with no significant swelling, amanda PROM without any problems, pt arrived to clinic with B sneakers on, pt weaned out of boot last weekend, recommended pt wear good, supportive sneakers with lower heel than she is wearing, for stability and safety, pt amb with decreased wt shift and too off on R       Plan: Continue per plan of care  Progress treatment as tolerated         Precautions: WBAT until cleared by MD to transition out of boot, pacemaker     Manuals 3 5  3 8  3/17/21  3/26/21               Ankle range FB  FB  VK VK                                                                                       Neuro Re-Ed      3/17/21  3/26/21               biodex      wt shift 5' up to 100 5' up to 100% WB                biodex LOS        static lvl 1 x2                                                                                                                                       Ther Ex 3 5  3 8  3/17/21 3/26/21               Supine calf stretch 5x10"  5x15"  5x20"  5x20"               PF/DF AROM 2x10  HEP                   Inv AROM 2x10  HEP                    SLR flexion    3x10  3x10  3x10                PF    2x15, YTB  ytb 3x12  ytb  3x12                clam shell   3x10  3x10  3x10x3"                Nu Step seat 7      5'  5'                                       Ther Activity                                                                       Gait Training                                                                       Modalities

## 2021-03-29 ENCOUNTER — APPOINTMENT (OUTPATIENT)
Dept: RADIOLOGY | Facility: MEDICAL CENTER | Age: 61
End: 2021-03-29
Payer: COMMERCIAL

## 2021-03-29 ENCOUNTER — TELEPHONE (OUTPATIENT)
Dept: FAMILY MEDICINE CLINIC | Facility: CLINIC | Age: 61
End: 2021-03-29

## 2021-03-29 ENCOUNTER — OFFICE VISIT (OUTPATIENT)
Dept: OBGYN CLINIC | Facility: MEDICAL CENTER | Age: 61
End: 2021-03-29

## 2021-03-29 VITALS
BODY MASS INDEX: 26.63 KG/M2 | SYSTOLIC BLOOD PRESSURE: 139 MMHG | HEART RATE: 87 BPM | HEIGHT: 64 IN | WEIGHT: 156 LBS | DIASTOLIC BLOOD PRESSURE: 79 MMHG

## 2021-03-29 DIAGNOSIS — S52.602D CLOSED FRACTURE OF DISTAL ENDS OF LEFT RADIUS AND ULNA WITH ROUTINE HEALING, SUBSEQUENT ENCOUNTER: Primary | ICD-10-CM

## 2021-03-29 DIAGNOSIS — S52.502A CLOSED FRACTURE OF DISTAL ENDS OF LEFT RADIUS AND ULNA, INITIAL ENCOUNTER: ICD-10-CM

## 2021-03-29 DIAGNOSIS — S52.502D CLOSED FRACTURE OF DISTAL ENDS OF LEFT RADIUS AND ULNA WITH ROUTINE HEALING, SUBSEQUENT ENCOUNTER: Primary | ICD-10-CM

## 2021-03-29 DIAGNOSIS — S52.602A CLOSED FRACTURE OF DISTAL ENDS OF LEFT RADIUS AND ULNA, INITIAL ENCOUNTER: ICD-10-CM

## 2021-03-29 PROCEDURE — 99024 POSTOP FOLLOW-UP VISIT: CPT | Performed by: ORTHOPAEDIC SURGERY

## 2021-03-29 PROCEDURE — 3008F BODY MASS INDEX DOCD: CPT | Performed by: PHYSICIAN ASSISTANT

## 2021-03-29 PROCEDURE — 73110 X-RAY EXAM OF WRIST: CPT

## 2021-03-29 NOTE — TELEPHONE ENCOUNTER
----- Message from Libby Dubose PA-C sent at 3/29/2021 12:18 PM EDT -----  Blood work is normal for kidneys, liver, blood sugar  Her potassium level has improved from 3 1 up to 3 4  Normal is 3 5-5 5  Please advise her to eat a potassium rich food daily    Cholesterol panel is normal

## 2021-03-29 NOTE — TELEPHONE ENCOUNTER
Per Ermelinda Feliciano at [facility] No auth required for DXA bone density spine, hip and pelvis (40985)  Ref # [id number]      Pt aware, she already sched test

## 2021-03-29 NOTE — PROGRESS NOTES
CHIEF COMPLAINT:  Chief Complaint   Patient presents with    Left Wrist - Follow-up       SUBJECTIVE:  Mari Young is a 63y o  year old  female who presents to the office for a follow up for left distal radius and ulna styloid fracture sustained 2021  Pt was initially seen in the office 3/8/2021 where she was placed into a [address]  PT states that she has ulna sided discomfort with forearm rotation  Pt has no other complaints at this time         PAST MEDICAL HISTORY:  Past Medical History:   Diagnosis Date    Anemia     iron def    Arthritis     knees    Carpal tunnel syndrome of right wrist     Chest pain     Colon polyp     Depression     at times    DVT (deep venous thrombosis) ([facility] [address] )     Dysphagia     Esophageal reflux 2011    Grade 1 out of 6 intensity murmur 2019    Heart murmur     History of transfusion 1980    Pacemaker 2018    Pneumonia     age 15    Psychiatric disorder     Trigger finger, left        PAST SURGICAL HISTORY:  Past Surgical History:   Procedure Laterality Date    BACK SURGERY      CARDIAC PACEMAKER PLACEMENT  2018    CERVICAL DISC SURGERY  2009    PLATES & SCREWS     SECTION      X2    COLONOSCOPY      GASTRIC BYPASS  2017    LAP RINYGB SURGERY    KNEE ARTHROSCOPY      KNEE SURGERY Bilateral     KNEE SURGERY Left 2020    OTHER SURGICAL HISTORY      ARM SURGERIES    LA INCISE FINGER TENDON SHEATH Left 2018    Procedure: LONG TRIGGER FINGER RELEASE;  Surgeon: Tapan Garcia MD;  Location: [facility];  Service: Orthopedics    LA WRIST Theresa Reek LIG Right 2018    Procedure: ENDOSCOPIC CARPAL TUNNEL RELEASE;  Surgeon: Tapan Garcia MD;  Location: [facility];  Service: Orthopedics    TONSILLECTOMY      UPPER GASTROINTESTINAL ENDOSCOPY         FAMILY HISTORY:  Family History   Problem Relation Age of Onset    Stroke Mother     Alzheimer's disease Mother     Arthritis Mother    Vj Jacob Coronary artery disease Mother     Breast cancer Mother 77    Cancer Mother     Heart disease Mother     Hypertension Father     Gout Father     Diabetes type II Father     Coronary artery disease Father     Heart disease Father     No Known Problems Sister     No Known Problems Daughter     No Known Problems Maternal Grandmother     No Known Problems Maternal Grandfather     No Known Problems Paternal Grandmother     No Known Problems Paternal Grandfather     Prostate cancer Brother     No Known Problems Sister     No Known Problems Sister     No Known Problems Maternal Aunt     No Known Problems Maternal Aunt     No Known Problems Maternal Aunt     No Known Problems Maternal Aunt     Cancer Maternal Aunt     No Known Problems Paternal Aunt     No Known Problems Paternal Aunt        SOCIAL HISTORY:  Social History     Tobacco Use    Smoking status: Current Every Day Smoker     Packs/day: 0 25     Types: Cigarettes    Smokeless tobacco: Never Used    Tobacco comment: quit "many years ago"   Substance Use Topics    Alcohol use: Never     Alcohol/week: 2 0 standard drinks     Types: 1 Glasses of wine, 1 Standard drinks or equivalent per week     Frequency: Never     Comment: very rare social    Drug use: No       MEDICATIONS:    Current Outpatient Medications:     Ascorbic Acid (VITAMIN C) 100 MG CHEW, Chew, Disp: , Rfl:     atorvastatin (LIPITOR) 10 mg tablet, take 1 tablet by mouth once daily, Disp: 90 tablet, Rfl: 2    B Complex Vitamins (VITAMIN B COMPLEX PO), Take 1 capsule by mouth, Disp: , Rfl:     Biotin 10 MG TABS, Take by mouth daily , Disp: , Rfl:     Calcium Carbonate (CALCI-CHEW PO), Take 500 mg by mouth 2 (two) times a day, Disp: , Rfl:     cholecalciferol (VITAMIN D3) 1,000 units tablet, Take 5,000 Units by mouth 2 (two) times a day , Disp: , Rfl:     clobetasol (TEMOVATE) 0 05 % cream, Apply to affected areas daily x 12 weeks, then twice weekly for maintenance, Disp: , Rfl:     Eliquis 2 5 MG, Take 1 tablet (2 5 mg total) by mouth 2 (two) times a day, Disp: 180 tablet, Rfl: 0    Ferrous Sulfate  (45 Fe) MG TBCR, Take 1 tablet by mouth daily, Disp: , Rfl:     Lysine HCl POWD, Take by mouth, Disp: , Rfl:     Multiple Vitamin (MULTIVITAMIN) capsule, Take 1 capsule by mouth daily 2 chewies daily, Disp: , Rfl:     nystatin (MYCOSTATIN) powder, Apply topically 2 (two) times a day, Disp: 30 g, Rfl: 0    ALLERGIES:  No Known Allergies    REVIEW OF SYSTEMS:  Review of Systems    VITALS:  Vitals:    03/29/21 1116   BP: 139/79   Pulse: 87       LABS:  HgA1c:   Lab Results   Component Value Date    HGBA1C 5 7 (H) 05/07/2020     BMP:   Lab Results   Component Value Date    CALCIUM 9 4 03/26/2021    K 3 4 (L) 03/26/2021    CO2 29 03/26/2021     (H) 03/26/2021    BUN 13 03/26/2021    CREATININE 0 50 (L) 03/26/2021       _____________________________________________________  PHYSICAL EXAMINATION:  General: well developed and well nourished, alert, oriented times 3 and appears comfortable  Psychiatric: Normal  HEENT: Trachea Midline, No torticollis  Pulmonary: No audible wheezing or strider  Cardiovascular: No discernable arrhythmia   Skin: No masses, erythema, lacerations, fluctation, ulcerations  Neurovascular: Sensation Intact to the Median, Ulnar, Radial Nerve, Motor Intact to the Median, Ulnar, Radial Nerve and Pulses Intact    MUSCULOSKELETAL EXAMINATION:  Left wrist    no swelling erythema or ecchymosis  Skin intact   Mildly tender palpation over the fracture site    ___________________________________________________  STUDIES REVIEWED:   x-rays of left wrist today showed  Distal radius fracture in maintained stable alignment  Fracture line remains visible      PROCEDURES PERFORMED:  Procedures  No Procedures performed today    _____________________________________________________  ASSESSMENT/PLAN:    Left distal radius and ulna styloid fx 2/4/2021  - SAC was removed prior to xrays   - Pt was provided with universal wrist splint to wear when outside of home and with activity  -OT order was placed for motion   - PT will follow up in the office in 6 weeks for reevaluation      Follow Up:  Return in about 6 weeks (around 5/10/2021)          To Do Next Visit:  Re-evaluation of current issue        Scribe Attestation    I,:  Freya Oliver am acting as a scribe while in the presence of the attending physician :       I,:  Arlene So MD personally performed the services described in this documentation    as scribed in my presence :

## 2021-03-30 DIAGNOSIS — Z23 ENCOUNTER FOR IMMUNIZATION: ICD-10-CM

## 2021-03-31 ENCOUNTER — APPOINTMENT (OUTPATIENT)
Dept: PHYSICAL THERAPY | Age: 61
End: 2021-03-31
Payer: COMMERCIAL

## 2021-04-01 ENCOUNTER — OFFICE VISIT (OUTPATIENT)
Dept: PHYSICAL THERAPY | Age: 61
End: 2021-04-01
Payer: COMMERCIAL

## 2021-04-01 ENCOUNTER — TELEPHONE (OUTPATIENT)
Dept: FAMILY MEDICINE CLINIC | Facility: CLINIC | Age: 61
End: 2021-04-01

## 2021-04-01 DIAGNOSIS — S52.502D CLOSED FRACTURE OF DISTAL ENDS OF LEFT RADIUS AND ULNA WITH ROUTINE HEALING, SUBSEQUENT ENCOUNTER: Primary | ICD-10-CM

## 2021-04-01 DIAGNOSIS — S82.811D CLOSED TORUS FRACTURE OF PROXIMAL END OF RIGHT FIBULA WITH ROUTINE HEALING, SUBSEQUENT ENCOUNTER: ICD-10-CM

## 2021-04-01 DIAGNOSIS — S52.602D CLOSED FRACTURE OF DISTAL ENDS OF LEFT RADIUS AND ULNA WITH ROUTINE HEALING, SUBSEQUENT ENCOUNTER: Primary | ICD-10-CM

## 2021-04-01 PROCEDURE — 97110 THERAPEUTIC EXERCISES: CPT | Performed by: PHYSICAL THERAPIST

## 2021-04-01 PROCEDURE — 97140 MANUAL THERAPY 1/> REGIONS: CPT | Performed by: PHYSICAL THERAPIST

## 2021-04-01 PROCEDURE — 97164 PT RE-EVAL EST PLAN CARE: CPT | Performed by: PHYSICAL THERAPIST

## 2021-04-01 NOTE — TELEPHONE ENCOUNTER
I called pt to see if she needed assistance to schedule her covid vaccine and she is already scheduled for 4/3/21

## 2021-04-01 NOTE — PROGRESS NOTES
Re-evaluation/Plan of Care    Today's date: 2021  Patient name: Marcela Limon  : 1960  MRN: [id number]  Referring provider: Corina Thacker MD Dx:   Encounter Diagnosis     ICD-10-CM    1  Closed torus fracture of proximal end of right fibula with routine healing, subsequent encounter  S82 811D    2  Closed fracture of distal ends of left radius and ulna with routine healing, subsequent encounter  S52 502D     S52 602D               Subjective: pt reports having wrist pain following fall 21 and getting clearance to start therapy  Pain wrist: current: 4/10 ache         Worst: 6/10    Short Term:  1  Pt will report decreased levels of pain by at least 2 subjective ratings in 4 weeks  2  Pt will demonstrate improved ROM by at least 10 degrees in 4 weeks  3  Pt will demonstrate improved strength by 1/2 grade in 4 weeks  4  Pt will be able to open door handle without limitations in 4 weeks  5  Patient will be able to carry 5 pound item in 8 weeks  Objective: See treatment diary below               MMT    Elbow       R       L   Flex  5 5   Extn  5 5   Sup  5 4   Pron  5 4                   MMT         AROM          PROM    Wrist       R         L        R         L          R         L   Flex  5 3+ 55 40 60 48   Extn  5 3+ 50 40 60 50   Rad  Dev  5 3+       Ulnar Dev  5 3+ 12 8 18 14    bent 5 4        straight 5 4           Wrist/hand joint mobility: decreased distal row    Assessment: pt demonstrates improved ankle mobility but has limited tolerance to weight-bearing based exercises  Unable to tolerate addition of squat to exercise program  Good tolerance to step up exercise  With regards to wrist, patient demonstrates decreased strength and ROM as anticipated leading to limitations with ADLs  Patient is right hand dominant and is now using splint LUE when in the community  Plan: 8 weeks, 1-2 visits per week PRN       Precautions: pacemaker, WBAT 21     Manuals 3 5  3 8  3/17/21  3/26/21  4/1     Ankle range FB  FB  VK VK  FB      wrist ROM         FB                                     Neuro Re-Ed      3/17/21  3/26/21  4/1     biodex      wt shift 5' up to 100% 5' up to 100% WB        biodex LOS        static lvl 1 x2                                                                                       Ther Ex 3 5  3 8  3/17/21 3/26/21  4/1     Supine calf stretch 5x10"  5x15"  5x20"  5x20"  5x20''      SLR flexion    3x10  3x10  3x10        PF    2x15, YTB  ytb 3x12  ytb  3x12  3x15 RTB      clam shell   3x10  3x10  3x10x3"  3x10, 3"     bike     8'    4-way wrist AROM     2x10 ea      eversion     3x15 RTB    Step up     3x10, 6"                                                        Ther Activity                                               Gait Training                                               Modalities

## 2021-04-03 ENCOUNTER — IMMUNIZATIONS (OUTPATIENT)
Dept: FAMILY MEDICINE CLINIC | Facility: HOSPITAL | Age: 61
End: 2021-04-03

## 2021-04-03 DIAGNOSIS — Z23 ENCOUNTER FOR IMMUNIZATION: Primary | ICD-10-CM

## 2021-04-03 PROCEDURE — 0001A SARS-COV-2 / COVID-19 MRNA VACCINE (PFIZER-BIONTECH) 30 MCG: CPT

## 2021-04-03 PROCEDURE — 91300 SARS-COV-2 / COVID-19 MRNA VACCINE (PFIZER-BIONTECH) 30 MCG: CPT

## 2021-04-09 ENCOUNTER — OFFICE VISIT (OUTPATIENT)
Dept: PHYSICAL THERAPY | Age: 61
End: 2021-04-09
Payer: COMMERCIAL

## 2021-04-09 DIAGNOSIS — S52.502D CLOSED FRACTURE OF DISTAL ENDS OF LEFT RADIUS AND ULNA WITH ROUTINE HEALING, SUBSEQUENT ENCOUNTER: ICD-10-CM

## 2021-04-09 DIAGNOSIS — S82.811D CLOSED TORUS FRACTURE OF PROXIMAL END OF RIGHT FIBULA WITH ROUTINE HEALING, SUBSEQUENT ENCOUNTER: Primary | ICD-10-CM

## 2021-04-09 DIAGNOSIS — S52.602D CLOSED FRACTURE OF DISTAL ENDS OF LEFT RADIUS AND ULNA WITH ROUTINE HEALING, SUBSEQUENT ENCOUNTER: ICD-10-CM

## 2021-04-09 PROCEDURE — 97112 NEUROMUSCULAR REEDUCATION: CPT

## 2021-04-09 PROCEDURE — 97110 THERAPEUTIC EXERCISES: CPT

## 2021-04-09 PROCEDURE — 97140 MANUAL THERAPY 1/> REGIONS: CPT

## 2021-04-09 NOTE — PROGRESS NOTES
Daily Note     Today's date: 2021  Patient name: Arsenio Cruz  : 1960  MRN: [id number]  Referring provider: Devan West PA-C  Dx:   Encounter Diagnosis     ICD-10-CM    1  Closed torus fracture of proximal end of right fibula with routine healing, subsequent encounter  S82 811D    2  Closed fracture of distal ends of left radius and ulna with routine healing, subsequent encounter  S52 502D     S52 602D                   Subjective: pt reports she started driving last week with no problems to reports, pt reports HEP compliance      Objective: See treatment diary below      Assessment: tightness noted with L wrist supination, pt amb with less antalgia, pt performed wt shift with less difficulty      Plan: Continue per plan of care  Progress treatment as tolerated  Precautions: pacemaker, WBAT 21     Manuals 3 5  3 8  3/17/21  3/26/21  4/1  4/9/21   Ankle range FB  FB  VK VK  FB  VK    wrist ROM         FB  VK                                   Neuro Re-Ed      3/17/21  3/26/21  4/1  4/9/21   biodex      wt shift 5' up to 100% 5' up to 100% WB        biodex LOS        static lvl 1 x2    static lvl 2 x3    biodex maze           Static lvl 1 x3                                                                   Ther Ex 3 5  3 8  3/17/21 3/26/21  421   Supine calf stretch 5x10"  5x15"  5x20"  5x20"  5x20''  5x20"    SLR flexion    3x10  3x10  3x10        PF    2x15, YTB  ytb 3x12  ytb  3x12  3x15 RTB  [id number]    clam shell   3x10  3x10  3x10x3"  3x10, 3"  3x10x3"   bike     8'    4-way wrist AROM     2x10 ea   2x10 ea   eversion     3x15 RTB rtb 3x15   Step up     3x10, 6" 6" 3x12                                                       Ther Activity                                               Gait Training                                               Modalities

## 2021-04-14 ENCOUNTER — HOSPITAL ENCOUNTER (OUTPATIENT)
Dept: RADIOLOGY | Facility: IMAGING CENTER | Age: 61
Discharge: HOME | End: 2021-04-14
Payer: COMMERCIAL

## 2021-04-14 ENCOUNTER — OFFICE VISIT (OUTPATIENT)
Dept: PHYSICAL THERAPY | Age: 61
End: 2021-04-14
Payer: COMMERCIAL

## 2021-04-14 DIAGNOSIS — S52.502D CLOSED FRACTURE OF DISTAL ENDS OF LEFT RADIUS AND ULNA WITH ROUTINE HEALING, SUBSEQUENT ENCOUNTER: ICD-10-CM

## 2021-04-14 DIAGNOSIS — S82.54XA NONDISPLACED FRACTURE OF MEDIAL MALLEOLUS OF RIGHT TIBIA, INITIAL ENCOUNTER FOR CLOSED FRACTURE: ICD-10-CM

## 2021-04-14 DIAGNOSIS — M80.00XA OSTEOPOROSIS WITH PATHOLOGICAL FRACTURE, INITIAL ENCOUNTER: ICD-10-CM

## 2021-04-14 DIAGNOSIS — S82.811D CLOSED TORUS FRACTURE OF PROXIMAL END OF RIGHT FIBULA WITH ROUTINE HEALING, SUBSEQUENT ENCOUNTER: Primary | ICD-10-CM

## 2021-04-14 DIAGNOSIS — S52.602D CLOSED FRACTURE OF DISTAL ENDS OF LEFT RADIUS AND ULNA WITH ROUTINE HEALING, SUBSEQUENT ENCOUNTER: ICD-10-CM

## 2021-04-14 DIAGNOSIS — S52.502A CLOSED FRACTURE OF DISTAL ENDS OF LEFT RADIUS AND ULNA, INITIAL ENCOUNTER: ICD-10-CM

## 2021-04-14 DIAGNOSIS — S52.602A CLOSED FRACTURE OF DISTAL ENDS OF LEFT RADIUS AND ULNA, INITIAL ENCOUNTER: ICD-10-CM

## 2021-04-14 DIAGNOSIS — S82.864A CLOSED NONDISPLACED MAISONNEUVE FRACTURE OF RIGHT LOWER EXTREMITY, INITIAL ENCOUNTER: ICD-10-CM

## 2021-04-14 PROCEDURE — 97110 THERAPEUTIC EXERCISES: CPT

## 2021-04-14 PROCEDURE — 97140 MANUAL THERAPY 1/> REGIONS: CPT

## 2021-04-14 PROCEDURE — 97112 NEUROMUSCULAR REEDUCATION: CPT

## 2021-04-14 PROCEDURE — 77080 DXA BONE DENSITY AXIAL: CPT

## 2021-04-14 NOTE — PROGRESS NOTES
Daily Note     Today's date: 2021  Patient name: Erika Jacobson  : 1960  MRN: [id number]  Referring provider: Shane Ramirez PA-C  Dx:   Encounter Diagnosis     ICD-10-CM    1  Closed torus fracture of proximal end of right fibula with routine healing, subsequent encounter  S82 811D    2  Closed fracture of distal ends of left radius and ulna with routine healing, subsequent encounter  S52 502D     S52 602D                   Subjective: pt reports she's trying to use L UE more as she is able to amanda    Objective: See treatment diary below  Pt ed for HEP and to progress gentle stretching of L wrist as amanda, also added HR    Assessment:  Some tightness noted at [facility] which improved with PT manual intervention, pt amb without any A D or boot with slight antalgia, pt cont to wear L wrist splint      Plan: Continue per plan of care  Progress treatment as tolerated  try standing calf stretch next visit, progress balance ex as amanda     Precautions: pacemaker, WBAT 21     Manuals  3/17/21  3/26/21  4/1  4/9/21 4 14   Ankle range  VK VK  FB  VK VK    wrist ROM     FB  VK VK   Sub talar mobs         FB grade 2-3                Neuro Re-Ed  3/17/21  3/26/21  421 4 14    biodex LOS    static lvl 1 x2    static lvl 2 x3     biodex maze       Static lvl 1 x3     wt shift to SLS         4x15"   Tandem stance         2x20" ea BL                             Ther Ex  3/17/21 3/26/21  421 4 14   Supine calf stretch  5x20"  5x20"  5x20''  5x20" 5x30"    SLR flexion  3x10  3x10         PF   ytb 3x12  ytb  3x12  3x15 RTB  [id number] gtb 2x10 ea (pf/df/ev/inv)    clam shell  3x10  3x10x3"  3x10, 3"  3x10x3" ytb 3x10   bike   8'  8'   4-way wrist AROM   2x10 ea   2x10 ea 20x ea   eversion   3x15 RTB rtb 3x15 above   Step up   3x10, 6" 6" 3x12 6" 3x10   HR     10x                                        Ther Activity                                      Gait Training                                   Modalities

## 2021-04-15 ENCOUNTER — TELEPHONE (OUTPATIENT)
Dept: FAMILY MEDICINE CLINIC | Facility: CLINIC | Age: 61
End: 2021-04-15

## 2021-04-15 NOTE — TELEPHONE ENCOUNTER
----- Message from Dave Fontenot PA-C sent at 4/14/2021 12:36 PM EDT -----  Please let her know that her DEXA scan does reveal osteopenia which is a step above osteoporosis  It did get worse from a previous study  She needs to continue on the calcium at least 2562-0254 mg daily  And the extra supplemental vitamin- D  Follow-up with rheumatology as scheduled in July

## 2021-04-15 NOTE — TELEPHONE ENCOUNTER
Pt aware of results and did already start on the vitamin d and calcium   She will also follow up with rheumatologist as scheduled

## 2021-04-23 ENCOUNTER — OFFICE VISIT (OUTPATIENT)
Dept: OBGYN CLINIC | Facility: CLINIC | Age: 61
End: 2021-04-23
Payer: COMMERCIAL

## 2021-04-23 ENCOUNTER — OFFICE VISIT (OUTPATIENT)
Dept: PHYSICAL THERAPY | Age: 61
End: 2021-04-23
Payer: COMMERCIAL

## 2021-04-23 ENCOUNTER — APPOINTMENT (OUTPATIENT)
Dept: RADIOLOGY | Facility: AMBULARY SURGERY CENTER | Age: 61
End: 2021-04-23
Attending: ORTHOPAEDIC SURGERY
Payer: COMMERCIAL

## 2021-04-23 VITALS
WEIGHT: 156 LBS | DIASTOLIC BLOOD PRESSURE: 70 MMHG | HEIGHT: 64 IN | BODY MASS INDEX: 26.63 KG/M2 | HEART RATE: 82 BPM | SYSTOLIC BLOOD PRESSURE: 110 MMHG

## 2021-04-23 DIAGNOSIS — S82.831A OTHER CLOSED FRACTURE OF PROXIMAL END OF RIGHT FIBULA, INITIAL ENCOUNTER: Primary | ICD-10-CM

## 2021-04-23 DIAGNOSIS — S82.811D CLOSED TORUS FRACTURE OF PROXIMAL END OF RIGHT FIBULA WITH ROUTINE HEALING, SUBSEQUENT ENCOUNTER: Primary | ICD-10-CM

## 2021-04-23 DIAGNOSIS — S82.831A OTHER CLOSED FRACTURE OF PROXIMAL END OF RIGHT FIBULA, INITIAL ENCOUNTER: ICD-10-CM

## 2021-04-23 DIAGNOSIS — S52.502D CLOSED FRACTURE OF DISTAL ENDS OF LEFT RADIUS AND ULNA WITH ROUTINE HEALING, SUBSEQUENT ENCOUNTER: ICD-10-CM

## 2021-04-23 DIAGNOSIS — S52.602D CLOSED FRACTURE OF DISTAL ENDS OF LEFT RADIUS AND ULNA WITH ROUTINE HEALING, SUBSEQUENT ENCOUNTER: ICD-10-CM

## 2021-04-23 PROCEDURE — 4004F PT TOBACCO SCREEN RCVD TLK: CPT | Performed by: ORTHOPAEDIC SURGERY

## 2021-04-23 PROCEDURE — 99213 OFFICE O/P EST LOW 20 MIN: CPT | Performed by: ORTHOPAEDIC SURGERY

## 2021-04-23 PROCEDURE — 97140 MANUAL THERAPY 1/> REGIONS: CPT | Performed by: PHYSICAL THERAPIST

## 2021-04-23 PROCEDURE — 73610 X-RAY EXAM OF ANKLE: CPT

## 2021-04-23 PROCEDURE — 97110 THERAPEUTIC EXERCISES: CPT | Performed by: PHYSICAL THERAPIST

## 2021-04-23 NOTE — LETTER
April 23, 2021     Patient: Mehreen Cohn   YOB: 1960   Date of Visit: 4/23/2021       To Whom it May Concern:    Mehreen Cohn is under my professional care  She was seen in my office on 4/23/2021  She may return to work on 5/22/21 without resctrictions  If you have any questions or concerns, please don't hesitate to call           Sincerely,          Ino Vang MD        CC: Mehreen Cohn

## 2021-04-23 NOTE — PROGRESS NOTES
ALIREZA Sahu  Attending, Orthopaedic Surgery  [facility] [address] [facility] [address] [facility]      ORTHOPAEDIC FOOT AND ANKLE CLINIC VISIT     Assessment:     Encounter Diagnosis   Name Primary?  Other closed fracture of proximal end of right fibula, initial encounter Yes            Plan:   · The patient verbalized understanding of exam findings and treatment plan  We engaged in the shared decision-making process and treatment options were discussed at length with the patient  Surgical and conservative management discussed today along with risks and benefits  · [place] Flank has continued to improve and is nearing 90% at this point from her injury  · Advised she may discontinue taking Vitamin D3 and calcium or return to previous regimen  · Continue PT and incorporate work hardening over the next 4 weeks for her anticipated return to work  · Continue Compression stocking as needed for swelling control  · Work note was provided with return in to full duty without restrictions in one month  · We will see the patient back on an as needed basis or if symptoms worsen  History of Present Illness:   Chief Complaint:   Chief Complaint   Patient presents with    Right Foot - Pain, Follow-up     Rakel Hodgson is a 63 y o  female who is being seen in follow-up for Right closed fracture of upper end of fibula  When we last saw she we recommended weaning out the boot into a supportive sneaker which the patient has done successfully  Continuation of PT, Vitamind D3 and calcium, compression stocking, and Tylenol as needed for pain  Discontinuation of ASA  Pain has  improved  Residual pain is localized at lateral ankle with minimal radiating and described as sharp and severe        Pain/symptom timing:  Worse during the day when active  Pain/symptom context:  Worse with activites and work  Pain/symptom modifying factors:  Rest makes better, activities make worse  Pain/symptom associated signs/symptoms: none    Prior treatment   · NSAIDsNo   · Injections No   · Bracing/Orthotics Yes    · Physical Therapy Yes     Orthopedic Surgical History:   See Below    Past Medical, Surgical and Social History:  Past Medical History:  has a past medical history of Anemia, Arthritis, Carpal tunnel syndrome of right wrist, Chest pain, Colon polyp, Depression, DVT (deep venous thrombosis) ([facility] [address] ) (), Dysphagia, Esophageal reflux (2011), Grade 1 out of 6 intensity murmur (2019), Heart murmur, History of transfusion (), Pacemaker (2018), Pneumonia, Psychiatric disorder, and Trigger finger, left  Problem List: does not have any pertinent problems on file  Past Surgical History:  has a past surgical history that includes Knee surgery (Bilateral);  section; Cervical disc surgery (2009); Tonsillectomy; Other surgical history; Gastric bypass (2017); pr wrist arthroscop,release xvers lig (Right, 2018); pr incise finger tendon sheath (Left, 2018); Cardiac pacemaker placement (2018); Upper gastrointestinal endoscopy; Colonoscopy; Knee arthroscopy; Back surgery; and Knee surgery (Left, 2020)  Family History: family history includes Alzheimer's disease in her mother; Arthritis in her mother; Breast cancer (age of onset: 77) in her mother; Cancer in her maternal aunt and mother; Coronary artery disease in her father and mother; Diabetes type II in her father; Gout in her father; Heart disease in her father and mother; Hypertension in her father; No Known Problems in her daughter, maternal aunt, maternal aunt, maternal aunt, maternal aunt, maternal grandfather, maternal grandmother, paternal aunt, paternal aunt, paternal grandfather, paternal grandmother, sister, sister, and sister; Prostate cancer in her brother; Stroke in her mother  Social History:  reports that she has been smoking cigarettes  She has been smoking about 0 25 packs per day   She has never used smokeless tobacco  She reports that she does not drink alcohol or use drugs  Current Medications: has a current medication list which includes the following prescription(s): vitamin c, atorvastatin, b complex vitamins, biotin, calcium carbonate, cholecalciferol, clobetasol, eliquis, ferrous sulfate er, lysine hcl, multivitamin, and nystatin  Allergies: has No Known Allergies  Review of Systems:  General- denies fever/chills  HEENT- denies hearing loss or sore throat  Eyes- denies eye pain or visual disturbances, denies red eyes  Respiratory- denies cough or SOB  Cardio- denies chest pain or palpitations  GI- denies abdominal pain  Endocrine- denies urinary frequency  Urinary- denies pain with urination  Musculoskeletal- Negative except noted above  Skin- denies rashes or wounds  Neurological- denies dizziness or headache  Psychiatric- denies anxiety or difficulty concentrating    Physical Exam:   Ht 5' 4" (1 626 m)   Wt 70 8 kg (156 lb)   LMP 09/18/2014   BMI 26 78 kg/m²   General/Constitutional: No apparent distress: well-nourished and well developed  Eyes: normal ocular motion  Lymphatic: No appreciable lymphadenopathy  Respiratory: Non-labored breathing  Vascular: No edema, swelling or tenderness, except as noted in detailed exam   Integumentary: No impressive skin lesions present, except as noted in detailed exam   Neuro: No ataxia or tremors noted  Psych: Normal mood and affect, oriented to person, place and time  Appropriate affect  Musculoskeletal: Normal, except as noted in detailed exam and in HPI      Examination    Right    Gait Normal   Musculoskeletal Tender to palpation at lateral ankle    Skin Normal       Nails Normal    Range of Motion  20 degrees dorsiflexion, 40 degrees plantarflexion  Subtalar motion: normal    Stability Stable    Muscle Strength 5/5 tibialis anterior  5/5 gastrocnemius-soleus  5/5 posterior tibialis  5/5 peroneal/eversion strength  5/5 EHL  5/5 FHL    Neurologic Normal Sensation  Intact to light touch throughout sural, saphenous, superficial peroneal, deep peroneal and medial/lateral plantar nerve distributions  [place]-Wil 5 07 filament (10g) testing  deferred  Cardiovascular Brisk capillary refill < 2 seconds,intact DP and PT pulses    Special Tests None      Imaging Studies:     3 views of the Right ankle were obtained, reviewed and interpreted independently which demonstrate healed upper distal fibula fracture without evidence of nonunion  Reviewed by me personally  Therese Schneider Lachman, MD  Foot & Ankle Surgery   Department of [address] [place] [address]      I personally performed the service  Priscillase Schneider Lachman, MD    Scribe Attestation    I,:   am acting as a scribe while in the presence of the attending physician :       I,:   personally performed the services described in this documentation    as scribed in my presence :

## 2021-04-23 NOTE — PROGRESS NOTES
Daily Note     Today's date: 2021  Patient name: Ck Akers  : 1960  MRN: [id number]  Referring provider: Arturo Evans PA-C  Dx:   Encounter Diagnosis     ICD-10-CM    1  Closed torus fracture of proximal end of right fibula with routine healing, subsequent encounter  S82 811D    2  Closed fracture of distal ends of left radius and ulna with routine healing, subsequent encounter  S52 502D     S52 602D                   Subjective: Patient reports missing step yesterday on ladder and landing on her left gluteal region  Reports right ankle is not limiting her from doing anything  Reports left hand is doing better just strength is limited  Objective: See treatment diary below    Assessment:  Patient reported being challenged by wrist exercises  Exercises modified today do to left gluteal symptoms  Overall making good progress at this time toward addressing functional goals  Plan: Continue per plan of care  Progress treatment as tolerated  Precautions: pacemaker, WBAT 21     Manuals  3/26/21  4/1  4/9/21 4 14 4 23   Ankle range VK  FB  VK VK     wrist ROM   FB  VK VK FB gr  3 mobs PRN   Sub talar mobs       FB grade 2-3                Neuro Re-Ed  3/26/21  4/1  4/9/21 4 14 4 23    biodex LOS  static lvl 1 x2    static lvl 2 x3      biodex maze     Static lvl 1 x3      wt shift to SLS       4x15"    Tandem stance       2x20" ea BL                            Ther Ex 3/26/21  4/1  4/9/21 4 14 4 23   Supine calf stretch  5x20"  5x20''  5x20" 5x30"     SLR flexion  3x10          PF   ytb  3x12  3x15 RTB  [id number] gtb 2x10 ea (pf/df/ev/inv)     clam shell  3x10x3"  3x10, 3"  3x10x3" ytb 3x10    bike  8'  8'    4-way wrist AROM  2x10 ea  2x10 ea 20x ea 2*10   eversion  3x15 RTB rtb 3x15 above    Step up  3x10, 6" 6" 3x12 6" 3x10 2*10, 8"   HR    10x 3*10   Treadmill     5'   supin/pron     2*15 RTB   [place] board     2*20 a/p, m/l ea   lvl 2               Ther Activity                               Gait Training                                   Modalities

## 2021-04-28 ENCOUNTER — OFFICE VISIT (OUTPATIENT)
Dept: PHYSICAL THERAPY | Age: 61
End: 2021-04-28
Payer: COMMERCIAL

## 2021-04-28 ENCOUNTER — IMMUNIZATIONS (OUTPATIENT)
Dept: FAMILY MEDICINE CLINIC | Facility: HOSPITAL | Age: 61
End: 2021-04-28

## 2021-04-28 DIAGNOSIS — S82.811D CLOSED TORUS FRACTURE OF PROXIMAL END OF RIGHT FIBULA WITH ROUTINE HEALING, SUBSEQUENT ENCOUNTER: Primary | ICD-10-CM

## 2021-04-28 DIAGNOSIS — S52.502D CLOSED FRACTURE OF DISTAL ENDS OF LEFT RADIUS AND ULNA WITH ROUTINE HEALING, SUBSEQUENT ENCOUNTER: ICD-10-CM

## 2021-04-28 DIAGNOSIS — Z23 ENCOUNTER FOR IMMUNIZATION: Primary | ICD-10-CM

## 2021-04-28 DIAGNOSIS — S52.602D CLOSED FRACTURE OF DISTAL ENDS OF LEFT RADIUS AND ULNA WITH ROUTINE HEALING, SUBSEQUENT ENCOUNTER: ICD-10-CM

## 2021-04-28 PROCEDURE — 0002A SARS-COV-2 / COVID-19 MRNA VACCINE (PFIZER-BIONTECH) 30 MCG: CPT

## 2021-04-28 PROCEDURE — 97112 NEUROMUSCULAR REEDUCATION: CPT

## 2021-04-28 PROCEDURE — 97110 THERAPEUTIC EXERCISES: CPT

## 2021-04-28 PROCEDURE — 97140 MANUAL THERAPY 1/> REGIONS: CPT

## 2021-04-28 PROCEDURE — 91300 SARS-COV-2 / COVID-19 MRNA VACCINE (PFIZER-BIONTECH) 30 MCG: CPT

## 2021-04-28 NOTE — PROGRESS NOTES
Daily Note     Today's date: 2021  Patient name: Lajune Carrel  : 1960  MRN: [id number]  Referring provider: Shannan Gusman PA-C  Dx:   Encounter Diagnosis     ICD-10-CM    1  Closed torus fracture of proximal end of right fibula with routine healing, subsequent encounter  S82 811D    2  Closed fracture of distal ends of left radius and ulna with routine healing, subsequent encounter  S52 502D     S52 602D                   Subjective: pt reports R ankle improved overall but bothersome last night, pt notes no change in her activity ,pt reports still having difficulty picking up heavier objects with L hand      Objective: See treatment diary below      Assessment: no increased L glut sx noted with ex progressions, L wrist ROM improving , ex progressions to improve functional mobility      Plan: Continue per plan of care  Progress treatment as tolerated  Precautions: pacemaker, WBAT 21     Manuals  3/26/21  4/1  4/9/21 4 14 4 23 21   Ankle range VK  FB  VK VK      wrist ROM   FB  VK VK FB gr  3 mobs PRN Passive stretching VK   Sub talar mobs       FB grade 2-3                  Neuro Re-Ed  3/26/21  421 4 14 4 23 21    biodex LOS  static lvl 1 x2    static lvl 2 x3       biodex maze     Static lvl 1 x3       wt shift to SLS       4x15"  3x30" ea BL   Tandem stance       2x20" ea BL  2x30" Ea BL    stand on airex         Narrow KELLI 2x30"    march on BOSU         2x20 ea            Ther Ex 3/26/21  421 4 14 4 23 21   Supine calf stretch  5x20"  5x20''  5x20" 5x30"      SLR flexion  3x10           PF   ytb  3x12  3x15 RTB  [id number] gtb 2x10 ea (pf/df/ev/inv)      clam shell  3x10x3"  3x10, 3"  3x10x3" ytb 3x10     bike  8'  8'     4-way wrist AROM  2x10 ea   2x10 ea 20x ea 2*10 2x10   eversion  3x15 RTB rtb 3x15 above     Step up  3x10, 6" 6" 3x12 6" 3x10 2*10, 8" 8"    HR    10x 3*10 3x12   Treadmill     5' 5' 1 9 mph   supin/pron     2*15 RTB redthera bar 2x10 ea [place] board     2*20 a/p, m/l ea   lvl 2 2x20 ea direction lvl 2    minisquats         3x10x3"   Ther Activity                                      Gait Training                                      Modalities

## 2021-04-29 ENCOUNTER — REMOTE DEVICE CLINIC VISIT (OUTPATIENT)
Dept: CARDIOLOGY CLINIC | Facility: CLINIC | Age: 61
End: 2021-04-29
Payer: COMMERCIAL

## 2021-04-29 DIAGNOSIS — Z95.0 CARDIAC PACEMAKER IN SITU: Primary | ICD-10-CM

## 2021-04-29 PROCEDURE — 93296 REM INTERROG EVL PM/IDS: CPT | Performed by: INTERNAL MEDICINE

## 2021-04-29 PROCEDURE — 93294 REM INTERROG EVL PM/LDLS PM: CPT | Performed by: INTERNAL MEDICINE

## 2021-04-29 NOTE — PROGRESS NOTES
Results for orders placed or performed in visit on 04/29/21   Cardiac EP device report    Narrative    MDT DUAL PM/ ACTIVE SYSTEM IS MRI CONDITIONAL  CARELINK TRANSMISSION: BATTERY VOLTAGE ADEQUATE (12 3 YRS)  AP-18%, <0 1%  ALL AVAILABLE LEAD PARAMETERS WITHIN NORMAL LIMITS  NO SIGNIFICANT HIGH RATE EPISODES  NORMAL DEVICE FUNCTION   GV

## 2021-05-03 ENCOUNTER — OFFICE VISIT (OUTPATIENT)
Dept: OBGYN CLINIC | Facility: MEDICAL CENTER | Age: 61
End: 2021-05-03

## 2021-05-03 VITALS
SYSTOLIC BLOOD PRESSURE: 109 MMHG | DIASTOLIC BLOOD PRESSURE: 74 MMHG | BODY MASS INDEX: 26.63 KG/M2 | HEART RATE: 88 BPM | WEIGHT: 156 LBS | HEIGHT: 64 IN

## 2021-05-03 DIAGNOSIS — S52.602D CLOSED FRACTURE OF DISTAL ENDS OF LEFT RADIUS AND ULNA WITH ROUTINE HEALING, SUBSEQUENT ENCOUNTER: Primary | ICD-10-CM

## 2021-05-03 DIAGNOSIS — S52.502D CLOSED FRACTURE OF DISTAL ENDS OF LEFT RADIUS AND ULNA WITH ROUTINE HEALING, SUBSEQUENT ENCOUNTER: Primary | ICD-10-CM

## 2021-05-03 PROCEDURE — 99024 POSTOP FOLLOW-UP VISIT: CPT | Performed by: ORTHOPAEDIC SURGERY

## 2021-05-03 PROCEDURE — 3008F BODY MASS INDEX DOCD: CPT | Performed by: ORTHOPAEDIC SURGERY

## 2021-05-03 NOTE — PROGRESS NOTES
CHIEF COMPLAINT:  Chief Complaint   Patient presents with    Left Wrist - Follow-up       SUBJECTIVE:  Humberto Diaz is a 63y o  year oldfemale who presents for follow-up regarding left distal radius and ulna styloid fracture sustained on 2021  Patient stated that she has been doing well  She stated that she has been going to occupational therapy where they advised her to discontinue her splint that she tolerated well        PAST MEDICAL HISTORY:  Past Medical History:   Diagnosis Date    Anemia     iron def    Arthritis     knees    Carpal tunnel syndrome of right wrist     Chest pain     Colon polyp     Depression     at times    DVT [facility]deep venous thrombosis) ([facility] [address] )     Dysphagia     Esophageal reflux 2011    Grade 1 out of 6 intensity murmur 2019    Heart murmur     History of transfusion 1980    Pacemaker 2018    Pneumonia     age 15    Psychiatric disorder     Trigger finger, left        PAST SURGICAL HISTORY:  Past Surgical History:   Procedure Laterality Date    BACK SURGERY      CARDIAC PACEMAKER PLACEMENT  2018    CERVICAL DISC SURGERY  2009    PLATES & SCREWS     SECTION      X2    COLONOSCOPY      GASTRIC BYPASS  2017    LAP RINYGB SURGERY    KNEE ARTHROSCOPY      KNEE SURGERY Bilateral     KNEE SURGERY Left 2020    OTHER SURGICAL HISTORY      ARM SURGERIES    [place] INCISE FINGER TENDON SHEATH Left 2018    Procedure: LONG TRIGGER FINGER RELEASE;  Surgeon: Scott Hammond MD;  [facility];  Service: Orthopedics    [place] WRIST Rupal Small LIG Right 2018    Procedure: ENDOSCOPIC CARPAL TUNNEL RELEASE;  Surgeon: Scott Hammond MD;  [facility];  Service: Orthopedics    TONSILLECTOMY      UPPER GASTROINTESTINAL ENDOSCOPY         FAMILY HISTORY:  Family History   Problem Relation Age of Onset    Stroke Mother     Alzheimer's disease Mother     Arthritis Mother     Coronary artery disease Mother     Breast cancer Mother 77    Cancer Mother     Heart disease Mother     Hypertension Father     Gout Father     Diabetes type II Father     Coronary artery disease Father     Heart disease Father     No Known Problems Sister     No Known Problems Daughter     No Known Problems Maternal Grandmother     No Known Problems Maternal Grandfather     No Known Problems Paternal Grandmother     No Known Problems Paternal Grandfather     Prostate cancer Brother     No Known Problems Sister     No Known Problems Sister     No Known Problems Maternal Aunt     No Known Problems Maternal Aunt     No Known Problems Maternal Aunt     No Known Problems Maternal Aunt     Cancer Maternal Aunt     No Known Problems Paternal Aunt     No Known Problems Paternal Aunt        SOCIAL HISTORY:  Social History     Tobacco Use    Smoking status: Current Every Day Smoker     Packs/day: 0 25     Types: Cigarettes    Smokeless tobacco: Never Used    Tobacco comment: quit "many years ago"   Substance Use Topics    Alcohol use: Never     Alcohol/week: 2 0 standard drinks     Types: 1 Glasses of wine, 1 Standard drinks or equivalent per week     Frequency: Never     Comment: very rare social    Drug use: No       MEDICATIONS:    Current Outpatient Medications:     Ascorbic Acid (VITAMIN C) 100 MG CHEW, Chew, Disp: , Rfl:     atorvastatin (LIPITOR) 10 mg tablet, take 1 tablet by mouth once daily, Disp: 90 tablet, Rfl: 2    B Complex Vitamins (VITAMIN B COMPLEX PO), Take 1 capsule by mouth, Disp: , Rfl:     Biotin 10 MG TABS, Take by mouth daily , Disp: , Rfl:     Calcium Carbonate (CALCI-CHEW PO), Take 500 mg by mouth 2 (two) times a day, Disp: , Rfl:     cholecalciferol (VITAMIN D3) 1,000 units tablet, Take 5,000 Units by mouth 2 (two) times a day , Disp: , Rfl:     clobetasol (TEMOVATE) 0 05 % cream, Apply to affected areas daily x 12 weeks, then twice weekly for maintenance, Disp: , Rfl:     Eliquis 2 5 MG, Take 1 tablet (2 5 mg total) by mouth 2 (two) times a day, Disp: 180 tablet, Rfl: 0    Ferrous Sulfate  (45 Fe) MG TBCR, Take 1 tablet by mouth daily, Disp: , Rfl:     Lysine HCl POWD, Take by mouth, Disp: , Rfl:     Multiple Vitamin (MULTIVITAMIN) capsule, Take 1 capsule by mouth daily 2 chewies daily, Disp: , Rfl:     nystatin (MYCOSTATIN) powder, Apply topically 2 (two) times a day, Disp: 30 g, Rfl: 0    ALLERGIES:  No Known Allergies    REVIEW OF SYSTEMS:  Review of Systems   Constitutional: Negative for chills and fever  HENT: Negative for ear pain and sore throat  Eyes: Negative for pain and visual disturbance  Respiratory: Negative for cough and shortness of breath  Cardiovascular: Negative for chest pain and palpitations  Gastrointestinal: Negative for abdominal pain and vomiting  Genitourinary: Negative for dysuria and hematuria  Musculoskeletal: Positive for arthralgias  Negative for back pain  Skin: Negative for color change and rash  Neurological: Negative for seizures and syncope  All other systems reviewed and are negative        VITALS:  Vitals:    05/03/21 1054   BP: 109/74   Pulse: 88       LABS:  HgA1c:   Lab Results   Component Value Date    HGBA1C 5 7 (H) 05/07/2020     BMP:   Lab Results   Component Value Date    CALCIUM 9 4 03/26/2021    K 3 4 (L) 03/26/2021    CO2 29 03/26/2021     (H) 03/26/2021    BUN 13 03/26/2021    CREATININE 0 50 (L) 03/26/2021       _____________________________________________________  PHYSICAL EXAMINATION:  General: well developed and well nourished, alert, oriented times 3 and appears comfortable  Psychiatric: Normal  HEENT: Trachea Midline, No torticollis  Pulmonary: No audible wheezing or respiratory distress   Skin: No masses, erythema, lacerations, fluctation, ulcerations  Neurovascular: Sensation Intact to the Median, Ulnar, Radial Nerve, Motor Intact to the Median, Ulnar, Radial Nerve and Pulses Intact    MUSCULOSKELETAL EXAMINATION:  Left Wrist:   Skin intact   No obvious swelling   No erythema or ecchymosis   No tenderness over fracture site   Sensation intact   Brisk capillary refill   Palpable distal radial pulse     ___________________________________________________  STUDIES REVIEWED:  No studies reviewed  PROCEDURES PERFORMED:  Procedures  No Procedures performed today    _____________________________________________________  ASSESSMENT/PLAN:    63 y o  female with left distal radius and ulna styloid fracture sustained on 2/04/2021    -I discussed that she may attend her last occupational therapy appointment and receive a home exercise program    -I discussed that she can return to activities as tolerated at this time    -I will see her back in office on an as needed basis if symptoms worsen or fail to improve  Diagnoses and all orders for this visit:    Closed fracture of distal ends of left radius and ulna with routine healing, subsequent encounter        Follow Up:  Return if symptoms worsen or fail to improve  Work/school status:  No restrictions    To Do Next Visit:   PRN        Scribe Attestation    I,:  Armen Dobbs am acting as a scribe while in the presence of the attending physician :       I,:  Rupert So MD personally performed the services described in this documentation    as scribed in my presence :           Portions of the record may have been created with voice recognition software  Occasional wrong word or "sound a like" substitutions may have occurred due to the inherent limitations of voice recognition software  Read the chart carefully and recognize, using context, where substitutions have occurred

## 2021-05-06 ENCOUNTER — TELEPHONE (OUTPATIENT)
Dept: OBGYN CLINIC | Facility: HOSPITAL | Age: 61
End: 2021-05-06

## 2021-05-06 NOTE — TELEPHONE ENCOUNTER
Patient is calling asking if the work note can be done by early Friday afternoon, is it possible to put in her my chart? If not she will        # [phone]

## 2021-05-06 NOTE — TELEPHONE ENCOUNTER
Dr Otoniel Castelan    Patient is asking for a work note clearing her to return, she said the date is up to the doctor  Please notify when ready so she can come to office to pick it up        # [phone]

## 2021-05-13 NOTE — PROGRESS NOTES
Patient called stating she was cleared by MD to return back to work and D/C therapy  D/c at this time

## 2021-06-09 DIAGNOSIS — I82.4Y9 DEEP VEIN THROMBOSIS (DVT) OF PROXIMAL LOWER EXTREMITY, UNSPECIFIED CHRONICITY, UNSPECIFIED LATERALITY (HCC): ICD-10-CM

## 2021-06-11 RX ORDER — APIXABAN 2.5 MG/1
2.5 TABLET, FILM COATED ORAL 2 TIMES DAILY
Qty: 180 TABLET | Refills: 0 | Status: SHIPPED | OUTPATIENT
Start: 2021-06-11 | End: 2021-09-13 | Stop reason: SDUPTHER

## 2021-06-17 DIAGNOSIS — E78.3 HYPERCHYLOMICRONEMIA: ICD-10-CM

## 2021-06-17 RX ORDER — ATORVASTATIN CALCIUM 10 MG/1
TABLET, FILM COATED ORAL
Qty: 90 TABLET | Refills: 2 | Status: SHIPPED | OUTPATIENT
Start: 2021-06-17 | End: 2021-09-21 | Stop reason: SDUPTHER

## 2021-06-18 ENCOUNTER — APPOINTMENT (EMERGENCY)
Dept: RADIOLOGY | Facility: HOSPITAL | Age: 61
End: 2021-06-18
Payer: COMMERCIAL

## 2021-06-18 ENCOUNTER — HOSPITAL ENCOUNTER (EMERGENCY)
Facility: HOSPITAL | Age: 61
Discharge: LEFT BEFORE DISCHARGE | End: 2021-06-18
Attending: SURGERY | Admitting: INTERNAL MEDICINE
Payer: COMMERCIAL

## 2021-06-18 VITALS
HEART RATE: 80 BPM | TEMPERATURE: 97.9 F | RESPIRATION RATE: 18 BRPM | SYSTOLIC BLOOD PRESSURE: 136 MMHG | DIASTOLIC BLOOD PRESSURE: 75 MMHG | OXYGEN SATURATION: 99 %

## 2021-06-18 DIAGNOSIS — S01.81XA LACERATION OF EYEBROW AND FOREHEAD, RIGHT, INITIAL ENCOUNTER: ICD-10-CM

## 2021-06-18 DIAGNOSIS — S01.111A LACERATION OF EYEBROW AND FOREHEAD, RIGHT, INITIAL ENCOUNTER: ICD-10-CM
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
## 2021-06-18 PROBLEM — S01.119A LACERATION OF EYEBROW AND FOREHEAD: Status: ACTIVE | Noted: 2021-06-18

## 2021-06-18 LAB
ANION GAP SERPL CALCULATED.3IONS-SCNC: 4 MMOL/L (ref 4–13)
APTT PPP: 29 SECONDS (ref 23–37)
ATRIAL RATE: 73 BPM
BASE EXCESS BLDA CALC-SCNC: 1 MMOL/L (ref -2–3)
BASOPHILS # BLD AUTO: 0.04 THOUSANDS/ΜL (ref 0–0.1)
BASOPHILS NFR BLD AUTO: 1 % (ref 0–1)
BUN SERPL-MCNC: 12 MG/DL (ref 5–25)
CALCIUM SERPL-MCNC: 9.4 MG/DL (ref 8.3–10.1)
CHLORIDE SERPL-SCNC: 113 MMOL/L (ref 100–108)
CO2 SERPL-SCNC: 27 MMOL/L (ref 21–32)
CREAT SERPL-MCNC: 0.61 MG/DL (ref 0.6–1.3)
EOSINOPHIL # BLD AUTO: 0.04 THOUSAND/ΜL (ref 0–0.61)
EOSINOPHIL NFR BLD AUTO: 1 % (ref 0–6)
ERYTHROCYTE [DISTWIDTH] IN BLOOD BY AUTOMATED COUNT: 12 % (ref 11.6–15.1)
GFR SERPL CREATININE-BSD FRML MDRD: 97 ML/MIN/1.73SQ M
GLUCOSE SERPL-MCNC: 124 MG/DL (ref 65–140)
GLUCOSE SERPL-MCNC: 126 MG/DL (ref 65–140)
HCO3 BLDA-SCNC: 26.6 MMOL/L (ref 24–30)
HCT VFR BLD AUTO: 36.9 % (ref 34.8–46.1)
HGB BLD-MCNC: 12.1 G/DL (ref 11.5–15.4)
IMM GRANULOCYTES # BLD AUTO: 0.01 THOUSAND/UL (ref 0–0.2)
IMM GRANULOCYTES NFR BLD AUTO: 0 % (ref 0–2)
INR PPP: 0.96 (ref 0.84–1.19)
LYMPHOCYTES # BLD AUTO: 1.44 THOUSANDS/ΜL (ref 0.6–4.47)
LYMPHOCYTES NFR BLD AUTO: 18 % (ref 14–44)
MCH RBC QN AUTO: 32.6 PG (ref 26.8–34.3)
MCHC RBC AUTO-ENTMCNC: 32.8 G/DL (ref 31.4–37.4)
MCV RBC AUTO: 100 FL (ref 82–98)
MONOCYTES # BLD AUTO: 0.44 THOUSAND/ΜL (ref 0.17–1.22)
MONOCYTES NFR BLD AUTO: 5 % (ref 4–12)
NEUTROPHILS # BLD AUTO: 6.23 THOUSANDS/ΜL (ref 1.85–7.62)
NEUTS SEG NFR BLD AUTO: 75 % (ref 43–75)
NRBC BLD AUTO-RTO: 0 /100 WBCS
P AXIS: 66 DEGREES
PCO2 BLD: 28 MMOL/L (ref 21–32)
PCO2 BLD: 44.9 MM HG (ref 42–50)
PH BLD: 7.38 [PH] (ref 7.3–7.4)
PLATELET # BLD AUTO: 184 THOUSANDS/UL (ref 149–390)
PMV BLD AUTO: 11.1 FL (ref 8.9–12.7)
PO2 BLD: 25 MM HG (ref 35–45)
POTASSIUM SERPL-SCNC: 3.6 MMOL/L (ref 3.5–5.3)
PR INTERVAL: 184 MS
PROTHROMBIN TIME: 12.8 SECONDS (ref 11.6–14.5)
QRS AXIS: 64 DEGREES
QRSD INTERVAL: 82 MS
QT INTERVAL: 396 MS
QTC INTERVAL: 436 MS
RBC # BLD AUTO: 3.71 MILLION/UL (ref 3.81–5.12)
SAO2 % BLD FROM PO2: 43 % (ref 60–85)
SODIUM SERPL-SCNC: 144 MMOL/L (ref 136–145)
SPECIMEN SOURCE: ABNORMAL
T WAVE AXIS: 31 DEGREES
TROPONIN I SERPL-MCNC: <0.02 NG/ML
VENTRICULAR RATE: 73 BPM
WBC # BLD AUTO: 8.2 THOUSAND/UL (ref 4.31–10.16)

## 2021-06-18 PROCEDURE — 99284 EMERGENCY DEPT VISIT MOD MDM: CPT

## 2021-06-18 PROCEDURE — NC001 PR NO CHARGE: Performed by: EMERGENCY MEDICINE

## 2021-06-18 PROCEDURE — 93005 ELECTROCARDIOGRAM TRACING: CPT

## 2021-06-18 PROCEDURE — 85610 PROTHROMBIN TIME: CPT | Performed by: NURSE PRACTITIONER

## 2021-06-18 PROCEDURE — 70450 CT HEAD/BRAIN W/O DYE: CPT

## 2021-06-18 PROCEDURE — 84484 ASSAY OF TROPONIN QUANT: CPT | Performed by: NURSE PRACTITIONER

## 2021-06-18 PROCEDURE — 80048 BASIC METABOLIC PNL TOTAL CA: CPT | Performed by: NURSE PRACTITIONER

## 2021-06-18 PROCEDURE — 99284 EMERGENCY DEPT VISIT MOD MDM: CPT | Performed by: SURGERY

## 2021-06-18 PROCEDURE — 85025 COMPLETE CBC W/AUTO DIFF WBC: CPT | Performed by: NURSE PRACTITIONER

## 2021-06-18 PROCEDURE — 85730 THROMBOPLASTIN TIME PARTIAL: CPT | Performed by: NURSE PRACTITIONER

## 2021-06-18 PROCEDURE — 90471 IMMUNIZATION ADMIN: CPT

## 2021-06-18 PROCEDURE — 70486 CT MAXILLOFACIAL W/O DYE: CPT

## 2021-06-18 PROCEDURE — 93308 TTE F-UP OR LMTD: CPT | Performed by: SURGERY

## 2021-06-18 PROCEDURE — 82803 BLOOD GASES ANY COMBINATION: CPT

## 2021-06-18 PROCEDURE — 71045 X-RAY EXAM CHEST 1 VIEW: CPT

## 2021-06-18 PROCEDURE — NC001 PR NO CHARGE: Performed by: SURGERY

## 2021-06-18 PROCEDURE — 82947 ASSAY GLUCOSE BLOOD QUANT: CPT

## 2021-06-18 PROCEDURE — 76705 ECHO EXAM OF ABDOMEN: CPT | Performed by: SURGERY

## 2021-06-18 PROCEDURE — 93010 ELECTROCARDIOGRAM REPORT: CPT | Performed by: INTERNAL MEDICINE

## 2021-06-18 PROCEDURE — 72125 CT NECK SPINE W/O DYE: CPT

## 2021-06-18 PROCEDURE — 12014 RPR F/E/E/N/L/M 5.1-7.5 CM: CPT | Performed by: SURGERY

## 2021-06-18 PROCEDURE — 90715 TDAP VACCINE 7 YRS/> IM: CPT | Performed by: NURSE PRACTITIONER

## 2021-06-18 PROCEDURE — 36415 COLL VENOUS BLD VENIPUNCTURE: CPT | Performed by: NURSE PRACTITIONER

## 2021-06-18 RX ORDER — LIDOCAINE HYDROCHLORIDE 10 MG/ML
5 INJECTION, SOLUTION EPIDURAL; INFILTRATION; INTRACAUDAL; PERINEURAL ONCE
Status: COMPLETED | OUTPATIENT
Start: 2021-06-18 | End: 2021-06-18

## 2021-06-18 RX ORDER — GINSENG 100 MG
1 CAPSULE ORAL ONCE
Status: DISCONTINUED | OUTPATIENT
Start: 2021-06-18 | End: 2021-06-18 | Stop reason: HOSPADM

## 2021-06-18 RX ORDER — LIDOCAINE HYDROCHLORIDE 10 MG/ML
INJECTION, SOLUTION EPIDURAL; INFILTRATION; INTRACAUDAL; PERINEURAL
Status: COMPLETED
Start: 2021-06-18 | End: 2021-06-18

## 2021-06-18 RX ADMIN — LIDOCAINE HYDROCHLORIDE 5 ML: 10 INJECTION, SOLUTION EPIDURAL; INFILTRATION; INTRACAUDAL; PERINEURAL at 11:39

## 2021-06-18 RX ADMIN — LIDOCAINE HYDROCHLORIDE 5 ML: 10 INJECTION, SOLUTION EPIDURAL; INFILTRATION; INTRACAUDAL; PERINEURAL at 09:32

## 2021-06-18 RX ADMIN — TETANUS TOXOID, REDUCED DIPHTHERIA TOXOID AND ACELLULAR PERTUSSIS VACCINE, ADSORBED 0.5 ML: 5; 2.5; 8; 8; 2.5 SUSPENSION INTRAMUSCULAR at 10:30

## 2021-06-18 NOTE — H&P
H&P Exam - Trauma   Trev Wheeler 57 y o  female MRN: [id number]  Unit/Bed#: ED 24 Encounter: [id number]    Assessment/Plan   Trauma Alert: Level B  Model of Arrival: self  Trauma Team: Attending To, Residents Diana Gaurdado and NINFA schmidt  Consultants:     Trauma Active Problems: S/P Fall  Right forehead abrasion  Syncopal episode      Trauma Plan:   Admit to medicine  Tertiary tomorrow and sign off  Closure of facial laceration  12 lead EKG  LAbs    Chief Complaint:     History of Present Illness   HPI:  Trev Wheeler is a 57 y o  female who presents after a fall, no  Reason for fall, possible syncope  No complaints of vertigo or chest discomfort  No complaints of shortness of breath, no C-spine, T-spine or L-spine tenderness noted  Will admit to medicine and do tertiary exam in the morning  Mechanism:Fall    Review of Systems   Constitutional: Negative  HENT: Negative  Eyes: Negative  Respiratory: Negative  Cardiovascular: Negative  Gastrointestinal: Negative  Endocrine: Negative  Genitourinary: Negative  Musculoskeletal: Negative  Skin: Positive for wound  Allergic/Immunologic: Negative  Neurological: Negative  Hematological: Negative  Psychiatric/Behavioral: Negative  12-point, complete review of systems was reviewed and negative except as stated above  Historical Information   History is obtainable from the patient         Past Medical History:   Diagnosis Date    Anemia     iron def    Arthritis     knees    Carpal tunnel syndrome of right wrist     Chest pain     Colon polyp     Depression     at times    DVT (deep venous thrombosis) ([facility] [place] [address] [facility] 2012    Dysphagia     Esophageal reflux 5/23/2011    Grade 1 out of 6 intensity murmur 6/24/2019    Heart murmur     History of transfusion 1980    Pacemaker 11/01/2018    Pneumonia     age 15    Psychiatric disorder     Trigger finger, left      Past Surgical History:   Procedure Laterality Date    BACK SURGERY      CARDIAC PACEMAKER PLACEMENT  2018    CERVICAL [address] SURGERY  2009    PLATES & SCREWS     SECTION      X2    COLONOSCOPY      GASTRIC BYPASS  2017    LAP RINYGB SURGERY    KNEE ARTHROSCOPY      KNEE SURGERY Bilateral     KNEE SURGERY Left 2020    OTHER SURGICAL HISTORY      ARM SURGERIES    [place] INCISE FINGER TENDON SHEATH Left 2018    Procedure: LONG TRIGGER FINGER RELEASE;  Surgeon: Maddie Granado MD;  [facility];  Service: Orthopedics    [place] WRIST Tyesha Flair LIG Right 2018    Procedure: ENDOSCOPIC CARPAL TUNNEL RELEASE;  Surgeon: Maddie Granado MD;  [facility];  Service: Orthopedics    TONSILLECTOMY      UPPER GASTROINTESTINAL ENDOSCOPY       Social History   Social History     Substance and Sexual Activity   Alcohol Use Never    Alcohol/week: 2 0 standard drinks    Types: 1 Glasses of wine, 1 Standard drinks or equivalent per week    Comment: very rare social     Social History     Substance and Sexual Activity   Drug Use No     Social History     Tobacco Use   Smoking Status Current Every Day Smoker    Packs/day: 0 25    Types: Cigarettes   Smokeless Tobacco Never Used   Tobacco Comment    quit "many years ago"     E-Cigarette/Vaping    E-Cigarette Use Never User      E-Cigarette/Vaping Substances    Nicotine No     THC No     CBD No     Flavoring No     Other No     Unknown No      Immunization History   Administered Date(s) Administered    Hep A, adult 2017    Hep A, ped/adol, 2 dose 2017    Hepatitis A 2017    INFLUENZA 10/05/2011, 2016, 2016, 10/23/2017, 10/23/2017    Influenza, injectable, quadrivalent, preservative free 0 5 mL 10/30/2020    Influenza, recombinant, quadrivalent,injectable, preservative free 10/31/2018, 10/15/2019    Influenza, seasonal, injectable 2014, 10/13/2015    Pneumococcal Polysaccharide PPV23 2013, 2016    SARS-CoV-2 / COVID-19 mRNA IM (Pf[employer]) 04/03/2021, 04/28/2021    Tdap 04/21/2011, 04/10/2017    Zoster 01/17/2016     Last Tetanus: today  Family History: Non-contributory        Meds/Allergies   all current active meds have been reviewed    No Known Allergies      PHYSICAL EXAM    PE limited by: nothing    Objective   Vitals:   First set: Temperature: 97 9 °F (36 6 °C) (06/18/21 0920)  Pulse: 85 (06/18/21 0920)  Respirations: 18 (06/18/21 0920)  Blood Pressure: 147/76 (06/18/21 0920)    Primary Survey:   (A) Airway: intact  (B) Breathing: non-labored  (C) Circulation: Pulses:  normal  (D) Disabliity:  GCS Total:  15, Eye Opening:   Spontaneous = 4, Motor Response: Obeys commands = 6 and Verbal Response:  Oriented = 5  (E) Expose:  Completed    Secondary Survey: (Click on Physical Exam tab above)  Physical Exam  Vitals reviewed  Constitutional:       Appearance: Normal appearance  HENT:      Head: Normocephalic and atraumatic  Right Ear: Tympanic membrane normal       Left Ear: Tympanic membrane normal       Nose: Nose normal       Mouth/Throat:      Pharynx: Oropharynx is clear  Eyes:      Extraocular Movements: Extraocular movements intact  Conjunctiva/sclera: Conjunctivae normal       Pupils: Pupils are equal, round, and reactive to light  Cardiovascular:      Rate and Rhythm: Normal rate and regular rhythm  Pulses: Normal pulses  Heart sounds: Normal heart sounds  Pulmonary:      Effort: Pulmonary effort is normal  No respiratory distress  Breath sounds: Normal breath sounds  Chest:      Chest wall: No tenderness  Abdominal:      General: Bowel sounds are normal       Palpations: Abdomen is soft  Tenderness: There is no abdominal tenderness  Genitourinary:     Comments: perineum clear  Musculoskeletal:         General: No tenderness  Normal range of motion  Cervical back: Normal range of motion and neck supple  Skin:     General: Skin is warm and dry  Capillary Refill: Capillary refill takes less than 2 seconds  Neurological:      General: No focal deficit present  Mental Status: She is alert and oriented to person, place, and time  Psychiatric:         Mood and Affect: Mood normal          Behavior: Behavior normal          Thought Content: Thought content normal          Judgment: Judgment normal          Invasive Devices     Peripheral Intravenous Line            Peripheral IV 06/18/21 Right Antecubital <1 day          Airway            Non-Surgical Airway Oral pharyngeal airway 90 mm 959 days                Lab Results: Results for [place] Sarah (MRN [id number]) as of 6/18/2021 13:52   Ref   Range 6/18/2021 09:29   Sodium Latest Ref Range: 136 - 145 mmol/L 144   Potassium Latest Ref Range: 3 5 - 5 3 mmol/L 3 6   Chloride Latest Ref Range: 100 - 108 mmol/L 113 (H)   CO2 Latest Ref Range: 21 - 32 mmol/L 27   Anion Gap Latest Ref Range: 4 - 13 mmol/L 4   BUN Latest Ref Range: 5 - 25 mg/dL 12   Creatinine Latest Ref Range: 0 60 - 1 30 mg/dL 0 61   Glucose, Random Latest Ref Range: 65 - 140 mg/dL 124   Calcium Latest Ref Range: 8 3 - 10 1 mg/dL 9 4   eGFR Latest Units: ml/min/1 73sq m 97   Troponin I Latest Ref Range: <=0 04 ng/mL <0 02   WBC Latest Ref Range: 4 31 - 10 16 Thousand/uL 8 20   Red Blood Cell Count Latest Ref Range: 3 81 - 5 12 Million/uL 3 71 (L)   Hemoglobin Latest Ref Range: 11 5 - 15 4 g/dL 12 1   HCT Latest Ref Range: 34 8 - 46 1 % 36 9   MCV Latest Ref Range: 82 - 98 fL 100 (H)   MCH Latest Ref Range: 26 8 - 34 3 pg 32 6   MCHC Latest Ref Range: 31 4 - 37 4 g/dL 32 8   RDW Latest Ref Range: 11 6 - 15 1 % 12 0   Platelet Count Latest Ref Range: 149 - 390 Thousands/uL 184   MPV Latest Ref Range: 8 9 - 12 7 fL 11 1   nRBC Latest Units: /100 WBCs 0   Neutrophils % Latest Ref Range: 43 - 75 % 75   Immat GRANS % Latest Ref Range: 0 - 2 % 0   Lymphocytes Relative Latest Ref Range: 14 - 44 % 18   Monocytes Relative Latest Ref Range: 4 - 12 % 5   Eosinophils Latest Ref Range: 0 - 6 % 1   Basophils Relative Latest Ref Range: 0 - 1 % 1   Immature Grans Absolute Latest Ref Range: 0 00 - 0 20 Thousand/uL 0 01   Absolute Neutrophils Latest Ref Range: 1 85 - 7 62 Thousands/µL 6 23   Lymphocytes Absolute Latest Ref Range: 0 60 - 4 47 Thousands/µL 1 44   Absolute Monocytes Latest Ref Range: 0 17 - 1 22 Thousand/µL 0 44   Absolute Eosinophils Latest Ref Range: 0 00 - 0 61 Thousand/µL 0 04   Basophils Absolute Latest Ref Range: 0 00 - 0 10 Thousands/µL 0 04   Protime Latest Ref Range: 11 6 - 14 5 seconds 12 8   INR Latest Ref Range: 0 84 - 1 19  0 96   PTT Latest Ref Range: 23 - 37 seconds 29     Imaging/EKG Studies: CXR -neg    HCT - Forehead soft tissue swelling/contusion/laceration with 11 mm hematoma  No acute intracranial process  No skull fracture  Chronic microangiopathy  CT C-spine -neg  CT face -    Forehead soft tissue swelling/contusion/laceration with 11 mm hematoma  No acute fracture or dislocation  Globes are intact    No orbital hematoma  Other Studies: 12 lead EKG - NSR    Code Status: Prior  Advance Directive and Living Will:      Power of :    POLST:

## 2021-06-18 NOTE — ED PROVIDER NOTES
Emergency Department Airway Evaluation and Management Form    History  Obtained from:  PatientReview of patient's allergies indicates no known allergies  Chief Complaint:  Trauma Alert    HPI: Pt is a 57 y o  female presents s/p fell head strike while outside patient is on Eliquis complains of headache      I have reviewed and agree with the history as documented  Physical Exam    Vitals:    06/18/21 0925   BP: 136/72   Pulse: 79   Resp: 18   Temp:    SpO2: 99%     Supplemental Oxygen no    GCS:  15    Neuro: Alert and oriented  Psych: not combative, not anxious, cooperative for exam  Neck: In collar, No JVD, No midline tenderness  Cardio:  Normal  Respiratory: Normal  Mouth:  Normal  Pharynx: Normal    Monitor:  NSR      ED Medications    No current facility-administered medications for this encounter      Current Outpatient Medications:     Ascorbic Acid (VITAMIN C) 100 MG CHEW, Chew, Disp: , Rfl:     atorvastatin (LIPITOR) 10 mg tablet, take 1 tablet by mouth once daily, Disp: 90 tablet, Rfl: 2    B Complex Vitamins (VITAMIN B COMPLEX PO), Take 1 capsule by mouth, Disp: , Rfl:     Biotin 10 MG TABS, Take by mouth daily , Disp: , Rfl:     Calcium Carbonate (CALCI-CHEW PO), Take 500 mg by mouth 2 (two) times a day, Disp: , Rfl:     cholecalciferol (VITAMIN D3) 1,000 units tablet, Take 5,000 Units by mouth 2 (two) times a day , Disp: , Rfl:     clobetasol (TEMOVATE) 0 05 % cream, Apply to affected areas daily x 12 weeks, then twice weekly for maintenance, Disp: , Rfl:     Eliquis 2 5 MG, Take 1 tablet (2 5 mg total) by mouth 2 (two) times a day, Disp: 180 tablet, Rfl: 0    Ferrous Sulfate  (45 Fe) MG TBCR, Take 1 tablet by mouth daily, Disp: , Rfl:     Lysine HCl POWD, Take by mouth, Disp: , Rfl:     Multiple Vitamin (MULTIVITAMIN) capsule, Take 1 capsule by mouth daily 2 chewies daily, Disp: , Rfl:     nystatin (MYCOSTATIN) powder, Apply topically 2 (two) times a day, Disp: 30 g, Rfl: 0      Intubation    No intubation required    Final Diagnosis:  Head injury on anticoagulation    ED Provider  Electronically Signed by       Alejandro Diaz MD  06/18/21 [id number]

## 2021-06-18 NOTE — TRAUMA DOCUMENTATION
SOD at bedside for assessment - trauma team at beside for lac repair  Additional lidocaine ordered/given

## 2021-06-18 NOTE — PROCEDURES
POC FAST US    Date/Time: 6/18/2021 12:29 PM  Performed by: Noemi Davidson DO  Authorized by:  Noemi Davidson DO     Patient location:  Trauma  Other Assisting Provider: No    Procedure details:     Exam Type:  Diagnostic    Indications: blunt abdominal trauma      Assess for:  Intra-abdominal fluid and pericardial effusion    Technique: FAST      Views obtained:  Heart - Pericardial sac, RUQ - Luong's Pouch, LUQ - Splenorenal space and Suprapubic - Pouch of Yoseph    Image quality: diagnostic      Image availability:  Images available in PACS  FAST Findings:     RUQ (Hepatorenal) free fluid: absent      LUQ (Splenorenal) free fluid: absent      Suprapubic free fluid: absent      Cardiac wall motion: identified      Pericardial effusion: absent    Interpretation:     Impressions: negative

## 2021-06-18 NOTE — DISCHARGE INSTRUCTIONS
Facial Laceration   WHAT YOU NEED TO KNOW:   A facial laceration is a tear or cut in the skin  Facial lacerations may be closed within 24 hours of injury  DISCHARGE INSTRUCTIONS:   Return to the emergency department if:   · You have a fever and the wound is painful, warm, or swollen  The wound area may be red, or fluid may come out of it  · You have heavy bleeding or bleeding that does not stop after 10 minutes of holding firm, direct pressure over the wound  Call your doctor if:   · Your wound reopens or your tape comes off  · Your wound is very painful  · Your wound is not healing, or you think there is an object in the wound  · The skin around your wound stays numb  · You have questions or concerns about your condition or care  Medicines:   · Antibiotics  may be given to prevent an infection if your wound was deep and had to be cleaned out  · Take your medicine as directed  Contact your healthcare provider if you think your medicine is not helping or if you have side effects  Tell him of her if you are allergic to any medicine  Keep a list of the medicines, vitamins, and herbs you take  Include the amounts, and when and why you take them  Bring the list or the pill bottles to follow-up visits  Carry your medicine list with you in case of an emergency  Care for your wound:  Care for your wound as directed to prevent infection and help it heal  Wash your hands with soap and warm water before and after you care for your wound  You may need to keep the wound dry for the first 24 to 48 hours  When your healthcare provider says it is okay, wash around your wound with soap and water, or as directed  Gently pat the area dry  Do not use alcohol or hydrogen peroxide to clean your wound unless you are directed to  · Do not take aspirin or NSAIDs for 24 hours after being injured  Aspirin and NSAIDs can increase blood flow  Your laceration may continue to bleed       · Do not take hot showers, eat or drink hot foods and liquids for 48 hours after being injured  Also, do not use a heating pad near your laceration  The heat can cause swelling in and around your laceration  · If your wound was covered with a bandage,  leave your bandage on as long as directed  Bandages keep your wound clean and protected  They can also prevent swelling  Ask when and how to change your bandage  Be careful not to apply the bandage or tape too tightly  This could cut off blood flow and cause more injury  · If your wound was closed with stitches,  keep your wound clean  Your healthcare provider may recommend that you apply antibiotic ointment after you clean your wound  · If your wound was closed with wound tape or medical strips,  keep the area clean and dry  The strips will usually fall off on their own after several days  · If your wound was closed with tissue glue,  do not use any ointments or lotions on the area  You may shower, but do not swim or soak in a bathtub  Gently pat the area dry after you take a shower  Do not pick at or scrub the glue area  Decrease scarring: The skin in the area of your wound may turn a different color if it is exposed to direct sunlight  After your wound is healed, use sunscreen over the area when you are out in the sun  You should do this for at least 6 months to 1 year after your injury  Some wounds scar less if they are covered while they heal   Follow up with your doctor as directed: You may need to follow up with your healthcare provider in 24 to 48 hours to have your wound checked for infection  You may need to return in 3 to 5 days if you have stitches that need to be removed  Write down your questions so you remember to ask them during your visits  © Copyright [facility] [address] Information is for End User's use only and may not be sold, redistributed or otherwise used for commercial purposes   All illustrations and images included in CareNotes® are the copyrighted property of A D A M , Inc  or [facility] Boss   The above information is an  only  It is not intended as medical advice for individual conditions or treatments  Talk to your doctor, nurse or pharmacist before following any medical regimen to see if it is safe and effective for you

## 2021-06-18 NOTE — PROCEDURES
Laceration repair    Date/Time: 6/18/2021 12:31 PM  Performed by: Arturo Rivera DO  Authorized by: Arturo Rivera DO   Consent: Verbal consent obtained  Risks and benefits: risks, benefits and alternatives were discussed  Consent given by: patient  Patient understanding: patient states understanding of the procedure being performed  Patient consent: the patient's understanding of the procedure matches consent given  Procedure consent: procedure consent matches procedure scheduled  Relevant documents: relevant documents present and verified  Test results: test results available and properly labeled  Radiology Images displayed and confirmed  If images not available, report reviewed: imaging studies available  Patient identity confirmed: verbally with patient and arm band  Time out: Immediately prior to procedure a "time out" was called to verify the correct patient, procedure, equipment, support staff and site/side marked as required  Body area: head/neck  Location details: forehead  Laceration length: 6 cm  Foreign bodies: no foreign bodies  Tendon involvement: none  Nerve involvement: none  Vascular damage: no  Anesthesia: local infiltration    Anesthesia:  Local Anesthetic: lidocaine 1% without epinephrine    Sedation:  Patient sedated: no        Procedure Details:  Preparation: Patient was prepped and draped in the usual sterile fashion  Irrigation solution: saline  Irrigation method: tap  Amount of cleaning: standard  Debridement: none  Degree of undermining: none  Skin closure: 5-0 nylon  Number of sutures: 7  Technique: simple  Approximation: close  Approximation difficulty: simple  Dressing: antibiotic ointment and 4x4 sterile gauze  Patient tolerance: patient tolerated the procedure well with no immediate complications  Comments: Additional small laceration over right medial nose, this was closed with skin glue

## 2021-06-18 NOTE — QUICK NOTE
Patient adamant about being discharged home  Internal medicine and Trauma discussed with the patient about being admitted to the hospital for observation given her likely syncope and cardiac history  Patient refuses to be admitted and requested to leave AMA  AMA paperwork was signed by patient  She is to follow with trauma team or PCP to have sutures removed in 7 days  Patient expresses understanding      Lucio Singer, DO

## 2021-06-21 ENCOUNTER — TRANSITIONAL CARE MANAGEMENT (OUTPATIENT)
Dept: FAMILY MEDICINE CLINIC | Facility: CLINIC | Age: 61
End: 2021-06-21

## 2021-06-21 ENCOUNTER — TELEPHONE (OUTPATIENT)
Dept: OTHER | Facility: OTHER | Age: 61
End: 2021-06-21

## 2021-06-22 NOTE — TELEPHONE ENCOUNTER
Spoke with patient about rescheduling appointment  Per patient, she would like to call the office back at a later time to reschedule

## 2021-06-25 ENCOUNTER — OFFICE VISIT (OUTPATIENT)
Dept: FAMILY MEDICINE CLINIC | Facility: CLINIC | Age: 61
End: 2021-06-25
Payer: COMMERCIAL

## 2021-06-25 VITALS
BODY MASS INDEX: 28.99 KG/M2 | RESPIRATION RATE: 16 BRPM | HEART RATE: 84 BPM | SYSTOLIC BLOOD PRESSURE: 118 MMHG | TEMPERATURE: 98.9 F | WEIGHT: 169.8 LBS | OXYGEN SATURATION: 98 % | DIASTOLIC BLOOD PRESSURE: 80 MMHG | HEIGHT: 64 IN

## 2021-06-25 DIAGNOSIS — S01.81XA LACERATION OF EYEBROW AND FOREHEAD, RIGHT, INITIAL ENCOUNTER: ICD-10-CM

## 2021-06-25 DIAGNOSIS — Z48.02 VISIT FOR SUTURE REMOVAL: Primary | ICD-10-CM

## 2021-06-25 DIAGNOSIS — S01.111A LACERATION OF EYEBROW AND FOREHEAD, RIGHT, INITIAL ENCOUNTER: ICD-10-CM
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
## 2021-06-25 PROCEDURE — 4004F PT TOBACCO SCREEN RCVD TLK: CPT | Performed by: FAMILY MEDICINE

## 2021-06-25 PROCEDURE — 99213 OFFICE O/P EST LOW 20 MIN: CPT | Performed by: FAMILY MEDICINE

## 2021-06-25 PROCEDURE — 3008F BODY MASS INDEX DOCD: CPT | Performed by: FAMILY MEDICINE

## 2021-06-25 NOTE — PROGRESS NOTES
Assessment/Plan:    Visit for suture removal  Healed  Wash with gentle soap and water  No follow up necessary    Fall  Injuries are healing  Discussed fall prevention measures  Problem List Items Addressed This Visit        Other    Laceration of eyebrow and forehead    Visit for suture removal - Primary     Healed  Wash with gentle soap and water  No follow up necessary         Fall     Injuries are healing  Discussed fall prevention measures  Subjective:      Patient ID: Mehreen Cohn is a 57 y o  female  Haven Newton is a 57year old female presenting to the office for a mechanical fall resulting in a trauma alert on 6/18  She fell off a curb and landed on her face  She is taking 2 5mg of Eliquis  No fractures or intracranial bleeding was found in the ER  She refused admission to the hospital  She denies any headaches, dizziness, syncope, neck pain, shortness of breath or chest pain  She is concerned that her Vitamin D level is low  She has no other complaints today  The following portions of the patient's history were reviewed and updated as appropriate: allergies, current medications, past family history, past medical history, past social history, past surgical history and problem list     Review of Systems   Constitutional: Negative for chills and fever  HENT: Negative for ear discharge, ear pain, nosebleeds, sinus pain and tinnitus  Eyes: Positive for redness  Respiratory: Negative for cough and shortness of breath  Cardiovascular: Negative for chest pain  Musculoskeletal: Negative for arthralgias, back pain, neck pain and neck stiffness  Skin: Positive for wound (Face)  Neurological: Negative for dizziness, syncope, weakness, light-headedness, numbness and headaches           Objective:      /80 (BP Location: Left arm, Patient Position: Sitting, Cuff Size: Standard)   Pulse 84   Temp 98 9 °F (37 2 °C) (Tympanic)   Resp 16   Ht 5' 4" (1 626 m)   Wt 77 kg (169 lb 12 8 oz)   LMP 09/18/2014   SpO2 98%   BMI 29 15 kg/m²          Physical Exam  Vitals and nursing note reviewed  Constitutional:       General: She is not in acute distress  Appearance: Normal appearance  She is well-developed  She is not toxic-appearing  HENT:      Head: Normocephalic and atraumatic  Right Ear: Tympanic membrane normal       Left Ear: Tympanic membrane normal    Eyes:      Pupils: Pupils are equal, round, and reactive to light  Cardiovascular:      Rate and Rhythm: Normal rate and regular rhythm  Heart sounds: Normal heart sounds  Pulmonary:      Effort: Pulmonary effort is normal       Breath sounds: Normal breath sounds  Abdominal:      General: Bowel sounds are normal       Palpations: Abdomen is soft  Musculoskeletal:         General: Normal range of motion  Cervical back: Normal range of motion and neck supple  No tenderness  Lymphadenopathy:      Cervical: No cervical adenopathy  Skin:     General: Skin is warm  Findings: Ecchymosis, signs of injury and wound present  Neurological:      General: No focal deficit present  Mental Status: She is alert and oriented to person, place, and time  Cranial Nerves: No cranial nerve deficit  Suture removal    Date/Time: 6/25/2021 12:54 PM  Performed by: Kyara Lerner MD  Authorized by: Kyara Lerner MD   Universal Protocol:  Consent: Verbal consent obtained  Consent given by: patient  Patient understanding: patient states understanding of the procedure being performed  Patient identity confirmed: verbally with patient        Patient location:  Clinic  Location:     Laterality:  Right    Location:  [address] [facility] location:  Eyebrow    Eyebrow location:  R eyebrow  Procedure details:      Tools used:  Suture removal kit    Wound appearance:  No sign(s) of infection, nonpurulent, tender, good wound healing and clean    Number of sutures removed:  7    Number of staples removed:  7  Post-procedure details:     Post-removal:  Steri-Strips applied    Patient tolerance of procedure:   Tolerated well, no immediate complications

## 2021-07-28 ENCOUNTER — CLINICAL SUPPORT (OUTPATIENT)
Dept: FAMILY MEDICINE CLINIC | Facility: CLINIC | Age: 61
End: 2021-07-28
Payer: COMMERCIAL

## 2021-07-28 DIAGNOSIS — Z11.1 PPD SCREENING TEST: Primary | ICD-10-CM

## 2021-07-28 PROCEDURE — 86580 TB INTRADERMAL TEST: CPT

## 2021-07-28 NOTE — PROGRESS NOTES
Pt presented in office for ppd with no other complaints  Pt is aware she must come back within 48 to 72 hours to have it read

## 2021-07-29 ENCOUNTER — REMOTE DEVICE CLINIC VISIT (OUTPATIENT)
Dept: CARDIOLOGY CLINIC | Facility: CLINIC | Age: 61
End: 2021-07-29
Payer: COMMERCIAL

## 2021-07-29 DIAGNOSIS — Z95.0 CARDIAC PACEMAKER IN SITU: Primary | ICD-10-CM

## 2021-07-29 PROCEDURE — 93294 REM INTERROG EVL PM/LDLS PM: CPT | Performed by: INTERNAL MEDICINE

## 2021-07-29 PROCEDURE — 93296 REM INTERROG EVL PM/IDS: CPT | Performed by: INTERNAL MEDICINE

## 2021-07-30 ENCOUNTER — CLINICAL SUPPORT (OUTPATIENT)
Dept: FAMILY MEDICINE CLINIC | Facility: CLINIC | Age: 61
End: 2021-07-30

## 2021-07-30 ENCOUNTER — TELEPHONE (OUTPATIENT)
Dept: FAMILY MEDICINE CLINIC | Facility: CLINIC | Age: 61
End: 2021-07-30

## 2021-07-30 DIAGNOSIS — Z11.1 ENCOUNTER FOR PPD SKIN TEST READING: Primary | ICD-10-CM

## 2021-07-30 LAB
INDURATION: 0 MM
TB SKIN TEST: NEGATIVE

## 2021-07-30 NOTE — PROGRESS NOTES
Pt presented in office for ppd reading and it was negative  Pt physical form was completed and given back to patient

## 2021-07-30 NOTE — TELEPHONE ENCOUNTER
Patient said that she is getting a new prescription plan and was told that eliquis is  Now generic  She also googled this  She said generic is called APIXABAN    Is this OK to take? Is this the same as Eliquis? What to do?   Asking for Samantha to check this out

## 2021-08-02 NOTE — TELEPHONE ENCOUNTER
Generics are essentially the same chemical make up but the medication may be manufactured by a different company so it may look different

## 2021-08-25 ENCOUNTER — VBI (OUTPATIENT)
Dept: ADMINISTRATIVE | Facility: OTHER | Age: 61
End: 2021-08-25

## 2021-09-13 DIAGNOSIS — I82.4Y9 DEEP VEIN THROMBOSIS (DVT) OF PROXIMAL LOWER EXTREMITY, UNSPECIFIED CHRONICITY, UNSPECIFIED LATERALITY (HCC): ICD-10-CM

## 2021-09-13 RX ORDER — APIXABAN 2.5 MG/1
2.5 TABLET, FILM COATED ORAL 2 TIMES DAILY
Qty: 60 TABLET | Refills: 0 | Status: SHIPPED | OUTPATIENT
Start: 2021-09-13 | End: 2021-09-21 | Stop reason: SDUPTHER

## 2021-09-21 ENCOUNTER — OFFICE VISIT (OUTPATIENT)
Dept: FAMILY MEDICINE CLINIC | Facility: CLINIC | Age: 61
End: 2021-09-21
Payer: COMMERCIAL

## 2021-09-21 VITALS
SYSTOLIC BLOOD PRESSURE: 120 MMHG | OXYGEN SATURATION: 98 % | HEART RATE: 85 BPM | HEIGHT: 64 IN | BODY MASS INDEX: 28.41 KG/M2 | DIASTOLIC BLOOD PRESSURE: 80 MMHG | RESPIRATION RATE: 16 BRPM | WEIGHT: 166.4 LBS | TEMPERATURE: 97.6 F

## 2021-09-21 DIAGNOSIS — I49.5 SICK SINUS SYNDROME (HCC): ICD-10-CM

## 2021-09-21 DIAGNOSIS — I82.4Y9 DEEP VEIN THROMBOSIS (DVT) OF PROXIMAL LOWER EXTREMITY, UNSPECIFIED CHRONICITY, UNSPECIFIED LATERALITY (HCC): ICD-10-CM

## 2021-09-21 DIAGNOSIS — Z12.31 SCREENING MAMMOGRAM, ENCOUNTER FOR: Primary | ICD-10-CM

## 2021-09-21 DIAGNOSIS — E78.5 HYPERLIPIDEMIA, UNSPECIFIED HYPERLIPIDEMIA TYPE: ICD-10-CM

## 2021-09-21 PROCEDURE — 99214 OFFICE O/P EST MOD 30 MIN: CPT | Performed by: NURSE PRACTITIONER

## 2021-09-21 PROCEDURE — 4004F PT TOBACCO SCREEN RCVD TLK: CPT | Performed by: NURSE PRACTITIONER

## 2021-09-21 PROCEDURE — 3008F BODY MASS INDEX DOCD: CPT | Performed by: NURSE PRACTITIONER

## 2021-09-21 RX ORDER — APIXABAN 2.5 MG/1
2.5 TABLET, FILM COATED ORAL 2 TIMES DAILY
Qty: 60 TABLET | Refills: 3 | Status: SHIPPED | OUTPATIENT
Start: 2021-09-21 | End: 2022-02-01

## 2021-09-21 RX ORDER — ATORVASTATIN CALCIUM 10 MG/1
10 TABLET, FILM COATED ORAL DAILY
Qty: 90 TABLET | Refills: 0 | Status: SHIPPED | OUTPATIENT
Start: 2021-09-21 | End: 2022-06-27 | Stop reason: SDUPTHER

## 2021-09-21 NOTE — PROGRESS NOTES
Assessment/Plan:    Deep vein thrombosis (DVT) of proximal lower extremity (HCC)  - Stable  - Continue Eliquis  Refill sent  - Will continue to monitor  Sick sinus syndrome (HCC)  - Stable  - Patient has permanent pacemaker   - Continue routine follow up with Cardiologist     Hyperlipidemia  - Well controlled  - Continue Lipitor 10 mg daily  - Will continue to monitor fasting lipid panel  Problem List Items Addressed This Visit        Cardiovascular and Mediastinum    Sick sinus syndrome ([facility] [address] )     - Stable  - Patient has permanent pacemaker   - Continue routine follow up with Cardiologist          Deep vein thrombosis (DVT) of proximal lower extremity ([facility] [address] )     - Stable  - Continue Eliquis  Refill sent  - Will continue to monitor  Relevant Medications    Eliquis 2 5 MG    Other Relevant Orders    CBC and differential    Lipid Panel with Direct LDL reflex       Other    Hyperlipidemia     - Well controlled  - Continue Lipitor 10 mg daily  - Will continue to monitor fasting lipid panel  Relevant Medications    atorvastatin (LIPITOR) 10 mg tablet    Other Relevant Orders    CBC and differential    Comprehensive metabolic panel    Lipid Panel with Direct LDL reflex    TSH, 3rd generation with Free T4 reflex      Other Visit Diagnoses     Screening mammogram, encounter for    -  Primary    Relevant Orders    Mammo screening bilateral w 3d & cad            Subjective:      Patient ID: Indio Escobar is a 57 y o  female  Patient with past medical history of depression, hyperlipidemia, heart murmur, and DVT presents today for follow up appointment  She has been taking her prescribed medications and reports no side effects  She has no concerns or complaints today          The following portions of the patient's history were reviewed and updated as appropriate: allergies, current medications, past family history, past medical history, past social history, past surgical history and problem list     Review of Systems   Constitutional: Negative for fatigue and fever  HENT: Negative for trouble swallowing  Eyes: Negative for visual disturbance  Respiratory: Negative for cough and shortness of breath  Cardiovascular: Negative for chest pain and palpitations  Gastrointestinal: Negative for abdominal pain and blood in stool  Endocrine: Negative for cold intolerance and heat intolerance  Genitourinary: Negative for difficulty urinating and dysuria  Musculoskeletal: Negative for gait problem  Skin: Negative for rash  Neurological: Negative for dizziness, syncope and headaches  Hematological: Negative for adenopathy  Psychiatric/Behavioral: Negative for behavioral problems  Objective:      /80 (BP Location: Left arm, Patient Position: Sitting, Cuff Size: Large)   Pulse 85   Temp 97 6 °F (36 4 °C) (Tympanic)   Resp 16   Ht 5' 4" (1 626 m)   Wt 75 5 kg (166 lb 6 4 oz)   LMP 09/18/2014   SpO2 98%   BMI 28 56 kg/m²          Physical Exam  Vitals and nursing note reviewed  Constitutional:       General: She is not in acute distress  Appearance: Normal appearance  HENT:      Head: Normocephalic and atraumatic  Right Ear: External ear normal       Left Ear: External ear normal    Eyes:      Conjunctiva/sclera: Conjunctivae normal    Cardiovascular:      Rate and Rhythm: Normal rate and regular rhythm  Heart sounds: Normal heart sounds  Pulmonary:      Effort: Pulmonary effort is normal       Breath sounds: Normal breath sounds  Musculoskeletal:         General: Normal range of motion  Cervical back: Normal range of motion  Skin:     General: Skin is warm and dry  Neurological:      Mental Status: She is alert and oriented to person, place, and time  Cranial Nerves: No cranial nerve deficit     Psychiatric:         Mood and Affect: Mood normal          Behavior: Behavior normal

## 2021-10-14 ENCOUNTER — IN-CLINIC DEVICE VISIT (OUTPATIENT)
Dept: CARDIOLOGY CLINIC | Facility: CLINIC | Age: 61
End: 2021-10-14
Payer: COMMERCIAL

## 2021-10-14 DIAGNOSIS — Z95.0 PACEMAKER: Primary | ICD-10-CM

## 2021-10-14 PROCEDURE — 93280 PM DEVICE PROGR EVAL DUAL: CPT | Performed by: INTERNAL MEDICINE

## 2021-11-09 ENCOUNTER — OFFICE VISIT (OUTPATIENT)
Dept: URGENT CARE | Age: 61
End: 2021-11-09
Payer: COMMERCIAL

## 2021-11-09 VITALS — RESPIRATION RATE: 20 BRPM | HEART RATE: 84 BPM | OXYGEN SATURATION: 98 % | TEMPERATURE: 98.1 F

## 2021-11-09 DIAGNOSIS — B96.89 BACTERIAL SINUSITIS: Primary | ICD-10-CM

## 2021-11-09 DIAGNOSIS — J32.9 BACTERIAL SINUSITIS: Primary | ICD-10-CM

## 2021-11-09 PROCEDURE — 99213 OFFICE O/P EST LOW 20 MIN: CPT | Performed by: STUDENT IN AN ORGANIZED HEALTH CARE EDUCATION/TRAINING PROGRAM

## 2021-11-09 PROCEDURE — S9088 SERVICES PROVIDED IN URGENT: HCPCS | Performed by: STUDENT IN AN ORGANIZED HEALTH CARE EDUCATION/TRAINING PROGRAM

## 2021-11-09 RX ORDER — AMOXICILLIN AND CLAVULANATE POTASSIUM 875; 125 MG/1; MG/1
1 TABLET, FILM COATED ORAL EVERY 12 HOURS SCHEDULED
Qty: 14 TABLET | Refills: 0 | Status: SHIPPED | OUTPATIENT
Start: 2021-11-09 | End: 2021-11-16

## 2021-11-22 ENCOUNTER — VBI (OUTPATIENT)
Dept: ADMINISTRATIVE | Facility: OTHER | Age: 61
End: 2021-11-22

## 2021-12-01 ENCOUNTER — TELEPHONE (OUTPATIENT)
Dept: FAMILY MEDICINE CLINIC | Facility: CLINIC | Age: 61
End: 2021-12-01

## 2021-12-01 DIAGNOSIS — B00.9 HSV-1 INFECTION: ICD-10-CM

## 2021-12-01 DIAGNOSIS — J32.9 SINUSITIS, UNSPECIFIED CHRONICITY, UNSPECIFIED LOCATION: Primary | ICD-10-CM

## 2021-12-02 RX ORDER — AMOXICILLIN AND CLAVULANATE POTASSIUM 875; 125 MG/1; MG/1
1 TABLET, FILM COATED ORAL EVERY 12 HOURS SCHEDULED
Qty: 14 TABLET | Refills: 0 | Status: SHIPPED | OUTPATIENT
Start: 2021-12-02 | End: 2021-12-09

## 2021-12-02 RX ORDER — VALACYCLOVIR HYDROCHLORIDE 1 G/1
2000 TABLET, FILM COATED ORAL 2 TIMES DAILY
Qty: 30 TABLET | Refills: 0 | Status: SHIPPED | OUTPATIENT
Start: 2021-12-02 | End: 2022-06-04 | Stop reason: SDUPTHER

## 2021-12-02 RX ORDER — FLUTICASONE PROPIONATE 50 MCG
1 SPRAY, SUSPENSION (ML) NASAL DAILY
Qty: 18.2 ML | Refills: 1 | Status: SHIPPED | OUTPATIENT
Start: 2021-12-02 | End: 2022-07-12

## 2021-12-30 ENCOUNTER — TELEPHONE (OUTPATIENT)
Dept: FAMILY MEDICINE CLINIC | Facility: CLINIC | Age: 61
End: 2021-12-30
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
## 2022-01-03 ENCOUNTER — TELEPHONE (OUTPATIENT)
Dept: FAMILY MEDICINE CLINIC | Facility: CLINIC | Age: 62
End: 2022-01-03

## 2022-01-05 ENCOUNTER — OFFICE VISIT (OUTPATIENT)
Dept: BARIATRICS | Facility: CLINIC | Age: 62
End: 2022-01-05
Payer: COMMERCIAL

## 2022-01-05 ENCOUNTER — APPOINTMENT (OUTPATIENT)
Dept: LAB | Facility: IMAGING CENTER | Age: 62
End: 2022-01-05
Payer: COMMERCIAL

## 2022-01-05 VITALS
WEIGHT: 173.5 LBS | SYSTOLIC BLOOD PRESSURE: 134 MMHG | BODY MASS INDEX: 29.62 KG/M2 | HEIGHT: 64 IN | DIASTOLIC BLOOD PRESSURE: 78 MMHG | TEMPERATURE: 98.3 F | HEART RATE: 88 BPM

## 2022-01-05 DIAGNOSIS — E78.5 HYPERLIPIDEMIA, UNSPECIFIED HYPERLIPIDEMIA TYPE: ICD-10-CM

## 2022-01-05 DIAGNOSIS — I82.4Y9 DEEP VEIN THROMBOSIS (DVT) OF PROXIMAL LOWER EXTREMITY, UNSPECIFIED CHRONICITY, UNSPECIFIED LATERALITY (HCC): ICD-10-CM

## 2022-01-05 DIAGNOSIS — Z98.84 BARIATRIC SURGERY STATUS: ICD-10-CM

## 2022-01-05 DIAGNOSIS — Z48.815 ENCOUNTER FOR SURGICAL AFTERCARE FOLLOWING SURGERY OF DIGESTIVE SYSTEM: Primary | ICD-10-CM

## 2022-01-05 DIAGNOSIS — K91.2 POSTSURGICAL MALABSORPTION: ICD-10-CM

## 2022-01-05 LAB
ALBUMIN SERPL BCP-MCNC: 3.9 G/DL (ref 3.5–5)
ALP SERPL-CCNC: 94 U/L (ref 46–116)
ALT SERPL W P-5'-P-CCNC: 35 U/L (ref 12–78)
ANION GAP SERPL CALCULATED.3IONS-SCNC: 5 MMOL/L (ref 4–13)
AST SERPL W P-5'-P-CCNC: 31 U/L (ref 5–45)
BASOPHILS # BLD AUTO: 0.08 THOUSANDS/ΜL (ref 0–0.1)
BASOPHILS NFR BLD AUTO: 1 % (ref 0–1)
BILIRUB SERPL-MCNC: 1.06 MG/DL (ref 0.2–1)
BUN SERPL-MCNC: 11 MG/DL (ref 5–25)
CALCIUM SERPL-MCNC: 9.7 MG/DL (ref 8.3–10.1)
CHLORIDE SERPL-SCNC: 109 MMOL/L (ref 100–108)
CHOLEST SERPL-MCNC: 205 MG/DL
CO2 SERPL-SCNC: 28 MMOL/L (ref 21–32)
CREAT SERPL-MCNC: 0.7 MG/DL (ref 0.6–1.3)
EOSINOPHIL # BLD AUTO: 0.21 THOUSAND/ΜL (ref 0–0.61)
EOSINOPHIL NFR BLD AUTO: 3 % (ref 0–6)
ERYTHROCYTE [DISTWIDTH] IN BLOOD BY AUTOMATED COUNT: 12.4 % (ref 11.6–15.1)
GFR SERPL CREATININE-BSD FRML MDRD: 93 ML/MIN/1.73SQ M
GLUCOSE P FAST SERPL-MCNC: 116 MG/DL (ref 65–99)
HCT VFR BLD AUTO: 38.7 % (ref 34.8–46.1)
HDLC SERPL-MCNC: 92 MG/DL
HGB BLD-MCNC: 12.3 G/DL (ref 11.5–15.4)
IMM GRANULOCYTES # BLD AUTO: 0.02 THOUSAND/UL (ref 0–0.2)
IMM GRANULOCYTES NFR BLD AUTO: 0 % (ref 0–2)
LDLC SERPL CALC-MCNC: 98 MG/DL (ref 0–100)
LYMPHOCYTES # BLD AUTO: 1.83 THOUSANDS/ΜL (ref 0.6–4.47)
LYMPHOCYTES NFR BLD AUTO: 30 % (ref 14–44)
MCH RBC QN AUTO: 32 PG (ref 26.8–34.3)
MCHC RBC AUTO-ENTMCNC: 31.8 G/DL (ref 31.4–37.4)
MCV RBC AUTO: 101 FL (ref 82–98)
MONOCYTES # BLD AUTO: 0.45 THOUSAND/ΜL (ref 0.17–1.22)
MONOCYTES NFR BLD AUTO: 7 % (ref 4–12)
NEUTROPHILS # BLD AUTO: 3.6 THOUSANDS/ΜL (ref 1.85–7.62)
NEUTS SEG NFR BLD AUTO: 59 % (ref 43–75)
NRBC BLD AUTO-RTO: 0 /100 WBCS
PLATELET # BLD AUTO: 214 THOUSANDS/UL (ref 149–390)
PMV BLD AUTO: 11.6 FL (ref 8.9–12.7)
POTASSIUM SERPL-SCNC: 3.9 MMOL/L (ref 3.5–5.3)
PROT SERPL-MCNC: 7.5 G/DL (ref 6.4–8.2)
RBC # BLD AUTO: 3.84 MILLION/UL (ref 3.81–5.12)
SODIUM SERPL-SCNC: 142 MMOL/L (ref 136–145)
TRIGL SERPL-MCNC: 77 MG/DL
TSH SERPL DL<=0.05 MIU/L-ACNC: 1.21 UIU/ML (ref 0.36–3.74)
WBC # BLD AUTO: 6.19 THOUSAND/UL (ref 4.31–10.16)

## 2022-01-05 PROCEDURE — 80053 COMPREHEN METABOLIC PANEL: CPT

## 2022-01-05 PROCEDURE — 99214 OFFICE O/P EST MOD 30 MIN: CPT | Performed by: NURSE PRACTITIONER

## 2022-01-05 PROCEDURE — 85025 COMPLETE CBC W/AUTO DIFF WBC: CPT

## 2022-01-05 PROCEDURE — 84443 ASSAY THYROID STIM HORMONE: CPT

## 2022-01-05 PROCEDURE — 80061 LIPID PANEL: CPT

## 2022-01-05 PROCEDURE — 36415 COLL VENOUS BLD VENIPUNCTURE: CPT

## 2022-01-05 RX ORDER — PANTOPRAZOLE SODIUM 20 MG/1
20 TABLET, DELAYED RELEASE ORAL DAILY
Qty: 30 TABLET | Refills: 3 | Status: SHIPPED | OUTPATIENT
Start: 2022-01-05 | End: 2022-04-29

## 2022-01-05 NOTE — PATIENT INSTRUCTIONS
· Get lab work done prior to annual visit  Please call the office if you need a script  It is recommended to check with your insurance BEFORE getting labs done to make sure they are covered by your policy  · Call our office if you have any problems with abdominal pain especially associated with fever, chills, nausea, vomiting or any other concerns  · All  Post-bariatric surgery patients should be aware that very small quantities of any alcohol can cause impairment and it is very possible not to feel the effect  The effect can be in the system for several hours  It is also a stomach irritant  · It is advised to AVOID alcohol, Nonsteroidal antiinflammatory drugs (NSAIDS) and nicotine of all forms   Any of these can cause stomach irritation/pain  · Discussed the effects of alcohol on a bariatric patient and the increased impairment risk  · Keep up the good work! Please switch your gummies to chewables or tablet forms  Will call you if your bariatric labs/results are out of limits

## 2022-01-05 NOTE — PROGRESS NOTES
Assessment/Plan:     Patient ID: Kevin Dupont is a 57 y o  female  Bariatric Surgery Status    -s/p Raghav-En-Y Gastric Bypass with Dr Hari Rodriguez on 2017  Presents to the office today for overdue annual  Overall doing well  Patient reports she is smoking occasionally when she is stressed at work  At times 2-4 cigarettes each time, about once a week  Also works night shift and drinks a "pot or pot in a half" of caffienated coffee  Denies any other concerns or issues  Denies having any N/V/D/C, regurgitation or reflux  Currently not on any PPI  - Start pantoprazole 20 mg PO QD for gastric irritation/ulcer prophylaxis  Discussed risk of continuing smoking and excessive coffee intake  Advised patient to drink only 1 cup (8 oz) of coffee only, and smoking cessation was STRONGLY ADVISED  Patient states she will try to stop smoking; refuses any smoking cessation products offered at this time  · Continued/Maintain healthy weight loss with good nutrition intakes  · Adequate hydration with at least 64oz  fluid intake  · Follow diet as discussed  · Follow vitamin and mineral recommendations as reviewed with you  · Exercise as tolerated  · Colonoscopy referral made: UTD  · Mammogram - NOT DONE - patient states she will get done  Patient has an order to get done  · Follow-up in 1 year  We kindly ask that your arrive 15 minutes before your scheduled appointment time with your provider to allow our staff to room you, get your vital signs and update your chart  · Get lab work done prior to annual visit  Please call the office if you need a script  It is recommended to check with your insurance BEFORE getting labs done to make sure they are covered by your policy  · Call our office if you have any problems with abdominal pain especially associated with fever, chills, nausea, vomiting or any other concerns      · All  Post-bariatric surgery patients should be aware that very small quantities of any alcohol can cause impairment and it is very possible not to feel the effect  The effect can be in the system for several hours  It is also a stomach irritant  · It is advised to AVOID alcohol, Nonsteroidal antiinflammatory drugs (NSAIDS) and nicotine of all forms   Any of these can cause stomach irritation/pain  · Discussed the effects of alcohol on a bariatric patient and the increased impairment risk  · Keep up the good work! Postsurgical Malabsorption   -At risk for malabsorption of vitamins/minerals secondary to malabsorption and restriction of intake from bariatric surgery  -NOT Currently taking adequate postop bariatric surgery vitamin supplementation - states she is taking regular gummies  Advised NOT to take gummies as they may not provide adequate supplementation  Lemuel MVI list provided    -Last set of bariatric labs completed on 07/07/2020 and showed WNL  -Next set of bariatric labs ordered for approximately 2 weeks  -Patient received education about the importance of adhering to a lifelong supplementation regimen to avoid vitamin/mineral deficiencies      Diagnoses and all orders for this visit:    Encounter for surgical aftercare following surgery of digestive system  -     Ferritin; Future  -     Folate; Future  -     Iron Saturation %; Future  -     PTH, intact; Future  -     Zinc; Future  -     Vitamin D 25 hydroxy; Future  -     Vitamin B12; Future  -     Vitamin B1, whole blood; Future  -     Vitamin A; Future  -     pantoprazole (PROTONIX) 20 mg tablet; Take 1 tablet (20 mg total) by mouth daily    Bariatric surgery status  -     Ferritin; Future  -     Folate; Future  -     Iron Saturation %; Future  -     PTH, intact; Future  -     Zinc; Future  -     Vitamin D 25 hydroxy; Future  -     Vitamin B12; Future  -     Vitamin B1, whole blood; Future  -     Vitamin A; Future  -     pantoprazole (PROTONIX) 20 mg tablet;  Take 1 tablet (20 mg total) by mouth daily    Postsurgical malabsorption  -     Ferritin; Future  -     Folate; Future  -     Iron Saturation %; Future  -     PTH, intact; Future  -     Zinc; Future  -     Vitamin D 25 hydroxy; Future  -     Vitamin B12; Future  -     Vitamin B1, whole blood; Future  -     Vitamin A; Future  -     pantoprazole (PROTONIX) 20 mg tablet; Take 1 tablet (20 mg total) by mouth daily    BMI 29 0-29 9,adult         Subjective:      Patient ID: Easton Castellano is a 57 y o  female  -s/p Raghav-En-Y Gastric Bypass with Dr Darius Randhawa on 2017  Presents to the office today for overdue annual  Overall doing well  Patient reports she is smoking occasionally when she is stressed at work  At times 2-4 cigarettes each time, about once a week  Also works night shift and drinks a "pot or pot in a half" of caffienated coffee  Denies any other concerns or issues  Denies having any N/V/D/C, regurgitation or reflux  Currently not on any PPI  Initial: 241 lbs  Current: 173   EWL: (Weight loss is ahead of schedule at this post surgical period )  Narendra: 166  Current BMI is Body mass index is 29 78 kg/m²  Goal weight: 159-167 lbs    · Tolerating a regular diet-yes  · Eating at least 60 grams of protein per day-yes  · Following 30/60 minute rule with liquids-no - advise to try at least the 30/30  · Drinking at least 64 ounces of fluid per day-no - advised to increase fluid intake  · Drinking carbonated beverages-yes  · Sufficient exercise-yes - goes to the gym  · Using NSAIDs regularly-no  · Using nicotine-yes - weekly 2-4 cigarettes  · Using alcohol-rarely  · Supplements: regular gummies + calcium chews, + b-complex + thiamine + ferrous sulfate - advise to not take gummies  · EWL is 69%, which places the patient ahead of schedule for expected post surgical weight loss at this time       The following portions of the patient's history were reviewed and updated as appropriate: allergies, current medications, past family history, past medical history, past social history, past surgical history and problem list     Review of Systems   Constitutional: Negative  Negative for fatigue  Respiratory: Negative  Cardiovascular: Negative  Gastrointestinal: Negative  Musculoskeletal: Negative  Skin: Negative  Neurological: Negative  Psychiatric/Behavioral: Negative  Objective:    /78 (BP Location: Left arm, Patient Position: Sitting)   Pulse 88   Temp 98 3 °F (36 8 °C) (Tympanic)   Ht 5' 4" (1 626 m)   Wt 78 7 kg (173 lb 8 oz)   LMP 09/18/2014   BMI 29 78 kg/m²      Physical Exam  Vitals reviewed  Constitutional:       Appearance: Normal appearance  Cardiovascular:      Rate and Rhythm: Normal rate and regular rhythm  Pulses: Normal pulses  Pulmonary:      Effort: Pulmonary effort is normal       Breath sounds: Normal breath sounds  Abdominal:      General: Bowel sounds are normal       Palpations: Abdomen is soft  Musculoskeletal:         General: Normal range of motion  Skin:     General: Skin is warm and dry  Neurological:      General: No focal deficit present  Mental Status: She is alert and oriented to person, place, and time  Psychiatric:         Mood and Affect: Mood normal          Behavior: Behavior normal          Thought Content:  Thought content normal          Judgment: Judgment normal

## 2022-01-07 ENCOUNTER — APPOINTMENT (OUTPATIENT)
Dept: LAB | Facility: IMAGING CENTER | Age: 62
End: 2022-01-07
Payer: COMMERCIAL

## 2022-01-07 ENCOUNTER — OFFICE VISIT (OUTPATIENT)
Dept: FAMILY MEDICINE CLINIC | Facility: CLINIC | Age: 62
End: 2022-01-07
Payer: COMMERCIAL

## 2022-01-07 VITALS
SYSTOLIC BLOOD PRESSURE: 116 MMHG | RESPIRATION RATE: 12 BRPM | BODY MASS INDEX: 30.15 KG/M2 | HEIGHT: 64 IN | HEART RATE: 87 BPM | DIASTOLIC BLOOD PRESSURE: 72 MMHG | OXYGEN SATURATION: 94 % | TEMPERATURE: 97.2 F | WEIGHT: 176.6 LBS

## 2022-01-07 DIAGNOSIS — Z00.00 MEDICARE ANNUAL WELLNESS VISIT, SUBSEQUENT: ICD-10-CM

## 2022-01-07 DIAGNOSIS — I82.4Y9 DEEP VEIN THROMBOSIS (DVT) OF PROXIMAL LOWER EXTREMITY, UNSPECIFIED CHRONICITY, UNSPECIFIED LATERALITY (HCC): ICD-10-CM

## 2022-01-07 DIAGNOSIS — Z98.84 BARIATRIC SURGERY STATUS: ICD-10-CM

## 2022-01-07 DIAGNOSIS — I49.5 SICK SINUS SYNDROME (HCC): ICD-10-CM

## 2022-01-07 DIAGNOSIS — J34.89 SINUS PRESSURE: Primary | ICD-10-CM

## 2022-01-07 DIAGNOSIS — D50.9 IRON DEFICIENCY ANEMIA, UNSPECIFIED IRON DEFICIENCY ANEMIA TYPE: ICD-10-CM

## 2022-01-07 DIAGNOSIS — K91.2 POSTSURGICAL MALABSORPTION: ICD-10-CM

## 2022-01-07 DIAGNOSIS — E78.49 OTHER HYPERLIPIDEMIA: ICD-10-CM

## 2022-01-07 DIAGNOSIS — Z48.815 ENCOUNTER FOR SURGICAL AFTERCARE FOLLOWING SURGERY OF DIGESTIVE SYSTEM: ICD-10-CM

## 2022-01-07 LAB
25(OH)D3 SERPL-MCNC: 64.8 NG/ML (ref 30–100)
FERRITIN SERPL-MCNC: 49 NG/ML (ref 8–388)
FOLATE SERPL-MCNC: >20 NG/ML (ref 3.1–17.5)
IRON SATN MFR SERPL: 24 % (ref 15–50)
IRON SERPL-MCNC: 102 UG/DL (ref 50–170)
PTH-INTACT SERPL-MCNC: 57.3 PG/ML (ref 18.4–80.1)
TIBC SERPL-MCNC: 423 UG/DL (ref 250–450)
VIT B12 SERPL-MCNC: 837 PG/ML (ref 100–900)

## 2022-01-07 PROCEDURE — 83970 ASSAY OF PARATHORMONE: CPT

## 2022-01-07 PROCEDURE — G0439 PPPS, SUBSEQ VISIT: HCPCS | Performed by: FAMILY MEDICINE

## 2022-01-07 PROCEDURE — 83540 ASSAY OF IRON: CPT

## 2022-01-07 PROCEDURE — 84630 ASSAY OF ZINC: CPT

## 2022-01-07 PROCEDURE — 99214 OFFICE O/P EST MOD 30 MIN: CPT | Performed by: FAMILY MEDICINE

## 2022-01-07 PROCEDURE — 84425 ASSAY OF VITAMIN B-1: CPT

## 2022-01-07 PROCEDURE — 3725F SCREEN DEPRESSION PERFORMED: CPT | Performed by: FAMILY MEDICINE

## 2022-01-07 PROCEDURE — 84590 ASSAY OF VITAMIN A: CPT

## 2022-01-07 PROCEDURE — 83550 IRON BINDING TEST: CPT

## 2022-01-07 PROCEDURE — 82306 VITAMIN D 25 HYDROXY: CPT

## 2022-01-07 PROCEDURE — 82728 ASSAY OF FERRITIN: CPT

## 2022-01-07 PROCEDURE — U0003 INFECTIOUS AGENT DETECTION BY NUCLEIC ACID (DNA OR RNA); SEVERE ACUTE RESPIRATORY SYNDROME CORONAVIRUS 2 (SARS-COV-2) (CORONAVIRUS DISEASE [COVID-19]), AMPLIFIED PROBE TECHNIQUE, MAKING USE OF HIGH THROUGHPUT TECHNOLOGIES AS DESCRIBED BY CMS-2020-01-R: HCPCS | Performed by: FAMILY MEDICINE

## 2022-01-07 PROCEDURE — 82746 ASSAY OF FOLIC ACID SERUM: CPT

## 2022-01-07 PROCEDURE — 36415 COLL VENOUS BLD VENIPUNCTURE: CPT

## 2022-01-07 PROCEDURE — 82607 VITAMIN B-12: CPT

## 2022-01-07 RX ORDER — PREDNISONE 50 MG/1
50 TABLET ORAL DAILY
Qty: 5 TABLET | Refills: 0 | Status: SHIPPED | OUTPATIENT
Start: 2022-01-07 | End: 2022-01-12

## 2022-01-07 NOTE — ASSESSMENT & PLAN NOTE
Patient has been vaccinated for Covid  No known sick contacts  Will collect rapid test in-office     Advised patient to utilize Flonase and 5 day course of prednisone given

## 2022-01-07 NOTE — PROGRESS NOTES
Assessment and Plan:     Problem List Items Addressed This Visit        Cardiovascular and Mediastinum    Sick sinus syndrome ([address] )     Stable  Has permanent dual chamber pacemaker  Follows with Cardiology  Appointment this month  Relevant Orders    COVID Only- Office Collect    Deep vein thrombosis (DVT) of proximal lower extremity (HCC)     Hx of recurrent DVT  Last in 2012  Stable  Continue Eliquis  Continue to monitor             Other    Hyperlipidemia    Iron deficiency anemia     Patient not current anemic  Hb 12 3  Continue with iron supplementation  Relevant Medications    Cyanocobalamin (VITAMIN B 12 PO)    Medicare annual wellness visit, subsequent     Labs reviewed with patient  Follow up in 6 months          Sinus pressure - Primary     Patient has been vaccinated for Covid  No known sick contacts  Will collect rapid test in-office     Advised patient to utilize Flonase and 5 day course of prednisone given  Relevant Medications    predniSONE 50 mg tablet        BMI Counseling: Body mass index is 30 31 kg/m²  The BMI is above normal  Nutrition recommendations include decreasing portion sizes, encouraging healthy choices of fruits and vegetables and decreasing fast food intake  Rationale for BMI follow-up plan is due to patient being overweight or obese  Preventive health issues were discussed with patient, and age appropriate screening tests were ordered as noted in patient's After Visit Summary  Personalized health advice and appropriate referrals for health education or preventive services given if needed, as noted in patient's After Visit Summary       History of Present Illness:     Patient presents for Medicare Annual Wellness visit    Patient Care Team:  Mouna Finch as PCP - General (Nurse Practitioner)  Kiko Barajas MD as PCP - [address] [place] [address] (RTE)  Kiko Barajas MD as PCP - [facility] ([facility])     Problem List:     Patient Active Problem List   Diagnosis    Abnormal CT scan    Sick sinus syndrome (HCC)    HSV-1 infection    Status post carpal tunnel release    Fibrocystic breast disease    Hyperlipidemia    Knee osteoarthritis    Lichen sclerosus    Menopausal and postmenopausal disorder    Neck pain    Candida infection of flexural skin    Grade 1 out of 6 intensity murmur    Encounter for screening mammogram for malignant neoplasm of breast    Deep vein thrombosis (DVT) of proximal lower extremity (HCC)    History of DVT (deep vein thrombosis)    Iron deficiency anemia    Vitamin deficiency    Medicare annual wellness visit, subsequent    Mass of soft tissue of right upper extremity    Chronic right shoulder pain    Work related injury    Leukopenia    Anemia    Right ear pain    Dysphagia    Acute gastric ulcer without hemorrhage or perforation    History of colon polyps    Preop examination    Overweight    Bariatric surgery status    Encounter for surgical aftercare following surgery of digestive system    Depression    Nondisplaced fracture of medial malleolus of right tibia, initial encounter for closed fracture    Closed nondisplaced Maisonneuve's fracture of right leg    Closed fracture of upper end of right fibula    Closed fracture of left distal radius and ulna    Laceration of eyebrow and forehead    Visit for suture removal    Fall    Sinus pressure      Past Medical and Surgical History:     Past Medical History:   Diagnosis Date    Anemia     iron def    Arthritis     knees    Carpal tunnel syndrome of right wrist     Chest pain     Colon polyp     Depression     at times    DVT (deep venous thrombosis) ([facility] [address] ) 2012    Dysphagia     Esophageal reflux 5/23/2011    Grade 1 out of 6 intensity murmur 6/24/2019    Heart murmur     History of transfusion 1980    Pacemaker 11/01/2018    Pneumonia     age 15    Psychiatric disorder     Trigger finger, left      Past Surgical History:   Procedure Laterality Date    BACK SURGERY      CARDIAC PACEMAKER PLACEMENT  2018    CERVICAL [address] SURGERY  2009    PLATES & SCREWS     SECTION      X2    COLONOSCOPY      GASTRIC BYPASS  2017    LAP RINYGB SURGERY    KNEE ARTHROSCOPY      KNEE SURGERY Bilateral     KNEE SURGERY Left 2020    OTHER SURGICAL HISTORY      ARM SURGERIES    TN INCISE FINGER TENDON SHEATH Left 2018    Procedure: LONG TRIGGER FINGER RELEASE;  Surgeon: Kandice Machado MD;  Location: [facility];  Service: Orthopedics    TN WRIST [address] LIG Right 2018    Procedure: ENDOSCOPIC CARPAL TUNNEL RELEASE;  Surgeon: Kandice Machado MD;  Location: [facility];  Service: Orthopedics    TONSILLECTOMY      UPPER GASTROINTESTINAL ENDOSCOPY        Family History:     Family History   Problem Relation Age of Onset    Stroke Mother     Alzheimer's disease Mother     Arthritis Mother     Coronary artery disease Mother     Breast cancer Mother 77    Cancer Mother     Heart disease Mother     Hypertension Father     Gout Father     Diabetes type II Father     Coronary artery disease Father     Heart disease Father     No Known Problems Sister     No Known Problems Daughter     No Known Problems Maternal Grandmother     No Known Problems Maternal Grandfather     No Known Problems Paternal Grandmother     No Known Problems Paternal Grandfather     Prostate cancer Brother     No Known Problems Sister     No Known Problems Sister     No Known Problems Maternal Aunt     No Known Problems Maternal Aunt     No Known Problems Maternal Aunt     No Known Problems Maternal Aunt     Cancer Maternal Aunt     No Known Problems Paternal Aunt     No Known Problems Paternal Aunt       Social History:     Social History     Socioeconomic History    Marital status: /Civil Union     Spouse name: None    Number of children: None    Years of education: None    Highest education level: None   Occupational History    None   Tobacco Use    Smoking status: Former Smoker     Packs/day: 0 25     Types: Cigarettes    Smokeless tobacco: Never Used   Vaping Use    Vaping Use: Never used   Substance and Sexual Activity    Alcohol use:  Yes     Alcohol/week: 2 0 standard drinks     Types: 1 Glasses of wine, 1 Standard drinks or equivalent per week     Comment: very rare social    Drug use: No    Sexual activity: Not Currently   Other Topics Concern    None   Social History Narrative    None     Social Determinants of Health     Financial Resource Strain: Not on file   Food Insecurity: Not on file   Transportation Needs: Not on file   Physical Activity: Not on file   Stress: Not on file   Social Connections: Not on file   Intimate Partner Violence: Not on file   Housing Stability: Not on file      Medications and Allergies:     Current Outpatient Medications   Medication Sig Dispense Refill    Ascorbic Acid (VITAMIN C) 100 MG CHEW Chew      atorvastatin (LIPITOR) 10 mg tablet Take 1 tablet (10 mg total) by mouth daily 90 tablet 0    Biotin 83346 MCG TABS Take by mouth daily        Calcium Carbonate (CALCI-CHEW PO) Take 500 mg by mouth 2 (two) times a day      cholecalciferol (VITAMIN D3) 1,000 units tablet Take 5,000 Units by mouth 3 (three) times a day        clobetasol (TEMOVATE) 0 05 % cream Apply to affected areas daily x 12 weeks, then twice weekly for maintenance      Cyanocobalamin (VITAMIN B 12 PO) Take 1,000 mcg by mouth      Eliquis 2 5 MG Take 1 tablet (2 5 mg total) by mouth 2 (two) times a day 60 tablet 3    Ferrous Sulfate  (45 Fe) MG TBCR Take 1 tablet by mouth daily      fluticasone (FLONASE) 50 mcg/act nasal spray 1 spray into each nostril daily 18 2 mL 1    Multiple Vitamin (MULTIVITAMIN) capsule Take 1 capsule by mouth daily 2 chewies daily      nystatin (MYCOSTATIN) powder Apply topically 2 (two) times a day 30 g 0    pantoprazole (PROTONIX) 20 mg tablet Take 1 tablet (20 mg total) by mouth daily 30 tablet 3    Lysine HCl POWD Take by mouth (Patient not taking: Reported on 1/7/2022 )      predniSONE 50 mg tablet Take 1 tablet (50 mg total) by mouth daily for 5 days 5 tablet 0    valACYclovir (VALTREX) 1,000 mg tablet Take 2 tablets (2,000 mg total) by mouth 2 (two) times a day for 1 day 30 tablet 0     No current facility-administered medications for this visit  No Known Allergies   Immunizations:     Immunization History   Administered Date(s) Administered    COVID-19 PFIZER VACCINE 0 3 ML IM 04/03/2021, 04/28/2021    Hep A, adult 08/08/2017    Hep A, ped/adol, 2 dose 01/04/2017    Hepatitis A 01/04/2017    INFLUENZA 10/05/2011, 11/22/2016, 11/22/2016, 10/23/2017, 10/23/2017, 10/23/2017, 08/18/2021, 08/18/2021    Influenza, injectable, quadrivalent, preservative free 0 5 mL 10/30/2020    Influenza, recombinant, quadrivalent,injectable, preservative free 10/31/2018, 10/15/2019    Influenza, seasonal, injectable 12/22/2014, 10/13/2015    Pneumococcal Polysaccharide PPV23 02/25/2013, 01/17/2016    Tdap 04/21/2011, 04/10/2017, 06/18/2021    Tuberculin Skin Test-PPD Intradermal 07/28/2021    Zoster 01/17/2016      Health Maintenance:         Topic Date Due    HIV Screening  Never done    Cervical Cancer Screening  Never done    Breast Cancer Screening: Mammogram  07/13/2020    Colorectal Cancer Screening  06/23/2023    Hepatitis C Screening  Completed         Topic Date Due    COVID-19 Vaccine (3 - Booster for Gorman Peter series) 10/28/2021      Medicare Health Risk Assessment:     /72 (BP Location: Left arm, Patient Position: Sitting, Cuff Size: Large)   Pulse 87   Temp (!) 97 2 °F (36 2 °C) (Tympanic)   Resp 12   Ht 5' 4" (1 626 m)   Wt 80 1 kg (176 lb 9 6 oz)   LMP 09/18/2014   SpO2 94%   BMI 30 31 kg/m²      Francoise Lundberg is here for her Subsequent Wellness visit   Last Medicare Wellness visit information reviewed, patient interviewed and updates made to the record today  Health Risk Assessment:   Patient rates overall health as good  Patient feels that their physical health rating is same  Patient is very satisfied with their life  Eyesight was rated as same  Hearing was rated as same  Patient feels that their emotional and mental health rating is same  Patients states they are sometimes angry  Patient states they are sometimes unusually tired/fatigued  Pain experienced in the last 7 days has been some  Patient's pain rating has been 5/10  UNKNOWN WEIGHT LOSS OR GAIN    Depression Screening:   PHQ-9 Score: 0      Fall Risk Screening: In the past year, patient has experienced: history of falling in past year    Number of falls: 2 or more  Injured during fall?: Yes    Feels unsteady when standing or walking?: No    Worried about falling?: No      Urinary Incontinence Screening:   Patient has not leaked urine accidently in the last six months  Home Safety:  Patient does not have trouble with stairs inside or outside of their home  Patient has working smoke alarms and has no working carbon monoxide detector  Home safety hazards include: none  Nutrition:   Current diet is No Added Salt and Regular  Medications:   Patient is currently taking over-the-counter supplements  OTC medications include: see medication list  Patient is able to manage medications  Activities of Daily Living (ADLs)/Instrumental Activities of Daily Living (IADLs):   Walk and transfer into and out of bed and chair?: Yes  Dress and groom yourself?: Yes    Bathe or shower yourself?: Yes    Feed yourself?  Yes  Do your laundry/housekeeping?: Yes  Manage your money, pay your bills and track your expenses?: Yes  Make your own meals?: Yes    Do your own shopping?: Yes    Previous Hospitalizations:   Any hospitalizations or ED visits within the last 12 months?: Yes    How many hospitalizations have you had in the last year?: 1-2    Advance Care Planning:   Living will: No    Durable POA for healthcare: No    Advanced directive: No      Cognitive Screening:   Provider or family/friend/caregiver concerned regarding cognition?: No    PREVENTIVE SCREENINGS      Cardiovascular Screening:    General: Screening Not Indicated and History Lipid Disorder      Diabetes Screening:     General: Screening Current      Colorectal Cancer Screening:     General: Screening Current      Breast Cancer Screening:     General: Risks and Benefits Discussed    Due for: Mammogram        Cervical Cancer Screening:    General: Risks and Benefits Discussed      Osteoporosis Screening:    General: Risks and Benefits Discussed and Screening Current      Abdominal Aortic Aneurysm (AAA) Screening:        General: Screening Not Indicated      Lung Cancer Screening:     General: Screening Not Indicated      Hepatitis C Screening:    General: Screening Current    Screening, Brief Intervention, and Referral to Treatment (SBIRT)    Screening  Typical number of drinks in a day: 0  Typical number of drinks in a week: 0  Interpretation: Low risk drinking behavior  Single Item Drug Screening:  How often have you used an illegal drug (including marijuana) or a prescription medication for non-medical reasons in the past year? never    Single Item Drug Screen Score: 0  Interpretation: Negative screen for possible drug use disorder    Brief Intervention  Alcohol & drug use screenings were reviewed  No concerns regarding substance use disorder identified         Elisabeth Velazquez MD

## 2022-01-07 NOTE — PATIENT INSTRUCTIONS
Medicare Preventive Visit Patient Instructions  Thank you for completing your Welcome to Medicare Visit or Medicare Annual Wellness Visit today  Your next wellness visit will be due in one year (1/8/2023)  The screening/preventive services that you may require over the next 5-10 years are detailed below  Some tests may not apply to you based off risk factors and/or age  Screening tests ordered at today's visit but not completed yet may show as past due  Also, please note that scanned in results may not display below  Preventive Screenings:  Service Recommendations Previous Testing/Comments   Colorectal Cancer Screening  * Colonoscopy    * Fecal Occult Blood Test (FOBT)/Fecal Immunochemical Test (FIT)  * Fecal DNA/Cologuard Test  * Flexible Sigmoidoscopy Age: 54-65 years old   Colonoscopy: every 10 years (may be performed more frequently if at higher risk)  OR  FOBT/FIT: every 1 year  OR  Cologuard: every 3 years  OR  Sigmoidoscopy: every 5 years  Screening may be recommended earlier than age 48 if at higher risk for colorectal cancer  Also, an individualized decision between you and your healthcare provider will decide whether screening between the ages of 74-80 would be appropriate  Colonoscopy: 06/23/2020  FOBT/FIT: 03/18/2019  Cologuard: Not on file  Sigmoidoscopy: Not on file    Screening Current     Breast Cancer Screening Age: 36 years old  Frequency: every 1-2 years  Not required if history of left and right mastectomy Mammogram: 07/13/2019        Cervical Cancer Screening Between the ages of 21-29, pap smear recommended once every 3 years  Between the ages of 33-67, can perform pap smear with HPV co-testing every 5 years     Recommendations may differ for women with a history of total hysterectomy, cervical cancer, or abnormal pap smears in past  Pap Smear: Not on file        Hepatitis C Screening Once for adults born between 1945 and 1965  More frequently in patients at high risk for Hepatitis C Hep C Antibody: 07/07/2020    Screening Current   Diabetes Screening 1-2 times per year if you're at risk for diabetes or have pre-diabetes Fasting glucose: 116 mg/dL   A1C: 5 7 %    Screening Current   Cholesterol Screening Once every 5 years if you don't have a lipid disorder  May order more often based on risk factors  Lipid panel: 01/05/2022    Screening Not Indicated  History Lipid Disorder     Other Preventive Screenings Covered by Medicare:  1  Abdominal Aortic Aneurysm (AAA) Screening: covered once if your at risk  You're considered to be at risk if you have a family history of AAA  2  Lung Cancer Screening: covers low dose CT scan once per year if you meet all of the following conditions: (1) Age 50-69; (2) No signs or symptoms of lung cancer; (3) Current smoker or have quit smoking within the last 15 years; (4) You have a tobacco smoking history of at least 30 pack years (packs per day multiplied by number of years you smoked); (5) You get a written order from a healthcare provider  3  Glaucoma Screening: covered annually if you're considered high risk: (1) You have diabetes OR (2) Family history of glaucoma OR (3)  aged 48 and older OR (3)  American aged 72 and older  3  Osteoporosis Screening: covered every 2 years if you meet one of the following conditions: (1) You're estrogen deficient and at risk for osteoporosis based off medical history and other findings; (2) Have a vertebral abnormality; (3) On glucocorticoid therapy for more than 3 months; (4) Have primary hyperparathyroidism; (5) On osteoporosis medications and need to assess response to drug therapy  · Last bone density test (DXA Scan): 04/14/2021   5  HIV Screening: covered annually if you're between the age of 15-65  Also covered annually if you are younger than 13 and older than 72 with risk factors for HIV infection  For pregnant patients, it is covered up to 3 times per pregnancy      Immunizations:  Immunization Recommendations   Influenza Vaccine Annual influenza vaccination during flu season is recommended for all persons aged >= 6 months who do not have contraindications   Pneumococcal Vaccine (Prevnar and Pneumovax)  * Prevnar = PCV13  * Pneumovax = PPSV23   Adults 25-60 years old: 1-3 doses may be recommended based on certain risk factors  Adults 72 years old: Prevnar (PCV13) vaccine recommended followed by Pneumovax (PPSV23) vaccine  If already received PPSV23 since turning 65, then PCV13 recommended at least one year after PPSV23 dose  Hepatitis B Vaccine 3 dose series if at intermediate or high risk (ex: diabetes, end stage renal disease, liver disease)   Tetanus (Td) Vaccine - COST NOT COVERED BY MEDICARE PART B Following completion of primary series, a booster dose should be given every 10 years to maintain immunity against tetanus  Td may also be given as tetanus wound prophylaxis  Tdap Vaccine - COST NOT COVERED BY MEDICARE PART B Recommended at least once for all adults  For pregnant patients, recommended with each pregnancy  Shingles Vaccine (Shingrix) - COST NOT COVERED BY MEDICARE PART B  2 shot series recommended in those aged 48 and above     Health Maintenance Due:      Topic Date Due    HIV Screening  Never done    Cervical Cancer Screening  Never done    Breast Cancer Screening: Mammogram  07/13/2020    Colorectal Cancer Screening  06/23/2023    Hepatitis C Screening  Completed     Immunizations Due:      Topic Date Due    COVID-19 Vaccine (3 - Booster for Gorman Peter series) 10/28/2021     Advance Directives   What are advance directives? Advance directives are legal documents that state your wishes and plans for medical care  These plans are made ahead of time in case you lose your ability to make decisions for yourself  Advance directives can apply to any medical decision, such as the treatments you want, and if you want to donate organs  What are the types of advance directives?   There are many types of advance directives, and each state has rules about how to use them  You may choose a combination of any of the following:  · Living will: This is a written record of the treatment you want  You can also choose which treatments you do not want, which to limit, and which to stop at a certain time  This includes surgery, medicine, IV fluid, and tube feedings  · Durable power of  for healthcare [facility]): This is a written record that states who you want to make healthcare choices for you when you are unable to make them for yourself  This person, called a proxy, is usually a family member or a friend  You may choose more than 1 proxy  · Do not resuscitate (DNR) order:  A DNR order is used in case your heart stops beating or you stop breathing  It is a request not to have certain forms of treatment, such as CPR  A DNR order may be included in other types of advance directives  · Medical directive: This covers the care that you want if you are in a coma, near death, or unable to make decisions for yourself  You can list the treatments you want for each condition  Treatment may include pain medicine, surgery, blood transfusions, dialysis, IV or tube feedings, and a ventilator (breathing machine)  · Values history: This document has questions about your views, beliefs, and how you feel and think about life  This information can help others choose the care that you would choose  Why are advance directives important? An advance directive helps you control your care  Although spoken wishes may be used, it is better to have your wishes written down  Spoken wishes can be misunderstood, or not followed  Treatments may be given even if you do not want them  An advance directive may make it easier for your family to make difficult choices about your care     Weight Management   Why it is important to manage your weight:  Being overweight increases your risk of health conditions such as heart disease, high blood pressure, type 2 diabetes, and certain types of cancer  It can also increase your risk for osteoarthritis, sleep apnea, and other respiratory problems  Aim for a slow, steady weight loss  Even a small amount of weight loss can lower your risk of health problems  How to lose weight safely:  A safe and healthy way to lose weight is to eat fewer calories and get regular exercise  You can lose up about 1 pound a week by decreasing the number of calories you eat by 500 calories each day  Healthy meal plan for weight management:  A healthy meal plan includes a variety of foods, contains fewer calories, and helps you stay healthy  A healthy meal plan includes the following:  · Eat whole-grain foods more often  A healthy meal plan should contain fiber  Fiber is the part of grains, fruits, and vegetables that is not broken down by your body  Whole-grain foods are healthy and provide extra fiber in your diet  Some examples of whole-grain foods are whole-wheat breads and pastas, oatmeal, brown rice, and bulgur  · Eat a variety of vegetables every day  Include dark, leafy greens such as spinach, kale, joleen greens, and mustard greens  Eat yellow and orange vegetables such as carrots, sweet potatoes, and winter squash  · Eat a variety of fruits every day  Choose fresh or canned fruit (canned in its own juice or light syrup) instead of juice  Fruit juice has very little or no fiber  · Eat low-fat dairy foods  Drink fat-free (skim) milk or 1% milk  Eat fat-free yogurt and low-fat cottage cheese  Try low-fat cheeses such as mozzarella and other reduced-fat cheeses  · Choose meat and other protein foods that are low in fat  Choose beans or other legumes such as split peas or lentils  Choose fish, skinless poultry (chicken or turkey), or lean cuts of red meat (beef or pork)  Before you cook meat or poultry, cut off any visible fat  · Use less fat and oil  Try baking foods instead of frying them  Add less fat, such as margarine, sour cream, regular salad dressing and mayonnaise to foods  Eat fewer high-fat foods  Some examples of high-fat foods include french fries, doughnuts, ice cream, and cakes  · Eat fewer sweets  Limit foods and drinks that are high in sugar  This includes candy, cookies, regular soda, and sweetened drinks  Exercise:  Exercise at least 30 minutes per day on most days of the week  Some examples of exercise include walking, biking, dancing, and swimming  You can also fit in more physical activity by taking the stairs instead of the elevator or parking farther away from stores  Ask your healthcare provider about the best exercise plan for you  © Copyright [employer] 2018 Information is for End User's use only and may not be sold, redistributed or otherwise used for commercial purposes   All illustrations and images included in CareNotes® are the copyrighted property of [employer]  or [address] [place] [address]

## 2022-01-07 NOTE — PROGRESS NOTES
Assessment/Plan:    Dizziness    Three episodes of dizziness associated with palpitations  Patient does note that she gets dizzy with changes in position and that seems to be the etiology of at least one of her episodes  She does have a pacemaker and follows with cardiology  Next cardiology appointment is 1/25/22  Patient reports that she drinks 1-1 5 pots of coffee a day  She does not consistently drink water  Regular rhythm on exam  Appreciate inpuit from cardiology if possible cardiac etiology  If problem recurs, may consider ENT consultation  Sinus pressure  Patient has been vaccinated for Covid  No known sick contacts  Will collect rapid test in-office     Advised patient to utilize Flonase and 5 day course of prednisone given  Deep vein thrombosis (DVT) of proximal lower extremity (HCC)  Hx of recurrent DVT  Last in 2012  Stable  Continue Eliquis  Continue to monitor     Sick sinus syndrome (HCC)  Stable  Has permanent dual chamber pacemaker  Follows with Cardiology  Appointment this month  Medicare annual wellness visit, subsequent  Labs reviewed with patient  Follow up in 6 months     Iron deficiency anemia  Patient not current anemic  Hb 12 3  Continue with iron supplementation  Problem List Items Addressed This Visit        Cardiovascular and Mediastinum    Sick sinus syndrome ([address] )     Stable  Has permanent dual chamber pacemaker  Follows with Cardiology  Appointment this month  Relevant Orders    COVID Only- Office Collect    Deep vein thrombosis (DVT) of proximal lower extremity (HCC)     Hx of recurrent DVT  Last in 2012  Stable  Continue Eliquis  Continue to monitor             Other    Hyperlipidemia    Iron deficiency anemia     Patient not current anemic  Hb 12 3  Continue with iron supplementation  Relevant Medications    Cyanocobalamin (VITAMIN B 12 PO)    Medicare annual wellness visit, subsequent     Labs reviewed with patient   Follow up in 6 months Sinus pressure - Primary     Patient has been vaccinated for Covid  No known sick contacts  Will collect rapid test in-office     Advised patient to utilize Flonase and 5 day course of prednisone given  Relevant Medications    predniSONE 50 mg tablet            Subjective:      Patient ID: Marcela Limon is a 57 y o  female  HPI     The patient, who has PMH of sick sinus syndrome with dual chamber pacemaker, presents to the office today for evaluation of episodes of palpitations/dizziness that has been intermittent the past couple months  She recalls three distinct episodes  The last episode was the past week and occurred with a chance in movement from being at the waist to standing straight  Shej also notes that she had skin changes that sound consistent with livedo reticularis during this particular episode in the bilateral arms  The other episodes occurred at random when she was sitting  She notified her cardiologist of these episodes and she reports that she was told to make appointment with her pcp  She has an appointment scheduled with cardiology within the month  Otherwise she feels that she is 'getting a sinus infection' as she has some congestion, sinus pressure, and her right ear has been hurting for the past 2-3 days  The following portions of the patient's history were reviewed and updated as appropriate: allergies, current medications, past family history, past medical history, past social history, past surgical history and problem list     Review of Systems   Constitutional: Negative for activity change, appetite change, fatigue and fever  HENT: Positive for congestion, ear pain and sinus pressure  Negative for hearing loss, rhinorrhea, sinus pain, sneezing, sore throat, tinnitus and trouble swallowing  Respiratory: Negative for cough and shortness of breath  Cardiovascular: Negative for chest pain, palpitations and leg swelling  Gastrointestinal: Negative  Genitourinary: Negative  Musculoskeletal: Negative  Skin: Negative for color change  Neurological: Positive for dizziness  Negative for seizures, syncope and speech difficulty  Psychiatric/Behavioral: Negative  Objective:      /72 (BP Location: Left arm, Patient Position: Sitting, Cuff Size: Large)   Pulse 87   Temp (!) 97 2 °F (36 2 °C) (Tympanic)   Resp 12   Ht 5' 4" (1 626 m)   Wt 80 1 kg (176 lb 9 6 oz)   LMP 09/18/2014   SpO2 94%   BMI 30 31 kg/m²          Physical Exam  Constitutional:       General: She is not in acute distress  Appearance: Normal appearance  She is not ill-appearing or toxic-appearing  HENT:      Head: Normocephalic and atraumatic  Right Ear: Hearing and ear canal normal  A middle ear effusion is present  Left Ear: Hearing, tympanic membrane and ear canal normal       Nose: Nose normal  No rhinorrhea  Eyes:      Conjunctiva/sclera: Conjunctivae normal    Cardiovascular:      Rate and Rhythm: Normal rate and regular rhythm  Pulses: Normal pulses  Heart sounds: Normal heart sounds  Pulmonary:      Effort: Pulmonary effort is normal       Breath sounds: Normal breath sounds  Skin:     General: Skin is warm and dry  Neurological:      General: No focal deficit present  Mental Status: She is alert     Psychiatric:         Mood and Affect: Mood normal          Behavior: Behavior normal

## 2022-01-10 LAB — SARS-COV-2 RNA RESP QL NAA+PROBE: NEGATIVE

## 2022-01-11 ENCOUNTER — TELEPHONE (OUTPATIENT)
Dept: FAMILY MEDICINE CLINIC | Facility: CLINIC | Age: 62
End: 2022-01-11

## 2022-01-11 LAB — ZINC SERPL-MCNC: 75 UG/DL (ref 44–115)

## 2022-01-13 ENCOUNTER — OFFICE VISIT (OUTPATIENT)
Dept: URGENT CARE | Age: 62
End: 2022-01-13
Payer: COMMERCIAL

## 2022-01-13 VITALS
SYSTOLIC BLOOD PRESSURE: 124 MMHG | HEART RATE: 84 BPM | RESPIRATION RATE: 20 BRPM | DIASTOLIC BLOOD PRESSURE: 60 MMHG | TEMPERATURE: 97.9 F | OXYGEN SATURATION: 97 %

## 2022-01-13 DIAGNOSIS — R30.0 DYSURIA: ICD-10-CM

## 2022-01-13 DIAGNOSIS — N39.0 ACUTE UTI: Primary | ICD-10-CM

## 2022-01-13 LAB
SL AMB  POCT GLUCOSE, UA: NEGATIVE
SL AMB LEUKOCYTE ESTERASE,UA: ABNORMAL
SL AMB POCT BILIRUBIN,UA: NEGATIVE
SL AMB POCT BLOOD,UA: NEGATIVE
SL AMB POCT CLARITY,UA: YELLOW
SL AMB POCT COLOR,UA: ABNORMAL
SL AMB POCT KETONES,UA: NEGATIVE
SL AMB POCT NITRITE,UA: NEGATIVE
SL AMB POCT PH,UA: 5
SL AMB POCT SPECIFIC GRAVITY,UA: 1
SL AMB POCT URINE PROTEIN: NEGATIVE
SL AMB POCT UROBILINOGEN: 0.2

## 2022-01-13 PROCEDURE — 87086 URINE CULTURE/COLONY COUNT: CPT | Performed by: PHYSICIAN ASSISTANT

## 2022-01-13 PROCEDURE — 81002 URINALYSIS NONAUTO W/O SCOPE: CPT | Performed by: PHYSICIAN ASSISTANT

## 2022-01-13 PROCEDURE — S9088 SERVICES PROVIDED IN URGENT: HCPCS | Performed by: PHYSICIAN ASSISTANT

## 2022-01-13 PROCEDURE — 99213 OFFICE O/P EST LOW 20 MIN: CPT | Performed by: PHYSICIAN ASSISTANT

## 2022-01-13 RX ORDER — CEPHALEXIN 500 MG/1
500 CAPSULE ORAL EVERY 12 HOURS SCHEDULED
Qty: 10 CAPSULE | Refills: 0 | Status: SHIPPED | OUTPATIENT
Start: 2022-01-13 | End: 2022-01-18

## 2022-01-13 NOTE — PATIENT INSTRUCTIONS
Take all antibiotics as prescribed  Drink lots of clear fluids  Call us in 2 days for urine culture results  Watch for bleeding and discuss antibiotic use with PCP  Follow-up with PCP in the next few 1-2 for reexamination and to ensure resolution of symptoms     Go to ER if worsening symptoms, fevers, back pain, side/flank pain, abdominal pain, numbness, tingling, weakness, bowel/bladder dysfunction nausea, vomiting or other concerning symptoms

## 2022-01-13 NOTE — PROGRESS NOTES
[facility] [facility]        NAME: Mary Beth Ramos is a 57 y o  female  : 1960    MRN: [id number]  DATE: 2022  TIME: 4:27 PM    Assessment and Plan   Acute UTI [N39 0]  1  Acute UTI  Urine culture    cephalexin (KEFLEX) 500 mg capsule   2  Dysuria  POCT urine dip     Patient states it feels like her previous UTIs, states it always starts out with back achiness and slight burning with urination  She denies any injury, trauma or falls  States she just came for the antibiotics for UTI  Urine dip yellow and hazy, negative blood, negative nitrites, trace leukocytes  Urine culture pending    Patient Instructions     Take all antibiotics as prescribed  Drink lots of clear fluids  Call us in 2 days for urine culture results  Watch for bleeding and discuss antibiotic use with PCP  Follow-up with PCP in the next few 1-2 for reexamination and to ensure resolution of symptoms  Go to ER if worsening symptoms, fevers, back pain, side/flank pain, abdominal pain, numbness, tingling, weakness, bowel/bladder dysfunction nausea, vomiting or other concerning symptoms     Chief Complaint     Chief Complaint   Patient presents with    Back Pain     right lower back pain x yesterday          History of Present Illness       58-year-old female presents with Iqra Code since yesterday  States yesterday she started with some mild dysuria/burning with urination and right-sided lower back achiness  Patient states she tried some cranberry juice and Tylenol with some improvement  States this feels exactly like her previous UTIs  States she came for the antibiotics  She denies any falls, injury or trauma  Denies any radiation of pain down the legs  She denies any flank/side pain, abdominal pain, fevers, chills, nausea, vomiting, chest pain, shortness of breath, bowel/bladder dysfunction, saddle anesthesia, vaginal/pelvic bleeding or discharge, hematuria or other complaints        Review of Systems   Review of Systems   Constitutional: Negative for activity change, appetite change, chills, fatigue and fever  Respiratory: Negative for cough and shortness of breath  Cardiovascular: Negative for chest pain and leg swelling  Gastrointestinal: Negative for abdominal pain, diarrhea, nausea and vomiting  Genitourinary: Positive for dysuria  Negative for decreased urine volume, flank pain, frequency, genital sores, hematuria, pelvic pain, urgency, vaginal bleeding, vaginal discharge and vaginal pain  Musculoskeletal: Positive for back pain  Negative for joint swelling, myalgias and neck pain  Skin: Negative for rash  Neurological: Negative for dizziness, weakness, light-headedness and numbness  All other systems reviewed and are negative          Current Medications       Current Outpatient Medications:     Ascorbic Acid (VITAMIN C) 100 MG CHEW, Chew, Disp: , Rfl:     atorvastatin (LIPITOR) 10 mg tablet, Take 1 tablet (10 mg total) by mouth daily, Disp: 90 tablet, Rfl: 0    Biotin 93967 MCG TABS, Take by mouth daily  , Disp: , Rfl:     Calcium Carbonate (CALCI-CHEW PO), Take 500 mg by mouth 2 (two) times a day, Disp: , Rfl:     cephalexin (KEFLEX) 500 mg capsule, Take 1 capsule (500 mg total) by mouth every 12 (twelve) hours for 5 days, Disp: 10 capsule, Rfl: 0    cholecalciferol (VITAMIN D3) 1,000 units tablet, Take 5,000 Units by mouth 3 (three) times a day  , Disp: , Rfl:     clobetasol (TEMOVATE) 0 05 % cream, Apply to affected areas daily x 12 weeks, then twice weekly for maintenance, Disp: , Rfl:     Cyanocobalamin (VITAMIN B 12 PO), Take 1,000 mcg by mouth, Disp: , Rfl:     Eliquis 2 5 MG, Take 1 tablet (2 5 mg total) by mouth 2 (two) times a day, Disp: 60 tablet, Rfl: 3    Ferrous Sulfate  (45 Fe) MG TBCR, Take 1 tablet by mouth daily, Disp: , Rfl:     fluticasone (FLONASE) 50 mcg/act nasal spray, 1 spray into each nostril daily, Disp: 18 2 mL, Rfl: 1    Lysine HCl POWD, Take by mouth (Patient not taking: Reported on 2022 ), Disp: , Rfl:     Multiple Vitamin (MULTIVITAMIN) capsule, Take 1 capsule by mouth daily 2 chewies daily, Disp: , Rfl:     nystatin (MYCOSTATIN) powder, Apply topically 2 (two) times a day, Disp: 30 g, Rfl: 0    pantoprazole (PROTONIX) 20 mg tablet, Take 1 tablet (20 mg total) by mouth daily, Disp: 30 tablet, Rfl: 3    valACYclovir (VALTREX) 1,000 mg tablet, Take 2 tablets (2,000 mg total) by mouth 2 (two) times a day for 1 day, Disp: 30 tablet, Rfl: 0    Current Allergies     Allergies as of 2022    (No Known Allergies)            The following portions of the patient's history were reviewed and updated as appropriate: allergies, current medications, past family history, past medical history, past social history, past surgical history and problem list      Past Medical History:   Diagnosis Date    Anemia     iron def    Arthritis     knees    Carpal tunnel syndrome of right wrist     Chest pain     Colon polyp     Depression     at times    DVT (deep venous thrombosis) ([facility] [address] )     Dysphagia     Esophageal reflux 2011    Grade 1 out of 6 intensity murmur 2019    Heart murmur     History of transfusion 1980    Pacemaker 2018    Pneumonia     age 15    Psychiatric disorder     Trigger finger, left        Past Surgical History:   Procedure Laterality Date    BACK SURGERY      CARDIAC PACEMAKER PLACEMENT  2018    CERVICAL [address] SURGERY  2009    PLATES & SCREWS     SECTION      X2    COLONOSCOPY      GASTRIC BYPASS  2017    LAP RINYGB SURGERY    KNEE ARTHROSCOPY      KNEE SURGERY Bilateral     KNEE SURGERY Left 2020    OTHER SURGICAL HISTORY      ARM SURGERIES    SC INCISE FINGER TENDON SHEATH Left 2018    Procedure: LONG TRIGGER FINGER RELEASE;  Surgeon: Lm Rooney MD;  Location: [facility];  Service: Orthopedics    SC WRIST Brett Greening LIG Right 2018 Procedure: ENDOSCOPIC CARPAL TUNNEL RELEASE;  Surgeon: Nathan Welsh MD;  Location: [facility];  Service: Orthopedics    TONSILLECTOMY      UPPER GASTROINTESTINAL ENDOSCOPY         Family History   Problem Relation Age of Onset    Stroke Mother     Alzheimer's disease Mother     Arthritis Mother     Coronary artery disease Mother     Breast cancer Mother 77    Cancer Mother     Heart disease Mother     Hypertension Father     Gout Father     Diabetes type II Father     Coronary artery disease Father     Heart disease Father     No Known Problems Sister     No Known Problems Daughter     No Known Problems Maternal Grandmother     No Known Problems Maternal Grandfather     No Known Problems Paternal Grandmother     No Known Problems Paternal Grandfather     Prostate cancer Brother     No Known Problems Sister     No Known Problems Sister     No Known Problems Maternal Aunt     No Known Problems Maternal Aunt     No Known Problems Maternal Aunt     No Known Problems Maternal Aunt     Cancer Maternal Aunt     No Known Problems Paternal Aunt     No Known Problems Paternal Aunt          Medications have been verified  Objective   /60   Pulse 84   Temp 97 9 °F (36 6 °C)   Resp 20   LMP 09/18/2014   SpO2 97%        Physical Exam     Physical Exam  Vitals and nursing note reviewed  Constitutional:       General: She is not in acute distress  Appearance: She is well-developed  She is not ill-appearing  HENT:      Head: Normocephalic and atraumatic  Eyes:      Pupils: Pupils are equal, round, and reactive to light  Cardiovascular:      Rate and Rhythm: Normal rate and regular rhythm  Heart sounds: Normal heart sounds  Pulmonary:      Effort: Pulmonary effort is normal       Breath sounds: Normal breath sounds  No wheezing  Abdominal:      General: Bowel sounds are normal       Palpations: Abdomen is soft  Tenderness: There is no abdominal tenderness  There is no right CVA tenderness, left CVA tenderness, guarding or rebound  Comments: No CVA tenderness   Musculoskeletal:         General: Normal range of motion  Cervical back: Normal range of motion and neck supple  No bony tenderness  Thoracic back: Normal  No bony tenderness  Normal range of motion  Lumbar back: Normal  No bony tenderness  Normal range of motion  Comments: Negative straight leg raise   Skin:     General: Skin is warm and dry  Capillary Refill: Capillary refill takes less than 2 seconds  Neurological:      Mental Status: She is alert and oriented to person, place, and time  Deep Tendon Reflexes: Reflexes are normal and symmetric  Comments: NV intact with sensation and strong peripheral pulses   5/5 strength of LE bilaterally   Psychiatric:         Behavior: Behavior normal

## 2022-01-14 ENCOUNTER — TELEPHONE (OUTPATIENT)
Dept: FAMILY MEDICINE CLINIC | Facility: CLINIC | Age: 62
End: 2022-01-14

## 2022-01-14 LAB
BACTERIA UR CULT: NORMAL
VIT B1 BLD-SCNC: 122.6 NMOL/L (ref 66.5–200)

## 2022-01-15 ENCOUNTER — HOSPITAL ENCOUNTER (EMERGENCY)
Facility: HOSPITAL | Age: 62
Discharge: HOME | End: 2022-01-15
Attending: EMERGENCY MEDICINE
Payer: COMMERCIAL

## 2022-01-15 ENCOUNTER — APPOINTMENT (EMERGENCY)
Dept: RADIOLOGY | Facility: HOSPITAL | Age: 62
End: 2022-01-15
Payer: COMMERCIAL

## 2022-01-15 ENCOUNTER — NURSE TRIAGE (OUTPATIENT)
Dept: OTHER | Facility: OTHER | Age: 62
End: 2022-01-15

## 2022-01-15 VITALS
DIASTOLIC BLOOD PRESSURE: 70 MMHG | BODY MASS INDEX: 29.18 KG/M2 | OXYGEN SATURATION: 100 % | WEIGHT: 170 LBS | HEART RATE: 81 BPM | SYSTOLIC BLOOD PRESSURE: 140 MMHG | RESPIRATION RATE: 16 BRPM | TEMPERATURE: 98.5 F

## 2022-01-15 DIAGNOSIS — R10.9 RIGHT FLANK PAIN: Primary | ICD-10-CM

## 2022-01-15 LAB
ALBUMIN SERPL BCP-MCNC: 3.9 G/DL (ref 3.5–5)
ALP SERPL-CCNC: 87 U/L (ref 46–116)
ALT SERPL W P-5'-P-CCNC: 40 U/L (ref 12–78)
ANION GAP SERPL CALCULATED.3IONS-SCNC: 4 MMOL/L (ref 4–13)
AST SERPL W P-5'-P-CCNC: 30 U/L (ref 5–45)
BASOPHILS # BLD AUTO: 0.07 THOUSANDS/ΜL (ref 0–0.1)
BASOPHILS NFR BLD AUTO: 1 % (ref 0–1)
BILIRUB SERPL-MCNC: 1.06 MG/DL (ref 0.2–1)
BILIRUB UR QL STRIP: NEGATIVE
BUN SERPL-MCNC: 13 MG/DL (ref 5–25)
CALCIUM SERPL-MCNC: 9.7 MG/DL (ref 8.3–10.1)
CHLORIDE SERPL-SCNC: 109 MMOL/L (ref 100–108)
CLARITY UR: CLEAR
CO2 SERPL-SCNC: 26 MMOL/L (ref 21–32)
COLOR UR: YELLOW
CREAT SERPL-MCNC: 0.65 MG/DL (ref 0.6–1.3)
EOSINOPHIL # BLD AUTO: 0.26 THOUSAND/ΜL (ref 0–0.61)
EOSINOPHIL NFR BLD AUTO: 3 % (ref 0–6)
ERYTHROCYTE [DISTWIDTH] IN BLOOD BY AUTOMATED COUNT: 12.6 % (ref 11.6–15.1)
EXT PREG TEST URINE: NEGATIVE
EXT. CONTROL ED NAV: NORMAL
GFR SERPL CREATININE-BSD FRML MDRD: 95 ML/MIN/1.73SQ M
GLUCOSE SERPL-MCNC: 84 MG/DL (ref 65–140)
GLUCOSE UR STRIP-MCNC: NEGATIVE MG/DL
HCT VFR BLD AUTO: 37.1 % (ref 34.8–46.1)
HGB BLD-MCNC: 12.1 G/DL (ref 11.5–15.4)
HGB UR QL STRIP.AUTO: NEGATIVE
IMM GRANULOCYTES # BLD AUTO: 0.03 THOUSAND/UL (ref 0–0.2)
IMM GRANULOCYTES NFR BLD AUTO: 0 % (ref 0–2)
KETONES UR STRIP-MCNC: NEGATIVE MG/DL
LEUKOCYTE ESTERASE UR QL STRIP: NEGATIVE
LIPASE SERPL-CCNC: 422 U/L (ref 73–393)
LYMPHOCYTES # BLD AUTO: 2.38 THOUSANDS/ΜL (ref 0.6–4.47)
LYMPHOCYTES NFR BLD AUTO: 30 % (ref 14–44)
MCH RBC QN AUTO: 32.2 PG (ref 26.8–34.3)
MCHC RBC AUTO-ENTMCNC: 32.6 G/DL (ref 31.4–37.4)
MCV RBC AUTO: 99 FL (ref 82–98)
MONOCYTES # BLD AUTO: 0.56 THOUSAND/ΜL (ref 0.17–1.22)
MONOCYTES NFR BLD AUTO: 7 % (ref 4–12)
NEUTROPHILS # BLD AUTO: 4.64 THOUSANDS/ΜL (ref 1.85–7.62)
NEUTS SEG NFR BLD AUTO: 59 % (ref 43–75)
NITRITE UR QL STRIP: NEGATIVE
NRBC BLD AUTO-RTO: 0 /100 WBCS
PH UR STRIP.AUTO: 5.5 [PH]
PLATELET # BLD AUTO: 235 THOUSANDS/UL (ref 149–390)
PMV BLD AUTO: 10.9 FL (ref 8.9–12.7)
POTASSIUM SERPL-SCNC: 3.9 MMOL/L (ref 3.5–5.3)
PROT SERPL-MCNC: 7.7 G/DL (ref 6.4–8.2)
PROT UR STRIP-MCNC: NEGATIVE MG/DL
RBC # BLD AUTO: 3.76 MILLION/UL (ref 3.81–5.12)
SODIUM SERPL-SCNC: 139 MMOL/L (ref 136–145)
SP GR UR STRIP.AUTO: <=1.005 (ref 1–1.03)
UROBILINOGEN UR QL STRIP.AUTO: 0.2 E.U./DL
WBC # BLD AUTO: 7.94 THOUSAND/UL (ref 4.31–10.16)

## 2022-01-15 PROCEDURE — 99285 EMERGENCY DEPT VISIT HI MDM: CPT | Performed by: EMERGENCY MEDICINE

## 2022-01-15 PROCEDURE — 80053 COMPREHEN METABOLIC PANEL: CPT | Performed by: EMERGENCY MEDICINE

## 2022-01-15 PROCEDURE — 81025 URINE PREGNANCY TEST: CPT | Performed by: EMERGENCY MEDICINE

## 2022-01-15 PROCEDURE — 96374 THER/PROPH/DIAG INJ IV PUSH: CPT

## 2022-01-15 PROCEDURE — 83690 ASSAY OF LIPASE: CPT | Performed by: EMERGENCY MEDICINE

## 2022-01-15 PROCEDURE — 81003 URINALYSIS AUTO W/O SCOPE: CPT | Performed by: EMERGENCY MEDICINE

## 2022-01-15 PROCEDURE — 74177 CT ABD & PELVIS W/CONTRAST: CPT

## 2022-01-15 PROCEDURE — 85025 COMPLETE CBC W/AUTO DIFF WBC: CPT | Performed by: EMERGENCY MEDICINE

## 2022-01-15 PROCEDURE — G1004 CDSM NDSC: HCPCS

## 2022-01-15 PROCEDURE — 99284 EMERGENCY DEPT VISIT MOD MDM: CPT

## 2022-01-15 PROCEDURE — 36415 COLL VENOUS BLD VENIPUNCTURE: CPT | Performed by: EMERGENCY MEDICINE

## 2022-01-15 RX ORDER — HYDROMORPHONE HCL/PF 1 MG/ML
1 SYRINGE (ML) INJECTION ONCE
Status: COMPLETED | OUTPATIENT
Start: 2022-01-15 | End: 2022-01-15

## 2022-01-15 RX ORDER — METHOCARBAMOL 500 MG/1
500 TABLET, FILM COATED ORAL 2 TIMES DAILY
Qty: 20 TABLET | Refills: 0 | Status: SHIPPED | OUTPATIENT
Start: 2022-01-15

## 2022-01-15 RX ADMIN — IOHEXOL 50 ML: 240 INJECTION, SOLUTION INTRATHECAL; INTRAVASCULAR; INTRAVENOUS; ORAL at 16:36

## 2022-01-15 RX ADMIN — HYDROMORPHONE HYDROCHLORIDE 1 MG: 1 INJECTION, SOLUTION INTRAMUSCULAR; INTRAVENOUS; SUBCUTANEOUS at 16:33

## 2022-01-15 RX ADMIN — IOHEXOL 100 ML: 350 INJECTION, SOLUTION INTRAVENOUS at 18:22

## 2022-01-15 NOTE — ED PROVIDER NOTES
History  Chief Complaint   Patient presents with    Flank Pain     pt c/o right sided flank pain for past few days, seen at urgent care and told she may have kidney stone  c/o pain with urination  58-year-old female presents to the ER for evaluation of right-sided flank pain that started 2-3 days ago  The patient states that she has had dysuria for few days, and the symptoms felt similar to prior UTIs  She was seen at an urgent care center, had a urinalysis, and was started on a course of Keflex  States that her symptoms have not improved and she has since developed right-sided flank pain  The patient's urine culture was negative, so the patient was referred to the ER for further evaluation  Her dysuria has since improved  Her right flank pain is intermittent, with no exacerbating or alleviating factors  No fever, chills, cough, shortness of breath, chest pain, anterior abdominal pain, nausea, vomiting, diarrhea, hematuria, or vaginal bleeding/discharge  She is on Eliquis for prior DVTs  Prior to Admission Medications   Prescriptions Last Dose Informant Patient Reported? Taking?    Ascorbic Acid (VITAMIN C) 100 MG CHEW  Self Yes No   Sig: Chew   Biotin 27720 MCG TABS  Self Yes No   Sig: Take by mouth daily     Calcium Carbonate (CALCI-CHEW PO)  Self Yes No   Sig: Take 500 mg by mouth 2 (two) times a day   Cyanocobalamin (VITAMIN B 12 PO)   Yes No   Sig: Take 1,000 mcg by mouth   Eliquis 2 5 MG   No No   Sig: Take 1 tablet (2 5 mg total) by mouth 2 (two) times a day   Ferrous Sulfate  (45 Fe) MG TBCR  Self Yes No   Sig: Take 1 tablet by mouth daily   Lysine HCl POWD  Self Yes No   Sig: Take by mouth   Patient not taking: Reported on 1/7/2022    Multiple Vitamin (MULTIVITAMIN) capsule  Self Yes No   Sig: Take 1 capsule by mouth daily 2 chewies daily   atorvastatin (LIPITOR) 10 mg tablet   No No   Sig: Take 1 tablet (10 mg total) by mouth daily   cephalexin (KEFLEX) 500 mg capsule   No No Sig: Take 1 capsule (500 mg total) by mouth every 12 (twelve) hours for 5 days   cholecalciferol (VITAMIN D3) 1,000 units tablet  Self Yes No   Sig: Take 5,000 Units by mouth 3 (three) times a day     clobetasol (TEMOVATE) 0 05 % cream  Self Yes No   Sig: Apply to affected areas daily x 12 weeks, then twice weekly for maintenance   fluticasone (FLONASE) 50 mcg/act nasal spray   No No   Si spray into each nostril daily   nystatin (MYCOSTATIN) powder  Self No No   Sig: Apply topically 2 (two) times a day   pantoprazole (PROTONIX) 20 mg tablet   No No   Sig: Take 1 tablet (20 mg total) by mouth daily   valACYclovir (VALTREX) 1,000 mg tablet   No No   Sig: Take 2 tablets (2,000 mg total) by mouth 2 (two) times a day for 1 day      Facility-Administered Medications: None       Past Medical History:   Diagnosis Date    Anemia     iron def    Arthritis     knees    Carpal tunnel syndrome of right wrist     Chest pain     Colon polyp     Depression     at times    DVT (deep venous thrombosis) ([facility] [address] )     Dysphagia     Esophageal reflux 2011    Grade 1 out of 6 intensity murmur 2019    Heart murmur     History of transfusion 1980    Pacemaker 2018    Pneumonia     age 15    Psychiatric disorder     Trigger finger, left        Past Surgical History:   Procedure Laterality Date    BACK SURGERY      CARDIAC PACEMAKER PLACEMENT  2018    CERVICAL [address] SURGERY  2009    PLATES & SCREWS     SECTION      X2    COLONOSCOPY      GASTRIC BYPASS  2017    LAP RINYGB SURGERY    KNEE ARTHROSCOPY      KNEE SURGERY Bilateral     KNEE SURGERY Left 2020    OTHER SURGICAL HISTORY      ARM SURGERIES    UT INCISE FINGER TENDON SHEATH Left 2018    Procedure: LONG TRIGGER FINGER RELEASE;  Surgeon: Rupert So MD;  Location: [facility];  Service: Orthopedics    UT WRIST Hero Narrow LIG Right 2018    Procedure: ENDOSCOPIC CARPAL TUNNEL RELEASE;  Surgeon: Lm Rooney MD;  Location: [facility];  Service: Orthopedics    TONSILLECTOMY      UPPER GASTROINTESTINAL ENDOSCOPY         Family History   Problem Relation Age of Onset    Stroke Mother     Alzheimer's disease Mother     Arthritis Mother     Coronary artery disease Mother     Breast cancer Mother 77    Cancer Mother     Heart disease Mother     Hypertension Father     Gout Father     Diabetes type II Father     Coronary artery disease Father     Heart disease Father     No Known Problems Sister     No Known Problems Daughter     No Known Problems Maternal Grandmother     No Known Problems Maternal Grandfather     No Known Problems Paternal Grandmother     No Known Problems Paternal Grandfather     Prostate cancer Brother     No Known Problems Sister     No Known Problems Sister     No Known Problems Maternal Aunt     No Known Problems Maternal Aunt     No Known Problems Maternal Aunt     No Known Problems Maternal Aunt     Cancer Maternal Aunt     No Known Problems Paternal Aunt     No Known Problems Paternal Aunt      I have reviewed and agree with the history as documented  E-Cigarette/Vaping    E-Cigarette Use Never User      E-Cigarette/Vaping Substances    Nicotine No     THC No     CBD No     Flavoring No     Other No     Unknown No      Social History     Tobacco Use    Smoking status: Former Smoker     Packs/day: 0 25     Types: Cigarettes    Smokeless tobacco: Never Used   Vaping Use    Vaping Use: Never used   Substance Use Topics    Alcohol use: Yes     Alcohol/week: 2 0 standard drinks     Types: 1 Glasses of wine, 1 Standard drinks or equivalent per week     Comment: very rare social    Drug use: No        Review of Systems   Constitutional: Negative for chills and fever  HENT: Negative for congestion, rhinorrhea and sore throat  Respiratory: Negative for cough and shortness of breath      Cardiovascular: Negative for chest pain and palpitations  Gastrointestinal: Negative for abdominal pain, diarrhea, nausea and vomiting  Genitourinary: Positive for dysuria and flank pain  Negative for hematuria, vaginal bleeding and vaginal discharge  Musculoskeletal: Negative for back pain and neck pain  Neurological: Negative for dizziness, weakness, light-headedness, numbness and headaches  All other systems reviewed and are negative  Physical Exam  ED Triage Vitals   Temperature Pulse Respirations Blood Pressure SpO2   01/15/22 1549 01/15/22 1549 01/15/22 1549 01/15/22 1549 01/15/22 1549   98 5 °F (36 9 °C) 81 16 140/70 100 %      Temp Source Heart Rate Source Patient Position - Orthostatic VS BP Location FiO2 (%)   01/15/22 1549 01/15/22 1549 01/15/22 1549 01/15/22 1549 --   Oral Monitor Sitting Left arm       Pain Score       01/15/22 1633       8             Orthostatic Vital Signs  Vitals:    01/15/22 1549   BP: 140/70   Pulse: 81   Patient Position - Orthostatic VS: Sitting       Physical Exam  Vitals and nursing note reviewed  Constitutional:       General: She is not in acute distress  Appearance: Normal appearance  She is normal weight  She is not ill-appearing  HENT:      Head: Normocephalic and atraumatic  Right Ear: External ear normal       Left Ear: External ear normal       Nose: Nose normal  No congestion or rhinorrhea  Mouth/Throat:      Mouth: Mucous membranes are moist       Pharynx: Oropharynx is clear  No oropharyngeal exudate or posterior oropharyngeal erythema  Eyes:      Extraocular Movements: Extraocular movements intact  Conjunctiva/sclera: Conjunctivae normal       Pupils: Pupils are equal, round, and reactive to light  Cardiovascular:      Rate and Rhythm: Normal rate and regular rhythm  Pulses: Normal pulses  Heart sounds: Normal heart sounds  No murmur heard  Pulmonary:      Effort: Pulmonary effort is normal  No respiratory distress        Breath sounds: Normal breath sounds  No wheezing or rales  Abdominal:      General: Abdomen is flat  Bowel sounds are normal  There is no distension  Palpations: Abdomen is soft  Tenderness: There is no abdominal tenderness  There is right CVA tenderness  There is no left CVA tenderness or guarding  Musculoskeletal:         General: No swelling or tenderness  Normal range of motion  Cervical back: Normal range of motion and neck supple  No tenderness  Skin:     General: Skin is warm and dry  Capillary Refill: Capillary refill takes less than 2 seconds  Neurological:      General: No focal deficit present  Mental Status: She is alert and oriented to person, place, and time           ED Medications  Medications   HYDROmorphone (DILAUDID) injection 1 mg (1 mg Intravenous Given 1/15/22 1633)   iohexol (OMNIPAQUE) 240 MG/ML solution 50 mL (50 mL Oral Given 1/15/22 1636)   iohexol (OMNIPAQUE) 350 MG/ML injection (SINGLE-DOSE) 100 mL (100 mL Intravenous Given 1/15/22 1822)       Diagnostic Studies  Results Reviewed     Procedure Component Value Units Date/Time    Comprehensive metabolic panel [[id number]]  (Abnormal) Collected: 01/15/22 1632    Lab Status: Final result Specimen: Blood from Arm, Right Updated: 01/15/22 1724     Sodium 139 mmol/L      Potassium 3 9 mmol/L      Chloride 109 mmol/L      CO2 26 mmol/L      ANION GAP 4 mmol/L      BUN 13 mg/dL      Creatinine 0 65 mg/dL      Glucose 84 mg/dL      Calcium 9 7 mg/dL      AST 30 U/L      ALT 40 U/L      Alkaline Phosphatase 87 U/L      Total Protein 7 7 g/dL      Albumin 3 9 g/dL      Total Bilirubin 1 06 mg/dL      eGFR 95 ml/min/1 73sq m     Narrative:      [place] guidelines for Chronic Kidney Disease (CKD):     Stage 1 with normal or high GFR (GFR > 90 mL/min/1 73 square meters)    Stage 2 Mild CKD (GFR = 60-89 mL/min/1 73 square meters)    Stage 3A Moderate CKD (GFR = 45-59 mL/min/1 73 square meters)    Stage 3B Moderate CKD (GFR = 30-44 mL/min/1 73 square meters)    Stage 4 Severe CKD (GFR = 15-29 mL/min/1 73 square meters)    Stage 5 End Stage CKD (GFR <15 mL/min/1 73 square meters)  Note: GFR calculation is accurate only with a steady state creatinine    Lipase [[id number]]  (Abnormal) Collected: 01/15/22 1632    Lab Status: Final result Specimen: Blood from Arm, Right Updated: 01/15/22 1724     Lipase 422 u/L     CBC and differential [[id number]]  (Abnormal) Collected: 01/15/22 1632    Lab Status: Final result Specimen: Blood from Arm, Right Updated: 01/15/22 1658     WBC 7 94 Thousand/uL      RBC 3 76 Million/uL      Hemoglobin 12 1 g/dL      Hematocrit 37 1 %      MCV 99 fL      MCH 32 2 pg      MCHC 32 6 g/dL      RDW 12 6 %      MPV 10 9 fL      Platelets 470 Thousands/uL      nRBC 0 /100 WBCs      Neutrophils Relative 59 %      Immat GRANS % 0 %      Lymphocytes Relative 30 %      Monocytes Relative 7 %      Eosinophils Relative 3 %      Basophils Relative 1 %      Neutrophils Absolute 4 64 Thousands/µL      Immature Grans Absolute 0 03 Thousand/uL      Lymphocytes Absolute 2 38 Thousands/µL      Monocytes Absolute 0 56 Thousand/µL      Eosinophils Absolute 0 26 Thousand/µL      Basophils Absolute 0 07 Thousands/µL     UA w Reflex to Microscopic w Reflex to Culture [[id number]] Collected: 01/15/22 1625    Lab Status: Final result Specimen: Urine, Clean Catch Updated: 01/15/22 1658     Color, UA Yellow     Clarity, UA Clear     Specific Gravity, UA <=1 005     pH, UA 5 5     Leukocytes, UA Negative     Nitrite, UA Negative     Protein, UA Negative mg/dl      Glucose, UA Negative mg/dl      Ketones, UA Negative mg/dl      Urobilinogen, UA 0 2 E U /dl      Bilirubin, UA Negative     Blood, UA Negative    POCT pregnancy, urine [[id number]]  (Normal) Resulted: 01/15/22 1640    Lab Status: Final result Updated: 01/15/22 1640     EXT PREG TEST UR (Ref: Negative) Negative     Control Valid                 CT abdomen pelvis with contrast   Final Result by Charlene Oneal MD (01/15 1943)      No acute findings in the abdomen or pelvis  Workstation performed: [id number]               Procedures  Procedures      ED Course  ED Course as of 01/24/22 1008   Sat Kirk 15, 2022   1714 WBC: 7 94   1714 UA w Reflex to Microscopic w Reflex to Culture   1715 PREGNANCY TEST URINE: Negative                                       MDM  Number of Diagnoses or Management Options  Right flank pain  Diagnosis management comments: 56-year-old female presents to the ER for evaluation of right-sided flank pain that started 2-3 days ago  The patient states that she has had dysuria for few days, and the symptoms felt similar to prior UTIs  She was seen at an urgent care center, had a urinalysis, and was started on a course of Keflex  States that her symptoms have not improved and she has since developed right-sided flank pain  The patient's urine culture was negative, so the patient was referred to the ER for further evaluation  Her right flank pain is intermittent, with no exacerbating or alleviating factors  No fever, chills, cough, shortness of breath, chest pain, anterior abdominal pain, nausea, vomiting, diarrhea, hematuria, or vaginal bleeding/discharge  She is on Eliquis for prior DVTs  On exam patient is afebrile, VSS, in no acute distress  Heart with regular rate and rhythm, lungs are clear to auscultation bilaterally  No anterior abdominal tenderness to palpation, soft  Right CVA tenderness on palpation  Differential includes UTI with pyelonephritis as well as possible renal calculi  Will repeat UA and check labs and CT A/P  Normal white blood cell count, UA remains negative  CT imaging with no acute findings, no perinephric stranding or renal calculi  Unclear etiology of right flank pain, may be musculoskeletal in nature  Advised supportive care and outpatient follow-up with PCP        Disposition  Final diagnoses:   Right flank pain     Time reflects when diagnosis was documented in both MDM as applicable and the Disposition within this note     Time User Action Codes Description Comment    1/15/2022  9:20 PM Lysle Quiet Add [R10 9] Right flank pain       ED Disposition     ED Disposition Condition Date/Time Comment    Discharge Stable Sat Kirk 15, 2022  9:20 PM Dayna Osuna discharge to home/self care              Follow-up Information     Follow up With Specialties Details Why Contact Info Additional [address] [facility], [address]  Nurse Practitioner In 2 days For follow up [address] [address] [phone]       [facility] Emergency Department Emergency Medicine Go to  If symptoms worsen [address]  [address] [facility] 64 [facility] Emergency Department, [address], [facility] 108          Discharge Medication List as of 1/15/2022  9:22 PM      START taking these medications    Details   methocarbamol (ROBAXIN) 500 mg tablet Take 1 tablet (500 mg total) by mouth 2 (two) times a day, Starting Sat 1/15/2022, Normal         CONTINUE these medications which have NOT CHANGED    Details   Ascorbic Acid (VITAMIN C) 100 MG CHEW Chew, Historical Med      atorvastatin (LIPITOR) 10 mg tablet Take 1 tablet (10 mg total) by mouth daily, Starting Tue 9/21/2021, Normal      Biotin 86101 MCG TABS Take by mouth daily  , Historical Med      Calcium Carbonate (CALCI-CHEW PO) Take 500 mg by mouth 2 (two) times a day, Historical Med      cephalexin (KEFLEX) 500 mg capsule Take 1 capsule (500 mg total) by mouth every 12 (twelve) hours for 5 days, Starting Thu 1/13/2022, Until Tue 1/18/2022, Normal      cholecalciferol (VITAMIN D3) 1,000 units tablet Take 5,000 Units by mouth 3 (three) times a day  , Historical Med      clobetasol (TEMOVATE) 0 05 % cream Apply to affected areas daily x 12 weeks, then twice weekly for maintenance, Historical Med      Cyanocobalamin (VITAMIN B 12 PO) Take 1,000 mcg by mouth, Historical Med      Eliquis 2 5 MG Take 1 tablet (2 5 mg total) by mouth 2 (two) times a day, Starting Tue 9/21/2021, Normal      Ferrous Sulfate  (45 Fe) MG TBCR Take 1 tablet by mouth daily, Historical Med      fluticasone (FLONASE) 50 mcg/act nasal spray 1 spray into each nostril daily, Starting Thu 12/2/2021, Normal      Lysine HCl POWD Take by mouth, Historical Med      Multiple Vitamin (MULTIVITAMIN) capsule Take 1 capsule by mouth daily 2 chewies daily, Historical Med      nystatin (MYCOSTATIN) powder Apply topically 2 (two) times a day, Starting Tue 1/7/2020, Normal      pantoprazole (PROTONIX) 20 mg tablet Take 1 tablet (20 mg total) by mouth daily, Starting Wed 1/5/2022, Normal      valACYclovir (VALTREX) 1,000 mg tablet Take 2 tablets (2,000 mg total) by mouth 2 (two) times a day for 1 day, Starting Thu 12/2/2021, Until Fri 12/3/2021, Normal           No discharge procedures on file  PDMP Review     None           ED Provider  Attending physically available and evaluated Nestora Cogan I managed the patient along with the ED Attending      Electronically Signed by         Renato Cook MD  01/24/22 8300

## 2022-01-15 NOTE — ED ATTENDING ATTESTATION
1/15/2022  I, Etelvina Van MD, saw and evaluated the patient  I have discussed the patient with the resident/non-physician practitioner and agree with the resident's/non-physician practitioner's findings, Plan of Care, and MDM as documented in the resident's/non-physician practitioner's note, except where noted  All available labs and Radiology studies were reviewed  I was present for key portions of any procedure(s) performed by the resident/non-physician practitioner and I was immediately available to provide assistance  At this point I agree with the current assessment done in the Emergency Department  I have conducted an independent evaluation of this patient a history and physical is as follows:  Pt had uti for a couple of days Pt went to UC and had ua and was given keflex Pt is no better Pt ahs r sided flank pain and dysuria has improved Culture was negative so pt was sent to ed  Pt is on eloquis for dvts  Pt states pain  r back is always there not made better or worse by anything Pt denies injury but states it was sudden in onset    No numbness no weakness PE alert nad heart reg lungs clear abd soft nontender r flank some reproducible tenderness noted MDM: will do ct check ua   ED Course         Critical Care Time  Procedures

## 2022-01-15 NOTE — TELEPHONE ENCOUNTER
Reason for Disposition   Patient sounds very sick or weak to the triager    Answer Assessment - Initial Assessment Questions  1  ANTIBIOTIC: "What antibiotic are you taking?" "How many times per day?"      Keflex  2  DURATION: "When was the antibiotic started?"      1/13  3  MAIN SYMPTOM: "What is the main symptom you are concerned about?"      Back pain 8/10  4  FEVER: "Do you have a fever?" If Yes, ask: "What is it, how was it measured, and when did it start?"      Denies  5   OTHER SYMPTOMS: "Do you have any other symptoms?" (e g , flank pain, vaginal discharge, blood in urine)      Flank pain, right    Protocols used: URINARY TRACT INFECTION ON ANTIBIOTIC FOLLOW-UP CALL [facility]

## 2022-01-15 NOTE — TELEPHONE ENCOUNTER
Regarding: UTI, Back Pain  ----- Message from [facility] sent at 1/15/2022  1:35 PM EST -----  " I have a UTI, I was put on Cephalexin 500 mg, I am having really bad Back Pain, It hurts when I walk  I am on Eliquis 2 5 mg and can not take anything but Tylenol   Tylenol is not working "

## 2022-01-18 ENCOUNTER — REMOTE DEVICE CLINIC VISIT (OUTPATIENT)
Dept: CARDIOLOGY CLINIC | Facility: CLINIC | Age: 62
End: 2022-01-18
Payer: COMMERCIAL

## 2022-01-18 DIAGNOSIS — Z95.0 PRESENCE OF PERMANENT CARDIAC PACEMAKER: Primary | ICD-10-CM

## 2022-01-18 LAB — VIT A SERPL-MCNC: 49.8 UG/DL (ref 22–69.5)

## 2022-01-18 PROCEDURE — 93296 REM INTERROG EVL PM/IDS: CPT | Performed by: INTERNAL MEDICINE

## 2022-01-18 PROCEDURE — 93294 REM INTERROG EVL PM/LDLS PM: CPT | Performed by: INTERNAL MEDICINE

## 2022-01-18 NOTE — PROGRESS NOTES
MDT-DUAL PPM (AAIR-DDDR MODE)/ ACTIVE SYSTEM IS MRI CONDITIONAL   CARELINK TRANSMISSION:  BATTERY VOLTAGE ADEQUATE (11 5 YR)   AP 21 7%  <0 1%    ALL LEAD PARAMETERS WITHIN NORMAL LIMITS   NO SIGNIFICANT HIGH RATE EPISODES   NORMAL DEVICE FUNCTION  Jerel Ravi

## 2022-01-25 ENCOUNTER — OFFICE VISIT (OUTPATIENT)
Dept: CARDIOLOGY CLINIC | Facility: CLINIC | Age: 62
End: 2022-01-25
Payer: COMMERCIAL

## 2022-01-25 ENCOUNTER — TELEPHONE (OUTPATIENT)
Dept: OTHER | Facility: OTHER | Age: 62
End: 2022-01-25

## 2022-01-25 VITALS
OXYGEN SATURATION: 97 % | WEIGHT: 177 LBS | SYSTOLIC BLOOD PRESSURE: 105 MMHG | BODY MASS INDEX: 30.22 KG/M2 | HEART RATE: 75 BPM | HEIGHT: 64 IN | DIASTOLIC BLOOD PRESSURE: 55 MMHG

## 2022-01-25 DIAGNOSIS — I49.5 SICK SINUS SYNDROME (HCC): Primary | ICD-10-CM

## 2022-01-25 DIAGNOSIS — Z95.0 PACEMAKER: ICD-10-CM

## 2022-01-25 DIAGNOSIS — Z86.718 HISTORY OF DVT (DEEP VEIN THROMBOSIS): ICD-10-CM

## 2022-01-25 DIAGNOSIS — I47.1 PSVT (PAROXYSMAL SUPRAVENTRICULAR TACHYCARDIA) (HCC): ICD-10-CM

## 2022-01-25 PROBLEM — I47.10 PSVT (PAROXYSMAL SUPRAVENTRICULAR TACHYCARDIA): Status: ACTIVE | Noted: 2022-01-25

## 2022-01-25 PROCEDURE — 1036F TOBACCO NON-USER: CPT | Performed by: INTERNAL MEDICINE

## 2022-01-25 PROCEDURE — 3008F BODY MASS INDEX DOCD: CPT | Performed by: INTERNAL MEDICINE

## 2022-01-25 PROCEDURE — 99214 OFFICE O/P EST MOD 30 MIN: CPT | Performed by: INTERNAL MEDICINE

## 2022-01-25 RX ORDER — METOPROLOL SUCCINATE 25 MG/1
25 TABLET, EXTENDED RELEASE ORAL DAILY
Qty: 90 TABLET | Refills: 3 | Status: SHIPPED | OUTPATIENT
Start: 2022-01-25 | End: 2022-06-04 | Stop reason: SDUPTHER

## 2022-01-25 NOTE — PROGRESS NOTES
Cardiology Follow Up    Letty Jackson  1960  [id number]  [address] [facility] [place] [id number] [address] [phone]    1  Sick sinus syndrome ([address] )     2  History of DVT (deep vein thrombosis)     3  Pacemaker     4  PSVT (paroxysmal supraventricular tachycardia) ([facility])  metoprolol succinate (TOPROL-XL) 25 mg 24 hr tablet     Discussion/Summary:    Her pacemaker is functioning appropriately  She has not had any further episodes of syncope  She has had episodes of paroxysmal SVT identified on the device interrogations and some of these are symptomatic  Previously, back last year some of these were quite prolonged, as well  I do not think these are sinus tachycardia but rather SVT  Start on metoprolol  Her blood pressure is on the lower side  I asked her to increase her sodium intake and make sure she remains adequately hydrated  She will let me know if she feels symptoms with starting the beta-blocker  Keep track of blood pressure at home periodically  Will monitor her rhythm with device interrogations  Thankfully, recently there has not been a lot of arrhythmia noted on the device interrogations  Previous History:  Sick sinus syndrome  Syncope with long pauses discovered in October, 2018  She had a pacemaker placed  Other history includes DVT for which she is on Eliquis  Gastric bypass previously  Previously, had atypical chest pain  Ultimately had cardiac catheterization at [facility], only mild plaque was seen, no obstructive lesions  Interval History:    Returns for follow-up  She has had a few episodes of SVT noted on her device interrogations  She tells me that 1 of these episodes she is quite symptomatic with  She was dizzy and lightheaded and said she got clammy and almost look like she was wearing stockings on her arm (?mottling?)    Blood pressure on the lower side today  She does not check it regularly at home  She is status post gastric bypass  She does try to stay hydrated, but I do not think she eats lunch only breakfast and dinner says she feels nauseous when she eats large amounts  Problem List     Deep vein thrombosis (DVT) of lower extremity (HCC)    Trigger middle finger of left hand    Carpal tunnel syndrome on right    Abnormal CT scan    Syncope    Sick sinus syndrome (HCC)    Leukocytosis        Past Medical History:   Diagnosis Date    Anemia     iron def    Arthritis     knees    Carpal tunnel syndrome of right wrist     Chest pain     Colon polyp     Depression     at times    DVT (deep venous thrombosis) ([facility] [address] ) 2012    Dysphagia     Esophageal reflux 5/23/2011    Grade 1 out of 6 intensity murmur 6/24/2019    Heart murmur     History of transfusion 1980    Pacemaker 11/01/2018    Pneumonia     age 15    Psychiatric disorder     Trigger finger, left      Social History     Tobacco Use    Smoking status: Former Smoker     Packs/day: 0 25     Types: Cigarettes    Smokeless tobacco: Never Used   Substance Use Topics    Alcohol use:  Yes     Alcohol/week: 2 0 standard drinks     Types: 1 Glasses of wine, 1 Standard drinks or equivalent per week     Comment: very rare social     Family History   Problem Relation Age of Onset   Nora Moreira Stroke Mother     Alzheimer's disease Mother     Arthritis Mother     Coronary artery disease Mother     Breast cancer Mother 77    Cancer Mother     Heart disease Mother     Hypertension Father     Gout Father     Diabetes type II Father     Coronary artery disease Father     Heart disease Father     No Known Problems Sister     No Known Problems Daughter     No Known Problems Maternal Grandmother     No Known Problems Maternal Grandfather     No Known Problems Paternal Grandmother     No Known Problems Paternal Grandfather     Prostate cancer Brother     No Known Problems Sister     No Known Problems Sister     No Known Problems Maternal Aunt     No Known Problems Maternal Aunt     No Known Problems Maternal Aunt     No Known Problems Maternal Aunt     Cancer Maternal Aunt     No Known Problems Paternal Aunt     No Known Problems Paternal Aunt      Past Surgical History:   Procedure Laterality Date    BACK SURGERY      CARDIAC PACEMAKER PLACEMENT  2018    CERVICAL DISC SURGERY  2009    PLATES & SCREWS     SECTION      X2    COLONOSCOPY      GASTRIC BYPASS  2017    LAP RINYGB SURGERY    KNEE ARTHROSCOPY      KNEE SURGERY Bilateral     KNEE SURGERY Left 2020    OTHER SURGICAL HISTORY      ARM SURGERIES    [place] INCISE FINGER TENDON SHEATH Left 2018    Procedure: LONG TRIGGER FINGER RELEASE;  Surgeon: Naldo Del Cid MD;  Location: [facility];  Service: Orthopedics    TN WRIST Jinnie Footman LIG Right 2018    Procedure: ENDOSCOPIC CARPAL TUNNEL RELEASE;  Surgeon: Naldo Del Cid MD;  Location: [facility];  Service: Orthopedics    TONSILLECTOMY      UPPER GASTROINTESTINAL ENDOSCOPY         Current Outpatient Medications:     Ascorbic Acid (VITAMIN C) 100 MG CHEW, Chew, Disp: , Rfl:     atorvastatin (LIPITOR) 10 mg tablet, Take 1 tablet (10 mg total) by mouth daily, Disp: 90 tablet, Rfl: 0    Biotin 09060 MCG TABS, Take by mouth daily  , Disp: , Rfl:     Calcium Carbonate (CALCI-CHEW PO), Take 500 mg by mouth 2 (two) times a day, Disp: , Rfl:     cholecalciferol (VITAMIN D3) 1,000 units tablet, Take 5,000 Units by mouth 3 (three) times a day  , Disp: , Rfl:     clobetasol (TEMOVATE) 0 05 % cream, Apply to affected areas daily x 12 weeks, then twice weekly for maintenance, Disp: , Rfl:     Cyanocobalamin (VITAMIN B 12 PO), Take 1,000 mcg by mouth, Disp: , Rfl:     Eliquis 2 5 MG, Take 1 tablet (2 5 mg total) by mouth 2 (two) times a day, Disp: 60 tablet, Rfl: 3    Ferrous Sulfate  (45 Fe) MG TBCR, Take 1 tablet by mouth daily, Disp: , Rfl:     fluticasone (FLONASE) 50 mcg/act nasal spray, 1 spray into each nostril daily, Disp: 18 2 mL, Rfl: 1    methocarbamol (ROBAXIN) 500 mg tablet, Take 1 tablet (500 mg total) by mouth 2 (two) times a day, Disp: 20 tablet, Rfl: 0    Multiple Vitamin (MULTIVITAMIN) capsule, Take 1 capsule by mouth daily 2 chewies daily, Disp: , Rfl:     nystatin (MYCOSTATIN) powder, Apply topically 2 (two) times a day, Disp: 30 g, Rfl: 0    pantoprazole (PROTONIX) 20 mg tablet, Take 1 tablet (20 mg total) by mouth daily, Disp: 30 tablet, Rfl: 3    Lysine HCl POWD, Take by mouth (Patient not taking: Reported on 1/7/2022 ), Disp: , Rfl:     metoprolol succinate (TOPROL-XL) 25 mg 24 hr tablet, Take 1 tablet (25 mg total) by mouth daily, Disp: 90 tablet, Rfl: 3    valACYclovir (VALTREX) 1,000 mg tablet, Take 2 tablets (2,000 mg total) by mouth 2 (two) times a day for 1 day, Disp: 30 tablet, Rfl: 0  No Known Allergies    Vitals:    01/25/22 1145   BP: 105/55   BP Location: Left arm   Patient Position: Sitting   Cuff Size: Standard   Pulse: 75   SpO2: 97%   Weight: 80 3 kg (177 lb)   Height: 5' 4" (1 626 m)     Vitals:    01/25/22 1145   Weight: 80 3 kg (177 lb)      Height: 5' 4" (162 6 cm)   Body mass index is 30 38 kg/m²  Physical Exam:  GEN: Ck Akers appears well, alert and oriented x 3, pleasant and cooperative   HEENT: pupils equal, round, and reactive to light; extraocular muscles intact  NECK: supple, no carotid bruits   HEART: regular rhythm, normal S1 and S2, no murmurs, clicks, gallops or rubs   LUNGS: clear to auscultation bilaterally; no wheezes, rales, or rhonchi   ABDOMEN: normal bowel sounds, soft, no tenderness, no distention  EXTREMITIES: peripheral pulses normal; no clubbing, cyanosis, or edema  NEURO: no focal findings   SKIN: normal without suspicious lesions on exposed skin    ROS:  Positive for anxiety, joint pain, back pain, nausea, vomiting, palpitations  Chest pain    Except as noted in HPI, is otherwise reviewed in detail and a 12 point review of systems is negative  ROS reviewed and is unchanged    Labs:  Lab Results   Component Value Date    K 3 9 01/15/2022     (H) 01/15/2022    CREATININE 0 65 01/15/2022    BUN 13 01/15/2022    CO2 26 01/15/2022    ALT 40 01/15/2022    AST 30 01/15/2022    INR 0 96 06/18/2021    GLUF 116 (H) 01/05/2022    HGBA1C 5 7 (H) 05/07/2020    WBC 7 94 01/15/2022    HGB 12 1 01/15/2022    HCT 37 1 01/15/2022     01/15/2022       No results found for: CHOL  Lab Results   Component Value Date    LDLCALC 98 01/05/2022    LDLCALC 66 03/26/2021    LDLCALC 92 11/13/2020     Lab Results   Component Value Date    HDL 92 01/05/2022    HDL 88 03/26/2021    HDL 80 11/13/2020     Lab Results   Component Value Date    TRIG 77 01/05/2022    TRIG 74 03/26/2021    TRIG 74 11/13/2020     Testing:  Stress test 8/6/19:  MYOCARDIAL PERFUSION IMAGING:  The image quality was fair  Rotating projection images reveal mild breast attenuation, mild diaphragmatic attenuation, and mild subdiaphragmatic activity  The TID ratio was 1 06      PERFUSION DEFECTS:  -  There was a moderate-sized, mildly severe, fixed myocardial perfusion defect of the basal inferior wall likely due to diaphragm attenuation      GATED SPECT:  The calculated left ventricular ejection fraction was 67 %  There was no diagnostic evidence for left ventricular regional abnormality      SUMMARY:  -  Stress results: There was no chest pain during stress  -  ECG conclusions: The stress ECG was equivocal for ischemia  -  Perfusion imaging: There was a moderate-sized, mildly severe, fixed myocardial perfusion defect of the basal inferior wall likely due to diaphragm attenuation   -  Gated SPECT: The calculated left ventricular ejection fraction was 67 %   There was no diagnostic evidence for left ventricular regional abnormality      IMPRESSIONS: Normal study after pharmacologic vasodilation without reproduction of symptoms  Myocardial perfusion imaging was normal at rest and with stress  Echo 11/1/18:  LEFT VENTRICLE: Size was normal  Systolic function was at the lower limits of normal  Ejection fraction was estimated to be 50 %  There were no regional wall motion abnormalities  Wall thickness was normal  There was no evidence of  concentric hypertrophy  DOPPLER: Left ventricular diastolic function parameters were normal      RIGHT VENTRICLE: The size was normal  Systolic function was normal  Wall thickness was normal      LEFT ATRIUM: Size was normal      RIGHT ATRIUM: Size was normal      MITRAL VALVE: Valve structure was normal  There was normal leaflet separation  DOPPLER: The transmitral velocity was within the normal range  There was no evidence for stenosis  There was mild regurgitation      AORTIC VALVE: The valve was trileaflet  Leaflets exhibited normal cuspal separation and sclerosis  DOPPLER: Transaortic velocity was within the normal range  There was no evidence for stenosis  There was no regurgitation      TRICUSPID VALVE: The valve structure was normal  There was normal leaflet separation  DOPPLER: The transtricuspid velocity was within the normal range  There was no evidence for stenosis  There was mild regurgitation  Pulmonary artery  systolic pressure was within the normal range      PULMONIC VALVE: Leaflets exhibited normal thickness, no calcification, and normal cuspal separation  DOPPLER: The transpulmonic velocity was within the normal range  There was no regurgitation      PERICARDIUM: There was no pericardial effusion  The pericardium was normal in appearance      AORTA: The root exhibited normal size

## 2022-01-26 ENCOUNTER — TELEPHONE (OUTPATIENT)
Dept: BARIATRICS | Facility: CLINIC | Age: 62
End: 2022-01-26

## 2022-01-26 NOTE — TELEPHONE ENCOUNTER
Attempted to call patient however unable to be reach at this time  Voicemail with callback number was provided

## 2022-01-26 NOTE — TELEPHONE ENCOUNTER
Patient called office to review lab results however was concerned for elevated lipase  Discussed with patient lipase level was drawn when she was in the ED on 01/15/22 - patient currently states she does not have any abdominal pain  I have advised patient to speak to her PCP for any further instructions  Also reviewed bariatric labs that was ordered and results were unremarkable  All questions were answered

## 2022-01-29 ENCOUNTER — HOSPITAL ENCOUNTER (EMERGENCY)
Facility: HOSPITAL | Age: 62
Discharge: HOME | End: 2022-01-29
Attending: EMERGENCY MEDICINE | Admitting: EMERGENCY MEDICINE
Payer: COMMERCIAL

## 2022-01-29 VITALS
RESPIRATION RATE: 20 BRPM | SYSTOLIC BLOOD PRESSURE: 130 MMHG | OXYGEN SATURATION: 97 % | DIASTOLIC BLOOD PRESSURE: 65 MMHG | HEART RATE: 74 BPM | TEMPERATURE: 98.6 F

## 2022-01-29 DIAGNOSIS — Z95.0 PACEMAKER: ICD-10-CM

## 2022-01-29 DIAGNOSIS — R07.9 CHEST PAIN: Primary | ICD-10-CM

## 2022-01-29 LAB — CARDIAC TROPONIN I PNL SERPL HS: 2 NG/L

## 2022-01-29 PROCEDURE — 99284 EMERGENCY DEPT VISIT MOD MDM: CPT

## 2022-01-29 PROCEDURE — 84484 ASSAY OF TROPONIN QUANT: CPT | Performed by: EMERGENCY MEDICINE

## 2022-01-29 PROCEDURE — 93005 ELECTROCARDIOGRAM TRACING: CPT

## 2022-01-29 PROCEDURE — 36415 COLL VENOUS BLD VENIPUNCTURE: CPT | Performed by: EMERGENCY MEDICINE

## 2022-01-29 PROCEDURE — 99285 EMERGENCY DEPT VISIT HI MDM: CPT | Performed by: EMERGENCY MEDICINE

## 2022-01-29 NOTE — ED PROVIDER NOTES
History  Chief Complaint   Patient presents with    Abdominal Pain     Pt reports started new medication for heart rate control metoperlol, started to intermittent abdominal pain  Pt denies nausea/vomiting/fevers     This is a 69-year-old female past medical history significant for sick sinus syndrome with pacemaker in place, gastric bypass surgery that presents the ED today for abdominal pain, chest pain  Patient recently started metoprolol per her cardiologist   She was told by her cardiologist that if she was to feel any symptoms that she should call his office  When she called his office there was no answer so she came to the ED for evaluation  She is a night shift worker  She said that roughly around 3:30 p m  She started having short episodes of sharp left upper quadrant abdominal pain  That has since resolved  She is complaining of mild chest pain  She said that this started around 9:00 p m  No associated diaphoresis, nausea or vomiting          Prior to Admission Medications   Prescriptions Last Dose Informant Patient Reported? Taking?    Ascorbic Acid (VITAMIN C) 100 MG CHEW  Self Yes Yes   Sig: Chew   Biotin 64153 MCG TABS  Self Yes Yes   Sig: Take by mouth daily     Calcium Carbonate (CALCI-CHEW PO)  Self Yes Yes   Sig: Take 500 mg by mouth 2 (two) times a day   Cyanocobalamin (VITAMIN B 12 PO)   Yes Yes   Sig: Take 1,000 mcg by mouth   Eliquis 2 5 MG   No Yes   Sig: Take 1 tablet (2 5 mg total) by mouth 2 (two) times a day   Ferrous Sulfate  (45 Fe) MG TBCR  Self Yes Yes   Sig: Take 1 tablet by mouth daily   Lysine HCl POWD Not Taking at Unknown time Self Yes No   Sig: Take by mouth   Patient not taking: Reported on 1/7/2022    Multiple Vitamin (MULTIVITAMIN) capsule  Self Yes Yes   Sig: Take 1 capsule by mouth daily 2 chewies daily   atorvastatin (LIPITOR) 10 mg tablet   No Yes   Sig: Take 1 tablet (10 mg total) by mouth daily   cholecalciferol (VITAMIN D3) 1,000 units tablet  Self Yes Yes Sig: Take 5,000 Units by mouth 3 (three) times a day     clobetasol (TEMOVATE) 0 05 % cream  Self Yes Yes   Sig: Apply to affected areas daily x 12 weeks, then twice weekly for maintenance   fluticasone (FLONASE) 50 mcg/act nasal spray   No Yes   Si spray into each nostril daily   methocarbamol (ROBAXIN) 500 mg tablet   No Yes   Sig: Take 1 tablet (500 mg total) by mouth 2 (two) times a day   metoprolol succinate (TOPROL-XL) 25 mg 24 hr tablet   No Yes   Sig: Take 1 tablet (25 mg total) by mouth daily   nystatin (MYCOSTATIN) powder  Self No Yes   Sig: Apply topically 2 (two) times a day   pantoprazole (PROTONIX) 20 mg tablet   No Yes   Sig: Take 1 tablet (20 mg total) by mouth daily   valACYclovir (VALTREX) 1,000 mg tablet   No Yes   Sig: Take 2 tablets (2,000 mg total) by mouth 2 (two) times a day for 1 day      Facility-Administered Medications: None       Past Medical History:   Diagnosis Date    Anemia     iron def    Arthritis     knees    Carpal tunnel syndrome of right wrist     Chest pain     Colon polyp     Depression     at times    DVT [facility]deep venous thrombosis) ([facility] [address]     Dysphagia     Esophageal reflux 2011    Grade 1 out of 6 intensity murmur 2019    Heart murmur     History of transfusion 1980    Pacemaker 2018    Pneumonia     age 15    Psychiatric disorder     Trigger finger, left        Past Surgical History:   Procedure Laterality Date    BACK SURGERY      CARDIAC PACEMAKER PLACEMENT  2018    CERVICAL DISC SURGERY  2009    PLATES & SCREWS     SECTION      X2    COLONOSCOPY      GASTRIC BYPASS  2017    LAP RINYGB SURGERY    KNEE ARTHROSCOPY      KNEE SURGERY Bilateral     KNEE SURGERY Left 2020    OTHER SURGICAL HISTORY      ARM SURGERIES    MD INCISE FINGER TENDON SHEATH Left 2018    Procedure: LONG TRIGGER FINGER RELEASE;  Surgeon: Maria T Rebolledo MD;  [facility];  Service: Orthopedics    MD WRIST Ailyn Rodriguez LIG Right 9/18/2018    Procedure: ENDOSCOPIC CARPAL TUNNEL RELEASE;  Surgeon: Jose Araya MD;  [facility];  Service: Orthopedics    TONSILLECTOMY      UPPER GASTROINTESTINAL ENDOSCOPY         Family History   Problem Relation Age of Onset    Stroke Mother     Alzheimer's disease Mother     Arthritis Mother     Coronary artery disease Mother     Breast cancer Mother 77    Cancer Mother     Heart disease Mother     Hypertension Father     Gout Father     Diabetes type II Father     Coronary artery disease Father     Heart disease Father     No Known Problems Sister     No Known Problems Daughter     No Known Problems Maternal Grandmother     No Known Problems Maternal Grandfather     No Known Problems Paternal Grandmother     No Known Problems Paternal Grandfather     Prostate cancer Brother     No Known Problems Sister     No Known Problems Sister     No Known Problems Maternal Aunt     No Known Problems Maternal Aunt     No Known Problems Maternal Aunt     No Known Problems Maternal Aunt     Cancer Maternal Aunt     No Known Problems Paternal Aunt     No Known Problems Paternal Aunt      I have reviewed and agree with the history as documented  E-Cigarette/Vaping    E-Cigarette Use Never User      E-Cigarette/Vaping Substances    Nicotine No     THC No     CBD No     Flavoring No     Other No     Unknown No      Social History     Tobacco Use    Smoking status: Former Smoker     Packs/day: 0 25     Types: Cigarettes    Smokeless tobacco: Never Used   Vaping Use    Vaping Use: Never used   Substance Use Topics    Alcohol use: Yes     Alcohol/week: 2 0 standard drinks     Types: 1 Glasses of wine, 1 Standard drinks or equivalent per week     Comment: very rare social    Drug use: No        Review of Systems   Constitutional: Negative for chills and fever  HENT: Negative for hearing loss  Eyes: Negative for visual disturbance  Respiratory: Negative for shortness of breath  Cardiovascular: Positive for chest pain  Gastrointestinal: Negative for abdominal pain, constipation, diarrhea, nausea and vomiting  Genitourinary: Negative for difficulty urinating  Musculoskeletal: Negative for myalgias  Skin: Negative for color change  Neurological: Negative for dizziness and headaches  Psychiatric/Behavioral: Negative for agitation  All other systems reviewed and are negative  Physical Exam  ED Triage Vitals   Temperature Pulse Respirations Blood Pressure SpO2   01/29/22 1450 01/29/22 1450 01/29/22 1506 01/29/22 1450 01/29/22 1450   98 6 °F (37 °C) 91 18 130/65 96 %      Temp Source Heart Rate Source Patient Position - Orthostatic VS BP Location FiO2 (%)   01/29/22 1450 01/29/22 1450 01/29/22 1450 01/29/22 1450 --   Tympanic Monitor Lying Left arm       Pain Score       --                    Orthostatic Vital Signs  Vitals:    01/29/22 1450 01/29/22 1645   BP: 130/65    Pulse: 91 74   Patient Position - Orthostatic VS: Lying        Physical Exam  Vitals and nursing note reviewed  Constitutional:       General: She is not in acute distress  Appearance: Normal appearance  She is well-developed  She is not ill-appearing  HENT:      Head: Normocephalic and atraumatic  Right Ear: Tympanic membrane, ear canal and external ear normal       Left Ear: Tympanic membrane, ear canal and external ear normal       Nose: Nose normal  No congestion  Mouth/Throat:      Mouth: Mucous membranes are moist       Pharynx: Oropharynx is clear  No oropharyngeal exudate  Eyes:      General:         Right eye: No discharge  Left eye: No discharge  Extraocular Movements: Extraocular movements intact  Conjunctiva/sclera: Conjunctivae normal       Pupils: Pupils are equal, round, and reactive to light  Cardiovascular:      Rate and Rhythm: Normal rate and regular rhythm  Heart sounds: Normal heart sounds   No murmur heard  No friction rub  No gallop  Pulmonary:      Effort: Pulmonary effort is normal  No respiratory distress  Breath sounds: Normal breath sounds  No stridor  No wheezing  Abdominal:      General: Bowel sounds are normal  There is no distension  Palpations: Abdomen is soft  Tenderness: There is no abdominal tenderness  Musculoskeletal:         General: No swelling or tenderness  Normal range of motion  Cervical back: Normal range of motion and neck supple  No rigidity  Skin:     General: Skin is warm and dry  Capillary Refill: Capillary refill takes less than 2 seconds  Neurological:      General: No focal deficit present  Mental Status: She is alert and oriented to person, place, and time  Mental status is at baseline  Cranial Nerves: No cranial nerve deficit  Motor: No weakness  Gait: Gait normal    Psychiatric:         Mood and Affect: Mood normal          Behavior: Behavior normal          ED Medications  Medications - No data to display    Diagnostic Studies  Results Reviewed     Procedure Component Value Units Date/Time    HS Troponin 0hr (reflex protocol) [[id number]]  (Normal) Collected: 01/29/22 1555    Lab Status: Final result Specimen: Blood from Arm, Right Updated: 01/29/22 1635     hs TnI 0hr 2 ng/L                  No orders to display         Procedures  Procedures      ED Course  ED Course as of 01/29/22 1737   Sat Jan 29, 2022   1636 hs TnI 0hr: 2  No need for Delta   1642 Procedure Note: EKG  Date/Time: 01/29/22 4:42 PM   Interpreted by: Alveda Fraction   Indications / Diagnosis: CP  ECG reviewed by me, the ED Provider: yes   The EKG demonstrates:  Rhythm: normal sinus  Intervals: normal intervals  Axis: normal axis  QRS/Blocks:normal QRS  ST Changes: No acute ST Changes, no STD/BRITANY        1642 Pacer interrogated  Report Faxed over   Normal functioning pacer and no episodes of bradycardia/VT MDM  Number of Diagnoses or Management Options  Chest pain  Pacemaker  Diagnosis management comments: 57-year-old presenting to the ED today for concern about metoprolol side effect  Patient due to the chest pain will get a troponin, EKG, interrogate pacer  Dispo pending labs and pacer interrogation  Otherwise reassuring physical exam       Disposition  Final diagnoses:   Chest pain   Pacemaker     Time reflects when diagnosis was documented in both MDM as applicable and the Disposition within this note     Time User Action Codes Description Comment    1/29/2022  4:42 PM Armen Hensley Add [R07 9] Chest pain     1/29/2022  4:43 PM Armen Ayden Add [Z95 0] Cardiac resynchronization therapy pacemaker (CRT-P) in place     1/29/2022  4:43 PM Armen Ayden Remove [Z95 0] Cardiac resynchronization therapy pacemaker (CRT-P) in place     1/29/2022  4:43 PM Armen Ayden Add [Z95 0] Pacemaker       ED Disposition     ED Disposition Condition Date/Time Comment    Discharge Stable Sat Jan 29, 2022  4:42 PM Kamryn Garcia discharge to home/self care              Follow-up Information     Follow up With Specialties Details Why Contact Info    Mir Fields Nurse Practitioner Schedule an appointment as soon as possible for a visit in 2 days  [address] [facility] [address] [place] [address]  [phone]            Discharge Medication List as of 1/29/2022  4:44 PM      CONTINUE these medications which have NOT CHANGED    Details   Ascorbic Acid (VITAMIN C) 100 MG CHEW Chew, Historical Med      atorvastatin (LIPITOR) 10 mg tablet Take 1 tablet (10 mg total) by mouth daily, Starting Tue 9/21/2021, Normal      Biotin 32696 MCG TABS Take by mouth daily  , Historical Med      Calcium Carbonate (CALCI-CHEW PO) Take 500 mg by mouth 2 (two) times a day, Historical Med      cholecalciferol (VITAMIN D3) 1,000 units tablet Take 5,000 Units by mouth 3 (three) times a day  , Historical Med clobetasol (TEMOVATE) 0 05 % cream Apply to affected areas daily x 12 weeks, then twice weekly for maintenance, Historical Med      Cyanocobalamin (VITAMIN B 12 PO) Take 1,000 mcg by mouth, Historical Med      Eliquis 2 5 MG Take 1 tablet (2 5 mg total) by mouth 2 (two) times a day, Starting Tue 9/21/2021, Normal      Ferrous Sulfate  (45 Fe) MG TBCR Take 1 tablet by mouth daily, Historical Med      fluticasone (FLONASE) 50 mcg/act nasal spray 1 spray into each nostril daily, Starting Thu 12/2/2021, Normal      Lysine HCl POWD Take by mouth, Historical Med      methocarbamol (ROBAXIN) 500 mg tablet Take 1 tablet (500 mg total) by mouth 2 (two) times a day, Starting Sat 1/15/2022, Normal      metoprolol succinate (TOPROL-XL) 25 mg 24 hr tablet Take 1 tablet (25 mg total) by mouth daily, Starting Tue 1/25/2022, Normal      Multiple Vitamin (MULTIVITAMIN) capsule Take 1 capsule by mouth daily 2 chewies daily, Historical Med      nystatin (MYCOSTATIN) powder Apply topically 2 (two) times a day, Starting Tue 1/7/2020, Normal      pantoprazole (PROTONIX) 20 mg tablet Take 1 tablet (20 mg total) by mouth daily, Starting Wed 1/5/2022, Normal      valACYclovir (VALTREX) 1,000 mg tablet Take 2 tablets (2,000 mg total) by mouth 2 (two) times a day for 1 day, Starting Thu 12/2/2021, Until Sat 1/29/2022, Normal           No discharge procedures on file  PDMP Review     None           ED Provider  Attending physically available and evaluated Mary Beth Ramos  MAKAYLA managed the patient along with the ED Attending      Electronically Signed by         Nisa Penaloza MD  01/29/22 1225

## 2022-01-29 NOTE — ED ATTENDING ATTESTATION
1/29/2022  IEdgar MD, saw and evaluated the patient  I have discussed the patient with the resident/non-physician practitioner and agree with the resident's/non-physician practitioner's findings, Plan of Care, and MDM as documented in the resident's/non-physician practitioner's note, except where noted  All available labs and Radiology studies were reviewed  I was present for key portions of any procedure(s) performed by the resident/non-physician practitioner and I was immediately available to provide assistance  At this point I agree with the current assessment done in the Emergency Department  I have conducted an independent evaluation of this patient a history and physical is as follows:    ED Course     Patient presents for evaluation due to complaints of abdominal pain chest pain  Patient recently started metoprolol and was told by her cardiologist to call if she develops any symptoms  She attempted to call prior to coming with states that she is not able to get in touch with anyone so she came to the emergency department  S patient states that initially she had intermittent left upper quadrant abdominal pain but that has since resolved  Currently, she is complaining only of some chest pain  She denies any diaphoresis, nausea, or vomiting  No prior episode of same  A/P: Chest pain   Will check cardiac labs and EKG      HEART Score for Major Cardiac Events       ED from 1/29/2022 in [address] [place] [address] Emergency Department    1/29/22    1600     Heart Score Risk Calculator     History 0   ECG 0   Age 1   Risk Factors 1   Troponin 0   HEART Score 2         Critical Care Time  Procedures

## 2022-01-29 NOTE — DISCHARGE INSTRUCTIONS
You were seen in the ED today for your symptoms  Your physical exam is reassuring  EKG, troponin, and pacemaker interrogation within normal limits  Follow up with your cardiologist and PCP  Return to the ED if you have any concerns

## 2022-01-30 ENCOUNTER — NURSE TRIAGE (OUTPATIENT)
Dept: OTHER | Facility: OTHER | Age: 62
End: 2022-01-30

## 2022-01-30 DIAGNOSIS — Z20.828 SARS-ASSOCIATED CORONAVIRUS EXPOSURE: Primary | ICD-10-CM

## 2022-01-30 PROCEDURE — U0003 INFECTIOUS AGENT DETECTION BY NUCLEIC ACID (DNA OR RNA); SEVERE ACUTE RESPIRATORY SYNDROME CORONAVIRUS 2 (SARS-COV-2) (CORONAVIRUS DISEASE [COVID-19]), AMPLIFIED PROBE TECHNIQUE, MAKING USE OF HIGH THROUGHPUT TECHNOLOGIES AS DESCRIBED BY CMS-2020-01-R: HCPCS | Performed by: NURSE PRACTITIONER

## 2022-01-30 PROCEDURE — U0005 INFEC AGEN DETEC AMPLI PROBE: HCPCS | Performed by: NURSE PRACTITIONER

## 2022-01-30 NOTE — TELEPHONE ENCOUNTER
Reason for Disposition   [1] COVID-19 infection suspected by caller or triager AND [2] mild symptoms (cough, fever, or others) AND [3] has not gotten tested yet    Answer Assessment - Initial Assessment Questions  1  Were you within 6 feet or less, for up to 15 minutes or more with a person that has a confirmed COVID-19 test?     Unknown  2  What was the date of your exposure? Unknown  3  Are you experiencing any symptoms attributed to the virus?  (Assess for SOB, cough, fever, difficulty breathing)    Cough, scratchy throat, temp 99 (temporal), chest pain  Pt was seen in ED yesterday for chest pain and was told symptom was not cardiac related  Pt says chest pain is unchanged  4  HIGH RISK: Do you have any history heart or lung conditions, weakened immune system, diabetes, Asthma, CHF, HIV, COPD, Chemo, renal failure, sickle cell, etc?     Pacer    Protocols used: CORONAVIRUS (COVID-19) DIAGNOSED OR SUSPECTED-ADULT-AH    Pts chest pain symptom discussed with Dr Sorin Giron who said pts symptoms were not cardiac related  Gave ok to order covid swab  Covid swab placed

## 2022-01-30 NOTE — TELEPHONE ENCOUNTER
Regarding: COVID-[facility]- chest pain, fever  ----- Message from Jaylan Whitney sent at 1/30/2022 11:55 AM EST -----  "I have a low grade fever, I sound hoarse, and pain in my chest  I went to the ER and they checked my heart because I have a pace maker, and they said my heart came back alright  It hurts when I breathe  I got checked out and everything looked ok yesterday   They didn't give me a COVID test  I wanted to see whether or not I should be getting one "

## 2022-01-31 ENCOUNTER — TELEPHONE (OUTPATIENT)
Dept: FAMILY MEDICINE CLINIC | Facility: CLINIC | Age: 62
End: 2022-01-31

## 2022-01-31 LAB
ATRIAL RATE: 75 BPM
P AXIS: 54 DEGREES
PR INTERVAL: 172 MS
QRS AXIS: 40 DEGREES
QRSD INTERVAL: 78 MS
QT INTERVAL: 364 MS
QTC INTERVAL: 406 MS
T WAVE AXIS: 39 DEGREES
VENTRICULAR RATE: 75 BPM

## 2022-01-31 PROCEDURE — 93010 ELECTROCARDIOGRAM REPORT: CPT | Performed by: INTERNAL MEDICINE

## 2022-01-31 NOTE — TELEPHONE ENCOUNTER
Phone call from Cherrie Werner, states she just received her covid test results & has ques re: test & possible medication  Please call [phone]

## 2022-02-01 ENCOUNTER — TELEMEDICINE (OUTPATIENT)
Dept: FAMILY MEDICINE CLINIC | Facility: CLINIC | Age: 62
End: 2022-02-01
Payer: COMMERCIAL

## 2022-02-01 VITALS — TEMPERATURE: 98.6 F | BODY MASS INDEX: 30.22 KG/M2 | HEIGHT: 64 IN | WEIGHT: 177 LBS

## 2022-02-01 DIAGNOSIS — U07.1 ACUTE RESPIRATORY DISEASE DUE TO COVID-19 VIRUS: ICD-10-CM

## 2022-02-01 DIAGNOSIS — I82.4Y9 DEEP VEIN THROMBOSIS (DVT) OF PROXIMAL LOWER EXTREMITY, UNSPECIFIED CHRONICITY, UNSPECIFIED LATERALITY (HCC): ICD-10-CM

## 2022-02-01 DIAGNOSIS — J06.9 ACUTE RESPIRATORY DISEASE DUE TO COVID-19 VIRUS: ICD-10-CM

## 2022-02-01 DIAGNOSIS — J01.00 ACUTE NON-RECURRENT MAXILLARY SINUSITIS: Primary | ICD-10-CM

## 2022-02-01 PROCEDURE — 3008F BODY MASS INDEX DOCD: CPT | Performed by: FAMILY MEDICINE

## 2022-02-01 PROCEDURE — 1036F TOBACCO NON-USER: CPT | Performed by: FAMILY MEDICINE

## 2022-02-01 PROCEDURE — 99214 OFFICE O/P EST MOD 30 MIN: CPT | Performed by: FAMILY MEDICINE

## 2022-02-01 RX ORDER — PREDNISONE 50 MG/1
50 TABLET ORAL DAILY
Qty: 5 TABLET | Refills: 0 | Status: SHIPPED | OUTPATIENT
Start: 2022-02-01 | End: 2022-02-06

## 2022-02-01 RX ORDER — AZITHROMYCIN 250 MG/1
TABLET, FILM COATED ORAL
Qty: 6 TABLET | Refills: 0 | Status: SHIPPED | OUTPATIENT
Start: 2022-02-01 | End: 2022-02-05

## 2022-02-01 RX ORDER — GUAIFENESIN AND CODEINE PHOSPHATE 100; 10 MG/5ML; MG/5ML
5 SOLUTION ORAL 3 TIMES DAILY PRN
Qty: 180 ML | Refills: 0 | Status: SHIPPED | OUTPATIENT
Start: 2022-02-01

## 2022-02-01 RX ORDER — APIXABAN 2.5 MG/1
TABLET, FILM COATED ORAL
Qty: 60 TABLET | Refills: 3 | Status: SHIPPED | OUTPATIENT
Start: 2022-02-01 | End: 2022-06-07

## 2022-02-01 NOTE — ASSESSMENT & PLAN NOTE
Today for since symptoms started  Respiratory status stable  She was given prescription for prednisone and cough medicine with codeine  Increase oral hydration use humidifier at home  If symptoms worse call back

## 2022-02-01 NOTE — PROGRESS NOTES
COVID-19 Outpatient Progress Note    Assessment/Plan:    Problem List Items Addressed This Visit        Respiratory    Acute non-recurrent maxillary sinusitis - Primary     She was given prescriptions for prednisone, Z-Robel and cough medicine with codeine  Increase oral hydration and use humidifier at home  Relevant Medications    predniSONE 50 mg tablet    azithromycin (ZITHROMAX) 250 mg tablet    guaifenesin-codeine (GUAIFENESIN AC) 100-10 MG/5ML liquid    Acute respiratory disease due to COVID-19 virus     Today for since symptoms started  Respiratory status stable  She was given prescription for prednisone and cough medicine with codeine  Increase oral hydration use humidifier at home  If symptoms worse call back  Gardenia Schlatter   Age: 57  Data as of: 02/01/2022    Demographics    Linked Records                Search Criteria            Summary    Summary  Total Prescriptions:    1    Total Prescribers:    1    Total Pharmacies:    1    Narcotics* (excluding Buprenorphine)  Current Qty:   0   Current MME/day:   0 00   30 Day Avg MME/day:   0 00   Buprenorphine*  Current Qty:   0   Current mg/day:   0 00   30 Day Avg mg/day:   0 00   Prescriptions    *Highlighted drugs could not be included in score calculations   Prescriptions  Total Prescriptions: 1    Total Private Pay: 0    Fill Date ID   Written Drug Qty Days Prescriber Rx # Pharmacy Refill   Daily Dose* Pymt Type      08/13/2020  1   08/13/2020  Oxycodone-Acetaminophen 5-325    20 00  2 Ke O'd   [id number]   Rit (0086)   0  75 00 MME  [place]   [place]   *Per CDC guidance, the MME conversion factors prescribed or provided as part of the medication-assisted treatment for opioid use disorder should not be used to benchmark against dosage thresholds meant for opioids prescribed for pain   Buprenorphine products have no agreed upon morphine equivalency, and as partial opioid agonists, are not expected to be associated with overdose risk in the same dose-dependent manner as doses for full agonist opioids  MME = morphine milligram equivalents  LME = Lorazepam milligram equivalents  MG = dose in milligrams  Providers  Total Providers: 1   Name [facility] Phone   MD Dakota Black [phone]    Pharmacies  Total Pharmacies: 1   Name [facility] Phone   [facility] [facility] [phone]             Disposition:     I have spent 25 minutes directly with the patient  Greater than 50% of this time was spent in counseling/coordination of care regarding: risks and benefits of treatment options, instructions for management and patient and family education  Encounter provider Sherif Irizarry MD    Provider located at [address] [facility]  [id number] [facility] [id number] [phone]    Recent Visits  Date Type Provider Dept   01/31/22 Telephone [facility]   Showing recent visits within past 7 days and meeting all other requirements  Today's Visits  Date Type Provider Dept   02/01/22 Telemedicine Sherif Irizarry MD [facility] [address] [facility]   Showing today's visits and meeting all other requirements  Future Appointments  No visits were found meeting these conditions  Showing future appointments within next 150 days and meeting all other requirements     This virtual check-in was done via "[employer]" and patient was informed that this is a secure, HIPAA-compliant platform  She agrees to proceed  Patient agrees to participate in a virtual check in via telephone or video visit instead of presenting to the office to address urgent/immediate medical needs  Patient is aware this is a billable service  After connecting through [facility], the patient was identified by name and date of birth   Mansoor Foss was informed that this was a telemedicine visit and that the exam was being conducted confidentially over secure lines  My office door was closed  No one else was in the room  Arsenio Cruz acknowledged consent and understanding of privacy and security of the telemedicine visit  I informed the patient that I have reviewed her record in Epic and presented the opportunity for her to ask any questions regarding the visit today  The patient agreed to participate  Verification of patient location:  Patient is located in the following state in which I hold an active license: [place]    Subjective:   Arsenio Cruz is a 57 y o  female who has been screened for COVID-19  Symptom change since last report: unchanged  Patient's symptoms include nasal congestion, rhinorrhea and cough  Patient denies fever, chills, vomiting and diarrhea  - Date of symptom onset: 1/28/2022  - Date of positive COVID-19 test: 1/30/2022  Type of test: PCR  COVID-19 vaccination status: Fully vaccinated with booster    Dayanara Callejas has been staying home and has isolated themselves in her home  She is taking care to not share personal items and is cleaning all surfaces that are touched often, like counters, tabletops, and doorknobs using household cleaning sprays or wipes  She is wearing a mask when she leaves her room       Lab Results   Component Value Date    SARSCOV2 Positive (A) 01/30/2022    SARSCOV2 Not Detected 06/17/2020    [address] [facility] Not Detected 05/29/2020     Past Medical History:   Diagnosis Date    Anemia     iron def    Arthritis     knees    Carpal tunnel syndrome of right wrist     Chest pain     Colon polyp     Depression     at times    DVT (deep venous thrombosis) ([facility] [address] ) 2012    Dysphagia     Esophageal reflux 5/23/2011    Grade 1 out of 6 intensity murmur 6/24/2019    Heart murmur     History of transfusion 1980    Pacemaker 11/01/2018    Pneumonia     age 15    Psychiatric disorder     Trigger finger, left      Past Surgical History:   Procedure Laterality Date    BACK SURGERY      CARDIAC PACEMAKER PLACEMENT  2018    CERVICAL DISC SURGERY  2009    PLATES & SCREWS     SECTION      X2    COLONOSCOPY      GASTRIC BYPASS  2017    LAP RINYGB SURGERY    KNEE ARTHROSCOPY      KNEE SURGERY Bilateral     KNEE SURGERY Left 2020    OTHER SURGICAL HISTORY      ARM SURGERIES    GA INCISE FINGER TENDON SHEATH Left 2018    Procedure: LONG TRIGGER FINGER RELEASE;  Surgeon: Campbell Dong MD;  Location: [facility];  Service: Orthopedics    GA WRIST Bonnie Bud LIG Right 2018    Procedure: ENDOSCOPIC CARPAL TUNNEL RELEASE;  Surgeon: Campbell Dong MD;  Location: [facility];  Service: Orthopedics    TONSILLECTOMY      UPPER GASTROINTESTINAL ENDOSCOPY       Current Outpatient Medications   Medication Sig Dispense Refill    Ascorbic Acid (VITAMIN C) 100 MG CHEW Chew      atorvastatin (LIPITOR) 10 mg tablet Take 1 tablet (10 mg total) by mouth daily 90 tablet 0    Biotin 08706 MCG TABS Take by mouth daily        Calcium Carbonate (CALCI-CHEW PO) Take 500 mg by mouth 2 (two) times a day      cholecalciferol (VITAMIN D3) 1,000 units tablet Take 5,000 Units by mouth 3 (three) times a day        clobetasol (TEMOVATE) 0 05 % cream Apply to affected areas daily x 12 weeks, then twice weekly for maintenance      Cyanocobalamin (VITAMIN B 12 PO) Take 1,000 mcg by mouth      Eliquis 2 5 MG Take 1 tablet (2 5 mg total) by mouth 2 (two) times a day 60 tablet 3    Ferrous Sulfate  (45 Fe) MG TBCR Take 1 tablet by mouth daily      fluticasone (FLONASE) 50 mcg/act nasal spray 1 spray into each nostril daily 18 2 mL 1    methocarbamol (ROBAXIN) 500 mg tablet Take 1 tablet (500 mg total) by mouth 2 (two) times a day 20 tablet 0    metoprolol succinate (TOPROL-XL) 25 mg 24 hr tablet Take 1 tablet (25 mg total) by mouth daily 90 tablet 3    Multiple Vitamin (MULTIVITAMIN) capsule Take 1 capsule by mouth daily 2 chewies daily      nystatin (MYCOSTATIN) powder Apply topically 2 (two) times a day 30 g 0    pantoprazole (PROTONIX) 20 mg tablet Take 1 tablet (20 mg total) by mouth daily 30 tablet 3    azithromycin (ZITHROMAX) 250 mg tablet Take 2 tablets today then 1 tablet daily x 4 days 6 tablet 0    guaifenesin-codeine (GUAIFENESIN AC) 100-10 MG/5ML liquid Take 5 mL by mouth 3 (three) times a day as needed for cough 180 mL 0    Lysine HCl POWD Take by mouth (Patient not taking: Reported on 1/7/2022 )      predniSONE 50 mg tablet Take 1 tablet (50 mg total) by mouth daily for 5 days 5 tablet 0    valACYclovir (VALTREX) 1,000 mg tablet Take 2 tablets (2,000 mg total) by mouth 2 (two) times a day for 1 day 30 tablet 0     No current facility-administered medications for this visit  No Known Allergies    Review of Systems   Constitutional: Negative for activity change, chills and fever  HENT: Positive for congestion, postnasal drip, rhinorrhea and sinus pressure  Respiratory: Positive for cough  Negative for apnea  Gastrointestinal: Negative for diarrhea and vomiting  Skin: Negative for rash  Neurological: Negative for dizziness  Objective:    Vitals:    02/01/22 0915   Temp: 98 6 °F (37 °C)   TempSrc: Tympanic   Weight: 80 3 kg (177 lb)   Height: 5' 4" (1 626 m)       Physical Exam  Vitals reviewed  Constitutional:       Appearance: Normal appearance  HENT:      Head: Normocephalic and atraumatic  Mouth/Throat:      Pharynx: Oropharynx is clear  Eyes:      Conjunctiva/sclera: Conjunctivae normal    Pulmonary:      Effort: No respiratory distress  Musculoskeletal:         General: Normal range of motion  Skin:     Findings: No rash  Neurological:      Mental Status: She is alert  Mental status is at baseline  Psychiatric:         Mood and Affect: Mood normal          VIRTUAL VISIT DISCLAIMER    Chuckysunny Castellano verbally agrees to participate in [employer]   Pt is aware that Virtual Care Services could be limited without vital signs or the ability to perform a full hands-on physical Unknown Benedict understands she or the provider may request at any time to terminate the video visit and request the patient to seek care or treatment in person

## 2022-02-01 NOTE — ASSESSMENT & PLAN NOTE
She was given prescriptions for prednisone, Z-Robel and cough medicine with codeine  Increase oral hydration and use humidifier at home

## 2022-02-02 ENCOUNTER — VBI (OUTPATIENT)
Dept: ADMINISTRATIVE | Facility: OTHER | Age: 62
End: 2022-02-02

## 2022-02-07 ENCOUNTER — TELEPHONE (OUTPATIENT)
Dept: FAMILY MEDICINE CLINIC | Facility: CLINIC | Age: 62
End: 2022-02-07

## 2022-02-07 ENCOUNTER — APPOINTMENT (EMERGENCY)
Dept: RADIOLOGY | Facility: HOSPITAL | Age: 62
End: 2022-02-07
Payer: COMMERCIAL

## 2022-02-07 ENCOUNTER — HOSPITAL ENCOUNTER (EMERGENCY)
Facility: HOSPITAL | Age: 62
Discharge: HOME | End: 2022-02-07
Attending: EMERGENCY MEDICINE | Admitting: EMERGENCY MEDICINE
Payer: COMMERCIAL

## 2022-02-07 VITALS
RESPIRATION RATE: 18 BRPM | HEART RATE: 66 BPM | OXYGEN SATURATION: 99 % | TEMPERATURE: 97.5 F | BODY MASS INDEX: 30.38 KG/M2 | WEIGHT: 177 LBS | SYSTOLIC BLOOD PRESSURE: 178 MMHG | DIASTOLIC BLOOD PRESSURE: 81 MMHG

## 2022-02-07 DIAGNOSIS — R07.9 CHEST PAIN, UNSPECIFIED: Primary | ICD-10-CM

## 2022-02-07 LAB
ALBUMIN SERPL BCP-MCNC: 3.8 G/DL (ref 3.5–5)
ALP SERPL-CCNC: 90 U/L (ref 46–116)
ALT SERPL W P-5'-P-CCNC: 34 U/L (ref 12–78)
ANION GAP SERPL CALCULATED.3IONS-SCNC: 5 MMOL/L (ref 4–13)
AST SERPL W P-5'-P-CCNC: 26 U/L (ref 5–45)
BASOPHILS # BLD AUTO: 0.05 THOUSANDS/ΜL (ref 0–0.1)
BASOPHILS NFR BLD AUTO: 1 % (ref 0–1)
BILIRUB SERPL-MCNC: 1.52 MG/DL (ref 0.2–1)
BUN SERPL-MCNC: 17 MG/DL (ref 5–25)
CALCIUM SERPL-MCNC: 9.1 MG/DL (ref 8.3–10.1)
CARDIAC TROPONIN I PNL SERPL HS: 3 NG/L
CHLORIDE SERPL-SCNC: 109 MMOL/L (ref 100–108)
CO2 SERPL-SCNC: 29 MMOL/L (ref 21–32)
CREAT SERPL-MCNC: 0.66 MG/DL (ref 0.6–1.3)
EOSINOPHIL # BLD AUTO: 0.13 THOUSAND/ΜL (ref 0–0.61)
EOSINOPHIL NFR BLD AUTO: 2 % (ref 0–6)
ERYTHROCYTE [DISTWIDTH] IN BLOOD BY AUTOMATED COUNT: 12.6 % (ref 11.6–15.1)
GFR SERPL CREATININE-BSD FRML MDRD: 94 ML/MIN/1.73SQ M
GLUCOSE SERPL-MCNC: 98 MG/DL (ref 65–140)
HCT VFR BLD AUTO: 38.6 % (ref 34.8–46.1)
HGB BLD-MCNC: 12.2 G/DL (ref 11.5–15.4)
IMM GRANULOCYTES # BLD AUTO: 0.04 THOUSAND/UL (ref 0–0.2)
IMM GRANULOCYTES NFR BLD AUTO: 1 % (ref 0–2)
LYMPHOCYTES # BLD AUTO: 2.27 THOUSANDS/ΜL (ref 0.6–4.47)
LYMPHOCYTES NFR BLD AUTO: 28 % (ref 14–44)
MCH RBC QN AUTO: 32.4 PG (ref 26.8–34.3)
MCHC RBC AUTO-ENTMCNC: 31.6 G/DL (ref 31.4–37.4)
MCV RBC AUTO: 102 FL (ref 82–98)
MONOCYTES # BLD AUTO: 0.49 THOUSAND/ΜL (ref 0.17–1.22)
MONOCYTES NFR BLD AUTO: 6 % (ref 4–12)
NEUTROPHILS # BLD AUTO: 5.07 THOUSANDS/ΜL (ref 1.85–7.62)
NEUTS SEG NFR BLD AUTO: 62 % (ref 43–75)
NRBC BLD AUTO-RTO: 0 /100 WBCS
PLATELET # BLD AUTO: 253 THOUSANDS/UL (ref 149–390)
PMV BLD AUTO: 11.2 FL (ref 8.9–12.7)
POTASSIUM SERPL-SCNC: 3.2 MMOL/L (ref 3.5–5.3)
PROT SERPL-MCNC: 7.2 G/DL (ref 6.4–8.2)
RBC # BLD AUTO: 3.77 MILLION/UL (ref 3.81–5.12)
SODIUM SERPL-SCNC: 143 MMOL/L (ref 136–145)
WBC # BLD AUTO: 8.05 THOUSAND/UL (ref 4.31–10.16)

## 2022-02-07 PROCEDURE — 93005 ELECTROCARDIOGRAM TRACING: CPT

## 2022-02-07 PROCEDURE — 85025 COMPLETE CBC W/AUTO DIFF WBC: CPT | Performed by: STUDENT IN AN ORGANIZED HEALTH CARE EDUCATION/TRAINING PROGRAM

## 2022-02-07 PROCEDURE — 99285 EMERGENCY DEPT VISIT HI MDM: CPT | Performed by: EMERGENCY MEDICINE

## 2022-02-07 PROCEDURE — 99285 EMERGENCY DEPT VISIT HI MDM: CPT

## 2022-02-07 PROCEDURE — 71045 X-RAY EXAM CHEST 1 VIEW: CPT

## 2022-02-07 PROCEDURE — 80053 COMPREHEN METABOLIC PANEL: CPT | Performed by: STUDENT IN AN ORGANIZED HEALTH CARE EDUCATION/TRAINING PROGRAM

## 2022-02-07 PROCEDURE — 84484 ASSAY OF TROPONIN QUANT: CPT | Performed by: STUDENT IN AN ORGANIZED HEALTH CARE EDUCATION/TRAINING PROGRAM

## 2022-02-07 PROCEDURE — 36415 COLL VENOUS BLD VENIPUNCTURE: CPT | Performed by: STUDENT IN AN ORGANIZED HEALTH CARE EDUCATION/TRAINING PROGRAM

## 2022-02-07 RX ORDER — ACETAMINOPHEN 325 MG/1
650 TABLET ORAL ONCE
Status: COMPLETED | OUTPATIENT
Start: 2022-02-07 | End: 2022-02-07

## 2022-02-07 RX ORDER — SODIUM CHLORIDE 9 MG/ML
3 INJECTION INTRAVENOUS
Status: DISCONTINUED | OUTPATIENT
Start: 2022-02-07 | End: 2022-02-07 | Stop reason: HOSPADM

## 2022-02-07 RX ADMIN — ACETAMINOPHEN 650 MG: 325 TABLET, FILM COATED ORAL at 15:52

## 2022-02-07 NOTE — DISCHARGE INSTRUCTIONS
You were evaluated in the Emergency Department today for chest pain  Your evaluation has shown no signs of medical conditions requiring emergent intervention at this time, however we recommend that you follow up with your primary care physician or your cardiologist as soon as possible for further testing as an outpatient  Please schedule an appointment for follow up with your primary care physician as soon as possible  Return to the Emergency Department if you experience worsening or uncontrolled chest pain, shortness of breath, light headedness, feeling faint, nausea, vomiting, or any other concerning symptoms

## 2022-02-07 NOTE — ED PROVIDER NOTES
History  Chief Complaint   Patient presents with    Chest Pain     Pt c/o midsternal CP since being seen for this same issue on Saturday  Denies SOB      Patient is a 70-year-old female, past medical history including sick sinus syndrome s/p pacemaker placement, and DVT on eliquis, who presents to the ED for chest pain  Patient states the pain started about 9-10 days ago  She does not remember what she was doing when the pain started  Since onset, it has been constant  It does not radiate  There are no modifying factors  Associated symptoms include lightheadedness  She states she was seen in the ED around time time when the pain started, but ultimately was d/c after a negative workup  She denies any prior history of chest pain like this int he past  She denies any fevers, chills, n/v/d, SOB, or any other new or concerning symptoms  Per chart review, patient was seen on 1/29/2022 for this pain  She had a negative troponin, and was discharged  Of note, patient states after her visit on 1/29/2022, she was tested for COVID-19  Her test came back positive  Prior to Admission Medications   Prescriptions Last Dose Informant Patient Reported? Taking?    Ascorbic Acid (VITAMIN C) 100 MG CHEW  Self Yes No   Sig: Chew   Biotin 12700 MCG TABS  Self Yes No   Sig: Take by mouth daily     Calcium Carbonate (CALCI-CHEW PO)  Self Yes No   Sig: Take 500 mg by mouth 2 (two) times a day   Cyanocobalamin (VITAMIN B 12 PO)   Yes No   Sig: Take 1,000 mcg by mouth   Eliquis 2 5 MG   No No   Sig: take 1 tablet by mouth twice a day   Ferrous Sulfate  (45 Fe) MG TBCR  Self Yes No   Sig: Take 1 tablet by mouth daily   Lysine HCl POWD  Self Yes No   Sig: Take by mouth   Patient not taking: Reported on 1/7/2022    Multiple Vitamin (MULTIVITAMIN) capsule  Self Yes No   Sig: Take 1 capsule by mouth daily 2 chewies daily   atorvastatin (LIPITOR) 10 mg tablet   No No   Sig: Take 1 tablet (10 mg total) by mouth daily cholecalciferol (VITAMIN D3) 1,000 units tablet  Self Yes No   Sig: Take 5,000 Units by mouth 3 (three) times a day     clobetasol (TEMOVATE) 0 05 % cream  Self Yes No   Sig: Apply to affected areas daily x 12 weeks, then twice weekly for maintenance   fluticasone (FLONASE) 50 mcg/act nasal spray   No No   Si spray into each nostril daily   guaifenesin-codeine (GUAIFENESIN AC) 100-10 MG/5ML liquid   No No   Sig: Take 5 mL by mouth 3 (three) times a day as needed for cough   methocarbamol (ROBAXIN) 500 mg tablet   No No   Sig: Take 1 tablet (500 mg total) by mouth 2 (two) times a day   metoprolol succinate (TOPROL-XL) 25 mg 24 hr tablet   No No   Sig: Take 1 tablet (25 mg total) by mouth daily   nystatin (MYCOSTATIN) powder  Self No No   Sig: Apply topically 2 (two) times a day   pantoprazole (PROTONIX) 20 mg tablet   No No   Sig: Take 1 tablet (20 mg total) by mouth daily   predniSONE 50 mg tablet   No No   Sig: Take 1 tablet (50 mg total) by mouth daily for 5 days   valACYclovir (VALTREX) 1,000 mg tablet   No No   Sig: Take 2 tablets (2,000 mg total) by mouth 2 (two) times a day for 1 day      Facility-Administered Medications: None       Past Medical History:   Diagnosis Date    Anemia     iron def    Arthritis     knees    Carpal tunnel syndrome of right wrist     Chest pain     Colon polyp     Depression     at times    DVT (deep venous thrombosis) ([facility] [address]     Dysphagia     Esophageal reflux 2011    Grade 1 out of 6 intensity murmur 2019    Heart murmur     History of transfusion 1980    Pacemaker 2018    Pneumonia     age 15    Psychiatric disorder     Trigger finger, left        Past Surgical History:   Procedure Laterality Date    BACK SURGERY      CARDIAC PACEMAKER PLACEMENT  2018    CERVICAL DISC SURGERY  2009    PLATES & SCREWS     SECTION      X2    COLONOSCOPY      GASTRIC BYPASS  2017    LAP RINYGB SURGERY    KNEE ARTHROSCOPY      KNEE SURGERY Bilateral     KNEE SURGERY Left 08/13/2020    OTHER SURGICAL HISTORY      ARM SURGERIES    NC INCISE FINGER TENDON SHEATH Left 9/18/2018    Procedure: LONG TRIGGER FINGER RELEASE;  Surgeon: Maria T Rebolledo MD;  [facility];  Service: Orthopedics    NC WRIST Dirk Soles LIG Right 9/18/2018    Procedure: ENDOSCOPIC CARPAL TUNNEL RELEASE;  Surgeon: Maria T Rebolledo MD;  [facility];  Service: Orthopedics    TONSILLECTOMY      UPPER GASTROINTESTINAL ENDOSCOPY         Family History   Problem Relation Age of Onset    Stroke Mother     Alzheimer's disease Mother     Arthritis Mother     Coronary artery disease Mother     Breast cancer Mother 77    Cancer Mother     Heart disease Mother     Hypertension Father     Gout Father     Diabetes type II Father     Coronary artery disease Father     Heart disease Father     No Known Problems Sister     No Known Problems Daughter     No Known Problems Maternal Grandmother     No Known Problems Maternal Grandfather     No Known Problems Paternal Grandmother     No Known Problems Paternal Grandfather     Prostate cancer Brother     No Known Problems Sister     No Known Problems Sister     No Known Problems Maternal Aunt     No Known Problems Maternal Aunt     No Known Problems Maternal Aunt     No Known Problems Maternal Aunt     Cancer Maternal Aunt     No Known Problems Paternal Aunt     No Known Problems Paternal Aunt      I have reviewed and agree with the history as documented  E-Cigarette/Vaping    E-Cigarette Use Never User      E-Cigarette/Vaping Substances    Nicotine No     THC No     CBD No     Flavoring No     Other No     Unknown No      Social History     Tobacco Use    Smoking status: Former Smoker     Packs/day: 0 25     Types: Cigarettes    Smokeless tobacco: Never Used   Vaping Use    Vaping Use: Never used   Substance Use Topics    Alcohol use:  Yes     Alcohol/week: 2 0 standard drinks     Types: 1 Glasses of wine, 1 Standard drinks or equivalent per week     Comment: very rare social    Drug use: No        Review of Systems   Constitutional: Negative for chills and fever  Respiratory: Negative for cough and shortness of breath  Cardiovascular: Positive for chest pain  Negative for leg swelling  Gastrointestinal: Negative for abdominal pain, diarrhea, nausea and vomiting  Neurological: Positive for light-headedness  All other systems reviewed and are negative  Physical Exam  ED Triage Vitals [02/07/22 1541]   Temperature Pulse Respirations Blood Pressure SpO2   97 5 °F (36 4 °C) 66 18 (!) 178/81 99 %      Temp Source Heart Rate Source Patient Position - Orthostatic VS BP Location FiO2 (%)   Oral Monitor Lying Right arm --      Pain Score       9             Orthostatic Vital Signs  Vitals:    02/07/22 1541   BP: (!) 178/81   Pulse: 66   Patient Position - Orthostatic VS: Lying       Physical Exam  Vitals and nursing note reviewed  Constitutional:       General: She is not in acute distress  Appearance: She is well-developed  She is not diaphoretic  HENT:      Head: Normocephalic and atraumatic  Right Ear: External ear normal       Left Ear: External ear normal       Nose: Nose normal    Eyes:      General: Lids are normal  No scleral icterus  Cardiovascular:      Rate and Rhythm: Normal rate and regular rhythm  Heart sounds: Normal heart sounds  No murmur heard  No friction rub  No gallop  Pulmonary:      Effort: Pulmonary effort is normal  No respiratory distress  Breath sounds: Normal breath sounds  No wheezing or rales  Abdominal:      Palpations: Abdomen is soft  Tenderness: There is no abdominal tenderness  There is no guarding or rebound  Musculoskeletal:         General: No deformity  Normal range of motion  Cervical back: Normal range of motion and neck supple  Skin:     General: Skin is warm and dry  Neurological:      General: No focal deficit present  Mental Status: She is alert     Psychiatric:         Mood and Affect: Mood normal          Behavior: Behavior normal          ED Medications  Medications   acetaminophen (TYLENOL) tablet 650 mg (650 mg Oral Given 2/7/22 1552)       Diagnostic Studies  Results Reviewed     Procedure Component Value Units Date/Time    HS Troponin 0hr (reflex protocol) [[id number]]  (Normal) Collected: 02/07/22 1552    Lab Status: Final result Specimen: Blood from Arm, Left Updated: 02/07/22 1644     hs TnI 0hr 3 ng/L     Comprehensive metabolic panel [[id number]]  (Abnormal) Collected: 02/07/22 1552    Lab Status: Final result Specimen: Blood from Arm, Left Updated: 02/07/22 1642     Sodium 143 mmol/L      Potassium 3 2 mmol/L      Chloride 109 mmol/L      CO2 29 mmol/L      ANION GAP 5 mmol/L      BUN 17 mg/dL      Creatinine 0 66 mg/dL      Glucose 98 mg/dL      Calcium 9 1 mg/dL      AST 26 U/L      ALT 34 U/L      Alkaline Phosphatase 90 U/L      Total Protein 7 2 g/dL      Albumin 3 8 g/dL      Total Bilirubin 1 52 mg/dL      eGFR 94 ml/min/1 73sq m     Narrative:      [place] guidelines for Chronic Kidney Disease (CKD):     Stage 1 with normal or high GFR (GFR > 90 mL/min/1 73 square meters)    Stage 2 Mild CKD (GFR = 60-89 mL/min/1 73 square meters)    Stage 3A Moderate CKD (GFR = 45-59 mL/min/1 73 square meters)    Stage 3B Moderate CKD (GFR = 30-44 mL/min/1 73 square meters)    Stage 4 Severe CKD (GFR = 15-29 mL/min/1 73 square meters)    Stage 5 End Stage CKD (GFR <15 mL/min/1 73 square meters)  Note: GFR calculation is accurate only with a steady state creatinine    CBC and differential [[id number]]  (Abnormal) Collected: 02/07/22 1552    Lab Status: Final result Specimen: Blood from Arm, Left Updated: 02/07/22 1618     WBC 8 05 Thousand/uL      RBC 3 77 Million/uL      Hemoglobin 12 2 g/dL      Hematocrit 38 6 %       fL MCH 32 4 pg      MCHC 31 6 g/dL      RDW 12 6 %      MPV 11 2 fL      Platelets 949 Thousands/uL      nRBC 0 /100 WBCs      Neutrophils Relative 62 %      Immat GRANS % 1 %      Lymphocytes Relative 28 %      Monocytes Relative 6 %      Eosinophils Relative 2 %      Basophils Relative 1 %      Neutrophils Absolute 5 07 Thousands/µL      Immature Grans Absolute 0 04 Thousand/uL      Lymphocytes Absolute 2 27 Thousands/µL      Monocytes Absolute 0 49 Thousand/µL      Eosinophils Absolute 0 13 Thousand/µL      Basophils Absolute 0 05 Thousands/µL                  X-ray chest 1 view portable   Final Result by Didier Roger MD (02/08 0700)      No acute cardiopulmonary disease  Workstation performed: [id number]               Procedures  ECG 12 Lead Documentation Only    Date/Time: 2/8/2022 8:03 PM  Performed by: Francisco Bernard DO  Authorized by: Francisco Bernard DO     ECG reviewed by me, the ED Provider: yes    Patient location:  ED  Interpretation:     Interpretation: abnormal    Rate:     ECG rate:  63    ECG rate assessment: normal    Rhythm:     Rhythm: sinus rhythm    Ectopy:     Ectopy: none    QRS:     QRS axis:  Normal  Conduction:     Conduction: abnormal      Abnormal conduction: 1st degree    ST segments:     ST segments:  Normal  T waves:     T waves: normal            ED Course  ED Course as of 02/08/22 2008 Mon Feb 07, 2022   1621 Hemoglobin: 12 2   1621 WBC: 8 05   1653 hs TnI 0hr: 3   1653 Potassium(!): 3 2   Tue Feb 08, 2022   1715 Patient was re-evaluated  Resting comfortably  Workup unremarkable  Will d/c  Recommended cardiology f/u  Return precautions discussed  Patient verbalized understanding and agreed to plan of care                HEART Risk Score      Most Recent Value   Heart Score Risk Calculator    History 0 Filed at: 02/08/2022 2004   ECG 1 Filed at: 02/08/2022 2004   Age 1 Filed at: 02/08/2022 2004   Risk Factors 1 Filed at: 02/08/2022 2004   Troponin 0 Filed at: 02/08/2022 2004   HEART Score 3 Filed at: 02/08/2022 2004                      SBIRT 22yo+      Most Recent Value   SBIRT (23 yo +)    In order to provide better care to our patients, we are screening all of our patients for alcohol and drug use  Would it be okay to ask you these screening questions? No Filed at: 02/07/2022 1555                [facility]  Number of Diagnoses or Management Options  Chest pain, unspecified  Diagnosis management comments: Patient is a 57 y o  female who presented to the ED for chest pain  Patient's symptoms are suggestive of noncardiac chest pain  Exam without evidence of volume overload  EKG without signs of active ischemia  Presentation not consistent with acute PE (on eliquis, has not missed doses), pneumothorax, thoracic arotic dissection, cardiac effusion or tamponade  Given the timing of pain to ER, plan to send single troponin to evaluate for NSTEMI  Plan: Labs, troponin, EKG, CXR, serial reassessment             Portions of the record may have been created with voice recognition software  Occasional wrong word or "sound a like" substitutions may have occurred due to the inherent limitations of voice recognition software  Read the chart carefully and recognize, using context, where substitutions have occurred         Amount and/or Complexity of Data Reviewed  Clinical lab tests: ordered and reviewed  Tests in the radiology section of CPT®: ordered and reviewed  Tests in the medicine section of CPT®: ordered and reviewed  Review and summarize past medical records: yes  Independent visualization of images, tracings, or specimens: yes    Risk of Complications, Morbidity, and/or Mortality  Presenting problems: moderate  Diagnostic procedures: moderate    Patient Progress  Patient progress: stable      Disposition  Final diagnoses:   Chest pain, unspecified     Time reflects when diagnosis was documented in both MDM as applicable and the Disposition within this note     Time User Action Codes Description Comment    2/7/2022  5:13 PM Kym Bamberger Add [R07 9] Chest pain, unspecified       ED Disposition     ED Disposition Condition Date/Time Comment    Discharge Stable Mon Feb 7, 2022  5:13 PM Ruth Prieto discharge to home/self care              Follow-up Information     Follow up With Specialties Details Why Contact Info Additional Information    Mir Sage Nurse Practitioner   [phone] [facility]  [phone]       [address] [place] [address] 34 [facility] Emergency Department Emergency Medicine  As needed [address]  [address] [facility] Emergency Department, [address], [place], [place], [address]   [phone]          Discharge Medication List as of 2/7/2022  5:25 PM      CONTINUE these medications which have NOT CHANGED    Details   Ascorbic Acid (VITAMIN C) 100 MG CHEW Chew, Historical Med      atorvastatin (LIPITOR) 10 mg tablet Take 1 tablet (10 mg total) by mouth daily, Starting Tue 9/21/2021, Normal      Biotin 63839 MCG TABS Take by mouth daily  , Historical Med      Calcium Carbonate (CALCI-CHEW PO) Take 500 mg by mouth 2 (two) times a day, Historical Med      cholecalciferol (VITAMIN D3) 1,000 units tablet Take 5,000 Units by mouth 3 (three) times a day  , Historical Med      clobetasol (TEMOVATE) 0 05 % cream Apply to affected areas daily x 12 weeks, then twice weekly for maintenance, Historical Med      Cyanocobalamin (VITAMIN B 12 PO) Take 1,000 mcg by mouth, Historical Med      Eliquis 2 5 MG take 1 tablet by mouth twice a day, Normal      Ferrous Sulfate  (45 Fe) MG TBCR Take 1 tablet by mouth daily, Historical Med      fluticasone (FLONASE) 50 mcg/act nasal spray 1 spray into each nostril daily, Starting Thu 12/2/2021, Normal      guaifenesin-codeine (GUAIFENESIN AC) 100-10 MG/5ML liquid Take 5 mL by mouth 3 (three) times a day as needed for cough, Starting Tue 2/1/2022, Normal      Lysine HCl POWD Take by mouth, Historical Med      methocarbamol (ROBAXIN) 500 mg tablet Take 1 tablet (500 mg total) by mouth 2 (two) times a day, Starting Sat 1/15/2022, Normal      metoprolol succinate (TOPROL-XL) 25 mg 24 hr tablet Take 1 tablet (25 mg total) by mouth daily, Starting Tue 1/25/2022, Normal      Multiple Vitamin (MULTIVITAMIN) capsule Take 1 capsule by mouth daily 2 chewies daily, Historical Med      nystatin (MYCOSTATIN) powder Apply topically 2 (two) times a day, Starting Tue 1/7/2020, Normal      pantoprazole (PROTONIX) 20 mg tablet Take 1 tablet (20 mg total) by mouth daily, Starting Wed 1/5/2022, Normal      valACYclovir (VALTREX) 1,000 mg tablet Take 2 tablets (2,000 mg total) by mouth 2 (two) times a day for 1 day, Starting Thu 12/2/2021, Until Sat 1/29/2022, Normal         STOP taking these medications       predniSONE 50 mg tablet Comments:   Reason for Stopping:             No discharge procedures on file  PDMP Review       Value Time User    PDMP Reviewed  Yes 2/1/2022 10:05 AM Gibran Carpenter MD           ED Provider  Attending physically available and evaluated Dayna Osuna  MAKAYLA managed the patient along with the ED Attending      Electronically Signed by         Hermann Sorenson DO  02/08/22 2008

## 2022-02-07 NOTE — TELEPHONE ENCOUNTER
Patient was covid positive  Last Sunday, and thought she was getting better, her chest hurts so bad as does her ear  What is she to do?

## 2022-02-08 LAB
ATRIAL RATE: 63 BPM
P AXIS: 62 DEGREES
PR INTERVAL: 224 MS
QRS AXIS: 53 DEGREES
QRSD INTERVAL: 82 MS
QT INTERVAL: 400 MS
QTC INTERVAL: 409 MS
T WAVE AXIS: 10 DEGREES
VENTRICULAR RATE: 63 BPM

## 2022-02-08 PROCEDURE — 93010 ELECTROCARDIOGRAM REPORT: CPT | Performed by: INTERNAL MEDICINE

## 2022-02-13 NOTE — ED ATTENDING ATTESTATION
2/7/2022  I, Tayla Quijano DO, saw and evaluated the patient  I have discussed the patient with the resident/non-physician practitioner and agree with the resident's/non-physician practitioner's findings, Plan of Care, and MDM as documented in the resident's/non-physician practitioner's note, except where noted  All available labs and Radiology studies were reviewed  I was present for key portions of any procedure(s) performed by the resident/non-physician practitioner and I was immediately available to provide assistance  At this point I agree with the current assessment done in the Emergency Department  I have conducted an independent evaluation of this patient a history and physical is as follows:    60-year-old female presents for chest pain  Started about 10 days ago  Constant  Recently had a negative workup for chest pain  She recently tested positive for COVID approximately 10 days ago    EKG is normal   She is on Eliquis so low risk for PE otherwise normal exam   Will send troponin to evaluate for ACS or myocarditis, chest x-ray reassess for disposition    ED Course         Critical Care Time  Procedures

## 2022-02-20 NOTE — PROGRESS NOTES
[address]  [address] [place], [address]  [phone]                GI PROGRESS NOTE      NAME:   Rich Brown Sr.   :    1958   MRN:    [id number]     [facility]) I50.9    Heart failure ([facility] [address] I50.9    Ischemic stroke ([facility] [address] I63.9       Subjective:     Rich Brown Sr. is a 58 Results for orders placed or performed in visit on 07/29/21   Cardiac EP device report    Narrative    MDT DUAL PM/ ACTIVE SYSTEM IS MRI CONDITIONAL  CARELINK TRANSMISSION: BATTERY STATUS "12 YRS " AP 34%  0%  ALL AVAILABLE LEAD PARAMETERS WITHIN NORMAL LIMITS  7 FAST A/V NOTED; AVAIL EGRAMS PRESENT AS STACH  EF 67% (2019) & PT ON ELIQUIS  NORMAL DEVICE FUNCTION   NC         Current Outpatient Medications:     Ascorbic Acid (VITAMIN C) 100 MG CHEW, Chew, Disp: , Rfl:     atorvastatin (LIPITOR) 10 mg tablet, take 1 tablet by mouth once daily, Disp: 90 tablet, Rfl: 2    B Complex Vitamins (VITAMIN B COMPLEX PO), Take 1 capsule by mouth, Disp: , Rfl:     Biotin 10 MG TABS, Take by mouth daily , Disp: , Rfl:     Calcium Carbonate (CALCI-CHEW PO), Take 500 mg by mouth 2 (two) times a day, Disp: , Rfl:     cholecalciferol (VITAMIN D3) 1,000 units tablet, Take 5,000 Units by mouth 2 (two) times a day , Disp: , Rfl:     clobetasol (TEMOVATE) 0 05 % cream, Apply to affected areas daily x 12 weeks, then twice weekly for maintenance, Disp: , Rfl:     Eliquis 2 5 MG, Take 1 tablet (2 5 mg total) by mouth 2 (two) times a day, Disp: 180 tablet, Rfl: 0    Ferrous Sulfate  (45 Fe) MG TBCR, Take 1 tablet by mouth daily, Disp: , Rfl:     Lysine HCl POWD, Take by mouth, Disp: , Rfl:     Multiple Vitamin (MULTIVITAMIN) capsule, Take 1 capsule by mouth daily 2 chewies daily, Disp: , Rfl:     nystatin (MYCOSTATIN) powder, Apply topically 2 (two) times a day, Disp: 30 g, Rfl: 0    Thiamine HCl, Vitamin B1, 500 MG tablet, Take 500 mg by mouth daily (Patient not taking: Reported on 6/25/2021), Disp: , Rfl: Rosalio Barber  PCT          Medications: Reviewed    PMH/ reviewed - no change compared to H&P  Attending Physician: Andrae Sena MD   Date/Time:  2/20/2022

## 2022-02-22 ENCOUNTER — VBI (OUTPATIENT)
Dept: ADMINISTRATIVE | Facility: OTHER | Age: 62
End: 2022-02-22

## 2022-03-29 NOTE — PROGRESS NOTES
Assessment and Plan:   Tello Tyson is a 57 y o   female who presents as a Rheumatology consult referred by Marycruz Hallman PA-C for evaluation of osteoporosis  Has osteopenia on latest DEXA scan with fracture history of right fibula and ankle, and left wrist which are osteoporosis equivalent  Warrants medical treatment; patient is amenable to starting alendronate as first line therapy  Take 1,200mg of calcium daily  Take 5,000 units of Vit  D daily  Start once a week alendronate 70mg po; take with a full glass of water on an empty stomach, and do not lie down for 30 minutes after  Try wrist weights to help with weight-bearing exercises    Return to clinic in 6 months    Plan:  Diagnoses and all orders for this visit:    Closed torus fracture of proximal end of right fibula with routine healing, subsequent encounter  -     alendronate (FOSAMAX) 70 mg tablet; Take 1 tablet (70 mg total) by mouth every 7 days take with a full glass of water on an empty stomach, and do not lie down for 30 minutes after    Osteopenia, unspecified location  -     alendronate (FOSAMAX) 70 mg tablet; Take 1 tablet (70 mg total) by mouth every 7 days take with a full glass of water on an empty stomach, and do not lie down for 30 minutes after    Arthritis  -     Diclofenac Sodium (VOLTAREN) 1 %; Apply 2 g topically 4 (four) times a day    Follow-up plan: RTC in 6 months        HPI  Tello Tyson is a 57 y o   female who presents as a Rheumatology consult referred by Marycruz Hallman PA-C for evaluation of osteoporosis  Fractured right fibula and ankle last 1/2021 by slipping on ice; fractured left wrist in 2/2021; both were treated with casts  Review of Systems  Review of Systems   Constitutional: Negative for fatigue, fever and unexpected weight change  HENT: Negative for mouth sores  Respiratory: Negative for cough and shortness of breath  Cardiovascular: Negative for chest pain and leg swelling     Gastrointestinal: Negative for abdominal pain, constipation and diarrhea  Musculoskeletal: Positive for arthralgias  Negative for back pain, joint swelling and myalgias  Skin: Negative for color change and rash  Neurological: Negative for weakness  Hematological: Negative for adenopathy  Psychiatric/Behavioral: Negative for sleep disturbance  Reviewed and agree      Allergies  No Known Allergies    Home Medications    Current Outpatient Medications:     Ascorbic Acid (VITAMIN C) 100 MG CHEW, Chew, Disp: , Rfl:     atorvastatin (LIPITOR) 10 mg tablet, Take 1 tablet (10 mg total) by mouth daily, Disp: 90 tablet, Rfl: 0    Biotin 49682 MCG TABS, Take by mouth daily  , Disp: , Rfl:     Calcium Carbonate (CALCI-CHEW PO), Take 500 mg by mouth 2 (two) times a day, Disp: , Rfl:     cholecalciferol (VITAMIN D3) 1,000 units tablet, Take 5,000 Units by mouth 3 (three) times a day  , Disp: , Rfl:     clobetasol (TEMOVATE) 0 05 % cream, Apply to affected areas daily x 12 weeks, then twice weekly for maintenance, Disp: , Rfl:     Cyanocobalamin (VITAMIN B 12 PO), Take 1,000 mcg by mouth, Disp: , Rfl:     Eliquis 2 5 MG, take 1 tablet by mouth twice a day, Disp: 60 tablet, Rfl: 3    Ferrous Sulfate  (45 Fe) MG TBCR, Take 1 tablet by mouth daily, Disp: , Rfl:     fluticasone (FLONASE) 50 mcg/act nasal spray, 1 spray into each nostril daily, Disp: 18 2 mL, Rfl: 1    guaifenesin-codeine (GUAIFENESIN AC) 100-10 MG/5ML liquid, Take 5 mL by mouth 3 (three) times a day as needed for cough (Patient not taking: Reported on 4/6/2022 ), Disp: 180 mL, Rfl: 0    methocarbamol (ROBAXIN) 500 mg tablet, Take 1 tablet (500 mg total) by mouth 2 (two) times a day, Disp: 20 tablet, Rfl: 0    metoprolol succinate (TOPROL-XL) 25 mg 24 hr tablet, Take 1 tablet (25 mg total) by mouth daily, Disp: 90 tablet, Rfl: 3    Multiple Vitamin (MULTIVITAMIN) capsule, Take 1 capsule by mouth daily 2 chewies daily, Disp: , Rfl:     nystatin (MYCOSTATIN) powder, Apply topically 2 (two) times a day, Disp: 30 g, Rfl: 0    pantoprazole (PROTONIX) 20 mg tablet, Take 1 tablet (20 mg total) by mouth daily, Disp: 30 tablet, Rfl: 3    alendronate (FOSAMAX) 70 mg tablet, Take 1 tablet (70 mg total) by mouth every 7 days take with a full glass of water on an empty stomach, and do not lie down for 30 minutes after, Disp: 4 tablet, Rfl: 6    Diclofenac Sodium (VOLTAREN) 1 %, Apply 2 g topically 4 (four) times a day, Disp: 100 g, Rfl: 6    Lysine HCl POWD, Take by mouth (Patient not taking: Reported on 2022 ), Disp: , Rfl:     traZODone (DESYREL) 50 mg tablet, Take 1 tablet (50 mg total) by mouth daily at bedtime as needed for sleep, Disp: 30 tablet, Rfl: 0    valACYclovir (VALTREX) 1,000 mg tablet, Take 2 tablets (2,000 mg total) by mouth 2 (two) times a day for 1 day, Disp: 30 tablet, Rfl: 0    Past Medical History  Past Medical History:   Diagnosis Date    Anemia     iron def    Arthritis     knees    Carpal tunnel syndrome of right wrist     Chest pain     Colon polyp     Depression     at times    DVT (deep venous thrombosis) ([facility] [address] )     Dysphagia     Esophageal reflux 2011    Grade 1 out of 6 intensity murmur 2019    Heart murmur     History of transfusion 1980    Pacemaker 2018    Pneumonia     age 15    Psychiatric disorder     Trigger finger, left        Past Surgical History   Past Surgical History:   Procedure Laterality Date    BACK SURGERY      CARDIAC PACEMAKER PLACEMENT  2018    CERVICAL [address] SURGERY  2009    PLATES & SCREWS     SECTION      X2    COLONOSCOPY      GASTRIC BYPASS  2017    LAP RINYGB SURGERY    KNEE ARTHROSCOPY      KNEE SURGERY Bilateral     KNEE SURGERY Left 2020    OTHER SURGICAL HISTORY      ARM SURGERIES    IL INCISE FINGER TENDON SHEATH Left 2018    Procedure: LONG TRIGGER FINGER RELEASE;  Surgeon: Lamar Goodson MD;  Location: [facility]; Service: Orthopedics    MT WRIST [place] LIG Right 9/18/2018    Procedure: ENDOSCOPIC CARPAL TUNNEL RELEASE;  Surgeon: Ivone Allison MD;  Location: [facility];  Service: Orthopedics    TONSILLECTOMY      UPPER GASTROINTESTINAL ENDOSCOPY         Family History    Family History   Problem Relation Age of Onset    Stroke Mother     Alzheimer's disease Mother     Arthritis Mother     Coronary artery disease Mother     Breast cancer Mother 77    Cancer Mother     Heart disease Mother     Hypertension Father     Gout Father     Diabetes type II Father     Coronary artery disease Father     Heart disease Father     No Known Problems Sister     No Known Problems Daughter     No Known Problems Maternal Grandmother     No Known Problems Maternal Grandfather     No Known Problems Paternal Grandmother     No Known Problems Paternal Grandfather     Prostate cancer Brother     No Known Problems Sister     No Known Problems Sister     No Known Problems Maternal Aunt     No Known Problems Maternal Aunt     No Known Problems Maternal Aunt     No Known Problems Maternal Aunt     Cancer Maternal Aunt     No Known Problems Paternal Aunt     No Known Problems Paternal Aunt    2 sisters - osteoporosis  Another sister - RA      Social History  Occupation: CNA, does home care  Social History     Substance and Sexual Activity   Alcohol Use Yes    Alcohol/week: 2 0 standard drinks    Types: 1 Glasses of wine, 1 Standard drinks or equivalent per week    Comment: very rare social     Social History     Substance and Sexual Activity   Drug Use No     Social History     Tobacco Use   Smoking Status Former Smoker    Packs/day: 0 25    Types: Cigarettes   Smokeless Tobacco Never Used       Objective:  Vitals:    03/30/22 1303   Weight: 80 7 kg (178 lb)   Height: 5' 4" (1 626 m)       Physical Exam  Constitutional:       General: She is not in acute distress    HENT:      Head: Normocephalic and atraumatic  Eyes:      Conjunctiva/sclera: Conjunctivae normal    Cardiovascular:      Rate and Rhythm: Normal rate and regular rhythm  Heart sounds: S1 normal and S2 normal  No friction rub  Pulmonary:      Effort: Pulmonary effort is normal  No respiratory distress  Breath sounds: Normal breath sounds  No wheezing, rhonchi or rales  Musculoskeletal:         General: Tenderness present  Cervical back: Neck supple  Comments: Right medial knee tenderness   Skin:     General: Skin is warm and dry  Coloration: Skin is not pale  Findings: No rash  Neurological:      Mental Status: She is alert  Mental status is at baseline  Psychiatric:         Mood and Affect: Mood normal          Behavior: Behavior normal        Reviewed labs and imaging  Imaging:   CXR 2/7/22  No acute cardiopulmonary disease  CT abdomen pelvis w contrast 1/15/22  No acute findings in the abdomen or pelvis  CT cervical spine 6/18/21  No cervical spine fracture or traumatic malalignment  Stable ACDF C4-C6  Multilevel cervical degenerative changes  XR right ankle 4/23/21  Congruent ankle mortise  No acute displaced fracture    DXA scan 4/14/21   RESULTS:   LUMBAR SPINE:  L1-L4:  BMD 1 026 gm/cm2  T-score -0 2  Z-score 1 4     LEFT TOTAL HIP:  BMD 0 784 gm/cm2  T-score -1 3  Z-score -0 2     LEFT FEMORAL NECK:  BMD 0 690 gm/cm2  T-score -1 4  Z-score -0 1     IMPRESSION:  1  Based on the [facility] classification, the T-score of -1 4 in the left hip is consistent with LOW BONE MINERAL DENSITY (OSTEOPENIA)        Labs:   Admission on 02/07/2022, Discharged on 02/07/2022   Component Date Value Ref Range Status    WBC 02/07/2022 8 05  4 31 - 10 16 Thousand/uL Final    RBC 02/07/2022 3 77* 3 81 - 5 12 Million/uL Final    Hemoglobin 02/07/2022 12 2  11 5 - 15 4 g/dL Final    Hematocrit 02/07/2022 38 6  34 8 - 46 1 % Final    MCV 02/07/2022 102* 82 - 98 fL Final    MCH 02/07/2022 32 4  26 8 - 34 3 pg Final  MCHC 02/07/2022 31 6  31 4 - 37 4 g/dL Final    RDW 02/07/2022 12 6  11 6 - 15 1 % Final    MPV 02/07/2022 11 2  8 9 - 12 7 fL Final    Platelets 88/40/5800 253  149 - 390 Thousands/uL Final    nRBC 02/07/2022 0  /100 WBCs Final    Neutrophils Relative 02/07/2022 62  43 - 75 % Final    Immat GRANS % 02/07/2022 1  0 - 2 % Final    Lymphocytes Relative 02/07/2022 28  14 - 44 % Final    Monocytes Relative 02/07/2022 6  4 - 12 % Final    Eosinophils Relative 02/07/2022 2  0 - 6 % Final    Basophils Relative 02/07/2022 1  0 - 1 % Final    Neutrophils Absolute 02/07/2022 5 07  1 85 - 7 62 Thousands/µL Final    Immature Grans Absolute 02/07/2022 0 04  0 00 - 0 20 Thousand/uL Final    Lymphocytes Absolute 02/07/2022 2 27  0 60 - 4 47 Thousands/µL Final    Monocytes Absolute 02/07/2022 0 49  0 17 - 1 22 Thousand/µL Final    Eosinophils Absolute 02/07/2022 0 13  0 00 - 0 61 Thousand/µL Final    Basophils Absolute 02/07/2022 0 05  0 00 - 0 10 Thousands/µL Final    hs TnI 0hr 02/07/2022 3  "Refer to ACS Flowchart"- see link ng/L Final    Comment:                                              Initial (time 0) result  If >=50 ng/L, Myocardial injury suggested ;  Type of myocardial injury and treatment strategy  to be determined  If 5-49 ng/L, a delta result at 2 hours and or 4 hours will be needed to further evaluate  If <4 ng/L, and chest pain has been >3 hours since onset, patient may qualify for discharge based on the HEART score in the ED  If <5 ng/L and <3hours since onset of chest pain, a delta result at 2 hours will be needed to further evaluate  Second Troponin (time 2 hours)  If calculated delta >= 20 ng/L,  Myocardial injury suggested ; Type of myocardial injury and treatment strategy to be determined  If 5-49 ng/L and the calculated delta is 5-19 ng/L, consult medical service for evaluation    Continue evaluation for ischemia on ecg and other possible etiology and repeat hs troponin at 4 hours   If delta is <5 ng/L at 2 hours, consider discharge based on risk stratification via the HEART score (if in ED), or GAL                            risk score in IP/Observation   Sodium 02/07/2022 143  136 - 145 mmol/L Final    Potassium 02/07/2022 3 2* 3 5 - 5 3 mmol/L Final    Chloride 02/07/2022 109* 100 - 108 mmol/L Final    CO2 02/07/2022 29  21 - 32 mmol/L Final    ANION GAP 02/07/2022 5  4 - 13 mmol/L Final    BUN 02/07/2022 17  5 - 25 mg/dL Final    Creatinine 02/07/2022 0 66  0 60 - 1 30 mg/dL Final    Standardized to IDMS reference method    Glucose 02/07/2022 98  65 - 140 mg/dL Final    If the patient is fasting, the ADA then defines impaired fasting glucose as > 100 mg/dL and diabetes as > or equal to 123 mg/dL  Specimen collection should occur prior to Sulfasalazine administration due to the potential for falsely depressed results  Specimen collection should occur prior to Sulfapyridine administration due to the potential for falsely elevated results   Calcium 02/07/2022 9 1  8 3 - 10 1 mg/dL Final    AST 02/07/2022 26  5 - 45 U/L Final    Specimen collection should occur prior to Sulfasalazine administration due to the potential for falsely depressed results   ALT 02/07/2022 34  12 - 78 U/L Final    Specimen collection should occur prior to Sulfasalazine and/or Sulfapyridine administration due to the potential for falsely depressed results   Alkaline Phosphatase 02/07/2022 90  46 - 116 U/L Final    Total Protein 02/07/2022 7 2  6 4 - 8 2 g/dL Final    Albumin 02/07/2022 3 8  3 5 - 5 0 g/dL Final    Total Bilirubin 02/07/2022 1 52* 0 20 - 1 00 mg/dL Final    Use of this assay is not recommended for patients undergoing treatment with eltrombopag due to the potential for falsely elevated results      eGFR 02/07/2022 94  ml/min/1 73sq m Final    Ventricular Rate 02/07/2022 63  BPM Final    Atrial Rate 02/07/2022 63  BPM Final    NC Interval 02/07/2022 224  ms Final    QRSD Interval 02/07/2022 82  ms Final    QT Interval 02/07/2022 400  ms Final    QTC Interval 02/07/2022 409  ms Final    P Axis 02/07/2022 62  degrees Final    QRS Axis 02/07/2022 53  degrees Final    T Wave [place] 02/07/2022 10  degrees Final   Orders Only on 01/30/2022   Component Date Value Ref Range Status    SARS-CoV-2 01/30/2022 Positive* Negative Final   Admission on 01/29/2022, Discharged on 01/29/2022   Component Date Value Ref Range Status    hs TnI 0hr 01/29/2022 2  "Refer to ACS Flowchart"- see link ng/L Final    Comment:                                              Initial (time 0) result  If >=50 ng/L, Myocardial injury suggested ;  Type of myocardial injury and treatment strategy  to be determined  If 5-49 ng/L, a delta result at 2 hours and or 4 hours will be needed to further evaluate  If <4 ng/L, and chest pain has been >3 hours since onset, patient may qualify for discharge based on the HEART score in the ED  If <5 ng/L and <3hours since onset of chest pain, a delta result at 2 hours will be needed to further evaluate  Second Troponin (time 2 hours)  If calculated delta >= 20 ng/L,  Myocardial injury suggested ; Type of myocardial injury and treatment strategy to be determined  If 5-49 ng/L and the calculated delta is 5-19 ng/L, consult medical service for evaluation  Continue evaluation for ischemia on ecg and other possible etiology and repeat hs troponin at 4 hours  If delta is <5 ng/L at 2 hours, consider discharge based on risk stratification via the HEART score (if in ED), or GAL                            risk score in IP/Observation      Ventricular Rate 01/29/2022 75  BPM Final    Atrial Rate 01/29/2022 75  BPM Final    [place] Interval 01/29/2022 172  ms Final    QRSD Interval 01/29/2022 78  ms Final    QT Interval 01/29/2022 364  ms Final    QTC Interval 01/29/2022 406  ms Final    P Axis 01/29/2022 54  degrees Final    QRS Axis 01/29/2022 40  degrees Final    T Wave [place] 01/29/2022 39  degrees Final   Admission on 01/15/2022, Discharged on 01/15/2022   Component Date Value Ref Range Status    WBC 01/15/2022 7 94  4 31 - 10 16 Thousand/uL Final    RBC 01/15/2022 3 76* 3 81 - 5 12 Million/uL Final    Hemoglobin 01/15/2022 12 1  11 5 - 15 4 g/dL Final    Hematocrit 01/15/2022 37 1  34 8 - 46 1 % Final    MCV 01/15/2022 99* 82 - 98 fL Final    MCH 01/15/2022 32 2  26 8 - 34 3 pg Final    MCHC 01/15/2022 32 6  31 4 - 37 4 g/dL Final    RDW 01/15/2022 12 6  11 6 - 15 1 % Final    MPV 01/15/2022 10 9  8 9 - 12 7 fL Final    Platelets 78/67/3789 235  149 - 390 Thousands/uL Final    nRBC 01/15/2022 0  /100 WBCs Final    Neutrophils Relative 01/15/2022 59  43 - 75 % Final    Immat GRANS % 01/15/2022 0  0 - 2 % Final    Lymphocytes Relative 01/15/2022 30  14 - 44 % Final    Monocytes Relative 01/15/2022 7  4 - 12 % Final    Eosinophils Relative 01/15/2022 3  0 - 6 % Final    Basophils Relative 01/15/2022 1  0 - 1 % Final    Neutrophils Absolute 01/15/2022 4 64  1 85 - 7 62 Thousands/µL Final    Immature Grans Absolute 01/15/2022 0 03  0 00 - 0 20 Thousand/uL Final    Lymphocytes Absolute 01/15/2022 2 38  0 60 - 4 47 Thousands/µL Final    Monocytes Absolute 01/15/2022 0 56  0 17 - 1 22 Thousand/µL Final    Eosinophils Absolute 01/15/2022 0 26  0 00 - 0 61 Thousand/µL Final    Basophils Absolute 01/15/2022 0 07  0 00 - 0 10 Thousands/µL Final    Sodium 01/15/2022 139  136 - 145 mmol/L Final    Potassium 01/15/2022 3 9  3 5 - 5 3 mmol/L Final    Chloride 01/15/2022 109* 100 - 108 mmol/L Final    CO2 01/15/2022 26  21 - 32 mmol/L Final    ANION GAP 01/15/2022 4  4 - 13 mmol/L Final    BUN 01/15/2022 13  5 - 25 mg/dL Final    Creatinine 01/15/2022 0 65  0 60 - 1 30 mg/dL Final    Standardized to IDMS reference method    Glucose 01/15/2022 84  65 - 140 mg/dL Final    If the patient is fasting, the ADA then defines impaired fasting glucose as > 100 mg/dL and diabetes as > or equal to 123 mg/dL  Specimen collection should occur prior to Sulfasalazine administration due to the potential for falsely depressed results  Specimen collection should occur prior to Sulfapyridine administration due to the potential for falsely elevated results   Calcium 01/15/2022 9 7  8 3 - 10 1 mg/dL Final    AST 01/15/2022 30  5 - 45 U/L Final    Specimen collection should occur prior to Sulfasalazine administration due to the potential for falsely depressed results   ALT 01/15/2022 40  12 - 78 U/L Final    Specimen collection should occur prior to Sulfasalazine and/or Sulfapyridine administration due to the potential for falsely depressed results   Alkaline Phosphatase 01/15/2022 87  46 - 116 U/L Final    Total Protein 01/15/2022 7 7  6 4 - 8 2 g/dL Final    Albumin 01/15/2022 3 9  3 5 - 5 0 g/dL Final    Total Bilirubin 01/15/2022 1 06* 0 20 - 1 00 mg/dL Final    Use of this assay is not recommended for patients undergoing treatment with eltrombopag due to the potential for falsely elevated results      eGFR 01/15/2022 95  ml/min/1 73sq m Final    Color, UA 01/15/2022 Yellow   Final    Clarity, UA 01/15/2022 Clear   Final    Specific Gravity, UA 01/15/2022 <=1 005  1 003 - 1 030 Final    pH, UA 01/15/2022 5 5  4 5, 5 0, 5 5, 6 0, 6 5, 7 0, 7 5, 8 0 Final    Leukocytes, UA 01/15/2022 Negative  Negative Final    Nitrite, UA 01/15/2022 Negative  Negative Final    Protein, UA 01/15/2022 Negative  Negative mg/dl Final    Glucose, UA 01/15/2022 Negative  Negative mg/dl Final    Ketones, UA 01/15/2022 Negative  Negative mg/dl Final    Urobilinogen, UA 01/15/2022 0 2  0 2, 1 0 E U /dl E U /dl Final    Bilirubin, UA 01/15/2022 Negative  Negative Final    Blood, UA 01/15/2022 Negative   Final    Lipase 01/15/2022 422* 73 - 393 u/L Final    EXT PREG TEST UR (Ref: Negative) 01/15/2022 Negative   Final    Control 01/15/2022 Valid   Final   Office Visit on 01/13/2022 Component Date Value Ref Range Status    LEUKOCYTE ESTERASE,UA 01/13/2022 trace   Final    NITRITE,UA 01/13/2022 negative   Final    SL AMB POCT UROBILINOGEN 01/13/2022 0 2   Final    POCT URINE PROTEIN 01/13/2022 negative   Final     PH,UA 01/13/2022 5 0   Final    BLOOD,UA 01/13/2022 negative   Final    SPECIFIC GRAVITY,UA 01/13/2022 1 005   Final    KETONES,UA 01/13/2022 negative   Final    BILIRUBIN,UA 01/13/2022 negative   Final    GLUCOSE, UA 01/13/2022 negative   Final     COLOR,UA 01/13/2022 hazy   Final    CLARITY,UA 01/13/2022 yellow   Final    Urine Culture 01/13/2022 10,000-19,000 cfu/ml    Final    Mixed Contaminants X3   Office Visit on 01/07/2022   Component Date Value Ref Range Status    SARS-CoV-2 01/07/2022 Negative  Negative Final   Appointment on 01/07/2022   Component Date Value Ref Range Status    Ferritin 01/07/2022 49  8 - 388 ng/mL Final    Folate 01/07/2022 >20 0* 3 1 - 17 5 ng/mL Final    E521    Iron Saturation 01/07/2022 24  15 - 50 % Final    TIBC 01/07/2022 423  250 - 450 ug/dL Final    Iron 01/07/2022 102  50 - 170 ug/dL Final    Patients treated with metal-binding drugs (ie  Deferoxamine) may have depressed iron values   PTH 01/07/2022 57 3  18 4 - 80 1 pg/mL Final    Zinc 01/07/2022 75  44 - 115 ug/dL Final                                    Detection Limit = 5    Vit D, 25-Hydroxy 01/07/2022 64 8  30 0 - 100 0 ng/mL Final    Vitamin B-12 01/07/2022 837  100 - 900 pg/mL Final    Vitamin B1, Whole Blood 01/07/2022 122 6  66 5 - 200 0 nmol/L Final    Vitamin A 01/07/2022 49 8  22 0 - 69 5 ug/dL Final    Reference intervals for vitamin A determined from [facility] internal  studies  Individuals with vitamin A less than 20 ug/dL are considered  vitamin A deficient and those with serum concentrations less than  10 ug/dL are considered severely deficient    This test was developed and its performance characteristics  determined by [facility]  It has not been cleared or approved  by the Food and Drug Administration     Appointment on 01/05/2022   Component Date Value Ref Range Status    WBC 01/05/2022 6 19  4 31 - 10 16 Thousand/uL Final    RBC 01/05/2022 3 84  3 81 - 5 12 Million/uL Final    Hemoglobin 01/05/2022 12 3  11 5 - 15 4 g/dL Final    Hematocrit 01/05/2022 38 7  34 8 - 46 1 % Final    MCV 01/05/2022 101* 82 - 98 fL Final    MCH 01/05/2022 32 0  26 8 - 34 3 pg Final    MCHC 01/05/2022 31 8  31 4 - 37 4 g/dL Final    RDW 01/05/2022 12 4  11 6 - 15 1 % Final    MPV 01/05/2022 11 6  8 9 - 12 7 fL Final    Platelets 58/84/1814 214  149 - 390 Thousands/uL Final    nRBC 01/05/2022 0  /100 WBCs Final    Neutrophils Relative 01/05/2022 59  43 - 75 % Final    Immat GRANS % 01/05/2022 0  0 - 2 % Final    Lymphocytes Relative 01/05/2022 30  14 - 44 % Final    Monocytes Relative 01/05/2022 7  4 - 12 % Final    Eosinophils Relative 01/05/2022 3  0 - 6 % Final    Basophils Relative 01/05/2022 1  0 - 1 % Final    Neutrophils Absolute 01/05/2022 3 60  1 85 - 7 62 Thousands/µL Final    Immature Grans Absolute 01/05/2022 0 02  0 00 - 0 20 Thousand/uL Final    Lymphocytes Absolute 01/05/2022 1 83  0 60 - 4 47 Thousands/µL Final    Monocytes Absolute 01/05/2022 0 45  0 17 - 1 22 Thousand/µL Final    Eosinophils Absolute 01/05/2022 0 21  0 00 - 0 61 Thousand/µL Final    Basophils Absolute 01/05/2022 0 08  0 00 - 0 10 Thousands/µL Final    Sodium 01/05/2022 142  136 - 145 mmol/L Final    Potassium 01/05/2022 3 9  3 5 - 5 3 mmol/L Final    Chloride 01/05/2022 109* 100 - 108 mmol/L Final    CO2 01/05/2022 28  21 - 32 mmol/L Final    ANION GAP 01/05/2022 5  4 - 13 mmol/L Final    BUN 01/05/2022 11  5 - 25 mg/dL Final    Creatinine 01/05/2022 0 70  0 60 - 1 30 mg/dL Final    Standardized to IDMS reference method    Glucose, Fasting 01/05/2022 116* 65 - 99 mg/dL Final    Specimen collection should occur prior to Sulfasalazine administration due to the potential for falsely depressed results  Specimen collection should occur prior to Sulfapyridine administration due to the potential for falsely elevated results   Calcium 01/05/2022 9 7  8 3 - 10 1 mg/dL Final    AST 01/05/2022 31  5 - 45 U/L Final    Specimen collection should occur prior to Sulfasalazine administration due to the potential for falsely depressed results   ALT 01/05/2022 35  12 - 78 U/L Final    Specimen collection should occur prior to Sulfasalazine and/or Sulfapyridine administration due to the potential for falsely depressed results   Alkaline Phosphatase 01/05/2022 94  46 - 116 U/L Final    Total Protein 01/05/2022 7 5  6 4 - 8 2 g/dL Final    Albumin 01/05/2022 3 9  3 5 - 5 0 g/dL Final    Total Bilirubin 01/05/2022 1 06* 0 20 - 1 00 mg/dL Final    Use of this assay is not recommended for patients undergoing treatment with eltrombopag due to the potential for falsely elevated results   eGFR 01/05/2022 93  ml/min/1 73sq m Final    Cholesterol 01/05/2022 205* See Comment mg/dL Final    Cholesterol:         Pediatric <18 Years        Desirable          <170 mg/dL      Borderline High    170-199 mg/dL      High               >=200 mg/dL        Adult >=18 Years            Desirable         <200 mg/dL      Borderline High   200-239 mg/dL      High              >239 mg/dL      Triglycerides 01/05/2022 77  See Comment mg/dL Final    Triglyceride:     0-9Y            <75mg/dL     10Y-17Y         <90 mg/dL       >=18Y     Normal          <150 mg/dL     Borderline High 150-199 mg/dL     High            200-499 mg/dL        Very High       >499 mg/dL    Specimen collection should occur prior to N-Acetylcysteine or Metamizole administration due to the potential for falsely depressed results   HDL, Direct 01/05/2022 92  >=50 mg/dL Final    Specimen collection should occur prior to Metamizole administration due to the potential for falsley depressed results      LDL Calculated 01/05/2022 98  0 - 100 mg/dL Final    LDL Cholesterol:     Optimal           <100 mg/dl     Near Optimal      100-129 mg/dl     Above Optimal       Borderline High 130-159 mg/dl       High            160-189 mg/dl       Very High       >189 mg/dl         This screening LDL is a calculated result  It does not have the accuracy of the Direct Measured LDL in the monitoring of patients with hyperlipidemia and/or statin therapy  Direct Measure LDL ([id number]) must be ordered separately in these patients   TSH [address] [place] [address] 01/05/2022 1 210  0 358 - 3 740 uIU/mL Final    The recommended reference ranges for TSH during pregnancy are as follows:   First trimester 0 1 to 2 5 uIU/mL   Second trimester  0 2 to 3 0 uIU/mL   Third trimester 0 3 to 3 0 uIU/m    Note: Normal ranges may not apply to patients who are transgender, non-binary, or whose legal sex, sex at birth, and gender identity differ

## 2022-03-30 ENCOUNTER — OFFICE VISIT (OUTPATIENT)
Dept: RHEUMATOLOGY | Facility: CLINIC | Age: 62
End: 2022-03-30
Payer: COMMERCIAL

## 2022-03-30 VITALS — BODY MASS INDEX: 30.39 KG/M2 | HEIGHT: 64 IN | WEIGHT: 178 LBS

## 2022-03-30 DIAGNOSIS — M85.80 OSTEOPENIA, UNSPECIFIED LOCATION: ICD-10-CM

## 2022-03-30 DIAGNOSIS — S82.811D CLOSED TORUS FRACTURE OF PROXIMAL END OF RIGHT FIBULA WITH ROUTINE HEALING, SUBSEQUENT ENCOUNTER: Primary | ICD-10-CM

## 2022-03-30 DIAGNOSIS — M19.90 ARTHRITIS: ICD-10-CM

## 2022-03-30 PROCEDURE — 3008F BODY MASS INDEX DOCD: CPT | Performed by: FAMILY MEDICINE

## 2022-03-30 PROCEDURE — 99204 OFFICE O/P NEW MOD 45 MIN: CPT | Performed by: INTERNAL MEDICINE

## 2022-03-30 RX ORDER — ALENDRONATE SODIUM 70 MG/1
70 TABLET ORAL
Qty: 4 TABLET | Refills: 6 | Status: SHIPPED | OUTPATIENT
Start: 2022-03-30 | End: 2022-07-20 | Stop reason: ALTCHOICE

## 2022-03-30 NOTE — PATIENT INSTRUCTIONS
Take 1,200mg of calcium daily  Take 5,000 units of Vit  D daily  Start once a week alendronate; take with a full glass of water on an empty stomach, and do not lie down for 30 minutes after  Try wrist weights to help with weight-bearing exercises  Try diclofenac (Voltaren) gel as needed for joint pain    Return to clinic in 6 months    Osteoporosis   AMBULATORY CARE:   Osteoporosis  is a long-term medical condition that causes your bones to become weak, brittle, and more likely to fracture  Osteoporosis occurs when your body absorbs more bone than it makes  It is also caused by a lack of calcium and estrogen (female hormone)  Common symptoms include the following: You may not have any signs or symptoms  You may break a bone after a muscle strain, bump, or fall  A break usually occurs in the hip, spine, or wrist  A collapsed vertebra (bone in your spine) may cause severe back pain or loss of height from bent posture  Call your doctor if:   · You have severe pain  · You have increasing pain after a fall  · You have pain when you do your daily activities  · You have questions or concerns about your condition or care  Diagnosis of osteoporosis:   · Blood and urine tests  measure your calcium, vitamin D, and estrogen levels  · An x-ray or CT  may show thinned bones or a fracture  You may be given contrast liquid to help the bones show up better in the pictures  Tell the healthcare provider if you have ever had an allergic reaction to contrast liquid  Do not enter the MRI room with anything metal  Metal can cause serious injury  Tell the healthcare provider if you have any metal in or on your body  · A bone density test  compares your bone thickness with what is expected for someone of your age, gender, and ethnicity  Treatment for osteoporosis  may include medicines to prevent bone loss, build new bone, and increase estrogen   These medicines help prevent fractures and may be given as a pill or injection  Ask your healthcare provider for more information on these medicines  Prevent bone loss:       · Eat healthy foods that are high in calcium  This helps keep your bones strong  Good sources of calcium are milk, cheese, broccoli, tofu, almonds, and canned salmon and sardines  · Increase your vitamin D intake  Vitamin D is in fish oils, some vegetables, and fortified milk, cereal, and bread  Vitamin D is also formed in the skin when it is exposed to the sun  Ask your healthcare provider how much sunlight is safe for you  · Drink liquids as directed  Ask your healthcare provider how much liquid to drink each day and which liquids are best for you  Do not have alcohol or caffeine  They decrease bone mineral density, which can weaken your bones  · Exercise regularly  Ask your healthcare provider about the best exercise plan for you  Weight bearing exercise for 30 minutes, 3 times a week can help build and strengthen bone  · Do not smoke  Nicotine and other chemicals in cigarettes and cigars can cause lung damage  Ask your healthcare provider for information if you currently smoke and need help to quit  E-cigarettes or smokeless tobacco still contain nicotine  Talk to your healthcare provider before you use these products  · Go to physical therapy as directed  A physical therapist teaches you exercises to help improve movement and muscle strength  Follow up with your doctor as directed:  Write down your questions so you remember to ask them during your visits  © Copyright [employer] 2022 Information is for End User's use only and may not be sold, redistributed or otherwise used for commercial purposes  All illustrations and images included in CareNotes® are the copyrighted property of [employer]  or [facility]   The above information is an  only  It is not intended as medical advice for individual conditions or treatments   Talk to your doctor, nurse or pharmacist before following any medical regimen to see if it is safe and effective for you

## 2022-04-05 ENCOUNTER — TELEPHONE (OUTPATIENT)
Dept: OBGYN CLINIC | Facility: HOSPITAL | Age: 62
End: 2022-04-05

## 2022-04-05 NOTE — TELEPHONE ENCOUNTER
Dr Valdo Howard    Patient is experiencing chest pain, shes not sure if she's getting sick or if its from the medication  She took a Covid test and its negative  She said this pain started when she started the med, nothing else is different  Medication  alendronate (FOSAMAX) 70 mg tablet [[id number]]    alendronate (FOSAMAX) 70 mg tablet [[id number]]     Order Details  Dose: 70 mg Route: Oral Frequency: Every 7 days   Dispense Quantity: 4 tablet Refills: 6          Sig: Take 1 tablet (70 mg total) by mouth every 7 days take with a full glass of water on an empty stomach, and do not lie down for 30 minutes after         Start Date: 03/30/22 End Date: --   Written Date: 03/30/22 Expiration Date: 03/30/23       Diagnosis Association: Closed torus fracture of proximal end of right fibula with routine healing, subsequent encounter (A80 390D);  Osteopenia, unspecified location (M85 45)

## 2022-04-06 ENCOUNTER — OFFICE VISIT (OUTPATIENT)
Dept: FAMILY MEDICINE CLINIC | Facility: CLINIC | Age: 62
End: 2022-04-06
Payer: COMMERCIAL

## 2022-04-06 VITALS
TEMPERATURE: 97.9 F | WEIGHT: 178 LBS | HEIGHT: 64 IN | OXYGEN SATURATION: 98 % | HEART RATE: 96 BPM | BODY MASS INDEX: 30.39 KG/M2 | SYSTOLIC BLOOD PRESSURE: 120 MMHG | RESPIRATION RATE: 16 BRPM | DIASTOLIC BLOOD PRESSURE: 58 MMHG

## 2022-04-06 DIAGNOSIS — G47.00 INSOMNIA, UNSPECIFIED TYPE: Primary | ICD-10-CM

## 2022-04-06 DIAGNOSIS — H65.03 NON-RECURRENT ACUTE SEROUS OTITIS MEDIA OF BOTH EARS: ICD-10-CM

## 2022-04-06 PROCEDURE — 99214 OFFICE O/P EST MOD 30 MIN: CPT | Performed by: NURSE PRACTITIONER

## 2022-04-06 PROCEDURE — 1036F TOBACCO NON-USER: CPT | Performed by: NURSE PRACTITIONER

## 2022-04-06 PROCEDURE — 3008F BODY MASS INDEX DOCD: CPT | Performed by: NURSE PRACTITIONER

## 2022-04-06 RX ORDER — TRAZODONE HYDROCHLORIDE 50 MG/1
50 TABLET ORAL
Qty: 30 TABLET | Refills: 0 | Status: SHIPPED | OUTPATIENT
Start: 2022-04-06 | End: 2022-04-29

## 2022-04-06 NOTE — PROGRESS NOTES
Assessment/Plan:    Non-recurrent acute serous otitis media of both ears  - Discussed with patient that there is currently no indication for antibiotics  - Recommended over the counter allergy medication    - Contact office if symptoms worsen  Insomnia  - Not well controlled  - Discussed proper sleep hygiene  - Prescription sent for Trazodone  Advised of side effects   - Will continue to monitor  Diagnoses and all orders for this visit:    Insomnia, unspecified type  -     traZODone (DESYREL) 50 mg tablet; Take 1 tablet (50 mg total) by mouth daily at bedtime as needed for sleep    Non-recurrent acute serous otitis media of both ears        Subjective:      Patient ID: Dagoberto Leyden is a 57 y o  female  Patient presents today with complaints of right ear pain that has been occurring for the past few days  She reports she has a cough "off and on " She denies any fever, chills, congestion, or shortness of breath  She has tried nothing over the counter  She reports that she cant take pseudofed because it makes her heart race  She is also complaining of difficulty sleeping and states she only gets a few hours per night  The following portions of the patient's history were reviewed and updated as appropriate: allergies, current medications, past family history, past medical history, past social history, past surgical history and problem list     Review of Systems   Constitutional: Negative for fatigue and fever  HENT: Positive for ear pain  Negative for congestion, ear discharge, rhinorrhea, sinus pressure, sinus pain and trouble swallowing  Eyes: Negative for visual disturbance  Respiratory: Negative for cough and shortness of breath  Cardiovascular: Negative for chest pain and palpitations  Gastrointestinal: Negative for abdominal pain and blood in stool  Endocrine: Negative for cold intolerance and heat intolerance     Genitourinary: Negative for difficulty urinating and dysuria  Musculoskeletal: Negative for gait problem  Skin: Negative for rash  Allergic/Immunologic: Positive for environmental allergies  Neurological: Negative for dizziness, syncope and headaches  Hematological: Negative for adenopathy  Psychiatric/Behavioral: Positive for sleep disturbance  Negative for behavioral problems  Objective:      /58 (BP Location: Left arm, Patient Position: Sitting, Cuff Size: Large)   Pulse 96   Temp 97 9 °F (36 6 °C) (Tympanic)   Resp 16   Ht 5' 4" (1 626 m)   Wt 80 7 kg (178 lb)   LMP 09/18/2014   SpO2 98%   BMI 30 55 kg/m²          Physical Exam  Vitals and nursing note reviewed  Constitutional:       Appearance: Normal appearance  HENT:      Head: Normocephalic and atraumatic  Right Ear: Ear canal and external ear normal  A middle ear effusion is present  Left Ear: External ear normal  A middle ear effusion is present  Nose: No congestion  Mouth/Throat:      Pharynx: No posterior oropharyngeal erythema  Eyes:      Conjunctiva/sclera: Conjunctivae normal    Cardiovascular:      Rate and Rhythm: Normal rate and regular rhythm  Heart sounds: Normal heart sounds  Pulmonary:      Effort: Pulmonary effort is normal       Breath sounds: Normal breath sounds  Musculoskeletal:         General: Normal range of motion  Cervical back: Normal range of motion  Lymphadenopathy:      Cervical: No cervical adenopathy  Skin:     General: Skin is warm and dry  Neurological:      Mental Status: She is alert and oriented to person, place, and time  Cranial Nerves: No cranial nerve deficit     Psychiatric:         Mood and Affect: Mood normal          Behavior: Behavior normal

## 2022-04-06 NOTE — TELEPHONE ENCOUNTER
It could be from the medication  Please double check that the patient is taking the medication on an empty stomach with a full glass of water, and not lying down for 30 minutes after  If she is, and still getting the chest pain, we can switch her to another medication

## 2022-04-06 NOTE — ASSESSMENT & PLAN NOTE
- Not well controlled  - Discussed proper sleep hygiene  - Prescription sent for Trazodone  Advised of side effects   - Will continue to monitor

## 2022-04-06 NOTE — ASSESSMENT & PLAN NOTE
- Discussed with patient that there is currently no indication for antibiotics  - Recommended over the counter allergy medication    - Contact office if symptoms worsen

## 2022-04-19 ENCOUNTER — REMOTE DEVICE CLINIC VISIT (OUTPATIENT)
Dept: CARDIOLOGY CLINIC | Facility: CLINIC | Age: 62
End: 2022-04-19
Payer: COMMERCIAL

## 2022-04-19 DIAGNOSIS — Z95.0 CARDIAC PACEMAKER IN SITU: Primary | ICD-10-CM

## 2022-04-19 PROCEDURE — 93296 REM INTERROG EVL PM/IDS: CPT | Performed by: INTERNAL MEDICINE

## 2022-04-19 PROCEDURE — 93294 REM INTERROG EVL PM/LDLS PM: CPT | Performed by: INTERNAL MEDICINE

## 2022-04-19 NOTE — PROGRESS NOTES
Results for orders placed or performed in visit on 04/19/22   Cardiac EP device report    Narrative    MDT-DUAL PPM (AAIR-DDDR MODE)/ ACTIVE SYSTEM IS MRI CONDITIONAL  CARELINK TRANSMISSION: BATTERY STATUS "11 YRS " AP 32%  0%  ALL AVAILABLE LEAD PARAMETERS WITHIN NORMAL LIMITS  NO SIGNIFICANT HIGH RATE EPISODES  NORMAL DEVICE FUNCTION   NC

## 2022-04-29 DIAGNOSIS — Z48.815 ENCOUNTER FOR SURGICAL AFTERCARE FOLLOWING SURGERY OF DIGESTIVE SYSTEM: ICD-10-CM

## 2022-04-29 DIAGNOSIS — Z98.84 BARIATRIC SURGERY STATUS: ICD-10-CM

## 2022-04-29 DIAGNOSIS — K91.2 POSTSURGICAL MALABSORPTION: ICD-10-CM

## 2022-04-29 DIAGNOSIS — G47.00 INSOMNIA, UNSPECIFIED TYPE: ICD-10-CM

## 2022-04-29 RX ORDER — PANTOPRAZOLE SODIUM 20 MG/1
TABLET, DELAYED RELEASE ORAL
Qty: 30 TABLET | Refills: 3 | Status: SHIPPED | OUTPATIENT
Start: 2022-04-29

## 2022-04-29 RX ORDER — TRAZODONE HYDROCHLORIDE 50 MG/1
TABLET ORAL
Qty: 30 TABLET | Refills: 0 | Status: SHIPPED | OUTPATIENT
Start: 2022-04-29 | End: 2022-05-31

## 2022-05-30 DIAGNOSIS — G47.00 INSOMNIA, UNSPECIFIED TYPE: ICD-10-CM

## 2022-05-31 RX ORDER — TRAZODONE HYDROCHLORIDE 50 MG/1
TABLET ORAL
Qty: 30 TABLET | Refills: 0 | Status: SHIPPED | OUTPATIENT
Start: 2022-05-31 | End: 2022-06-27

## 2022-06-04 DIAGNOSIS — B37.2 CANDIDA INFECTION OF FLEXURAL SKIN: ICD-10-CM

## 2022-06-04 DIAGNOSIS — B00.9 HSV-1 INFECTION: ICD-10-CM

## 2022-06-04 DIAGNOSIS — I47.1 PSVT (PAROXYSMAL SUPRAVENTRICULAR TACHYCARDIA) (HCC): ICD-10-CM

## 2022-06-06 RX ORDER — METOPROLOL SUCCINATE 25 MG/1
25 TABLET, EXTENDED RELEASE ORAL DAILY
Qty: 90 TABLET | Refills: 0 | Status: SHIPPED | OUTPATIENT
Start: 2022-06-06

## 2022-06-06 RX ORDER — VALACYCLOVIR HYDROCHLORIDE 1 G/1
2000 TABLET, FILM COATED ORAL 2 TIMES DAILY
Qty: 30 TABLET | Refills: 0 | Status: SHIPPED | OUTPATIENT
Start: 2022-06-06 | End: 2022-06-07

## 2022-06-07 DIAGNOSIS — I82.4Y9 DEEP VEIN THROMBOSIS (DVT) OF PROXIMAL LOWER EXTREMITY, UNSPECIFIED CHRONICITY, UNSPECIFIED LATERALITY (HCC): ICD-10-CM

## 2022-06-07 RX ORDER — NYSTATIN 100000 [USP'U]/G
POWDER TOPICAL 2 TIMES DAILY
Qty: 30 G | Refills: 0 | Status: SHIPPED | OUTPATIENT
Start: 2022-06-07

## 2022-06-07 RX ORDER — APIXABAN 2.5 MG/1
TABLET, FILM COATED ORAL
Qty: 60 TABLET | Refills: 3 | Status: SHIPPED | OUTPATIENT
Start: 2022-06-07

## 2022-06-21 ENCOUNTER — OFFICE VISIT (OUTPATIENT)
Dept: FAMILY MEDICINE CLINIC | Facility: CLINIC | Age: 62
End: 2022-06-21
Payer: COMMERCIAL

## 2022-06-21 VITALS
DIASTOLIC BLOOD PRESSURE: 64 MMHG | HEIGHT: 64 IN | HEART RATE: 87 BPM | WEIGHT: 188 LBS | TEMPERATURE: 97.1 F | SYSTOLIC BLOOD PRESSURE: 108 MMHG | RESPIRATION RATE: 16 BRPM | BODY MASS INDEX: 32.1 KG/M2 | OXYGEN SATURATION: 97 %

## 2022-06-21 DIAGNOSIS — I82.4Y9 DEEP VEIN THROMBOSIS (DVT) OF PROXIMAL LOWER EXTREMITY, UNSPECIFIED CHRONICITY, UNSPECIFIED LATERALITY (HCC): ICD-10-CM

## 2022-06-21 DIAGNOSIS — E78.49 OTHER HYPERLIPIDEMIA: ICD-10-CM

## 2022-06-21 DIAGNOSIS — G47.00 INSOMNIA, UNSPECIFIED TYPE: ICD-10-CM

## 2022-06-21 DIAGNOSIS — M54.31 SCIATIC PAIN, RIGHT: Primary | ICD-10-CM

## 2022-06-21 PROCEDURE — 1036F TOBACCO NON-USER: CPT | Performed by: NURSE PRACTITIONER

## 2022-06-21 PROCEDURE — 3008F BODY MASS INDEX DOCD: CPT | Performed by: NURSE PRACTITIONER

## 2022-06-21 PROCEDURE — 99214 OFFICE O/P EST MOD 30 MIN: CPT | Performed by: NURSE PRACTITIONER

## 2022-06-21 RX ORDER — METHOCARBAMOL 750 MG/1
750 TABLET, FILM COATED ORAL 2 TIMES DAILY PRN
Qty: 30 TABLET | Refills: 0 | Status: SHIPPED | OUTPATIENT
Start: 2022-06-21

## 2022-06-21 NOTE — ASSESSMENT & PLAN NOTE
- Continue Lipitor 10 mg daily   - Encouraged healthy diet and regular exercise  - Will obtain updated fasting lipid panel

## 2022-06-21 NOTE — PROGRESS NOTES
Assessment/Plan:    Hyperlipidemia  - Continue Lipitor 10 mg daily   - Encouraged healthy diet and regular exercise  - Will obtain updated fasting lipid panel  Sciatic pain, right  - Will increase Robaxin to 750 mg  Advised of side effects   - Contact office if no improvement  Insomnia  - Improved on Trazodone as needed  Continue same    - Will continue to monitor  Deep vein thrombosis (DVT) of proximal lower extremity (HCC)  - History of DVT  No current symptoms  On Eliquis 2 5 mg daily  Diagnoses and all orders for this visit:    Sciatic pain, right  -     methocarbamol (Robaxin-750) 750 mg tablet; Take 1 tablet (750 mg total) by mouth 2 (two) times a day as needed for muscle spasms    Other hyperlipidemia  -     CBC and differential; Future  -     Comprehensive metabolic panel; Future  -     Lipid Panel with Direct LDL reflex; Future  -     TSH, 3rd generation with Free T4 reflex; Future    Insomnia, unspecified type        Subjective:      Patient ID: Dhaval Sharp is a 60 y o  female  Patient presents today with complaints of right leg pain that has been occurring for about 2 weeks  She does also complain of sciatic pain  She denies any pain or swelling of the leg  She denies any numbness, tingling, or burning pain  She states that pain sometimes wakes her up at night  She has been taking Robaxin as needed  She does state she has a history of a fracture in the right leg that occurred in January of 2020  The following portions of the patient's history were reviewed and updated as appropriate: allergies, current medications, past family history, past medical history, past social history, past surgical history and problem list     Review of Systems   Constitutional: Negative for fatigue and fever  HENT: Negative for trouble swallowing  Eyes: Negative for visual disturbance  Respiratory: Negative for cough and shortness of breath      Cardiovascular: Negative for chest pain and palpitations  Gastrointestinal: Negative for abdominal pain and blood in stool  Endocrine: Negative for cold intolerance and heat intolerance  Genitourinary: Negative for difficulty urinating and dysuria  Musculoskeletal: Negative for gait problem  Right leg pain     Skin: Negative for rash  Neurological: Negative for dizziness, syncope and headaches  Hematological: Negative for adenopathy  Psychiatric/Behavioral: Negative for behavioral problems  Objective:      /64 (BP Location: Left arm, Patient Position: Sitting, Cuff Size: Large)   Pulse 87   Temp (!) 97 1 °F (36 2 °C) (Tympanic)   Resp 16   Ht 5' 4" (1 626 m)   Wt 85 3 kg (188 lb)   LMP 09/18/2014   SpO2 97%   BMI 32 27 kg/m²          Physical Exam  Vitals and nursing note reviewed  Constitutional:       General: She is not in acute distress  Appearance: Normal appearance  HENT:      Head: Normocephalic and atraumatic  Right Ear: Ear canal and external ear normal  A middle ear effusion is present  Left Ear: Ear canal and external ear normal    Eyes:      Conjunctiva/sclera: Conjunctivae normal    Cardiovascular:      Rate and Rhythm: Normal rate and regular rhythm  Heart sounds: Normal heart sounds  Pulmonary:      Effort: Pulmonary effort is normal       Breath sounds: Normal breath sounds  Musculoskeletal:         General: Normal range of motion  Cervical back: Normal range of motion  Right lower leg: No edema  Left lower leg: No edema  Lymphadenopathy:      Cervical: No cervical adenopathy  Skin:     General: Skin is warm and dry  Neurological:      Mental Status: She is alert and oriented to person, place, and time  Cranial Nerves: No cranial nerve deficit     Psychiatric:         Mood and Affect: Mood normal          Behavior: Behavior normal

## 2022-06-23 ENCOUNTER — TELEPHONE (OUTPATIENT)
Dept: FAMILY MEDICINE CLINIC | Facility: CLINIC | Age: 62
End: 2022-06-23

## 2022-06-23 DIAGNOSIS — M54.31 SCIATIC PAIN, RIGHT: Primary | ICD-10-CM

## 2022-06-23 RX ORDER — PREDNISONE 50 MG/1
50 TABLET ORAL DAILY
Qty: 5 TABLET | Refills: 0 | Status: SHIPPED | OUTPATIENT
Start: 2022-06-23 | End: 2022-06-28

## 2022-06-23 NOTE — TELEPHONE ENCOUNTER
Patient called and said that the methocarbamol is not helping  She was told to call back if no improvement      Asking what she should do

## 2022-06-27 ENCOUNTER — TELEPHONE (OUTPATIENT)
Dept: FAMILY MEDICINE CLINIC | Facility: CLINIC | Age: 62
End: 2022-06-27

## 2022-06-27 DIAGNOSIS — E78.5 HYPERLIPIDEMIA, UNSPECIFIED HYPERLIPIDEMIA TYPE: ICD-10-CM

## 2022-06-27 DIAGNOSIS — M54.31 SCIATIC PAIN, RIGHT: Primary | ICD-10-CM

## 2022-06-27 DIAGNOSIS — G47.00 INSOMNIA, UNSPECIFIED TYPE: ICD-10-CM

## 2022-06-27 RX ORDER — ATORVASTATIN CALCIUM 10 MG/1
10 TABLET, FILM COATED ORAL DAILY
Qty: 90 TABLET | Refills: 0 | Status: SHIPPED | OUTPATIENT
Start: 2022-06-27

## 2022-06-27 RX ORDER — TRAZODONE HYDROCHLORIDE 50 MG/1
TABLET ORAL
Qty: 30 TABLET | Refills: 0 | Status: SHIPPED | OUTPATIENT
Start: 2022-06-27 | End: 2022-07-25

## 2022-06-27 NOTE — TELEPHONE ENCOUNTER
Pt states she has 1 more prednisone 50mg , no pain relief thus far  Pt states methocarbamol did not help either  She was sitting at kitchen table and went to stand, pain shot down her entire leg , she is still having the pain down her leg  Please advise

## 2022-06-27 NOTE — TELEPHONE ENCOUNTER
Pharmacy requesting refill on trazodone   Last seen 6/21/22  Has appt 7/20/22  Medication sent to pharmacy

## 2022-07-04 ENCOUNTER — TELEPHONE (OUTPATIENT)
Dept: OTHER | Facility: OTHER | Age: 62
End: 2022-07-04

## 2022-07-04 NOTE — TELEPHONE ENCOUNTER
Pt called, requesting a call back from provider, regarding medication she was prescribed to take once weekly  Pt is complaining of getting chest pains when take medication   Healthcall triage services was offered but pt preferred to receive a call back from office

## 2022-07-05 ENCOUNTER — OFFICE VISIT (OUTPATIENT)
Dept: CARDIOLOGY CLINIC | Facility: CLINIC | Age: 62
End: 2022-07-05
Payer: COMMERCIAL

## 2022-07-05 VITALS
DIASTOLIC BLOOD PRESSURE: 60 MMHG | TEMPERATURE: 97.7 F | HEIGHT: 64 IN | OXYGEN SATURATION: 97 % | WEIGHT: 188 LBS | BODY MASS INDEX: 32.1 KG/M2 | HEART RATE: 68 BPM | SYSTOLIC BLOOD PRESSURE: 110 MMHG

## 2022-07-05 DIAGNOSIS — I47.1 PSVT (PAROXYSMAL SUPRAVENTRICULAR TACHYCARDIA) (HCC): ICD-10-CM

## 2022-07-05 DIAGNOSIS — Z95.0 PACEMAKER: ICD-10-CM

## 2022-07-05 DIAGNOSIS — I49.5 SICK SINUS SYNDROME (HCC): Primary | ICD-10-CM

## 2022-07-05 PROCEDURE — 99214 OFFICE O/P EST MOD 30 MIN: CPT | Performed by: INTERNAL MEDICINE

## 2022-07-05 NOTE — PROGRESS NOTES
Cardiology Follow Up    Christian Nicole  1960  [id number]  [address] [facility] [place] [id number] [address] [phone]    1  Sick sinus syndrome ([facility] )     2  PSVT (paroxysmal supraventricular tachycardia) ([facility] )     3  Pacemaker       Discussion/Summary:    History of sick sinus syndrome  Pacemaker is functioning appropriately  After starting on the metoprolol, no significant high heart rate episodes were see on her most recent device interrogation  She has a remote device interrogation upcoming later this month  October, she is scheduled for 1 in person  She will find a more convenient to come to the [facility], so I contacted the device clinic to see if this could be rescheduled  She has chest pain  I suspect this is pill esophagitis from her Fosamax  She had a catheterization in the recent past which was unremarkable  She had a normal stress test in 2019, as well  No changes from a cardiac standpoint  She is going to contact the endocrinologist as she has already discussed potentially switching to an alternate regimen with infusion or injection  Continue current medications  I can see her back in 1 year  Previous History:  Sick sinus syndrome  Syncope with long pauses discovered in October, 2018  She had a pacemaker placed  Other history includes DVT for which she is on Eliquis  Gastric bypass previously  Previously, had atypical chest pain  Ultimately had cardiac catheterization at [facility], only mild plaque was seen, no obstructive lesions  PSVT was seen on device interrogations in the past, slightly symptomatic and was started on beta blocker  Interval History:    Last visit, I started her on metoprolol for PSVT that was seen on device interrogations  She has had no significant palpitations  Most recent device interrogation showed no high heart rate episodes      She started taking Fosamax  She is noticing that she is getting pain in her chest for a few days after she takes it, and then towards the end of the week she feels a little bit better  She had a catheterization previously in a stress test done in 2019  I suspect this is pill esophagitis  Problem List     Deep vein thrombosis (DVT) of lower extremity (HCC)    Trigger middle finger of left hand    Carpal tunnel syndrome on right    Abnormal CT scan    Syncope    Sick sinus syndrome (HCC)    Leukocytosis        Past Medical History:   Diagnosis Date    Anemia     iron def    Arthritis     knees    Carpal tunnel syndrome of right wrist     Chest pain     Colon polyp     Depression     at times    DVT (deep venous thrombosis) ([facility] ) 2012    Dysphagia     Esophageal reflux 5/23/2011    Grade 1 out of 6 intensity murmur 6/24/2019    Heart murmur     History of transfusion 1980    Pacemaker 11/01/2018    Pneumonia     age 15    Psychiatric disorder     Trigger finger, left      Social History     Tobacco Use    Smoking status: Former Smoker     Packs/day: 0 25     Types: Cigarettes    Smokeless tobacco: Never Used   Substance Use Topics    Alcohol use:  Yes     Alcohol/week: 2 0 standard drinks     Types: 1 Glasses of wine, 1 Standard drinks or equivalent per week     Comment: very rare social     Family History   Problem Relation Age of Onset    Stroke Mother     Alzheimer's disease Mother     Arthritis Mother     Coronary artery disease Mother     Breast cancer Mother 77    Cancer Mother     Heart disease Mother     Hypertension Father     Gout Father     Diabetes type II Father     Coronary artery disease Father     Heart disease Father     No Known Problems Sister     No Known Problems Daughter     No Known Problems Maternal Grandmother     No Known Problems Maternal Grandfather     No Known Problems Paternal Grandmother     No Known Problems Paternal Jb Floresara     Prostate cancer Brother     No Known Problems Sister     No Known Problems Sister     No Known Problems Maternal Aunt     No Known Problems Maternal Aunt     No Known Problems Maternal Aunt     No Known Problems Maternal Aunt     Cancer Maternal Aunt     No Known Problems Paternal Aunt     No Known Problems Paternal Aunt      Past Surgical History:   Procedure Laterality Date    BACK SURGERY      CARDIAC PACEMAKER PLACEMENT  2018    CERVICAL [address] SURGERY  2009    PLATES & SCREWS     SECTION      X2    COLONOSCOPY      GASTRIC BYPASS  2017    LAP RINYGB SURGERY    KNEE ARTHROSCOPY      KNEE SURGERY Bilateral     KNEE SURGERY Left 2020    OTHER SURGICAL HISTORY      ARM SURGERIES    MT INCISE FINGER TENDON SHEATH Left 2018    Procedure: LONG TRIGGER FINGER RELEASE;  Surgeon: Ryder Marquez MD;  Location: [facility];  Service: Orthopedics    MT WRIST Teryl Amour LIG Right 2018    Procedure: ENDOSCOPIC CARPAL TUNNEL RELEASE;  Surgeon: Ryder Marquez MD;  Location: [facility];  Service: Orthopedics    TONSILLECTOMY      UPPER GASTROINTESTINAL ENDOSCOPY         Current Outpatient Medications:     alendronate (FOSAMAX) 70 mg tablet, Take 1 tablet (70 mg total) by mouth every 7 days take with a full glass of water on an empty stomach, and do not lie down for 30 minutes after, Disp: 4 tablet, Rfl: 6    Ascorbic Acid (VITAMIN C) 100 MG CHEW, Chew, Disp: , Rfl:     atorvastatin (LIPITOR) 10 mg tablet, Take 1 tablet (10 mg total) by mouth daily, Disp: 90 tablet, Rfl: 0    Biotin 36669 MCG TABS, Take by mouth daily  , Disp: , Rfl:     Calcium Carbonate (CALCI-CHEW PO), Take 500 mg by mouth 2 (two) times a day, Disp: , Rfl:     cholecalciferol (VITAMIN D3) 1,000 units tablet, Take 5,000 Units by mouth 3 (three) times a day  , Disp: , Rfl:     clobetasol (TEMOVATE) 0 05 % cream, Apply to affected areas daily x 12 weeks, then twice weekly for maintenance, Disp: , Rfl:     Cyanocobalamin (VITAMIN B 12 PO), Take 1,000 mcg by mouth, Disp: , Rfl:     Diclofenac Sodium (VOLTAREN) 1 %, Apply 2 g topically 4 (four) times a day, Disp: 100 g, Rfl: 6    Eliquis 2 5 MG, take 1 tablet by mouth twice a day, Disp: 60 tablet, Rfl: 3    fluticasone (FLONASE) 50 mcg/act nasal spray, 1 spray into each nostril daily, Disp: 18 2 mL, Rfl: 1    methocarbamol (Robaxin-750) 750 mg tablet, Take 1 tablet (750 mg total) by mouth 2 (two) times a day as needed for muscle spasms, Disp: 30 tablet, Rfl: 0    metoprolol succinate (TOPROL-XL) 25 mg 24 hr tablet, Take 1 tablet (25 mg total) by mouth daily, Disp: 90 tablet, Rfl: 0    Multiple Vitamin (MULTIVITAMIN) capsule, Take 1 capsule by mouth daily 2 chewies daily, Disp: , Rfl:     nystatin (MYCOSTATIN) powder, Apply topically 2 (two) times a day, Disp: 30 g, Rfl: 0    traZODone (DESYREL) 50 mg tablet, take 1 tablet by mouth at bedtime if needed for sleep, Disp: 30 tablet, Rfl: 0    Ferrous Sulfate  (45 Fe) MG TBCR, Take 1 tablet by mouth daily (Patient not taking: Reported on 7/5/2022), Disp: , Rfl:     guaifenesin-codeine (GUAIFENESIN AC) 100-10 MG/5ML liquid, Take 5 mL by mouth 3 (three) times a day as needed for cough (Patient not taking: No sig reported), Disp: 180 mL, Rfl: 0    Lysine HCl POWD, Take by mouth (Patient not taking: No sig reported), Disp: , Rfl:     methocarbamol (ROBAXIN) 500 mg tablet, Take 1 tablet (500 mg total) by mouth 2 (two) times a day (Patient not taking: Reported on 7/5/2022), Disp: 20 tablet, Rfl: 0    pantoprazole (PROTONIX) 20 mg tablet, take 1 tablet by mouth once daily (Patient not taking: Reported on 7/5/2022), Disp: 30 tablet, Rfl: 3    valACYclovir (VALTREX) 1,000 mg tablet, Take 2 tablets (2,000 mg total) by mouth 2 (two) times a day for 1 day, Disp: 30 tablet, Rfl: 0  No Known Allergies    Vitals:    07/05/22 1358   BP: 110/60   BP Location: Left arm   Patient Position: Sitting   Cuff Size: Standard   Pulse: 68   Temp: 97 7 °F (36 5 °C)   SpO2: 97%   Weight: 85 3 kg (188 lb)   Height: 5' 4" (1 626 m)     Vitals:    07/05/22 1358   Weight: 85 3 kg (188 lb)      Height: 5' 4" (162 6 cm)   Body mass index is 32 27 kg/m²  Physical Exam:  GEN: Trev Wheeler appears well, alert and oriented x 3, pleasant and cooperative   HEENT: pupils equal, round, and reactive to light; extraocular muscles intact  NECK: supple, no carotid bruits   HEART: regular rhythm, normal S1 and S2, no murmurs, clicks, gallops or rubs   LUNGS: clear to auscultation bilaterally; no wheezes, rales, or rhonchi   ABDOMEN: normal bowel sounds, soft, no tenderness, no distention  EXTREMITIES: peripheral pulses normal; no clubbing, cyanosis, or edema  NEURO: no focal findings   SKIN: normal without suspicious lesions on exposed skin    ROS:  Positive for anxiety, joint pain, back pain, nausea, vomiting, palpitations  Chest pain  Except as noted in HPI, is otherwise reviewed in detail and a 12 point review of systems is negative  ROS reviewed and is unchanged    Labs:  Lab Results   Component Value Date    K 3 2 (L) 02/07/2022     (H) 02/07/2022    CREATININE 0 66 02/07/2022    BUN 17 02/07/2022    CO2 29 02/07/2022    ALT 34 02/07/2022    AST 26 02/07/2022    INR 0 96 06/18/2021    GLUF 116 (H) 01/05/2022    HGBA1C 5 7 (H) 05/07/2020    WBC 8 05 02/07/2022    HGB 12 2 02/07/2022    HCT 38 6 02/07/2022     02/07/2022       No results found for: CHOL  Lab Results   Component Value Date    LDLCALC 98 01/05/2022    LDLCALC 66 03/26/2021    LDLCALC 92 11/13/2020     Lab Results   Component Value Date    HDL 92 01/05/2022    HDL 88 03/26/2021    HDL 80 11/13/2020     Lab Results   Component Value Date    TRIG 77 01/05/2022    TRIG 74 03/26/2021    TRIG 74 11/13/2020     Testing:  Stress test 8/6/19:  MYOCARDIAL PERFUSION IMAGING:  The image quality was fair   Rotating projection images reveal mild breast attenuation, mild diaphragmatic attenuation, and mild subdiaphragmatic activity  The TID ratio was 1 06      PERFUSION DEFECTS:  -  There was a moderate-sized, mildly severe, fixed myocardial perfusion defect of the basal inferior wall likely due to diaphragm attenuation      GATED SPECT:  The calculated left ventricular ejection fraction was 67 %  There was no diagnostic evidence for left ventricular regional abnormality      SUMMARY:  -  Stress results: There was no chest pain during stress  -  ECG conclusions: The stress ECG was equivocal for ischemia  -  Perfusion imaging: There was a moderate-sized, mildly severe, fixed myocardial perfusion defect of the basal inferior wall likely due to diaphragm attenuation   -  Gated SPECT: The calculated left ventricular ejection fraction was 67 %  There was no diagnostic evidence for left ventricular regional abnormality      IMPRESSIONS: Normal study after pharmacologic vasodilation without reproduction of symptoms  Myocardial perfusion imaging was normal at rest and with stress  Echo 11/1/18:  LEFT VENTRICLE: Size was normal  Systolic function was at the lower limits of normal  Ejection fraction was estimated to be 50 %  There were no regional wall motion abnormalities  Wall thickness was normal  There was no evidence of  concentric hypertrophy  DOPPLER: Left ventricular diastolic function parameters were normal      RIGHT VENTRICLE: The size was normal  Systolic function was normal  Wall thickness was normal      LEFT ATRIUM: Size was normal      RIGHT ATRIUM: Size was normal      MITRAL VALVE: Valve structure was normal  There was normal leaflet separation  DOPPLER: The transmitral velocity was within the normal range  There was no evidence for stenosis  There was mild regurgitation      AORTIC VALVE: The valve was trileaflet  Leaflets exhibited normal cuspal separation and sclerosis  DOPPLER: Transaortic velocity was within the normal range  There was no evidence for stenosis  There was no regurgitation      TRICUSPID VALVE: The valve structure was normal  There was normal leaflet separation  DOPPLER: The transtricuspid velocity was within the normal range  There was no evidence for stenosis  There was mild regurgitation  Pulmonary artery  systolic pressure was within the normal range      PULMONIC VALVE: Leaflets exhibited normal thickness, no calcification, and normal cuspal separation  DOPPLER: The transpulmonic velocity was within the normal range  There was no regurgitation      PERICARDIUM: There was no pericardial effusion  The pericardium was normal in appearance      AORTA: The root exhibited normal size

## 2022-07-05 NOTE — TELEPHONE ENCOUNTER
Dr Yang Pedraza:  Chest pains continue     Patient states she is still having chest pains for 3 days after taking this medicine  Her cardiologist told her it is the medication  Patient is asking if she could have the shot that you two discussed, instead  Patient states you may respond in [address]

## 2022-07-06 ENCOUNTER — PATIENT MESSAGE (OUTPATIENT)
Dept: RHEUMATOLOGY | Facility: CLINIC | Age: 62
End: 2022-07-06

## 2022-07-11 ENCOUNTER — TELEPHONE (OUTPATIENT)
Dept: OBGYN CLINIC | Facility: OTHER | Age: 62
End: 2022-07-11

## 2022-07-11 NOTE — TELEPHONE ENCOUNTER
Patient called in , following up on the [employer] message  She had chest pain with the Reclast     She did call her Heart Doctor   What other options does she have? She would interested in the shot      She was supposed to take it on Sunday but did not    C/b # [phone]

## 2022-07-12 ENCOUNTER — EVALUATION (OUTPATIENT)
Dept: PHYSICAL THERAPY | Age: 62
End: 2022-07-12
Payer: COMMERCIAL

## 2022-07-12 ENCOUNTER — TELEPHONE (OUTPATIENT)
Dept: OBGYN CLINIC | Facility: HOSPITAL | Age: 62
End: 2022-07-12

## 2022-07-12 DIAGNOSIS — M54.31 SCIATIC PAIN, RIGHT: ICD-10-CM

## 2022-07-12 DIAGNOSIS — J32.9 SINUSITIS, UNSPECIFIED CHRONICITY, UNSPECIFIED LOCATION: ICD-10-CM

## 2022-07-12 PROCEDURE — 97112 NEUROMUSCULAR REEDUCATION: CPT | Performed by: PHYSICAL THERAPIST

## 2022-07-12 PROCEDURE — 97162 PT EVAL MOD COMPLEX 30 MIN: CPT | Performed by: PHYSICAL THERAPIST

## 2022-07-12 RX ORDER — FLUTICASONE PROPIONATE 50 MCG
SPRAY, SUSPENSION (ML) NASAL
Qty: 32 G | Refills: 1 | Status: SHIPPED | OUTPATIENT
Start: 2022-07-12

## 2022-07-12 NOTE — PROGRESS NOTES
PT Evaluation     Today's date: 2022  Patient name: Tony Uriostegui  : 1960  MRN: [id number]  Referring provider: DANIELA Ferrara  Dx:   Encounter Diagnosis     ICD-10-CM    1  Sciatic pain, right  M54 31 Ambulatory Referral to Physical Therapy                  Assessment  Assessment details: Patient presents complaining of R LE pain, which has been ongoing for about a month  She denies any trauma or increase in activity prior to the pain beginning  She reports the pain is worse with prolonged walking and sitting     She locates her pain to her R buttock, posterolateral thigh and anterior thigh, she also reports numbness in this pattern, she denies any pain distal to her knee     She has no recent imaging     She is currently working as a caretaker    Patient presents with limitations in thoracolumbar ROM and B/L LE strength  Patient would benefit from skilled physical therapy to address limitations and deficits  Patient provided with HEP  Patient made aware of condition as well as the proposed treatment plan, including risks, benefits and alternatives  Impairments: abnormal or restricted ROM, activity intolerance, impaired physical strength, lacks appropriate home exercise program and pain with function    Goals  ST- demonstrate compliancy with HEP in 1 week  Decrease reported pain to 5/10 at worst and with activity, to improve functional capacity, within 3 weeks   Improve thoracolumbar range of motion to only minimal limitation, within 3 weeks     LT- Improve FOTO score to specified value to improve patients perceived benefit of therapy, in 8 weeks   Improve LE strength to 4+/5 to improve ability to complete ADL's at home, within 8 weeks     Plan  Plan details: Physical therapy with focus on there ex and manual therapy to improve ability to complete tasks around the house and complete functional activities, use of modalities as needed     Patient would benefit from: skilled physical therapy  Referral necessary: No  Planned modality interventions: cryotherapy, TENS and thermotherapy: hydrocollator packs  Planned therapy interventions: ADL training, balance, balance/weight bearing training, gait training, manual therapy, joint mobilization, neuromuscular re-education, strengthening, stretching, therapeutic activities and therapeutic exercise  Frequency: 2x week  Duration in weeks: 12  Plan of Care beginning date: 2022  Plan of Care expiration date: 10/4/2022  Treatment plan discussed with: patient        Subjective Evaluation    History of Present Illness  Date of onset: 2022  Mechanism of injury: Chronic onset   Pain  Current pain rating: 10  At best pain ratin  At worst pain rating: 10  Location: R sided low back, R LE   Quality: sharp  Aggravating factors: sitting, standing, walking, stair climbing and lifting  Progression: worsening      Diagnostic Tests  No diagnostic tests performed  Treatments  Current treatment: medication  Patient Goals  Patient goals for therapy: decreased pain and independence with ADLs/IADLs          Objective     General Comments:      Lumbar Comments  Ttp along R buttock and into R posterolateral thigh     rom  Thoracolumbar motions all moderately limited secondary to pain, except for extension which was minimally limited with only pinching p! @ end range     mmt  Hip flexion L=3+ R=2+*  Hip abduction L=4 R=4-*  Hip adduction L=4 R=4  Knee extension L=4 R=4-*  Knee flexion L=4 R=4-*    Special tests   (+) slump R, SLR R     Joint play not able to assess due to inability to lay in prone   CPA's              Precautions: hx DVT, cardiac       Manuals             Gentle piriformis stretch                                                     Neuro Re-Ed             Sciatic nerve glide              GS             Mini bridge              Hip 3-way standing                                                     Ther Ex             Nustep Ther Activity                                       Gait Training                                       Modalities

## 2022-07-13 ENCOUNTER — RA CDI HCC (OUTPATIENT)
Dept: OTHER | Facility: HOSPITAL | Age: 62
End: 2022-07-13

## 2022-07-13 NOTE — TELEPHONE ENCOUNTER
Fwd to Dr. Gupta  No medication protocol available Tramadol  LOV:6/4/2019  Last Fill:5/10/2019  Next Appt:8/6/2019       Please schedule ASAP for Prolia only injection visit; she is an [facility] patient

## 2022-07-13 NOTE — PROGRESS NOTES
[facility]  coding opportunities       Chart reviewed, no opportunity found:   [address]        Patients Insurance     Medicare Insurance: [employer]

## 2022-07-13 NOTE — TELEPHONE ENCOUNTER
Patient would like a call from Dr Zabrina Pardo directly, she states previous messages she left have been incorrectly relayed   Thanks

## 2022-07-13 NOTE — TELEPHONE ENCOUNTER
Kamryn Davey from [facility] called  Prolia was approved    Authorization will be sent by fax

## 2022-07-13 NOTE — TELEPHONE ENCOUNTER
Please work on prior [address] for Prolia every 6 month injections through her medical benefits for buy and bill to treat her osteoporosis  She has had chest pain with both Fosamax and Reclast  Once approved, please schedule ASAP for Prolia only injection visit      Thanks,  ESTHER

## 2022-07-14 NOTE — TELEPHONE ENCOUNTER
Teresa Foster, could you please call to schedule this patient for a Prolia only visit at [facility]? She will likely need to be overbooked  Thank you!

## 2022-07-14 NOTE — TELEPHONE ENCOUNTER
Patient will call back to schedule once she has her appointment book in front of her  Please offer patient a time that hasn't already been overbooked and I will add them to the schedule

## 2022-07-15 ENCOUNTER — OFFICE VISIT (OUTPATIENT)
Dept: FAMILY MEDICINE CLINIC | Facility: CLINIC | Age: 62
End: 2022-07-15
Payer: COMMERCIAL

## 2022-07-15 VITALS
HEIGHT: 64 IN | TEMPERATURE: 98.2 F | SYSTOLIC BLOOD PRESSURE: 120 MMHG | OXYGEN SATURATION: 96 % | WEIGHT: 186 LBS | HEART RATE: 72 BPM | RESPIRATION RATE: 16 BRPM | BODY MASS INDEX: 31.76 KG/M2 | DIASTOLIC BLOOD PRESSURE: 70 MMHG

## 2022-07-15 DIAGNOSIS — M54.41 ACUTE RIGHT-SIDED LOW BACK PAIN WITH RIGHT-SIDED SCIATICA: Primary | ICD-10-CM

## 2022-07-15 DIAGNOSIS — R10.9 STOMACH CRAMPS: ICD-10-CM

## 2022-07-15 DIAGNOSIS — R10.32 LLQ PAIN: ICD-10-CM

## 2022-07-15 PROCEDURE — 99214 OFFICE O/P EST MOD 30 MIN: CPT | Performed by: NURSE PRACTITIONER

## 2022-07-15 RX ORDER — DICYCLOMINE HYDROCHLORIDE 10 MG/1
10 CAPSULE ORAL
Qty: 90 CAPSULE | Refills: 0 | Status: SHIPPED | OUTPATIENT
Start: 2022-07-15 | End: 2022-08-01

## 2022-07-15 NOTE — ASSESSMENT & PLAN NOTE
- back pain unrelieved with prednisone and muscle relaxer   - she is currently seeing physical therapy  - referral placed to Orthopedic surgery for further workup and management

## 2022-07-15 NOTE — PROGRESS NOTES
Assessment/Plan:    LLQ pain  - will obtain ultrasound  - advised to get blood work done  - will continue to follow-up  Stomach cramps  - prescription sent for Bentyl  Advised of side effects   - will obtain abdominal ultrasound  - will continue to follow-up  Acute right-sided low back pain with right-sided sciatica  - back pain unrelieved with prednisone and muscle relaxer   - she is currently seeing physical therapy  - referral placed to Orthopedic surgery for further workup and management  Diagnoses and all orders for this visit:    Acute right-sided low back pain with right-sided sciatica  -     Ambulatory Referral to Orthopedic Surgery; Future  -     dicyclomine (BENTYL) 10 mg capsule; Take 1 capsule (10 mg total) by mouth 4 (four) times a day (before meals and at bedtime)    LLQ pain  -     US abdomen limited; Future    Stomach cramps  -     dicyclomine (BENTYL) 10 mg capsule; Take 1 capsule (10 mg total) by mouth 4 (four) times a day (before meals and at bedtime)        Subjective:      Patient ID: Yoel Chandler is a 60 y o  female  Patient presents today with complaints of cramping in the left side of her stomach for the past 2-3 days  She states this is dull pain  It does not matter what kind of food she eats  She denies any constipation or diarrhea  She denies any stomach bloating  She has taken Tylenol with no relief  She is still having lower back pain with sciatica  This was unrelieved with prednisone and muscle relaxers  She is currently seeing physical therapy  M*Modal software was used to dictate this note  It may contain errors with dictating incorrect words/spelling  Please contact provider directly for any questions             The following portions of the patient's history were reviewed and updated as appropriate: allergies, current medications, past family history, past medical history, past social history, past surgical history and problem list     Review of Systems   Constitutional: Negative for fatigue and fever  HENT: Negative for trouble swallowing  Eyes: Negative for visual disturbance  Respiratory: Negative for cough and shortness of breath  Cardiovascular: Negative for chest pain and palpitations  Gastrointestinal: Positive for abdominal pain (Cramping in left stomach)  Negative for blood in stool  Endocrine: Negative for cold intolerance and heat intolerance  Genitourinary: Negative for difficulty urinating and dysuria  Musculoskeletal: Positive for back pain  Negative for gait problem  Skin: Negative for rash  Neurological: Negative for dizziness, syncope and headaches  Hematological: Negative for adenopathy  Psychiatric/Behavioral: Negative for behavioral problems  Objective:      /70 (BP Location: Left arm, Patient Position: Sitting, Cuff Size: Large)   Pulse 72   Temp 98 2 °F (36 8 °C) (Tympanic)   Resp 16   Ht 5' 4" (1 626 m)   Wt 84 4 kg (186 lb)   LMP 09/18/2014   SpO2 96%   BMI 31 93 kg/m²          Physical Exam  Vitals and nursing note reviewed  Constitutional:       General: She is not in acute distress  Appearance: Normal appearance  HENT:      Head: Normocephalic and atraumatic  Right Ear: External ear normal       Left Ear: External ear normal    Eyes:      Conjunctiva/sclera: Conjunctivae normal    Cardiovascular:      Rate and Rhythm: Normal rate and regular rhythm  Heart sounds: Normal heart sounds  Pulmonary:      Effort: Pulmonary effort is normal       Breath sounds: Normal breath sounds  Abdominal:      General: Bowel sounds are normal       Palpations: Abdomen is soft  Tenderness: There is abdominal tenderness ( slight tenderness to palpation in left lower quadrant) in the left lower quadrant  There is no guarding or rebound  Musculoskeletal:      Cervical back: Normal range of motion  Lumbar back: Spasms present  Decreased range of motion ( due to pain)  Positive right straight leg raise test    Skin:     General: Skin is warm and dry  Neurological:      Mental Status: She is alert and oriented to person, place, and time  Cranial Nerves: No cranial nerve deficit     Psychiatric:         Mood and Affect: Mood normal          Behavior: Behavior normal

## 2022-07-15 NOTE — ASSESSMENT & PLAN NOTE
- prescription sent for Bentyl  Advised of side effects   - will obtain abdominal ultrasound  - will continue to follow-up

## 2022-07-16 ENCOUNTER — APPOINTMENT (EMERGENCY)
Dept: RADIOLOGY | Facility: HOSPITAL | Age: 62
End: 2022-07-16
Payer: COMMERCIAL

## 2022-07-16 ENCOUNTER — HOSPITAL ENCOUNTER (EMERGENCY)
Facility: HOSPITAL | Age: 62
Discharge: HOME | End: 2022-07-17
Attending: EMERGENCY MEDICINE | Admitting: EMERGENCY MEDICINE
Payer: COMMERCIAL

## 2022-07-16 VITALS
OXYGEN SATURATION: 98 % | SYSTOLIC BLOOD PRESSURE: 127 MMHG | DIASTOLIC BLOOD PRESSURE: 71 MMHG | HEART RATE: 76 BPM | TEMPERATURE: 99.1 F | RESPIRATION RATE: 18 BRPM

## 2022-07-16 DIAGNOSIS — M54.41 BILATERAL LOW BACK PAIN WITH RIGHT-SIDED SCIATICA, UNSPECIFIED CHRONICITY: Primary | ICD-10-CM

## 2022-07-16 PROCEDURE — 99284 EMERGENCY DEPT VISIT MOD MDM: CPT

## 2022-07-16 PROCEDURE — 99284 EMERGENCY DEPT VISIT MOD MDM: CPT | Performed by: EMERGENCY MEDICINE

## 2022-07-16 PROCEDURE — 71250 CT THORAX DX C-: CPT

## 2022-07-16 PROCEDURE — 74176 CT ABD & PELVIS W/O CONTRAST: CPT

## 2022-07-16 PROCEDURE — 36415 COLL VENOUS BLD VENIPUNCTURE: CPT

## 2022-07-16 PROCEDURE — G1004 CDSM NDSC: HCPCS

## 2022-07-16 PROCEDURE — 85025 COMPLETE CBC W/AUTO DIFF WBC: CPT

## 2022-07-16 RX ORDER — HYDROMORPHONE HCL/PF 1 MG/ML
0.5 SYRINGE (ML) INJECTION ONCE
Status: COMPLETED | OUTPATIENT
Start: 2022-07-16 | End: 2022-07-17

## 2022-07-17 ENCOUNTER — TELEPHONE (OUTPATIENT)
Dept: OTHER | Facility: OTHER | Age: 62
End: 2022-07-17

## 2022-07-17 LAB
ANION GAP SERPL CALCULATED.3IONS-SCNC: 7 MMOL/L (ref 4–13)
BASOPHILS # BLD AUTO: 0.08 THOUSANDS/ΜL (ref 0–0.1)
BASOPHILS NFR BLD AUTO: 1 % (ref 0–1)
BUN SERPL-MCNC: 22 MG/DL (ref 5–25)
CALCIUM SERPL-MCNC: 9.3 MG/DL (ref 8.3–10.1)
CHLORIDE SERPL-SCNC: 107 MMOL/L (ref 100–108)
CO2 SERPL-SCNC: 27 MMOL/L (ref 21–32)
CREAT SERPL-MCNC: 0.84 MG/DL (ref 0.6–1.3)
EOSINOPHIL # BLD AUTO: 0.17 THOUSAND/ΜL (ref 0–0.61)
EOSINOPHIL NFR BLD AUTO: 3 % (ref 0–6)
ERYTHROCYTE [DISTWIDTH] IN BLOOD BY AUTOMATED COUNT: 12.5 % (ref 11.6–15.1)
GFR SERPL CREATININE-BSD FRML MDRD: 74 ML/MIN/1.73SQ M
GLUCOSE SERPL-MCNC: 105 MG/DL (ref 65–140)
HCT VFR BLD AUTO: 38.7 % (ref 34.8–46.1)
HGB BLD-MCNC: 12.4 G/DL (ref 11.5–15.4)
IMM GRANULOCYTES # BLD AUTO: 0.01 THOUSAND/UL (ref 0–0.2)
IMM GRANULOCYTES NFR BLD AUTO: 0 % (ref 0–2)
LYMPHOCYTES # BLD AUTO: 1.98 THOUSANDS/ΜL (ref 0.6–4.47)
LYMPHOCYTES NFR BLD AUTO: 29 % (ref 14–44)
MCH RBC QN AUTO: 32.5 PG (ref 26.8–34.3)
MCHC RBC AUTO-ENTMCNC: 32 G/DL (ref 31.4–37.4)
MCV RBC AUTO: 102 FL (ref 82–98)
MONOCYTES # BLD AUTO: 0.39 THOUSAND/ΜL (ref 0.17–1.22)
MONOCYTES NFR BLD AUTO: 6 % (ref 4–12)
NEUTROPHILS # BLD AUTO: 4.1 THOUSANDS/ΜL (ref 1.85–7.62)
NEUTS SEG NFR BLD AUTO: 61 % (ref 43–75)
NRBC BLD AUTO-RTO: 0 /100 WBCS
PLATELET # BLD AUTO: 194 THOUSANDS/UL (ref 149–390)
PMV BLD AUTO: 11.3 FL (ref 8.9–12.7)
POTASSIUM SERPL-SCNC: 5.3 MMOL/L (ref 3.5–5.3)
RBC # BLD AUTO: 3.81 MILLION/UL (ref 3.81–5.12)
SODIUM SERPL-SCNC: 141 MMOL/L (ref 136–145)
WBC # BLD AUTO: 6.73 THOUSAND/UL (ref 4.31–10.16)

## 2022-07-17 PROCEDURE — 80048 BASIC METABOLIC PNL TOTAL CA: CPT

## 2022-07-17 PROCEDURE — 96374 THER/PROPH/DIAG INJ IV PUSH: CPT

## 2022-07-17 RX ORDER — OXYCODONE HYDROCHLORIDE 5 MG/1
5 TABLET ORAL EVERY 4 HOURS PRN
Qty: 18 TABLET | Refills: 0 | Status: SHIPPED | OUTPATIENT
Start: 2022-07-17

## 2022-07-17 RX ORDER — ATORVASTATIN CALCIUM 10 MG/1
10 TABLET, FILM COATED ORAL
Status: DISCONTINUED | OUTPATIENT
Start: 2022-07-17 | End: 2022-07-17 | Stop reason: HOSPADM

## 2022-07-17 RX ADMIN — HYDROMORPHONE HYDROCHLORIDE 0.5 MG: 1 INJECTION, SOLUTION INTRAMUSCULAR; INTRAVENOUS; SUBCUTANEOUS at 00:10

## 2022-07-17 NOTE — ED PROVIDER NOTES
History  Chief Complaint   Patient presents with    Back Pain     Mid upper back pain and lower back pain, being treated by PCP but reports pain is now unbearable  This is a 79-year-old female a past medical history of osteoporosis, right-sided sciatica lumbar pain who is presenting to the ED today with tentative 10/10 lower lumbar and thoracic pain  Patient states that she has been being seen by her PCP for her sciatica/lumbar pain, however pain has increased to a 10 recently  The pain starts in her lower back goes to her right buttocks down to her right knee and stops  She characterizes the pain as a burning shooting stabbing pain  steroids and muscle relaxers she was given have not been helping  Patient has tried Tylenol but it has not touched pain  She can find minimal relief when sitting on her left buttocks however all movements and positions cause her pain  She denies any fever, chills, saddle anesthesia, loss of muscle bulk,  uncoordination, or confusion  Denies any recent trauma or injury to the area  Prior to Admission Medications   Prescriptions Last Dose Informant Patient Reported? Taking?    Ascorbic Acid (VITAMIN C) 100 MG CHEW  Self Yes No   Sig: Chew   Biotin 90880 MCG TABS  Self Yes No   Sig: Take by mouth daily     Calcium Carbonate (CALCI-CHEW PO)  Self Yes No   Sig: Take 500 mg by mouth 2 (two) times a day   Cyanocobalamin (VITAMIN B 12 PO)   Yes No   Sig: Take 1,000 mcg by mouth   Diclofenac Sodium (VOLTAREN) 1 %   No No   Sig: Apply 2 g topically 4 (four) times a day   Eliquis 2 5 MG   No No   Sig: take 1 tablet by mouth twice a day   Ferrous Sulfate  (45 Fe) MG TBCR  Self Yes No   Sig: Take 1 tablet by mouth daily   Patient not taking: Reported on 7/5/2022   Lysine HCl POWD  Self Yes No   Sig: Take by mouth   Patient not taking: No sig reported   Multiple Vitamin (MULTIVITAMIN) capsule  Self Yes No   Sig: Take 1 capsule by mouth daily 2 chewies daily   alendronate (FOSAMAX) 70 mg tablet   No No   Sig: Take 1 tablet (70 mg total) by mouth every 7 days take with a full glass of water on an empty stomach, and do not lie down for 30 minutes after   atorvastatin (LIPITOR) 10 mg tablet   No No   Sig: Take 1 tablet (10 mg total) by mouth daily   cholecalciferol (VITAMIN D3) 1,000 units tablet  Self Yes No   Sig: Take 5,000 Units by mouth 3 (three) times a day     clobetasol (TEMOVATE) 0 05 % cream  Self Yes No   Sig: Apply to affected areas daily x 12 weeks, then twice weekly for maintenance   dicyclomine (BENTYL) 10 mg capsule   No No   Sig: Take 1 capsule (10 mg total) by mouth 4 (four) times a day (before meals and at bedtime)   fluticasone (FLONASE) 50 mcg/act nasal spray   No No   Sig: instill 1 spray into each nostril once daily   guaifenesin-codeine (GUAIFENESIN AC) 100-10 MG/5ML liquid   No No   Sig: Take 5 mL by mouth 3 (three) times a day as needed for cough   Patient not taking: No sig reported   methocarbamol (ROBAXIN) 500 mg tablet   No No   Sig: Take 1 tablet (500 mg total) by mouth 2 (two) times a day   Patient not taking: Reported on 7/5/2022   methocarbamol (Robaxin-750) 750 mg tablet   No No   Sig: Take 1 tablet (750 mg total) by mouth 2 (two) times a day as needed for muscle spasms   metoprolol succinate (TOPROL-XL) 25 mg 24 hr tablet   No No   Sig: Take 1 tablet (25 mg total) by mouth daily   nystatin (MYCOSTATIN) powder   No No   Sig: Apply topically 2 (two) times a day   pantoprazole (PROTONIX) 20 mg tablet   No No   Sig: take 1 tablet by mouth once daily   Patient not taking: Reported on 7/5/2022   traZODone (DESYREL) 50 mg tablet   No No   Sig: take 1 tablet by mouth at bedtime if needed for sleep   valACYclovir (VALTREX) 1,000 mg tablet   No No   Sig: Take 2 tablets (2,000 mg total) by mouth 2 (two) times a day for 1 day      Facility-Administered Medications: None       Past Medical History:   Diagnosis Date    Anemia     iron def    Arthritis     knees  Carpal tunnel syndrome of right wrist     Chest pain     Colon polyp     Depression     at times    DVT (deep venous thrombosis) ([address]     Dysphagia     Esophageal reflux 2011    Grade 1 out of 6 intensity murmur 2019    Heart murmur     History of transfusion 1980    Pacemaker 2018    Pneumonia     age 15    Psychiatric disorder     Trigger finger, left        Past Surgical History:   Procedure Laterality Date    BACK SURGERY      CARDIAC PACEMAKER PLACEMENT  2018    CERVICAL DISC SURGERY  2009    PLATES & SCREWS     SECTION      X2    COLONOSCOPY      GASTRIC BYPASS  2017    LAP RINYGB SURGERY    KNEE ARTHROSCOPY      KNEE SURGERY Bilateral     KNEE SURGERY Left 2020    OTHER SURGICAL HISTORY      ARM SURGERIES    IA INCISE FINGER TENDON SHEATH Left 2018    Procedure: LONG TRIGGER FINGER RELEASE;  Surgeon: Ca Davila MD;  Location: [facility];  Service: Orthopedics    IA WRIST Rosemary Bakari LIG Right 2018    Procedure: ENDOSCOPIC CARPAL TUNNEL RELEASE;  Surgeon: Ca Davila MD;  Location: [facility];  Service: Orthopedics    TONSILLECTOMY      UPPER GASTROINTESTINAL ENDOSCOPY         Family History   Problem Relation Age of Onset    Stroke Mother     Alzheimer's disease Mother     Arthritis Mother     Coronary artery disease Mother     Breast cancer Mother 77    Cancer Mother     Heart disease Mother     Hypertension Father     Gout Father     Diabetes type II Father     Coronary artery disease Father     Heart disease Father     No Known Problems Sister     No Known Problems Daughter     No Known Problems Maternal Grandmother     No Known Problems Maternal Grandfather     No Known Problems Paternal Grandmother     No Known Problems Paternal Grandfather     Prostate cancer Brother     No Known Problems Sister     No Known Problems Sister     No Known Problems Maternal Aunt     No Known Problems Maternal Aunt     No Known Problems Maternal Aunt     No Known Problems Maternal Aunt     Cancer Maternal Aunt     No Known Problems Paternal Aunt     No Known Problems Paternal Aunt      I have reviewed and agree with the history as documented  E-Cigarette/Vaping    E-Cigarette Use Never User      E-Cigarette/Vaping Substances    Nicotine No     THC No     CBD No     Flavoring No     Other No     Unknown No      Social History     Tobacco Use    Smoking status: Former Smoker     Packs/day: 0 25     Types: Cigarettes    Smokeless tobacco: Never Used   Vaping Use    Vaping Use: Never used   Substance Use Topics    Alcohol use: Yes     Alcohol/week: 2 0 standard drinks     Types: 1 Glasses of wine, 1 Standard drinks or equivalent per week     Comment: very rare social    Drug use: No        Review of Systems   Constitutional: Positive for activity change  Negative for appetite change, diaphoresis, fever and unexpected weight change  HENT: Negative for sore throat and trouble swallowing  Eyes: Positive for visual disturbance  Respiratory: Negative for cough and chest tightness  Cardiovascular: Negative for chest pain and leg swelling  Gastrointestinal: Positive for abdominal pain  Negative for anal bleeding, blood in stool, constipation, diarrhea, nausea, rectal pain and vomiting  Genitourinary: Negative for dyspareunia, hematuria and pelvic pain  Musculoskeletal: Positive for back pain  Negative for arthralgias and joint swelling  Skin: Negative for rash  Neurological: Positive for numbness  Negative for dizziness and syncope  Psychiatric/Behavioral: Negative for confusion         Physical Exam  ED Triage Vitals   Temperature Pulse Respirations Blood Pressure SpO2   07/16/22 2230 07/16/22 2230 07/16/22 2230 07/16/22 2231 07/16/22 2230   99 1 °F (37 3 °C) 76 18 127/71 98 %      Temp src Heart Rate Source Patient Position - Orthostatic VS BP Location FiO2 (%)   -- -- -- -- --             Pain Score       07/16/22 2230       9             Orthostatic Vital Signs  Vitals:    07/16/22 2230 07/16/22 2231   BP:  127/71   Pulse: 76        Physical Exam  Constitutional:       Appearance: Normal appearance  HENT:      Head: Normocephalic and atraumatic  Right Ear: External ear normal       Left Ear: External ear normal       Nose: Nose normal       Mouth/Throat:      Mouth: Mucous membranes are moist       Pharynx: Oropharynx is clear  Eyes:      Extraocular Movements: Extraocular movements intact  Conjunctiva/sclera: Conjunctivae normal       Pupils: Pupils are equal, round, and reactive to light  Cardiovascular:      Rate and Rhythm: Normal rate and regular rhythm  Pulses: Normal pulses  Pulmonary:      Effort: Pulmonary effort is normal    Abdominal:      General: There is no distension  Palpations: Abdomen is soft  Comments: Tenderness in the left lower quadrant to deep palpation   Musculoskeletal:         General: No swelling or deformity  Cervical back: Normal range of motion and neck supple  Right lower leg: No edema  Left lower leg: No edema  Comments: Decreased range of motion about thoracic and lumbar spine secondary to patient pain  Midline spinal tenderness T7 and L2 through 5 vertebra  Patient able to eat resistance in all planes of motion of the lower extremities  However patient is somewhat limited by pain on the right side in hip flexion/extension, and leg extension/flexion   Skin:     General: Skin is warm and dry  Capillary Refill: Capillary refill takes less than 2 seconds  Comments: Some varicose veins noted on both legs   Neurological:      Mental Status: She is alert and oriented to person, place, and time        Comments: Patient complains of decreased sensation to light touch on the right L 1-3 dermatomes  Patient slow-moving gait secondary to pain   Psychiatric:         Mood and Affect: Mood normal          Thought Content:  Thought content normal          ED Medications  Medications   apixaban (ELIQUIS) tablet 2 5 mg (has no administration in time range)   atorvastatin (LIPITOR) tablet 10 mg (has no administration in time range)   HYDROmorphone (DILAUDID) injection 0 5 mg (0 5 mg Intravenous Given 7/17/22 0010)       Diagnostic Studies  Results Reviewed     Procedure Component Value Units Date/Time    Basic metabolic panel [[id number]] Collected: 07/17/22 0007    Lab Status: Final result Specimen: Blood from Hand, Right Updated: 07/17/22 0109     Sodium 141 mmol/L      Potassium 5 3 mmol/L      Chloride 107 mmol/L      CO2 27 mmol/L      ANION GAP 7 mmol/L      BUN 22 mg/dL      Creatinine 0 84 mg/dL      Glucose 105 mg/dL      Calcium 9 3 mg/dL      eGFR 74 ml/min/1 73sq m     Narrative:      [place] guidelines for Chronic Kidney Disease (CKD):     Stage 1 with normal or high GFR (GFR > 90 mL/min/1 73 square meters)    Stage 2 Mild CKD (GFR = 60-89 mL/min/1 73 square meters)    Stage 3A Moderate CKD (GFR = 45-59 mL/min/1 73 square meters)    Stage 3B Moderate CKD (GFR = 30-44 mL/min/1 73 square meters)    Stage 4 Severe CKD (GFR = 15-29 mL/min/1 73 square meters)    Stage 5 End Stage CKD (GFR <15 mL/min/1 73 square meters)  Note: GFR calculation is accurate only with a steady state creatinine    CBC and differential [[id number]]  (Abnormal) Collected: 07/16/22 2359    Lab Status: Final result Specimen: Blood from Arm, Left Updated: 07/17/22 0006     WBC 6 73 Thousand/uL      RBC 3 81 Million/uL      Hemoglobin 12 4 g/dL      Hematocrit 38 7 %       fL      MCH 32 5 pg      MCHC 32 0 g/dL      RDW 12 5 %      MPV 11 3 fL      Platelets 234 Thousands/uL      nRBC 0 /100 WBCs      Neutrophils Relative 61 %      Immat GRANS % 0 %      Lymphocytes Relative 29 %      Monocytes Relative 6 %      Eosinophils Relative 3 %      Basophils Relative 1 %      Neutrophils Absolute 4 10 Thousands/µL      Immature Grans Absolute 0 01 Thousand/uL      Lymphocytes Absolute 1 98 Thousands/µL      Monocytes Absolute 0 39 Thousand/µL      Eosinophils Absolute 0 17 Thousand/µL      Basophils Absolute 0 08 Thousands/µL                  CT chest abdomen pelvis wo contrast   Final Result by Yaritza Tyler MD (07/17 0040)      No evidence of acute pathology throughout the chest, abdomen or pelvis                  Workstation performed: [id number]         CT recon only thoracolumbar (No Charge)   Final Result by Yaritza Tyler MD (07/17 0048)      No fracture or traumatic subluxation  Workstation performed: [id number]               Procedures  Procedures      ED Course                                       MDM  Number of Diagnoses or Management Options  Bilateral low back pain with right-sided sciatica, unspecified chronicity  Diagnosis management comments: This is a 26-year-old female a past medical history of osteoporosis, right-sided sciatica lumbar pain who is presenting to the ED today with tentative 10/10 lower lumbar and thoracic pain  Patient's clinical picture concerning given there are multiple areas tenderness and pain in her spine  Patient has a history degenerative disc disease with spinal fusion in the cervical spine  Given patient's physical exam concern for potentially referred pain from left lower quadrant abdominal pathology  CT abdomen pelvis lumbar spine reconstruction ordered  CT chest ordered with thoracic spine reconstruction  Patient given IV Dilaudid for pain  Will reassess  Patient feeling much better after medication    CT of thorax abdomen pelvis negative for any acute processes only finding of note Grade 1 anterolisthesis of L4 and L5     Patient okay with being discharged and will follow up outpatient      Disposition  Final diagnoses:   Bilateral low back pain with right-sided sciatica, unspecified chronicity     Time reflects when diagnosis was documented in both MDM as applicable and the Disposition within this note     Time User Action Codes Description Comment    7/17/2022  1:31 AM Jocelyn MARTINEZ Add [M54 41] Bilateral low back pain with right-sided sciatica, unspecified chronicity       ED Disposition     ED Disposition   Discharge    Condition   Stable    Date/Time   Sun Jul 17, 2022  1:31 AM    Comment   Tyson Roger discharge to home/self care  Follow-up Information     Follow up With Specialties Details Why Contact Info Additional Information    Mir Soriano Nurse Practitioner Call  As needed [id number] [phone]       [facility] Call in 1 day As needed [address] [address] [phone] [address] [facility] [address], [address]   [phone]  Please report to [address] located on the 2nd floor to check in, however if your appointment is for an EMG, please register in Out Patient Registration located on the 1st floor  Patient's Medications   Discharge Prescriptions    OXYCODONE (ROXICODONE) 5 IMMEDIATE RELEASE TABLET    Take 1 tablet (5 mg total) by mouth every 4 (four) hours as needed for moderate pain for up to 18 doses Max Daily Amount: 30 mg       Start Date: 7/17/2022 End Date: --       Order Dose: 5 mg       Quantity: 18 tablet    Refills: 0     No discharge procedures on file  PDMP Review       Value Time User    PDMP Reviewed  Yes 2/1/2022 10:05 AM Carlos Saleh MD           ED Provider  Attending physically available and evaluated Tyson Roger  I managed the patient along with the ED Attending      Electronically Signed by         León Nagy DO  07/17/22 Calin Velazquez DO  07/17/22 0159

## 2022-07-17 NOTE — DISCHARGE INSTRUCTIONS
You were seen in the emergency department for intense right-sided lower back and sciatic pain  A CT of your thoracic and lumbar spine was performed as well of years thorax abdomen and pelvis  There is no acute pathology noted  Of note there was the grade 1 spondylolisthesis of your L4-L5 vertebra  You were given pain medication to control your symptoms  You have been given a contact information for the spine and pain specialist   Please follow-up with them  If your symptoms get worse the pain there pretty new or worsening symptoms please return for re-evaluation here in the emergency room

## 2022-07-17 NOTE — ED ATTENDING ATTESTATION
7/16/2022  ICarina MD, saw and evaluated the patient  I have discussed the patient with the resident/non-physician practitioner and agree with the resident's/non-physician practitioner's findings, Plan of Care, and MDM as documented in the resident's/non-physician practitioner's note, except where noted  All available labs and Radiology studies were reviewed  I was present for key portions of any procedure(s) performed by the resident/non-physician practitioner and I was immediately available to provide assistance  At this point I agree with the current assessment done in the Emergency Department  I have conducted an independent evaluation of this patient a history and physical is as follows:    ED Course     Patient is a 78-year-old female who presents with worsening thoracic and lumbar back pain  Patient states that the pain is been currently treated under treatment by her primary care provider  Patient states pain is gotten worse despite outpatient treatment  Pain is located mostly in her upper thoracic and lumbar back  Patient does admit to right lower extremity numbness  Denies weakness denies any bowel or bladder incontinence denies any fevers or chills denies any nausea vomiting  Patient also admits to some lower abdominal pain  Patient has not had prior imaging of this area  Patient has a prior cervical spine surgery including bleeding years ago also history of bariatric surgery  Vital signs reviewed patient is point tender in the upper thoracic spine as well as lower lumbar spine  There is no erythema there no rash  Heart regular rate rhythm  Lungs clear to auscultation bilaterally  Abdomen soft lower quadrant tenderness to palpation  No guarding or rebound noted    Upper extremity strength and sensation is 5/5 grossly intact with no focal deficits lower extremities bilateral lower extremities 5/5 strength there is numbness noted per patient on the right lower extremity EHLs and plantar flexors 5/5 bilaterally  Impression:  Thoracic and lumbar back pain  Will check CT imaging pain control reassess if negative ED workup anticipate discharge with outpatient comprehensive Spine follow-up      Return precautions given    Critical Care Time  Procedures

## 2022-07-19 ENCOUNTER — REMOTE DEVICE CLINIC VISIT (OUTPATIENT)
Dept: CARDIOLOGY CLINIC | Facility: CLINIC | Age: 62
End: 2022-07-19
Payer: COMMERCIAL

## 2022-07-19 ENCOUNTER — OFFICE VISIT (OUTPATIENT)
Dept: PHYSICAL THERAPY | Age: 62
End: 2022-07-19
Payer: COMMERCIAL

## 2022-07-19 DIAGNOSIS — Z95.0 PRESENCE OF PERMANENT CARDIAC PACEMAKER: Primary | ICD-10-CM

## 2022-07-19 DIAGNOSIS — M54.31 SCIATIC PAIN, RIGHT: Primary | ICD-10-CM

## 2022-07-19 PROCEDURE — 93296 REM INTERROG EVL PM/IDS: CPT | Performed by: INTERNAL MEDICINE

## 2022-07-19 PROCEDURE — 97140 MANUAL THERAPY 1/> REGIONS: CPT

## 2022-07-19 PROCEDURE — 93294 REM INTERROG EVL PM/LDLS PM: CPT | Performed by: INTERNAL MEDICINE

## 2022-07-19 PROCEDURE — 97112 NEUROMUSCULAR REEDUCATION: CPT

## 2022-07-19 NOTE — PROGRESS NOTES
Daily Note     Today's date: 2022  Patient name: Humberto Diaz  : 1960  MRN: [id number]  Referring provider: DANIELA Esteves  Dx:   Encounter Diagnosis     ICD-10-CM    1  Sciatic pain, right  M54 31                   Subjective: pt reports she had to go to ER on Saturday due to increased pain, pt reports she was given IV pain med which helped and is now taking 5 mg oxycodone up to 4 x/day which is helping but has to see ortho Dr Allan Mccauley for follow up  PAS  Across upper back is 5/10 at present; PAS 3/10 LB    Objective: See treatment diary below      Assessment:  Increased pain with passive stretching (R>L), pt able to perform ex with modifications, pt amb with AD but very slow/guarded, pt txfers with guarded mobility    Plan: Continue per plan of care        Precautions: hx DVT, cardiac       Manuals 22            Gentle piriformis stretch  VK                                                    Neuro Re-Ed             Sciatic nerve glide  2x10 seated            GS 3x10x5"            Mini bridge  2x10 no hold            Hip 3-way standing  np                                                   Ther Ex             Nustep Gentle 5' no UE                                                                                                       Ther Activity                                       Gait Training                                       Modalities

## 2022-07-19 NOTE — PROGRESS NOTES
MDT-DUAL PPM/ACTIVE SYSTEM IS MRI CONDITIONAL   CARELINK TRANSMISSION:  BATTERY VOLTAGE ADEQUATE (11 0 YR )   AP 25 4%  <0 1%    ALL LEAD PARAMETERS WITHIN NORMAL LIMITS   2 SVT-ST EPISODES WITH -158 BPM   NORMAL DEVICE FUNCTION   RG

## 2022-07-20 ENCOUNTER — OFFICE VISIT (OUTPATIENT)
Dept: RHEUMATOLOGY | Facility: CLINIC | Age: 62
End: 2022-07-20
Payer: COMMERCIAL

## 2022-07-20 VITALS
SYSTOLIC BLOOD PRESSURE: 128 MMHG | DIASTOLIC BLOOD PRESSURE: 80 MMHG | BODY MASS INDEX: 31.76 KG/M2 | HEIGHT: 64 IN | WEIGHT: 186 LBS

## 2022-07-20 DIAGNOSIS — M81.0 OSTEOPOROSIS, UNSPECIFIED OSTEOPOROSIS TYPE, UNSPECIFIED PATHOLOGICAL FRACTURE PRESENCE: ICD-10-CM

## 2022-07-20 DIAGNOSIS — M85.80 OSTEOPENIA, UNSPECIFIED LOCATION: Primary | ICD-10-CM

## 2022-07-20 PROCEDURE — 96372 THER/PROPH/DIAG INJ SC/IM: CPT

## 2022-07-20 NOTE — PROGRESS NOTES
Prolia injection was successfully administered subcutaneously in patient's arm, which was tolerated without any adverse event

## 2022-07-21 ENCOUNTER — OFFICE VISIT (OUTPATIENT)
Dept: OBGYN CLINIC | Facility: CLINIC | Age: 62
End: 2022-07-21
Payer: COMMERCIAL

## 2022-07-21 VITALS
BODY MASS INDEX: 31.76 KG/M2 | SYSTOLIC BLOOD PRESSURE: 118 MMHG | DIASTOLIC BLOOD PRESSURE: 72 MMHG | HEIGHT: 64 IN | WEIGHT: 186 LBS

## 2022-07-21 DIAGNOSIS — M54.41 ACUTE RIGHT-SIDED LOW BACK PAIN WITH RIGHT-SIDED SCIATICA: Primary | ICD-10-CM

## 2022-07-21 PROCEDURE — 99214 OFFICE O/P EST MOD 30 MIN: CPT | Performed by: PHYSICIAN ASSISTANT

## 2022-07-21 RX ORDER — PREDNISONE 10 MG/1
TABLET ORAL
Qty: 42 TABLET | Refills: 0 | Status: SHIPPED | OUTPATIENT
Start: 2022-07-21

## 2022-07-21 NOTE — PROGRESS NOTES
Patient Name:  Mason Da Silva  MRN:  [id number]    Assessment & Plan     Axial thoracolumbar spine pain with right-sided sacral/radicular symptoms  1  Patient notes persistent symptoms despite conservative measures including prednisone burst, activity modification, physical therapy  2  MRI of the lumbar spine will be obtained due to persistent symptoms  3  Continue physical therapy  4  Prescription for prednisone taper  5  Follow-up after MRI with pain management    Chief Complaint     Low back pain    History of the Present Illness     Mason Da Silva is a 60 y o  female who reports to the office today for evaluation of her thoracolumbar spine  She notes an onset of pain approximately six weeks ago  She denies any injury or trauma  She notes pain primarily in the axial thoracolumbar spine  Pain is constant but worse with all activities  She does note radiation of the pain distally along the right lower extremity to the knee  She notes associated numbness and tingling well  She notes weakness secondary to pain  She denies any bowel or bladder dysfunction  No saddle paresthesias  She denies any gait or balance disturbances  Prior treatment includes prednisone burst, Tylenol, muscle relaxers, oxycodone, and physical therapy without significant improvement  Physical Exam     /72   Ht 5' 4" (1 626 m)   Wt 84 4 kg (186 lb)   LMP 09/18/2014   BMI 31 93 kg/m²     Thoracolumbar spine:  No gross deformity  Tenderness to palpation paraspinal musculature  No tenderness to palpation midline thoracolumbar spine  No tenderness to palpation bilateral SI joints  There is tenderness palpation right piriformis musculature  Motor intact L2-S1 bilaterally 5/5 strength  Sensation diminished throughout the entire right lower extremity  Negative STEPHANY test bilaterally  Negative straight leg raise bilaterally  Eyes: Anicteric sclerae  ENT: Trachea midline    Lungs: Normal respiratory effort  CV: Capillary refill is less than 2 seconds  Skin: Intact without erythema  Lymph: No palpable lymphadenopathy  Neuro: Sensation is grossly intact to light touch  Psych: Mood and affect are appropriate  Data Review     I have personally reviewed pertinent films in PACS, and my interpretation follows:    CT scan thoracolumbar spine 22: Multilevel DDD with grade 1 spondylolisthesis L4 on L5  No fracture noted      Past Medical History:   Diagnosis Date    Anemia     iron def    Arthritis     knees    Carpal tunnel syndrome of right wrist     Chest pain     Colon polyp     Depression     at times    DVT (deep venous thrombosis) ([facility] [address] )     Dysphagia     Esophageal reflux 2011    Grade 1 out of 6 intensity murmur 2019    Heart murmur     History of transfusion 1980    Pacemaker 2018    Pneumonia     age 15    Psychiatric disorder     Trigger finger, left        Past Surgical History:   Procedure Laterality Date    BACK SURGERY      CARDIAC PACEMAKER PLACEMENT  2018    CERVICAL [address] SURGERY  2009    PLATES & SCREWS     SECTION      X2    COLONOSCOPY      GASTRIC BYPASS  2017    LAP RINYGB SURGERY    KNEE ARTHROSCOPY      KNEE SURGERY Bilateral     KNEE SURGERY Left 2020    OTHER SURGICAL HISTORY      ARM SURGERIES    MD INCISE FINGER TENDON SHEATH Left 2018    Procedure: LONG TRIGGER FINGER RELEASE;  Surgeon: Jacqueline Gunderson MD;  Location: [facility];  Service: Orthopedics    MD WRIST Jacquelyn Velasco LIG Right 2018    Procedure: ENDOSCOPIC CARPAL TUNNEL RELEASE;  Surgeon: Jacqueline Gunderson MD;  Location: [facility];  Service: Orthopedics    TONSILLECTOMY      UPPER GASTROINTESTINAL ENDOSCOPY         No Known Allergies    Current Outpatient Medications on File Prior to Visit   Medication Sig Dispense Refill    Ascorbic Acid (VITAMIN C) 100 MG CHEW Chew      atorvastatin (LIPITOR) 10 mg tablet Take 1 tablet (10 mg total) by mouth daily 90 tablet 0    Biotin 19918 MCG TABS Take by mouth daily        Calcium Carbonate (CALCI-CHEW PO) Take 500 mg by mouth 2 (two) times a day      cholecalciferol (VITAMIN D3) 1,000 units tablet Take 5,000 Units by mouth 3 (three) times a day        clobetasol (TEMOVATE) 0 05 % cream Apply to affected areas daily x 12 weeks, then twice weekly for maintenance      Cyanocobalamin (VITAMIN B 12 PO) Take 1,000 mcg by mouth      Diclofenac Sodium (VOLTAREN) 1 % Apply 2 g topically 4 (four) times a day 100 g 6    dicyclomine (BENTYL) 10 mg capsule Take 1 capsule (10 mg total) by mouth 4 (four) times a day (before meals and at bedtime) 90 capsule 0    Eliquis 2 5 MG take 1 tablet by mouth twice a day 60 tablet 3    Ferrous Sulfate  (45 Fe) MG TBCR Take 1 tablet by mouth daily      fluticasone (FLONASE) 50 mcg/act nasal spray instill 1 spray into each nostril once daily 32 g 1    Lysine HCl POWD Take by mouth      methocarbamol (Robaxin-750) 750 mg tablet Take 1 tablet (750 mg total) by mouth 2 (two) times a day as needed for muscle spasms 30 tablet 0    metoprolol succinate (TOPROL-XL) 25 mg 24 hr tablet Take 1 tablet (25 mg total) by mouth daily 90 tablet 0    Multiple Vitamin (MULTIVITAMIN) capsule Take 1 capsule by mouth daily 2 chewies daily      nystatin (MYCOSTATIN) powder Apply topically 2 (two) times a day 30 g 0    oxyCODONE (Roxicodone) 5 immediate release tablet Take 1 tablet (5 mg total) by mouth every 4 (four) hours as needed for moderate pain for up to 18 doses Max Daily Amount: 30 mg 18 tablet 0    traZODone (DESYREL) 50 mg tablet take 1 tablet by mouth at bedtime if needed for sleep 30 tablet 0    guaifenesin-codeine (GUAIFENESIN AC) 100-10 MG/5ML liquid Take 5 mL by mouth 3 (three) times a day as needed for cough (Patient not taking: No sig reported) 180 mL 0    methocarbamol (ROBAXIN) 500 mg tablet Take 1 tablet (500 mg total) by mouth 2 (two) times a day (Patient not taking: No sig reported) 20 tablet 0    pantoprazole (PROTONIX) 20 mg tablet take 1 tablet by mouth once daily (Patient not taking: No sig reported) 30 tablet 3    valACYclovir (VALTREX) 1,000 mg tablet Take 2 tablets (2,000 mg total) by mouth 2 (two) times a day for 1 day 30 tablet 0     Current Facility-Administered Medications on File Prior to Visit   Medication Dose Route Frequency Provider Last Rate Last Admin    [COMPLETED] denosumab (PROLIA) subcutaneous injection 60 mg  60 mg Subcutaneous Once Carolina Page MD   60 mg at 07/20/22 1200       Social History     Tobacco Use    Smoking status: Former Smoker     Packs/day: 0 25     Types: Cigarettes    Smokeless tobacco: Never Used   Vaping Use    Vaping Use: Never used   Substance Use Topics    Alcohol use: Yes     Alcohol/week: 2 0 standard drinks     Types: 1 Glasses of wine, 1 Standard drinks or equivalent per week     Comment: very rare social    Drug use: No       Family History   Problem Relation Age of Onset    Stroke Mother     Alzheimer's disease Mother     Arthritis Mother     Coronary artery disease Mother     Breast cancer Mother 77    Cancer Mother     Heart disease Mother     Hypertension Father     Gout Father     Diabetes type II Father     Coronary artery disease Father     Heart disease Father     No Known Problems Sister     No Known Problems Daughter     No Known Problems Maternal Grandmother     No Known Problems Maternal Grandfather     No Known Problems Paternal Grandmother     No Known Problems Paternal Grandfather     Prostate cancer Brother     No Known Problems Sister     No Known Problems Sister     No Known Problems Maternal Aunt     No Known Problems Maternal Aunt     No Known Problems Maternal Aunt     No Known Problems Maternal Aunt     Cancer Maternal Aunt     No Known Problems Paternal Aunt     No Known Problems Paternal Aunt        Review of Systems     As stated in the HPI   All other systems reviewed and are negative

## 2022-07-22 ENCOUNTER — OFFICE VISIT (OUTPATIENT)
Dept: FAMILY MEDICINE CLINIC | Facility: CLINIC | Age: 62
End: 2022-07-22
Payer: COMMERCIAL

## 2022-07-22 VITALS
TEMPERATURE: 97.7 F | WEIGHT: 188 LBS | HEIGHT: 64 IN | HEART RATE: 85 BPM | RESPIRATION RATE: 16 BRPM | OXYGEN SATURATION: 97 % | BODY MASS INDEX: 32.1 KG/M2 | DIASTOLIC BLOOD PRESSURE: 68 MMHG | SYSTOLIC BLOOD PRESSURE: 108 MMHG

## 2022-07-22 DIAGNOSIS — I49.5 SICK SINUS SYNDROME (HCC): ICD-10-CM

## 2022-07-22 DIAGNOSIS — M54.41 ACUTE RIGHT-SIDED LOW BACK PAIN WITH RIGHT-SIDED SCIATICA: Primary | ICD-10-CM

## 2022-07-22 DIAGNOSIS — I82.4Y9 DEEP VEIN THROMBOSIS (DVT) OF PROXIMAL LOWER EXTREMITY, UNSPECIFIED CHRONICITY, UNSPECIFIED LATERALITY (HCC): ICD-10-CM

## 2022-07-22 DIAGNOSIS — E78.49 OTHER HYPERLIPIDEMIA: ICD-10-CM

## 2022-07-22 DIAGNOSIS — Z12.31 ENCOUNTER FOR SCREENING MAMMOGRAM FOR MALIGNANT NEOPLASM OF BREAST: ICD-10-CM

## 2022-07-22 PROCEDURE — 99214 OFFICE O/P EST MOD 30 MIN: CPT | Performed by: NURSE PRACTITIONER

## 2022-07-22 NOTE — ASSESSMENT & PLAN NOTE
- Discussed healthy diet and regular exercise  - Continue Lipitor daily   - Patient will obtain updated fasting lipid panel next week

## 2022-07-22 NOTE — PROGRESS NOTES
Assessment/Plan:    Sick sinus syndrome ([facility] [address] )  - Pacemaker in place   - Continue routine follow up with Cardiology  Acute right-sided low back pain with right-sided sciatica  - Improving on prednisone taper   - Continue muscle relaxer as needed  - Will continue to monitor  Hyperlipidemia  - Discussed healthy diet and regular exercise  - Continue Lipitor daily   - Patient will obtain updated fasting lipid panel next week  Deep vein thrombosis (DVT) of proximal lower extremity (HCC)  - No evidence of acute DVT  - Patient on Eliquis daily  Continue  - Will continue to monitor  Diagnoses and all orders for this visit:    Acute right-sided low back pain with right-sided sciatica    Encounter for screening mammogram for malignant neoplasm of breast  -     Mammo screening bilateral w 3d & cad; Future    Sick sinus syndrome ([facility] [address] )    Other hyperlipidemia        Subjective:      Patient ID: Lajune Carrel is a 60 y o  female  Patient presents today for follow up  She was seen in the ER on 7/16 with complaints of low back pain and right sided sciatica unrelieved with steroids and muscle relaxers  She did also have LLQ pain  CT of her chest, abdomen, and pelvis was unremarkable  CT thoracolumbar showed no fracture or subluxation but did show grade 1 anterolisthesis of L4 and L5  She was seen by Ortho yesterday who prescribed a steroid taper  Her back pain is feeling much better  She states she does need an MRI but needs to check pacemaker compatibility  The following portions of the patient's history were reviewed and updated as appropriate: allergies, current medications, past family history, past medical history, past social history, past surgical history and problem list     Review of Systems   Constitutional: Negative for fatigue and fever  HENT: Negative for trouble swallowing  Eyes: Negative for visual disturbance  Respiratory: Negative for cough and shortness of breath  Cardiovascular: Negative for chest pain and palpitations  Gastrointestinal: Negative for abdominal pain and blood in stool  Endocrine: Negative for cold intolerance and heat intolerance  Genitourinary: Negative for difficulty urinating and dysuria  Musculoskeletal: Positive for back pain (improving)  Negative for gait problem  Skin: Negative for rash  Neurological: Negative for dizziness, syncope and headaches  Hematological: Negative for adenopathy  Psychiatric/Behavioral: Negative for behavioral problems  Objective:      /68 (BP Location: Left arm, Patient Position: Sitting, Cuff Size: Adult)   Pulse 85   Temp 97 7 °F (36 5 °C) (Tympanic)   Resp 16   Ht 5' 4" (1 626 m)   Wt 85 3 kg (188 lb)   LMP 09/18/2014   SpO2 97%   BMI 32 27 kg/m²          Physical Exam  Vitals and nursing note reviewed  Constitutional:       Appearance: Normal appearance  HENT:      Head: Normocephalic and atraumatic  Right Ear: External ear normal       Left Ear: External ear normal    Eyes:      Conjunctiva/sclera: Conjunctivae normal    Cardiovascular:      Rate and Rhythm: Normal rate and regular rhythm  Heart sounds: Normal heart sounds  Pulmonary:      Effort: Pulmonary effort is normal       Breath sounds: Normal breath sounds  Musculoskeletal:         General: Normal range of motion  Cervical back: Normal range of motion  Skin:     General: Skin is warm and dry  Neurological:      Mental Status: She is alert and oriented to person, place, and time  Cranial Nerves: No cranial nerve deficit     Psychiatric:         Mood and Affect: Mood normal          Behavior: Behavior normal

## 2022-07-24 DIAGNOSIS — G47.00 INSOMNIA, UNSPECIFIED TYPE: ICD-10-CM

## 2022-07-25 RX ORDER — TRAZODONE HYDROCHLORIDE 50 MG/1
TABLET ORAL
Qty: 30 TABLET | Refills: 1 | Status: SHIPPED | OUTPATIENT
Start: 2022-07-25 | End: 2022-09-26

## 2022-07-26 ENCOUNTER — OFFICE VISIT (OUTPATIENT)
Dept: PHYSICAL THERAPY | Age: 62
End: 2022-07-26
Payer: COMMERCIAL

## 2022-07-26 DIAGNOSIS — M54.31 SCIATIC PAIN, RIGHT: Primary | ICD-10-CM

## 2022-07-26 PROCEDURE — 97110 THERAPEUTIC EXERCISES: CPT | Performed by: PHYSICAL THERAPIST

## 2022-07-26 PROCEDURE — 97112 NEUROMUSCULAR REEDUCATION: CPT | Performed by: PHYSICAL THERAPIST

## 2022-07-26 PROCEDURE — 97140 MANUAL THERAPY 1/> REGIONS: CPT | Performed by: PHYSICAL THERAPIST

## 2022-07-26 NOTE — PROGRESS NOTES
Daily Note     Today's date: 2022  Patient name: Mansoor Foss  : 1960  MRN: [id number]  Referring provider: DANIELA Robison  Dx:   Encounter Diagnosis     ICD-10-CM    1  Sciatic pain, right  M54 31                   Subjective: "The doctor put me on steroids and I feel a little better today"       Objective: See treatment diary below      Assessment: Tolerated treatment well  Patient would benefit from continued PT, reported pain throughout tx which limited session  Plan: Continue per plan of care        Precautions: hx DVT, cardiac       Manuals 22            Gentle piriformis stretch  VK  CW                                                  Neuro Re-Ed             Sciatic nerve glide  2x10 seated 30x seated            GS 3x10x5" 3x10x5"            Mini bridge  2x10 no hold 2x10            Hip 3-way standing  np 15x ea B/L                                                  Ther Ex             Nustep Gentle 5' no UE 8' gentle            PB flexion 3-way   5x10" ea                                                                                         Ther Activity                                       Gait Training                                       Modalities

## 2022-07-27 ENCOUNTER — VBI (OUTPATIENT)
Dept: ADMINISTRATIVE | Facility: OTHER | Age: 62
End: 2022-07-27

## 2022-07-27 ENCOUNTER — TELEPHONE (OUTPATIENT)
Dept: CARDIOLOGY CLINIC | Facility: CLINIC | Age: 62
End: 2022-07-27

## 2022-07-27 ENCOUNTER — TELEPHONE (OUTPATIENT)
Dept: OBGYN CLINIC | Facility: HOSPITAL | Age: 62
End: 2022-07-27

## 2022-07-27 NOTE — TELEPHONE ENCOUNTER
See previous notes    Patient called in with what information she has  [employer]   [phone]  Model "# [id number]    Patient stated that cardiology has all information on file    Dr Marja Boxer  [phone]    Patient  [phone]

## 2022-07-27 NOTE — TELEPHONE ENCOUNTER
Left msg for patient to call back with [employer] name and telephone number for us to reach to them regarding MRI

## 2022-07-27 NOTE — TELEPHONE ENCOUNTER
She had her pacemaker ID card with her at her appt and stated it was MRI conditional  So she can proceed with MRI   We just need to reach out to the pacemaker company who may have to be there on the day of MRI to program it

## 2022-07-27 NOTE — TELEPHONE ENCOUNTER
Patient saw Bianca Castro      Patient stated she was ordered to have an MRI of her lumbar spine and that we had to reach out to to the company that made her pacemaker and call her back and she hasn't heard anything,      Patient would like to know the status of this      CB: [phone]

## 2022-07-28 NOTE — TELEPHONE ENCOUNTER
I spoke with [employer] and they said that patient should schedule MRI and the facility will contact them to have the device programmed for sure scan remotely before MRI so it can be turned off prior to MRI and then turned back on after  I left patient detailed msg above via voicemail  I asked her to call back with any further questions

## 2022-07-31 DIAGNOSIS — R10.9 STOMACH CRAMPS: ICD-10-CM

## 2022-07-31 DIAGNOSIS — M54.41 ACUTE RIGHT-SIDED LOW BACK PAIN WITH RIGHT-SIDED SCIATICA: ICD-10-CM

## 2022-08-01 RX ORDER — DICYCLOMINE HYDROCHLORIDE 10 MG/1
CAPSULE ORAL
Qty: 90 CAPSULE | Refills: 0 | Status: SHIPPED | OUTPATIENT
Start: 2022-08-01 | End: 2022-08-23

## 2022-08-02 ENCOUNTER — APPOINTMENT (OUTPATIENT)
Dept: PHYSICAL THERAPY | Age: 62
End: 2022-08-02
Payer: COMMERCIAL

## 2022-08-09 ENCOUNTER — OFFICE VISIT (OUTPATIENT)
Dept: PHYSICAL THERAPY | Age: 62
End: 2022-08-09
Payer: COMMERCIAL

## 2022-08-09 DIAGNOSIS — M54.31 SCIATIC PAIN, RIGHT: Primary | ICD-10-CM

## 2022-08-09 PROCEDURE — 97140 MANUAL THERAPY 1/> REGIONS: CPT

## 2022-08-09 PROCEDURE — 97110 THERAPEUTIC EXERCISES: CPT

## 2022-08-09 PROCEDURE — 97112 NEUROMUSCULAR REEDUCATION: CPT

## 2022-08-09 NOTE — PROGRESS NOTES
Daily Note     Today's date: 2022  Patient name: Maximus Soto  : 1960  MRN: [id number]  Referring provider: DANIELA Krishnan  Dx:   Encounter Diagnosis     ICD-10-CM    1  Sciatic pain, right  M54 31                   Subjective: pt reports LB is better but feels occasional pain R buttock but still having pain across shoulder blades which was somewhat relieved with steroids, pt reports she's missed therapy due to personal issues       Objective: See treatment diary below      Assessment: pt amanda treatment fairly well with some discomfort noted, cueing for pt to stay on task       Plan: Cont PT POC     Precautions: hx DVT, cardiac       Manuals 22          Gentle piriformis stretch  VK  CW VK                                                 Neuro Re-Ed             Sciatic nerve glide  2x10 seated 30x seated  10x ea B/L          GS 3x10x5" 3x10x5"  30x5"          Mini bridge  2x10 no hold 2x10  3x10 no hold          Hip 3-way standing  np 15x ea B/L 2x10 ea B/L                                                 Ther Ex             Nustep Gentle 5' no UE 8' gentle  5'          PB flexion 3-way   5x10" ea 5x10"                                                                                        Ther Activity                                       Gait Training                                       Modalities

## 2022-08-10 DIAGNOSIS — H91.91 DECREASED HEARING OF RIGHT EAR: ICD-10-CM

## 2022-08-10 DIAGNOSIS — H93.8X1 EAR PRESSURE, RIGHT: Primary | ICD-10-CM

## 2022-08-11 ENCOUNTER — TELEPHONE (OUTPATIENT)
Dept: CARDIOLOGY CLINIC | Facility: CLINIC | Age: 62
End: 2022-08-11

## 2022-08-11 ENCOUNTER — TELEPHONE (OUTPATIENT)
Dept: OBGYN CLINIC | Facility: HOSPITAL | Age: 62
End: 2022-08-11

## 2022-08-11 NOTE — TELEPHONE ENCOUNTER
She should complete 6 weeks of PT on the shoulder region   If at that point she still has pain then we can consider an MRI of that region

## 2022-08-11 NOTE — TELEPHONE ENCOUNTER
I don't know what she means by the pacemaker implantation - she has a device, and it is not being replaced? Regardless, there is limited data about interactions with her medications, but in general I am not opposed to her using it

## 2022-08-11 NOTE — TELEPHONE ENCOUNTER
Pt called stating she is having a pacemake implanted up coming  She would like to know if she could take the following herbs  All in one capsule the ingridents-   altha- lipoic, acetyll-carnitine(alcar), 5htp(hyvrooxtryptophen),ptophan, fever few herb extract, passion flower herb extract, skaullcap root extract, oat straw herb extract

## 2022-08-11 NOTE — TELEPHONE ENCOUNTER
Spoke with patient   She bought an herbal preparation for pain and inflammation  She wanted to know if she is okay to take this  She is on Eliquis  I deferred to cardiologist for the okay of this preparation  She will check with them

## 2022-08-11 NOTE — TELEPHONE ENCOUNTER
Patient calling to state she has finished her prednisone and now has pain shooting across her shoulders  Please advise    Best time to call is after 1 pm, as patient works night shift and just got home        # [phone]

## 2022-08-15 ENCOUNTER — OFFICE VISIT (OUTPATIENT)
Dept: PHYSICAL THERAPY | Age: 62
End: 2022-08-15
Payer: COMMERCIAL

## 2022-08-15 DIAGNOSIS — M54.31 SCIATIC PAIN, RIGHT: Primary | ICD-10-CM

## 2022-08-15 PROCEDURE — 97140 MANUAL THERAPY 1/> REGIONS: CPT

## 2022-08-15 PROCEDURE — 97110 THERAPEUTIC EXERCISES: CPT

## 2022-08-15 PROCEDURE — 97112 NEUROMUSCULAR REEDUCATION: CPT

## 2022-08-15 NOTE — PROGRESS NOTES
Daily Note     Today's date: 8/15/2022  Patient name: Mary Beth Ramos  : 1960  MRN: [id number]  Referring provider: DANIELA Berman  Dx:   Encounter Diagnosis     ICD-10-CM    1  Sciatic pain, right  M54 31                   Subjective: pt reports she feels about the same, cont to have pain but has some relief with taking tylenol    Objective: See treatment diary below      Assessment: ex progressions to improve TrA activation challenging but amanda fairly well with min discomfort      Plan: Continue per plan of care  Progress treatment as tolerated         Precautions: hx DVT, cardiac       Manuals 7/19/22 7/26  8/9/22 8/15/22         Gentle piriformis stretch  VK  CW VK VK                                                Neuro Re-Ed             Sciatic nerve glide  2x10 seated 30x seated  10x ea B/L 10x ea B/L         GS 3x10x5" 3x10x5"  30x5" 30x5"         Mini bridge  2x10 no hold 2x10  3x10 no hold 3x10         Hip 3-way standing  np 15x ea B/L 2x10 ea B/L 2x10 ea B/L         TrA activation     20x5"         BKFO    2x10         TrA march    2x10         Ther Ex             Nustep Gentle 5' no UE 8' gentle  5' 5'         PB flexion 3-way   5x10" ea 5x10" 5x10"                                                                                       Ther Activity                                       Gait Training                                       Modalities

## 2022-08-16 ENCOUNTER — HOSPITAL ENCOUNTER (OUTPATIENT)
Dept: RADIOLOGY | Facility: HOSPITAL | Age: 62
Discharge: HOME | End: 2022-08-16
Payer: COMMERCIAL

## 2022-08-16 DIAGNOSIS — M54.41 ACUTE RIGHT-SIDED LOW BACK PAIN WITH RIGHT-SIDED SCIATICA: ICD-10-CM

## 2022-08-16 PROCEDURE — 72148 MRI LUMBAR SPINE W/O DYE: CPT

## 2022-08-16 PROCEDURE — G1004 CDSM NDSC: HCPCS

## 2022-08-16 NOTE — NURSING NOTE
Device interrogation completed by Karen Richardson , EP specialist (Izabela Montelongo on vacation) pacemaker in MRI safe mode--  DOO 85    Vital signs stable and monitored throughout scan  Pt alert and oriented x4    Device reprogrammed per cardiology settings by Fred Sheppard EP clinical specialist

## 2022-08-17 ENCOUNTER — HOSPITAL ENCOUNTER (OUTPATIENT)
Dept: RADIOLOGY | Facility: IMAGING CENTER | Age: 62
Discharge: HOME | End: 2022-08-17
Payer: COMMERCIAL

## 2022-08-17 VITALS — WEIGHT: 180 LBS | BODY MASS INDEX: 30.73 KG/M2 | HEIGHT: 64 IN

## 2022-08-17 DIAGNOSIS — Z12.31 ENCOUNTER FOR SCREENING MAMMOGRAM FOR MALIGNANT NEOPLASM OF BREAST: ICD-10-CM

## 2022-08-17 PROCEDURE — 77063 BREAST TOMOSYNTHESIS BI: CPT

## 2022-08-17 PROCEDURE — 77067 SCR MAMMO BI INCL CAD: CPT

## 2022-08-18 ENCOUNTER — TELEPHONE (OUTPATIENT)
Dept: FAMILY MEDICINE CLINIC | Facility: CLINIC | Age: 62
End: 2022-08-18

## 2022-08-18 NOTE — TELEPHONE ENCOUNTER
Pt wants a note to only 40 hrs a week because 50-60 hours a week and its too much for her. 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 Can we write her a note stating such.

## 2022-08-19 ENCOUNTER — TELEPHONE (OUTPATIENT)
Dept: FAMILY MEDICINE CLINIC | Facility: CLINIC | Age: 62
End: 2022-08-19

## 2022-08-19 NOTE — TELEPHONE ENCOUNTER
----- Message from [address] sent at 8/18/2022  9:29 AM EDT -----  Mammogram was normal  I recommend repeat screening mammogram in 1 year

## 2022-08-22 ENCOUNTER — OFFICE VISIT (OUTPATIENT)
Dept: PHYSICAL THERAPY | Age: 62
End: 2022-08-22
Payer: COMMERCIAL

## 2022-08-22 DIAGNOSIS — M54.31 SCIATIC PAIN, RIGHT: Primary | ICD-10-CM

## 2022-08-22 PROCEDURE — 97112 NEUROMUSCULAR REEDUCATION: CPT

## 2022-08-22 PROCEDURE — 97110 THERAPEUTIC EXERCISES: CPT

## 2022-08-22 PROCEDURE — 97140 MANUAL THERAPY 1/> REGIONS: CPT

## 2022-08-23 ENCOUNTER — TELEPHONE (OUTPATIENT)
Dept: OBGYN CLINIC | Facility: HOSPITAL | Age: 62
End: 2022-08-23

## 2022-08-23 DIAGNOSIS — R10.9 STOMACH CRAMPS: ICD-10-CM

## 2022-08-23 DIAGNOSIS — M54.41 ACUTE RIGHT-SIDED LOW BACK PAIN WITH RIGHT-SIDED SCIATICA: ICD-10-CM

## 2022-08-23 RX ORDER — DICYCLOMINE HYDROCHLORIDE 10 MG/1
CAPSULE ORAL
Qty: 90 CAPSULE | Refills: 0 | Status: SHIPPED | OUTPATIENT
Start: 2022-08-23 | End: 2022-09-14

## 2022-08-23 NOTE — TELEPHONE ENCOUNTER
There is a lot of arthritis in her spine with some tightness around her nerves  I did place a referral for pain mgmt previously and that is who she needs to see going forward with regards to her spine  They will go over the MRI in more depth and develop a treatment plan with her

## 2022-08-23 NOTE — TELEPHONE ENCOUNTER
Patient would like to have her MRI results of her spine today of possible    Please Advise  CB: [phone]

## 2022-08-25 DIAGNOSIS — K91.2 POSTSURGICAL MALABSORPTION: ICD-10-CM

## 2022-08-25 DIAGNOSIS — Z98.84 BARIATRIC SURGERY STATUS: ICD-10-CM

## 2022-08-25 DIAGNOSIS — Z48.815 ENCOUNTER FOR SURGICAL AFTERCARE FOLLOWING SURGERY OF DIGESTIVE SYSTEM: ICD-10-CM

## 2022-08-25 RX ORDER — PANTOPRAZOLE SODIUM 20 MG/1
TABLET, DELAYED RELEASE ORAL
Qty: 30 TABLET | Refills: 3 | Status: SHIPPED | OUTPATIENT
Start: 2022-08-25

## 2022-08-29 NOTE — H&P (VIEW-ONLY)
Assessment  1  Lumbar radiculopathy    2  Acute right-sided low back pain with right-sided sciatica    3  Lumbar spondylosis    4  Chronic right-sided low back pain with right-sided sciatica        Plan  This is a 40-year-old female with past medical history of sick sinus syndrome with a pacemaker as well as DVTs on Eliquis who is presenting with worsening of her bilateral low back pain that radiates into the right anterior thigh stopping at theright  knee  On physical exam findings, positive straight leg as well as facet loading on the right, positive tenderness to palpation the right SI joint, positive maritza test on the right  Decrease sensation to light touch on the right anterior thigh  No motor weakness appreciated  Lumbar MRI imaging showing right L3 nerve root impingement secondary to disc bulge as well as severe facet arthropathy  The etiology patient's pain presentation today is likely multifactorial with the most contributing etiology being lumbar radiculopathy secondary to disc bulge compressing the right L3 nerve root  Patient's pain presentation is in the L3 dermatomal distribution on the right  Patient also likely has elements of sacroiliitis given the inability to set on the right SI joint as well as positive SI joint tenderness and Sascha's test     - will proceed with right-sided L3-L4 transforaminal epidural steroid injection  I explained the risk and benefits of this intervention to the patient including infection, bleeding, nerve damage, lack of benefit and patient is in agreement to proceed with this injection  Patient will need to hold Eliquis for epidural steroid injections  - patient should continue with physical therapy as tolerated    - will address other etiology of the patient's pain back such as  SI joint injection after epidural steroid injection if the patient's symptoms do not improve      My impressions and treatment recommendations were discussed in detail with the patient who verbalized understanding and had no further questions  Discharge instructions were provided  I personally saw and examined the patient and I agree with the above discussed plan of care  No orders of the defined types were placed in this encounter  No orders of the defined types were placed in this encounter  History of Present Illness    Catrina Ramsay is a 60 y o  female with past medical history of sick sinus syndrome with a pacemaker, DVTs on Eliquis, diabetes, depression who presents with complaints of worsening of her chronic low back pain  Patient presents to the ED on July 16, 2022 with complaints of low back pain and right-sided radiating pain into her lower extremity  She was managed with steroids and muscle relaxants  Negative CT thorax abdominal pelvis  Today she presents with complaints of bilateral low back and shoulder pain for the past month that is moderate to severe 9/10 and interferes with daily activities  The pain is nearly constant and she describes the pain as cramping, numbness, sharp, pins and needles couple dull aching  The pain starts in the bilateral low back and radiates into the right anterior lateral aspect of the right thigh stopping at the knee  The pain is made worse by sitting with inability set on the right, bending, twisting, turning, standing  She does admit to having numbness of the right anterior and lateral aspects of the thigh with associated pins and needle sensation in that region  In terms of management, patient notes no relief with physical therapy and very little relief with heat and ice  In terms of medications she currently is taking only Tylenol which provides some relief  In the past she has tried tramadol, Robaxin, lidocaine patches  She takes approximately 2-3 g a day      In terms of imaging patient underwent a lumbar spine MRI dated August 16, 2022 which showed severe facet arthropathy throughout the lumbar spine as well as a far lateral bulge with marginal osteophyte at the L3-L4 that contacts the right L3 nerve root consistent with the patient's presentation  There is also a facet overgrowth cyst at L2-L3 it contributing to mild central and moderate left lateral recess stenosis  Patient denies any recent fevers chills or weight changes  Denies any bowel bladder incontinence  Denies any progressive and worsening motor or sensory deficits  I have personally reviewed and/or updated the patient's past medical history, past surgical history, family history, social history, current medications, allergies, and vital signs today  Review of Systems   Constitutional: Negative for chills, diaphoresis, fatigue, fever and unexpected weight change  Respiratory: Negative for chest tightness  Cardiovascular: Negative for chest pain  Gastrointestinal: Positive for abdominal pain, nausea and vomiting  Genitourinary: Negative for difficulty urinating and urgency  Musculoskeletal: Positive for back pain, gait problem and joint swelling  Neurological: Positive for numbness         Patient Active Problem List   Diagnosis    Abnormal CT scan    Sick sinus syndrome (HCC)    HSV-1 infection    Status post carpal tunnel release    Fibrocystic breast disease    Hyperlipidemia    Knee osteoarthritis    Lichen sclerosus    Menopausal and postmenopausal disorder    Neck pain    Candida infection of flexural skin    Grade 1 out of 6 intensity murmur    Encounter for screening mammogram for malignant neoplasm of breast    Deep vein thrombosis (DVT) of proximal lower extremity (HCC)    History of DVT (deep vein thrombosis)    Iron deficiency anemia    Vitamin deficiency    Medicare annual wellness visit, subsequent    Mass of soft tissue of right upper extremity    Chronic right shoulder pain    Work related injury    Leukopenia    Anemia    Right ear pain    Dysphagia    Acute gastric ulcer without hemorrhage or perforation    History of colon polyps    Preop examination    Overweight    Bariatric surgery status    Encounter for surgical aftercare following surgery of digestive system    Depression    Nondisplaced fracture of medial malleolus of right tibia, initial encounter for closed fracture    Closed nondisplaced Maisonneuve's fracture of right leg    Closed fracture of upper end of right fibula    Closed fracture of left distal radius and ulna    Laceration of eyebrow and forehead    Visit for suture removal    Fall    Sinus pressure    Pacemaker    [facility]VT [facility]ox[facility]ricular tachycardia[facility])    Acute non-recurrent maxillary sinusitis    Acute respiratory disease due to COVID-19 virus    Insomnia    Non-recurrent acute serous otitis media of both ears    Sciatic pain, right    Acute right-sided low back pain with right-sided sciatica    LLQ pain    Stomach cramps       Past Medical History:   Diagnosis Date    Anemia     iron def    Arthritis     knees    Carpal tunnel syndrome of right wrist     Chest pain     Colon polyp     Depression     at times    [facility] venous thrombosis) ([facility] [address] [facility]     Dysphagia     Esophageal reflux 2011    Grade 1 out of 6 intensity murmur 2019    Heart murmur     History of transfusion 1980    Pacemaker 2018    Pneumonia     age 15    Psychiatric disorder     Trigger finger, left        Past Surgical History:   Procedure Laterality Date    BACK SURGERY      CARDIAC PACEMAKER PLACEMENT  2018    CERVICAL DISC SURGERY  2009    PLATES & SCREWS     SECTION      X2    COLONOSCOPY      GASTRIC BYPASS  2017    LAP RINYGB SURGERY    KNEE ARTHROSCOPY      KNEE SURGERY Bilateral     KNEE SURGERY Left 2020    OTHER SURGICAL HISTORY      ARM SURGERIES    LA INCISE FINGER TENDON SHEATH Left 2018    Procedure: LONG TRIGGER FINGER RELEASE;  Surgeon: Francisco Moncada MD;  Location: [facility]; Service: Orthopedics    VT WRIST Hershall Niurka LIG Right 9/18/2018    Procedure: ENDOSCOPIC CARPAL TUNNEL RELEASE;  Surgeon: Liliana Montague MD;  Location: [facility];  Service: Orthopedics    TONSILLECTOMY      UPPER GASTROINTESTINAL ENDOSCOPY         Family History   Problem Relation Age of Onset    Stroke Mother     Alzheimer's disease Mother     Arthritis Mother     Coronary artery disease Mother     Breast cancer Mother 77    Cancer Mother     Heart disease Mother     Hypertension Father     Gout Father     Diabetes type II Father     Coronary artery disease Father     Heart disease Father     No Known Problems Sister     No Known Problems Sister     No Known Problems Sister     No Known Problems Daughter     No Known Problems Maternal Grandmother     No Known Problems Maternal Grandfather     No Known Problems Paternal Grandmother     No Known Problems Paternal Grandfather     Prostate cancer Brother     Pancreatic cancer Brother     No Known Problems Maternal Aunt     No Known Problems Maternal Aunt     No Known Problems Maternal Aunt     No Known Problems Maternal Aunt     Cancer Maternal Aunt     No Known Problems Paternal Aunt     No Known Problems Paternal Aunt        Social History     Occupational History    Not on file   Tobacco Use    Smoking status: Former Smoker     Packs/day: 0 25     Types: Cigarettes    Smokeless tobacco: Never Used   Vaping Use    Vaping Use: Never used   Substance and Sexual Activity    Alcohol use:  Yes     Alcohol/week: 2 0 standard drinks     Types: 1 Glasses of wine, 1 Standard drinks or equivalent per week     Comment: very rare social    Drug use: No    Sexual activity: Not Currently       Current Outpatient Medications on File Prior to Visit   Medication Sig    Ascorbic Acid (VITAMIN C) 100 MG CHEW Chew    atorvastatin (LIPITOR) 10 mg tablet Take 1 tablet (10 mg total) by mouth daily    Biotin 46272 MCG TABS Take by mouth daily      Calcium Carbonate (CALCI-CHEW PO) Take 500 mg by mouth 2 (two) times a day    cholecalciferol (VITAMIN D3) 1,000 units tablet Take 5,000 Units by mouth 3 (three) times a day      clobetasol (TEMOVATE) 0 05 % cream Apply to affected areas daily x 12 weeks, then twice weekly for maintenance    Cyanocobalamin (VITAMIN B 12 PO) Take 1,000 mcg by mouth    Diclofenac Sodium (VOLTAREN) 1 % Apply 2 g topically 4 (four) times a day    dicyclomine (BENTYL) 10 mg capsule take 1 capsule by mouth four times a day before meals at bedtime    Eliquis 2 5 MG take 1 tablet by mouth twice a day    Ferrous Sulfate  (45 Fe) MG TBCR Take 1 tablet by mouth daily    fluticasone (FLONASE) 50 mcg/act nasal spray instill 1 spray into each nostril once daily    Multiple Vitamin (MULTIVITAMIN) capsule Take 1 capsule by mouth daily 2 chewies daily    pantoprazole (PROTONIX) 20 mg tablet take 1 tablet by mouth once daily    guaifenesin-codeine (GUAIFENESIN AC) 100-10 MG/5ML liquid Take 5 mL by mouth 3 (three) times a day as needed for cough (Patient not taking: No sig reported)    Lysine HCl POWD Take by mouth (Patient not taking: Reported on 8/30/2022)    methocarbamol (ROBAXIN) 500 mg tablet Take 1 tablet (500 mg total) by mouth 2 (two) times a day (Patient not taking: No sig reported)    methocarbamol (Robaxin-750) 750 mg tablet Take 1 tablet (750 mg total) by mouth 2 (two) times a day as needed for muscle spasms (Patient not taking: Reported on 8/30/2022)    metoprolol succinate (TOPROL-XL) 25 mg 24 hr tablet Take 1 tablet (25 mg total) by mouth daily    nystatin (MYCOSTATIN) powder Apply topically 2 (two) times a day    oxyCODONE (Roxicodone) 5 immediate release tablet Take 1 tablet (5 mg total) by mouth every 4 (four) hours as needed for moderate pain for up to 18 doses Max Daily Amount: 30 mg (Patient not taking: Reported on 8/30/2022)    predniSONE 10 mg tablet Take 6 tabs days 1&2, 5 tabs days 3&4, 4 tabs days 5&6, 3 tabs days 7&8, 2 tabs days 9&10, 1 tab days 11&12 (Patient not taking: Reported on 8/30/2022)    traZODone (DESYREL) 50 mg tablet take 1 tablet by mouth at bedtime if needed for sleep (Patient not taking: Reported on 8/30/2022)    valACYclovir (VALTREX) 1,000 mg tablet Take 2 tablets (2,000 mg total) by mouth 2 (two) times a day for 1 day     No current facility-administered medications on file prior to visit  No Known Allergies    Physical Exam    /78   Pulse 97   Ht 5' 4" (1 626 m) Comment: verbal  Wt 86 kg (189 lb 9 6 oz)   LMP 09/18/2014   BMI 32 54 kg/m²       Palpation:    No tenderness over the left paravertebral musculature     tenderness over the right paravertebral musculature    No tenderness with palpation of the left SI joint    tenderness with palpation of the right SI joint      No tenderness with palpation of the left greater trochanter    No tenderness with palpation of the right greater trochanter    Sensory:    Intact to light touch grossly in the left lower extremity    Intact to light touch grossly in the right lower extremity except decreased sensation to light touch in the right L3 and L4 dermatomal distribution in the anterior thigh  Muscle Strength      Hip flexion Knee flexion Knee extension Foot plantarflexion Foot   dorsiflexion EHL   L 5/5 5/5 5/5 5/5 5/5 5/5   R 5/5 5/5 5/5 5/5 5/5 5/5     DTRs: 2+ patellar, 2+ Achilles and symmetric  Special Tests:     Facet loading Straight leg raise STEPHANY FAIR   L Negative Negative Negative    R Positive Positive Positive      + SI joint compression    Imaging    MRI LUMBAR SPINE WITHOUT CONTRAST   8-   INDICATION: [id number]: Lumbago with sciatica, right side     Low back pain with right lower extremity radiculopathy      COMPARISON:  Prior MRI March 2, 2009     TECHNIQUE:  Sagittal T1, sagittal T2, sagittal inversion recovery, axial T1 and axial T2, coronal T2     IMAGE QUALITY: Diagnostic     FINDINGS:     VERTEBRAL BODIES:  There are 5 lumbar type vertebral bodies  Slight anterolisthesis identified of L4 on L5 measuring 4 mm which is new from the prior study  Normal marrow signal is identified within the visualized bony structures  No discrete marrow   lesion      SACRUM:  Normal signal within the sacrum  No evidence of insufficiency or stress fracture      DISTAL CORD AND CONUS:  Normal size and signal within the distal cord and conus      PARASPINAL SOFT TISSUES:  Paraspinal soft tissues are unremarkable      LOWER THORACIC DISC SPACES:  Normal disc height and signal   No disc herniation, canal stenosis or foraminal narrowing      LUMBAR DISC SPACES:     L1-L2:  No central or foraminal narrowing      L2-L3:  Moderate facet hypertrophy with ligamentous infolding identified  There is a T2 hyperintense mass in the posterior aspect of the spinal canal adjacent to the degenerated left L2-3 facet joint measuring 9 x 4 x 12 mm resulting in mild central and   moderate left lateral recess stenosis  This is compatible with synovial facet cyst      L3-L4:  Severe facet hypertrophy with ligamentous infolding identified  There is mild annular bulge with marginal osteophytes  Moderate right and mild left foraminal narrowing noted with moderate central stenosis  Disc and osteophyte material contacts   the extraforaminal right L3 nerve root in the far lateral location  Correlate for right L3 radiculopathy      L4-L5:  There is severe facet degenerative changes with slight anterolisthesis  There is minimal left foraminal narrowing  There is mild to moderate central stenosis      L5-S1:  Severe left greater than right bilateral facet hypertrophy  Mild annular bulge with marginal osteophytes without significant central or foraminal narrowing      IMPRESSION:     Far lateral bulge with marginal osteophyte at L3-4 contacts the extraforaminal right L3 nerve root    Correlate for right L3 radiculopathy  Moderate central stenosis noted at this level      Facet overgrowth facet cyst L2-3 eccentric to the left dorsally in the canal contributes to mild central and moderate left lateral recess stenosis      Severe facet degenerative change L4-5 accounts for 4 mm anterolisthesis    CT THORACIC AND LUMBAR SPINE   7-     INDICATION:   mutlilevel spinal segemental pain      COMPARISON:  None     TECHNIQUE: Axial CT examination of the thoracic and lumbar spine was obtained utilizing reconstructed images from CT of the chest abdomen and pelvis performed the same day  Images were reformatted in the sagittal and coronal planes      This examination, like all CT scans performed in the [facility], was performed utilizing techniques to minimize radiation dose exposure, including the use of iterative reconstruction and automated exposure control        FINDINGS:     ALIGNMENT: Normal alignment of lumbar vertebral bodies  Grade 1 anterolisthesis of L4 and L5      VERTEBRAL BODIES: No fracture      DEGENERATIVE CHANGES: Mild multilevel degenerative disc disease      PREVERTEBRAL AND PARASPINAL SOFT TISSUES: Normal      IMPRESSION:     No fracture or traumatic subluxation    CT CERVICAL SPINE - WITHOUT CONTRAST   6-       INDICATION:   TRAUMA      COMPARISON:  CT cervical spine 4/29/2012     TECHNIQUE:  CT examination of the cervical spine was performed without intravenous contrast   Contiguous axial images were obtained  Sagittal and coronal reconstructions were performed        Radiation dose length product (DLP) for this visit:  434 88 mGy-cm   This examination, like all CT scans performed in the [facility], was performed utilizing techniques to minimize radiation dose exposure, including the use of iterative   reconstruction and automated exposure control        IMAGE QUALITY:  Diagnostic      FINDINGS:     ALIGNMENT:  Normal alignment of the cervical spine   No subluxation      VERTEBRAL BODIES:  No fracture  Stable C4-C6 ACDF with prosthetic discs at C4-5 and C5-6  Hardware is intact  No evidence of loosening      DEGENERATIVE CHANGES:  Stable moderate disc height loss at C6-7  Progressive degenerative changes at C1-2  Progressive multilevel facet degenerative changes  No critical spinal canal stenosis      PREVERTEBRAL AND PARASPINAL SOFT TISSUES:  No prevertebral soft tissue swelling  Left carotid artery calcification   Incidental discovery of one or more thyroid nodule(s) measuring less than 1 5 cm and without suspicious features is noted in this patient   who is above 28years old; according to guidelines published in the February 2015 white paper on incidental thyroid nodules in the Journal of the [employer] of radiology [place]), no further evaluation is recommended      THORACIC INLET:  Normal      IMPRESSION:     No cervical spine fracture or traumatic malalignment      Stable ACDF C4-C6      Multilevel cervical degenerative changes      I personally discussed this study with David Perry on 6/18/2021 at 09:58

## 2022-08-29 NOTE — PROGRESS NOTES
Assessment  1  Lumbar radiculopathy    2  Acute right-sided low back pain with right-sided sciatica    3  Lumbar spondylosis    4  Chronic right-sided low back pain with right-sided sciatica        Plan  This is a 23-year-old female with past medical history of sick sinus syndrome with a pacemaker as well as DVTs on Eliquis who is presenting with worsening of her bilateral low back pain that radiates into the right anterior thigh stopping at theright  knee  On physical exam findings, positive straight leg as well as facet loading on the right, positive tenderness to palpation the right SI joint, positive maritza test on the right  Decrease sensation to light touch on the right anterior thigh  No motor weakness appreciated  Lumbar MRI imaging showing right L3 nerve root impingement secondary to disc bulge as well as severe facet arthropathy  The etiology patient's pain presentation today is likely multifactorial with the most contributing etiology being lumbar radiculopathy secondary to disc bulge compressing the right L3 nerve root  Patient's pain presentation is in the L3 dermatomal distribution on the right  Patient also likely has elements of sacroiliitis given the inability to set on the right SI joint as well as positive SI joint tenderness and Sascha's test     - will proceed with right-sided L3-L4 transforaminal epidural steroid injection  I explained the risk and benefits of this intervention to the patient including infection, bleeding, nerve damage, lack of benefit and patient is in agreement to proceed with this injection  Patient will need to hold Eliquis for epidural steroid injections  - patient should continue with physical therapy as tolerated    - will address other etiology of the patient's pain back such as  SI joint injection after epidural steroid injection if the patient's symptoms do not improve      My impressions and treatment recommendations were discussed in detail with the patient who verbalized understanding and had no further questions  Discharge instructions were provided  I personally saw and examined the patient and I agree with the above discussed plan of care  No orders of the defined types were placed in this encounter  No orders of the defined types were placed in this encounter  History of Present Illness    Lajune Carrel is a 60 y o  female with past medical history of sick sinus syndrome with a pacemaker, DVTs on Eliquis, diabetes, depression who presents with complaints of worsening of her chronic low back pain  Patient presents to the ED on July 16, 2022 with complaints of low back pain and right-sided radiating pain into her lower extremity  She was managed with steroids and muscle relaxants  Negative CT thorax abdominal pelvis  Today she presents with complaints of bilateral low back and shoulder pain for the past month that is moderate to severe 9/10 and interferes with daily activities  The pain is nearly constant and she describes the pain as cramping, numbness, sharp, pins and needles couple dull aching  The pain starts in the bilateral low back and radiates into the right anterior lateral aspect of the right thigh stopping at the knee  The pain is made worse by sitting with inability set on the right, bending, twisting, turning, standing  She does admit to having numbness of the right anterior and lateral aspects of the thigh with associated pins and needle sensation in that region  In terms of management, patient notes no relief with physical therapy and very little relief with heat and ice  In terms of medications she currently is taking only Tylenol which provides some relief  In the past she has tried tramadol, Robaxin, lidocaine patches  She takes approximately 2-3 g a day      In terms of imaging patient underwent a lumbar spine MRI dated August 16, 2022 which showed severe facet arthropathy throughout the lumbar spine as well as a far lateral bulge with marginal osteophyte at the L3-L4 that contacts the right L3 nerve root consistent with the patient's presentation  There is also a facet overgrowth cyst at L2-L3 it contributing to mild central and moderate left lateral recess stenosis  Patient denies any recent fevers chills or weight changes  Denies any bowel bladder incontinence  Denies any progressive and worsening motor or sensory deficits  I have personally reviewed and/or updated the patient's past medical history, past surgical history, family history, social history, current medications, allergies, and vital signs today  Review of Systems   Constitutional: Negative for chills, diaphoresis, fatigue, fever and unexpected weight change  Respiratory: Negative for chest tightness  Cardiovascular: Negative for chest pain  Gastrointestinal: Positive for abdominal pain, nausea and vomiting  Genitourinary: Negative for difficulty urinating and urgency  Musculoskeletal: Positive for back pain, gait problem and joint swelling  Neurological: Positive for numbness         Patient Active Problem List   Diagnosis    Abnormal CT scan    Sick sinus syndrome (HCC)    HSV-1 infection    Status post carpal tunnel release    Fibrocystic breast disease    Hyperlipidemia    Knee osteoarthritis    Lichen sclerosus    Menopausal and postmenopausal disorder    Neck pain    Candida infection of flexural skin    Grade 1 out of 6 intensity murmur    Encounter for screening mammogram for malignant neoplasm of breast    Deep vein thrombosis (DVT) of proximal lower extremity (HCC)    History of DVT (deep vein thrombosis)    Iron deficiency anemia    Vitamin deficiency    Medicare annual wellness visit, subsequent    Mass of soft tissue of right upper extremity    Chronic right shoulder pain    Work related injury    Leukopenia    Anemia    Right ear pain    Dysphagia    Acute gastric ulcer without hemorrhage or perforation    History of colon polyps    Preop examination    Overweight    Bariatric surgery status    Encounter for surgical aftercare following surgery of digestive system    Depression    Nondisplaced fracture of medial malleolus of right tibia, initial encounter for closed fracture    Closed nondisplaced Maisonneuve's fracture of right leg    Closed fracture of upper end of right fibula    Closed fracture of left distal radius and ulna    Laceration of eyebrow and forehead    Visit for suture removal    Fall    Sinus pressure    Pacemaker    [facility]ox[facility]    Acute non-recurrent maxillary sinusitis    Acute respiratory disease due to COVID-19 virus    Insomnia    Non-recurrent acute serous otitis media of both ears    Sciatic pain, right    Acute right-sided low back pain with right-sided sciatica    LLQ pain    Stomach cramps       Past Medical History:   Diagnosis Date    Anemia     iron def    Arthritis     knees    Carpal tunnel syndrome of right wrist     Chest pain     Colon polyp     Depression     at times    DVT (deep venous thrombosis) ([facility] [address] [facility]     Dysphagia     Esophageal reflux 2011    Grade 1 out of 6 intensity murmur 2019    Heart murmur     History of transfusion 1980    Pacemaker 2018    Pneumonia     age 15    Psychiatric disorder     Trigger finger, left        Past Surgical History:   Procedure Laterality Date    BACK SURGERY      CARDIAC PACEMAKER PLACEMENT  2018    CERVICAL DISC SURGERY  2009    PLATES & SCREWS     SECTION      X2    COLONOSCOPY      GASTRIC BYPASS  2017    LAP RINYGB SURGERY    KNEE ARTHROSCOPY      KNEE SURGERY Bilateral     KNEE SURGERY Left 2020    OTHER SURGICAL HISTORY      ARM SURGERIES    [place] INCISE FINGER TENDON SHEATH Left 2018    Procedure: LONG TRIGGER FINGER RELEASE;  Surgeon: Austin Flanagan MD;  Location: [facility]; Service: Orthopedics    IA WRIST Rohan Nipper LIG Right 9/18/2018    Procedure: ENDOSCOPIC CARPAL TUNNEL RELEASE;  Surgeon: Malachi Magana MD;  Location: [facility];  Service: Orthopedics    TONSILLECTOMY      UPPER GASTROINTESTINAL ENDOSCOPY         Family History   Problem Relation Age of Onset    Stroke Mother     Alzheimer's disease Mother     Arthritis Mother     Coronary artery disease Mother     Breast cancer Mother 77    Cancer Mother     Heart disease Mother     Hypertension Father     Gout Father     Diabetes type II Father     Coronary artery disease Father     Heart disease Father     No Known Problems Sister     No Known Problems Sister     No Known Problems Sister     No Known Problems Daughter     No Known Problems Maternal Grandmother     No Known Problems Maternal Grandfather     No Known Problems Paternal Grandmother     No Known Problems Paternal Grandfather     Prostate cancer Brother     Pancreatic cancer Brother     No Known Problems Maternal Aunt     No Known Problems Maternal Aunt     No Known Problems Maternal Aunt     No Known Problems Maternal Aunt     Cancer Maternal Aunt     No Known Problems Paternal Aunt     No Known Problems Paternal Aunt        Social History     Occupational History    Not on file   Tobacco Use    Smoking status: Former Smoker     Packs/day: 0 25     Types: Cigarettes    Smokeless tobacco: Never Used   Vaping Use    Vaping Use: Never used   Substance and Sexual Activity    Alcohol use:  Yes     Alcohol/week: 2 0 standard drinks     Types: 1 Glasses of wine, 1 Standard drinks or equivalent per week     Comment: very rare social    Drug use: No    Sexual activity: Not Currently       Current Outpatient Medications on File Prior to Visit   Medication Sig    Ascorbic Acid (VITAMIN C) 100 MG CHEW Chew    atorvastatin (LIPITOR) 10 mg tablet Take 1 tablet (10 mg total) by mouth daily    Biotin 36712 MCG TABS Take by mouth daily      Calcium Carbonate (CALCI-CHEW PO) Take 500 mg by mouth 2 (two) times a day    cholecalciferol (VITAMIN D3) 1,000 units tablet Take 5,000 Units by mouth 3 (three) times a day      clobetasol (TEMOVATE) 0 05 % cream Apply to affected areas daily x 12 weeks, then twice weekly for maintenance    Cyanocobalamin (VITAMIN B 12 PO) Take 1,000 mcg by mouth    Diclofenac Sodium (VOLTAREN) 1 % Apply 2 g topically 4 (four) times a day    dicyclomine (BENTYL) 10 mg capsule take 1 capsule by mouth four times a day before meals at bedtime    Eliquis 2 5 MG take 1 tablet by mouth twice a day    Ferrous Sulfate  (45 Fe) MG TBCR Take 1 tablet by mouth daily    fluticasone (FLONASE) 50 mcg/act nasal spray instill 1 spray into each nostril once daily    Multiple Vitamin (MULTIVITAMIN) capsule Take 1 capsule by mouth daily 2 chewies daily    pantoprazole (PROTONIX) 20 mg tablet take 1 tablet by mouth once daily    guaifenesin-codeine (GUAIFENESIN AC) 100-10 MG/5ML liquid Take 5 mL by mouth 3 (three) times a day as needed for cough (Patient not taking: No sig reported)    Lysine HCl POWD Take by mouth (Patient not taking: Reported on 8/30/2022)    methocarbamol (ROBAXIN) 500 mg tablet Take 1 tablet (500 mg total) by mouth 2 (two) times a day (Patient not taking: No sig reported)    methocarbamol (Robaxin-750) 750 mg tablet Take 1 tablet (750 mg total) by mouth 2 (two) times a day as needed for muscle spasms (Patient not taking: Reported on 8/30/2022)    metoprolol succinate (TOPROL-XL) 25 mg 24 hr tablet Take 1 tablet (25 mg total) by mouth daily    nystatin (MYCOSTATIN) powder Apply topically 2 (two) times a day    oxyCODONE (Roxicodone) 5 immediate release tablet Take 1 tablet (5 mg total) by mouth every 4 (four) hours as needed for moderate pain for up to 18 doses Max Daily Amount: 30 mg (Patient not taking: Reported on 8/30/2022)    predniSONE 10 mg tablet Take 6 tabs days 1&2, 5 tabs days 3&4, 4 tabs days 5&6, 3 tabs days 7&8, 2 tabs days 9&10, 1 tab days 11&12 (Patient not taking: Reported on 8/30/2022)    traZODone (DESYREL) 50 mg tablet take 1 tablet by mouth at bedtime if needed for sleep (Patient not taking: Reported on 8/30/2022)    valACYclovir (VALTREX) 1,000 mg tablet Take 2 tablets (2,000 mg total) by mouth 2 (two) times a day for 1 day     No current facility-administered medications on file prior to visit  No Known Allergies    Physical Exam    /78   Pulse 97   Ht 5' 4" (1 626 m) Comment: verbal  Wt 86 kg (189 lb 9 6 oz)   LMP 09/18/2014   BMI 32 54 kg/m²       Palpation:    No tenderness over the left paravertebral musculature     tenderness over the right paravertebral musculature    No tenderness with palpation of the left SI joint    tenderness with palpation of the right SI joint      No tenderness with palpation of the left greater trochanter    No tenderness with palpation of the right greater trochanter    Sensory:    Intact to light touch grossly in the left lower extremity    Intact to light touch grossly in the right lower extremity except decreased sensation to light touch in the right L3 and L4 dermatomal distribution in the anterior thigh  Muscle Strength      Hip flexion Knee flexion Knee extension Foot plantarflexion Foot   dorsiflexion EHL   L 5/5 5/5 5/5 5/5 5/5 5/5   R 5/5 5/5 5/5 5/5 5/5 5/5     DTRs: 2+ patellar, 2+ Achilles and symmetric  Special Tests:     Facet loading Straight leg raise STEPHANY FAIR   L Negative Negative Negative    R Positive Positive Positive      + SI joint compression    Imaging    MRI LUMBAR SPINE WITHOUT CONTRAST   8-   INDICATION: M54 41: Lumbago with sciatica, right side     Low back pain with right lower extremity radiculopathy      COMPARISON:  Prior MRI March 2, 2009     TECHNIQUE:  Sagittal T1, sagittal T2, sagittal inversion recovery, axial T1 and axial T2, coronal T2     IMAGE QUALITY: Diagnostic     FINDINGS:     VERTEBRAL BODIES:  There are 5 lumbar type vertebral bodies  Slight anterolisthesis identified of L4 on L5 measuring 4 mm which is new from the prior study  Normal marrow signal is identified within the visualized bony structures  No discrete marrow   lesion      SACRUM:  Normal signal within the sacrum  No evidence of insufficiency or stress fracture      DISTAL CORD AND CONUS:  Normal size and signal within the distal cord and conus      PARASPINAL SOFT TISSUES:  Paraspinal soft tissues are unremarkable      LOWER THORACIC DISC SPACES:  Normal disc height and signal   No disc herniation, canal stenosis or foraminal narrowing      LUMBAR DISC SPACES:     L1-L2:  No central or foraminal narrowing      L2-L3:  Moderate facet hypertrophy with ligamentous infolding identified  There is a T2 hyperintense mass in the posterior aspect of the spinal canal adjacent to the degenerated left L2-3 facet joint measuring 9 x 4 x 12 mm resulting in mild central and   moderate left lateral recess stenosis  This is compatible with synovial facet cyst      L3-L4:  Severe facet hypertrophy with ligamentous infolding identified  There is mild annular bulge with marginal osteophytes  Moderate right and mild left foraminal narrowing noted with moderate central stenosis  Disc and osteophyte material contacts   the extraforaminal right L3 nerve root in the far lateral location  Correlate for right L3 radiculopathy      L4-L5:  There is severe facet degenerative changes with slight anterolisthesis  There is minimal left foraminal narrowing  There is mild to moderate central stenosis      L5-S1:  Severe left greater than right bilateral facet hypertrophy  Mild annular bulge with marginal osteophytes without significant central or foraminal narrowing      IMPRESSION:     Far lateral bulge with marginal osteophyte at L3-4 contacts the extraforaminal right L3 nerve root    Correlate for right L3 radiculopathy  Moderate central stenosis noted at this level      Facet overgrowth facet cyst L2-3 eccentric to the left dorsally in the canal contributes to mild central and moderate left lateral recess stenosis      Severe facet degenerative change L4-5 accounts for 4 mm anterolisthesis    CT THORACIC AND LUMBAR SPINE   7-     INDICATION:   mutlilevel spinal segemental pain      COMPARISON:  None     TECHNIQUE: Axial CT examination of the thoracic and lumbar spine was obtained utilizing reconstructed images from CT of the chest abdomen and pelvis performed the same day  Images were reformatted in the sagittal and coronal planes      This examination, like all CT scans performed in the [facility], was performed utilizing techniques to minimize radiation dose exposure, including the use of iterative reconstruction and automated exposure control        FINDINGS:     ALIGNMENT: Normal alignment of lumbar vertebral bodies  Grade 1 anterolisthesis of L4 and L5      VERTEBRAL BODIES: No fracture      DEGENERATIVE CHANGES: Mild multilevel degenerative disc disease      PREVERTEBRAL AND PARASPINAL SOFT TISSUES: Normal      IMPRESSION:     No fracture or traumatic subluxation    CT CERVICAL SPINE - WITHOUT CONTRAST   6-       INDICATION:   TRAUMA      COMPARISON:  CT cervical spine 4/29/2012     TECHNIQUE:  CT examination of the cervical spine was performed without intravenous contrast   Contiguous axial images were obtained  Sagittal and coronal reconstructions were performed        Radiation dose length product (DLP) for this visit:  434 88 mGy-cm   This examination, like all CT scans performed in the [facility], was performed utilizing techniques to minimize radiation dose exposure, including the use of iterative   reconstruction and automated exposure control        IMAGE QUALITY:  Diagnostic      FINDINGS:     ALIGNMENT:  Normal alignment of the cervical spine   No subluxation      VERTEBRAL BODIES:  No fracture  Stable C4-C6 ACDF with prosthetic discs at C4-5 and C5-6  Hardware is intact  No evidence of loosening      DEGENERATIVE CHANGES:  Stable moderate disc height loss at C6-7  Progressive degenerative changes at C1-2  Progressive multilevel facet degenerative changes  No critical spinal canal stenosis      PREVERTEBRAL AND PARASPINAL SOFT TISSUES:  No prevertebral soft tissue swelling  Left carotid artery calcification   Incidental discovery of one or more thyroid nodule(s) measuring less than 1 5 cm and without suspicious features is noted in this patient   who is above 28years old; according to guidelines published in the February 2015 white paper on incidental thyroid nodules in the Journal of the [address] [place] [address] of radiology Michela Marcos), no further evaluation is recommended      THORACIC INLET:  Normal      IMPRESSION:     No cervical spine fracture or traumatic malalignment      Stable ACDF C4-C6      Multilevel cervical degenerative changes      I personally discussed this study with Cong Perry on 6/18/2021 at 09:58

## 2022-08-30 ENCOUNTER — CONSULT (OUTPATIENT)
Dept: PAIN MEDICINE | Facility: CLINIC | Age: 62
End: 2022-08-30
Payer: COMMERCIAL

## 2022-08-30 VITALS
DIASTOLIC BLOOD PRESSURE: 78 MMHG | WEIGHT: 189.6 LBS | SYSTOLIC BLOOD PRESSURE: 110 MMHG | BODY MASS INDEX: 32.37 KG/M2 | HEART RATE: 97 BPM | HEIGHT: 64 IN

## 2022-08-30 DIAGNOSIS — G89.29 CHRONIC RIGHT-SIDED LOW BACK PAIN WITH RIGHT-SIDED SCIATICA: ICD-10-CM

## 2022-08-30 DIAGNOSIS — M47.816 LUMBAR SPONDYLOSIS: ICD-10-CM

## 2022-08-30 DIAGNOSIS — M54.16 LUMBAR RADICULOPATHY: Primary | ICD-10-CM

## 2022-08-30 DIAGNOSIS — M54.41 CHRONIC RIGHT-SIDED LOW BACK PAIN WITH RIGHT-SIDED SCIATICA: ICD-10-CM

## 2022-08-30 DIAGNOSIS — M54.41 ACUTE RIGHT-SIDED LOW BACK PAIN WITH RIGHT-SIDED SCIATICA: ICD-10-CM

## 2022-08-30 PROCEDURE — 99204 OFFICE O/P NEW MOD 45 MIN: CPT | Performed by: STUDENT IN AN ORGANIZED HEALTH CARE EDUCATION/TRAINING PROGRAM

## 2022-09-02 ENCOUNTER — TELEPHONE (OUTPATIENT)
Dept: PAIN MEDICINE | Facility: CLINIC | Age: 62
End: 2022-09-02

## 2022-09-02 DIAGNOSIS — M54.31 SCIATIC PAIN, RIGHT: ICD-10-CM

## 2022-09-02 DIAGNOSIS — M54.16 LUMBAR RADICULITIS: Primary | ICD-10-CM

## 2022-09-02 DIAGNOSIS — R10.9 RIGHT FLANK PAIN: ICD-10-CM

## 2022-09-02 RX ORDER — METHOCARBAMOL 750 MG/1
750 TABLET, FILM COATED ORAL 2 TIMES DAILY PRN
Qty: 30 TABLET | Refills: 0 | Status: SHIPPED | OUTPATIENT
Start: 2022-09-02 | End: 2022-10-05 | Stop reason: ALTCHOICE

## 2022-09-02 NOTE — PROGRESS NOTES
Patient called reporting 9/10 low back and right leg pain  She is scheduled for a right L3-L4 transforaminal epidural steroid injection on Tuesday 09/06/2022  I will reorder Robaxin to help with the pain  Patient has been informed since she is getting a steroid injection next week, we should hold off on oral steroid taper over the weekend if possible  Patient should seek ED care if having bowel or bladder changes or significant motor sensory deficits

## 2022-09-02 NOTE — TELEPHONE ENCOUNTER
Ananda De La Paz,    Your patient is being considered for a neuraxial injection, such as an epidural steroid injection, nerve root block, etc, for the management of their pain  However, the patient is currently on an anticoagulant per your orders  The American Society of Regional Anest[employer] Guideline on neuraxial procedures and anti-coagulation indicates that medication should be held as follows:    Eliquis - 3 days prior    Your permission to discontinue this anti-coagulant is necessary in order to proceed with this procedure  We will instruct the patient to restart their anti-coagulant immediately after the procedure, unless otherwise specified by you       Thank you,   [facility]    [phone]

## 2022-09-02 NOTE — TELEPHONE ENCOUNTER
Spoke to patient, she said her pain right now is 9/10  She would greatly appreciate if robaxin and a steroid taper dose could be sent in  Did mention to her that you would like to limit the amount of steroids she gets before her procedure       Pt uses [facility] in [place]

## 2022-09-02 NOTE — TELEPHONE ENCOUNTER
Scheduled patient for her right sided L3-L4 TFESI for 9/13  Sent pcp anticoagulant hold request for marlena  Patient also asked if something can be prescribed in the meantime  Stated she is going to [facility] this weekend and will be walking a lot       Sent medication to [facility] in [facility]

## 2022-09-03 ENCOUNTER — APPOINTMENT (OUTPATIENT)
Dept: LAB | Facility: IMAGING CENTER | Age: 62
End: 2022-09-03
Payer: COMMERCIAL

## 2022-09-03 DIAGNOSIS — E78.49 OTHER HYPERLIPIDEMIA: ICD-10-CM

## 2022-09-03 LAB
ALBUMIN SERPL BCP-MCNC: 3.5 G/DL (ref 3.5–5)
ALP SERPL-CCNC: 61 U/L (ref 46–116)
ALT SERPL W P-5'-P-CCNC: 33 U/L (ref 12–78)
ANION GAP SERPL CALCULATED.3IONS-SCNC: 2 MMOL/L (ref 4–13)
AST SERPL W P-5'-P-CCNC: 22 U/L (ref 5–45)
BASOPHILS # BLD AUTO: 0.08 THOUSANDS/ΜL (ref 0–0.1)
BASOPHILS NFR BLD AUTO: 1 % (ref 0–1)
BILIRUB SERPL-MCNC: 0.65 MG/DL (ref 0.2–1)
BUN SERPL-MCNC: 14 MG/DL (ref 5–25)
CALCIUM SERPL-MCNC: 8.2 MG/DL (ref 8.3–10.1)
CHLORIDE SERPL-SCNC: 116 MMOL/L (ref 96–108)
CHOLEST SERPL-MCNC: 193 MG/DL
CO2 SERPL-SCNC: 26 MMOL/L (ref 21–32)
CREAT SERPL-MCNC: 0.68 MG/DL (ref 0.6–1.3)
EOSINOPHIL # BLD AUTO: 0.09 THOUSAND/ΜL (ref 0–0.61)
EOSINOPHIL NFR BLD AUTO: 2 % (ref 0–6)
ERYTHROCYTE [DISTWIDTH] IN BLOOD BY AUTOMATED COUNT: 13 % (ref 11.6–15.1)
GFR SERPL CREATININE-BSD FRML MDRD: 93 ML/MIN/1.73SQ M
GLUCOSE P FAST SERPL-MCNC: 130 MG/DL (ref 65–99)
HCT VFR BLD AUTO: 36.2 % (ref 34.8–46.1)
HDLC SERPL-MCNC: 80 MG/DL
HGB BLD-MCNC: 11.5 G/DL (ref 11.5–15.4)
IMM GRANULOCYTES # BLD AUTO: 0.02 THOUSAND/UL (ref 0–0.2)
IMM GRANULOCYTES NFR BLD AUTO: 0 % (ref 0–2)
LDLC SERPL CALC-MCNC: 96 MG/DL (ref 0–100)
LYMPHOCYTES # BLD AUTO: 1.66 THOUSANDS/ΜL (ref 0.6–4.47)
LYMPHOCYTES NFR BLD AUTO: 28 % (ref 14–44)
MCH RBC QN AUTO: 32 PG (ref 26.8–34.3)
MCHC RBC AUTO-ENTMCNC: 31.8 G/DL (ref 31.4–37.4)
MCV RBC AUTO: 101 FL (ref 82–98)
MONOCYTES # BLD AUTO: 0.31 THOUSAND/ΜL (ref 0.17–1.22)
MONOCYTES NFR BLD AUTO: 5 % (ref 4–12)
NEUTROPHILS # BLD AUTO: 3.86 THOUSANDS/ΜL (ref 1.85–7.62)
NEUTS SEG NFR BLD AUTO: 64 % (ref 43–75)
NRBC BLD AUTO-RTO: 0 /100 WBCS
PLATELET # BLD AUTO: 212 THOUSANDS/UL (ref 149–390)
PMV BLD AUTO: 11.3 FL (ref 8.9–12.7)
POTASSIUM SERPL-SCNC: 4 MMOL/L (ref 3.5–5.3)
PROT SERPL-MCNC: 7.1 G/DL (ref 6.4–8.4)
RBC # BLD AUTO: 3.59 MILLION/UL (ref 3.81–5.12)
SODIUM SERPL-SCNC: 144 MMOL/L (ref 135–147)
TRIGL SERPL-MCNC: 83 MG/DL
TSH SERPL DL<=0.05 MIU/L-ACNC: 0.71 UIU/ML (ref 0.45–4.5)
WBC # BLD AUTO: 6.02 THOUSAND/UL (ref 4.31–10.16)

## 2022-09-03 PROCEDURE — 85025 COMPLETE CBC W/AUTO DIFF WBC: CPT

## 2022-09-03 PROCEDURE — 36415 COLL VENOUS BLD VENIPUNCTURE: CPT

## 2022-09-03 PROCEDURE — 80061 LIPID PANEL: CPT

## 2022-09-03 PROCEDURE — 80053 COMPREHEN METABOLIC PANEL: CPT

## 2022-09-03 PROCEDURE — 84443 ASSAY THYROID STIM HORMONE: CPT

## 2022-09-06 ENCOUNTER — TELEPHONE (OUTPATIENT)
Dept: PAIN MEDICINE | Facility: CLINIC | Age: 62
End: 2022-09-06

## 2022-09-06 ENCOUNTER — HOSPITAL ENCOUNTER (OUTPATIENT)
Facility: HOSPITAL | Age: 62
Setting detail: OUTPATIENT SURGERY
Discharge: HOME | End: 2022-09-06
Attending: STUDENT IN AN ORGANIZED HEALTH CARE EDUCATION/TRAINING PROGRAM | Admitting: STUDENT IN AN ORGANIZED HEALTH CARE EDUCATION/TRAINING PROGRAM
Payer: COMMERCIAL

## 2022-09-06 ENCOUNTER — HOSPITAL ENCOUNTER (OUTPATIENT)
Dept: RADIOLOGY | Facility: HOSPITAL | Age: 62
Discharge: HOME | End: 2022-09-06
Attending: STUDENT IN AN ORGANIZED HEALTH CARE EDUCATION/TRAINING PROGRAM

## 2022-09-06 ENCOUNTER — NURSE TRIAGE (OUTPATIENT)
Dept: OTHER | Facility: OTHER | Age: 62
End: 2022-09-06

## 2022-09-06 ENCOUNTER — TELEPHONE (OUTPATIENT)
Dept: FAMILY MEDICINE CLINIC | Facility: CLINIC | Age: 62
End: 2022-09-06

## 2022-09-06 ENCOUNTER — APPOINTMENT (OUTPATIENT)
Dept: PHYSICAL THERAPY | Age: 62
End: 2022-09-06
Payer: COMMERCIAL

## 2022-09-06 VITALS
SYSTOLIC BLOOD PRESSURE: 108 MMHG | DIASTOLIC BLOOD PRESSURE: 66 MMHG | RESPIRATION RATE: 20 BRPM | OXYGEN SATURATION: 97 % | HEART RATE: 61 BPM | TEMPERATURE: 97.9 F

## 2022-09-06 DIAGNOSIS — M54.16 LUMBAR RADICULOPATHY: ICD-10-CM

## 2022-09-06 DIAGNOSIS — R73.9 HYPERGLYCEMIA: Primary | ICD-10-CM

## 2022-09-06 PROCEDURE — 64483 NJX AA&/STRD TFRM EPI L/S 1: CPT | Performed by: STUDENT IN AN ORGANIZED HEALTH CARE EDUCATION/TRAINING PROGRAM

## 2022-09-06 RX ORDER — BUPIVACAINE HYDROCHLORIDE 2.5 MG/ML
INJECTION, SOLUTION EPIDURAL; INFILTRATION; INTRACAUDAL AS NEEDED
Status: DISCONTINUED | OUTPATIENT
Start: 2022-09-06 | End: 2022-09-06 | Stop reason: HOSPADM

## 2022-09-06 RX ORDER — DEXAMETHASONE SODIUM PHOSPHATE 10 MG/ML
INJECTION, SOLUTION INTRAMUSCULAR; INTRAVENOUS AS NEEDED
Status: DISCONTINUED | OUTPATIENT
Start: 2022-09-06 | End: 2022-09-06 | Stop reason: HOSPADM

## 2022-09-06 RX ORDER — LIDOCAINE HYDROCHLORIDE 10 MG/ML
INJECTION, SOLUTION EPIDURAL; INFILTRATION; INTRACAUDAL; PERINEURAL AS NEEDED
Status: DISCONTINUED | OUTPATIENT
Start: 2022-09-06 | End: 2022-09-06 | Stop reason: HOSPADM

## 2022-09-06 NOTE — OP NOTE
OPERATIVE REPORT  PATIENT NAME: Christian Nicole    :  1960  MRN: [id number]  Pt Location: [facility] OR ROOM 01    SURGERY DATE: 2022    Surgeon(s) and Role:     * Bairon Youngblood DO - Primary    Preop Diagnosis:  Lumbar radiculopathy [M54 16]    Post-Op Diagnosis Codes:     * Lumbar radiculopathy [M54 16]    Procedure(s) (LRB):  RIGHT L3-[id number] EPIDURAL STEROID INJECTION (98928) (Right)    Specimen(s):  * No specimens in log *    Estimated Blood Loss:   Minimal    Drains:  * No LDAs found *    Anesthesia Type:   Local    Operative Indications:  Lumbar radiculopathy [M54 16]      Operative Findings:  DATE: 2022  Procedure: Right L3-[id number] Epidural Steroid Injection   Physician: Spike Larson DO  PRE-OPERATIVE DIAGNOSIS: Lumbar radiculopathy   POST-OPERATIVE DIAGNOSIS: Same  ANESTHESIA: Local  COMPLICATIONS: None     Indications  Christian Nicole is a 60 y o  female with a history of low back pain radiating into the right anterior thigh  in the right L3 distribution, who has failed conservative therapy  The patient presents today for the above mentioned procedure  Informed Consent  The risks, benefits and alternatives of the procedure were discussed with the patient  The patient was given opportunity to ask questions regarding the procedure, its indications and the associated risks  The risks of the procedure discussed include but are not limited to infection, bleeding, allergic reactions, nerve injuries, intravascular injection and side effects  The patient showed understanding and wished to proceed  Preparation  The patient was placed in the prone position on the fluoroscopic table with a pillow under the abdomen  Routine monitors were applied  The lumbosacral area was prepped and draped in the usual sterile fashion and sterile technique was adhered to throughout the entire procedure  The L3-L4 was identified under fluoroscopic guidance   The vertebral body end plates were aligned and the foramen was brought into view using an ipsilateral oblique angulation of the C-arm  The skin and subcutaneous tissue were infiltrated with 1% Lidocaine  After adequate local anesthesia a 5 inch 22 gauge Quincke needle was inserted at the right L3-L4 space  The needle was advanced under fluoroscopic guidance to the superolateral portion of the foramen  Needle position was confirmed in the AP and lateral views  After negative aspiration for heme and CSF, 0 5 cc Omnipaque 240 contrast dye was injected under live fluoroscopy and no intravascular or intrathecal spread was noted  Epidural spread of contrast was confirmed in the AP and lateral views  2 ml of the treatment solution mixed with 1 ml 0 25% bupivacaine and 10 mg Dexamethasone was injected after negative aspiration  The needle was then removed  The patient tolerated the procedure well  Vital signs remained stable throughout  Post-operative exam did not reveal any new neurological deficits  Patient was sent to the recovery room in a stable condition  The patient was instructed to call with fever, chills, increased pain, redness or swelling at the injection site, or numbness or weakness in the leg  A total of medication used: 1ml 0 25% bupivacaine, 10 mg Dexamethasone        Complications:   None         I was present for the entire procedure    Patient Disposition:  PACU       SIGNATURE: Veronica Snell DO  DATE: September 6, 2022  TIME: 9:49 AM

## 2022-09-06 NOTE — DISCHARGE INSTRUCTIONS
Epidural Steroid Injection   WHAT YOU NEED TO KNOW:   An epidural steroid injection (PRAMINDER) is a procedure to inject steroid medicine into the epidural space  The epidural space is between your spinal cord and vertebrae  Steroids reduce inflammation and fluid buildup in your spine that may be causing pain  You may be given pain medicine along with the steroids  ACTIVITY  Do not drive or operate machinery today  No strenuous activity today - bending, lifting, etc   You may resume normal activites starting tomorrow - start slowly and as tolerated  You may shower today, but no tub baths or hot tubs  You may have numbness for several hours from the local anesthetic  Please use caution and common sense, especially with weight-bearing activities  CARE OF THE INJECTION SITE  If you have soreness or pain, apply ice to the area today (20 minutes on/20 minutes off)  Starting tomorrow, you may use warm, moist heat or ice if needed  You may have an increase or change in your discomfort for 36-48 hours after your treatment  Apply ice and continue with any pain medication you have been prescribed  Notify the [facility] if you have any of the following: redness, drainage, swelling, headache, stiff neck or fever above 100°F     SPECIAL INSTRUCTIONS  Our office will contact you in approximately 7 days for a progress report  MEDICATIONS  Continue to take all routine medications  Our office may have instructed you to hold some medications  Please resume your Eliquis on 9/7/2022 @ the time you normally take the medication  As no general anesthesia was used in today's procedure, you should not experience any side effects related to anesthesia  If you are diabetic, the steroids used in today's injection may temporarily increase your blood sugar levels after the first few days after your injection   Please keep a close eye on your sugars and alert the doctor who manages your diabetes if your sugars are significantly high from your baseline or you are symptomatic  If you have a problem specifically related to your procedure, please call our office at [phone]  Problems not related to your procedure should be directed to your primary care physician

## 2022-09-06 NOTE — TELEPHONE ENCOUNTER
----- Message from [address] sent at 9/6/2022  7:11 AM EDT -----  Labs are stable except for elevated fasting glucose  I did put order in computer for her to get hemoglobin A1c at her earliest convenience

## 2022-09-06 NOTE — TELEPHONE ENCOUNTER
PT called in she would like to know if she can go to Physical therapy on Thursday ? She had a procedure today  Pt was advised that as long as she feels up to it she can go  Please be advised thank you    Pt can be reached @  [phone]

## 2022-09-06 NOTE — TELEPHONE ENCOUNTER
S/w pt, she said the Robaxin has been helping take the edge off  Pt was able to get her procedure moved up to today

## 2022-09-07 NOTE — TELEPHONE ENCOUNTER
Pt called back and had a injection which had prednisone in it   She wants to wait 14 days until she goes for HgA1c

## 2022-09-07 NOTE — TELEPHONE ENCOUNTER
Reason for Disposition   [1] SEVERE headache AND [2] after spinal (epidural) anesthesia    Answer Assessment - Initial Assessment Questions  1  SYMPTOM: "What's the main symptom you're concerned about?" (e g , pain, fever, vomiting)      Headache     2  ONSET: "When did S/S  start?"      Tonight at 2030    3  SURGERY: "What surgery was performed?"      Epidural steroid injection     4  DATE of SURGERY: "When was surgery performed?"       9/6/22    5  ANESTHESIA: " What type of anesthesia did you have?" (e g , general, spinal, epidural, local)      Local     6  PAIN: "Is there any pain?" If Yes, ask: "How bad is it?"  (Scale 1-10; or mild, moderate, severe)      Yes, headache was 10/10, now 4/10    7  FEVER: "Do you have a fever?" If Yes, ask: "What is your temperature, how was it measured, and when did it start?"      Denies    8  VOMITING: "Is there any vomiting?" If yes, ask: "How many times?"      Denies    9  BLEEDING: "Is there any bleeding?" If Yes, ask: "How much?" and "Where?"      Denies    10   OTHER SYMPTOMS: "Do you have any other symptoms?" (e g , drainage from wound, painful urination, constipation)        Denies    Tylenol 1000 mg PO at 2040    Protocols used: POST-OP SYMPTOMS AND QUESTIONS-On license of [facility]

## 2022-09-07 NOTE — TELEPHONE ENCOUNTER
FYI:  S/W pt regarding headache after TFESI yesterday  States as per below  Bad headache last night around 2030, which was semi relieved by taking Tylenol  Did not sleep much last night but this am, she states headache seems to be ok  Has not gotten up as of yet  Is trying to sleep since she needs to go to work      Advised pt to CB with any further issues  Pt agreeable

## 2022-09-08 ENCOUNTER — OFFICE VISIT (OUTPATIENT)
Dept: PHYSICAL THERAPY | Age: 62
End: 2022-09-08
Payer: COMMERCIAL

## 2022-09-08 DIAGNOSIS — M54.31 SCIATIC PAIN, RIGHT: Primary | ICD-10-CM

## 2022-09-08 PROCEDURE — 97110 THERAPEUTIC EXERCISES: CPT

## 2022-09-08 PROCEDURE — 97112 NEUROMUSCULAR REEDUCATION: CPT

## 2022-09-08 PROCEDURE — 97140 MANUAL THERAPY 1/> REGIONS: CPT

## 2022-09-08 NOTE — PROGRESS NOTES
Daily Note     Today's date: 2022  Patient name: Nestora Cogan  : 1960  MRN: [id number]  Referring provider: DANIELA Woo  Dx:   Encounter Diagnosis     ICD-10-CM    1  Sciatic pain, right  M54 31                   Subjective: Patient reports slight pain relief after epidural injection  Objective: See treatment diary below      Assessment: Tolerated treatment fair  Patient reports increased pain with TA activation and bridges  Plan: Continue per plan of care        Precautions: hx DVT, cardiac       Manuals 7/19/22 7/26  8/9/22 8/15/22 8/22/22 9/8       Gentle piriformis stretch  VK  CW VK VK VK HA                                              Neuro Re-Ed             Sciatic nerve glide  2x10 seated 30x seated  10x ea B/L 10x ea B/L 2x10 ea B/L 2x10 B/L       GS 3x10x5" 3x10x5"  30x5" 30x5" 30x5" 30x5"       Mini bridge  2x10 no hold 2x10  3x10 no hold 3x10 3x10 3x10        Hip 3-way standing  np 15x ea B/L 2x10 ea B/L 2x10 ea B/L 2x10 ea B/L 2x10ea B/L       TrA activation     20x5" 2x15x5" 2x15 x5"       BKFO    2x10 2x15 2x15       TrA march    2x10 2x15        Ther Ex             Nustep Gentle 5' no UE 8' gentle  5' 5' 10' 10'       PB flexion 3-way   5x10" ea 5x10" 5x10" 10x10" ea  10x10" ea                                                                                     Ther Activity                                       Gait Training                                       Modalities

## 2022-09-13 ENCOUNTER — IN-CLINIC DEVICE VISIT (OUTPATIENT)
Dept: CARDIOLOGY CLINIC | Facility: CLINIC | Age: 62
End: 2022-09-13
Payer: COMMERCIAL

## 2022-09-13 DIAGNOSIS — Z95.0 PRESENCE OF PERMANENT CARDIAC PACEMAKER: Primary | ICD-10-CM

## 2022-09-13 PROCEDURE — 93280 PM DEVICE PROGR EVAL DUAL: CPT | Performed by: INTERNAL MEDICINE

## 2022-09-13 NOTE — PROGRESS NOTES
Results for orders placed or performed in visit on 09/13/22   Cardiac EP device report    Narrative    MDT-DUAL PPM (AAIR-DDDR MODE)/ ACTIVE SYSTEM IS MRI CONDITIONAL  DEVICE INTERROGATED IN THE [facility]ICE  BATTERY VOLTAGE ADEQUATE  (10 8 YRS) AP 36%  <1%  ALL LEAD PARAMETERS WITHIN NORMAL LIMITS  NO SIGNIFICANT HIGH RATE EPISODES  NO PROGRAMMING CHANGES MADE TO DEVICE PARAMETERS  NORMAL DEVICE FUNCTION  -----GOLDBERG

## 2022-09-14 DIAGNOSIS — M54.41 ACUTE RIGHT-SIDED LOW BACK PAIN WITH RIGHT-SIDED SCIATICA: ICD-10-CM

## 2022-09-14 DIAGNOSIS — R10.9 STOMACH CRAMPS: ICD-10-CM

## 2022-09-14 RX ORDER — DICYCLOMINE HYDROCHLORIDE 10 MG/1
CAPSULE ORAL
Qty: 90 CAPSULE | Refills: 0 | Status: SHIPPED | OUTPATIENT
Start: 2022-09-14 | End: 2022-10-03

## 2022-09-16 ENCOUNTER — TELEPHONE (OUTPATIENT)
Dept: PAIN MEDICINE | Facility: CLINIC | Age: 62
End: 2022-09-16

## 2022-09-19 DIAGNOSIS — E78.5 HYPERLIPIDEMIA, UNSPECIFIED HYPERLIPIDEMIA TYPE: ICD-10-CM

## 2022-09-19 RX ORDER — ATORVASTATIN CALCIUM 10 MG/1
TABLET, FILM COATED ORAL
Qty: 90 TABLET | Refills: 0 | Status: SHIPPED | OUTPATIENT
Start: 2022-09-19

## 2022-09-19 NOTE — TELEPHONE ENCOUNTER
S/w pt  Pt had a RT L3-4 TFESI on 9/6 with relief initially until yesterday when her pain returned  Pt describes her pain as RT lower back non radiating and sharp with certain movements like folding laundry   Pt has tried Robaxin as prescribed bid with minimal relief  Pt advised she can try ice 20 mins on 20 mins off, ES Tylenol 1000 mg q 8h not to exceed 3000 mg/24 hrs, OTC options such as Lidocaine patches or creams  Pt scheduled for a f/u appt on 9/30 with a 9 am arrival time  Pt was advised that we will call back only if Dr Yamil Cristobal has any additional recommendations otherwise we will see her at her f/u appt on 9/30  Please advise- Are there ay additional recommendations?

## 2022-09-19 NOTE — TELEPHONE ENCOUNTER
Patient states she has 0 improvement & 10/10 pain level  She would like to speak to a nurse about her next plan of care   Please reach out to her # [phone], thx

## 2022-09-22 ENCOUNTER — TELEPHONE (OUTPATIENT)
Dept: OBGYN CLINIC | Facility: MEDICAL CENTER | Age: 62
End: 2022-09-22

## 2022-09-22 ENCOUNTER — OFFICE VISIT (OUTPATIENT)
Dept: PAIN MEDICINE | Facility: CLINIC | Age: 62
End: 2022-09-22
Payer: COMMERCIAL

## 2022-09-22 VITALS
WEIGHT: 189 LBS | SYSTOLIC BLOOD PRESSURE: 122 MMHG | HEIGHT: 64 IN | HEART RATE: 67 BPM | DIASTOLIC BLOOD PRESSURE: 78 MMHG | BODY MASS INDEX: 32.27 KG/M2

## 2022-09-22 DIAGNOSIS — M54.50 ACUTE RIGHT-SIDED LOW BACK PAIN WITHOUT SCIATICA: Primary | ICD-10-CM

## 2022-09-22 DIAGNOSIS — M54.16 LUMBAR RADICULITIS: ICD-10-CM

## 2022-09-22 PROCEDURE — 99214 OFFICE O/P EST MOD 30 MIN: CPT | Performed by: STUDENT IN AN ORGANIZED HEALTH CARE EDUCATION/TRAINING PROGRAM

## 2022-09-22 RX ORDER — NALOXONE HYDROCHLORIDE 4 MG/.1ML
SPRAY NASAL
Qty: 1 EACH | Refills: 1 | Status: SHIPPED | OUTPATIENT
Start: 2022-09-22

## 2022-09-22 RX ORDER — TRAMADOL HYDROCHLORIDE 50 MG/1
25 TABLET ORAL EVERY 8 HOURS PRN
Qty: 10 TABLET | Refills: 0 | Status: SHIPPED | OUTPATIENT
Start: 2022-09-22

## 2022-09-22 NOTE — TELEPHONE ENCOUNTER
----- Message from Lou Sahni DO sent at 9/22/2022 11:27 AM EDT -----  Please schedule follow up in 3 weeks   Thanks

## 2022-09-22 NOTE — LETTER
September 22, 2022     Patient: Marcela Limon  YOB: 1960  Date of Visit: 9/22/2022      To Whom it May Concern:    Marcela Limon is under my professional care  Danica Adames was seen in my office on 9/22/2022  Please excuse Danica Adames from work at this time  If you have any questions or concerns, please don't hesitate to call           Sincerely,          Bairon Lockhart DO        CC: No Recipients

## 2022-09-22 NOTE — PROGRESS NOTES
Assessment:  1  Acute right-sided low back pain without sciatica    2  Lumbar radiculitis        Plan:  Orders Placed This Encounter   Procedures    MRI lumbar spine wo contrast     Standing Status:   Future     Standing Expiration Date:   9/22/2026     Scheduling Instructions: There is no preparation for this test  Please leave your jewelry and valuables at home, wedding rings are the exception  All patients will be required to change into a hospital gown and pants  Street clothes are not permitted in the MRI  Magnetic nail polish must be removed prior to arrival for your test  Please bring your insurance cards, a form of photo ID and a list of your medications with you  Arrive 15 minutes prior to your appointment time in order to register  Please bring any prior CT or MRI studies of this area that were not performed at a [facility]  To schedule this appointment, please contact Central Scheduling at [phone]  Prior to your appointment, please make sure you complete the MRI Screening Form when you e-Check in for your appointment  This will be available starting 7 days before your appointment in [address]  You may receive an e-mail with an activation code if you do not have a [employer] account  If you do not have access to a device, we will complete your screening at your appointment  Order Specific Question:   What is the patient's sedation requirement? Answer:   No Sedation     Order Specific Question:   Release to patient through [employer]     Answer:   Immediate     Order Specific Question:   Is order priority selected as STAT? Answer:   No     Order Specific Question:   Reason for Exam (FREE TEXT)     Answer:   Pt s/p right L3-L4 transforminal epidural steroid injection with acute worsening of right sided low back pain     Order Specific Question:   When should the test be performed?      Answer:   Urgent- less than one week    Ambulatory referral to Neurosurgery     Standing Status:   Future     Standing Expiration Date:   9/22/2023     Referral Priority:   Routine     Referral Type:   Consult - AMB     Referral Reason:   Specialty Services Required     Requested Specialty:   Neurosurgery     Number of Visits Requested:   1     Expiration Date:   9/22/2023     New Medications Ordered This Visit   Medications    naloxone (NARCAN) 4 mg/0 1 mL nasal spray     Sig: Administer 1 spray into a nostril  If no response after 2-3 minutes, give another dose in the other nostril using a new spray  Dispense:  1 each     Refill:  1    traMADol (Ultram) 50 mg tablet     Sig: Take 0 5 tablets (25 mg total) by mouth every 8 (eight) hours as needed for moderate pain or severe pain     Dispense:  10 tablet     Refill:  [de-identified]      69-year-old femalewith past medical history of sick sinus syndrome with pacemaker as well as DVTs on Eliquis presenting with acute onset severe and worsening localized right-sided low back pain with radiation to anterior groin but different than prior pain presentation  Patient is status post right L3-L4 transforaminal epidural steroid injection on 09/06/2022  Physical exam noteworthy for tenderness to palpation to the right mid lumbar region but no erythema, swelling or hematoma noted  Generalize weakness secondary to pain in the right lower extremity as well as decreased sensation light touch in anterior and lateral thigh as prior  Prior lumbar MRI showing lateral bulge with osteophyte contacting the right L3 nerve root as well as facet overgrowth cyst at L2-L3 leading to moderate left recess stenosis  Patient's pain presentation could be secondary to post procedural trauma secondary patient on Eliquis  No recent fevers or chills  Unlikely to be facet arthropathy  given the nonpositional severity of the pain    Could be neuritis or worsening of her chronic L3 radiculopathy or synovial cyst leading to L2-L3 impingement but the pain appears most localized to the back at this time  - will start the patient short-term on tramadol in the setting of severe pain not improved with adjuvant medications and patient cannot take NSAIDs given her heart condition and Eliqus  Explained the risk associated with opioid medications and drug interactions with other sedative medications  Can consider    - patient requesting referral for Neurosurgery consultation at this time to evaluate possible surgical options  I explained to her that we consider surgery after exhausting noninvasive options including injections and medications  - will plan for repeat lumbar MRI in the setting of new presentation and acute onset severe right sided low back pain and post procedure patient on Eliquis  - patient follow-up in 3 weeks and can consider repeating epidural injection for longer term pain relief  Portions of the record may have been created with voice recognition software  Occasional wrong word or "sound a like" substitutions may have occurred due to the inherent limitations of voice recognition software  Read the chart carefully and recognize, using context, where substitutions have occurred  My impressions and treatment recommendations were discussed in detail with the patient who verbalized understanding and had no further questions  Discharge instructions were provided  I personally saw and examined the patient and I agree with the above discussed plan of care  History of Present Illness:  Arsenio Cruz is a 60 y o  female with past medical history of sick sinus syndrome with pacemaker as well as DVTs on Eliquis who presents for a follow up office visit in regards to Back Pain (Lumbar pain worst)  The patients current symptoms include worsening right-sided low back pain 10/10  She describes the pain is constant sharp shooting and interfering with her daily activities as well as ability to work    After TFESI on 09/06/2022 she noted initial relief but then after few days then she started noticing worsening of localized right-sided low back pain with minimal radiation into the anterior groin region but no longer experiences the radiating pain as prior  She notes that this pain is different than prior and more severe  She has tried her usual pain medication but have not helped with the pain  She notes that to the severe pain she is unable to ambulate as usual      She denies any recent fevers chills  No bowel or bladder incontinence  Denies any significant worsening motor or sensory deficits  Prior Visits:  8-30-22:  Initial consultation with pain presentation along L3 dermatome on the right  Plan for  L3-L4 transforaminal epidural steroid injection  9-6-22: right L3-L4 TFESI: 100% relief initially with acute onset and worsening right low back pain but radiating pain into right leg with continued relief  Other Symptoms:  The patient does not have other numbness  The patient does not have other weakness  The patient does not have bladder dysfunction  The patient does not have bowel dysfunction  The patient does not have chills and fever, night sweats or unintentional weight loss  PDMP results:  Reviewed  Current Pain Medications:  Robaxin, Tylenol, lidocaine patch  Interventional Techniques Employed:  Right L3-L4 transforaminal epidural steroid injection:  09/06/2022    Imaging:  Lumbar MRI 08/16/2022    Far lateral bulge with marginal osteophyte at L3-4 contacts the extraforaminal right L3 nerve root  Correlate for right L3 radiculopathy  Moderate central stenosis noted at this level      Facet overgrowth facet cyst L2-3 eccentric to the left dorsally in the canal contributes to mild central and moderate left lateral recess stenosis      Severe facet degenerative change L4-5 accounts for 4 mm anterolisthesis          I have personally reviewed and/or updated the patient's past medical history, past surgical history, family history, social history, current medications, allergies, and vital signs today  Review of Systems   Constitutional: Negative for diaphoresis, fatigue, fever and unexpected weight change  Musculoskeletal: Positive for back pain, gait problem and joint swelling         Patient Active Problem List   Diagnosis    Abnormal CT scan    Sick sinus syndrome (HCC)    HSV-1 infection    Status post carpal tunnel release    Fibrocystic breast disease    Hyperlipidemia    Knee osteoarthritis    Lichen sclerosus    Menopausal and postmenopausal disorder    Neck pain    Candida infection of flexural skin    Grade 1 out of 6 intensity murmur    Encounter for screening mammogram for malignant neoplasm of breast    Deep vein thrombosis (DVT) of proximal lower extremity ([facility])    History of DVT (deep vein thrombosis)    Iron deficiency anemia    Vitamin deficiency    Medicare annual wellness visit, subsequent    Mass of soft tissue of right upper extremity    Chronic right shoulder pain    Work related injury    Leukopenia    Anemia    Right ear pain    Dysphagia    Acute gastric ulcer without hemorrhage or perforation    History of colon polyps    Preop examination    Overweight    Bariatric surgery status    Encounter for surgical aftercare following surgery of digestive system    Depression    Nondisplaced fracture of medial malleolus of right tibia, initial encounter for closed fracture    Closed nondisplaced Maisonneuve's fracture of right leg    Closed fracture of upper end of right fibula    Closed fracture of left distal radius and ulna    Laceration of eyebrow and forehead    Visit for suture removal    Fall    Sinus pressure    Pacemaker    PSVT (paroxysmal supraventricular tachycardia) ([facility])    Acute non-recurrent maxillary sinusitis    Acute respiratory disease due to COVID-19 virus    Insomnia    Non-recurrent acute serous otitis media of both ears    Sciatic pain, right    Acute right-sided low back pain with right-sided sciatica    LLQ pain    Stomach cramps    Lumbar radiculopathy       Past Medical History:   Diagnosis Date    Anemia     iron def    Arthritis     knees    Carpal tunnel syndrome of right wrist     Chest pain     Colon polyp     Depression     at times    DVT (deep venous thrombosis) ([facility] [address] )     Dysphagia     Esophageal reflux 2011    Grade 1 out of 6 intensity murmur 2019    Heart murmur     History of transfusion 1980    Pacemaker 2018    Pneumonia     age 15    Psychiatric disorder     Trigger finger, left        Past Surgical History:   Procedure Laterality Date    BACK SURGERY      CARDIAC PACEMAKER PLACEMENT  2018    CERVICAL [address] SURGERY  2009    PLATES & SCREWS     SECTION      X2    COLONOSCOPY      EPIDURAL BLOCK INJECTION Right 2022    Procedure: RIGHT [id number] EPIDURAL STEROID INJECTION ([id number]);   Surgeon: Kay Elizondo DO;  Location: [facility];  Service: Pain Management     GASTRIC BYPASS  2017    LAP RINYGB SURGERY    KNEE ARTHROSCOPY      KNEE SURGERY Bilateral     KNEE SURGERY Left 2020    OTHER SURGICAL HISTORY      ARM SURGERIES    MI INCISE FINGER TENDON SHEATH Left 2018    Procedure: LONG TRIGGER FINGER RELEASE;  Surgeon: Tiffanie Barreto MD;  Location: [facility];  Service: Orthopedics    MI WRIST Edie Carls LIG Right 2018    Procedure: ENDOSCOPIC CARPAL TUNNEL RELEASE;  Surgeon: Tiffanie Barreto MD;  Location: [facility];  Service: Orthopedics    TONSILLECTOMY      UPPER GASTROINTESTINAL ENDOSCOPY         Family History   Problem Relation Age of Onset    Stroke Mother     Alzheimer's disease Mother     Arthritis Mother     Coronary artery disease Mother    Adan Newman Breast cancer Mother 77    Cancer Mother     Heart disease Mother     Hypertension Father     Gout Father     Diabetes type II Father     Coronary artery disease Father  Heart disease Father     No Known Problems Sister     No Known Problems Sister     No Known Problems Sister     No Known Problems Daughter     No Known Problems Maternal Grandmother     No Known Problems Maternal Grandfather     No Known Problems Paternal Grandmother     No Known Problems Paternal Grandfather     Prostate cancer Brother     Pancreatic cancer Brother     No Known Problems Maternal Aunt     No Known Problems Maternal Aunt     No Known Problems Maternal Aunt     No Known Problems Maternal Aunt     Cancer Maternal Aunt     No Known Problems Paternal Aunt     No Known Problems Paternal Aunt        Social History     Occupational History    Not on file   Tobacco Use    Smoking status: Former Smoker     Packs/day: 0 25     Types: Cigarettes    Smokeless tobacco: Never Used   Vaping Use    Vaping Use: Never used   Substance and Sexual Activity    Alcohol use:  Yes     Alcohol/week: 2 0 standard drinks     Types: 1 Glasses of wine, 1 Standard drinks or equivalent per week     Comment: very rare social    Drug use: No    Sexual activity: Not Currently       Current Outpatient Medications on File Prior to Visit   Medication Sig    Ascorbic Acid (VITAMIN C) 100 MG CHEW Chew    atorvastatin (LIPITOR) 10 mg tablet take 1 tablet by mouth once daily    Biotin 00087 MCG TABS Take by mouth daily      Calcium Carbonate (CALCI-CHEW PO) Take 500 mg by mouth 2 (two) times a day    cholecalciferol (VITAMIN D3) 1,000 units tablet Take 5,000 Units by mouth 3 (three) times a day      clobetasol (TEMOVATE) 0 05 % cream Apply to affected areas daily x 12 weeks, then twice weekly for maintenance    Cyanocobalamin (VITAMIN B 12 PO) Take 1,000 mcg by mouth    Diclofenac Sodium (VOLTAREN) 1 % Apply 2 g topically 4 (four) times a day    dicyclomine (BENTYL) 10 mg capsule take 1 capsule by mouth four times a day before meals at bedtime    Eliquis 2 5 MG take 1 tablet by mouth twice a day    Ferrous Sulfate  (45 Fe) MG TBCR Take 1 tablet by mouth daily    fluticasone (FLONASE) 50 mcg/act nasal spray instill 1 spray into each nostril once daily    guaifenesin-codeine (GUAIFENESIN AC) 100-10 MG/5ML liquid Take 5 mL by mouth 3 (three) times a day as needed for cough    Lysine HCl POWD Take by mouth    methocarbamol (ROBAXIN) 500 mg tablet Take 1 tablet (500 mg total) by mouth 2 (two) times a day    methocarbamol (Robaxin-750) 750 mg tablet Take 1 tablet (750 mg total) by mouth 2 (two) times a day as needed for muscle spasms    metoprolol succinate (TOPROL-XL) 25 mg 24 hr tablet Take 1 tablet (25 mg total) by mouth daily    Multiple Vitamin (MULTIVITAMIN) capsule Take 1 capsule by mouth daily 2 chewies daily    nystatin (MYCOSTATIN) powder Apply topically 2 (two) times a day    oxyCODONE (Roxicodone) 5 immediate release tablet Take 1 tablet (5 mg total) by mouth every 4 (four) hours as needed for moderate pain for up to 18 doses Max Daily Amount: 30 mg    pantoprazole (PROTONIX) 20 mg tablet take 1 tablet by mouth once daily    predniSONE 10 mg tablet Take 6 tabs days 1&2, 5 tabs days 3&4, 4 tabs days 5&6, 3 tabs days 7&8, 2 tabs days 9&10, 1 tab days 11&12    traZODone (DESYREL) 50 mg tablet take 1 tablet by mouth at bedtime if needed for sleep    valACYclovir (VALTREX) 1,000 mg tablet Take 2 tablets (2,000 mg total) by mouth 2 (two) times a day for 1 day     No current facility-administered medications on file prior to visit         No Known Allergies    Physical Exam:    /78   Pulse 67   Ht 5' 4" (1 626 m) Comment: verbal  Wt 85 7 kg (189 lb)   LMP 09/18/2014   BMI 32 44 kg/m²       Palpation:    No tenderness over the left paravertebral musculature    Severe  tenderness over the right paravertebral musculature but no erythema hematoma or swelling noted    No tenderness with palpation of the left SI joint    No tenderness with palpation of the right SI joint      No tenderness with palpation of the left greater trochanter    No tenderness with palpation of the right greater trochanter    Sensory:    Intact to light touch grossly in the left lower extremity    Decreased sensation in the anterior thigh as compared to the left    Muscle Strength      Hip flexion Knee flexion Knee extension Foot plantarflexion Foot   dorsiflexion EHL   L 5/5 5/5 5/5 5/5 5/5 5/5   R 4/5 4/5 4/5 5/5 5/5 5/5   Generalized pain with any movement of the right lower extremity limited muscle strength examination      DTRs: 2+ patellar, 2+ Achilles and symmetric       Special Tests:     Facet loading Straight leg raise STEPHANY FAIR   L       R Positive Positive

## 2022-09-25 DIAGNOSIS — G47.00 INSOMNIA, UNSPECIFIED TYPE: ICD-10-CM

## 2022-09-26 RX ORDER — TRAZODONE HYDROCHLORIDE 50 MG/1
TABLET ORAL
Qty: 30 TABLET | Refills: 1 | Status: SHIPPED | OUTPATIENT
Start: 2022-09-26

## 2022-09-28 ENCOUNTER — HOSPITAL ENCOUNTER (OUTPATIENT)
Dept: MRI IMAGING | Facility: HOSPITAL | Age: 62
Discharge: HOME | End: 2022-09-28
Payer: COMMERCIAL

## 2022-09-28 DIAGNOSIS — M54.50 ACUTE RIGHT-SIDED LOW BACK PAIN WITHOUT SCIATICA: ICD-10-CM

## 2022-09-28 DIAGNOSIS — M54.16 LUMBAR RADICULITIS: ICD-10-CM

## 2022-09-28 PROCEDURE — 72148 MRI LUMBAR SPINE W/O DYE: CPT

## 2022-09-28 PROCEDURE — G1004 CDSM NDSC: HCPCS

## 2022-10-02 DIAGNOSIS — I82.4Y9 DEEP VEIN THROMBOSIS (DVT) OF PROXIMAL LOWER EXTREMITY, UNSPECIFIED CHRONICITY, UNSPECIFIED LATERALITY (HCC): ICD-10-CM

## 2022-10-03 DIAGNOSIS — R10.9 STOMACH CRAMPS: ICD-10-CM

## 2022-10-03 DIAGNOSIS — M54.41 ACUTE RIGHT-SIDED LOW BACK PAIN WITH RIGHT-SIDED SCIATICA: ICD-10-CM

## 2022-10-03 RX ORDER — APIXABAN 2.5 MG/1
TABLET, FILM COATED ORAL
Qty: 60 TABLET | Refills: 3 | Status: SHIPPED | OUTPATIENT
Start: 2022-10-03

## 2022-10-03 RX ORDER — DICYCLOMINE HYDROCHLORIDE 10 MG/1
CAPSULE ORAL
Qty: 90 CAPSULE | Refills: 0 | Status: SHIPPED | OUTPATIENT
Start: 2022-10-03 | End: 2022-10-21

## 2022-10-05 ENCOUNTER — CONSULT (OUTPATIENT)
Dept: NEUROSURGERY | Facility: CLINIC | Age: 62
End: 2022-10-05
Payer: COMMERCIAL

## 2022-10-05 ENCOUNTER — OFFICE VISIT (OUTPATIENT)
Dept: RHEUMATOLOGY | Facility: CLINIC | Age: 62
End: 2022-10-05
Payer: COMMERCIAL

## 2022-10-05 VITALS
SYSTOLIC BLOOD PRESSURE: 112 MMHG | HEIGHT: 64 IN | BODY MASS INDEX: 32.27 KG/M2 | DIASTOLIC BLOOD PRESSURE: 74 MMHG | TEMPERATURE: 98.1 F | HEART RATE: 84 BPM | RESPIRATION RATE: 18 BRPM | WEIGHT: 189 LBS

## 2022-10-05 VITALS — WEIGHT: 185 LBS | DIASTOLIC BLOOD PRESSURE: 74 MMHG | SYSTOLIC BLOOD PRESSURE: 118 MMHG | BODY MASS INDEX: 31.76 KG/M2

## 2022-10-05 DIAGNOSIS — Z79.899 HIGH RISK MEDICATION USE: ICD-10-CM

## 2022-10-05 DIAGNOSIS — I47.1 PSVT (PAROXYSMAL SUPRAVENTRICULAR TACHYCARDIA) (HCC): ICD-10-CM

## 2022-10-05 DIAGNOSIS — M47.816 SPONDYLOSIS WITHOUT MYELOPATHY OR RADICULOPATHY, LUMBAR REGION: Primary | ICD-10-CM

## 2022-10-05 DIAGNOSIS — M71.30 SYNOVIAL CYST: ICD-10-CM

## 2022-10-05 DIAGNOSIS — M54.31 SCIATIC PAIN, RIGHT: ICD-10-CM

## 2022-10-05 DIAGNOSIS — M54.50 CHRONIC LOW BACK PAIN WITHOUT SCIATICA, UNSPECIFIED BACK PAIN LATERALITY: ICD-10-CM

## 2022-10-05 DIAGNOSIS — M43.16 SPONDYLOLISTHESIS OF LUMBAR REGION: ICD-10-CM

## 2022-10-05 DIAGNOSIS — I49.5 SICK SINUS SYNDROME (HCC): ICD-10-CM

## 2022-10-05 DIAGNOSIS — M81.0 OSTEOPOROSIS, UNSPECIFIED OSTEOPOROSIS TYPE, UNSPECIFIED PATHOLOGICAL FRACTURE PRESENCE: Primary | ICD-10-CM

## 2022-10-05 DIAGNOSIS — M54.16 LUMBAR RADICULITIS: ICD-10-CM

## 2022-10-05 DIAGNOSIS — G89.29 CHRONIC LOW BACK PAIN WITHOUT SCIATICA, UNSPECIFIED BACK PAIN LATERALITY: ICD-10-CM

## 2022-10-05 DIAGNOSIS — M54.89 BACK PAIN WITHOUT SCIATICA: ICD-10-CM

## 2022-10-05 PROBLEM — Z48.815 ENCOUNTER FOR SURGICAL AFTERCARE FOLLOWING SURGERY OF DIGESTIVE SYSTEM: Status: RESOLVED | Noted: 2020-09-24 | Resolved: 2022-10-05

## 2022-10-05 PROBLEM — Z01.818 PREOP EXAMINATION: Status: RESOLVED | Noted: 2020-08-03 | Resolved: 2022-10-05

## 2022-10-05 PROBLEM — M54.9 BACK PAIN WITHOUT SCIATICA: Status: ACTIVE | Noted: 2022-07-15

## 2022-10-05 PROBLEM — Z48.02 VISIT FOR SUTURE REMOVAL: Status: RESOLVED | Noted: 2021-06-25 | Resolved: 2022-10-05

## 2022-10-05 PROCEDURE — 99214 OFFICE O/P EST MOD 30 MIN: CPT | Performed by: INTERNAL MEDICINE

## 2022-10-05 PROCEDURE — 99203 OFFICE O/P NEW LOW 30 MIN: CPT | Performed by: NEUROLOGICAL SURGERY

## 2022-10-05 RX ORDER — TIZANIDINE 2 MG/1
2 TABLET ORAL EVERY 8 HOURS PRN
Qty: 90 TABLET | Refills: 4 | Status: SHIPPED | OUTPATIENT
Start: 2022-10-05

## 2022-10-05 RX ORDER — GABAPENTIN 100 MG/1
100 CAPSULE ORAL 3 TIMES DAILY
Qty: 90 CAPSULE | Refills: 4 | Status: SHIPPED | OUTPATIENT
Start: 2022-10-05

## 2022-10-05 RX ORDER — SENNOSIDES 8.6 MG
650 CAPSULE ORAL EVERY 8 HOURS PRN
COMMUNITY

## 2022-10-05 NOTE — PROGRESS NOTES
Neurosurgery Office Note  Rakel Hodgson 60 y o  female MRN: [id number]      Assessment/Plan      Diagnoses and all orders for this visit:    Spondylosis without myelopathy or radiculopathy, lumbar region    Synovial cyst    Back pain without sciatica    Spondylolisthesis of lumbar region    Sick sinus syndrome (HCC)    PSVT (paroxysmal supraventricular tachycardia) ([facility] [address] )    H/O DVT    Anti-coagulant use        Discussion:    Pain Score:   7 (LBP )      10/05/22 Metrics: [id number]  0 400   VAS [id number]    59-year-old woman who presents with back pain that has been a problem for some months, since the summer 2022  This back pain is constant, with no temporal pattern  Severity is 7/10, does not radiate into the buttocks or legs  No numbness, tingling  No weakness  Has interfered in her ability to work etcetera  Underwent epidural steroid injection L L3-4 with Dr Roseann Meyer earlier in 09/2022  This helped her for about a day, and then she relapsed  She underwent a 2nd MRI scan lumbar spine end of 09/2022, which is compared against that done mid 08/2022  Scan shows very mild spondylolisthesis L4-5, stenosis L3-4, left sided synovial cyst L2-3  Synovial cyst does not cause any significant nerve root compression  I reviewed the patient her imaging study  I counseled her that spine surgery is not particularly successful or beneficial for axial back pain  While she is quite focused that the synovial cyst should be removed and would improve her back pain, I attempted to  her this would likely not be the case  I did recommend she undergo facet blocks, perhaps particularly left L 2 3 to attempt to mitigate her pain  Otherwise I would also advise other conservative measures such as muscle relaxers, etcetera  We will have her return to Dr Roseann Meyer, and she can follow up with us on an as-needed basis        CHIEF COMPLAINT    Chief Complaint   Patient presents with   Markus Glover 106 9/28/2022 SL       HISTORY    History of Present Illness     60y o  year old female     HPI    See Discussion    REVIEW OF SYSTEMS    Review of Systems   Musculoskeletal: Positive for back pain (entire LBP- using ice 20min on/off and Heat- pt thinks heat makes it worse) and gait problem (due to pain- hard to walk or sit )  LBP with difficult walk and weakness due to pain   symptoms present for a while and getting worse    MEDS tramadol     PT with SL   PARMINDER 9/6/22 (0 improvement & 10/10 pain level)    no previous spine sx  7/10 pain LBP   former smoker    Neurological: Positive for weakness  Negative for numbness  Hematological: Bruises/bleeds easily          Eliqus h/o DVT          Meds/Allergies     Current Outpatient Medications   Medication Sig Dispense Refill    acetaminophen (Tylenol 8 Hour) 650 mg CR tablet Take 650 mg by mouth every 8 (eight) hours as needed for mild pain Take 2 tablets in the AM and 2 tablets in the late evening      Ascorbic Acid (VITAMIN C) 100 MG CHEW Chew      atorvastatin (LIPITOR) 10 mg tablet take 1 tablet by mouth once daily 90 tablet 0    Biotin 47341 MCG TABS Take by mouth daily        Calcium Carbonate (CALCI-CHEW PO) Take 500 mg by mouth 2 (two) times a day      cholecalciferol (VITAMIN D3) 1,000 units tablet Take 5,000 Units by mouth in the morning      clobetasol (TEMOVATE) 0 05 % cream Apply to affected areas daily x 12 weeks, then twice weekly for maintenance      Cyanocobalamin (VITAMIN B 12 PO) Take 1,000 mcg by mouth      dicyclomine (BENTYL) 10 mg capsule take 1 capsule by mouth four times a day before meals at bedtime 90 capsule 0    Eliquis 2 5 MG take 1 tablet by mouth twice a day 60 tablet 3    Ferrous Sulfate  (45 Fe) MG TBCR Take 1 tablet by mouth daily      fluticasone (FLONASE) 50 mcg/act nasal spray instill 1 spray into each nostril once daily (Patient taking differently: PRN) 32 g 1    Lysine HCl POWD Take by mouth      Multiple Vitamin (MULTIVITAMIN) capsule Take 1 capsule by mouth daily 2 chewies daily      nystatin (MYCOSTATIN) powder Apply topically 2 (two) times a day (Patient taking differently: Apply topically 2 (two) times a day PRN) 30 g 0    pantoprazole (PROTONIX) 20 mg tablet take 1 tablet by mouth once daily 30 tablet 3    traZODone (DESYREL) 50 mg tablet take 1 tablet by mouth at bedtime if needed for sleep 30 tablet 1    Diclofenac Sodium (VOLTAREN) 1 % Apply 2 g topically 4 (four) times a day (Patient not taking: Reported on 10/5/2022) 100 g 6    gabapentin (Neurontin) 100 mg capsule Take 1 capsule (100 mg total) by mouth 3 (three) times a day As needed for shooting pain 90 capsule 4    guaifenesin-codeine (GUAIFENESIN AC) 100-10 MG/5ML liquid Take 5 mL by mouth 3 (three) times a day as needed for cough (Patient not taking: Reported on 10/5/2022) 180 mL 0    methocarbamol (ROBAXIN) 500 mg tablet Take 1 tablet (500 mg total) by mouth 2 (two) times a day 20 tablet 0    metoprolol succinate (TOPROL-XL) 25 mg 24 hr tablet Take 1 tablet (25 mg total) by mouth daily 90 tablet 0    naloxone (NARCAN) 4 mg/0 1 mL nasal spray Administer 1 spray into a nostril  If no response after 2-3 minutes, give another dose in the other nostril using a new spray   (Patient not taking: Reported on 10/5/2022) 1 each 1    oxyCODONE (Roxicodone) 5 immediate release tablet Take 1 tablet (5 mg total) by mouth every 4 (four) hours as needed for moderate pain for up to 18 doses Max Daily Amount: 30 mg (Patient not taking: Reported on 10/5/2022) 18 tablet 0    predniSONE 10 mg tablet Take 6 tabs days 1&2, 5 tabs days 3&4, 4 tabs days 5&6, 3 tabs days 7&8, 2 tabs days 9&10, 1 tab days 11&12 (Patient not taking: Reported on 10/5/2022) 42 tablet 0    tiZANidine (ZANAFLEX) 2 mg tablet Take 1 tablet (2 mg total) by mouth every 8 (eight) hours as needed for muscle spasms 90 tablet 4    traMADol (Ultram) 50 mg tablet Take 0 5 tablets (25 mg total) by mouth every 8 (eight) hours as needed for moderate pain or severe pain (Patient not taking: Reported on 10/5/2022) 10 tablet 0    valACYclovir (VALTREX) 1,000 mg tablet Take 2 tablets (2,000 mg total) by mouth 2 (two) times a day for 1 day (Patient not taking: Reported on 10/5/2022) 30 tablet 0     No current facility-administered medications for this visit  No Known Allergies    PAST HISTORY    Past Medical History:   Diagnosis Date    Anemia     iron def    Arthritis     knees    Carpal tunnel syndrome of right wrist     Chest pain     Colon polyp     Deep vein thrombosis (DVT) of proximal lower extremity ([facility] [address] ) 2012    Depression     at times    [facility]     Dysphagia     Encounter for surgical aftercare following surgery of digestive system 2020    s/p Raghav-En-Y Gastric Bypass with Dr Jeremiah Hamilton in   INITIAL weight:241 lb INITIAL weight with start of topirmate: 176 lb Goal weight loss of 5-10 %-159 lb-167 lb     Esophageal reflux 2011    Grade 1 out of 6 intensity murmur 2019    Heart murmur     History of transfusion 1980    Pacemaker 2018    Pneumonia     age 15    Psychiatric disorder     Trigger finger, left     Visit for suture removal 2021       Past Surgical History:   Procedure Laterality Date    BACK SURGERY      CARDIAC PACEMAKER PLACEMENT  2018    CERVICAL [address] SURGERY  2009    PLATES & SCREWS     SECTION      X2    COLONOSCOPY      EPIDURAL BLOCK INJECTION Right 2022    Procedure: RIGHT [id number] EPIDURAL STEROID INJECTION (06838);   Surgeon: Loetta Claude, DO;  Location:  MAIN OR;  Service: Pain Management     GASTRIC BYPASS  2017    LAP RINYGB SURGERY    KNEE ARTHROSCOPY      KNEE SURGERY Bilateral     KNEE SURGERY Left 2020    OTHER SURGICAL HISTORY      ARM SURGERIES    [place] INCISE FINGER TENDON SHEATH Left 2018    Procedure: LONG TRIGGER FINGER RELEASE;  Surgeon: Corina Thacker MD;  Location: [facility];  Service: Orthopedics    WI WRIST Darell Candi LIG Right 9/18/2018    Procedure: ENDOSCOPIC CARPAL TUNNEL RELEASE;  Surgeon: Corina Thacker MD;  Location: [facility];  Service: Orthopedics    TONSILLECTOMY      UPPER GASTROINTESTINAL ENDOSCOPY         Social History     Tobacco Use    Smoking status: Former Smoker     Packs/day: 0 25     Types: Cigarettes    Smokeless tobacco: Never Used   Vaping Use    Vaping Use: Never used   Substance Use Topics    Alcohol use: Yes     Alcohol/week: 2 0 standard drinks     Types: 1 Glasses of wine, 1 Standard drinks or equivalent per week     Comment: very rare social    Drug use: No       Family History   Problem Relation Age of Onset    Stroke Mother     Alzheimer's disease Mother     Arthritis Mother     Coronary artery disease Mother     Breast cancer Mother 77    Cancer Mother     Heart disease Mother     Hypertension Father     Gout Father     Diabetes type II Father     Coronary artery disease Father     Heart disease Father     No Known Problems Sister     No Known Problems Sister     No Known Problems Sister     No Known Problems Daughter     No Known Problems Maternal Grandmother     No Known Problems Maternal Grandfather     No Known Problems Paternal Grandmother     No Known Problems Paternal Grandfather     Prostate cancer Brother     Pancreatic cancer Brother     No Known Problems Maternal Aunt     No Known Problems Maternal Aunt     No Known Problems Maternal Aunt     No Known Problems Maternal Aunt     Cancer Maternal Aunt     No Known Problems Paternal Aunt     No Known Problems Paternal Aunt          The following portions of the patient's history were reviewed in this encounter and updated as appropriate: Past medical, surgical, family, and social history, as well as medications, allergies, and review of systems          EXAM    Vitals:Blood pressure 112/74, pulse 84, temperature 98 1 °F (36 7 °C), temperature source Temporal, resp  rate 18, height 5' 4" (1 626 m), weight 85 7 kg (189 lb), last menstrual period 09/18/2014  ,Body mass index is 32 44 kg/m²  Physical Exam  Vitals reviewed  Constitutional:       Appearance: Normal appearance  She is well-developed  She is obese  HENT:      Head: Normocephalic  Eyes:      General: No scleral icterus  Cardiovascular:      Rate and Rhythm: Normal rate  Pulmonary:      Effort: Pulmonary effort is normal    Abdominal:      Palpations: Abdomen is soft  Musculoskeletal:      Cervical back: Neck supple  Skin:     General: Skin is warm and dry  Neurological:      Mental Status: She is alert and oriented to person, place, and time  GCS: GCS eye subscore is 4  GCS verbal subscore is 5  GCS motor subscore is 6  Psychiatric:         Speech: Speech normal          Behavior: Behavior normal          Neurologic Exam     Mental Status   Oriented to person, place, and time  Attention: normal    Speech: speech is normal   Level of consciousness: alert    Cranial Nerves     CN VII   Facial expression full, symmetric  Motor Exam   Muscle bulk: normal  Overall muscle tone: normal  Moves all extremities, grossly normal     Gait, Coordination, and Reflexes     Tremor   Resting tremor: absent  Intention tremor: absent  Action tremor: absent  No aids  MEDICAL DECISION MAKING    Imaging Studies:     MRI lumbar spine wo contrast    Result Date: 9/30/2022  Narrative: MRI LUMBAR SPINE WITHOUT CONTRAST INDICATION: M54 16: Radiculopathy, lumbar region M54 50: Low back pain, unspecified  COMPARISON:  MRI lumbar spine 8/16/2022 TECHNIQUE:  Sagittal T1, sagittal T2, sagittal inversion recovery, axial T1 and axial T2, coronal T2   IMAGE QUALITY:  Diagnostic FINDINGS: VERTEBRAL BODIES:  There are 5 lumbar type vertebral bodies  Stable grade 1 anterolisthesis of L4 on L5    No abnormal bone marrow signal or suspicious discrete lesion  SACRUM:  Normal signal within the sacrum  No evidence of insufficiency or stress fracture  DISTAL CORD AND CONUS:  Normal size and signal within the distal cord and conus  PARASPINAL SOFT TISSUES:  Paraspinal soft tissues are unremarkable  Hepatomegaly  Left renal cyst LOWER THORACIC DISC SPACES:  Normal disc height and signal   No disc herniation, canal stenosis or foraminal narrowing  LUMBAR DISC SPACES: L1-L2:  No significant disc bulge  Mild facet arthropathy  No canal or foraminal stenosis  L2-L3:  Minimal disc bulge  Bilateral facet arthropathy and ligamentum flavum thickening  There is 1 x 0 6 x 0 5 cm left facet synovial cyst (series 5 image 7) indenting left posterolateral thecal sac, similar to prior exam   There is mild canal stenosis  Mild left foraminal narrowing  L3-L4:  Disc bulge and superimposed right foraminal/extraforaminal disc protrusion  Bilateral facet arthropathy and ligamentum flavum thickening  Moderate to severe bilateral lateral recesses stenosis and moderate canal narrowing  Moderate right and mild left foraminal stenosis  Similar to prior exam, the herniated disc contacts undersurface of right L3 nerve root  L4-L5:  Grade 1 anterolisthesis uncovering posterior disc margin  Severe bilateral facet arthropathy  Moderate bilateral lateral recesses stenosis and mild to moderate canal narrowing  Mild bilateral foraminal stenosis  L5-S1:  Mild disc bulge  Moderate facet arthropathy  No significant canal or foraminal stenosis  Impression: 1  Grossly stable degenerative change compared to recent MRI 8/16/2022  2   Moderate to severe bilateral lateral recesses stenosis and moderate canal narrowing at level L3-4    Moderate right foraminal stenosis at this level with herniated disc contacting undersurface of exiting right L3 nerve root, similar to prior exam  3   Moderate bilateral lateral recesses stenosis and mild to moderate canal narrowing at level L4-5  4    A 1 cm left facet joint synovial cyst at level L2-3 contributing to mild canal narrowing  5   Hepatomegaly  Workstation performed: [id number]     FL spine and pain procedure    Result Date: 9/10/2022  Narrative: C-ARM - lumbar spine INDICATION: M54 16: Radiculopathy, lumbar region  Procedure guidance  COMPARISON:  None TECHNIQUE: FLUOROSCOPY TIME:   27 SEC 3 FLUOROSCOPIC IMAGES FINDINGS: Fluoroscopic guidance provided for procedure guidance  Right L3-4 transforaminal epidural steroid injection  Osseous and soft tissue detail limited by technique  Impression: Fluoroscopic guidance provided for procedure guidance  Please refer to the separate procedure notes for additional details  Workstation performed: [id number]       I have personally reviewed pertinent reports  and I have personally reviewed pertinent films in PACS      PLEASE NOTE:  This encounter may have been completed utilizing the [employer] Direct Speech Voice Recognition Software  Grammatical errors, random word insertions, pronoun errors and incomplete sentences are occasional consequences of the system due to software limitations, ambient noise and hardware issues  These may be missed by proof reading prior to affixing electronic signature  Any questions or concerns about the content, text or information contained within the body of this dictation should be directly addressed to the advanced practitioner or physician for clarification

## 2022-10-05 NOTE — H&P (VIEW-ONLY)
Portions of the record may have been created with voice recognition software  Occasional wrong word or "sound a like" substitutions may have occurred due to the inherent limitations of voice recognition software  Read the chart carefully and recognize, using context, where substitutions have occurred  Assessment:  1  Lumbar facet arthropathy    2  Lumbar spondylosis    3  Lumbar disc herniation        Plan:  No orders of the defined types were placed in this encounter  No orders of the defined types were placed in this encounter  This is a 41-year-old female with extensive past medical history including pacemaker, DVTs on Eliquis who presents as a follow-up complaining of mostly axial low back pain without any radicular symptoms at this time  Physical exam finding noteworthy for positive facet loading bilateral a negative straight leg test   No motor sensory deficits appreciated  Patient had a repeat lumbar MRI since last visit in setting of severe onset right-sided low back pain after right L3-L4 TFESI to rule out any hematoma or osseous abnormality given the new onset localized right-sided back pain compared to radicular symptoms in the right lower extremity prior  Lumbar MRI showing similar results prior with noted multilevel neural foraminal narrowing as well as a synovial cyst on the left synovial cyst at L2-L3, facet arthropathy throughout the lumbar spine  Etiology of patient's pain presentation is multifactorial given that initial presentation was right-sided low back and right thigh pain for which she underwent TFESI and now presenting with axial low back pain with no radiating symptoms  She likely has elements of lumbar radiculitis as well as lumbar facet arthropathy  - I explained to the patient that the epidural was done to help with the radicular symptoms in the right lower extremity and not to address the facet arthrosis    Now that she is presenting with axial low back pain with no radiating symptoms, we can consider addressing the lumbar facet arthrosis with lumbar medial branch blocks  Explained to her that this will not treat underlying etiology but can help provide some pain relief  - plan for L3-L4, L4-L5, L5-S1 bilateral lumbar medial branch block diagnostic 1  I did explain in detail the process of diagnostic blocks and radiofrequency ablation  Patient understand the risks risk benefits and wishes to proceed  - patient was also requesting [place] paperwork however I did explain to her that to have long-term work restrictions or disability that she may need a functional capacity evaluation and that I do not have expertise in those  Explained to her that her pain is mostly localized to the low back without any motor sensory deficits and she may require return to work if her pain is controlled  I did provided work note for 2 weeks in the hopes of proceeding with the 1st diagnostic block  I will refer her to her PCP and Dr Lenora Hoskins, [facility] [place] if needed  My impressions and treatment recommendations were discussed in detail with the patient who verbalized understanding and had no further questions  Discharge instructions were provided  I personally saw and examined the patient and I agree with the above discussed plan of care  History of Present Illness:  Mary Montes is a 60 y o  female with past medical history of sick sinus syndrome with a pacemaker as well as DVTs on Eliquis who presents for a follow up office visit in regards to bilateral Back Pain right worse than left without any radiating pain into the right lower extremity  Patient presents today after her appointment with with neurosurgery and was recommended non surgical management for low back pain  Patient was also seen by rheumatology and started on gabapentin and tizanidine to help with her low back pain symptoms      Patient initially presented to me with right-sided low back and leg pain and subsequently underwent TFESI L3-L4 and then noted to have severe right-sided low back pain and no longer having the radicular symptoms  Patient underwent a lumbar MRI in setting of recent new axial procedure and sudden severe pain to the region under patient on Eliquis  Lumbar MRI results noted below but showing no new onset hematoma, osseous abnormalities  Continues to show multilevel lateral and foraminal narrowing particularly at the L3 level  Today she reports continue bilateral lumbar back pain without any radiating symptoms  Right side is worse than the left side as per the patient  The pain is constant and associated cramping, pressure-like  She states it is debilitating and she is unable to bend, turn, twist   She denies any numbness or weakness in bilateral lower extremities  She denies any bowel or bladder incontinence or saddle anesthesia  No recent fevers chills or weight changes  Other Symptoms:  The patient does not have numbness  The patient does not have weakness  The patient does not have bladder dysfunction  The patient does not have bowel dysfunction  The patient does not have chills and fever, night sweats or unintentional weight loss  Prior visits:  9-22-22: Follow up  Pt reporting no radiating pain into right leg, now reporting axial low back pain R>L since injection   Lumbar MRI ordered  9-6-22: L3-L4 TFESI   8-30-22: Consult with plan for right T3-T4 TFESI      Specialists Seen:  Neurosurgery: 10-5-22- non surgical management recommended for axial low back pain  Rheumatology 10-5-22: Osteoporosis management, gabapentin and tizanidine for back pain    Current Pain Medications:  Gabapentin, Tizanidine    Interventional Techniques Employed:  9-6-22: Right L3-L4 TFESI for right leg pain in L3 distrubution     Imaging:      MRI LUMBAR SPINE WITHOUT CONTRAST   9-     INDICATION: M54 16: Radiculopathy, lumbar region  M54 50: Low back pain, unspecified      COMPARISON:  MRI lumbar spine 8/16/2022     TECHNIQUE:  Sagittal T1, sagittal T2, sagittal inversion recovery, axial T1 and axial T2, coronal T2     IMAGE QUALITY:  Diagnostic     FINDINGS:     VERTEBRAL BODIES:  There are 5 lumbar type vertebral bodies  Stable grade 1 anterolisthesis of L4 on L5       No abnormal bone marrow signal or suspicious discrete lesion         SACRUM:  Normal signal within the sacrum  No evidence of insufficiency or stress fracture      DISTAL CORD AND CONUS:  Normal size and signal within the distal cord and conus      PARASPINAL SOFT TISSUES:  Paraspinal soft tissues are unremarkable      Hepatomegaly      Left renal cyst     LOWER THORACIC DISC SPACES:  Normal disc height and signal   No disc herniation, canal stenosis or foraminal narrowing      LUMBAR DISC SPACES:     L1-L2:  No significant disc bulge  Mild facet arthropathy  No canal or foraminal stenosis      L2-L3:  Minimal disc bulge  Bilateral facet arthropathy and ligamentum flavum thickening  There is 1 x 0 6 x 0 5 cm left facet synovial cyst (series 5 image 7) indenting left posterolateral thecal sac, similar to prior exam   There is mild canal   stenosis  Mild left foraminal narrowing      L3-L4:  Disc bulge and superimposed right foraminal/extraforaminal disc protrusion  Bilateral facet arthropathy and ligamentum flavum thickening  Moderate to severe bilateral lateral recesses stenosis and moderate canal narrowing  Moderate right and   mild left foraminal stenosis  Similar to prior exam, the herniated disc contacts undersurface of right L3 nerve root      L4-L5:  Grade 1 anterolisthesis uncovering posterior disc margin  Severe bilateral facet arthropathy  Moderate bilateral lateral recesses stenosis and mild to moderate canal narrowing  Mild bilateral foraminal stenosis      L5-S1:  Mild disc bulge  Moderate facet arthropathy  No significant canal or foraminal stenosis      IMPRESSION:     1  Grossly stable degenerative change compared to recent MRI 8/16/2022      2  Moderate to severe bilateral lateral recesses stenosis and moderate canal narrowing at level L3-4  Moderate right foraminal stenosis at this level with herniated disc contacting undersurface of exiting right L3 nerve root, similar to prior exam      3  Moderate bilateral lateral recesses stenosis and mild to moderate canal narrowing at level L4-5      4  A 1 cm left facet joint synovial cyst at level L2-3 contributing to mild canal narrowing      5  Hepatomegaly  No MRI cervical spine results found in the past 2 years   MRI lumbar spine wo contrast    Result Date: 9/30/2022  Impression     1  Grossly stable degenerative change compared to recent MRI 8/16/2022  2   Moderate to severe bilateral lateral recesses stenosis and moderate canal narrowing at level L3-4  Moderate right foraminal stenosis at this level with herniated disc contacting undersurface of exiting right L3 nerve root, similar to prior exam      3   Moderate bilateral lateral recesses stenosis and mild to moderate canal narrowing at level L4-5      4    A 1 cm left facet joint synovial cyst at level L2-3 contributing to mild canal narrowing  5   Hepatomegaly  Workstation performed: [id number]         MRI lumbar spine wo contrast    Result Date: 8/19/2022  Impression     Far lateral bulge with marginal osteophyte at L3-4 contacts the extraforaminal right L3 nerve root  Correlate for right L3 radiculopathy  Moderate central stenosis noted at this level  Facet overgrowth facet cyst L2-3 eccentric to the left dorsally in the canal contributes to mild central and moderate left lateral recess stenosis  Severe facet degenerative change L4-5 accounts for 4 mm anterolisthesis               Workstation performed: [id number]          No MRI thoracic spine results found in the past 2 years   No X-ray cervical spine results found in the past 2 years   No X-ray lumbar spine results found in the past 2 years   No X-ray hip/pelvis results found in the past 2 years   No X-ray knee results found in the past 2 years     Lab Results   Component Value Date    CREATININE 0 68 09/03/2022     Lab Results   Component Value Date    ALT 33 09/03/2022       I have personally reviewed and/or updated the patient's past medical history, past surgical history, family history, social history, current medications, allergies, and vital signs today  Review of Systems   Musculoskeletal: Positive for back pain, gait problem and joint swelling         Patient Active Problem List   Diagnosis   • Abnormal CT scan   • Sick sinus syndrome (HCC)   • HSV-1 infection   • Status post carpal tunnel release   • Fibrocystic breast disease   • Hyperlipidemia   • Knee osteoarthritis   • Lichen sclerosus   • Menopausal and postmenopausal disorder   • Neck pain   • Candida infection of flexural skin   • Grade 1 out of 6 intensity murmur   • Encounter for screening mammogram for malignant neoplasm of breast   • History of DVT (deep vein thrombosis)   • Iron deficiency anemia   • Vitamin deficiency   • Medicare annual wellness visit, subsequent   • Mass of soft tissue of right upper extremity   • Chronic right shoulder pain   • Work related injury   • Leukopenia   • Anemia   • Right ear pain   • Dysphagia   • Acute gastric ulcer without hemorrhage or perforation   • History of colon polyps   • Overweight   • Bariatric surgery status   • Depression   • Nondisplaced fracture of medial malleolus of right tibia, initial encounter for closed fracture   • Closed nondisplaced Maisonneuve's fracture of right leg   • Closed fracture of upper end of right fibula   • Closed fracture of left distal radius and ulna   • Laceration of eyebrow and forehead   • Fall   • Sinus pressure   • Pacemaker   • PSVT (paroxysmal supraventricular tachycardia) ([facility])   • Acute non-recurrent maxillary sinusitis   • Acute respiratory disease due to COVID-19 virus   • Insomnia   • Non-recurrent acute serous otitis media of both ears   • Back pain without sciatica   • LLQ pain   • Stomach cramps       Past Medical History:   Diagnosis Date   • Anemia     iron def   • Arthritis     knees   • Carpal tunnel syndrome of right wrist    • Chest pain    • Colon polyp    • Deep vein thrombosis (DVT) of proximal lower extremity ([facility] [address] ) 2012   • Depression     at times   • DVT (deep venous thrombosis) ([facility] [address] )    • Dysphagia    • Encounter for surgical aftercare following surgery of digestive system 2020    s/p Raghav-En-Y Gastric Bypass with Dr Lady Matthews in 2017  INITIAL weight:241 lb INITIAL weight with start of topirmate: 176 lb Goal weight loss of 5-10 %-159 lb-167 lb    • Esophageal reflux 2011   • Grade 1 out of 6 intensity murmur 2019   • Heart murmur    • History of transfusion 1980   • Pacemaker 2018   • Pneumonia     age 15   • Psychiatric disorder    • Trigger finger, left    • Visit for suture removal 2021       Past Surgical History:   Procedure Laterality Date   • BACK SURGERY     • CARDIAC PACEMAKER PLACEMENT  2018   • CERVICAL DISC SURGERY  2009    PLATES & SCREWS   •  SECTION      X2   • COLONOSCOPY     • EPIDURAL BLOCK INJECTION Right 2022    Procedure: RIGHT [id number] EPIDURAL STEROID INJECTION (04844);   Surgeon: Jim Figueroa DO;  Location:  MAIN OR;  Service: Pain Management    • GASTRIC BYPASS  2017    LAP RINYGB SURGERY   • KNEE ARTHROSCOPY     • KNEE SURGERY Bilateral    • KNEE SURGERY Left 2020   • OTHER SURGICAL HISTORY      ARM SURGERIES   • MS INCISE FINGER TENDON SHEATH Left 2018    Procedure: LONG TRIGGER FINGER RELEASE;  Surgeon: Lielani Hatfield MD;  Location: [facility];  Service: Orthopedics   • MS WRIST Darleen Cassis LIG Right 2018    Procedure: ENDOSCOPIC CARPAL TUNNEL RELEASE;  Surgeon: Leilani Hatfield MD;  Location: [facility];  Service: Orthopedics   • TONSILLECTOMY     • UPPER GASTROINTESTINAL ENDOSCOPY         Family History   Problem Relation Age of Onset   • Stroke Mother    • Alzheimer's disease Mother    • Arthritis Mother    • Coronary artery disease Mother    • Breast cancer Mother 77   • Cancer Mother    • Heart disease Mother    • Hypertension Father    • Gout Father    • Diabetes type II Father    • Coronary artery disease Father    • Heart disease Father    • No Known Problems Sister    • No Known Problems Sister    • No Known Problems Sister    • No Known Problems Daughter    • No Known Problems Maternal Grandmother    • No Known Problems Maternal Grandfather    • No Known Problems Paternal Grandmother    • No Known Problems Paternal Grandfather    • Prostate cancer Brother    • Pancreatic cancer Brother    • No Known Problems Maternal Aunt    • No Known Problems Maternal Aunt    • No Known Problems Maternal Aunt    • No Known Problems Maternal Aunt    • Cancer Maternal Aunt    • No Known Problems Paternal Aunt    • No Known Problems Paternal Aunt        Social History     Occupational History   • Not on file   Tobacco Use   • Smoking status: Former Smoker     Packs/day: 0 25     Types: Cigarettes   • Smokeless tobacco: Never Used   Vaping Use   • Vaping Use: Never used   Substance and Sexual Activity   • Alcohol use:  Yes     Alcohol/week: 2 0 standard drinks     Types: 1 Glasses of wine, 1 Standard drinks or equivalent per week     Comment: very rare social   • Drug use: No   • Sexual activity: Not Currently       Current Outpatient Medications on File Prior to Visit   Medication Sig   • acetaminophen (TYLENOL) 650 mg CR tablet Take 650 mg by mouth every 8 (eight) hours as needed for mild pain Take 2 tablets in the AM and 2 tablets in the late evening   • Ascorbic Acid (VITAMIN C) 100 MG CHEW Chew   • atorvastatin (LIPITOR) 10 mg tablet take 1 tablet by mouth once daily   • Biotin 13491 MCG TABS Take by mouth daily     • Calcium Carbonate (CALCI-CHEW PO) Take 500 mg by mouth 2 (two) times a day   • cholecalciferol (VITAMIN D3) 1,000 units tablet Take 5,000 Units by mouth in the morning   • clobetasol (TEMOVATE) 0 05 % cream Apply to affected areas daily x 12 weeks, then twice weekly for maintenance   • Cyanocobalamin (VITAMIN B 12 PO) Take 1,000 mcg by mouth   • dicyclomine (BENTYL) 10 mg capsule take 1 capsule by mouth four times a day before meals at bedtime   • Eliquis 2 5 MG take 1 tablet by mouth twice a day   • Ferrous Sulfate  (45 Fe) MG TBCR Take 1 tablet by mouth daily   • gabapentin (Neurontin) 100 mg capsule Take 1 capsule (100 mg total) by mouth 3 (three) times a day As needed for shooting pain   • Lysine HCl POWD Take by mouth   • methocarbamol (ROBAXIN) 500 mg tablet Take 1 tablet (500 mg total) by mouth 2 (two) times a day   • metoprolol succinate (TOPROL-XL) 25 mg 24 hr tablet Take 1 tablet (25 mg total) by mouth daily   • Multiple Vitamin (MULTIVITAMIN) capsule Take 1 capsule by mouth daily 2 chewies daily   • pantoprazole (PROTONIX) 20 mg tablet take 1 tablet by mouth once daily   • predniSONE 10 mg tablet Take 6 tabs days 1&2, 5 tabs days 3&4, 4 tabs days 5&6, 3 tabs days 7&8, 2 tabs days 9&10, 1 tab days 11&12   • tiZANidine (ZANAFLEX) 2 mg tablet Take 1 tablet (2 mg total) by mouth every 8 (eight) hours as needed for muscle spasms   • traZODone (DESYREL) 50 mg tablet take 1 tablet by mouth at bedtime if needed for sleep   • Diclofenac Sodium (VOLTAREN) 1 % Apply 2 g topically 4 (four) times a day (Patient not taking: No sig reported)   • fluticasone (FLONASE) 50 mcg/act nasal spray instill 1 spray into each nostril once daily (Patient not taking: Reported on 10/6/2022)   • guaifenesin-codeine (GUAIFENESIN AC) 100-10 MG/5ML liquid Take 5 mL by mouth 3 (three) times a day as needed for cough (Patient not taking: No sig reported)   • naloxone (NARCAN) 4 mg/0 1 mL nasal spray Administer 1 spray into a nostril  If no response after 2-3 minutes, give another dose in the other nostril using a new spray  (Patient not taking: No sig reported)   • nystatin (MYCOSTATIN) powder Apply topically 2 (two) times a day (Patient not taking: Reported on 10/6/2022)   • oxyCODONE (Roxicodone) 5 immediate release tablet Take 1 tablet (5 mg total) by mouth every 4 (four) hours as needed for moderate pain for up to 18 doses Max Daily Amount: 30 mg (Patient not taking: No sig reported)   • traMADol (Ultram) 50 mg tablet Take 0 5 tablets (25 mg total) by mouth every 8 (eight) hours as needed for moderate pain or severe pain (Patient not taking: No sig reported)   • valACYclovir (VALTREX) 1,000 mg tablet Take 2 tablets (2,000 mg total) by mouth 2 (two) times a day for 1 day (Patient not taking: Reported on 10/5/2022)     No current facility-administered medications on file prior to visit  No Known Allergies    Physical Exam:    BP (!) 89/56   Pulse 66   Ht 5' 4" (1 626 m) Comment: verbal  Wt 85 7 kg (189 lb)   LMP 09/18/2014   BMI 32 44 kg/m²       Palpation:     tenderness over the left paravertebral musculature   tenderness over the right paravertebral musculature    No tenderness with palpation of the left SI joint    No tenderness with palpation of the right SI joint      No tenderness with palpation of the left greater trochanter    No tenderness with palpation of the right greater trochanter    Sensory:    Intact to light touch grossly in the left lower extremity    Intact to light touch grossly in the right lower extremity    Muscle Strength      Hip flexion Knee flexion Knee extension Foot plantarflexion Foot   dorsiflexion EHL   L 5/5 5/5 5/5 5/5 5/5 5/5   R 5/5 5/5 5/5 5/5 5/5 5/5     DTRs: 2+ patellar, 2+ Achilles and symmetric       Special Tests:     Facet loading Straight leg raise STEPHANY FAIR   L positive Negative Negative    R Positive negative negative

## 2022-10-05 NOTE — PATIENT INSTRUCTIONS
Continue 1,200 mg of calcium daily; encourage dairy intake  Continue 5,000 units of Vit   D daily  Will continue Prolia every 6 month injections, next one due in January  Next DEXA scan due in 4/2023, will order at next visit  Try tizanidine up to 3 times a day as needed for back muscle pain  Try gabapentin up to 3 times a day as needed for back nerve pain     Return to clinic in 6 months for provider visit + Prolia injection

## 2022-10-05 NOTE — PROGRESS NOTES
Assessment and Plan:   Edd Reyes is a 60 y o   female who presents for follow up of osteoporosis  Tolerating Prolia fine  Has significant low back pain; will be seeing Neurosurgery this afternoon  Continue 1,200 mg of calcium daily; encourage dairy intake  Continue 5,000 units of Vit  D daily  Will continue Prolia every 6 month injections, next one due in January  Next DEXA scan due in 4/2023, will order at next visit  Try tizanidine up to 3 times a day as needed for back muscle pain  Try gabapentin up to 3 times a day as needed for back nerve pain     Return to clinic in 6 months for provider visit + Prolia injection    Plan:  Diagnoses and all orders for this visit:    Osteoporosis, unspecified osteoporosis type, unspecified pathological fracture presence    Sciatic pain, right    Chronic low back pain without sciatica, unspecified back pain laterality  -     tiZANidine (ZANAFLEX) 2 mg tablet; Take 1 tablet (2 mg total) by mouth every 8 (eight) hours as needed for muscle spasms  -     gabapentin (Neurontin) 100 mg capsule; Take 1 capsule (100 mg total) by mouth 3 (three) times a day As needed for shooting pain    High risk medication use    High risk medication use - Prolia (denosumab) is a monoclonal antibody biologic medication that can rapidly utilize the body's calcium and make one susceptible to hypocalcemia; the medication also has a risk of osteonecrosis of the jaw in patients with exposed jaw bone  Patient does not plan on getting any invasive dental procedures in the near future  Follow-up plan: Return to clinic in 6 months for provider visit + Prolia injection         Rheumatic Disease Summary  Initial visit 3/30/22: Edd Reyes is a 57 y o   female who presents as a Rheumatology consult referred by Kristina Doe PA-C for evaluation of osteoporosis  Has osteopenia on latest DEXA scan with fracture history of right fibula and ankle, and left wrist which are osteoporosis equivalent  Warrants medical treatment; patient is amenable to starting alendronate as first line therapy  7/20/22: Prolia injection     NABEEL Lin is a 60 y o   female who presents for follow up of osteoporosis  Last clinic visit was 3/30/22 which was her initial visit  She then had a Prolia injection on 7/20/22  Has been having low back pain; physical therapy and epidural hasn't helped  Broke right ankle and right fibula as well as left wrist in the past     The following portions of the patient's history were reviewed and updated as appropriate: allergies, current medications, past family history, past medical history, past social history, past surgical history and problem list     Review of Systems:   Review of Systems   Constitutional: Negative for fatigue  HENT: Negative for mouth sores  Eyes: Negative for pain  Respiratory: Negative for shortness of breath  Cardiovascular: Negative for leg swelling  Musculoskeletal: Positive for back pain  Negative for arthralgias and joint swelling  Skin: Negative for rash  Neurological: Negative for weakness  Hematological: Negative for adenopathy  Psychiatric/Behavioral: Negative for sleep disturbance  Reviewed and agree      Home Medications:    Current Outpatient Medications:   •  Ascorbic Acid (VITAMIN C) 100 MG CHEW, Chew, Disp: , Rfl:   •  atorvastatin (LIPITOR) 10 mg tablet, take 1 tablet by mouth once daily, Disp: 90 tablet, Rfl: 0  •  Biotin 85703 MCG TABS, Take by mouth daily  , Disp: , Rfl:   •  Calcium Carbonate (CALCI-CHEW PO), Take 500 mg by mouth 2 (two) times a day, Disp: , Rfl:   •  cholecalciferol (VITAMIN D3) 1,000 units tablet, Take 5,000 Units by mouth in the morning, Disp: , Rfl:   •  clobetasol (TEMOVATE) 0 05 % cream, Apply to affected areas daily x 12 weeks, then twice weekly for maintenance, Disp: , Rfl:   •  Cyanocobalamin (VITAMIN B 12 PO), Take 1,000 mcg by mouth, Disp: , Rfl:   •  Diclofenac Sodium (VOLTAREN) 1 %, Apply 2 g topically 4 (four) times a day (Patient not taking: No sig reported), Disp: 100 g, Rfl: 6  •  Eliquis 2 5 MG, take 1 tablet by mouth twice a day, Disp: 60 tablet, Rfl: 3  •  Ferrous Sulfate  (45 Fe) MG TBCR, Take 1 tablet by mouth daily, Disp: , Rfl:   •  fluticasone (FLONASE) 50 mcg/act nasal spray, instill 1 spray into each nostril once daily (Patient not taking: Reported on 10/6/2022), Disp: 32 g, Rfl: 1  •  gabapentin (Neurontin) 100 mg capsule, Take 1 capsule (100 mg total) by mouth 3 (three) times a day As needed for shooting pain, Disp: 90 capsule, Rfl: 4  •  guaifenesin-codeine (GUAIFENESIN AC) 100-10 MG/5ML liquid, Take 5 mL by mouth 3 (three) times a day as needed for cough (Patient not taking: No sig reported), Disp: 180 mL, Rfl: 0  •  Lysine HCl POWD, Take by mouth, Disp: , Rfl:   •  methocarbamol (ROBAXIN) 500 mg tablet, Take 1 tablet (500 mg total) by mouth 2 (two) times a day, Disp: 20 tablet, Rfl: 0  •  Multiple Vitamin (MULTIVITAMIN) capsule, Take 1 capsule by mouth daily 2 chewies daily, Disp: , Rfl:   •  naloxone (NARCAN) 4 mg/0 1 mL nasal spray, Administer 1 spray into a nostril  If no response after 2-3 minutes, give another dose in the other nostril using a new spray   (Patient not taking: No sig reported), Disp: 1 each, Rfl: 1  •  nystatin (MYCOSTATIN) powder, Apply topically 2 (two) times a day (Patient not taking: Reported on 10/6/2022), Disp: 30 g, Rfl: 0  •  oxyCODONE (Roxicodone) 5 immediate release tablet, Take 1 tablet (5 mg total) by mouth every 4 (four) hours as needed for moderate pain for up to 18 doses Max Daily Amount: 30 mg (Patient not taking: No sig reported), Disp: 18 tablet, Rfl: 0  •  pantoprazole (PROTONIX) 20 mg tablet, take 1 tablet by mouth once daily, Disp: 30 tablet, Rfl: 3  •  predniSONE 10 mg tablet, Take 6 tabs days 1&2, 5 tabs days 3&4, 4 tabs days 5&6, 3 tabs days 7&8, 2 tabs days 9&10, 1 tab days 11&12, Disp: 42 tablet, Rfl: 0  •  tiZANidine (ZANAFLEX) 2 mg tablet, Take 1 tablet (2 mg total) by mouth every 8 (eight) hours as needed for muscle spasms, Disp: 90 tablet, Rfl: 4  •  traMADol (Ultram) 50 mg tablet, Take 0 5 tablets (25 mg total) by mouth every 8 (eight) hours as needed for moderate pain or severe pain (Patient not taking: No sig reported), Disp: 10 tablet, Rfl: 0  •  traZODone (DESYREL) 50 mg tablet, take 1 tablet by mouth at bedtime if needed for sleep, Disp: 30 tablet, Rfl: 1  •  acetaminophen (TYLENOL) 650 mg CR tablet, Take 650 mg by mouth every 8 (eight) hours as needed for mild pain Take 2 tablets in the AM and 2 tablets in the late evening, Disp: , Rfl:   •  dicyclomine (BENTYL) 10 mg capsule, take 1 capsule by mouth four times a day before meals at bedtime, Disp: 90 capsule, Rfl: 0  •  metoprolol succinate (TOPROL-XL) 25 mg 24 hr tablet, take 1 tablet by mouth once daily, Disp: 90 tablet, Rfl: 0  •  valACYclovir (VALTREX) 1,000 mg tablet, Take 2 tablets (2,000 mg total) by mouth 2 (two) times a day for 1 day (Patient not taking: Reported on 10/5/2022), Disp: 30 tablet, Rfl: 0    Objective:    Vitals:    10/05/22 1139   BP: 118/74   Weight: 83 9 kg (185 lb)       Physical Exam  Constitutional:       General: She is not in acute distress  HENT:      Head: Normocephalic and atraumatic  Eyes:      Conjunctiva/sclera: Conjunctivae normal    Cardiovascular:      Rate and Rhythm: Normal rate and regular rhythm  Heart sounds: S1 normal and S2 normal      No friction rub  Pulmonary:      Effort: Pulmonary effort is normal  No respiratory distress  Breath sounds: Normal breath sounds  No wheezing, rhonchi or rales  Musculoskeletal:      Cervical back: Neck supple  Skin:     Coloration: Skin is not pale  Neurological:      Mental Status: She is alert  Mental status is at baseline  Psychiatric:         Mood and Affect: Mood normal          Behavior: Behavior normal        Reviewed labs and imaging      Imaging:   MRI lumbar spine 9/28/22  1  Grossly stable degenerative change compared to recent MRI 8/16/2022  2   Moderate to severe bilateral lateral recesses stenosis and moderate canal narrowing at level L3-4  Moderate right foraminal stenosis at this level with herniated disc contacting undersurface of exiting right L3 nerve root, similar to prior exam   3   Moderate bilateral lateral recesses stenosis and mild to moderate canal narrowing at level L4-5   4    A 1 cm left facet joint synovial cyst at level L2-3 contributing to mild canal narrowing  5   Hepatomegaly  CT chest abdomen pelvis 7/16/22  No evidence of acute pathology throughout the chest, abdomen or pelvis    CXR 2/7/22  No acute cardiopulmonary disease      CT abdomen pelvis w contrast 1/15/22  No acute findings in the abdomen or pelvis      CT cervical spine 6/18/21  No cervical spine fracture or traumatic malalignment  Stable ACDF C4-C6  Multilevel cervical degenerative changes      XR right ankle 4/23/21  Congruent ankle mortise  No acute displaced fracture     DXA scan 4/14/21   RESULTS:   LUMBAR SPINE:  L1-L4:  BMD 1 026 gm/cm2  T-score -0 2  Z-score 1 4     LEFT TOTAL HIP:  BMD 0 784 gm/cm2  T-score -1 3  Z-score -0 2     LEFT FEMORAL NECK:  BMD 0 690 gm/cm2  T-score -1 4  Z-score -0 1     IMPRESSION:  1   Based on the WHO classification, the T-score of -1 4 in the left hip is consistent with LOW BONE MINERAL DENSITY (OSTEOPENIA)        Labs:   Appointment on 09/03/2022   Component Date Value Ref Range Status   • WBC 09/03/2022 6 02  4 31 - 10 16 Thousand/uL Final   • RBC 09/03/2022 3 59 (A) 3 81 - 5 12 Million/uL Final   • Hemoglobin 09/03/2022 11 5  11 5 - 15 4 g/dL Final   • Hematocrit 09/03/2022 36 2  34 8 - 46 1 % Final   • MCV 09/03/2022 101 (A) 82 - 98 fL Final   • MCH 09/03/2022 32 0  26 8 - 34 3 pg Final   • MCHC 09/03/2022 31 8  31 4 - 37 4 g/dL Final   • RDW 09/03/2022 13 0  11 6 - 15 1 % Final   • MPV 09/03/2022 11 3  8 9 - 12 7 fL Final   • Platelets 09/03/2022 212  149 - 390 Thousands/uL Final   • nRBC 09/03/2022 0  /100 WBCs Final   • Neutrophils Relative 09/03/2022 64  43 - 75 % Final   • Immat GRANS % 09/03/2022 0  0 - 2 % Final   • Lymphocytes Relative 09/03/2022 28  14 - 44 % Final   • Monocytes Relative 09/03/2022 5  4 - 12 % Final   • Eosinophils Relative 09/03/2022 2  0 - 6 % Final   • Basophils Relative 09/03/2022 1  0 - 1 % Final   • Neutrophils Absolute 09/03/2022 3 86  1 85 - 7 62 Thousands/µL Final   • Immature Grans Absolute 09/03/2022 0 02  0 00 - 0 20 Thousand/uL Final   • Lymphocytes Absolute 09/03/2022 1 66  0 60 - 4 47 Thousands/µL Final   • Monocytes Absolute 09/03/2022 0 31  0 17 - 1 22 Thousand/µL Final   • Eosinophils Absolute 09/03/2022 0 09  0 00 - 0 61 Thousand/µL Final   • Basophils Absolute 09/03/2022 0 08  0 00 - 0 10 Thousands/µL Final   • Sodium 09/03/2022 144  135 - 147 mmol/L Final   • Potassium 09/03/2022 4 0  3 5 - 5 3 mmol/L Final   • Chloride 09/03/2022 116 (A) 96 - 108 mmol/L Final   • CO2 09/03/2022 26  21 - 32 mmol/L Final   • ANION GAP 09/03/2022 2 (A) 4 - 13 mmol/L Final   • BUN 09/03/2022 14  5 - 25 mg/dL Final   • Creatinine 09/03/2022 0 68  0 60 - 1 30 mg/dL Final    Standardized to IDMS reference method   • Glucose, Fasting 09/03/2022 130 (A) 65 - 99 mg/dL Final    Specimen collection should occur prior to Sulfasalazine administration due to the potential for falsely depressed results  Specimen collection should occur prior to Sulfapyridine administration due to the potential for falsely elevated results  • Calcium 09/03/2022 8 2 (A) 8 3 - 10 1 mg/dL Final   • AST 09/03/2022 22  5 - 45 U/L Final    Specimen collection should occur prior to Sulfasalazine administration due to the potential for falsely depressed results  • ALT 09/03/2022 33  12 - 78 U/L Final    Specimen collection should occur prior to Sulfasalazine and/or Sulfapyridine administration due to the potential for falsely depressed results      • Alkaline Phosphatase 09/03/2022 61  46 - 116 U/L Final   • Total Protein 09/03/2022 7 1  6 4 - 8 4 g/dL Final   • Albumin 09/03/2022 3 5  3 5 - 5 0 g/dL Final   • Total Bilirubin 09/03/2022 0 65  0 20 - 1 00 mg/dL Final    Use of this assay is not recommended for patients undergoing treatment with eltrombopag due to the potential for falsely elevated results  • eGFR 09/03/2022 93  ml/min/1 73sq m Final   • Cholesterol 09/03/2022 193  See Comment mg/dL Final    Cholesterol:         Pediatric <18 Years        Desirable          <170 mg/dL      Borderline High    170-199 mg/dL      High               >=200 mg/dL        Adult >=18 Years            Desirable         <200 mg/dL      Borderline High   200-239 mg/dL      High              >239 mg/dL     • Triglycerides 09/03/2022 83  See Comment mg/dL Final    Triglyceride:     0-9Y            <75mg/dL     10Y-17Y         <90 mg/dL       >=18Y     Normal          <150 mg/dL     Borderline High 150-199 mg/dL     High            200-499 mg/dL        Very High       >499 mg/dL    Specimen collection should occur prior to N-Acetylcysteine or Metamizole administration due to the potential for falsely depressed results  • HDL, Direct 09/03/2022 80  >=50 mg/dL Final    Specimen collection should occur prior to Metamizole administration due to the potential for falsley depressed results  • LDL Calculated 09/03/2022 96  0 - 100 mg/dL Final    LDL Cholesterol:     Optimal           <100 mg/dl     Near Optimal      100-129 mg/dl     Above Optimal       Borderline High 130-159 mg/dl       High            160-189 mg/dl       Very High       >189 mg/dl         This screening LDL is a calculated result  It does not have the accuracy of the Direct Measured LDL in the monitoring of patients with hyperlipidemia and/or statin therapy  Direct Measure LDL (FPA432) must be ordered separately in these patients     • TSH 3RD GENERATON 09/03/2022 0 706  0 450 - 4 500 uIU/mL Final    The recommended reference ranges for TSH during pregnancy are as follows:   First trimester 0 1 to 2 5 uIU/mL   Second trimester  0 2 to 3 0 uIU/mL   Third trimester 0 3 to 3 0 uIU/m    Note: Normal ranges may not apply to patients who are transgender, non-binary, or whose legal sex, sex at birth, and gender identity differ  Adult TSH (3rd generation) reference range follows the recommended guidelines of the [employer], January, 2020     Admission on 07/16/2022, Discharged on 07/17/2022   Component Date Value Ref Range Status   • WBC 07/16/2022 6 73  4 31 - 10 16 Thousand/uL Final   • RBC 07/16/2022 3 81  3 81 - 5 12 Million/uL Final   • Hemoglobin 07/16/2022 12 4  11 5 - 15 4 g/dL Final   • Hematocrit 07/16/2022 38 7  34 8 - 46 1 % Final   • MCV 07/16/2022 102 (A) 82 - 98 fL Final   • MCH 07/16/2022 32 5  26 8 - 34 3 pg Final   • MCHC 07/16/2022 32 0  31 4 - 37 4 g/dL Final   • RDW 07/16/2022 12 5  11 6 - 15 1 % Final   • MPV 07/16/2022 11 3  8 9 - 12 7 fL Final   • Platelets 18/02/2392 194  149 - 390 Thousands/uL Final   • nRBC 07/16/2022 0  /100 WBCs Final   • Neutrophils Relative 07/16/2022 61  43 - 75 % Final   • Immat GRANS % 07/16/2022 0  0 - 2 % Final   • Lymphocytes Relative 07/16/2022 29  14 - 44 % Final   • Monocytes Relative 07/16/2022 6  4 - 12 % Final   • Eosinophils Relative 07/16/2022 3  0 - 6 % Final   • Basophils Relative 07/16/2022 1  0 - 1 % Final   • Neutrophils Absolute 07/16/2022 4 10  1 85 - 7 62 Thousands/µL Final   • Immature Grans Absolute 07/16/2022 0 01  0 00 - 0 20 Thousand/uL Final   • Lymphocytes Absolute 07/16/2022 1 98  0 60 - 4 47 Thousands/µL Final   • Monocytes Absolute 07/16/2022 0 39  0 17 - 1 22 Thousand/µL Final   • Eosinophils Absolute 07/16/2022 0 17  0 00 - 0 61 Thousand/µL Final   • Basophils Absolute 07/16/2022 0 08  0 00 - 0 10 Thousands/µL Final   • Sodium 07/17/2022 141  136 - 145 mmol/L Final   • Potassium 07/17/2022 5 3  3 5 - 5 3 mmol/L Final Moderately Hemolyzed; Results May be Affected   • Chloride 07/17/2022 107  100 - 108 mmol/L Final   • CO2 07/17/2022 27  21 - 32 mmol/L Final   • ANION GAP 07/17/2022 7  4 - 13 mmol/L Final   • BUN 07/17/2022 22  5 - 25 mg/dL Final   • Creatinine 07/17/2022 0 84  0 60 - 1 30 mg/dL Final    Standardized to IDMS reference method   • Glucose 07/17/2022 105  65 - 140 mg/dL Final    If the patient is fasting, the ADA then defines impaired fasting glucose as > 100 mg/dL and diabetes as > or equal to 123 mg/dL  Specimen collection should occur prior to Sulfasalazine administration due to the potential for falsely depressed results  Specimen collection should occur prior to Sulfapyridine administration due to the potential for falsely elevated results     • Calcium 07/17/2022 9 3  8 3 - 10 1 mg/dL Final   • eGFR 07/17/2022 74  ml/min/1 73sq m Final

## 2022-10-05 NOTE — PROGRESS NOTES
Portions of the record may have been created with voice recognition software  Occasional wrong word or "sound a like" substitutions may have occurred due to the inherent limitations of voice recognition software  Read the chart carefully and recognize, using context, where substitutions have occurred  Assessment:  1  Lumbar facet arthropathy    2  Lumbar spondylosis    3  Lumbar disc herniation        Plan:  No orders of the defined types were placed in this encounter  No orders of the defined types were placed in this encounter  This is a 59-year-old female with extensive past medical history including pacemaker, DVTs on Eliquis who presents as a follow-up complaining of mostly axial low back pain without any radicular symptoms at this time  Physical exam finding noteworthy for positive facet loading bilateral a negative straight leg test   No motor sensory deficits appreciated  Patient had a repeat lumbar MRI since last visit in setting of severe onset right-sided low back pain after right L3-L4 TFESI to rule out any hematoma or osseous abnormality given the new onset localized right-sided back pain compared to radicular symptoms in the right lower extremity prior  Lumbar MRI showing similar results prior with noted multilevel neural foraminal narrowing as well as a synovial cyst on the left synovial cyst at L2-L3, facet arthropathy throughout the lumbar spine  Etiology of patient's pain presentation is multifactorial given that initial presentation was right-sided low back and right thigh pain for which she underwent TFESI and now presenting with axial low back pain with no radiating symptoms  She likely has elements of lumbar radiculitis as well as lumbar facet arthropathy  - I explained to the patient that the epidural was done to help with the radicular symptoms in the right lower extremity and not to address the facet arthrosis    Now that she is presenting with axial low back pain with no radiating symptoms, we can consider addressing the lumbar facet arthrosis with lumbar medial branch blocks  Explained to her that this will not treat underlying etiology but can help provide some pain relief  - plan for L3-L4, L4-L5, L5-S1 bilateral lumbar medial branch block diagnostic 1  I did explain in detail the process of diagnostic blocks and radiofrequency ablation  Patient understand the risks risk benefits and wishes to proceed  - patient was also requesting [place] paperwork however I did explain to her that to have long-term work restrictions or disability that she may need a functional capacity evaluation and that I do not have expertise in those  Explained to her that her pain is mostly localized to the low back without any motor sensory deficits and she may require return to work if her pain is controlled  I did provided work note for 2 weeks in the hopes of proceeding with the 1st diagnostic block  I will refer her to her PCP and Dr Amandeep Anthony, [facility] for those evaluations if needed  My impressions and treatment recommendations were discussed in detail with the patient who verbalized understanding and had no further questions  Discharge instructions were provided  I personally saw and examined the patient and I agree with the above discussed plan of care  History of Present Illness:  Carolyn Gardner is a 60 y o  female with past medical history of sick sinus syndrome with a pacemaker as well as DVTs on Eliquis who presents for a follow up office visit in regards to bilateral Back Pain right worse than left without any radiating pain into the right lower extremity  Patient presents today after her appointment with with neurosurgery and was recommended non surgical management for low back pain  Patient was also seen by rheumatology and started on gabapentin and tizanidine to help with her low back pain symptoms      Patient initially presented to me with right-sided low back and leg pain and subsequently underwent TFESI L3-L4 and then noted to have severe right-sided low back pain and no longer having the radicular symptoms  Patient underwent a lumbar MRI in setting of recent new axial procedure and sudden severe pain to the region under patient on Eliquis  Lumbar MRI results noted below but showing no new onset hematoma, osseous abnormalities  Continues to show multilevel lateral and foraminal narrowing particularly at the L3 level  Today she reports continue bilateral lumbar back pain without any radiating symptoms  Right side is worse than the left side as per the patient  The pain is constant and associated cramping, pressure-like  She states it is debilitating and she is unable to bend, turn, twist   She denies any numbness or weakness in bilateral lower extremities  She denies any bowel or bladder incontinence or saddle anesthesia  No recent fevers chills or weight changes  Other Symptoms:  The patient does not have numbness  The patient does not have weakness  The patient does not have bladder dysfunction  The patient does not have bowel dysfunction  The patient does not have chills and fever, night sweats or unintentional weight loss  Prior visits:  9-22-22: Follow up  Pt reporting no radiating pain into right leg, now reporting axial low back pain R>L since injection   Lumbar MRI ordered  9-6-22: L3-L4 TFESI   8-30-22: Consult with plan for right T3-T4 TFESI      Specialists Seen:  Neurosurgery: 10-5-22- non surgical management recommended for axial low back pain  Rheumatology 10-5-22: Osteoporosis management, gabapentin and tizanidine for back pain    Current Pain Medications:  Gabapentin, Tizanidine    Interventional Techniques Employed:  9-6-22: Right L3-L4 TFESI for right leg pain in L3 distrubution     Imaging:      MRI LUMBAR SPINE WITHOUT CONTRAST   9-     INDICATION: M54 16: Radiculopathy, lumbar region  M54 50: Low back pain, unspecified      COMPARISON:  MRI lumbar spine 8/16/2022     TECHNIQUE:  Sagittal T1, sagittal T2, sagittal inversion recovery, axial T1 and axial T2, coronal T2     IMAGE QUALITY:  Diagnostic     FINDINGS:     VERTEBRAL BODIES:  There are 5 lumbar type vertebral bodies  Stable grade 1 anterolisthesis of L4 on L5       No abnormal bone marrow signal or suspicious discrete lesion         SACRUM:  Normal signal within the sacrum  No evidence of insufficiency or stress fracture      DISTAL CORD AND CONUS:  Normal size and signal within the distal cord and conus      PARASPINAL SOFT TISSUES:  Paraspinal soft tissues are unremarkable      Hepatomegaly      Left renal cyst     LOWER THORACIC DISC SPACES:  Normal disc height and signal   No disc herniation, canal stenosis or foraminal narrowing      LUMBAR DISC SPACES:     L1-L2:  No significant disc bulge  Mild facet arthropathy  No canal or foraminal stenosis      L2-L3:  Minimal disc bulge  Bilateral facet arthropathy and ligamentum flavum thickening  There is 1 x 0 6 x 0 5 cm left facet synovial cyst (series 5 image 7) indenting left posterolateral thecal sac, similar to prior exam   There is mild canal   stenosis  Mild left foraminal narrowing      L3-L4:  Disc bulge and superimposed right foraminal/extraforaminal disc protrusion  Bilateral facet arthropathy and ligamentum flavum thickening  Moderate to severe bilateral lateral recesses stenosis and moderate canal narrowing  Moderate right and   mild left foraminal stenosis  Similar to prior exam, the herniated disc contacts undersurface of right L3 nerve root      L4-L5:  Grade 1 anterolisthesis uncovering posterior disc margin  Severe bilateral facet arthropathy  Moderate bilateral lateral recesses stenosis and mild to moderate canal narrowing  Mild bilateral foraminal stenosis      L5-S1:  Mild disc bulge  Moderate facet arthropathy  No significant canal or foraminal stenosis      IMPRESSION:     1  Grossly stable degenerative change compared to recent MRI 8/16/2022      2  Moderate to severe bilateral lateral recesses stenosis and moderate canal narrowing at level L3-4  Moderate right foraminal stenosis at this level with herniated disc contacting undersurface of exiting right L3 nerve root, similar to prior exam      3  Moderate bilateral lateral recesses stenosis and mild to moderate canal narrowing at level L4-5      4  A 1 cm left facet joint synovial cyst at level L2-3 contributing to mild canal narrowing      5  Hepatomegaly  No MRI cervical spine results found in the past 2 years   MRI lumbar spine wo contrast    Result Date: 9/30/2022  Impression     1  Grossly stable degenerative change compared to recent MRI 8/16/2022  2   Moderate to severe bilateral lateral recesses stenosis and moderate canal narrowing at level L3-4  Moderate right foraminal stenosis at this level with herniated disc contacting undersurface of exiting right L3 nerve root, similar to prior exam      3   Moderate bilateral lateral recesses stenosis and mild to moderate canal narrowing at level L4-5      4    A 1 cm left facet joint synovial cyst at level L2-3 contributing to mild canal narrowing  5   Hepatomegaly  Workstation performed: [id number]         MRI lumbar spine wo contrast    Result Date: 8/19/2022  Impression     Far lateral bulge with marginal osteophyte at L3-4 contacts the extraforaminal right L3 nerve root  Correlate for right L3 radiculopathy  Moderate central stenosis noted at this level  Facet overgrowth facet cyst L2-3 eccentric to the left dorsally in the canal contributes to mild central and moderate left lateral recess stenosis  Severe facet degenerative change L4-5 accounts for 4 mm anterolisthesis               Workstation performed: [id number]          No MRI thoracic spine results found in the past 2 years   No X-ray cervical spine results found in the past 2 years   No X-ray lumbar spine results found in the past 2 years   No X-ray hip/pelvis results found in the past 2 years   No X-ray knee results found in the past 2 years     Lab Results   Component Value Date    CREATININE 0 68 09/03/2022     Lab Results   Component Value Date    ALT 33 09/03/2022       I have personally reviewed and/or updated the patient's past medical history, past surgical history, family history, social history, current medications, allergies, and vital signs today  Review of Systems   Musculoskeletal: Positive for back pain, gait problem and joint swelling         Patient Active Problem List   Diagnosis    Abnormal CT scan    Sick sinus syndrome (HCC)    HSV-1 infection    Status post carpal tunnel release    Fibrocystic breast disease    Hyperlipidemia    Knee osteoarthritis    Lichen sclerosus    Menopausal and postmenopausal disorder    Neck pain    Candida infection of flexural skin    Grade 1 out of 6 intensity murmur    Encounter for screening mammogram for malignant neoplasm of breast    History of DVT (deep vein thrombosis)    Iron deficiency anemia    Vitamin deficiency    Medicare annual wellness visit, subsequent    Mass of soft tissue of right upper extremity    Chronic right shoulder pain    Work related injury    Leukopenia    Anemia    Right ear pain    Dysphagia    Acute gastric ulcer without hemorrhage or perforation    History of colon polyps    Overweight    Bariatric surgery status    Depression    Nondisplaced fracture of medial malleolus of right tibia, initial encounter for closed fracture    Closed nondisplaced Maisonneuve's fracture of right leg    Closed fracture of upper end of right fibula    Closed fracture of left distal radius and ulna    Laceration of eyebrow and forehead    Fall    Sinus pressure    Pacemaker    [facility]    Acute non-recurrent maxillary sinusitis    Acute respiratory disease due to COVID-19 virus    Insomnia    Non-recurrent acute serous otitis media of both ears    Back pain without sciatica    LLQ pain    Stomach cramps       Past Medical History:   Diagnosis Date    Anemia     iron def    Arthritis     knees    Carpal tunnel syndrome of right wrist     Chest pain     Colon polyp     Deep vein thrombosis (DVT) of proximal lower extremity ([facility] [address] ) 2012    Depression     at times    DVT (deep venous thrombosis) ([facility] [address] )     Dysphagia     Encounter for surgical aftercare following surgery of digestive system 2020    s/p Raghav-En-Y Gastric Bypass with Dr Taco Ferguson in 2017  INITIAL weight:241 lb INITIAL weight with start of topirmate: 176 lb Goal weight loss of 5-10 %-159 lb-167 lb     Esophageal reflux 2011    Grade 1 out of 6 intensity murmur 2019    Heart murmur     History of transfusion 1980    Pacemaker 2018    Pneumonia     age 15    Psychiatric disorder     Trigger finger, left     Visit for suture removal 2021       Past Surgical History:   Procedure Laterality Date    BACK SURGERY      CARDIAC PACEMAKER PLACEMENT  2018    CERVICAL [address] SURGERY  2009    PLATES & SCREWS     SECTION      X2    COLONOSCOPY      EPIDURAL BLOCK INJECTION Right 2022    Procedure: RIGHT [id number] EPIDURAL STEROID INJECTION ([id number]);   Surgeon: Meggan Rivas DO;  Location:  MAIN OR;  Service: Pain Management     GASTRIC BYPASS  2017    LAP RINYGB SURGERY    KNEE ARTHROSCOPY      KNEE SURGERY Bilateral     KNEE SURGERY Left 2020    OTHER SURGICAL HISTORY      ARM SURGERIES    [place] INCISE FINGER TENDON SHEATH Left 2018    Procedure: LONG TRIGGER FINGER RELEASE;  Surgeon: Liliana Montague MD;  Location: [facility];  Service: Orthopedics    [place] WRIST [place] LIG Right 2018    Procedure: ENDOSCOPIC CARPAL TUNNEL RELEASE;  Surgeon: Liliana Montague MD;  Location: [facility];  Service: Orthopedics    TONSILLECTOMY      UPPER GASTROINTESTINAL ENDOSCOPY         Family History   Problem Relation Age of Onset    Stroke Mother     Alzheimer's disease Mother     Arthritis Mother     Coronary artery disease Mother     Breast cancer Mother 77    Cancer Mother     Heart disease Mother     Hypertension Father     Gout Father     Diabetes type II Father     Coronary artery disease Father     Heart disease Father     No Known Problems Sister     No Known Problems Sister     No Known Problems Sister     No Known Problems Daughter     No Known Problems Maternal Grandmother     No Known Problems Maternal Grandfather     No Known Problems Paternal Grandmother     No Known Problems Paternal Grandfather     Prostate cancer Brother     Pancreatic cancer Brother     No Known Problems Maternal Aunt     No Known Problems Maternal Aunt     No Known Problems Maternal Aunt     No Known Problems Maternal Aunt     Cancer Maternal Aunt     No Known Problems Paternal Aunt     No Known Problems Paternal Aunt        Social History     Occupational History    Not on file   Tobacco Use    Smoking status: Former Smoker     Packs/day: 0 25     Types: Cigarettes    Smokeless tobacco: Never Used   Vaping Use    Vaping Use: Never used   Substance and Sexual Activity    Alcohol use:  Yes     Alcohol/week: 2 0 standard drinks     Types: 1 Glasses of wine, 1 Standard drinks or equivalent per week     Comment: very rare social    Drug use: No    Sexual activity: Not Currently       Current Outpatient Medications on File Prior to Visit   Medication Sig    acetaminophen (TYLENOL) 650 mg CR tablet Take 650 mg by mouth every 8 (eight) hours as needed for mild pain Take 2 tablets in the AM and 2 tablets in the late evening    Ascorbic Acid (VITAMIN C) 100 MG CHEW Chew    atorvastatin (LIPITOR) 10 mg tablet take 1 tablet by mouth once daily    Biotin 60529 MCG TABS Take by mouth daily      Calcium Carbonate (CALCI-CHEW PO) Take 500 mg by mouth 2 (two) times a day    cholecalciferol (VITAMIN D3) 1,000 units tablet Take 5,000 Units by mouth in the morning    clobetasol (TEMOVATE) 0 05 % cream Apply to affected areas daily x 12 weeks, then twice weekly for maintenance    Cyanocobalamin (VITAMIN B 12 PO) Take 1,000 mcg by mouth    dicyclomine (BENTYL) 10 mg capsule take 1 capsule by mouth four times a day before meals at bedtime    Eliquis 2 5 MG take 1 tablet by mouth twice a day    Ferrous Sulfate  (45 Fe) MG TBCR Take 1 tablet by mouth daily    gabapentin (Neurontin) 100 mg capsule Take 1 capsule (100 mg total) by mouth 3 (three) times a day As needed for shooting pain    Lysine HCl POWD Take by mouth    methocarbamol (ROBAXIN) 500 mg tablet Take 1 tablet (500 mg total) by mouth 2 (two) times a day    metoprolol succinate (TOPROL-XL) 25 mg 24 hr tablet Take 1 tablet (25 mg total) by mouth daily    Multiple Vitamin (MULTIVITAMIN) capsule Take 1 capsule by mouth daily 2 chewies daily    pantoprazole (PROTONIX) 20 mg tablet take 1 tablet by mouth once daily    predniSONE 10 mg tablet Take 6 tabs days 1&2, 5 tabs days 3&4, 4 tabs days 5&6, 3 tabs days 7&8, 2 tabs days 9&10, 1 tab days 11&12    tiZANidine (ZANAFLEX) 2 mg tablet Take 1 tablet (2 mg total) by mouth every 8 (eight) hours as needed for muscle spasms    traZODone (DESYREL) 50 mg tablet take 1 tablet by mouth at bedtime if needed for sleep    Diclofenac Sodium (VOLTAREN) 1 % Apply 2 g topically 4 (four) times a day (Patient not taking: No sig reported)    fluticasone (FLONASE) 50 mcg/act nasal spray instill 1 spray into each nostril once daily (Patient not taking: Reported on 10/6/2022)    guaifenesin-codeine (GUAIFENESIN AC) 100-10 MG/5ML liquid Take 5 mL by mouth 3 (three) times a day as needed for cough (Patient not taking: No sig reported)    naloxone (NARCAN) 4 mg/0 1 mL nasal spray Administer 1 spray into a nostril  If no response after 2-3 minutes, give another dose in the other nostril using a new spray  (Patient not taking: No sig reported)    nystatin (MYCOSTATIN) powder Apply topically 2 (two) times a day (Patient not taking: Reported on 10/6/2022)    oxyCODONE (Roxicodone) 5 immediate release tablet Take 1 tablet (5 mg total) by mouth every 4 (four) hours as needed for moderate pain for up to 18 doses Max Daily Amount: 30 mg (Patient not taking: No sig reported)    traMADol (Ultram) 50 mg tablet Take 0 5 tablets (25 mg total) by mouth every 8 (eight) hours as needed for moderate pain or severe pain (Patient not taking: No sig reported)    valACYclovir (VALTREX) 1,000 mg tablet Take 2 tablets (2,000 mg total) by mouth 2 (two) times a day for 1 day (Patient not taking: Reported on 10/5/2022)     No current facility-administered medications on file prior to visit  No Known Allergies    Physical Exam:    BP (!) 89/56   Pulse 66   Ht 5' 4" (1 626 m) Comment: verbal  Wt 85 7 kg (189 lb)   LMP 09/18/2014   BMI 32 44 kg/m²       Palpation:     tenderness over the left paravertebral musculature   tenderness over the right paravertebral musculature    No tenderness with palpation of the left SI joint    No tenderness with palpation of the right SI joint      No tenderness with palpation of the left greater trochanter    No tenderness with palpation of the right greater trochanter    Sensory:    Intact to light touch grossly in the left lower extremity    Intact to light touch grossly in the right lower extremity    Muscle Strength      Hip flexion Knee flexion Knee extension Foot plantarflexion Foot   dorsiflexion EHL   L 5/5 5/5 5/5 5/5 5/5 5/5   R 5/5 5/5 5/5 5/5 5/5 5/5     DTRs: 2+ patellar, 2+ Achilles and symmetric       Special Tests:     Facet loading Straight leg raise STEPHANY FAIR   L positive Negative Negative    R Positive negative negative

## 2022-10-06 ENCOUNTER — OFFICE VISIT (OUTPATIENT)
Dept: PAIN MEDICINE | Facility: CLINIC | Age: 62
End: 2022-10-06
Payer: COMMERCIAL

## 2022-10-06 ENCOUNTER — TELEPHONE (OUTPATIENT)
Dept: OBGYN CLINIC | Facility: MEDICAL CENTER | Age: 62
End: 2022-10-06

## 2022-10-06 VITALS
HEART RATE: 66 BPM | SYSTOLIC BLOOD PRESSURE: 89 MMHG | BODY MASS INDEX: 32.27 KG/M2 | HEIGHT: 64 IN | WEIGHT: 189 LBS | DIASTOLIC BLOOD PRESSURE: 56 MMHG

## 2022-10-06 DIAGNOSIS — M51.26 LUMBAR DISC HERNIATION: ICD-10-CM

## 2022-10-06 DIAGNOSIS — M47.816 LUMBAR FACET ARTHROPATHY: Primary | ICD-10-CM

## 2022-10-06 DIAGNOSIS — M47.816 LUMBAR SPONDYLOSIS: ICD-10-CM

## 2022-10-06 PROCEDURE — 99213 OFFICE O/P EST LOW 20 MIN: CPT | Performed by: STUDENT IN AN ORGANIZED HEALTH CARE EDUCATION/TRAINING PROGRAM

## 2022-10-06 NOTE — LETTER
October 6, 2022     Patient: Kamryn Garcia  YOB: 1960  Date of Visit: 10/6/2022      To Whom it May Concern:    Kamryn Garcia is under my professional care  Aretha Jackson was seen in my office on 10/6/2022  Arethagali Jackson may return to work duty after she has her upcoming pain intervention to address her pain in two weeks  If you have any questions or concerns, please don't hesitate to call           Sincerely,          Harsh Junious Borne, DO        CC: No Recipients

## 2022-10-06 NOTE — TELEPHONE ENCOUNTER
Called pt and scheduled her procedure with Dr Jona Martinez  Pt aware of procedure date, location and instructions  F/u appt scheduled  Pt aware that someone will call her the night before with procedure time  Understanding verbalized

## 2022-10-11 PROBLEM — H65.03 NON-RECURRENT ACUTE SEROUS OTITIS MEDIA OF BOTH EARS: Status: RESOLVED | Noted: 2022-04-06 | Resolved: 2022-10-11

## 2022-10-12 PROBLEM — J06.9 ACUTE RESPIRATORY DISEASE DUE TO COVID-19 VIRUS: Status: RESOLVED | Noted: 2022-02-01 | Resolved: 2022-10-12

## 2022-10-12 PROBLEM — J01.00 ACUTE NON-RECURRENT MAXILLARY SINUSITIS: Status: RESOLVED | Noted: 2022-02-01 | Resolved: 2022-10-12

## 2022-10-12 PROBLEM — S01.119A LACERATION OF EYEBROW AND FOREHEAD: Status: RESOLVED | Noted: 2021-06-18 | Resolved: 2022-10-12

## 2022-10-12 PROBLEM — S01.81XA LACERATION OF EYEBROW AND FOREHEAD: Status: RESOLVED | Noted: 2021-06-18 | Resolved: 2022-10-12

## 2022-10-12 PROBLEM — U07.1 ACUTE RESPIRATORY DISEASE DUE TO COVID-19 VIRUS: Status: RESOLVED | Noted: 2022-02-01 | Resolved: 2022-10-12

## 2022-10-17 DIAGNOSIS — I47.1 PSVT (PAROXYSMAL SUPRAVENTRICULAR TACHYCARDIA) (HCC): ICD-10-CM

## 2022-10-17 RX ORDER — METOPROLOL SUCCINATE 25 MG/1
TABLET, EXTENDED RELEASE ORAL
Qty: 90 TABLET | Refills: 0 | Status: SHIPPED | OUTPATIENT
Start: 2022-10-17

## 2022-10-21 DIAGNOSIS — R10.9 STOMACH CRAMPS: ICD-10-CM

## 2022-10-21 DIAGNOSIS — M54.41 ACUTE RIGHT-SIDED LOW BACK PAIN WITH RIGHT-SIDED SCIATICA: ICD-10-CM

## 2022-10-21 RX ORDER — DICYCLOMINE HYDROCHLORIDE 10 MG/1
CAPSULE ORAL
Qty: 90 CAPSULE | Refills: 0 | Status: SHIPPED | OUTPATIENT
Start: 2022-10-21

## 2022-10-25 ENCOUNTER — TELEPHONE (OUTPATIENT)
Dept: PAIN MEDICINE | Facility: CLINIC | Age: 62
End: 2022-10-25

## 2022-10-25 ENCOUNTER — HOSPITAL ENCOUNTER (OUTPATIENT)
Facility: HOSPITAL | Age: 62
Setting detail: OUTPATIENT SURGERY
Discharge: HOME | End: 2022-10-25
Attending: STUDENT IN AN ORGANIZED HEALTH CARE EDUCATION/TRAINING PROGRAM | Admitting: STUDENT IN AN ORGANIZED HEALTH CARE EDUCATION/TRAINING PROGRAM

## 2022-10-25 VITALS
HEART RATE: 61 BPM | OXYGEN SATURATION: 98 % | DIASTOLIC BLOOD PRESSURE: 58 MMHG | TEMPERATURE: 97.1 F | SYSTOLIC BLOOD PRESSURE: 101 MMHG | RESPIRATION RATE: 20 BRPM

## 2022-10-25 RX ORDER — BUPIVACAINE HYDROCHLORIDE 2.5 MG/ML
INJECTION, SOLUTION EPIDURAL; INFILTRATION; INTRACAUDAL AS NEEDED
Status: DISCONTINUED | OUTPATIENT
Start: 2022-10-25 | End: 2022-10-25 | Stop reason: HOSPADM

## 2022-10-25 NOTE — TELEPHONE ENCOUNTER
Lorin Vega: Patient     Doctor: Cecy Anderson    Reason for call: Pt is calling in requesting a work letter stating she can return back to work  She is suppose to go back on [employer] 10/30/22  Also are they any restrictions, please add that   Please please note in [employer] [facility]    Call back#: [phone]

## 2022-10-25 NOTE — TELEPHONE ENCOUNTER
OK to draft return to work note? Any restrictions from your standpoint?  Looks like pt was supposed to make an appt with Dr Vijay Dickson

## 2022-10-25 NOTE — DISCHARGE INSTRUCTIONS

## 2022-10-25 NOTE — OP NOTE
OPERATIVE REPORT  PATIENT NAME: Harmony Babcock    :  1960  MRN: [id number]  Pt Location: [facility] OR ROOM 01    SURGERY DATE: 10/25/2022    Surgeon(s) and Role:     * Bairon Randhawa DO - Primary    Preop Diagnosis:  Lumbar facet arthropathy [M47 816]    Post-Op Diagnosis Codes:     * Lumbar facet arthropathy [M47 816]    Procedure(s) (LRB):  L3-L4,L4-5,L5-S1 BILATERAL BRANCH BLOCK (Bilateral)    Specimen(s):  * No specimens in log *    Estimated Blood Loss:   Minimal    Drains:  * No LDAs found *    Anesthesia Type:   Local    Operative Indications:  Lumbar facet arthropathy [M47 816]      Operative Findings:    Complications:   None    Procedure and Technique:    DATE: 2022  PROCEDURE: bilateral Lumbar L2 , L3, L4 Medial Branch and L5 Dorsal Ramus Block under Fluoroscopy-Diagnostic 1  PHYSICIAN: Bairon Kamara DO  PRE-OPERATIVE DIAGNOSIS:  Bilateral Lumbar Pain and Spondylosis  POST-OPERATIVE DIAGNOSIS: Same  ANESTHESIA: Local  COMPLICATIONS: None     Indications  Harmony Babcock is a 60 y o  female with a history of chronic bilateral back pain who has failed conservative therapy and presents today for diagnostic lumbar medial branch blocks to help with the pain  Informed Consent  The risks, benefits and alternatives of the procedure were discussed with the patient  The patient was given opportunity to ask questions regarding the procedure, its indications and the associated risks  The risks of the procedure discussed include but are not limited to infection, bleeding, allergic reactions, nerve injuries, worsened pain, paralysis, headache, susceptibility to COVID from steroids, and medication side effects  The patient showed understanding and wished to proceed  Informed consent was signed  Patient is on blood thinners but discussed that the risks of stopping blood thinners for cardiac and clotting conditions outweigh the the risk of a superficial hematoma if proceeding with procedure    Patient acknowledged understanding  Preparation  After obtaining informed consent, the patient's chart was reviewed, and the patient's identification was confirmed  The patient was brought to the exam room and placed in the prone position on the exam room table  A time out was performed per hospital policy  Strict sterile technique was used  Skin was prepped with chloraprep solution and draped in the usual manner  Standard monitors were placed  AP and oblique fluoroscopy were used to identify and jairon the junction of the sacral superior articular process and sacral ala on the left side as well as the junction of the superior articular process and transverse process at the L3, L4, L5 vertebral level(s)  The overlying skin was anesthetized using a 25 gauge needle and 1% lidocaine  A 25 G 3 5 in needle was inserted and directed to the to the marked location  Once bone was contacted, negative aspiration was confirmed, and 0 5  cc of a solution containing 0 25% bupivacaine was injected at each level  No paresthesias were elicited  The procedure was repeated on the right side    The patient was injected with a total of 4 cc 0 25% bupivacaine     The patient tolerated the procedure well and there were no immediate adverse events  The patient was instructed to call with fever, chills, increased pain, redness or swelling at the injection site, numbness or weakness in the leg         I was present for the entire procedure    Patient Disposition:  PACU         SIGNATURE: Casimiro Wiley DO  DATE: October 25, 2022  TIME: 4:26 PM

## 2022-10-25 NOTE — INTERVAL H&P NOTE
H&P reviewed  After examining the patient I find no changes in the patients condition since the H&P had been written      Vitals:    10/25/22 1012   BP: 107/54   Pulse: 61   Resp: 18   Temp:    SpO2: 98%

## 2022-10-27 ENCOUNTER — TELEPHONE (OUTPATIENT)
Dept: OBGYN CLINIC | Facility: CLINIC | Age: 62
End: 2022-10-27

## 2022-10-27 NOTE — TELEPHONE ENCOUNTER
----- Message from Stephane Conway DO sent at 10/26/2022  4:15 PM EDT -----  Regarding: RE: Pain Diary  Can schedule #2  Thanks  ----- Message -----  From: Ritesh Scott MA  Sent: 10/26/2022   3:50 PM EDT  To: Stephane Conway DO  Subject: Pain Diary                                       Received pain diary from yesterday please advise    Thanks!   Maribell Tejeda

## 2022-10-27 NOTE — TELEPHONE ENCOUNTER
Patient is scheduled for 11/15/22  [facility]bal [facility] to [facility]  patient understood

## 2022-11-14 DIAGNOSIS — R10.9 STOMACH CRAMPS: ICD-10-CM

## 2022-11-14 DIAGNOSIS — M54.41 ACUTE RIGHT-SIDED LOW BACK PAIN WITH RIGHT-SIDED SCIATICA: ICD-10-CM

## 2022-11-14 RX ORDER — DICYCLOMINE HYDROCHLORIDE 10 MG/1
CAPSULE ORAL
Qty: 90 CAPSULE | Refills: 0 | Status: SHIPPED | OUTPATIENT
Start: 2022-11-14

## 2022-11-15 ENCOUNTER — HOSPITAL ENCOUNTER (OUTPATIENT)
Facility: HOSPITAL | Age: 62
Setting detail: OUTPATIENT SURGERY
Discharge: HOME | End: 2022-11-15
Attending: STUDENT IN AN ORGANIZED HEALTH CARE EDUCATION/TRAINING PROGRAM | Admitting: STUDENT IN AN ORGANIZED HEALTH CARE EDUCATION/TRAINING PROGRAM

## 2022-11-15 VITALS
TEMPERATURE: 98.1 F | DIASTOLIC BLOOD PRESSURE: 54 MMHG | OXYGEN SATURATION: 99 % | SYSTOLIC BLOOD PRESSURE: 108 MMHG | HEART RATE: 78 BPM | RESPIRATION RATE: 18 BRPM

## 2022-11-15 RX ORDER — BUPIVACAINE HYDROCHLORIDE 2.5 MG/ML
INJECTION, SOLUTION EPIDURAL; INFILTRATION; INTRACAUDAL AS NEEDED
Status: DISCONTINUED | OUTPATIENT
Start: 2022-11-15 | End: 2022-11-15 | Stop reason: HOSPADM

## 2022-11-15 NOTE — DISCHARGE INSTRUCTIONS

## 2022-11-16 NOTE — H&P
History of Present Illness: The patient is a 60 y o  female who presents with complaints of bilateral axial low back pain s/p successful bilateral L3-L4,L4-L5,L5-S1 diagnostic lmbb #1 now presenting for LMBB diagnostic #2  Past Medical History:   Diagnosis Date   • Anemia     iron def   • Arthritis     knees   • Carpal tunnel syndrome of right wrist    • Chest pain    • Colon polyp    • Deep vein thrombosis (DVT) of proximal lower extremity ([facility] [address] ) 2012   • Depression     at times   • DVT (deep venous thrombosis) ([facility] [address] )    • Dysphagia    • Encounter for surgical aftercare following surgery of digestive system 2020    s/p Raghav-En-Y Gastric Bypass with Dr Cristela Prader in   INITIAL weight:241 lb INITIAL weight with start of topirmate: 176 lb Goal weight loss of 5-10 %-159 lb-167 lb    • Esophageal reflux 2011   • Grade 1 out of 6 intensity murmur 2019   • Heart murmur    • History of transfusion 1980   • Pacemaker 2018   • Pneumonia     age 15   • Psychiatric disorder    • Trigger finger, left    • Visit for suture removal 2021       Past Surgical History:   Procedure Laterality Date   • BACK SURGERY     • CARDIAC PACEMAKER PLACEMENT  2018   • CERVICAL DISC SURGERY  2009    PLATES & SCREWS   •  SECTION      X2   • COLONOSCOPY     • EPIDURAL BLOCK INJECTION Right 2022    Procedure: RIGHT [id number] EPIDURAL STEROID INJECTION (45074);   Surgeon: Veronica Snell DO;  Location:  MAIN OR;  Service: Pain Management    • GASTRIC BYPASS  2017    LAP RINYGB SURGERY   • KNEE ARTHROSCOPY     • KNEE SURGERY Bilateral    • KNEE SURGERY Left 2020   • OTHER SURGICAL HISTORY      ARM SURGERIES   • DC INCISE FINGER TENDON SHEATH Left 2018    Procedure: LONG TRIGGER FINGER RELEASE;  Surgeon: Farhat Man MD;  Location: [facility];  Service: Orthopedics   • DC INJ DX/THER AGNT PARAVERT FACET JOINT,IMG GUIDE,LUMBAR/SAC, 1ST LEVEL Bilateral 10/25/2022    Procedure: L3-L4,L4-5,L5-S1 BILATERAL BRANCH BLOCK;  Surgeon: Kay Elizondo DO;  Location: [facility];  Service: Pain Management    • [place] WRIST Edie Carls LIG Right 9/18/2018    Procedure: ENDOSCOPIC CARPAL TUNNEL RELEASE;  Surgeon: Tiffanie Barreto MD;  Location: [facility];  Service: Orthopedics   • TONSILLECTOMY     • UPPER GASTROINTESTINAL ENDOSCOPY         No current facility-administered medications for this encounter      Current Outpatient Medications:   •  acetaminophen (TYLENOL) 650 mg CR tablet, Take 650 mg by mouth every 8 (eight) hours as needed for mild pain Take 2 tablets in the AM and 2 tablets in the late evening, Disp: , Rfl:   •  Ascorbic Acid (VITAMIN C) 100 MG CHEW, Chew, Disp: , Rfl:   •  atorvastatin (LIPITOR) 10 mg tablet, take 1 tablet by mouth once daily, Disp: 90 tablet, Rfl: 0  •  Biotin 38245 MCG TABS, Take by mouth daily  , Disp: , Rfl:   •  Calcium Carbonate (CALCI-CHEW PO), Take 500 mg by mouth 2 (two) times a day, Disp: , Rfl:   •  cholecalciferol (VITAMIN D3) 1,000 units tablet, Take 5,000 Units by mouth in the morning, Disp: , Rfl:   •  clobetasol (TEMOVATE) 0 05 % cream, Apply to affected areas daily x 12 weeks, then twice weekly for maintenance, Disp: , Rfl:   •  Cyanocobalamin (VITAMIN B 12 PO), Take 1,000 mcg by mouth, Disp: , Rfl:   •  Diclofenac Sodium (VOLTAREN) 1 %, Apply 2 g topically 4 (four) times a day (Patient not taking: No sig reported), Disp: 100 g, Rfl: 6  •  dicyclomine (BENTYL) 10 mg capsule, take 1 capsule by mouth four times a day before meals at bedtime, Disp: 90 capsule, Rfl: 0  •  Eliquis 2 5 MG, take 1 tablet by mouth twice a day, Disp: 60 tablet, Rfl: 3  •  Ferrous Sulfate  (45 Fe) MG TBCR, Take 1 tablet by mouth daily, Disp: , Rfl:   •  fluticasone (FLONASE) 50 mcg/act nasal spray, instill 1 spray into each nostril once daily (Patient not taking: Reported on 10/6/2022), Disp: 32 g, Rfl: 1  •  gabapentin (Neurontin) 100 mg capsule, Take 1 capsule (100 mg total) by mouth 3 (three) times a day As needed for shooting pain, Disp: 90 capsule, Rfl: 4  •  Lysine HCl POWD, Take by mouth, Disp: , Rfl:   •  methocarbamol (ROBAXIN) 500 mg tablet, Take 1 tablet (500 mg total) by mouth 2 (two) times a day, Disp: 20 tablet, Rfl: 0  •  metoprolol succinate (TOPROL-XL) 25 mg 24 hr tablet, take 1 tablet by mouth once daily, Disp: 90 tablet, Rfl: 0  •  Multiple Vitamin (MULTIVITAMIN) capsule, Take 1 capsule by mouth daily 2 chewies daily, Disp: , Rfl:   •  naloxone (NARCAN) 4 mg/0 1 mL nasal spray, Administer 1 spray into a nostril  If no response after 2-3 minutes, give another dose in the other nostril using a new spray   (Patient not taking: No sig reported), Disp: 1 each, Rfl: 1  •  nystatin (MYCOSTATIN) powder, Apply topically 2 (two) times a day (Patient not taking: Reported on 10/6/2022), Disp: 30 g, Rfl: 0  •  oxyCODONE (Roxicodone) 5 immediate release tablet, Take 1 tablet (5 mg total) by mouth every 4 (four) hours as needed for moderate pain for up to 18 doses Max Daily Amount: 30 mg (Patient not taking: No sig reported), Disp: 18 tablet, Rfl: 0  •  pantoprazole (PROTONIX) 20 mg tablet, take 1 tablet by mouth once daily, Disp: 30 tablet, Rfl: 3  •  tiZANidine (ZANAFLEX) 2 mg tablet, Take 1 tablet (2 mg total) by mouth every 8 (eight) hours as needed for muscle spasms, Disp: 90 tablet, Rfl: 4  •  traZODone (DESYREL) 50 mg tablet, take 1 tablet by mouth at bedtime if needed for sleep, Disp: 30 tablet, Rfl: 1  •  valACYclovir (VALTREX) 1,000 mg tablet, Take 2 tablets (2,000 mg total) by mouth 2 (two) times a day for 1 day (Patient not taking: Reported on 10/5/2022), Disp: 30 tablet, Rfl: 0    No Known Allergies    Physical Exam:   Vitals:    11/15/22 1157   BP: 108/54   Pulse: 78   Resp: 18   Temp:    SpO2: 99%     General: Awake, Alert, Oriented x 3, Mood and affect appropriate  Respiratory: Respirations even and unlabored  Cardiovascular: Peripheral pulses intact; no edema  Musculoskeletal Exam: No motor or sensory deficits, bilateral tenderness to palpation to lumbar paraspinal region  ASA Score: 3    Patient/Chart Verification  Patient ID Verified: Verbal  ID Band Applied: Yes  Consents Confirmed: Procedural, To be obtained in the Pre-Procedure area  H&P( within 30 days) Verified: Yes  Interval H&P(within 24 hr) Complete (required for Outpatients and Surgery Admit only): Yes  Beta Blocker given : N/A  Pre-op Lab/Test Results Available: N/A  Pregnancy Lab Collected: N/A comment  Does Patient Have a Prosthetic Device/Implant: Unable to Assess    Assessment: No diagnosis found      Plan: bilateral L3-L4, L4-L5 and L5-S1 LMBB #2

## 2022-11-16 NOTE — OP NOTE
OPERATIVE REPORT  PATIENT NAME: Paddy Osman    :  1960  MRN: [id number]  Pt Location: [facility] OR ROOM 01    SURGERY DATE: 11/15/2022    Surgeon(s) and Role:     * Bairon Bonner DO - Primary    Preop Diagnosis:  Lumbar spondylosis [M47 816]    Post-Op Diagnosis Codes:     * Lumbar spondylosis [M47 816]    Procedure(s) (LRB):  BILATERAL L3-S1 MEDICAL BRANCH BLOCK (00529,56345,42741) (Bilateral)    Specimen(s):  * No specimens in log *    Estimated Blood Loss:   Minimal    Drains:  * No LDAs found *    Anesthesia Type:   Local    Operative Indications:  Lumbar spondylosis [M47 816]      Operative Findings:      Complications:   None    Procedure and Technique:      DATE: November 15, 2022  PROCEDURE: bilateral Lumbar L2, L3, L4 Medial Branch and L5 Dorsal Ramus Block under Fluoroscopy-Diagnostic #2  PHYSICIAN: Bairon Kamara DO  PRE-OPERATIVE DIAGNOSIS: Bilateral Lumbar Pain and Spondylosis  POST-OPERATIVE DIAGNOSIS: Same  ANESTHESIA: Local  COMPLICATIONS: None     Indications  Paddy Osman is a 60 y o  female with a history of chronic axial bilateral back pain who has failed conservative therapy and presents today for diagnostic lumbar medial branch blocks to help with the pain  Informed Consent  The risks, benefits and alternatives of the procedure were discussed with the patient  The patient was given opportunity to ask questions regarding the procedure, its indications and the associated risks  The risks of the procedure discussed include but are not limited to infection, bleeding, allergic reactions, nerve injuries, worsened pain, paralysis, headache, susceptibility to COVID from steroids, and medication side effects  The patient showed understanding and wished to proceed  Informed consent was signed  Preparation  After obtaining informed consent, the patient's chart was reviewed, and the patient's identification was confirmed   The patient was brought to the exam room and placed in the prone position on the exam room table  A time out was performed per hospital policy  Strict sterile technique was used  Skin was prepped with chloraprep solution and draped in the usual manner  Standard monitors were placed  AP and oblique fluoroscopy were used to identify and jairon the junction of the  superior articular process and sacral ala on the left side as well as the junction of the superior articular process and transverse process at the L3, L4, L5 vertebral level(s)  A 25 gauge 3 5 in needle was inserted and directed to the to the marked location  Once bone was contacted, negative aspiration was confirmed, and 0 5 cc of 0 25% bupivacaine was injected at each level  No paresthesias were elicited  The procedure was repeated at the same levels on the right side  The patient was injected with a total of 4 cc 0 25% bupivacaine     The patient tolerated the procedure well and there were no immediate adverse events  The patient was instructed to call with fever, chills, increased pain, redness or swelling at the injection site, numbness or weakness in the leg      Patient Disposition:  PACU         SIGNATURE: Emily Cooper,   DATE: November 15, 2022  TIME: 7:07 PM

## 2022-11-17 NOTE — TELEPHONE ENCOUNTER
Caller:Patient    Doctor: Edwin Nicole    Reason for call: pt dropped off her pain diary to the office 11/16/22 and pt would like to know it was given to Dr Edwin Nicole  Pt would like a call back today please  Pt states after the injections she had no pain at all       Call back#: [phone]

## 2022-11-17 NOTE — TELEPHONE ENCOUNTER
Caller: Patient    Doctor: Sabina Fortune    Reason for call: Would like a call back regarding her pain diary    Call back#: [phone]

## 2022-11-17 NOTE — TELEPHONE ENCOUNTER
S/w pt who stated she did not have any questions just wanted to make sure her diary was seen  RN confirmed RFA's

## 2022-11-21 DIAGNOSIS — G47.00 INSOMNIA, UNSPECIFIED TYPE: ICD-10-CM

## 2022-11-21 RX ORDER — TRAZODONE HYDROCHLORIDE 50 MG/1
TABLET ORAL
Qty: 30 TABLET | Refills: 1 | Status: SHIPPED | OUTPATIENT
Start: 2022-11-21

## 2022-11-22 ENCOUNTER — OFFICE VISIT (OUTPATIENT)
Dept: FAMILY MEDICINE CLINIC | Facility: CLINIC | Age: 62
End: 2022-11-22

## 2022-11-22 VITALS
OXYGEN SATURATION: 97 % | HEART RATE: 85 BPM | BODY MASS INDEX: 32.74 KG/M2 | TEMPERATURE: 98.1 F | HEIGHT: 64 IN | SYSTOLIC BLOOD PRESSURE: 120 MMHG | RESPIRATION RATE: 16 BRPM | WEIGHT: 191.8 LBS | DIASTOLIC BLOOD PRESSURE: 74 MMHG

## 2022-11-22 DIAGNOSIS — J01.00 ACUTE NON-RECURRENT MAXILLARY SINUSITIS: Primary | ICD-10-CM

## 2022-11-22 RX ORDER — PREDNISONE 50 MG/1
50 TABLET ORAL DAILY
Qty: 5 TABLET | Refills: 0 | Status: SHIPPED | OUTPATIENT
Start: 2022-11-22 | End: 2022-11-27

## 2022-11-22 RX ORDER — AZITHROMYCIN 250 MG/1
TABLET, FILM COATED ORAL
Qty: 6 TABLET | Refills: 0 | Status: SHIPPED | OUTPATIENT
Start: 2022-11-22 | End: 2022-11-26

## 2022-11-22 NOTE — ASSESSMENT & PLAN NOTE
Patient should take prednisone and azithromycin on full stomach with water  Patient should report back to the office if symptoms do not resolve  Patient was instructed on possible side effects

## 2022-11-22 NOTE — PROGRESS NOTES
Name: Matthew Rush      : 1960      MRN: [id number]  Encounter Provider: Jerry Hwang MD  Encounter Date: 2022   Encounter department: [address] [place] [address]  Acute non-recurrent maxillary sinusitis  Assessment & Plan:  Patient should take prednisone and azithromycin on full stomach with water  Patient should report back to the office if symptoms do not resolve  Patient was instructed on possible side effects  Orders:  -     predniSONE 50 mg tablet; Take 1 tablet (50 mg total) by mouth daily for 5 days  -     azithromycin (ZITHROMAX) 250 mg tablet; Take 2 tablets today then 1 tablet daily x 4 days           Subjective     Matthew Rush is a 60 yr old female with PMH of sick sinus syndrome and PSVT with placement of a pacemaker who presents to the office today with complaints of sinus pressure for about a week  She states that she cannot remember when it first started  But she states that she always has a runny nose and that the pressure has intensified in the last five weeks  She states that nothing makes it feel better or worse since she has tried Advil Cold and Sinus and Flonase with no relief  She states that the pressure does not radiate anywhere else and that it is not painful  She further states that it feels like her head is full of air  She denies any illicit drug use, alcohol, or tobacco  She states that she eats a well balanced diet and exercises  She is up to date on her immunizations and states that she took an at home COVID test that negative  She further states that she was sick five weeks ago with something very similar  She denies chest pain, dyspnea at rest, N/V/D, ear pain, sinus pain, eye discharge, coughing, sore throat, or fever/ chills  Review of Systems   Constitutional: Negative for chills and fever  HENT: Positive for congestion, rhinorrhea and sinus pressure   Negative for ear discharge, ear pain, facial swelling, mouth sores, nosebleeds, postnasal drip, sore throat and trouble swallowing  Eyes: Negative for pain and visual disturbance  Respiratory: Positive for shortness of breath  Negative for cough and chest tightness  Cardiovascular: Negative for chest pain and palpitations  Gastrointestinal: Negative for abdominal pain and vomiting  Endocrine: Negative for cold intolerance and heat intolerance  Genitourinary: Negative for dysuria and hematuria  Musculoskeletal: Negative for arthralgias and back pain  Skin: Negative for color change and rash  Allergic/Immunologic: Negative for environmental allergies and food allergies  Neurological: Negative for seizures and syncope  Hematological: Negative for adenopathy  Does not bruise/bleed easily  Psychiatric/Behavioral: Negative for agitation and confusion  All other systems reviewed and are negative  Past Medical History:   Diagnosis Date   • Anemia     iron def   • Arthritis     knees   • Carpal tunnel syndrome of right wrist    • Chest pain    • Colon polyp    • Deep vein thrombosis (DVT) of proximal lower extremity ([facility] [address] ) 2012   • Depression     at times   • D[facility] )    • Dysphagia    • Encounter for surgical aftercare following surgery of digestive system 2020    s/p Raghav-En-Y Gastric Bypass with Dr Kaylen Sarah in 2017     INITIAL weight:241 lb INITIAL weight with start of topirmate: 176 lb Goal weight loss of 5-10 %-159 lb-167 lb    • Esophageal reflux 2011   • Grade 1 out of 6 intensity murmur 2019   • Heart murmur    • History of transfusion 1980   • Pacemaker 2018   • Pneumonia     age 15   • Psychiatric disorder    • Trigger finger, left    • Visit for suture removal 2021     Past Surgical History:   Procedure Laterality Date   • BACK SURGERY     • CARDIAC PACEMAKER PLACEMENT  2018   • CERVICAL DISC SURGERY  2009    PLATES & SCREWS   •  SECTION      X2   • COLONOSCOPY     • EPIDURAL BLOCK INJECTION Right 9/6/2022    Procedure: RIGHT [id number] EPIDURAL STEROID INJECTION (03599); Surgeon: Korin Cheek DO;  Location: [facility];  Service: Pain Management    • GASTRIC BYPASS  02/26/2017    LAP RINYGB SURGERY   • KNEE ARTHROSCOPY     • KNEE SURGERY Bilateral    • KNEE SURGERY Left 08/13/2020   • OTHER SURGICAL HISTORY      ARM SURGERIES   • MI INCISE FINGER TENDON SHEATH Left 9/18/2018    Procedure: LONG TRIGGER FINGER RELEASE;  Surgeon: Julio Jack MD;  Location: [facility];  Service: Orthopedics   • MI INJ DX/THER AGNT PARAVERT FACET [place] GUIDE,LUMBAR/SAC, 1ST LEVEL Bilateral 10/25/2022    Procedure: L3-L4,L4-5,L5-S1 BILATERAL BRANCH BLOCK;  Surgeon: Korin Cheek DO;  Location: [facility];  Service: Pain Management    • MI INJ DX/THER AGNT PARAVERT FACET JOINT,IMG GUIDE,LUMBAR/SAC, 1ST LEVEL Bilateral 11/15/2022    Procedure: BILATERAL L3-S1 MEDICAL BRANCH BLOCK (14041,93705,10424);   Surgeon: Korin Cheek DO;  Location: [facility];  Service: Pain Management    • MI WRIST Chance Rhianna LIG Right 9/18/2018    Procedure: ENDOSCOPIC CARPAL TUNNEL RELEASE;  Surgeon: Julio Jack MD;  Location: [facility];  Service: Orthopedics   • TONSILLECTOMY     • UPPER GASTROINTESTINAL ENDOSCOPY       Family History   Problem Relation Age of Onset   • Stroke Mother    • Alzheimer's disease Mother    • Arthritis Mother    • Coronary artery disease Mother    • Breast cancer Mother 77   • Cancer Mother    • Heart disease Mother    • Hypertension Father    • Gout Father    • Diabetes type II Father    • Coronary artery disease Father    • Heart disease Father    • No Known Problems Sister    • No Known Problems Sister    • No Known Problems Sister    • No Known Problems Daughter    • No Known Problems Maternal Grandmother    • No Known Problems Maternal Grandfather    • No Known Problems Paternal Grandmother    • No Known Problems Paternal Grandfather    • Prostate cancer Brother    • Pancreatic cancer Brother    • No Known Problems Maternal Aunt    • No Known Problems Maternal Aunt    • No Known Problems Maternal Aunt    • No Known Problems Maternal Aunt    • Cancer Maternal Aunt    • No Known Problems Paternal Aunt    • No Known Problems Paternal Aunt      Social History     Socioeconomic History   • Marital status: /Civil Union     Spouse name: None   • Number of children: None   • Years of education: None   • Highest education level: None   Occupational History   • None   Tobacco Use   • Smoking status: Former     Packs/day: 0 25     Types: Cigarettes   • Smokeless tobacco: Never   Vaping Use   • Vaping Use: Never used   Substance and Sexual Activity   • Alcohol use:  Yes     Alcohol/week: 2 0 standard drinks     Types: 1 Glasses of wine, 1 Standard drinks or equivalent per week     Comment: very rare social   • Drug use: No   • Sexual activity: Not Currently   Other Topics Concern   • None   Social History Narrative   • None     Social Determinants of Health     Financial Resource Strain: Not on file   Food Insecurity: Not on file   Transportation Needs: Not on file   Physical Activity: Not on file   Stress: Not on file   Social Connections: Not on file   Intimate Partner Violence: Not on file   Housing Stability: Not on file     Current Outpatient Medications on File Prior to Visit   Medication Sig   • acetaminophen (TYLENOL) 650 mg CR tablet Take 650 mg by mouth every 8 (eight) hours as needed for mild pain Take 2 tablets in the AM and 2 tablets in the late evening   • Ascorbic Acid (VITAMIN C) 100 MG CHEW Chew   • atorvastatin (LIPITOR) 10 mg tablet take 1 tablet by mouth once daily   • Biotin 80207 MCG TABS Take by mouth daily     • Calcium Carbonate (CALCI-CHEW PO) Take 500 mg by mouth 2 (two) times a day   • cholecalciferol (VITAMIN D3) 1,000 units tablet Take 5,000 Units by mouth in the morning   • clobetasol (TEMOVATE) 0 05 % cream Apply to affected areas daily x 12 weeks, then twice weekly for maintenance   • Cyanocobalamin (VITAMIN B 12 PO) Take 1,000 mcg by mouth   • dicyclomine (BENTYL) 10 mg capsule take 1 capsule by mouth four times a day before meals at bedtime   • Eliquis 2 5 MG take 1 tablet by mouth twice a day   • Ferrous Sulfate  (45 Fe) MG TBCR Take 1 tablet by mouth daily   • gabapentin (Neurontin) 100 mg capsule Take 1 capsule (100 mg total) by mouth 3 (three) times a day As needed for shooting pain   • Lysine HCl POWD Take by mouth   • methocarbamol (ROBAXIN) 500 mg tablet Take 1 tablet (500 mg total) by mouth 2 (two) times a day   • metoprolol succinate (TOPROL-XL) 25 mg 24 hr tablet take 1 tablet by mouth once daily   • Multiple Vitamin (MULTIVITAMIN) capsule Take 1 capsule by mouth daily 2 chewies daily   • pantoprazole (PROTONIX) 20 mg tablet take 1 tablet by mouth once daily   • tiZANidine (ZANAFLEX) 2 mg tablet Take 1 tablet (2 mg total) by mouth every 8 (eight) hours as needed for muscle spasms   • traZODone (DESYREL) 50 mg tablet take 1 tablet by mouth at bedtime if needed for sleep   • Diclofenac Sodium (VOLTAREN) 1 % Apply 2 g topically 4 (four) times a day (Patient not taking: No sig reported)   • fluticasone (FLONASE) 50 mcg/act nasal spray instill 1 spray into each nostril once daily (Patient not taking: Reported on 10/6/2022)   • naloxone (NARCAN) 4 mg/0 1 mL nasal spray Administer 1 spray into a nostril  If no response after 2-3 minutes, give another dose in the other nostril using a new spray   (Patient not taking: No sig reported)   • nystatin (MYCOSTATIN) powder Apply topically 2 (two) times a day (Patient not taking: Reported on 10/6/2022)   • oxyCODONE (Roxicodone) 5 immediate release tablet Take 1 tablet (5 mg total) by mouth every 4 (four) hours as needed for moderate pain for up to 18 doses Max Daily Amount: 30 mg (Patient not taking: No sig reported)   • valACYclovir (VALTREX) 1,000 mg tablet Take 2 tablets (2,000 mg total) by mouth 2 (two) times a day for 1 day (Patient not taking: Reported on 10/5/2022)     No Known Allergies  Immunization History   Administered Date(s) Administered   • COVID-19 PFIZER VACCINE 0 3 ML IM 04/03/2021, 04/28/2021, 11/01/2021   • COVID-19 Pfizer Vac BIVALENT Sudhakar-sucrose 12 Yr+ IM (BOOSTER ONLY) 10/15/2022   • Hep A, adult 08/08/2017   • Hep A, ped/adol, 2 dose 01/04/2017, 08/08/2017   • Hepatitis A 01/04/2017   • INFLUENZA 10/05/2011, 11/22/2016, 11/22/2016, 10/23/2017, 10/23/2017, 10/23/2017, 08/18/2021, 08/18/2021   • Influenza Quadrivalent Preservative Free 3 years and older IM 10/15/2022   • Influenza, injectable, quadrivalent, preservative free 0 5 mL 10/30/2020   • Influenza, recombinant, quadrivalent,injectable, preservative free 10/31/2018, 10/15/2019   • Influenza, seasonal, injectable 12/22/2014, 10/13/2015   • Pneumococcal Polysaccharide PPV23 02/25/2013, 01/17/2016   • Tdap 04/21/2011, 04/10/2017, 06/18/2021   • Tuberculin Skin Test-PPD Intradermal 07/28/2021   • Zoster 01/17/2016       Objective     /74 (BP Location: Left arm, Patient Position: Sitting, Cuff Size: Large)   Pulse 85   Temp 98 1 °F (36 7 °C) (Axillary)   Resp 16   Ht 5' 4" (1 626 m)   Wt 87 kg (191 lb 12 8 oz)   LMP 09/18/2014   SpO2 97%   BMI 32 92 kg/m²     Physical Exam  Vitals and nursing note reviewed  Constitutional:       General: She is not in acute distress  Appearance: Normal appearance  She is not ill-appearing or toxic-appearing  HENT:      Head: Normocephalic and atraumatic  Right Ear: Tympanic membrane, ear canal and external ear normal       Left Ear: Tympanic membrane, ear canal and external ear normal       Ears:      Comments: Some fluid behind both TM's     Nose: Rhinorrhea present  No congestion  Mouth/Throat:      Mouth: Mucous membranes are moist       Pharynx: Oropharynx is clear  No oropharyngeal exudate or posterior oropharyngeal erythema     Eyes: Conjunctiva/sclera: Conjunctivae normal       Pupils: Pupils are equal, round, and reactive to light  Cardiovascular:      Rate and Rhythm: Normal rate and regular rhythm  Pulses: Normal pulses  Heart sounds: Normal heart sounds  No murmur heard  No friction rub  No gallop  Pulmonary:      Effort: Pulmonary effort is normal  No respiratory distress  Breath sounds: Normal breath sounds  No stridor  No wheezing, rhonchi or rales  Chest:      Chest wall: No tenderness  Abdominal:      General: Abdomen is flat  Bowel sounds are normal  There is no distension  Palpations: Abdomen is soft  Tenderness: There is no abdominal tenderness  There is no guarding  Musculoskeletal:         General: No swelling, tenderness or deformity  Normal range of motion  Cervical back: Normal range of motion and neck supple  Skin:     General: Skin is warm and dry  Coloration: Skin is not jaundiced or pale  Neurological:      General: No focal deficit present  Mental Status: She is alert and oriented to person, place, and time  Mental status is at baseline  Cranial Nerves: No cranial nerve deficit  Sensory: No sensory deficit  Motor: No weakness  Psychiatric:         Mood and Affect: Mood normal          Behavior: Behavior normal          Thought Content:  Thought content normal          Judgment: Judgment normal        Ariane Del Angel MD

## 2022-11-23 ENCOUNTER — TELEPHONE (OUTPATIENT)
Dept: OBGYN CLINIC | Facility: CLINIC | Age: 62
End: 2022-11-23

## 2022-12-02 ENCOUNTER — TELEPHONE (OUTPATIENT)
Dept: PAIN MEDICINE | Facility: MEDICAL CENTER | Age: 62
End: 2022-12-02

## 2022-12-02 ENCOUNTER — HOSPITAL ENCOUNTER (OUTPATIENT)
Dept: RADIOLOGY | Facility: HOSPITAL | Age: 62
Discharge: HOME | End: 2022-12-02
Attending: STUDENT IN AN ORGANIZED HEALTH CARE EDUCATION/TRAINING PROGRAM

## 2022-12-02 DIAGNOSIS — M47.816 LUMBAR FACET ARTHROPATHY: ICD-10-CM

## 2022-12-02 NOTE — TELEPHONE ENCOUNTER
Spoke with pt, auth was approved for RFA.     Pt asking if you heard from Dr. Gómez about her pacemaker.

## 2022-12-02 NOTE — TELEPHONE ENCOUNTER
Caller: Loretta Avalos    Doctor: Dr Kamara    Reason for call:Patient would like to know if Auth is approved for procedure.    Call back#: [phone]

## 2022-12-03 ENCOUNTER — HOSPITAL ENCOUNTER (OUTPATIENT)
Dept: RADIOLOGY | Facility: HOSPITAL | Age: 62
Discharge: HOME | End: 2022-12-03
Attending: STUDENT IN AN ORGANIZED HEALTH CARE EDUCATION/TRAINING PROGRAM

## 2022-12-03 DIAGNOSIS — M47.816 LUMBAR SPONDYLOSIS: ICD-10-CM

## 2022-12-06 ENCOUNTER — TELEPHONE (OUTPATIENT)
Dept: PAIN MEDICINE | Facility: CLINIC | Age: 62
End: 2022-12-06

## 2022-12-06 ENCOUNTER — HOSPITAL ENCOUNTER (OUTPATIENT)
Facility: HOSPITAL | Age: 62
Setting detail: OUTPATIENT SURGERY
Discharge: HOME | End: 2022-12-06
Attending: STUDENT IN AN ORGANIZED HEALTH CARE EDUCATION/TRAINING PROGRAM | Admitting: STUDENT IN AN ORGANIZED HEALTH CARE EDUCATION/TRAINING PROGRAM

## 2022-12-06 ENCOUNTER — HOSPITAL ENCOUNTER (OUTPATIENT)
Dept: RADIOLOGY | Facility: HOSPITAL | Age: 62
Setting detail: OUTPATIENT SURGERY
Discharge: HOME | End: 2022-12-06
Attending: STUDENT IN AN ORGANIZED HEALTH CARE EDUCATION/TRAINING PROGRAM

## 2022-12-06 VITALS
SYSTOLIC BLOOD PRESSURE: 114 MMHG | TEMPERATURE: 98 F | DIASTOLIC BLOOD PRESSURE: 65 MMHG | RESPIRATION RATE: 20 BRPM | HEART RATE: 73 BPM | OXYGEN SATURATION: 98 %

## 2022-12-06 DIAGNOSIS — M47.816 LUMBAR SPONDYLOSIS: ICD-10-CM

## 2022-12-06 RX ORDER — METHYLPREDNISOLONE ACETATE 40 MG/ML
INJECTION, SUSPENSION INTRA-ARTICULAR; INTRALESIONAL; INTRAMUSCULAR; SOFT TISSUE AS NEEDED
Status: DISCONTINUED | OUTPATIENT
Start: 2022-12-06 | End: 2022-12-06 | Stop reason: HOSPADM

## 2022-12-06 RX ORDER — BUPIVACAINE HYDROCHLORIDE 2.5 MG/ML
INJECTION, SOLUTION EPIDURAL; INFILTRATION; INTRACAUDAL AS NEEDED
Status: DISCONTINUED | OUTPATIENT
Start: 2022-12-06 | End: 2022-12-06 | Stop reason: HOSPADM

## 2022-12-06 RX ORDER — LIDOCAINE HYDROCHLORIDE 20 MG/ML
INJECTION, SOLUTION EPIDURAL; INFILTRATION; INTRACAUDAL; PERINEURAL AS NEEDED
Status: DISCONTINUED | OUTPATIENT
Start: 2022-12-06 | End: 2022-12-06 | Stop reason: HOSPADM

## 2022-12-06 NOTE — TELEPHONE ENCOUNTER
Pt s/p RT L3-S1 RFA on 12/6/22 w/ HS/    Pt schd for Lt L3-S1 RFA on 12/20/22 HS    Pls c/b pt on 12/7/22

## 2022-12-06 NOTE — DISCHARGE INSTRUCTIONS
Medial Branch Radiofrequency Ablation     WHAT YOU NEED TO KNOW:   Medial branch radiofrequency ablation (RFA) is a procedure used to treat facet joint pain in your neck, mid back, or lower back  Facet joints are found at the back of each vertebra  A needle electrode is used to send electrical currents to the nerves in your facet joint  The electrical currents create heat that damages the nerve so it cannot send pain signals  ACTIVITY  Do not drive or operate machinery today  No strenuous activity today - bending, lifting, etc   You may shower today, but do not sit in a tub of water  Resume normal activities tomorrow as tolerated  CARE OF THE INJECTION SITE  If you have soreness or pain, apply ice to the area today (20 minutes on/20 minutes off)  Starting tomorrow, you may use warm, moist heat or ice if needed  Notify the [facility] if you have any of the following: redness, drainage, swelling, or fever above 100°F     SPECIAL INSTRUCTIONS  Our office will call you tomorrow for a progress report and make an appointment for a follow up visit in 4 weeks  If you feel a sunburn-like sensation in the area of your procedure, call our office  MEDICATIONS  Continue to take all routine medications  Our office may have instructed you to hold some medications  As no general anesthesia was used in today's procedure, you should not experience any side effects related to anesthesia  If you have a problem specifically related to your procedure, please call our office at [phone]  Problems not related to your procedure should be directed to your primary care physician

## 2022-12-07 NOTE — TELEPHONE ENCOUNTER
Caller: Homer Coleman    Doctor/Office: [email or web address]    Caller asked to speak to: nurse     Call was transferred to: nurse

## 2022-12-07 NOTE — TELEPHONE ENCOUNTER
FYI  Post RFA  S/W pt who states feels ok  Denies s/s of infection, fever or sun burn  States feels like it has helped thus far  Aware of next procedure date for left side

## 2022-12-07 NOTE — OP NOTE
OPERATIVE REPORT  PATIENT NAME: Humberto Diaz    :  1960  MRN: [id number]  Pt Location: [facility] OR ROOM 01    SURGERY DATE: 2022    Surgeon(s) and Role:     * Bairon Barajas DO - Primary    Preop Diagnosis:  Lumbar spondylosis [M47 816]    Post-Op Diagnosis Codes:     * Lumbar spondylosis [M47 816]    Procedure(s) (LRB):  RIGHT L3-S1 RADIO FREQUENCY ABLATION (53348,37736) (Right)    Specimen(s):  * No specimens in log *    Estimated Blood Loss:   Minimal    Drains:  * No LDAs found *    Anesthesia Type:   Local    Operative Indications:  Lumbar spondylosis [M47 816]      Operative Findings:      Complications:   None    Procedure and Technique:  DATE: 2022  PROCEDURE: RIGHT L2,L3,L4,L5 Lumbar Medial Branch Radiofrequency Ablation Under Fluoroscopic Guidance  Physician: Arpan Jacob DO  PRE-OPERATIVE DIAGNOSIS: Lumbosacral facet arthropathy, axial back pain  POST -OPERATIVE DIAGNOSIS: Same  ANESTHESIA: Local  COMPLICATIONS: None     Indications  Humberto Diaz is a 60 y o  female with a history of Lumbosacral facet arthropathy, axial back pain who has failed conservative therapy and presents today for the above mentioned procedures  Pt has had two successful LMBB with more than 80% pain relief  Plan for RIGHT L2,L3,L4,L5 Lumbar medial branch RFA  Informed Consent  The risks, benefits and alternatives of the procedure were discussed with the patient  The patient was given opportunity to ask questions regarding the procedure, its indications and the associated risks  The risks of the procedure discussed include but are not limited to infection, bleeding, allergic reactions, nerve injuries, and side effects  The patient showed understanding and wished to proceed  Procedure  Of Note, Pt has pacemaker for SSS but as per discussion with cardiology team, pt is nondependent and no defib capabilities  Plan to have magnet in the procedure  room and monitoring       The patient was placed in the prone position on the fluoroscopic table  Routine monitors were applied  The back was prepped and draped in the usual sterile fashion and sterile technique was adhered to throughout the entire procedure  A time out was performed per hospital policy  The RIGHT L3,L4,L5 vertebral levels were identified under fluoroscopic guidance  The vertebral body end plates were aligned and the junction of the superior articular process and the base of the transverse process and the junction of the sacral superior articular process and sacral ala were marked while the fluoroscope was oblique to the ipsilateral side  The skin and subcutaneous tissues were infiltrated with 2% Lidocaine  A 20 gauge 10 cm 10 mm active tip insulated needle was used at each level  Gun barrel view was used under fluoroscopy to direct needle placement at the junction of the corresponding superior articular process and transverse process  A bony endpoint was achieved and the needle was walked off superiorly  There was no cerebrospinal fluid or blood on aspiration  Once the needles were in good position,motor stimulation at 2 Hz up to 3V with appropriate response  No response was radiating to the leg  Afterwards, 2 cc of 2% lidocaine was injected through each needle after negative aspiration of blood or CSF  Radiofrequency lesioning was performed at each level to a temperature of 80 degrees Centigrade for 90 seconds after waiting for 2min  After repositioning the needle 180 degrees, another lesion at 80 degrees for 90 seconds was performed  After Next, 0 5  ml of treatment solution mixed with 2 cc 0 25% bupivacaine and 40 Depomedrol was injected intermittently after negative heme aspirations  The needles were then completely withdrawn and A Band-Aid dressing was applied  The patient tolerated the procedure well  Vital signs remained stable throughout  Post-operative exam did not reveal any new neurological deficits   Patient was sent to the recovery room in a stable condition  The patient was instructed to call with fever, chills, increased pain, redness or swelling at the injection site, or numbness or weakness in the leg      Patient Disposition:  PACU         SIGNATURE: Kim Cardoza DO  DATE: December 6, 2022  TIME: 9:37 PM

## 2022-12-07 NOTE — H&P
History of Present Illness: The patient is a 60 y o  female who presents with complaints of bilateral axial low back pain s/p two successful LMBB with more than 80% relief presenting for RIGHT L3-S1 RFA  Past Medical History:   Diagnosis Date   • Anemia     iron def   • Arthritis     knees   • Carpal tunnel syndrome of right wrist    • Chest pain    • Colon polyp    • Deep vein thrombosis (DVT) of proximal lower extremity ([facility] [address] ) 2012   • Depression     at times   • DVT (deep venous thrombosis) ([facility] [address] )    • Dysphagia    • Encounter for surgical aftercare following surgery of digestive system 2020    s/p Raghav-En-Y Gastric Bypass with Dr Hari Rodriguez in 2017  INITIAL weight:241 lb INITIAL weight with start of topirmate: 176 lb Goal weight loss of 5-10 %-159 lb-167 lb    • Esophageal reflux 2011   • Grade 1 out of 6 intensity murmur 2019   • Heart murmur    • History of transfusion 1980   • Pacemaker 2018   • Pneumonia     age 15   • Psychiatric disorder    • Trigger finger, left    • Visit for suture removal 2021       Past Surgical History:   Procedure Laterality Date   • BACK SURGERY     • CARDIAC PACEMAKER PLACEMENT  2018   • CERVICAL DISC SURGERY  2009    PLATES & SCREWS   •  SECTION      X2   • COLONOSCOPY     • EPIDURAL BLOCK INJECTION Right 2022    Procedure: RIGHT L3-[id number] EPIDURAL STEROID INJECTION (48517);   Surgeon: Yumiko Griffith DO;  Location:  MAIN OR;  Service: Pain Management    • GASTRIC BYPASS  2017    LAP RINYGB SURGERY   • KNEE ARTHROSCOPY     • KNEE SURGERY Bilateral    • KNEE SURGERY Left 2020   • OTHER SURGICAL HISTORY      ARM SURGERIES   • MA INCISE FINGER TENDON SHEATH Left 2018    Procedure: LONG TRIGGER FINGER RELEASE;  Surgeon: Francisco Moncada MD;  Location: [facility];  Service: Orthopedics   • MA INJ DX/THER AGNT PARAVERT FACET JOINT,IMG GUIDE,LUMBAR/SAC, 1ST LEVEL Bilateral 10/25/2022 Procedure: L3-L4,L4-5,L5-S1 BILATERAL BRANCH BLOCK;  Surgeon: Kim Cardoza DO;  Location: [facility];  Service: Pain Management    • ND INJ DX/THER AGNT PARAVERT FACET JOINT,IMG GUIDE,LUMBAR/SAC, 1ST LEVEL Bilateral 11/15/2022    Procedure: BILATERAL L3-S1 MEDICAL BRANCH BLOCK ([id number]); Surgeon: Kim Cardoza DO;  Location: [facility];  Service: Pain Management    • ND WRIST Charmel Blake LIG Right 9/18/2018    Procedure: ENDOSCOPIC CARPAL TUNNEL RELEASE;  Surgeon: Petra Zimmerman MD;  Location: [facility];  Service: Orthopedics   • TONSILLECTOMY     • UPPER GASTROINTESTINAL ENDOSCOPY         No current facility-administered medications for this encounter      Current Outpatient Medications:   •  acetaminophen (TYLENOL) 650 mg CR tablet, Take 650 mg by mouth every 8 (eight) hours as needed for mild pain Take 2 tablets in the AM and 2 tablets in the late evening, Disp: , Rfl:   •  Ascorbic Acid (VITAMIN C) 100 MG CHEW, Chew, Disp: , Rfl:   •  atorvastatin (LIPITOR) 10 mg tablet, take 1 tablet by mouth once daily, Disp: 90 tablet, Rfl: 0  •  Biotin 72036 MCG TABS, Take by mouth daily  , Disp: , Rfl:   •  Calcium Carbonate (CALCI-CHEW PO), Take 500 mg by mouth 2 (two) times a day, Disp: , Rfl:   •  cholecalciferol (VITAMIN D3) 1,000 units tablet, Take 5,000 Units by mouth in the morning, Disp: , Rfl:   •  clobetasol (TEMOVATE) 0 05 % cream, Apply to affected areas daily x 12 weeks, then twice weekly for maintenance, Disp: , Rfl:   •  Cyanocobalamin (VITAMIN B 12 PO), Take 1,000 mcg by mouth, Disp: , Rfl:   •  Diclofenac Sodium (VOLTAREN) 1 %, Apply 2 g topically 4 (four) times a day (Patient not taking: No sig reported), Disp: 100 g, Rfl: 6  •  dicyclomine (BENTYL) 10 mg capsule, take 1 capsule by mouth four times a day before meals at bedtime, Disp: 90 capsule, Rfl: 0  •  Eliquis 2 5 MG, take 1 tablet by mouth twice a day, Disp: 60 tablet, Rfl: 3  •  Ferrous Sulfate  (45 Fe) MG TBCR, Take 1 tablet by mouth daily, Disp: , Rfl:   •  fluticasone (FLONASE) 50 mcg/act nasal spray, instill 1 spray into each nostril once daily (Patient not taking: Reported on 10/6/2022), Disp: 32 g, Rfl: 1  •  gabapentin (Neurontin) 100 mg capsule, Take 1 capsule (100 mg total) by mouth 3 (three) times a day As needed for shooting pain, Disp: 90 capsule, Rfl: 4  •  Lysine HCl POWD, Take by mouth, Disp: , Rfl:   •  methocarbamol (ROBAXIN) 500 mg tablet, Take 1 tablet (500 mg total) by mouth 2 (two) times a day, Disp: 20 tablet, Rfl: 0  •  metoprolol succinate (TOPROL-XL) 25 mg 24 hr tablet, take 1 tablet by mouth once daily, Disp: 90 tablet, Rfl: 0  •  Multiple Vitamin (MULTIVITAMIN) capsule, Take 1 capsule by mouth daily 2 chewies daily, Disp: , Rfl:   •  naloxone (NARCAN) 4 mg/0 1 mL nasal spray, Administer 1 spray into a nostril  If no response after 2-3 minutes, give another dose in the other nostril using a new spray   (Patient not taking: No sig reported), Disp: 1 each, Rfl: 1  •  nystatin (MYCOSTATIN) powder, Apply topically 2 (two) times a day (Patient not taking: Reported on 10/6/2022), Disp: 30 g, Rfl: 0  •  oxyCODONE (Roxicodone) 5 immediate release tablet, Take 1 tablet (5 mg total) by mouth every 4 (four) hours as needed for moderate pain for up to 18 doses Max Daily Amount: 30 mg (Patient not taking: No sig reported), Disp: 18 tablet, Rfl: 0  •  pantoprazole (PROTONIX) 20 mg tablet, take 1 tablet by mouth once daily, Disp: 30 tablet, Rfl: 3  •  tiZANidine (ZANAFLEX) 2 mg tablet, Take 1 tablet (2 mg total) by mouth every 8 (eight) hours as needed for muscle spasms, Disp: 90 tablet, Rfl: 4  •  traZODone (DESYREL) 50 mg tablet, take 1 tablet by mouth at bedtime if needed for sleep, Disp: 30 tablet, Rfl: 1  •  valACYclovir (VALTREX) 1,000 mg tablet, Take 2 tablets (2,000 mg total) by mouth 2 (two) times a day for 1 day (Patient not taking: Reported on 10/5/2022), Disp: 30 tablet, Rfl: 0    No Known Allergies    Physical Exam:   Vitals:    12/06/22 0953   BP: 114/65   Pulse: 73   Resp:    Temp:    SpO2: 98%     General: Awake, Alert, Oriented x 3, Mood and affect appropriate  Respiratory: Respirations even and unlabored  Cardiovascular: Peripheral pulses intact; no edema  Musculoskeletal Exam: Tenderness to palpation bilateral axial low back pain  No changes in motor or sensory deficits than previous  ASA Score: 3    Patient/Chart Verification  Patient ID Verified: Verbal, Armband  ID Band Applied: Yes  Consents Confirmed: Procedural  H&P( within 30 days) Verified: Yes  Interval H&P(within 24 hr) Complete (required for Outpatients and Surgery Admit only): Yes  Beta Blocker given : N/A    Assessment: No diagnosis found      Plan:   RIGHT L3-L4,L4-L5,L5-S1 RFA

## 2022-12-08 DIAGNOSIS — R10.9 STOMACH CRAMPS: ICD-10-CM

## 2022-12-08 DIAGNOSIS — M54.41 ACUTE RIGHT-SIDED LOW BACK PAIN WITH RIGHT-SIDED SCIATICA: ICD-10-CM

## 2022-12-08 RX ORDER — DICYCLOMINE HYDROCHLORIDE 10 MG/1
CAPSULE ORAL
Qty: 90 CAPSULE | Refills: 0 | Status: SHIPPED | OUTPATIENT
Start: 2022-12-08

## 2022-12-14 DIAGNOSIS — E78.5 HYPERLIPIDEMIA, UNSPECIFIED HYPERLIPIDEMIA TYPE: ICD-10-CM

## 2022-12-14 RX ORDER — ATORVASTATIN CALCIUM 10 MG/1
TABLET, FILM COATED ORAL
Qty: 90 TABLET | Refills: 0 | Status: SHIPPED | OUTPATIENT
Start: 2022-12-14

## 2022-12-20 ENCOUNTER — TELEPHONE (OUTPATIENT)
Dept: PAIN MEDICINE | Facility: CLINIC | Age: 62
End: 2022-12-20

## 2022-12-20 ENCOUNTER — HOSPITAL ENCOUNTER (OUTPATIENT)
Facility: HOSPITAL | Age: 62
Setting detail: OUTPATIENT SURGERY
Discharge: HOME | End: 2022-12-20
Attending: STUDENT IN AN ORGANIZED HEALTH CARE EDUCATION/TRAINING PROGRAM | Admitting: STUDENT IN AN ORGANIZED HEALTH CARE EDUCATION/TRAINING PROGRAM

## 2022-12-20 ENCOUNTER — REMOTE DEVICE CLINIC VISIT (OUTPATIENT)
Dept: CARDIOLOGY CLINIC | Facility: CLINIC | Age: 62
End: 2022-12-20

## 2022-12-20 ENCOUNTER — HOSPITAL ENCOUNTER (OUTPATIENT)
Dept: RADIOLOGY | Facility: HOSPITAL | Age: 62
Setting detail: OUTPATIENT SURGERY
Discharge: HOME | End: 2022-12-20
Attending: STUDENT IN AN ORGANIZED HEALTH CARE EDUCATION/TRAINING PROGRAM

## 2022-12-20 VITALS
RESPIRATION RATE: 18 BRPM | SYSTOLIC BLOOD PRESSURE: 117 MMHG | TEMPERATURE: 98.5 F | DIASTOLIC BLOOD PRESSURE: 57 MMHG | HEART RATE: 77 BPM | OXYGEN SATURATION: 97 %

## 2022-12-20 DIAGNOSIS — Z95.0 PRESENCE OF PERMANENT CARDIAC PACEMAKER: Primary | ICD-10-CM

## 2022-12-20 DIAGNOSIS — M47.816 LUMBAR SPONDYLOSIS: ICD-10-CM

## 2022-12-20 RX ORDER — METHYLPREDNISOLONE ACETATE 40 MG/ML
INJECTION, SUSPENSION INTRA-ARTICULAR; INTRALESIONAL; INTRAMUSCULAR; SOFT TISSUE AS NEEDED
Status: DISCONTINUED | OUTPATIENT
Start: 2022-12-20 | End: 2022-12-20 | Stop reason: HOSPADM

## 2022-12-20 RX ORDER — BUPIVACAINE HYDROCHLORIDE 2.5 MG/ML
INJECTION, SOLUTION EPIDURAL; INFILTRATION; INTRACAUDAL AS NEEDED
Status: DISCONTINUED | OUTPATIENT
Start: 2022-12-20 | End: 2022-12-20 | Stop reason: HOSPADM

## 2022-12-20 RX ORDER — LIDOCAINE HYDROCHLORIDE 10 MG/ML
INJECTION, SOLUTION EPIDURAL; INFILTRATION; INTRACAUDAL; PERINEURAL AS NEEDED
Status: DISCONTINUED | OUTPATIENT
Start: 2022-12-20 | End: 2022-12-20 | Stop reason: HOSPADM

## 2022-12-20 RX ORDER — LIDOCAINE HYDROCHLORIDE 20 MG/ML
INJECTION, SOLUTION EPIDURAL; INFILTRATION; INTRACAUDAL; PERINEURAL AS NEEDED
Status: DISCONTINUED | OUTPATIENT
Start: 2022-12-20 | End: 2022-12-20 | Stop reason: HOSPADM

## 2022-12-20 NOTE — DISCHARGE INSTRUCTIONS
Medial Branch Radiofrequency Ablation     WHAT YOU NEED TO KNOW:   Medial branch radiofrequency ablation (RFA) is a procedure used to treat facet joint pain in your neck, mid back, or lower back  Facet joints are found at the back of each vertebra  A needle electrode is used to send electrical currents to the nerves in your facet joint  The electrical currents create heat that damages the nerve so it cannot send pain signals  ACTIVITY  Do not drive or operate machinery today  No strenuous activity today - bending, lifting, etc   You may shower today, but do not sit in a tub of water  Resume normal activities tomorrow as tolerated  CARE OF THE INJECTION SITE  If you have soreness or pain, apply ice to the area today (20 minutes on/20 minutes off)  Starting tomorrow, you may use warm, moist heat or ice if needed  Notify the [facility] if you have any of the following: redness, drainage, swelling, or fever above 100°F     SPECIAL INSTRUCTIONS  Our office will call you tomorrow for a progress report and make an appointment for a follow up visit in 4 weeks  If you feel a sunburn-like sensation in the area of your procedure, call our office  MEDICATIONS  Continue to take all routine medications  Our office may have instructed you to hold some medications  As no general anesthesia was used in today's procedure, you should not experience any side effects related to anesthesia  If you have a problem specifically related to your procedure, please call our office at [phone]  Problems not related to your procedure should be directed to your primary care physician

## 2022-12-20 NOTE — PROGRESS NOTES
Results for orders placed or performed in visit on 12/20/22   Cardiac EP device report    Narrative    MDT-DUAL PPM (AAIR-DDDR MODE)/ ACTIVE SYSTEM IS MRI CONDITIONAL  CARELINK TRANSMISSION: BATTERY VOLTAGE ADEQUATE  (10 5 YRS) AP 53%  <1%  ALL AVAILABLE LEAD PARAMETERS WITHIN NORMAL LIMITS  2 VHR EPISODES DETECTED; #53, 6 BEATS NSVT @ 320ms  1 FAST A & V EPISODE DETECTED  PATIENT IS ON ELIQUIS AND METOPROLOL SUCC; EF 67% (2019, STRESS)  NORMAL DEVICE FUNCTION  ---GOLDBERG

## 2022-12-21 NOTE — OP NOTE
OPERATIVE REPORT  PATIENT NAME: Alma Cole    :  1960  MRN: [id number]  Pt Location: [facility] OR ROOM 01    SURGERY DATE: 2022  v  Surgeon(s) and Role:     * Bairon Rae DO - Primary    Preop Diagnosis:  Lumbar spondylosis [M47 816]    Post-Op Diagnosis Codes:     * Lumbar spondylosis [M47 816]    Procedure(s) (LRB):  LEFT L3-S1 RADIO FREQUENCY ABLATION (34707,40241) (Left)    Specimen(s):  * No specimens in log *    Estimated Blood Loss:   Minimal    Drains:  * No LDAs found *    Anesthesia Type:   Local    Operative Indications:  Lumbar spondylosis [M47 816]      Operative Findings:      Complications:   None    Procedure and Technique:  DATE: 2022  PROCEDURE: LEFT L2,L3,L4,L5 Lumbar Medial Branch Radiofrequency Ablation Under Fluoroscopic Guidance  Physician: Galina Bonilla DO  PRE-OPERATIVE DIAGNOSIS: Lumbosacral facet arthropathy, axial back pain  POST -OPERATIVE DIAGNOSIS: Same  ANESTHESIA: Local  COMPLICATIONS: None      Indications  Alma Cole is a 60 y o  female with a history of Lumbosacral facet arthropathy, axial back pain who has failed conservative therapy and presents today for the above mentioned procedures  Pt has had two successful LMBB with more than 80% pain relief  Plan for LEFT L2,L3,L4,L5 Lumbar medial branch RFA     Informed Consent  The risks, benefits and alternatives of the procedure were discussed with the patient  The patient was given opportunity to ask questions regarding the procedure, its indications and the associated risks  The risks of the procedure discussed include but are not limited to infection, bleeding, allergic reactions, nerve injuries, and side effects  The patient showed understanding and wished to proceed      Procedure  Of Note, Pt has pacemaker for SSS but as per discussion with cardiology team, pt is nondependent and no defib capabilities  Plan to have magnet in the procedure  room and monitoring     Discussed the risks vs benefits of continuing anticoagulation vs risk of increased bleeding and hematoma      The patient was placed in the prone position on the fluoroscopic table  Routine monitors were applied  The back was prepped and draped in the usual sterile fashion and sterile technique was adhered to throughout the entire procedure  A time out was performed per hospital policy          The LEFT L3,L4,L5 vertebral levels were identified under fluoroscopic guidance  The vertebral body end plates were aligned and the junction of the superior articular process and the base of the transverse process and the junction of the sacral superior articular process and sacral ala were marked while the fluoroscope was oblique to the ipsilateral side  The skin and subcutaneous tissues were infiltrated with 2% Lidocaine       A 20 gauge 10 cm 10 mm active tip insulated needle was used for L4 and L5 medial branches and 15 cm needle was used for L2 and L3 medial branch  Gun barrel view was used under fluoroscopy to direct needle placement at the junction of the corresponding superior articular process and transverse process  A bony endpoint was achieved and the needle was walked off superiorly  There was no cerebrospinal fluid or blood on aspiration        Once the needles were in good position,motor stimulation at 2 Hz up to 3V with appropriate response  No response was radiating to the leg  Afterwards, 2 cc of 2% lidocaine was injected through each needle after negative aspiration of blood or CSF  Radiofrequency lesioning was performed at each level to a temperature of 80 degrees Centigrade for 90 seconds after waiting for 2min  After repositioning the needle 180 degrees, another lesion at 80 degrees for 90 seconds was performed  After Next, 1  ml of treatment solution mixed with 3 cc 0 25% bupivacaine and 40 Depomedrol was injected intermittently after negative heme aspirations   The needles were then completely withdrawn and A Band-Aid dressing was applied      The patient tolerated the procedure well  Vital signs remained stable throughout  Post-operative exam did not reveal any new neurological deficits   Patient was sent to the recovery room in a stable condition       The patient was instructed to call with fever, chills, increased pain, redness or swelling at the injection site, or numbness or weakness in the leg      Patient Disposition:  PACU       SIGNATURE: Shantal Stauffer DO  DATE: December 20, 2022  TIME: 8:56 PM

## 2022-12-21 NOTE — TELEPHONE ENCOUNTER
S/W pt  Pt stated needle sites look good, denies S&S of infection, denies fevers, denies soreness and denies sun burn like sensation  She stated she had a bad headache yesterday  Advised pt if he does get pain to take his prescribed or OTC pain medications and/or use ice/heat and that it takes 4 to 6 weeks to see the full effect  Confirmed next appt w/ pt  Pt verbalized understanding

## 2022-12-21 NOTE — H&P
History of Present Illness: The patient is a 60 y o  female who presents with left axial low back pain s/p two successful LMBB with more than 80% relief presenting for LEFT L3-S1 RFA  Past Medical History:   Diagnosis Date   • Anemia     iron def   • Arthritis     knees   • Carpal tunnel syndrome of right wrist    • Chest pain    • Colon polyp    • Deep vein thrombosis (DVT) of proximal lower extremity ([facility] [address] ) 2012   • Depression     at times   • DVT (deep venous thrombosis) ([facility] [address] )    • Dysphagia    • Encounter for surgical aftercare following surgery of digestive system 2020    s/p Raghav-En-Y Gastric Bypass with Dr Sonali Gee in   INITIAL weight:241 lb INITIAL weight with start of topirmate: 176 lb Goal weight loss of 5-10 %-159 lb-167 lb    • Esophageal reflux 2011   • Grade 1 out of 6 intensity murmur 2019   • Heart murmur    • History of transfusion 1980   • Pacemaker 2018   • Pneumonia     age 15   • Psychiatric disorder    • Trigger finger, left    • Visit for suture removal 2021       Past Surgical History:   Procedure Laterality Date   • BACK SURGERY     • CARDIAC PACEMAKER PLACEMENT  2018   • CERVICAL DISC SURGERY  2009    PLATES & SCREWS   •  SECTION      X2   • COLONOSCOPY     • EPIDURAL BLOCK INJECTION Right 2022    Procedure: RIGHT L3-[id number] EPIDURAL STEROID INJECTION (73539);   Surgeon: Berkley Vitale DO;  Location:  MAIN OR;  Service: Pain Management    • GASTRIC BYPASS  2017    LAP RINYGB SURGERY   • KNEE ARTHROSCOPY     • KNEE SURGERY Bilateral    • KNEE SURGERY Left 2020   • OTHER SURGICAL HISTORY      ARM SURGERIES   • NM INCISE FINGER TENDON SHEATH Left 2018    Procedure: LONG TRIGGER FINGER RELEASE;  Surgeon: Francine Marley MD;  Location: [facility];  Service: Orthopedics   • NM INJ DX/THER AGNT PARAVERT FACET [address] [place], 1ST LEVEL Bilateral 10/25/2022    Procedure: B7-R5,K6-0,K7-M6 BILATERAL BRANCH BLOCK;  Surgeon: Misti Hughes DO;  Location: [facility];  Service: Pain Management    • MS INJ DX/THER AGNT PARAVERT FACET JOINT,IMG GUIDE,LUMBAR/SAC, 1ST LEVEL Bilateral 11/15/2022    Procedure: BILATERAL L3-S1 MEDICAL BRANCH BLOCK ([id number]); Surgeon: Misti Hughes DO;  [facility]: [facility];  Service: Pain Management    • MS WRIST Clydie Maximus LIG Right 9/18/2018    Procedure: ENDOSCOPIC CARPAL TUNNEL RELEASE;  Surgeon: Angella Santacruz MD;  [facility];  Service: Orthopedics   • RADIOFREQUENCY ABLATION Right 12/6/2022    Procedure: RIGHT L3-S1 RADIO FREQUENCY ABLATION ([id number]); Surgeon: Misti Hughes DO;  Location: [facility];  Service: Pain Management    • TONSILLECTOMY     • UPPER GASTROINTESTINAL ENDOSCOPY         No current facility-administered medications for this encounter      Current Outpatient Medications:   •  acetaminophen (TYLENOL) 650 mg CR tablet, Take 650 mg by mouth every 8 (eight) hours as needed for mild pain Take 2 tablets in the AM and 2 tablets in the late evening, Disp: , Rfl:   •  Ascorbic Acid (VITAMIN C) 100 MG CHEW, Chew, Disp: , Rfl:   •  atorvastatin (LIPITOR) 10 mg tablet, take 1 tablet by mouth once daily, Disp: 90 tablet, Rfl: 0  •  Biotin 93468 MCG TABS, Take by mouth daily  , Disp: , Rfl:   •  Calcium Carbonate (CALCI-CHEW PO), Take 500 mg by mouth 2 (two) times a day, Disp: , Rfl:   •  cholecalciferol (VITAMIN D3) 1,000 units tablet, Take 5,000 Units by mouth in the morning, Disp: , Rfl:   •  clobetasol (TEMOVATE) 0 05 % cream, Apply to affected areas daily x 12 weeks, then twice weekly for maintenance, Disp: , Rfl:   •  Cyanocobalamin (VITAMIN B 12 PO), Take 1,000 mcg by mouth, Disp: , Rfl:   •  Diclofenac Sodium (VOLTAREN) 1 %, Apply 2 g topically 4 (four) times a day (Patient not taking: No sig reported), Disp: 100 g, Rfl: 6  •  dicyclomine (BENTYL) 10 mg capsule, take 1 capsule by mouth four times a day before meals at bedtime, Disp: 90 capsule, Rfl: 0  •  Eliquis 2 5 MG, take 1 tablet by mouth twice a day, Disp: 60 tablet, Rfl: 3  •  Ferrous Sulfate  (45 Fe) MG TBCR, Take 1 tablet by mouth daily, Disp: , Rfl:   •  fluticasone (FLONASE) 50 mcg/act nasal spray, instill 1 spray into each nostril once daily (Patient not taking: Reported on 10/6/2022), Disp: 32 g, Rfl: 1  •  gabapentin (Neurontin) 100 mg capsule, Take 1 capsule (100 mg total) by mouth 3 (three) times a day As needed for shooting pain, Disp: 90 capsule, Rfl: 4  •  Lysine HCl POWD, Take by mouth, Disp: , Rfl:   •  methocarbamol (ROBAXIN) 500 mg tablet, Take 1 tablet (500 mg total) by mouth 2 (two) times a day, Disp: 20 tablet, Rfl: 0  •  metoprolol succinate (TOPROL-XL) 25 mg 24 hr tablet, take 1 tablet by mouth once daily, Disp: 90 tablet, Rfl: 0  •  Multiple Vitamin (MULTIVITAMIN) capsule, Take 1 capsule by mouth daily 2 chewies daily, Disp: , Rfl:   •  naloxone (NARCAN) 4 mg/0 1 mL nasal spray, Administer 1 spray into a nostril  If no response after 2-3 minutes, give another dose in the other nostril using a new spray   (Patient not taking: No sig reported), Disp: 1 each, Rfl: 1  •  nystatin (MYCOSTATIN) powder, Apply topically 2 (two) times a day (Patient not taking: Reported on 10/6/2022), Disp: 30 g, Rfl: 0  •  oxyCODONE (Roxicodone) 5 immediate release tablet, Take 1 tablet (5 mg total) by mouth every 4 (four) hours as needed for moderate pain for up to 18 doses Max Daily Amount: 30 mg (Patient not taking: No sig reported), Disp: 18 tablet, Rfl: 0  •  pantoprazole (PROTONIX) 20 mg tablet, take 1 tablet by mouth once daily, Disp: 30 tablet, Rfl: 3  •  tiZANidine (ZANAFLEX) 2 mg tablet, Take 1 tablet (2 mg total) by mouth every 8 (eight) hours as needed for muscle spasms, Disp: 90 tablet, Rfl: 4  •  traZODone (DESYREL) 50 mg tablet, take 1 tablet by mouth at bedtime if needed for sleep, Disp: 30 tablet, Rfl: 1  •  valACYclovir (VALTREX) 1,000 mg tablet, Take 2 tablets (2,000 mg total) by mouth 2 (two) times a day for 1 day (Patient not taking: Reported on 10/5/2022), Disp: 30 tablet, Rfl: 0    No Known Allergies    Physical Exam:   Vitals:    12/20/22 1113   BP: 117/57   Pulse: 77   Resp: 18   Temp:    SpO2: 97%     General: Awake, Alert, Oriented x 3, Mood and affect appropriate  Respiratory: Respirations even and unlabored  Cardiovascular: Peripheral pulses intact; no edema  Musculoskeletal Exam:   TTP left lumbar paraspinal region  No motor or sensory deficits LE  ASA Score: 4    Patient/Chart Verification  Patient ID Verified: Verbal  ID Band Applied: Yes  Consents Confirmed: Procedural, To be obtained in the Pre-Procedure area  H&P( within 30 days) Verified: Yes  Interval H&P(within 24 hr) Complete (required for Outpatients and Surgery Admit only): Yes  Beta Blocker given : N/A  Pre-op Lab/Test Results Available: N/A  Pregnancy Lab Collected: N/A comment  Does Patient Have a Prosthetic Device/Implant: Unable to Assess    Assessment: No diagnosis found      Plan:   LEFT L3-L4,L4-L5 and L5-S1 level Lumbar RFA

## 2022-12-23 DIAGNOSIS — Z48.815 ENCOUNTER FOR SURGICAL AFTERCARE FOLLOWING SURGERY OF DIGESTIVE SYSTEM: ICD-10-CM

## 2022-12-23 DIAGNOSIS — Z98.84 BARIATRIC SURGERY STATUS: ICD-10-CM

## 2022-12-23 DIAGNOSIS — K91.2 POSTSURGICAL MALABSORPTION: ICD-10-CM

## 2022-12-23 RX ORDER — PANTOPRAZOLE SODIUM 20 MG/1
TABLET, DELAYED RELEASE ORAL
Qty: 30 TABLET | Refills: 3 | Status: SHIPPED | OUTPATIENT
Start: 2022-12-23

## 2022-12-29 DIAGNOSIS — M54.41 ACUTE RIGHT-SIDED LOW BACK PAIN WITH RIGHT-SIDED SCIATICA: ICD-10-CM

## 2022-12-29 DIAGNOSIS — R10.9 STOMACH CRAMPS: ICD-10-CM

## 2022-12-29 RX ORDER — DICYCLOMINE HYDROCHLORIDE 10 MG/1
CAPSULE ORAL
Qty: 120 CAPSULE | Refills: 2 | Status: SHIPPED | OUTPATIENT
Start: 2022-12-29

## 2023-01-11 ENCOUNTER — OFFICE VISIT (OUTPATIENT)
Dept: BARIATRICS | Facility: CLINIC | Age: 63
End: 2023-01-11

## 2023-01-11 VITALS
HEIGHT: 64 IN | TEMPERATURE: 98.5 F | SYSTOLIC BLOOD PRESSURE: 118 MMHG | HEART RATE: 92 BPM | BODY MASS INDEX: 32.87 KG/M2 | DIASTOLIC BLOOD PRESSURE: 62 MMHG | WEIGHT: 192.5 LBS

## 2023-01-11 DIAGNOSIS — Z48.815 ENCOUNTER FOR SURGICAL AFTERCARE FOLLOWING SURGERY OF DIGESTIVE SYSTEM: Primary | ICD-10-CM

## 2023-01-11 DIAGNOSIS — Z98.84 BARIATRIC SURGERY STATUS: ICD-10-CM

## 2023-01-11 DIAGNOSIS — K91.2 POSTSURGICAL MALABSORPTION: ICD-10-CM

## 2023-01-11 DIAGNOSIS — E66.9 OBESITY, CLASS I, BMI 30-34.9: ICD-10-CM

## 2023-01-11 NOTE — PATIENT INSTRUCTIONS
Please start on pantoprazole or omeprazole if you ever start smoking again   Please defer from smoking and keep up the great work

## 2023-01-11 NOTE — PROGRESS NOTES
Assessment/Plan:     Patient ID: Siena Ang is a 60 y o  female  Bariatric Surgery Status  -s/p Raghav-En-Y Gastric Bypass with Dr Shelton Dec on 2017  Presents to the office today for annual  Overall doing well, tolerating a regular diet  She expresses she stopped smoking for a while now  Denies any other concerns or issues  Knows she has gained some weight since last year - will work on healthier habits  Taking her vitamins  Denies having abdominal pain, N/V/D/C, regurgitation, reflux or dysphagia  PLAN:     - congratulated patient for quitting smoking  Advised to refrain; however if she ever restarts, please restart pantoprazole or omeprazole once daily    - Routine follow up in 1 year for annual visit  - Continue with healthy lifestyle, adequate protein intake of 60 gm, fluid intake of at least 64 oz  - Continue with MVI daily  - Activity as tolerated  - Labs ordered and will adjust accordingly if any deficiency  - Follow up with RD and SW as needed  · Continued/Maintain healthy weight loss with good nutrition intakes  · Adequate hydration with at least 64oz  fluid intake  · Follow diet as discussed  · Follow vitamin and mineral recommendations as reviewed with you  · Exercise as tolerated  · Colonoscopy referral made: UTD - due again in June  · Mammogram - UTD    · Follow-up in 1 Year for annual visit  We kindly ask that your arrive 15 minutes before your scheduled appointment time with your provider to allow our staff to room you, get your vital signs and update your chart  · Get lab work done  Please call the office if you need a script  It is recommended to check with your insurance BEFORE getting labs done to make sure they are covered by your policy  · Call our office if you have any problems with abdominal pain especially associated with fever, chills, nausea, vomiting or any other concerns      · All  Post-bariatric surgery patients should be aware that very small quantities of any alcohol can cause impairment and it is very possible not to feel the effect  The effect can be in the system for several hours  It is also a stomach irritant  · It is advised to AVOID alcohol, Nonsteroidal antiinflammatory drugs (NSAIDS) and nicotine of all forms   Any of these can cause stomach irritation/pain  · Discussed the effects of alcohol on a bariatric patient and the increased impairment risk  · Keep up the good work! Postsurgical Malabsorption   -At risk for malabsorption of vitamins/minerals secondary to malabsorption and restriction of intake from bariatric surgery  -Currently taking adequate postop bariatric surgery vitamin supplementation  -Last set of bariatric labs completed on 01/2022 and showed WNL levels  -Next set of bariatric labs ordered for approximately 2 weeks  -Patient received education about the importance of adhering to a lifelong supplementation regimen to avoid vitamin/mineral deficiencies      Diagnoses and all orders for this visit:    Encounter for surgical aftercare following surgery of digestive system  -     Zinc; Future  -     Vitamin D 25 hydroxy; Future  -     Vitamin B12; Future  -     Vitamin B1, whole blood; Future  -     Vitamin A; Future  -     PTH, intact; Future  -     Comprehensive metabolic panel; Future  -     Ferritin; Future  -     Folate; Future  -     Iron Saturation %; Future    Bariatric surgery status  -     Zinc; Future  -     Vitamin D 25 hydroxy; Future  -     Vitamin B12; Future  -     Vitamin B1, whole blood; Future  -     Vitamin A; Future  -     PTH, intact; Future  -     Comprehensive metabolic panel; Future  -     Ferritin; Future  -     Folate; Future  -     Iron Saturation %; Future    Postsurgical malabsorption  -     Zinc; Future  -     Vitamin D 25 hydroxy; Future  -     Vitamin B12; Future  -     Vitamin B1, whole blood; Future  -     Vitamin A; Future  -     PTH, intact;  Future  - Comprehensive metabolic panel; Future  -     Ferritin; Future  -     Folate; Future  -     Iron Saturation %; Future    Obesity, Class I, BMI 30-34 9  -     Zinc; Future  -     Vitamin D 25 hydroxy; Future  -     Vitamin B12; Future  -     Vitamin B1, whole blood; Future  -     Vitamin A; Future  -     PTH, intact; Future  -     Comprehensive metabolic panel; Future  -     Ferritin; Future  -     Folate; Future  -     Iron Saturation %; Future         Subjective:      Patient ID: Maximus Soto is a 60 y o  female  -s/p Raghav-En-Y Gastric Bypass with Dr Smith Mason on 2017  Presents to the office today for annual  Overall doing well, tolerating a regular diet  She expresses she stopped smoking for a while now  Denies any other concerns or issues  Knows she has gained some weight since last year - will work on healthier habits  Taking her vitamins  Denies having abdominal pain, N/V/D/C, regurgitation, reflux or dysphagia  Initial: 241 LBS  Current: 192 5 LBS  EWL: (Weight loss is ahead of schedule at this post surgical period )  Narendra: 145 LBS  Current BMI is Body mass index is 33 04 kg/m²  · Tolerating a regular diet-yes  · Eating at least 60 grams of protein per day-yes  · Following 30/60 minute rule with liquids-no  · Drinking at least 64 ounces of fluid per day-yes  · Drinking carbonated beverages-no  · Sufficient exercise-no - very limited exercise due to broken ankle and tibial of the right leg  · Using NSAIDs regularly-no  · Using nicotine-no - congratulated patient for quitting  · Using alcohol-no  · Supplements: Multivitamins, Iron, Calcium  and Vitamin C, B12    · EWL is 49%, which places the patient ahead of schedule for expected post surgical weight loss at this time       The following portions of the patient's history were reviewed and updated as appropriate: allergies, current medications, past family history, past medical history, past social history, past surgical history and problem list     Review of Systems   Constitutional: Negative  Respiratory: Negative  Cardiovascular: Negative  Gastrointestinal: Negative  Musculoskeletal: Positive for arthralgias  Neurological: Negative  Psychiatric/Behavioral: Negative  Objective:    /62 (BP Location: Left arm, Patient Position: Sitting, Cuff Size: Standard)   Pulse 92   Temp 98 5 °F (36 9 °C) (Tympanic)   Ht 5' 4" (1 626 m)   Wt 87 3 kg (192 lb 8 oz)   LMP 09/18/2014   BMI 33 04 kg/m²      Physical Exam  Vitals and nursing note reviewed  Constitutional:       Appearance: Normal appearance  She is obese  Cardiovascular:      Rate and Rhythm: Normal rate and regular rhythm  Pulses: Normal pulses  Heart sounds: Normal heart sounds  Pulmonary:      Effort: Pulmonary effort is normal       Breath sounds: Normal breath sounds  Abdominal:      General: Bowel sounds are normal       Palpations: Abdomen is soft  Tenderness: There is no abdominal tenderness  Musculoskeletal:         General: Normal range of motion  Skin:     General: Skin is warm and dry  Neurological:      General: No focal deficit present  Mental Status: She is alert and oriented to person, place, and time  Psychiatric:         Mood and Affect: Mood normal          Behavior: Behavior normal          Thought Content:  Thought content normal          Judgment: Judgment normal

## 2023-01-17 NOTE — PROGRESS NOTES
Portions of the record may have been created with voice recognition software  Occasional wrong word or "sound a like" substitutions may have occurred due to the inherent limitations of voice recognition software  Read the chart carefully and recognize, using context, where substitutions have occurred  Assessment:  1  Chronic bilateral low back pain without sciatica        Plan:  No orders of the defined types were placed in this encounter  No orders of the defined types were placed in this encounter  28-year-old female presenting for follow-up visit status post bilateral L3-S1 RFA and reports no significant complaints today, 0 out of 10 pain, no motor or sensory symptoms noted on physical exam     -Advised the patient that at this time she can wean off the gabapentin 100 mg 3 times daily by taking it twice daily for 5 days and then once a day for 5 days     -Follow-up as needed  My impressions and treatment recommendations were discussed in detail with the patient who verbalized understanding and had no further questions  Discharge instructions were provided  I personally saw and examined the patient and I agree with the above discussed plan of care  History of Present Illness:  Alli Robison is a 60 y o  female who presents for a follow up office visit in regards to Back Pain (0/10 pain after procedure  )  Patient presents as a follow-up status post lumbar RFA in December 2022 and notes 0 out of 10 pain today  Reports no complaints at this time or any new onset motor or sensory symptoms  Other Symptoms:  The patient does not have numbness  The patient does not have weakness  The patient does not have bladder dysfunction  The patient does not have bowel dysfunction  The patient does not have chills and fever, night sweats or unintentional weight loss  Prior Appts:  8-30-22: consult  9-22-22:  Follow up  10-6-22: follow up      Specialists Seen:  Neurosurgery    Current Pain Medications:  Gabapentin 100 mg TID    Pain Medications Trialed:     Interventional Techniques Employed:  9-6- 22: Right L3-L4 TFESI  12-6-2022: Right L3-S1 RFA  12-20-22: LEFT L3-S1 RFA    Imaging:  No MRI cervical spine results found in the past 2 years   MRI lumbar spine wo contrast    Result Date: 9/30/2022  Impression     1  Grossly stable degenerative change compared to recent MRI 8/16/2022  2   Moderate to severe bilateral lateral recesses stenosis and moderate canal narrowing at level L3-4  Moderate right foraminal stenosis at this level with herniated disc contacting undersurface of exiting right L3 nerve root, similar to prior exam      3   Moderate bilateral lateral recesses stenosis and mild to moderate canal narrowing at level L4-5      4    A 1 cm left facet joint synovial cyst at level L2-3 contributing to mild canal narrowing  5   Hepatomegaly  Workstation performed: [id number]         MRI lumbar spine wo contrast    Result Date: 8/19/2022  Impression     Far lateral bulge with marginal osteophyte at L3-4 contacts the extraforaminal right L3 nerve root  Correlate for right L3 radiculopathy  Moderate central stenosis noted at this level  Facet overgrowth facet cyst L2-3 eccentric to the left dorsally in the canal contributes to mild central and moderate left lateral recess stenosis  Severe facet degenerative change L4-5 accounts for 4 mm anterolisthesis               Workstation performed: [id number]          No MRI thoracic spine results found in the past 2 years   No X-ray cervical spine results found in the past 2 years   No X-ray lumbar spine results found in the past 2 years   No X-ray hip/pelvis results found in the past 2 years   No X-ray knee results found in the past 2 years     Lab Results   Component Value Date    CREATININE 0 68 09/03/2022     Lab Results   Component Value Date    ALT 33 09/03/2022       I have personally reviewed and/or updated the patient's past medical history, past surgical history, family history, social history, current medications, allergies, and vital signs today       Review of Systems    Patient Active Problem List   Diagnosis   • Abnormal CT scan   • Sick sinus syndrome (HCC)   • Acute non-recurrent maxillary sinusitis   • HSV-1 infection   • Status post carpal tunnel release   • Fibrocystic breast disease   • Hyperlipidemia   • Knee osteoarthritis   • Lichen sclerosus   • Menopausal and postmenopausal disorder   • Neck pain   • Candida infection of flexural skin   • Grade 1 out of 6 intensity murmur   • Encounter for screening mammogram for malignant neoplasm of breast   • History of DVT (deep vein thrombosis)   • Iron deficiency anemia   • Vitamin deficiency   • Medicare annual wellness visit, subsequent   • Mass of soft tissue of right upper extremity   • Chronic right shoulder pain   • Work related injury   • Leukopenia   • Anemia   • Right ear pain   • Dysphagia   • Acute gastric ulcer without hemorrhage or perforation   • History of colon polyps   • Overweight   • Bariatric surgery status   • Depression   • Nondisplaced fracture of medial malleolus of right tibia, initial encounter for closed fracture   • Closed nondisplaced Maisonneuve's fracture of right leg   • Closed fracture of upper end of right fibula   • Closed fracture of left distal radius and ulna   • Fall   • Sinus pressure   • Pacemaker   • PSVT (paroxysmal supraventricular tachycardia) ([facility])   • Insomnia   • Back pain without sciatica   • LLQ pain   • Stomach cramps       Past Medical History:   Diagnosis Date   • Anemia     iron def   • Arthritis     knees   • Carpal tunnel syndrome of right wrist    • Chest pain    • Colon polyp    • Deep vein thrombosis (DVT) of proximal lower extremity ([address] ) 1/1/2012   • Depression     at times   • DVT (deep venous thrombosis) ([address] ) 2012   • Dysphagia    • Encounter for surgical aftercare following surgery of digestive system 9/24/2020    s/p Raghav-En-Y Gastric Bypass with Dr Katy Dias in 2017  INITIAL weight:241 lb INITIAL weight with start of topirmate: 176 lb Goal weight loss of 5-10 %-159 lb-167 lb    • Esophageal reflux 2011   • Grade 1 out of 6 intensity murmur 2019   • Heart murmur    • History of transfusion 1980   • Pacemaker 2018   • Pneumonia     age 15   • Psychiatric disorder    • Trigger finger, left    • Visit for suture removal 2021       Past Surgical History:   Procedure Laterality Date   • BACK SURGERY     • CARDIAC PACEMAKER PLACEMENT  2018   • CERVICAL DISC SURGERY  2009    PLATES & SCREWS   •  SECTION      X2   • COLONOSCOPY     • EPIDURAL BLOCK INJECTION Right 2022    Procedure: RIGHT L3-W7OLIYTZREEDNLNY EPIDURAL STEROID INJECTION (49349); Surgeon: Severiano Cruz DO;  Location: [facility];  Service: Pain Management    • GASTRIC BYPASS  2017    LAP RINYGB SURGERY   • KNEE ARTHROSCOPY     • KNEE SURGERY Bilateral    • KNEE SURGERY Left 2020   • OTHER SURGICAL HISTORY      ARM SURGERIES   • [place] NDSC WRST SURG W/RLS TRANSVRS CARPL LIGM Right 2018    Procedure: ENDOSCOPIC CARPAL TUNNEL RELEASE;  Surgeon: Farhat Toney MD;  Location: [facility];  Service: Orthopedics   • [place] [address] DX/THER AGT PVRT FACET JT LMBR/SAC 1 LEVEL Bilateral 10/25/2022    Procedure: L3-L4,L4-5,L5-S1 BILATERAL BRANCH BLOCK;  Surgeon: Severiano Cruz DO;  Location: [facility];  Service: Pain Management    • [place] NJX DX/THER AGT PVRT FACET JT LMBR/SAC 1 LEVEL Bilateral 11/15/2022    Procedure: BILATERAL L3-S1 MEDICAL BRANCH BLOCK (51077,80454,34310);   Surgeon: Severiano Cruz DO;  Location: [facility];  Service: Pain Management    • [place] TENDON SHEATH INCISION Left 2018    Procedure: LONG TRIGGER FINGER RELEASE;  Surgeon: Farhat Toney MD;  [facility];  Service: Orthopedics   • RADIOFREQUENCY ABLATION Right 2022    Procedure: RIGHT L3-S1 RADIO FREQUENCY ABLATION ([id number]); Surgeon: Deisi Timmons DO;  Location: [facility];  Service: Pain Management    • RADIOFREQUENCY ABLATION Left 12/20/2022    Procedure: LEFT L3-S1 RADIO FREQUENCY ABLATION ([id number]); Surgeon: Deisi Timmons DO;  Location: [facility];  Service: Pain Management    • TONSILLECTOMY     • UPPER GASTROINTESTINAL ENDOSCOPY         Family History   Problem Relation Age of Onset   • Stroke Mother    • Alzheimer's disease Mother    • Arthritis Mother    • Coronary artery disease Mother    • Breast cancer Mother 77   • Cancer Mother    • Heart disease Mother    • Hypertension Father    • Gout Father    • Diabetes type II Father    • Coronary artery disease Father    • Heart disease Father    • No Known Problems Sister    • No Known Problems Sister    • No Known Problems Sister    • No Known Problems Daughter    • No Known Problems Maternal Grandmother    • No Known Problems Maternal Grandfather    • No Known Problems Paternal Grandmother    • No Known Problems Paternal Grandfather    • Prostate cancer Brother    • Pancreatic cancer Brother    • No Known Problems Maternal Aunt    • No Known Problems Maternal Aunt    • No Known Problems Maternal Aunt    • No Known Problems Maternal Aunt    • Cancer Maternal Aunt    • No Known Problems Paternal Aunt    • No Known Problems Paternal Aunt        Social History     Occupational History   • Not on file   Tobacco Use   • Smoking status: Former     Packs/day: 0 25     Types: Cigarettes   • Smokeless tobacco: Never   Vaping Use   • Vaping Use: Never used   Substance and Sexual Activity   • Alcohol use:  Yes     Alcohol/week: 2 0 standard drinks     Types: 1 Glasses of wine, 1 Standard drinks or equivalent per week     Comment: very rare social   • Drug use: No   • Sexual activity: Not Currently       Current Outpatient Medications on File Prior to Visit   Medication Sig   • acetaminophen (TYLENOL) 650 mg CR tablet Take 650 mg by mouth every 8 (eight) hours as needed for mild pain Take 2 tablets in the AM and 2 tablets in the late evening   • Ascorbic Acid (VITAMIN C) 100 MG CHEW Chew   • atorvastatin (LIPITOR) 10 mg tablet take 1 tablet by mouth once daily   • Biotin 53964 MCG TABS Take by mouth daily     • Calcium Carbonate (CALCI-CHEW PO) Take 500 mg by mouth 2 (two) times a day   • cholecalciferol (VITAMIN D3) 1,000 units tablet Take 5,000 Units by mouth in the morning   • clobetasol (TEMOVATE) 0 05 % cream Apply to affected areas daily x 12 weeks, then twice weekly for maintenance   • Cyanocobalamin (VITAMIN B 12 PO) Take 1,000 mcg by mouth   • dicyclomine (BENTYL) 10 mg capsule take 1 capsule by mouth four times a day before meals at bedtime   • Eliquis 2 5 MG take 1 tablet by mouth twice a day   • Ferrous Sulfate  (45 Fe) MG TBCR Take 1 tablet by mouth daily   • gabapentin (Neurontin) 100 mg capsule Take 1 capsule (100 mg total) by mouth 3 (three) times a day As needed for shooting pain   • Lysine HCl POWD Take by mouth   • methocarbamol (ROBAXIN) 500 mg tablet Take 1 tablet (500 mg total) by mouth 2 (two) times a day   • metoprolol succinate (TOPROL-XL) 25 mg 24 hr tablet take 1 tablet by mouth once daily   • Multiple Vitamin (MULTIVITAMIN) capsule Take 1 capsule by mouth daily 2 chewies daily   • Diclofenac Sodium (VOLTAREN) 1 % Apply 2 g topically 4 (four) times a day (Patient not taking: Reported on 10/5/2022)   • fluticasone (FLONASE) 50 mcg/act nasal spray instill 1 spray into each nostril once daily (Patient not taking: Reported on 10/6/2022)   • nystatin (MYCOSTATIN) powder Apply topically 2 (two) times a day (Patient not taking: Reported on 10/6/2022)   • [DISCONTINUED] naloxone (NARCAN) 4 mg/0 1 mL nasal spray Administer 1 spray into a nostril  If no response after 2-3 minutes, give another dose in the other nostril using a new spray  (Patient not taking: Reported on 10/5/2022)     No current facility-administered medications on file prior to visit         No Known Allergies    Physical Exam:    /78   Pulse 76   Ht 5' 4" (1 626 m) Comment: verbal  Wt 87 1 kg (192 lb)   LMP 09/18/2014   BMI 32 96 kg/m²     Constitutional:normal, well developed, well nourished, alert, in no distress and non-toxic and no overt pain behavior  Eyes:anicteric  HEENT:grossly intact  Neck:supple, symmetric, trachea midline and no masses   Pulmonary:even and unlabored  Cardiovascular:No edema or pitting edema present  Skin:Normal without rashes or lesions and well hydrated  Psychiatric:Mood and affect appropriate  Neurologic:Cranial Nerves II-XII grossly intact  Musculoskeletal:normal    No motor or sensory deficits bilateral lower extremity, no tenderness palpation bilateral lumbar paraspinal region  No erythema, hematoma or swelling noted the lumbar spine

## 2023-01-18 ENCOUNTER — OFFICE VISIT (OUTPATIENT)
Dept: PAIN MEDICINE | Facility: CLINIC | Age: 63
End: 2023-01-18

## 2023-01-18 ENCOUNTER — APPOINTMENT (OUTPATIENT)
Dept: LAB | Facility: MEDICAL CENTER | Age: 63
End: 2023-01-18

## 2023-01-18 VITALS
WEIGHT: 192 LBS | SYSTOLIC BLOOD PRESSURE: 119 MMHG | BODY MASS INDEX: 32.78 KG/M2 | HEART RATE: 76 BPM | HEIGHT: 64 IN | DIASTOLIC BLOOD PRESSURE: 78 MMHG

## 2023-01-18 DIAGNOSIS — M54.50 CHRONIC BILATERAL LOW BACK PAIN WITHOUT SCIATICA: Primary | ICD-10-CM

## 2023-01-18 DIAGNOSIS — Z48.815 ENCOUNTER FOR SURGICAL AFTERCARE FOLLOWING SURGERY OF DIGESTIVE SYSTEM: ICD-10-CM

## 2023-01-18 DIAGNOSIS — R73.9 HYPERGLYCEMIA: ICD-10-CM

## 2023-01-18 DIAGNOSIS — Z98.84 BARIATRIC SURGERY STATUS: ICD-10-CM

## 2023-01-18 DIAGNOSIS — K91.2 POSTSURGICAL MALABSORPTION: ICD-10-CM

## 2023-01-18 DIAGNOSIS — E66.9 OBESITY, CLASS I, BMI 30-34.9: ICD-10-CM

## 2023-01-18 DIAGNOSIS — G89.29 CHRONIC BILATERAL LOW BACK PAIN WITHOUT SCIATICA: Primary | ICD-10-CM

## 2023-01-18 LAB
25(OH)D3 SERPL-MCNC: 31.6 NG/ML (ref 30–100)
ALBUMIN SERPL BCP-MCNC: 3.7 G/DL (ref 3.5–5)
ALP SERPL-CCNC: 69 U/L (ref 46–116)
ALT SERPL W P-5'-P-CCNC: 40 U/L (ref 12–78)
ANION GAP SERPL CALCULATED.3IONS-SCNC: 5 MMOL/L (ref 4–13)
AST SERPL W P-5'-P-CCNC: 28 U/L (ref 5–45)
BILIRUB SERPL-MCNC: 0.75 MG/DL (ref 0.2–1)
BUN SERPL-MCNC: 15 MG/DL (ref 5–25)
CALCIUM SERPL-MCNC: 9.8 MG/DL (ref 8.3–10.1)
CHLORIDE SERPL-SCNC: 112 MMOL/L (ref 96–108)
CO2 SERPL-SCNC: 27 MMOL/L (ref 21–32)
CREAT SERPL-MCNC: 0.69 MG/DL (ref 0.6–1.3)
EST. AVERAGE GLUCOSE BLD GHB EST-MCNC: 120 MG/DL
FERRITIN SERPL-MCNC: 61 NG/ML (ref 8–388)
FOLATE SERPL-MCNC: >20 NG/ML (ref 3.1–17.5)
GFR SERPL CREATININE-BSD FRML MDRD: 92 ML/MIN/1.73SQ M
GLUCOSE P FAST SERPL-MCNC: 116 MG/DL (ref 65–99)
HBA1C MFR BLD: 5.8 %
POTASSIUM SERPL-SCNC: 4.4 MMOL/L (ref 3.5–5.3)
PROT SERPL-MCNC: 7.5 G/DL (ref 6.4–8.4)
PTH-INTACT SERPL-MCNC: 57.2 PG/ML (ref 18.4–80.1)
SODIUM SERPL-SCNC: 144 MMOL/L (ref 135–147)
VIT B12 SERPL-MCNC: 4624 PG/ML (ref 100–900)

## 2023-01-19 ENCOUNTER — TELEPHONE (OUTPATIENT)
Dept: FAMILY MEDICINE CLINIC | Facility: CLINIC | Age: 63
End: 2023-01-19

## 2023-01-19 NOTE — TELEPHONE ENCOUNTER
----- Message from [id number] sent at 1/19/2023  8:44 AM EST -----  A1c mildly elevated  I recommend healthy diet limiting carbohydrates and sugar  Regular exercise can also help improve A1c  We will continue to monitor

## 2023-01-20 DIAGNOSIS — I82.4Y9 DEEP VEIN THROMBOSIS (DVT) OF PROXIMAL LOWER EXTREMITY, UNSPECIFIED CHRONICITY, UNSPECIFIED LATERALITY (HCC): ICD-10-CM

## 2023-01-20 LAB
IRON SATN MFR SERPL: 28 % (ref 15–50)
IRON SERPL-MCNC: 107 UG/DL (ref 50–170)
TIBC SERPL-MCNC: 388 UG/DL (ref 250–450)
VIT B1 BLD-SCNC: 298.2 NMOL/L (ref 66.5–200)

## 2023-01-20 RX ORDER — APIXABAN 2.5 MG/1
TABLET, FILM COATED ORAL
Qty: 60 TABLET | Refills: 3 | Status: SHIPPED | OUTPATIENT
Start: 2023-01-20

## 2023-01-21 PROBLEM — J01.00 ACUTE NON-RECURRENT MAXILLARY SINUSITIS: Status: RESOLVED | Noted: 2018-12-24 | Resolved: 2023-01-21

## 2023-01-21 LAB — ZINC SERPL-MCNC: 82 UG/DL (ref 44–115)

## 2023-01-24 LAB — VIT A SERPL-MCNC: 44.5 UG/DL (ref 22–69.5)

## 2023-01-25 ENCOUNTER — TELEPHONE (OUTPATIENT)
Dept: RHEUMATOLOGY | Facility: CLINIC | Age: 63
End: 2023-01-25

## 2023-01-25 ENCOUNTER — OFFICE VISIT (OUTPATIENT)
Dept: RHEUMATOLOGY | Facility: CLINIC | Age: 63
End: 2023-01-25

## 2023-01-25 VITALS
SYSTOLIC BLOOD PRESSURE: 119 MMHG | WEIGHT: 190 LBS | HEIGHT: 64 IN | BODY MASS INDEX: 32.44 KG/M2 | DIASTOLIC BLOOD PRESSURE: 78 MMHG

## 2023-01-25 DIAGNOSIS — Z51.81 ENCOUNTER FOR MONITORING DENOSUMAB THERAPY: ICD-10-CM

## 2023-01-25 DIAGNOSIS — M81.0 AGE-RELATED OSTEOPOROSIS WITHOUT CURRENT PATHOLOGICAL FRACTURE: Primary | ICD-10-CM

## 2023-01-25 DIAGNOSIS — Z79.899 ENCOUNTER FOR MONITORING DENOSUMAB THERAPY: ICD-10-CM

## 2023-01-25 NOTE — TELEPHONE ENCOUNTER
Please make sure she has 6-month follow-up with Dr Vivian Poon and let her know she is due for a DEXA scan around April

## 2023-01-25 NOTE — PROGRESS NOTES
Prolia injection was successfully administered subcutaneously in patient's right arm, which was tolerated without any adverse event

## 2023-01-27 ENCOUNTER — OFFICE VISIT (OUTPATIENT)
Dept: URGENT CARE | Age: 63
End: 2023-01-27

## 2023-01-27 VITALS
TEMPERATURE: 97.3 F | HEART RATE: 97 BPM | SYSTOLIC BLOOD PRESSURE: 123 MMHG | DIASTOLIC BLOOD PRESSURE: 65 MMHG | OXYGEN SATURATION: 98 % | RESPIRATION RATE: 16 BRPM

## 2023-01-27 DIAGNOSIS — J02.9 SORE THROAT: ICD-10-CM

## 2023-01-27 DIAGNOSIS — H66.91 RIGHT OTITIS MEDIA, UNSPECIFIED OTITIS MEDIA TYPE: Primary | ICD-10-CM

## 2023-01-27 DIAGNOSIS — R07.89 CHEST TIGHTNESS: ICD-10-CM

## 2023-01-27 LAB — S PYO AG THROAT QL: NEGATIVE

## 2023-01-27 RX ORDER — ALBUTEROL SULFATE 90 UG/1
2 AEROSOL, METERED RESPIRATORY (INHALATION) EVERY 6 HOURS PRN
Qty: 8.5 G | Refills: 0 | Status: SHIPPED | OUTPATIENT
Start: 2023-01-27

## 2023-01-27 RX ORDER — AMOXICILLIN AND CLAVULANATE POTASSIUM 875; 125 MG/1; MG/1
1 TABLET, FILM COATED ORAL EVERY 12 HOURS SCHEDULED
Qty: 14 TABLET | Refills: 0 | Status: SHIPPED | OUTPATIENT
Start: 2023-01-27 | End: 2023-02-03

## 2023-01-27 NOTE — PROGRESS NOTES
[address] [employer] [facility]        NAME: Jones Lakhani is a 60 y o  female  : 1960    MRN: [id number]  DATE: 2023  TIME: 5:59 PM    Assessment and Plan   Right otitis media, unspecified otitis media type [H66 [id number]]  1  Right otitis media, unspecified otitis media type  amoxicillin-clavulanate (AUGMENTIN) 875-125 mg per tablet      2  Chest tightness  albuterol (ProAir HFA) 90 mcg/act inhaler      3  Sore throat  POCT rapid strepA    Throat culture        Rapid strep negative, pending result culture  Patient Instructions       Rapid strep negative, please allow 24 to 48 hours for your throat culture to result  Please take Augmentin 2 times daily for the next 7 days for acute otitis media  Days use albuterol inhaler every 6 hours as needed for chest tightness  Please trial warm salt water gargles, Chloraseptic spray, Cepacol cough drops and warm tea with honey as needed for sore throat  If your symptoms persist, please follow-up with your PCP  Proceed to  ER if symptoms worsen  Chief Complaint     Chief Complaint   Patient presents with   • Sore Throat     Chest pressure, difficulty swallowing, no fever or body aches or chills, sinus pressure, since Wednesday, getting worse, no redness or swelling in back of throat, home covid negative,          History of Present Illness       Patient presenting for evaluation of sinus pressure, sore throat and chest tightness that has been progressing over the past 2 days  Patient states that he has been taking Tylenol with mild relief of her symptoms  She states that she is vaccinated for COVID and flu and denies any recent sick contacts  Patient states that she took a home COVID test and had negative test results  Review of Systems   Review of Systems   Constitutional: Negative for chills and fever  HENT: Positive for congestion, ear pain, sinus pressure and sore throat  Respiratory: Positive for cough and chest tightness   Negative for shortness of breath  Cardiovascular: Negative for chest pain  Gastrointestinal: Negative for diarrhea, nausea and vomiting  All other systems reviewed and are negative          Current Medications       Current Outpatient Medications:   •  acetaminophen (TYLENOL) 650 mg CR tablet, Take 650 mg by mouth every 8 (eight) hours as needed for mild pain Take 2 tablets in the AM and 2 tablets in the late evening, Disp: , Rfl:   •  albuterol (ProAir HFA) 90 mcg/act inhaler, Inhale 2 puffs every 6 (six) hours as needed for wheezing, Disp: 8 5 g, Rfl: 0  •  amoxicillin-clavulanate (AUGMENTIN) 875-125 mg per tablet, Take 1 tablet by mouth every 12 (twelve) hours for 7 days, Disp: 14 tablet, Rfl: 0  •  Ascorbic Acid (VITAMIN C) 100 MG CHEW, Chew, Disp: , Rfl:   •  atorvastatin (LIPITOR) 10 mg tablet, take 1 tablet by mouth once daily, Disp: 90 tablet, Rfl: 0  •  Biotin 98271 MCG TABS, Take by mouth daily  , Disp: , Rfl:   •  Calcium Carbonate (CALCI-CHEW PO), Take 500 mg by mouth 2 (two) times a day, Disp: , Rfl:   •  cholecalciferol (VITAMIN D3) 1,000 units tablet, Take 5,000 Units by mouth in the morning, Disp: , Rfl:   •  clobetasol (TEMOVATE) 0 05 % cream, Apply to affected areas daily x 12 weeks, then twice weekly for maintenance, Disp: , Rfl:   •  Cyanocobalamin (VITAMIN B 12 PO), Take 1,000 mcg by mouth, Disp: , Rfl:   •  dicyclomine (BENTYL) 10 mg capsule, take 1 capsule by mouth four times a day before meals at bedtime, Disp: 120 capsule, Rfl: 2  •  Eliquis 2 5 MG, TAKE 1 TABLET BY MOUTH TWICE A DAY, Disp: 60 tablet, Rfl: 3  •  Ferrous Sulfate  (45 Fe) MG TBCR, Take 1 tablet by mouth daily, Disp: , Rfl:   •  gabapentin (Neurontin) 100 mg capsule, Take 1 capsule (100 mg total) by mouth 3 (three) times a day As needed for shooting pain, Disp: 90 capsule, Rfl: 4  •  Lysine HCl POWD, Take by mouth, Disp: , Rfl:   •  methocarbamol (ROBAXIN) 500 mg tablet, Take 1 tablet (500 mg total) by mouth 2 (two) times a day, Disp: 20 tablet, Rfl: 0  •  metoprolol succinate (TOPROL-XL) 25 mg 24 hr tablet, take 1 tablet by mouth once daily, Disp: 90 tablet, Rfl: 1  •  Multiple Vitamin (MULTIVITAMIN) capsule, Take 1 capsule by mouth daily 2 chewies daily, Disp: , Rfl:   •  nystatin (MYCOSTATIN) powder, Apply topically 2 (two) times a day, Disp: 30 g, Rfl: 0  •  Diclofenac Sodium (VOLTAREN) 1 %, Apply 2 g topically 4 (four) times a day (Patient not taking: Reported on 10/5/2022), Disp: 100 g, Rfl: 6  •  fluticasone (FLONASE) 50 mcg/act nasal spray, instill 1 spray into each nostril once daily (Patient not taking: Reported on 10/6/2022), Disp: 32 g, Rfl: 1    Current Allergies     Allergies as of 01/27/2023   • (No Known Allergies)            The following portions of the patient's history were reviewed and updated as appropriate: allergies, current medications, past family history, past medical history, past social history, past surgical history and problem list      Past Medical History:   Diagnosis Date   • Anemia     iron def   • Arthritis     knees   • Carpal tunnel syndrome of right wrist    • Chest pain    • Colon polyp    • Deep vein thrombosis (DVT) of proximal lower extremity ([facility] [address] ) 1/1/2012   • Depression     at times   • DVT (deep venous thrombosis) ([facility] [address] ) 2012   • Dysphagia    • Encounter for surgical aftercare following surgery of digestive system 9/24/2020    s/p Raghav-En-Y Gastric Bypass with Dr Melba Raygoza in 2017     INITIAL weight:241 lb INITIAL weight with start of topirmate: 176 lb Goal weight loss of 5-10 %-159 lb-167 lb    • Esophageal reflux 5/23/2011   • Grade 1 out of 6 intensity murmur 6/24/2019   • Heart murmur    • History of transfusion 1980   • Pacemaker 11/01/2018   • Pneumonia     age 15   • Psychiatric disorder    • Trigger finger, left    • Visit for suture removal 6/25/2021       Past Surgical History:   Procedure Laterality Date   • BACK SURGERY     • CARDIAC PACEMAKER PLACEMENT  11/01/2018   • CERVICAL DISC SURGERY  2009    PLATES & SCREWS   •  SECTION      X2   • COLONOSCOPY     • EPIDURAL BLOCK INJECTION Right 2022    Procedure: RIGHT [id number] EPIDURAL STEROID INJECTION (49602); Surgeon: Erna Koehler DO;  Location: [facility];  Service: Pain Management    • GASTRIC BYPASS  2017    LAP RINYGB SURGERY   • KNEE ARTHROSCOPY     • KNEE SURGERY Bilateral    • KNEE SURGERY Left 2020   • OTHER SURGICAL HISTORY      ARM SURGERIES   • SD NDSC WRST SURG W/RLS TRANSVRS CARPL LIGM Right 2018    Procedure: ENDOSCOPIC CARPAL TUNNEL RELEASE;  Surgeon: Óscar De La Torre MD;  Location: [facility];  Service: Orthopedics   • SD Manjeet Alexis Sarthak 83 DX/THER AGT PVRT FACET JT LMBR/SAC 1 LEVEL Bilateral 10/25/2022    Procedure: L3-L4,L4-5,L5-S1 BILATERAL BRANCH BLOCK;  Surgeon: Erna Koehler DO;  Location: [facility];  Service: Pain Management    • SD NJX DX/THER AGT PVRT FACET JT LMBR/SAC 1 LEVEL Bilateral 11/15/2022    Procedure: BILATERAL L3-S1 MEDICAL BRANCH BLOCK ([id number]); Surgeon: Erna Koehler DO;  Location: [facility];  Service: Pain Management    • SD TENDON SHEATH INCISION Left 2018    Procedure: LONG TRIGGER FINGER RELEASE;  Surgeon: Óscar De La Torre MD;  Location: [facility];  Service: Orthopedics   • RADIOFREQUENCY ABLATION Right 2022    Procedure: RIGHT L3-S1 RADIO FREQUENCY ABLATION ([id number],99423); Surgeon: Erna Koehler DO;  Location: [facility];  Service: Pain Management    • RADIOFREQUENCY ABLATION Left 2022    Procedure: LEFT L3-S1 RADIO FREQUENCY ABLATION (40402,82543);   Surgeon: Erna Koehler DO;  Location: [facility];  Service: Pain Management    • TONSILLECTOMY     • UPPER GASTROINTESTINAL ENDOSCOPY         Family History   Problem Relation Age of Onset   • Stroke Mother    • Alzheimer's disease Mother    • Arthritis Mother    • Coronary artery disease Mother    • Breast cancer Mother 77   • Cancer Mother    • Heart disease Mother    • Hypertension Father    • Gout Father    • Diabetes type II Father    • Coronary artery disease Father    • Heart disease Father    • No Known Problems Sister    • No Known Problems Sister    • No Known Problems Sister    • No Known Problems Daughter    • No Known Problems Maternal Grandmother    • No Known Problems Maternal Grandfather    • No Known Problems Paternal Grandmother    • No Known Problems Paternal Grandfather    • Prostate cancer Brother    • Pancreatic cancer Brother    • No Known Problems Maternal Aunt    • No Known Problems Maternal Aunt    • No Known Problems Maternal Aunt    • No Known Problems Maternal Aunt    • Cancer Maternal Aunt    • No Known Problems Paternal Aunt    • No Known Problems Paternal Aunt          Medications have been verified  Objective   /65   Pulse 97   Temp (!) 97 3 °F (36 3 °C)   Resp 16   LMP 09/18/2014   SpO2 98%        Physical Exam     Physical Exam  Vitals and nursing note reviewed  Constitutional:       General: She is not in acute distress  Appearance: Normal appearance  She is normal weight  She is not ill-appearing, toxic-appearing or diaphoretic  HENT:      Head: Normocephalic and atraumatic  Right Ear: Tenderness present  Tympanic membrane is erythematous and bulging  Left Ear: Tympanic membrane normal       Nose: Nose normal  No congestion or rhinorrhea  Mouth/Throat:      Mouth: Mucous membranes are moist       Pharynx: Oropharynx is clear  Posterior oropharyngeal erythema present  No oropharyngeal exudate  Eyes:      General:         Right eye: No discharge  Left eye: No discharge  Cardiovascular:      Rate and Rhythm: Normal rate and regular rhythm  Pulses: Normal pulses  Heart sounds: Normal heart sounds  No murmur heard  No friction rub  No gallop  Pulmonary:      Effort: Pulmonary effort is normal  No respiratory distress  Breath sounds: Normal breath sounds  No stridor   No wheezing, rhonchi or rales    Chest:      Chest wall: No tenderness  Abdominal:      General: Bowel sounds are normal       Palpations: Abdomen is soft  Tenderness: There is no abdominal tenderness  Skin:     General: Skin is warm and dry  Neurological:      Mental Status: She is alert     Psychiatric:         Mood and Affect: Mood normal          Behavior: Behavior normal

## 2023-01-27 NOTE — PATIENT INSTRUCTIONS
Rapid strep negative, please allow 24 to 48 hours for your throat culture to result  Please take Augmentin 2 times daily for the next 7 days for acute otitis media  Days use albuterol inhaler every 6 hours as needed for chest tightness  Please trial warm salt water gargles, Chloraseptic spray, Cepacol cough drops and warm tea with honey as needed for sore throat  If your symptoms persist, please follow-up with your PCP

## 2023-01-29 LAB — BACTERIA THROAT CULT: NORMAL

## 2023-01-30 NOTE — TELEPHONE ENCOUNTER
Spoke with patient and made appointment in August, informed of dexa scan, patient states she will call to schedule

## 2023-01-31 ENCOUNTER — OFFICE VISIT (OUTPATIENT)
Dept: FAMILY MEDICINE CLINIC | Facility: CLINIC | Age: 63
End: 2023-01-31

## 2023-01-31 VITALS
BODY MASS INDEX: 33.12 KG/M2 | DIASTOLIC BLOOD PRESSURE: 60 MMHG | WEIGHT: 194 LBS | RESPIRATION RATE: 16 BRPM | TEMPERATURE: 97.7 F | OXYGEN SATURATION: 95 % | HEART RATE: 84 BPM | SYSTOLIC BLOOD PRESSURE: 102 MMHG | HEIGHT: 64 IN

## 2023-01-31 DIAGNOSIS — I49.5 SICK SINUS SYNDROME (HCC): ICD-10-CM

## 2023-01-31 DIAGNOSIS — J01.00 ACUTE NON-RECURRENT MAXILLARY SINUSITIS: Primary | ICD-10-CM

## 2023-01-31 DIAGNOSIS — Z00.00 MEDICARE ANNUAL WELLNESS VISIT, SUBSEQUENT: ICD-10-CM

## 2023-01-31 RX ORDER — BENZONATATE 200 MG/1
200 CAPSULE ORAL 3 TIMES DAILY PRN
Qty: 20 CAPSULE | Refills: 0 | Status: SHIPPED | OUTPATIENT
Start: 2023-01-31

## 2023-01-31 RX ORDER — PREDNISONE 50 MG/1
50 TABLET ORAL DAILY
Qty: 5 TABLET | Refills: 0 | Status: SHIPPED | OUTPATIENT
Start: 2023-01-31 | End: 2023-02-05

## 2023-01-31 NOTE — ASSESSMENT & PLAN NOTE
She was told to continue on her Augmentin  She was given prescriptions for Tessalon Perles and prednisone  Discussed about possible side effect  I am going to swab her for flu and COVID

## 2023-01-31 NOTE — PROGRESS NOTES
Assessment and Plan:     Problem List Items Addressed This Visit        Respiratory    Acute non-recurrent maxillary sinusitis - Primary     She was told to continue on her Augmentin  She was given prescriptions for Tessalon Perles and prednisone  Discussed about possible side effect  I am going to swab her for flu and COVID  Relevant Medications    predniSONE 50 mg tablet    benzonatate (TESSALON) 200 MG capsule    Other Relevant Orders    Covid/Flu- Office Collect       Cardiovascular and Mediastinum    Sick sinus syndrome ([facility] [address] )     Asymptomatic  Continue to follow-up with cardiology  Continue to monitor  Other    Medicare annual wellness visit, subsequent     It was discussed about immunizations, diet, exercise and safety measures  Preventive health issues were discussed with patient, and age appropriate screening tests were ordered as noted in patient's After Visit Summary  Personalized health advice and appropriate referrals for health education or preventive services given if needed, as noted in patient's After Visit Summary  History of Present Illness:     Patient presents for a Medicare Wellness Visit    She is here today with complaint of upper respiratory symptoms including nasal congestion, postnasal drip and sinus pressure  She was seen in urgent care and was a started on Augmentin for otitis media  She stated her symptoms are not improving but she denies any fever or chills  She had rapid strep came back negative  She was not swabbed for flu or COVID  Patient Care Team:  Jocelyn Adams as PCP - General (Nurse Practitioner)  Brenna Lockhart MD as PCP - [address] [place] [address] (RTE)  Brenna Lockhart MD as PCP - PCP-[facility] (RTE)     Review of Systems:     Review of Systems   Constitutional: Negative for activity change, chills and fever  HENT: Positive for congestion, postnasal drip, rhinorrhea and sinus pressure      Respiratory: Positive for cough  Negative for apnea  Gastrointestinal: Negative for diarrhea and vomiting  Skin: Negative for rash  Neurological: Negative for dizziness          Problem List:     Patient Active Problem List   Diagnosis   • Abnormal CT scan   • Sick sinus syndrome (HCC)   • Acute non-recurrent maxillary sinusitis   • HSV-1 infection   • Status post carpal tunnel release   • Fibrocystic breast disease   • Hyperlipidemia   • Knee osteoarthritis   • Lichen sclerosus   • Menopausal and postmenopausal disorder   • Neck pain   • Candida infection of flexural skin   • Grade 1 out of 6 intensity murmur   • Encounter for screening mammogram for malignant neoplasm of breast   • History of DVT (deep vein thrombosis)   • Iron deficiency anemia   • Vitamin deficiency   • Medicare annual wellness visit, subsequent   • Mass of soft tissue of right upper extremity   • Chronic right shoulder pain   • Work related injury   • Leukopenia   • Anemia   • Right ear pain   • Dysphagia   • Acute gastric ulcer without hemorrhage or perforation   • History of colon polyps   • Overweight   • Bariatric surgery status   • Depression   • Nondisplaced fracture of medial malleolus of right tibia, initial encounter for closed fracture   • Closed nondisplaced Maisonneuve's fracture of right leg   • Closed fracture of upper end of right fibula   • Closed fracture of left distal radius and ulna   • Fall   • Sinus pressure   • Pacemaker   • PSVT (paroxysmal supraventricular tachycardia) ([facility])   • Insomnia   • Back pain without sciatica   • LLQ pain   • Stomach cramps      Past Medical and Surgical History:     Past Medical History:   Diagnosis Date   • Anemia     iron def   • Arthritis     knees   • Carpal tunnel syndrome of right wrist    • Chest pain    • Colon polyp    • Deep vein thrombosis (DVT) of proximal lower extremity ([facility] [address] ) 1/1/2012   • Depression     at times   • DVT (deep venous thrombosis) ([facility] [address] ) 2012   • Dysphagia    • Encounter for surgical aftercare following surgery of digestive system 2020    s/p Raghav-En-Y Gastric Bypass with Dr Alta Martin in 2017  INITIAL weight:241 lb INITIAL weight with start of topirmate: 176 lb Goal weight loss of 5-10 %-159 lb-167 lb    • Esophageal reflux 2011   • Grade 1 out of 6 intensity murmur 2019   • Heart murmur    • History of transfusion 1980   • Pacemaker 2018   • Pneumonia     age 15   • Psychiatric disorder    • Trigger finger, left    • Visit for suture removal 2021     Past Surgical History:   Procedure Laterality Date   • BACK SURGERY     • CARDIAC PACEMAKER PLACEMENT  2018   • CERVICAL DISC SURGERY  2009    PLATES & SCREWS   •  SECTION      X2   • COLONOSCOPY     • EPIDURAL BLOCK INJECTION Right 2022    Procedure: RIGHT L3-X3BPHFWAYEQCGRIP EPIDURAL STEROID INJECTION (69200); Surgeon: Kenny Vale DO;  Location: [facility];  Service: Pain Management    • GASTRIC BYPASS  2017    LAP RINYGB SURGERY   • KNEE ARTHROSCOPY     • KNEE SURGERY Bilateral    • KNEE SURGERY Left 2020   • OTHER SURGICAL HISTORY      ARM SURGERIES   • MT NDSC WRST SURG W/RLS TRANSVRS CARPL LIGM Right 2018    Procedure: ENDOSCOPIC CARPAL TUNNEL RELEASE;  Surgeon: Liana Carter MD;  Location: [facility];  Service: Orthopedics   • MT Manjeet Alexis Sarthak 83 DX/THER AGT PVRT FACET JT LMBR/SAC 1 LEVEL Bilateral 10/25/2022    Procedure: L3-L4,L4-5,L5-S1 BILATERAL BRANCH BLOCK;  Surgeon: Kenny Vale DO;  Location: [facility];  Service: Pain Management    • MT NJX DX/THER AGT PVRT FACET JT LMBR/SAC 1 LEVEL Bilateral 11/15/2022    Procedure: BILATERAL L3-S1 MEDICAL BRANCH BLOCK (69662,47070,19016);   Surgeon: Kenny Vale DO;  Location: [facility];  Service: Pain Management    • MT TENDON SHEATH INCISION Left 2018    Procedure: LONG TRIGGER FINGER RELEASE;  Surgeon: Liana Carter MD;  [facility];  Service: Orthopedics   • RADIOFREQUENCY ABLATION Right 2022 Procedure: RIGHT L3-S1 RADIO FREQUENCY ABLATION (75270,75304); Surgeon: Angel Luis Christianson DO;  Location: [facility];  Service: Pain Management    • RADIOFREQUENCY ABLATION Left 12/20/2022    Procedure: LEFT L3-S1 RADIO FREQUENCY ABLATION (17743,49188); Surgeon: Angel Luis Christianson DO;  Location: [facility];  Service: Pain Management    • TONSILLECTOMY     • UPPER GASTROINTESTINAL ENDOSCOPY        Family History:     Family History   Problem Relation Age of Onset   • Stroke Mother    • Alzheimer's disease Mother    • Arthritis Mother    • Coronary artery disease Mother    • Breast cancer Mother 77   • Cancer Mother    • Heart disease Mother    • Hypertension Father    • Gout Father    • Diabetes type II Father    • Coronary artery disease Father    • Heart disease Father    • No Known Problems Sister    • No Known Problems Sister    • No Known Problems Sister    • No Known Problems Daughter    • No Known Problems Maternal Grandmother    • No Known Problems Maternal Grandfather    • No Known Problems Paternal Grandmother    • No Known Problems Paternal Grandfather    • Prostate cancer Brother    • Pancreatic cancer Brother    • No Known Problems Maternal Aunt    • No Known Problems Maternal Aunt    • No Known Problems Maternal Aunt    • No Known Problems Maternal Aunt    • Cancer Maternal Aunt    • No Known Problems Paternal Aunt    • No Known Problems Paternal Aunt       Social History:     Social History     Socioeconomic History   • Marital status: /Civil Union     Spouse name: None   • Number of children: None   • Years of education: None   • Highest education level: None   Occupational History   • None   Tobacco Use   • Smoking status: Former     Packs/day: 0 25     Types: Cigarettes   • Smokeless tobacco: Never   Vaping Use   • Vaping Use: Never used   Substance and Sexual Activity   • Alcohol use:  Yes     Alcohol/week: 2 0 standard drinks     Types: 1 Glasses of wine, 1 Standard drinks or equivalent per week Comment: very rare social   • Drug use: No   • Sexual activity: Not Currently   Other Topics Concern   • None   Social History Narrative   • None     Social Determinants of Health     Financial Resource Strain: Not on file   Food Insecurity: Not on file   Transportation Needs: Not on file   Physical Activity: Not on file   Stress: Not on file   Social Connections: Not on file   Intimate Partner Violence: Not on file   Housing Stability: Not on file      Medications and Allergies:     Current Outpatient Medications   Medication Sig Dispense Refill   • acetaminophen (TYLENOL) 650 mg CR tablet Take 650 mg by mouth every 8 (eight) hours as needed for mild pain Take 2 tablets in the AM and 2 tablets in the late evening     • albuterol (ProAir HFA) 90 mcg/act inhaler Inhale 2 puffs every 6 (six) hours as needed for wheezing 8 5 g 0   • amoxicillin-clavulanate (AUGMENTIN) 875-125 mg per tablet Take 1 tablet by mouth every 12 (twelve) hours for 7 days 14 tablet 0   • Ascorbic Acid (VITAMIN C) 100 MG CHEW Chew     • atorvastatin (LIPITOR) 10 mg tablet take 1 tablet by mouth once daily 90 tablet 0   • benzonatate (TESSALON) 200 MG capsule Take 1 capsule (200 mg total) by mouth 3 (three) times a day as needed for cough 20 capsule 0   • Biotin 97946 MCG TABS Take by mouth daily       • Calcium Carbonate (CALCI-CHEW PO) Take 500 mg by mouth 2 (two) times a day     • cholecalciferol (VITAMIN D3) 1,000 units tablet Take 5,000 Units by mouth in the morning     • clobetasol (TEMOVATE) 0 05 % cream Apply to affected areas daily x 12 weeks, then twice weekly for maintenance     • Cyanocobalamin (VITAMIN B 12 PO) Take 1,000 mcg by mouth     • dicyclomine (BENTYL) 10 mg capsule take 1 capsule by mouth four times a day before meals at bedtime 120 capsule 2   • Eliquis 2 5 MG TAKE 1 TABLET BY MOUTH TWICE A DAY 60 tablet 3   • Ferrous Sulfate  (45 Fe) MG TBCR Take 1 tablet by mouth daily     • Lysine HCl POWD Take by mouth     • methocarbamol (ROBAXIN) 500 mg tablet Take 1 tablet (500 mg total) by mouth 2 (two) times a day 20 tablet 0   • metoprolol succinate (TOPROL-XL) 25 mg 24 hr tablet take 1 tablet by mouth once daily 90 tablet 1   • Multiple Vitamin (MULTIVITAMIN) capsule Take 1 capsule by mouth daily 2 chewies daily     • nystatin (MYCOSTATIN) powder Apply topically 2 (two) times a day 30 g 0   • predniSONE 50 mg tablet Take 1 tablet (50 mg total) by mouth daily for 5 days 5 tablet 0   • Diclofenac Sodium (VOLTAREN) 1 % Apply 2 g topically 4 (four) times a day (Patient not taking: Reported on 10/5/2022) 100 g 6   • fluticasone (FLONASE) 50 mcg/act nasal spray instill 1 spray into each nostril once daily (Patient not taking: Reported on 10/6/2022) 32 g 1   • gabapentin (Neurontin) 100 mg capsule Take 1 capsule (100 mg total) by mouth 3 (three) times a day As needed for shooting pain (Patient not taking: Reported on 1/31/2023) 90 capsule 4     No current facility-administered medications for this visit       No Known Allergies   Immunizations:     Immunization History   Administered Date(s) Administered   • COVID-19 PFIZER VACCINE 0 3 ML IM 04/03/2021, 04/28/2021, 11/01/2021   • COVID-19 Pfizer Vac BIVALENT Sudhakar-sucrose 12 Yr+ IM (BOOSTER ONLY) 10/15/2022   • Hep A, adult 08/08/2017   • Hep A, ped/adol, 2 dose 01/04/2017, 08/08/2017   • Hepatitis A 01/04/2017   • INFLUENZA 10/05/2011, 11/22/2016, 11/22/2016, 10/23/2017, 10/23/2017, 10/23/2017, 08/18/2021, 08/18/2021   • Influenza Quadrivalent Preservative Free 3 years and older IM 10/15/2022   • Influenza, injectable, quadrivalent, preservative free 0 5 mL 10/30/2020   • Influenza, recombinant, quadrivalent,injectable, preservative free 10/31/2018, 10/15/2019   • Influenza, seasonal, injectable 12/22/2014, 10/13/2015   • Pneumococcal Polysaccharide PPV23 02/25/2013, 01/17/2016   • Tdap 04/21/2011, 04/10/2017, 06/18/2021   • Tuberculin Skin Test-PPD Intradermal 07/28/2021   • Zoster 01/17/2016      Health Maintenance:         Topic Date Due   • HIV Screening  Never done   • Cervical Cancer Screening  Never done   • Colorectal Cancer Screening  06/23/2023   • Breast Cancer Screening: Mammogram  08/17/2023   • Hepatitis C Screening  Completed         Topic Date Due   • Pneumococcal Vaccine: Pediatrics (0 to 5 Years) and At-Risk Patients (6 to 59 Years) (2 - PCV) 01/17/2017   • COVID-19 Vaccine (4 - Booster for Pfizer series) 12/10/2022      Medicare Screening Tests and Risk Assessments:     Serge Eisenmenger is here for her Subsequent Wellness visit  Health Risk Assessment:   Patient rates overall health as good  Patient feels that their physical health rating is same  Patient is satisfied with their life  Eyesight was rated as same  Hearing was rated as same  Patient feels that their emotional and mental health rating is same  Patients states they are never, rarely angry  Patient states they are never, rarely unusually tired/fatigued  Pain experienced in the last 7 days has been none  Patient states that she has experienced no weight loss or gain in last 6 months  Fall Risk Screening: In the past year, patient has experienced: no history of falling in past year      Urinary Incontinence Screening:   Patient has not leaked urine accidently in the last six months  Home Safety:  Patient does not have trouble with stairs inside or outside of their home  Patient has working smoke alarms and has no working carbon monoxide detector  Home safety hazards include: none  Nutrition:   Current diet is Regular  Medications:   Patient is currently taking over-the-counter supplements  OTC medications include: see medication list  Patient is able to manage medications  Activities of Daily Living (ADLs)/Instrumental Activities of Daily Living (IADLs):   Walk and transfer into and out of bed and chair?: Yes  Dress and groom yourself?: Yes    Bathe or shower yourself?: Yes    Feed yourself?  Yes  Do your laundry/housekeeping?: Yes  Manage your money, pay your bills and track your expenses?: Yes  Make your own meals?: Yes    Do your own shopping?: Yes    Previous Hospitalizations:   Any hospitalizations or ED visits within the last 12 months?: Yes    How many hospitalizations have you had in the last year?: more than 4    Hospitalization Comments: For pain medicine     Advance Care Planning:   Living will: No      PREVENTIVE SCREENINGS      Cardiovascular Screening:    General: Screening Not Indicated and History Lipid Disorder      Diabetes Screening:     General: Screening Current      Colorectal Cancer Screening:     General: Screening Current      Breast Cancer Screening:     General: Screening Current      Osteoporosis Screening:    General: Screening Not Indicated and History Osteoporosis      Lung Cancer Screening:     General: Screening Not Indicated      Hepatitis C Screening:    General: Screening Current    Screening, Brief Intervention, and Referral to Treatment (SBIRT)    Screening  Typical number of drinks in a day: 0  Typical number of drinks in a week: 0  Interpretation: Low risk drinking behavior  Single Item Drug Screening:  How often have you used an illegal drug (including marijuana) or a prescription medication for non-medical reasons in the past year? never    Single Item Drug Screen Score: 0  Interpretation: Negative screen for possible drug use disorder    No results found  Physical Exam:     /60 (BP Location: Left arm, Patient Position: Sitting, Cuff Size: Large)   Pulse 84   Temp 97 7 °F (36 5 °C) (Tympanic)   Resp 16   Ht 5' 4" (1 626 m)   Wt 88 kg (194 lb)   LMP 09/18/2014   SpO2 95%   BMI 33 30 kg/m²     Physical Exam  Vitals and nursing note reviewed  Constitutional:       General: She is not in acute distress  Appearance: Normal appearance  She is well-developed  HENT:      Head: Normocephalic and atraumatic        Mouth/Throat:      Pharynx: Oropharynx is clear  Eyes:      Conjunctiva/sclera: Conjunctivae normal    Cardiovascular:      Rate and Rhythm: Normal rate and regular rhythm  Heart sounds: No murmur heard  Pulmonary:      Effort: Pulmonary effort is normal  No respiratory distress  Breath sounds: Normal breath sounds  Abdominal:      Palpations: Abdomen is soft  Tenderness: There is no abdominal tenderness  Musculoskeletal:         General: No swelling  Normal range of motion  Cervical back: Neck supple  Skin:     General: Skin is warm and dry  Capillary Refill: Capillary refill takes less than 2 seconds  Findings: No rash  Neurological:      Mental Status: She is alert  Mental status is at baseline     Psychiatric:         Mood and Affect: Mood normal           Elayne Shone, MD

## 2023-02-01 LAB
FLUAV RNA RESP QL NAA+PROBE: NEGATIVE
FLUBV RNA RESP QL NAA+PROBE: NEGATIVE
SARS-COV-2 RNA RESP QL NAA+PROBE: NEGATIVE

## 2023-03-06 ENCOUNTER — TELEPHONE (OUTPATIENT)
Dept: FAMILY MEDICINE CLINIC | Facility: CLINIC | Age: 63
End: 2023-03-06

## 2023-03-06 DIAGNOSIS — G47.00 INSOMNIA, UNSPECIFIED TYPE: Primary | ICD-10-CM

## 2023-03-07 RX ORDER — TRAZODONE HYDROCHLORIDE 50 MG/1
50 TABLET ORAL
Qty: 30 TABLET | Refills: 1 | Status: SHIPPED | OUTPATIENT
Start: 2023-03-07

## 2023-03-07 NOTE — TELEPHONE ENCOUNTER
Ok to refill trazodone  I never prescribed her tramadol so she will have to contact the prescribing provider

## 2023-03-15 DIAGNOSIS — E78.5 HYPERLIPIDEMIA, UNSPECIFIED HYPERLIPIDEMIA TYPE: ICD-10-CM

## 2023-03-15 RX ORDER — ATORVASTATIN CALCIUM 10 MG/1
TABLET, FILM COATED ORAL
Qty: 90 TABLET | Refills: 0 | Status: SHIPPED | OUTPATIENT
Start: 2023-03-15

## 2023-03-21 ENCOUNTER — REMOTE DEVICE CLINIC VISIT (OUTPATIENT)
Dept: CARDIOLOGY CLINIC | Facility: CLINIC | Age: 63
End: 2023-03-21

## 2023-03-21 DIAGNOSIS — Z95.0 PACEMAKER: Primary | ICD-10-CM

## 2023-03-21 NOTE — PROGRESS NOTES
Results for orders placed or performed in visit on 03/21/23   Cardiac EP device report    Narrative    MDT-DUAL PPM (AAIR-DDDR MODE)/ ACTIVE SYSTEM IS MRI CONDITIONAL  CARELINK TRANSMISSION: BATTERY VOLTAGE ADEQUATE (10 2 YRS); AP 46 7%  0 2% (AAIR-DDDR 60 PPM); ALL AVAILABLE LEAD PARAMETERS WITHIN NORMAL LIMITS  NO SIGNIFICANT HIGH RATE EPISODES  NORMAL DEVICE FUNCTION    ES

## 2023-03-30 DIAGNOSIS — G47.00 INSOMNIA, UNSPECIFIED TYPE: ICD-10-CM

## 2023-03-31 RX ORDER — TRAZODONE HYDROCHLORIDE 50 MG/1
TABLET ORAL
Qty: 90 TABLET | Refills: 1 | Status: SHIPPED | OUTPATIENT
Start: 2023-03-31

## 2023-04-01 PROBLEM — J01.00 ACUTE NON-RECURRENT MAXILLARY SINUSITIS: Status: RESOLVED | Noted: 2018-12-24 | Resolved: 2023-04-01

## 2023-04-17 ENCOUNTER — HOSPITAL ENCOUNTER (EMERGENCY)
Facility: HOSPITAL | Age: 63
Discharge: HOME | End: 2023-04-17
Attending: EMERGENCY MEDICINE

## 2023-04-17 ENCOUNTER — APPOINTMENT (EMERGENCY)
Dept: RADIOLOGY | Facility: HOSPITAL | Age: 63
End: 2023-04-17

## 2023-04-17 VITALS
BODY MASS INDEX: 29.88 KG/M2 | HEART RATE: 62 BPM | SYSTOLIC BLOOD PRESSURE: 114 MMHG | RESPIRATION RATE: 16 BRPM | DIASTOLIC BLOOD PRESSURE: 65 MMHG | OXYGEN SATURATION: 99 % | WEIGHT: 175 LBS | HEIGHT: 64 IN | TEMPERATURE: 98.3 F

## 2023-04-17 DIAGNOSIS — M54.2 NECK PAIN: Primary | ICD-10-CM

## 2023-04-17 DIAGNOSIS — M62.838 NECK MUSCLE SPASM: ICD-10-CM

## 2023-04-17 LAB
ALBUMIN SERPL BCP-MCNC: 3.7 G/DL (ref 3.5–5)
ALP SERPL-CCNC: 59 U/L (ref 46–116)
ALT SERPL W P-5'-P-CCNC: 32 U/L (ref 12–78)
ANION GAP SERPL CALCULATED.3IONS-SCNC: 3 MMOL/L (ref 4–13)
AST SERPL W P-5'-P-CCNC: 28 U/L (ref 5–45)
ATRIAL RATE: 62 BPM
BASOPHILS # BLD AUTO: 0.08 THOUSANDS/ΜL (ref 0–0.1)
BASOPHILS NFR BLD AUTO: 1 % (ref 0–1)
BILIRUB SERPL-MCNC: 0.45 MG/DL (ref 0.2–1)
BILIRUB UR QL STRIP: NEGATIVE
BUN SERPL-MCNC: 16 MG/DL (ref 5–25)
CALCIUM SERPL-MCNC: 9.1 MG/DL (ref 8.3–10.1)
CHLORIDE SERPL-SCNC: 111 MMOL/L (ref 96–108)
CLARITY UR: CLEAR
CO2 SERPL-SCNC: 27 MMOL/L (ref 21–32)
COLOR UR: YELLOW
CREAT SERPL-MCNC: 0.74 MG/DL (ref 0.6–1.3)
EOSINOPHIL # BLD AUTO: 0.36 THOUSAND/ΜL (ref 0–0.61)
EOSINOPHIL NFR BLD AUTO: 5 % (ref 0–6)
ERYTHROCYTE [DISTWIDTH] IN BLOOD BY AUTOMATED COUNT: 12.5 % (ref 11.6–15.1)
FLUAV RNA RESP QL NAA+PROBE: NEGATIVE
FLUBV RNA RESP QL NAA+PROBE: NEGATIVE
GFR SERPL CREATININE-BSD FRML MDRD: 86 ML/MIN/1.73SQ M
GLUCOSE SERPL-MCNC: 100 MG/DL (ref 65–140)
GLUCOSE UR STRIP-MCNC: NEGATIVE MG/DL
HCT VFR BLD AUTO: 34.8 % (ref 34.8–46.1)
HGB BLD-MCNC: 11.5 G/DL (ref 11.5–15.4)
HGB UR QL STRIP.AUTO: NEGATIVE
IMM GRANULOCYTES # BLD AUTO: 0.02 THOUSAND/UL (ref 0–0.2)
IMM GRANULOCYTES NFR BLD AUTO: 0 % (ref 0–2)
KETONES UR STRIP-MCNC: ABNORMAL MG/DL
LEUKOCYTE ESTERASE UR QL STRIP: NEGATIVE
LYMPHOCYTES # BLD AUTO: 1.64 THOUSANDS/ΜL (ref 0.6–4.47)
LYMPHOCYTES NFR BLD AUTO: 24 % (ref 14–44)
MCH RBC QN AUTO: 32.9 PG (ref 26.8–34.3)
MCHC RBC AUTO-ENTMCNC: 33 G/DL (ref 31.4–37.4)
MCV RBC AUTO: 99 FL (ref 82–98)
MONOCYTES # BLD AUTO: 0.41 THOUSAND/ΜL (ref 0.17–1.22)
MONOCYTES NFR BLD AUTO: 6 % (ref 4–12)
NEUTROPHILS # BLD AUTO: 4.36 THOUSANDS/ΜL (ref 1.85–7.62)
NEUTS SEG NFR BLD AUTO: 64 % (ref 43–75)
NITRITE UR QL STRIP: NEGATIVE
NRBC BLD AUTO-RTO: 0 /100 WBCS
P AXIS: 85 DEGREES
PH UR STRIP.AUTO: 5 [PH] (ref 4.5–8)
PLATELET # BLD AUTO: 213 THOUSANDS/UL (ref 149–390)
PMV BLD AUTO: 11.4 FL (ref 8.9–12.7)
POTASSIUM SERPL-SCNC: 3.6 MMOL/L (ref 3.5–5.3)
PR INTERVAL: 208 MS
PROT SERPL-MCNC: 7.3 G/DL (ref 6.4–8.4)
PROT UR STRIP-MCNC: NEGATIVE MG/DL
QRS AXIS: 71 DEGREES
QRSD INTERVAL: 84 MS
QT INTERVAL: 436 MS
QTC INTERVAL: 442 MS
RBC # BLD AUTO: 3.5 MILLION/UL (ref 3.81–5.12)
RSV RNA RESP QL NAA+PROBE: NEGATIVE
SARS-COV-2 RNA RESP QL NAA+PROBE: NEGATIVE
SODIUM SERPL-SCNC: 141 MMOL/L (ref 135–147)
SP GR UR STRIP.AUTO: >=1.03 (ref 1–1.03)
T WAVE AXIS: 32 DEGREES
UROBILINOGEN UR QL STRIP.AUTO: 0.2 E.U./DL
VENTRICULAR RATE: 62 BPM
WBC # BLD AUTO: 6.87 THOUSAND/UL (ref 4.31–10.16)

## 2023-04-17 RX ORDER — LIDOCAINE 50 MG/G
1 PATCH TOPICAL ONCE
Status: DISCONTINUED | OUTPATIENT
Start: 2023-04-17 | End: 2023-04-17 | Stop reason: HOSPADM

## 2023-04-17 RX ORDER — KETOROLAC TROMETHAMINE 30 MG/ML
15 INJECTION, SOLUTION INTRAMUSCULAR; INTRAVENOUS ONCE
Status: COMPLETED | OUTPATIENT
Start: 2023-04-17 | End: 2023-04-17

## 2023-04-17 RX ORDER — METHOCARBAMOL 500 MG/1
500 TABLET, FILM COATED ORAL ONCE
Status: COMPLETED | OUTPATIENT
Start: 2023-04-17 | End: 2023-04-17

## 2023-04-17 RX ORDER — GABAPENTIN 300 MG/1
300 CAPSULE ORAL 3 TIMES DAILY
Qty: 42 CAPSULE | Refills: 0 | Status: SHIPPED | OUTPATIENT
Start: 2023-04-17 | End: 2023-05-01

## 2023-04-17 RX ADMIN — LIDOCAINE 1 PATCH: 50 PATCH TOPICAL at 15:57

## 2023-04-17 RX ADMIN — KETOROLAC TROMETHAMINE 15 MG: 30 INJECTION, SOLUTION INTRAMUSCULAR; INTRAVENOUS at 15:57

## 2023-04-17 RX ADMIN — METHOCARBAMOL 500 MG: 500 TABLET ORAL at 15:57

## 2023-04-17 RX ADMIN — IOHEXOL 100 ML: 350 INJECTION, SOLUTION INTRAVENOUS at 18:48

## 2023-04-17 NOTE — ED ATTENDING ATTESTATION
"Final Diagnoses:     1  Neck pain    2  Neck muscle spasm           I, Rickey Carter MD, saw and evaluated the patient  All available labs and X-rays were ordered by me or the resident and have been reviewed by myself  I discussed the patient with the resident / non-physician and agree with the resident's / non-physician practitioner's findings and plan as documented in the resident's / non-physician practicitioner's note, except where noted  At this point, I agree with the current assessment done in the ED  I was present during key portions of all procedures performed unless otherwise stated  Nursing Triage:     Chief Complaint   Patient presents with   • Neck Pain     Pt c/o right sided neck pain and dizziness  Pt sent in for further evaluation from urgent care       HPI:   This is a 60 y o  female presenting for evaluation of neck pain w/o falls/trauma  Tylenol didn't help her symptoms  Didn't trial NSAIDs b/c hx of Eliquis for clots  Went to  ? sent in for further evaluation  No paresthesias or weakness ,just the neck pain and dizziness non-specifically  Feels unwell  Denies any urinary tract infection symptoms (burning, itching, pain, blood, frequency)  No nystagmus  Ambulated without difficulty  Hx of multiple spinal procedures, \"they burned my nerves 8 times\"  Compliant with all of her medications as prescribed  Pain getting worse since yesterday  ASSESSMENT + PLAN:   Neck pain:  - likely viral vs  MSK given over-all presentation  - PAtietn is very concerned something more dangerous is going on; discussed doing CTH for bleed, CTC for vascualr abnormality or fracture  She does have a little midline tenderness in cervical region but also paracervical    - supporitve measures, re-evaluate  - that beign said, she feels like she has a fever (despite having normal temperature)  I think there is a viral process going on       Physical:   Pertinent: normal appearance, doesn't want to " range her neck  EHL/FHL/PF/DF/KF/KE/HF/HE 5/5  No saddle anesthesia  2+ patellar reflexes  SLR negative  M/U/R/A nerve sensation bilateral intact but says the right side feels slightly different  She doesn't thuink this is new but b/c no one has asked her before   strength 5/5  Finger abduction/adduction/opposition intact  Suppination/Pronation intact without reproduction of pain  Good capillary refill  2+ radial pulses, bilaterally equal    WE/WF/WRD/WUD 5/5, intact, without pain  BICEPS/TRICEPS 5/5  Shoulder abduction/adduction 5/5  No pronator drift  No aphasia/dysarthria  CN 2-12 intact  No nystagmus  No facial droop  General: VS reviewed  Appears in NAD  awake, alert  Well-nourished, well-developed  Appears stated age  Speaking normally in full sentences  Head: Normocephalic, atraumatic  Eyes: EOM-I  No diplopia  No hyphema  No subconjunctival hemorrhages  Symmetrical lids  ENT: Atraumatic external nose and ears  MMM  No malocclusion  No stridor  Normal phonation  No drooling  Normal swallowing  Neck: No JVD  CV: No pallor noted  C-spine midline tenderness  +cspine paracervical tenderness  Lungs:   No tachypnea  No respiratory distress  Abd: soft nt nd no rebound/guarding  MSK:   FROM spontaneously  Skin: Dry, intact  Neuro: Awake, alert, GCS15, CN II-XII grossly intact  Motor grossly intact  As above   Psychiatric/Behavioral: interacting normally; appropriate mood/affect   Exam: deferred    Vitals:    04/17/23 1530 04/17/23 1700 04/17/23 1900 04/17/23 2015   BP: 115/67 116/59 113/69 114/65   BP Location: Right arm Right arm     Pulse: 60 57 60 62   Resp: 20 20 18 16   Temp:       TempSrc:       SpO2: 97% 98% 97% 99%   Weight:       Height:         - There are no obvious limitations to social determinants of care  - Nursing note reviewed  - Vitals reviewed     - Orders placed by myself and/or advanced practitioner / resident     - Previous chart was reviewed  - No language barrier    - History obtained from patient  - There are no limitations to the history obtained:     Past Medical:    has a past medical history of Anemia, Arthritis, Carpal tunnel syndrome of right wrist, Chest pain, Colon polyp, Deep vein thrombosis (DVT) of proximal lower extremity ([facility] [address] ) (2012), Depression, DVT (deep venous thrombosis) ([facility] [address] ) (), Dysphagia, Encounter for surgical aftercare following surgery of digestive system (2020), Esophageal reflux (2011), Grade 1 out of 6 intensity murmur (2019), Heart murmur, History of transfusion (), Pacemaker (2018), Pneumonia, Psychiatric disorder, Trigger finger, left, and Visit for suture removal (2021)  Past Surgical:    has a past surgical history that includes Knee surgery (Bilateral);  section; Cervical disc surgery (2009); Tonsillectomy; Other surgical history; Gastric bypass (2017); pr ndsc wrst surg w/rls transvrs carpl ligm (Right, 2018); pr tendon sheath incision (Left, 2018); Cardiac pacemaker placement (2018); Upper gastrointestinal endoscopy; Colonoscopy; Knee arthroscopy; Back surgery; Knee surgery (Left, 2020); Epidural block injection (Right, 2022); pr njx dx/ther agt pvrt facet jt lmbr/sac 1 level (Bilateral, 10/25/2022); pr njx dx/ther agt pvrt facet jt lmbr/sac 1 level (Bilateral, 11/15/2022); Radiofrequency ablation (Right, 2022); and Radiofrequency ablation (Left, 2022)      Social:     Social History     Substance and Sexual Activity   Alcohol Use Yes   • Alcohol/week: 2 0 standard drinks   • Types: 1 Glasses of wine, 1 Standard drinks or equivalent per week    Comment: very rare social     Social History     Tobacco Use   Smoking Status Former   • Packs/day: 0 25   • Types: Cigarettes   Smokeless Tobacco Never     Social History     Substance and Sexual Activity   Drug Use No       Echo:   Results for orders placed during the hospital encounter of 10/30/18    Echo complete with contrast if indicated    Narrative  [address]  [facility] [place]  [phone]    Transthoracic Echocardiogram  2D, M-mode, Doppler, and Color Doppler    Study date:  2018    Patient: Jacek Mcneill  MR number: [id number]  Account number: [de-identified]  : 1960  Age: 60 years  Gender: Female  Status: Inpatient  Location: Bedside  Height: 24 8 in  Weight: 343 2 lb  BP: 105/ 60 mmHg    Indications: Syncope, bradycardia  Diagnoses: R55  - Syncope and collapse    Sonographer:  FLAQUITO Sewell  Primary Physician:  Didier Duenas  Referring Physician:  Vonda Davidson MD  Group:  [facility]  Cardiology Fellow: Gena Trevizo MD  Interpreting Physician:  Natalio Shaw MD    SUMMARY    SUMMARY:  Patient was bradycardic throughout the procedure with heart rates averaging 45 bpm     LEFT VENTRICLE:  Systolic function was at the lower limits of normal  Ejection fraction was estimated to be 50 %  There were no regional wall motion abnormalities  There was no evidence of concentric hypertrophy  MITRAL VALVE:  There was mild regurgitation  TRICUSPID VALVE:  There was mild regurgitation  HISTORY: PRIOR HISTORY: DVT, gastric bypass, current smoker  PROCEDURE: The procedure was performed at the bedside  This was a routine study  The transthoracic approach was used  The study included complete 2D imaging, M-mode, complete spectral Doppler, and color Doppler  The heart rate was 47 bpm,  at the start of the study  Image quality was adequate  LEFT VENTRICLE: Size was normal  Systolic function was at the lower limits of normal  Ejection fraction was estimated to be 50 %  There were no regional wall motion abnormalities  Wall thickness was normal  There was no evidence of  concentric hypertrophy   DOPPLER: Left ventricular diastolic function parameters were normal     RIGHT VENTRICLE: The size was normal  Systolic function was normal  Wall thickness was normal     LEFT ATRIUM: Size was normal     RIGHT ATRIUM: Size was normal     MITRAL VALVE: Valve structure was normal  There was normal leaflet separation  DOPPLER: The transmitral velocity was within the normal range  There was no evidence for stenosis  There was mild regurgitation  AORTIC VALVE: The valve was trileaflet  Leaflets exhibited normal cuspal separation and sclerosis  DOPPLER: Transaortic velocity was within the normal range  There was no evidence for stenosis  There was no regurgitation  TRICUSPID VALVE: The valve structure was normal  There was normal leaflet separation  DOPPLER: The transtricuspid velocity was within the normal range  There was no evidence for stenosis  There was mild regurgitation  Pulmonary artery  systolic pressure was within the normal range  PULMONIC VALVE: Leaflets exhibited normal thickness, no calcification, and normal cuspal separation  DOPPLER: The transpulmonic velocity was within the normal range  There was no regurgitation  PERICARDIUM: There was no pericardial effusion  The pericardium was normal in appearance  AORTA: The root exhibited normal size  SYSTEM MEASUREMENT TABLES    2D  %FS: 28 58 %  Ao Diam: 2 93 cm  EDV(Teich): 84 92 ml  EF(Cube): 63 57 %  EF(Teich): 55 35 %  ESV(Cube): 29 79 ml  ESV(Teich): 37 91 ml  IVSd: 0 9 cm  LA Area: 17 12 cm2  LA Diam: 3 87 cm  LVIDd: 4 34 cm  LVIDs: 3 1 cm  LVPWd: 0 88 cm  RA Area: 17 59 cm2  SI(Cube): 41 92 ml/m2  SI(Teich): 37 91 ml/m2  SV(Cube): 51 98 ml  SV(Teich): 47 01 ml  rv diam: 4 13 cm    CW  AV Env  Ti: 340 11 ms  AV VTI: 31 98 cm  AV Vmax: 1 48 m/s  AV Vmean: 0 94 m/s  AV maxP 74 mmHg  AV meanP 02 mmHg  TR Vmax: 2 08 m/s  TR maxP 23 mmHg    MM  TAPSE: 2 33 cm    PW  E': 0 1 m/s  E/E': 8 55  LVOT Env  Ti: 362 29 ms  LVOT VTI: 23 55 cm  LVOT Vmax: 0 89 m/s  LVOT Vmean: 0 65 m/s  LVOT maxPG: 3 16 mmHg  LVOT meanP 84 mmHg  MV A Rivera: 0 55 m/s  MV Dec Andrew: 3 87 m/s2  MV DecT: 217 32 ms  MV E Rivera: 0 84 m/s  MV E/A Ratio: 1 53    [employer] Accredited Echocardiography Laboratory    Prepared and electronically signed by    Gerri James MD  Signed 2018 11:04:01    No results found for this or any previous visit  Cath:    No results found for this or any previous visit  Code Status: Prior  Advance Directive and Living Will:      Power of :    POLST:    Medications   ketorolac (TORADOL) injection 15 mg (15 mg Intravenous Given 23 155)   methocarbamol (ROBAXIN) tablet 500 mg (500 mg Oral Given 23 155)   iohexol (OMNIPAQUE) 350 MG/ML injection (SINGLE-DOSE) 100 mL (100 mL Intravenous Given 23 184)     CTA head and neck with and without contrast   Final Result         1  No evidence of acute infarct, intracranial hemorrhage or mass  2   No hemodynamically significant stenosis, dissection or occlusion of the carotid or vertebral arteries or major vessels of the [place]                    Workstation performed: [id number]           Orders Placed This Encounter   Procedures   • ED ECG Documentation Only   • FLU/RSV/COVID - if FLU/RSV clinically relevant   • CTA head and neck with and without contrast   • CBC and differential   • Comprehensive metabolic panel   • Ambulatory Referral to Comprehensive Spine Program   • ECG 12 lead   • ECG 12 lead     Labs Reviewed   CBC AND DIFFERENTIAL - Abnormal       Result Value Ref Range Status    WBC 6 87  4 31 - 10 16 Thousand/uL Final    RBC 3 50 (*) 3 81 - 5 12 Million/uL Final    Hemoglobin 11 5  11 5 - 15 4 g/dL Final    Hematocrit 34 8  34 8 - 46 1 % Final    MCV 99 (*) 82 - 98 fL Final    MCH 32 9  26 8 - 34 3 pg Final    MCHC 33 0  31 4 - 37 4 g/dL Final    RDW 12 5  11 6 - 15 1 % Final    MPV 11 4  8 9 - 12 7 fL Final    Platelets 398  620 - 390 Thousands/uL Final    nRBC 0  /100 WBCs Final    Neutrophils Relative 64  43 - 75 % Final    Immat GRANS % 0  0 - 2 % Final    Lymphocytes Relative 24  14 - 44 % Final    Monocytes Relative 6  4 - 12 % Final    Eosinophils Relative 5  0 - 6 % Final    Basophils Relative 1  0 - 1 % Final    Neutrophils Absolute 4 36  1 85 - 7 62 Thousands/µL Final    Immature Grans Absolute 0 02  0 00 - 0 20 Thousand/uL Final    Lymphocytes Absolute 1 64  0 60 - 4 47 Thousands/µL Final    Monocytes Absolute 0 41  0 17 - 1 22 Thousand/µL Final    Eosinophils Absolute 0 36  0 00 - 0 61 Thousand/µL Final    Basophils Absolute 0 08  0 00 - 0 10 Thousands/µL Final   COMPREHENSIVE METABOLIC PANEL - Abnormal    Sodium 141  135 - 147 mmol/L Final    Potassium 3 6  3 5 - 5 3 mmol/L Final    Chloride 111 (*) 96 - 108 mmol/L Final    CO2 27  21 - 32 mmol/L Final    ANION GAP 3 (*) 4 - 13 mmol/L Final    BUN 16  5 - 25 mg/dL Final    Creatinine 0 74  0 60 - 1 30 mg/dL Final    Comment: Standardized to IDMS reference method    Glucose 100  65 - 140 mg/dL Final    Comment: If the patient is fasting, the ADA then defines impaired fasting glucose as > 100 mg/dL and diabetes as > or equal to 123 mg/dL  Specimen collection should occur prior to Sulfasalazine administration due to the potential for falsely depressed results  Specimen collection should occur prior to Sulfapyridine administration due to the potential for falsely elevated results  Calcium 9 1  8 3 - 10 1 mg/dL Final    AST 28  5 - 45 U/L Final    Comment: Specimen collection should occur prior to Sulfasalazine administration due to the potential for falsely depressed results  ALT 32  12 - 78 U/L Final    Comment: Specimen collection should occur prior to Sulfasalazine and/or Sulfapyridine administration due to the potential for falsely depressed results       Alkaline Phosphatase 59  46 - 116 U/L Final    Total Protein 7 3  6 4 - 8 4 g/dL Final    Albumin 3 7  3 5 - 5 0 g/dL Final    Total Bilirubin 0 45  0 20 - 1 00 mg/dL Final    Comment: Use of this assay is not recommended for patients undergoing treatment with eltrombopag due to the potential for falsely elevated results  eGFR 86  ml/min/1 73sq m Final    Narrative:     National Kidney Disease Foundation guidelines for Chronic Kidney Disease (CKD):   •  Stage 1 with normal or high GFR (GFR > 90 mL/min/1 73 square meters)  •  Stage 2 Mild CKD (GFR = 60-89 mL/min/1 73 square meters)  •  Stage 3A Moderate CKD (GFR = 45-59 mL/min/1 73 square meters)  •  Stage 3B Moderate CKD (GFR = 30-44 mL/min/1 73 square meters)  •  Stage 4 Severe CKD (GFR = 15-29 mL/min/1 73 square meters)  •  Stage 5 End Stage CKD (GFR <15 mL/min/1 73 square meters)  Note: GFR calculation is accurate only with a steady state creatinine   URINE MACROSCOPIC, POC - Abnormal    Color, UA Yellow   Final    Clarity, UA Clear   Final    pH, UA 5 0  4 5 - 8 0 Final    Leukocytes, UA Negative  Negative Final    Nitrite, UA Negative  Negative Final    Protein, UA Negative  Negative mg/dl Final    Glucose, UA Negative  Negative mg/dl Final    Ketones, UA Trace (*) Negative mg/dl Final    Urobilinogen, UA 0 2  0 2, 1 0 E U /dl E U /dl Final    Bilirubin, UA Negative  Negative Final    Occult Blood, UA Negative  Negative Final    Specific Gravity, UA >=1 030  1 003 - 1 030 Final    Narrative:     CLINITEK RESULT   COVID19, INFLUENZA A/B, RSV PCR, [facility] - Normal    SARS-CoV-2 Negative  Negative Final    Comment:      INFLUENZA A PCR Negative  Negative Final    Comment:      INFLUENZA B PCR Negative  Negative Final    Comment:      RSV PCR Negative  Negative Final    Comment:      Narrative:     FOR PEDIATRIC PATIENTS - copy/paste COVID Guidelines URL to browser: [email or web address]    SARS-CoV-2 assay is a Nucleic Acid Amplification assay intended for the  qualitative detection of nucleic acid from SARS-CoV-2 in nasopharyngeal  swabs   Results are for the presumptive identification of SARS-CoV-2 RNA  Positive results are indicative of infection with SARS-CoV-2, the virus  causing COVID-19, but do not rule out bacterial infection or co-infection  with other viruses  Laboratories within the [place] and its  territories are required to report all positive results to the appropriate  public health authorities  Negative results do not preclude SARS-CoV-2  infection and should not be used as the sole basis for treatment or other  patient management decisions  Negative results must be combined with  clinical observations, patient history, and epidemiological information  This test has not been FDA cleared or approved  This test has been authorized by FDA under an Emergency Use Authorization  (EUA)  This test is only authorized for the duration of time the  declaration that circumstances exist justifying the authorization of the  emergency use of an in vitro diagnostic tests for detection of SARS-CoV-2  virus and/or diagnosis of COVID-19 infection under section 564(b)(1) of  the Act, 21 U  S C  614AYJ-4(Y)(6), unless the authorization is terminated  or revoked sooner  The test has been validated but independent review by FDA  and CLIA is pending  Test performed using [employer] Gene[employer]pert: This RT-PCR assay targets N2,  a region unique to SARS-CoV-2  A conserved region in the E-gene was chosen  for pan-Sarbecovirus detection which includes SARS-CoV-2  According to CMS-2020-01-R, this platform meets the definition of high-throughput technology       Time reflects when diagnosis was documented in both MDM as applicable and the Disposition within this note     Time User Action Codes Description Comment    4/17/2023  8:25 PM Tessy Bonilla Add [M54 2] Neck pain     4/17/2023  8:25 PM Tessy Bonilla Add [J42 195] Neck muscle spasm       ED Disposition     ED Disposition   Discharge    Condition   Stable    Date/Time   Mon Apr 17, 2023  8:25 PM    Comment   Isis Stone discharge to home/self care                Follow-up Information     Follow up With Specialties Details Why Contact Info Additional [address] [facility] Emergency Medicine Go to  If symptoms worsen [address] Emergency Department, [address] [place] [address], [address] [place] [address], [address] [place] [address] Nurse Practitioner Go to  If symptoms worsen [address]  [phone]           Discharge Medication List as of 4/17/2023  8:39 PM      START taking these medications    Details   !! gabapentin (Neurontin) 300 mg capsule Take 1 capsule (300 mg total) by mouth 3 (three) times a day for 14 days For post-herpetic neuralgia: Take 1 tablet on day 1,  Then take 2 tablets on day 2, Then take 3 tablets on day 3 and every day after that as instructed by your doctor , Starting Mon  4/17/2023, Until Mon 5/1/2023, Normal       !! - Potential duplicate medications found  Please discuss with provider        CONTINUE these medications which have NOT CHANGED    Details   acetaminophen (TYLENOL) 650 mg CR tablet Take 650 mg by mouth every 8 (eight) hours as needed for mild pain Take 2 tablets in the AM and 2 tablets in the late evening, Historical Med      albuterol (ProAir HFA) 90 mcg/act inhaler Inhale 2 puffs every 6 (six) hours as needed for wheezing, Starting Fri 1/27/2023, Normal      aluminum-magnesium hydroxide-simethicone (MAALOX MAX) 400-400-40 MG/5ML suspension Take 10 mL by mouth every 6 (six) hours as needed for indigestion or heartburn, Starting Sat 4/8/2023, Normal      Ascorbic Acid (VITAMIN C) 100 MG CHEW Chew, Historical Med      atorvastatin (LIPITOR) 10 mg tablet take 1 tablet by mouth once daily, Normal      benzonatate (TESSALON) 200 MG capsule Take 1 capsule (200 mg total) by mouth 3 (three) times a day as needed for cough, Starting Tue 1/31/2023, Normal Biotin 35989 MCG TABS Take by mouth daily  , Historical Med      Calcium Carbonate (CALCI-CHEW PO) Take 500 mg by mouth 2 (two) times a day, Historical Med      cholecalciferol (VITAMIN D3) 1,000 units tablet Take 5,000 Units by mouth in the morning, Historical Med      clobetasol (TEMOVATE) 0 05 % cream Apply to affected areas daily x 12 weeks, then twice weekly for maintenance, Historical Med      Cyanocobalamin (VITAMIN B 12 PO) Take 1,000 mcg by mouth, Historical Med      Diclofenac Sodium (VOLTAREN) 1 % Apply 2 g topically 4 (four) times a day, Starting Wed 3/30/2022, Normal      dicyclomine (BENTYL) 10 mg capsule take 1 capsule by mouth four times a day before meals at bedtime, Normal      Eliquis 2 5 MG TAKE 1 TABLET BY MOUTH TWICE A DAY, Normal      famotidine (PEPCID) 20 mg tablet Take 1 tablet (20 mg total) by mouth 2 (two) times a day, Starting Sat 4/8/2023, Normal      Ferrous Sulfate  (45 Fe) MG TBCR Take 1 tablet by mouth daily, Historical Med      fluticasone (FLONASE) 50 mcg/act nasal spray instill 1 spray into each nostril once daily, Normal      !! gabapentin (NEURONTIN) 100 mg capsule TAKE 1 CAPSULE BY MOUTH THREE TIMES A DAY AS NEEDED FOR SHOOTING PAIN, Normal      Lysine HCl POWD Take by mouth, Historical Med      methocarbamol (ROBAXIN) 500 mg tablet Take 1 tablet (500 mg total) by mouth 2 (two) times a day, Starting Sat 1/15/2022, Normal      metoprolol succinate (TOPROL-XL) 25 mg 24 hr tablet take 1 tablet by mouth once daily, Normal      Multiple Vitamin (MULTIVITAMIN) capsule Take 1 capsule by mouth daily 2 chewies daily, Historical Med      nystatin (MYCOSTATIN) powder Apply topically 2 (two) times a day, Starting Tue 6/7/2022, Normal      sucralfate (CARAFATE) 1 g tablet Take 1 tablet (1 g total) by mouth 4 (four) times a day, Starting Sat 4/8/2023, Normal      traZODone (DESYREL) 50 mg tablet TAKE 1 TABLET BY MOUTH DAILY AT BEDTIME, Normal       !! - Potential duplicate medications found  Please discuss with provider  Prior to Admission Medications   Prescriptions Last Dose Informant Patient Reported? Taking?    Ascorbic Acid (VITAMIN C) 100 MG CHEW   Yes No   Sig: Chew   Biotin 97584 MCG TABS   Yes No   Sig: Take by mouth daily     Calcium Carbonate (CALCI-CHEW PO)   Yes No   Sig: Take 500 mg by mouth 2 (two) times a day   Cyanocobalamin (VITAMIN B 12 PO)   Yes No   Sig: Take 1,000 mcg by mouth   Diclofenac Sodium (VOLTAREN) 1 %   No No   Sig: Apply 2 g topically 4 (four) times a day   Patient not taking: Reported on 10/5/2022   Eliquis 2 5 MG   No No   Sig: TAKE 1 TABLET BY MOUTH TWICE A DAY   Ferrous Sulfate  (45 Fe) MG TBCR   Yes No   Sig: Take 1 tablet by mouth daily   Lysine HCl POWD   Yes No   Sig: Take by mouth   Multiple Vitamin (MULTIVITAMIN) capsule   Yes No   Sig: Take 1 capsule by mouth daily 2 chewies daily   acetaminophen (TYLENOL) 650 mg CR tablet   Yes No   Sig: Take 650 mg by mouth every 8 (eight) hours as needed for mild pain Take 2 tablets in the AM and 2 tablets in the late evening   albuterol (ProAir HFA) 90 mcg/act inhaler   No No   Sig: Inhale 2 puffs every 6 (six) hours as needed for wheezing   aluminum-magnesium hydroxide-simethicone (MAALOX MAX) 400-400-40 MG/5ML suspension   No No   Sig: Take 10 mL by mouth every 6 (six) hours as needed for indigestion or heartburn   atorvastatin (LIPITOR) 10 mg tablet   No No   Sig: take 1 tablet by mouth once daily   benzonatate (TESSALON) 200 MG capsule   No No   Sig: Take 1 capsule (200 mg total) by mouth 3 (three) times a day as needed for cough   cholecalciferol (VITAMIN D3) 1,000 units tablet   Yes No   Sig: Take 5,000 Units by mouth in the morning   clobetasol (TEMOVATE) 0 05 % cream   Yes No   Sig: Apply to affected areas daily x 12 weeks, then twice weekly for maintenance   dicyclomine (BENTYL) 10 mg capsule   No No   Sig: take 1 capsule by mouth four times a day before meals at bedtime "  famotidine (PEPCID) 20 mg tablet   No No   Sig: Take 1 tablet (20 mg total) by mouth 2 (two) times a day   fluticasone (FLONASE) 50 mcg/act nasal spray   No No   Sig: instill 1 spray into each nostril once daily   Patient not taking: Reported on 10/6/2022   gabapentin (NEURONTIN) 100 mg capsule   No No   Sig: TAKE 1 CAPSULE BY MOUTH THREE TIMES A DAY AS NEEDED FOR SHOOTING PAIN   methocarbamol (ROBAXIN) 500 mg tablet   No No   Sig: Take 1 tablet (500 mg total) by mouth 2 (two) times a day   metoprolol succinate (TOPROL-XL) 25 mg 24 hr tablet   No No   Sig: take 1 tablet by mouth once daily   nystatin (MYCOSTATIN) powder   No No   Sig: Apply topically 2 (two) times a day   sucralfate (CARAFATE) 1 g tablet   No No   Sig: Take 1 tablet (1 g total) by mouth 4 (four) times a day   traZODone (DESYREL) 50 mg tablet   No No   Sig: TAKE 1 TABLET BY MOUTH DAILY AT BEDTIME      Facility-Administered Medications: None                        Portions of the record may have been created with voice recognition software  Occasional wrong word or \"sound a like\" substitutions may have occurred due to the inherent limitations of voice recognition software  Read the chart carefully and recognize, using context, where substitutions have occurred      Electronically signed by:  Sabina Zarco    "

## 2023-04-18 NOTE — ED PROVIDER NOTES
History  Chief Complaint   Patient presents with   • Neck Pain     Pt c/o right sided neck pain and dizziness  Pt sent in for further evaluation from urgent care     HPI  Patient is 60-year-old female presenting for complaints of right-sided neck pain and dizziness  Past medical history significant for cervical disc surgery, pacemaker, sick sinus syndrome, prior history of DVTs on Eliquis (patient has not tried any NSAIDs due to concerns of being on Eliquis for blood clots)  Went to urgent care was sent over for further evaluation  Patient denies any paresthesias, lateralizing weakness, syncope, change in vision, palpitations, chest pain, shortness of breath, fever/chills, UTIs, was able to ambulate without difficulty into the ER  Patient states neck pain has been getting worse since yesterday, patient's been taking her medications as prescribed  Prior to Admission Medications   Prescriptions Last Dose Informant Patient Reported? Taking?    Ascorbic Acid (VITAMIN C) 100 MG CHEW   Yes No   Sig: Chew   Biotin 87869 MCG TABS   Yes No   Sig: Take by mouth daily     Calcium Carbonate (CALCI-CHEW PO)   Yes No   Sig: Take 500 mg by mouth 2 (two) times a day   Cyanocobalamin (VITAMIN B 12 PO)   Yes No   Sig: Take 1,000 mcg by mouth   Diclofenac Sodium (VOLTAREN) 1 %   No No   Sig: Apply 2 g topically 4 (four) times a day   Patient not taking: Reported on 10/5/2022   Eliquis 2 5 MG   No No   Sig: TAKE 1 TABLET BY MOUTH TWICE A DAY   Ferrous Sulfate  (45 Fe) MG TBCR   Yes No   Sig: Take 1 tablet by mouth daily   Lysine HCl POWD   Yes No   Sig: Take by mouth   Multiple Vitamin (MULTIVITAMIN) capsule   Yes No   Sig: Take 1 capsule by mouth daily 2 chewies daily   acetaminophen (TYLENOL) 650 mg CR tablet   Yes No   Sig: Take 650 mg by mouth every 8 (eight) hours as needed for mild pain Take 2 tablets in the AM and 2 tablets in the late evening   albuterol (ProAir HFA) 90 mcg/act inhaler   No No   Sig: Inhale 2 puffs every 6 (six) hours as needed for wheezing   aluminum-magnesium hydroxide-simethicone (MAALOX MAX) 400-400-40 MG/5ML suspension   No No   Sig: Take 10 mL by mouth every 6 (six) hours as needed for indigestion or heartburn   atorvastatin (LIPITOR) 10 mg tablet   No No   Sig: take 1 tablet by mouth once daily   benzonatate (TESSALON) 200 MG capsule   No No   Sig: Take 1 capsule (200 mg total) by mouth 3 (three) times a day as needed for cough   cholecalciferol (VITAMIN D3) 1,000 units tablet   Yes No   Sig: Take 5,000 Units by mouth in the morning   clobetasol (TEMOVATE) 0 05 % cream   Yes No   Sig: Apply to affected areas daily x 12 weeks, then twice weekly for maintenance   dicyclomine (BENTYL) 10 mg capsule   No No   Sig: take 1 capsule by mouth four times a day before meals at bedtime   famotidine (PEPCID) 20 mg tablet   No No   Sig: Take 1 tablet (20 mg total) by mouth 2 (two) times a day   fluticasone (FLONASE) 50 mcg/act nasal spray   No No   Sig: instill 1 spray into each nostril once daily   Patient not taking: Reported on 10/6/2022   gabapentin (NEURONTIN) 100 mg capsule   No No   Sig: TAKE 1 CAPSULE BY MOUTH THREE TIMES A DAY AS NEEDED FOR SHOOTING PAIN   methocarbamol (ROBAXIN) 500 mg tablet   No No   Sig: Take 1 tablet (500 mg total) by mouth 2 (two) times a day   metoprolol succinate (TOPROL-XL) 25 mg 24 hr tablet   No No   Sig: take 1 tablet by mouth once daily   nystatin (MYCOSTATIN) powder   No No   Sig: Apply topically 2 (two) times a day   sucralfate (CARAFATE) 1 g tablet   No No   Sig: Take 1 tablet (1 g total) by mouth 4 (four) times a day   traZODone (DESYREL) 50 mg tablet   No No   Sig: TAKE 1 TABLET BY MOUTH DAILY AT BEDTIME      Facility-Administered Medications: None       Past Medical History:   Diagnosis Date   • Anemia     iron def   • Arthritis     knees   • Carpal tunnel syndrome of right wrist    • Chest pain    • Colon polyp    • Deep vein thrombosis (DVT) of proximal lower extremity (RUST 75 ) 2012   • Depression     at times   • DVT (deep venous thrombosis) (RUST 75 )    • Dysphagia    • Encounter for surgical aftercare following surgery of digestive system 2020    s/p Raghav-En-Y Gastric Bypass with Dr Sharita Shook in   INITIAL weight:241 lb INITIAL weight with start of topirmate: 176 lb Goal weight loss of 5-10 %-159 lb-167 lb    • Esophageal reflux 2011   • Grade 1 out of 6 intensity murmur 2019   • Heart murmur    • History of transfusion 1980   • Pacemaker 2018   • Pneumonia     age 15   • Psychiatric disorder    • Trigger finger, left    • Visit for suture removal 2021       Past Surgical History:   Procedure Laterality Date   • BACK SURGERY     • CARDIAC PACEMAKER PLACEMENT  2018   • CERVICAL DISC SURGERY  2009    PLATES & SCREWS   •  SECTION      X2   • COLONOSCOPY     • EPIDURAL BLOCK INJECTION Right 2022    Procedure: RIGHT L3-[id number] EPIDURAL STEROID INJECTION (95195); Surgeon: Arleen Diaz DO;  [facility];  Service: Pain Management    • GASTRIC BYPASS  2017    LAP RINYGB SURGERY   • KNEE ARTHROSCOPY     • KNEE SURGERY Bilateral    • KNEE SURGERY Left 2020   • OTHER SURGICAL HISTORY      ARM SURGERIES   • DC NDSC WRST SURG W/RLS TRANSVRS CARPL LIGM Right 2018    Procedure: ENDOSCOPIC CARPAL TUNNEL RELEASE;  Surgeon: Indiana Arshad MD;  [facility];  Service: Orthopedics   • DC Manjeet Alexis Sarthak 83 DX/THER AGT PVRT FACET JT LMBR/SAC 1 LEVEL Bilateral 10/25/2022    Procedure: L3-L4,L4-5,L5-S1 BILATERAL BRANCH BLOCK;  Surgeon: Arleen Diaz DO;  [facility];  Service: Pain Management    • DC NJX DX/THER AGT PVRT FACET JT LMBR/SAC 1 LEVEL Bilateral 11/15/2022    Procedure: BILATERAL L3-S1 MEDICAL BRANCH BLOCK (05161,[id number]);   Surgeon: Arleen Diaz DO;  [facility];  Service: Pain Management    • DC TENDON SHEATH INCISION Left 2018    Procedure: LONG TRIGGER FINGER RELEASE;  Surgeon: Chauncy Bumpers, MD;  [facility];  Service: Orthopedics   • RADIOFREQUENCY ABLATION Right 12/6/2022    Procedure: RIGHT L3-S1 RADIO FREQUENCY ABLATION (61398,78477); Surgeon: Christie Sewell DO;  [facility];  Service: Pain Management    • RADIOFREQUENCY ABLATION Left 12/20/2022    Procedure: LEFT L3-S1 RADIO FREQUENCY ABLATION (91133,40662); Surgeon: Christie Sewell DO;  [facility]: [facility];  Service: Pain Management    • TONSILLECTOMY     • UPPER GASTROINTESTINAL ENDOSCOPY         Family History   Problem Relation Age of Onset   • Stroke Mother    • Alzheimer's disease Mother    • Arthritis Mother    • Coronary artery disease Mother    • Breast cancer Mother 77   • Cancer Mother    • Heart disease Mother    • Hypertension Father    • Gout Father    • Diabetes type II Father    • Coronary artery disease Father    • Heart disease Father    • No Known Problems Sister    • No Known Problems Sister    • No Known Problems Sister    • No Known Problems Daughter    • No Known Problems Maternal Grandmother    • No Known Problems Maternal Grandfather    • No Known Problems Paternal Grandmother    • No Known Problems Paternal Grandfather    • Prostate cancer Brother    • Pancreatic cancer Brother    • No Known Problems Maternal Aunt    • No Known Problems Maternal Aunt    • No Known Problems Maternal Aunt    • No Known Problems Maternal Aunt    • Cancer Maternal Aunt    • No Known Problems Paternal Aunt    • No Known Problems Paternal Aunt      I have reviewed and agree with the history as documented  E-Cigarette/Vaping   • E-Cigarette Use Never User      E-Cigarette/Vaping Substances   • Nicotine No    • THC No    • CBD No    • Flavoring No    • Other No    • Unknown No      Social History     Tobacco Use   • Smoking status: Former     Packs/day: 0 25     Types: Cigarettes   • Smokeless tobacco: Never   Vaping Use   • Vaping Use: Never used   Substance Use Topics   • Alcohol use:  Yes Alcohol/week: 2 0 standard drinks     Types: 1 Glasses of wine, 1 Standard drinks or equivalent per week     Comment: very rare social   • Drug use: No        Review of Systems   Constitutional: Negative  HENT:        Right-sided neck pain  Eyes: Negative  Respiratory: Negative  Cardiovascular: Negative  Gastrointestinal: Negative  Endocrine: Negative  Genitourinary: Negative  Musculoskeletal: Negative  Skin: Negative  Allergic/Immunologic: Negative  Neurological: Positive for dizziness  Negative for syncope  Hematological: Negative  Psychiatric/Behavioral: Negative  Physical Exam  ED Triage Vitals [04/17/23 1452]   Temperature Pulse Respirations Blood Pressure SpO2   98 3 °F (36 8 °C) (!) 52 20 114/69 96 %      Temp Source Heart Rate Source Patient Position - Orthostatic VS BP Location FiO2 (%)   Oral Monitor Lying Left arm --      Pain Score       9             Orthostatic Vital Signs  Vitals:    04/17/23 1530 04/17/23 1700 04/17/23 1900 04/17/23 2015   BP: 115/67 116/59 113/69 114/65   Pulse: 60 57 60 62   Patient Position - Orthostatic VS: Lying Lying         Physical Exam  Vitals and nursing note reviewed  Constitutional:       Appearance: Normal appearance  She is normal weight  HENT:      Head: Normocephalic and atraumatic  Right Ear: Tympanic membrane, ear canal and external ear normal       Left Ear: Tympanic membrane, ear canal and external ear normal       Nose: Nose normal       Mouth/Throat:      Mouth: Mucous membranes are moist       Pharynx: Oropharynx is clear  Eyes:      Extraocular Movements: Extraocular movements intact  Conjunctiva/sclera: Conjunctivae normal       Pupils: Pupils are equal, round, and reactive to light  Neck:      Comments: Mild right-sided neck tenderness and midline tenderness over no palpable deformity, no step-off  Patient does have history of prior neck surgery    Patient range of motion at baseline  Cardiovascular:      Rate and Rhythm: Normal rate and regular rhythm  Pulses: Normal pulses  Heart sounds: Normal heart sounds  Pulmonary:      Effort: Pulmonary effort is normal       Breath sounds: Normal breath sounds  Abdominal:      General: Abdomen is flat  Bowel sounds are normal       Palpations: Abdomen is soft  Musculoskeletal:         General: Normal range of motion  Cervical back: Neck supple  Tenderness present  Skin:     General: Skin is warm and dry  Capillary Refill: Capillary refill takes less than 2 seconds  Neurological:      General: No focal deficit present  Mental Status: She is alert and oriented to person, place, and time  Psychiatric:         Mood and Affect: Mood normal          Behavior: Behavior normal          Thought Content:  Thought content normal          Judgment: Judgment normal          ED Medications  Medications   lidocaine (LIDODERM) 5 % patch 1 patch (1 patch Topical Medication Applied 4/17/23 1557)   ketorolac (TORADOL) injection 15 mg (15 mg Intravenous Given 4/17/23 1557)   methocarbamol (ROBAXIN) tablet 500 mg (500 mg Oral Given 4/17/23 1557)   iohexol (OMNIPAQUE) 350 MG/ML injection (SINGLE-DOSE) 100 mL (100 mL Intravenous Given 4/17/23 1848)       Diagnostic Studies  Results Reviewed     Procedure Component Value Units Date/Time    Comprehensive metabolic panel [[id number]]  (Abnormal) Collected: 04/17/23 1602    Lab Status: Final result Specimen: Blood from Arm, Left Updated: 04/17/23 1738     Sodium 141 mmol/L      Potassium 3 6 mmol/L      Chloride 111 mmol/L      CO2 27 mmol/L      ANION GAP 3 mmol/L      BUN 16 mg/dL      Creatinine 0 74 mg/dL      Glucose 100 mg/dL      Calcium 9 1 mg/dL      AST 28 U/L      ALT 32 U/L      Alkaline Phosphatase 59 U/L      Total Protein 7 3 g/dL      Albumin 3 7 g/dL      Total Bilirubin 0 45 mg/dL      eGFR 86 ml/min/1 73sq m     Narrative:      [place] guidelines for Chronic Kidney Disease (CKD):   •  Stage 1 with normal or high GFR (GFR > 90 mL/min/1 73 square meters)  •  Stage 2 Mild CKD (GFR = 60-89 mL/min/1 73 square meters)  •  Stage 3A Moderate CKD (GFR = 45-59 mL/min/1 73 square meters)  •  Stage 3B Moderate CKD (GFR = 30-44 mL/min/1 73 square meters)  •  Stage 4 Severe CKD (GFR = 15-29 mL/min/1 73 square meters)  •  Stage 5 End Stage CKD (GFR <15 mL/min/1 73 square meters)  Note: GFR calculation is accurate only with a steady state creatinine    FLU/RSV/COVID - if FLU/RSV clinically relevant [[id number]]  (Normal) Collected: 04/17/23 1602    Lab Status: Final result Specimen: Nares from Nose Updated: 04/17/23 1735     SARS-CoV-2 Negative     INFLUENZA A PCR Negative     INFLUENZA B PCR Negative     RSV PCR Negative    Narrative:      FOR PEDIATRIC PATIENTS - copy/paste COVID Guidelines URL to browser: [email or web address]  [email or web address]    SARS-CoV-2 assay is a Nucleic Acid Amplification assay intended for the  qualitative detection of nucleic acid from SARS-CoV-2 in nasopharyngeal  swabs  Results are for the presumptive identification of SARS-CoV-2 RNA  Positive results are indicative of infection with SARS-CoV-2, the virus  causing COVID-19, but do not rule out bacterial infection or co-infection  with other viruses  Laboratories within the [place] and its  territories are required to report all positive results to the appropriate  public health authorities  Negative results do not preclude SARS-CoV-2  infection and should not be used as the sole basis for treatment or other  patient management decisions  Negative results must be combined with  clinical observations, patient history, and epidemiological information  This test has not been FDA cleared or approved  This test has been authorized by FDA under an Emergency Use Authorization  (EUA)   This test is only authorized for the duration of time the  declaration that circumstances exist justifying the authorization of the  emergency use of an in vitro diagnostic tests for detection of SARS-CoV-2  virus and/or diagnosis of COVID-19 infection under section 564(b)(1) of  the Act, 21 U  S C  426NXQ-3(I)(5), unless the authorization is terminated  or revoked sooner  The test has been validated but independent review by FDA  and CLIA is pending  Test performed using [employer] GeneXpert: This RT-PCR assay targets N2,  a region unique to SARS-CoV-2  A conserved region in the E-gene was chosen  for pan-Sarbecovirus detection which includes SARS-CoV-2  According to CMS-2020-01-R, this platform meets the definition of high-throughput technology      CBC and differential [[id number]]  (Abnormal) Collected: 04/17/23 1602    Lab Status: Final result Specimen: Blood from Arm, Left Updated: 04/17/23 1617     WBC 6 87 Thousand/uL      RBC 3 50 Million/uL      Hemoglobin 11 5 g/dL      Hematocrit 34 8 %      MCV 99 fL      MCH 32 9 pg      MCHC 33 0 g/dL      RDW 12 5 %      MPV 11 4 fL      Platelets 450 Thousands/uL      nRBC 0 /100 WBCs      Neutrophils Relative 64 %      Immat GRANS % 0 %      Lymphocytes Relative 24 %      Monocytes Relative 6 %      Eosinophils Relative 5 %      Basophils Relative 1 %      Neutrophils Absolute 4 36 Thousands/µL      Immature Grans Absolute 0 02 Thousand/uL      Lymphocytes Absolute 1 64 Thousands/µL      Monocytes Absolute 0 41 Thousand/µL      Eosinophils Absolute 0 36 Thousand/µL      Basophils Absolute 0 08 Thousands/µL     Urine Macroscopic, POC [[id number]]  (Abnormal) Collected: 04/17/23 1607    Lab Status: Final result Specimen: Urine Updated: 04/17/23 1608     Color, UA Yellow     Clarity, UA Clear     pH, UA 5 0     Leukocytes, UA Negative     Nitrite, UA Negative     Protein, UA Negative mg/dl      Glucose, UA Negative mg/dl      Ketones, UA Trace mg/dl      Urobilinogen, UA 0 2 E U /dl      Bilirubin, UA Negative     Occult Blood, UA Negative     Specific Gravity, UA >=1 030    Narrative:      CLINITEK RESULT                 CTA head and neck with and without contrast   Final Result by Fei Owusu MD (04/17 2019)         1  No evidence of acute infarct, intracranial hemorrhage or mass  2   No hemodynamically significant stenosis, dissection or occlusion of the carotid or vertebral arteries or major vessels of the [place]  Workstation performed: [id number]               Procedures  ECG 12 Lead Documentation Only    Date/Time: 4/17/2023 5:43 PM  Performed by: Adrian Rangel MD  Authorized by: Adrian Rangel MD     Indications / Diagnosis:  Neck pain  Interpretation:     Interpretation: normal    Rate:     ECG rate assessment: normal    Rhythm:     Rhythm: paced    Pacing:     Capture:  Complete    Type of pacing:  Atrial  QRS:     QRS axis:  Normal    QRS intervals:  Normal  ST segments:     ST segments:  Normal  T waves:     T waves: normal            ED Course                             SBIRT 22yo+    Flowsheet Row Most Recent Value   Initial Alcohol Screen: US AUDIT-C     1  How often do you have a drink containing alcohol? 0 Filed at: 04/17/2023 1456   2  How many drinks containing alcohol do you have on a typical day you are drinking? 0 Filed at: 04/17/2023 1456   3a  Male UNDER 65: How often do you have five or more drinks on one occasion? 0 Filed at: 04/17/2023 1456   3b  FEMALE Any Age, or MALE 65+: How often do you have 4 or more drinks on one occassion? 0 Filed at: 04/17/2023 1456   Audit-C Score 0 Filed at: 04/17/2023 1456   AISHA: How many times in the past year have you    Used an illegal drug or used a prescription medication for non-medical reasons? Never Filed at: 04/17/2023 1456                Medical Decision Making  Patient is 23-year-old female presenting for right-sided neck pain    DDx: Musculoskeletal neck pain, CVA, ICH, arrhythmia/pacemaker failure, electrolyte abnormality, viral syndrome, UTI  Plan for full work-up to evaluate for CVA/ICH, CTA head and neck ordered baseline labs ordered, EKG, multimodal analgesia given for neck pain  If all lab work-up negative, will plan for recommendations for symptomatic treatment at home, increase gabapentin dosage, follow-up with PCP, referral to comprehensive spine program and symptomatic treatment at home with Tylenol  Neck muscle spasm: acute illness or injury  Neck pain: acute illness or injury  Amount and/or Complexity of Data Reviewed  Labs: ordered  Radiology: ordered  Risk  Prescription drug management  Disposition  Final diagnoses:   Neck pain   Neck muscle spasm     Time reflects when diagnosis was documented in both MDM as applicable and the Disposition within this note     Time User Action Codes Description Comment    4/17/2023  8:25 PM Eujace Gin Add [M54 2] Neck pain     4/17/2023  8:25 PM Jigar Gin Add [U58 474] Neck muscle spasm       ED Disposition     ED Disposition   Discharge    Condition   Stable    Date/Time   Mon Apr 17, 2023  8:25 PM    Comment   Mattson Aron discharge to home/self care  Follow-up Information     Follow up With Specialties Details Why Contact Info Additional [address] [facility] Emergency Medicine Go to  If symptoms worsen [address] [phone]  [facility] [address], [address], [place], [address] [place] [address], [address] [facility], [address] [place] [address] Nurse Practitioner Go to  If symptoms worsen [address]  [place] [address]  [phone]             Discharge Medication List as of 4/17/2023  8:39 PM      START taking these medications    Details   !! gabapentin (Neurontin) 300 mg capsule Take 1 capsule (300 mg total) by mouth 3 (three) times a day for 14 days For post-herpetic neuralgia:  Take 1 tablet on day 1,  Then take 2 tablets on day 2, Then take 3 tablets on day 3 and every day after that as instructed by your doctor , Starting Mon  4/17/2023, Until Mon 5/1/2023, Normal       !! - Potential duplicate medications found  Please discuss with provider        CONTINUE these medications which have NOT CHANGED    Details   acetaminophen (TYLENOL) 650 mg CR tablet Take 650 mg by mouth every 8 (eight) hours as needed for mild pain Take 2 tablets in the AM and 2 tablets in the late evening, Historical Med      albuterol (ProAir HFA) 90 mcg/act inhaler Inhale 2 puffs every 6 (six) hours as needed for wheezing, Starting Fri 1/27/2023, Normal      aluminum-magnesium hydroxide-simethicone (MAALOX MAX) 400-400-40 MG/5ML suspension Take 10 mL by mouth every 6 (six) hours as needed for indigestion or heartburn, Starting Sat 4/8/2023, Normal      Ascorbic Acid (VITAMIN C) 100 MG CHEW Chew, Historical Med      atorvastatin (LIPITOR) 10 mg tablet take 1 tablet by mouth once daily, Normal      benzonatate (TESSALON) 200 MG capsule Take 1 capsule (200 mg total) by mouth 3 (three) times a day as needed for cough, Starting Tue 1/31/2023, Normal      Biotin 63632 MCG TABS Take by mouth daily  , Historical Med      Calcium Carbonate (CALCI-CHEW PO) Take 500 mg by mouth 2 (two) times a day, Historical Med      cholecalciferol (VITAMIN D3) 1,000 units tablet Take 5,000 Units by mouth in the morning, Historical Med      clobetasol (TEMOVATE) 0 05 % cream Apply to affected areas daily x 12 weeks, then twice weekly for maintenance, Historical Med      Cyanocobalamin (VITAMIN B 12 PO) Take 1,000 mcg by mouth, Historical Med      Diclofenac Sodium (VOLTAREN) 1 % Apply 2 g topically 4 (four) times a day, Starting Wed 3/30/2022, Normal      dicyclomine (BENTYL) 10 mg capsule take 1 capsule by mouth four times a day before meals at bedtime, Normal      Eliquis 2 5 MG TAKE 1 TABLET BY MOUTH TWICE A DAY, Normal      famotidine (PEPCID) 20 mg tablet Take 1 tablet (20 mg total) by mouth 2 (two) times a day, Starting Sat 4/8/2023, Normal      Ferrous Sulfate  (45 Fe) MG TBCR Take 1 tablet by mouth daily, Historical Med      fluticasone (FLONASE) 50 mcg/act nasal spray instill 1 spray into each nostril once daily, Normal      !! gabapentin (NEURONTIN) 100 mg capsule TAKE 1 CAPSULE BY MOUTH THREE TIMES A DAY AS NEEDED FOR SHOOTING PAIN, Normal      Lysine HCl POWD Take by mouth, Historical Med      methocarbamol (ROBAXIN) 500 mg tablet Take 1 tablet (500 mg total) by mouth 2 (two) times a day, Starting Sat 1/15/2022, Normal      metoprolol succinate (TOPROL-XL) 25 mg 24 hr tablet take 1 tablet by mouth once daily, Normal      Multiple Vitamin (MULTIVITAMIN) capsule Take 1 capsule by mouth daily 2 chewies daily, Historical Med      nystatin (MYCOSTATIN) powder Apply topically 2 (two) times a day, Starting Tue 6/7/2022, Normal      sucralfate (CARAFATE) 1 g tablet Take 1 tablet (1 g total) by mouth 4 (four) times a day, Starting Sat 4/8/2023, Normal      traZODone (DESYREL) 50 mg tablet TAKE 1 TABLET BY MOUTH DAILY AT BEDTIME, Normal       !! - Potential duplicate medications found  Please discuss with provider  PDMP Review       Value Time User    PDMP Reviewed  Yes 3/6/2023  8:23 PM Edgar Hall [facility]           ED Provider  Attending physically available and evaluated Ashely Vargas  MAKAYLA managed the patient along with the ED Attending      Electronically Signed by         Yang Mcdermott MD  04/17/23 3509

## 2023-04-25 ENCOUNTER — OFFICE VISIT (OUTPATIENT)
Dept: FAMILY MEDICINE CLINIC | Facility: CLINIC | Age: 63
End: 2023-04-25

## 2023-04-25 VITALS
DIASTOLIC BLOOD PRESSURE: 62 MMHG | OXYGEN SATURATION: 97 % | SYSTOLIC BLOOD PRESSURE: 104 MMHG | WEIGHT: 182 LBS | HEIGHT: 64 IN | HEART RATE: 85 BPM | TEMPERATURE: 98.3 F | BODY MASS INDEX: 31.07 KG/M2 | RESPIRATION RATE: 16 BRPM

## 2023-04-25 DIAGNOSIS — H66.001 NON-RECURRENT ACUTE SUPPURATIVE OTITIS MEDIA OF RIGHT EAR WITHOUT SPONTANEOUS RUPTURE OF TYMPANIC MEMBRANE: Primary | ICD-10-CM

## 2023-04-25 DIAGNOSIS — E78.5 HYPERLIPIDEMIA, UNSPECIFIED HYPERLIPIDEMIA TYPE: ICD-10-CM

## 2023-04-25 DIAGNOSIS — N39.41 URGE INCONTINENCE OF URINE: ICD-10-CM

## 2023-04-25 DIAGNOSIS — R73.9 HYPERGLYCEMIA: ICD-10-CM

## 2023-04-25 RX ORDER — ONDANSETRON HYDROCHLORIDE 8 MG/1
TABLET, FILM COATED ORAL
COMMUNITY
Start: 2023-04-18

## 2023-04-25 RX ORDER — PANTOPRAZOLE SODIUM 20 MG/1
20 TABLET, DELAYED RELEASE ORAL DAILY
COMMUNITY
Start: 2023-04-02

## 2023-04-25 RX ORDER — TIZANIDINE 2 MG/1
TABLET ORAL
COMMUNITY
Start: 2023-03-21

## 2023-04-25 NOTE — ASSESSMENT & PLAN NOTE
- Hemoglobin A1c elevated at 5 8    - Encouraged healthy diet limiting carbohydrates and sugar    - Will continue to monitor

## 2023-04-25 NOTE — ASSESSMENT & PLAN NOTE
Component      Latest Ref Rng & Units 1/5/2022 9/3/2022   Cholesterol      See Comment mg/dL 205 (H) 193   Triglycerides      See Comment mg/dL 77 83   HDL      >=50 mg/dL 92 80   LDL Calculated      0 - 100 mg/dL 98 96     - Well controlled  - Continue Lipitor 10 mg daily   - Encourage healthy diet and regular exercise  - Will continue to monitor fasting lipid panel

## 2023-04-25 NOTE — PROGRESS NOTES
Name: Nicol Armendariz      : 1960      MRN: [id number]  Encounter Provider: DANIELA Gonzalez  Encounter Date: 2023   Encounter department: [address] [place] [address]  Non-recurrent acute suppurative otitis media of right ear without spontaneous rupture of tympanic membrane  Assessment & Plan:  - Symptoms improving after course of amoxicillin prescribed in ER  TM normal on exam       2  Hyperglycemia  Assessment & Plan:  - Hemoglobin A1c elevated at 5 8    - Encouraged healthy diet limiting carbohydrates and sugar    - Will continue to monitor  Orders:  -     Comprehensive metabolic panel; Future  -     Hemoglobin A1C; Future    3  Hyperlipidemia, unspecified hyperlipidemia type  Assessment & Plan:  Component      Latest Ref Rng & Units 2022 9/3/2022   Cholesterol      See Comment mg/dL 205 (H) 193   Triglycerides      See Comment mg/dL 77 83   HDL      >=50 mg/dL 92 80   LDL Calculated      0 - 100 mg/dL 98 96     - Well controlled  - Continue Lipitor 10 mg daily   - Encourage healthy diet and regular exercise  - Will continue to monitor fasting lipid panel  Orders:  -     CBC and differential; Future  -     Lipid Panel with Direct LDL reflex; Future  -     TSH, 3rd generation with Free T4 reflex; Future    4  Urge incontinence of urine  Assessment & Plan:  - Referred to Pelvic PT and Urology  - Will continue to follow up  Orders:  -     Ambulatory Referral to Physical Therapy; Future  -     Ambulatory Referral to Urology; Future           Subjective     Patient presents today for ER follow up  She was seen on  with complaints of right sided neck pain, nausea, and dizziness  Found to have right otitis media and she was treated with Amoxicillin x 7 days  She finished her antibiotic and her pain is starting to improve  She has complaints today of leaking urine whenever she stands up  Happens throughout the day   Denies any other concerns or complaints today  Review of Systems   Constitutional: Negative for fatigue and fever  HENT: Negative for trouble swallowing  Eyes: Negative for visual disturbance  Respiratory: Negative for cough and shortness of breath  Cardiovascular: Negative for chest pain and palpitations  Gastrointestinal: Negative for abdominal pain and blood in stool  Endocrine: Negative for cold intolerance and heat intolerance  Genitourinary: Positive for urgency  Negative for difficulty urinating and dysuria  Urinary incontinence   Musculoskeletal: Negative for gait problem  Skin: Negative for rash  Neurological: Negative for dizziness, syncope and headaches  Hematological: Negative for adenopathy  Psychiatric/Behavioral: Negative for behavioral problems  Past Medical History:   Diagnosis Date   • Anemia     iron def   • Arthritis     knees   • Carpal tunnel syndrome of right wrist    • Chest pain    • Colon polyp    • Deep vein thrombosis (DVT) of proximal lower extremity ([facility] [address] ) 2012   • Depression     at times   • DVT (deep venous thrombosis) ([facility] [address] )    • Dysphagia    • Encounter for surgical aftercare following surgery of digestive system 2020    s/p Raghav-En-Y Gastric Bypass with Dr Terry Garcias in 2017     INITIAL weight:241 lb INITIAL weight with start of topirmate: 176 lb Goal weight loss of 5-10 %-159 lb-167 lb    • Esophageal reflux 2011   • Grade 1 out of 6 intensity murmur 2019   • Heart murmur    • History of transfusion 1980   • Pacemaker 2018   • Pneumonia     age 15   • Psychiatric disorder    • Trigger finger, left    • Visit for suture removal 2021     Past Surgical History:   Procedure Laterality Date   • BACK SURGERY     • CARDIAC PACEMAKER PLACEMENT  2018   • CERVICAL DISC SURGERY  2009    PLATES & SCREWS   •  SECTION      X2   • COLONOSCOPY     • EPIDURAL BLOCK INJECTION Right 2022    Procedure: RIGHT L3-[id number] EPIDURAL STEROID INJECTION (84425); Surgeon: Alana Esparza DO;  Location: [facility];  Service: Pain Management    • GASTRIC BYPASS  02/26/2017    LAP RINYGB SURGERY   • KNEE ARTHROSCOPY     • KNEE SURGERY Bilateral    • KNEE SURGERY Left 08/13/2020   • OTHER SURGICAL HISTORY      ARM SURGERIES   • [place] NDSC WRST SURG W/RLS TRANSVRS CARPL LIGM Right 9/18/2018    Procedure: ENDOSCOPIC CARPAL TUNNEL RELEASE;  Surgeon: Bettina Godoy MD;  Location: [facility];  Service: Orthopedics   • [place] Manjeet Alexis Sarthak 83 DX/THER AGT PVRT FACET JT LMBR/SAC 1 LEVEL Bilateral 10/25/2022    Procedure: L3-L4,L4-5,L5-S1 BILATERAL BRANCH BLOCK;  Surgeon: Alana Esparza DO;  Location: [facility];  Service: Pain Management    • [place] NJX DX/THER AGT PVRT FACET JT LMBR/SAC 1 LEVEL Bilateral 11/15/2022    Procedure: BILATERAL L3-S1 MEDICAL BRANCH BLOCK (93553,88920,67035); Surgeon: Alana Esparza DO;  Location: [facility];  Service: Pain Management    • [place] TENDON SHEATH INCISION Left 9/18/2018    Procedure: LONG TRIGGER FINGER RELEASE;  Surgeon: Bettina Godoy MD;  Location: [facility];  Service: Orthopedics   • RADIOFREQUENCY ABLATION Right 12/6/2022    Procedure: RIGHT L3-S1 RADIO FREQUENCY ABLATION (26422,66371); Surgeon: Alana Esparza DO;  Location: [facility];  Service: Pain Management    • RADIOFREQUENCY ABLATION Left 12/20/2022    Procedure: LEFT L3-S1 RADIO FREQUENCY ABLATION (92826,[id number]);   Surgeon: Alana Espraza DO;  Location: [facility];  Service: Pain Management    • TONSILLECTOMY     • UPPER GASTROINTESTINAL ENDOSCOPY       Family History   Problem Relation Age of Onset   • Stroke Mother    • Alzheimer's disease Mother    • Arthritis Mother    • Coronary artery disease Mother    • Breast cancer Mother 77   • Cancer Mother    • Heart disease Mother    • Hypertension Father    • Gout Father    • Diabetes type II Father    • Coronary artery disease Father    • Heart disease Father    • No Known Problems Sister    • No Known Problems Sister    • No Known Problems Sister    • No Known Problems Daughter    • No Known Problems Maternal Grandmother    • No Known Problems Maternal Grandfather    • No Known Problems Paternal Grandmother    • No Known Problems Paternal Grandfather    • Prostate cancer Brother    • Pancreatic cancer Brother    • No Known Problems Maternal Aunt    • No Known Problems Maternal Aunt    • No Known Problems Maternal Aunt    • No Known Problems Maternal Aunt    • Cancer Maternal Aunt    • No Known Problems Paternal Aunt    • No Known Problems Paternal Aunt      Social History     Socioeconomic History   • Marital status: /Civil Union     Spouse name: None   • Number of children: None   • Years of education: None   • Highest education level: None   Occupational History   • None   Tobacco Use   • Smoking status: Former     Packs/day: 0 25     Types: Cigarettes   • Smokeless tobacco: Never   Vaping Use   • Vaping Use: Never used   Substance and Sexual Activity   • Alcohol use:  Yes     Alcohol/week: 2 0 standard drinks     Types: 1 Glasses of wine, 1 Standard drinks or equivalent per week     Comment: very rare social   • Drug use: No   • Sexual activity: Not Currently   Other Topics Concern   • None   Social History Narrative   • None     Social Determinants of Health     Financial Resource Strain: Not on file   Food Insecurity: Not on file   Transportation Needs: Not on file   Physical Activity: Not on file   Stress: Not on file   Social Connections: Not on file   Intimate Partner Violence: Not on file   Housing Stability: Not on file     Current Outpatient Medications on File Prior to Visit   Medication Sig   • acetaminophen (TYLENOL) 650 mg CR tablet Take 650 mg by mouth every 8 (eight) hours as needed for mild pain Take 2 tablets in the AM and 2 tablets in the late evening   • Ascorbic Acid (VITAMIN C) 100 MG CHEW Chew   • atorvastatin (LIPITOR) 10 mg tablet take 1 tablet by mouth once daily   • Biotin 92037 MCG TABS Take by mouth daily     • Calcium Carbonate (CALCI-CHEW PO) Take 500 mg by mouth 2 (two) times a day   • cholecalciferol (VITAMIN D3) 1,000 units tablet Take 5,000 Units by mouth in the morning   • clobetasol (TEMOVATE) 0 05 % cream Apply to affected areas daily x 12 weeks, then twice weekly for maintenance   • dicyclomine (BENTYL) 10 mg capsule take 1 capsule by mouth four times a day before meals at bedtime   • Eliquis 2 5 MG TAKE 1 TABLET BY MOUTH TWICE A DAY   • famotidine (PEPCID) 20 mg tablet Take 1 tablet (20 mg total) by mouth 2 (two) times a day   • Ferrous Sulfate  (45 Fe) MG TBCR Take 1 tablet by mouth daily   • gabapentin (Neurontin) 300 mg capsule Take 1 capsule (300 mg total) by mouth 3 (three) times a day for 14 days For post-herpetic neuralgia: Take 1 tablet on day 1,  Then take 2 tablets on day 2, Then take 3 tablets on day 3 and every day after that as instructed by your doctor  • Lysine HCl POWD Take by mouth   • metoprolol succinate (TOPROL-XL) 25 mg 24 hr tablet take 1 tablet by mouth once daily   • Multiple Vitamin (MULTIVITAMIN) capsule Take 1 capsule by mouth daily 2 chewies daily   • nystatin (MYCOSTATIN) powder Apply topically 2 (two) times a day   • ondansetron (ZOFRAN) 8 mg tablet TAKE 1 TABLET BY MOUTH EVERY 8 HOURS AS NEEDED FOR NAUSEA OR VOMITING     • pantoprazole (PROTONIX) 20 mg tablet Take 20 mg by mouth daily   • sucralfate (CARAFATE) 1 g tablet Take 1 tablet (1 g total) by mouth 4 (four) times a day   • tiZANidine (ZANAFLEX) 2 mg tablet TAKE 1 TABLET BY MOUTH EVERY 8 HOURS AS NEEDED FOR MUSCLE SPASM   • traZODone (DESYREL) 50 mg tablet TAKE 1 TABLET BY MOUTH DAILY AT BEDTIME   • albuterol (ProAir HFA) 90 mcg/act inhaler Inhale 2 puffs every 6 (six) hours as needed for wheezing (Patient not taking: Reported on 4/25/2023)   • aluminum-magnesium hydroxide-simethicone (MAALOX MAX) 400-400-40 MG/5ML suspension Take 10 mL by mouth every 6 (six) hours as needed for indigestion or heartburn (Patient not taking: Reported on 4/25/2023)   • benzonatate (TESSALON) 200 MG capsule Take 1 capsule (200 mg total) by mouth 3 (three) times a day as needed for cough (Patient not taking: Reported on 4/25/2023)   • Cyanocobalamin (VITAMIN B 12 PO) Take 1,000 mcg by mouth (Patient not taking: Reported on 4/25/2023)   • Diclofenac Sodium (VOLTAREN) 1 % Apply 2 g topically 4 (four) times a day (Patient not taking: Reported on 10/5/2022)   • fluticasone (FLONASE) 50 mcg/act nasal spray instill 1 spray into each nostril once daily (Patient not taking: Reported on 10/6/2022)   • gabapentin (NEURONTIN) 100 mg capsule TAKE 1 CAPSULE BY MOUTH THREE TIMES A DAY AS NEEDED FOR SHOOTING PAIN (Patient not taking: Reported on 4/25/2023)   • methocarbamol (ROBAXIN) 500 mg tablet Take 1 tablet (500 mg total) by mouth 2 (two) times a day (Patient not taking: Reported on 4/25/2023)     No Known Allergies  Immunization History   Administered Date(s) Administered   • COVID-19 PFIZER VACCINE 0 3 ML IM 04/03/2021, 04/28/2021, 11/01/2021   • COVID-19 Pfizer Vac BIVALENT Sudhakar-sucrose 12 Yr+ IM (BOOSTER ONLY) 10/15/2022   • Hep A, adult 08/08/2017   • Hep A, ped/adol, 2 dose 01/04/2017, 08/08/2017   • Hepatitis A 01/04/2017   • INFLUENZA 10/05/2011, 11/22/2016, 11/22/2016, 10/23/2017, 10/23/2017, 10/23/2017, 08/18/2021, 08/18/2021   • Influenza Quadrivalent Preservative Free 3 years and older IM 10/15/2022   • Influenza, injectable, quadrivalent, preservative free 0 5 mL 10/30/2020   • Influenza, recombinant, quadrivalent,injectable, preservative free 10/31/2018, 10/15/2019   • Influenza, seasonal, injectable 12/22/2014, 10/13/2015   • Pneumococcal Polysaccharide PPV23 02/25/2013, 01/17/2016   • Tdap 04/21/2011, 04/10/2017, 06/18/2021   • Tuberculin Skin Test-PPD Intradermal 07/28/2021   • Zoster 01/17/2016       Objective     /62 (BP Location: Left arm, Patient Position: Sitting, Cuff Size: Large)   Pulse 85   Temp 98 3 °F "(36 8 °C) (Tympanic)   Resp 16   Ht 5' 4\" (1 626 m)   Wt 82 6 kg (182 lb)   LMP 09/18/2014   SpO2 97%   BMI 31 24 kg/m²     Physical Exam  Vitals and nursing note reviewed  Constitutional:       Appearance: Normal appearance  HENT:      Head: Normocephalic and atraumatic  Right Ear: Tympanic membrane, ear canal and external ear normal       Left Ear: Tympanic membrane, ear canal and external ear normal    Eyes:      Conjunctiva/sclera: Conjunctivae normal    Cardiovascular:      Rate and Rhythm: Normal rate and regular rhythm  Heart sounds: Normal heart sounds  Pulmonary:      Effort: Pulmonary effort is normal       Breath sounds: Normal breath sounds  Musculoskeletal:         General: Normal range of motion  Cervical back: Normal range of motion  Lymphadenopathy:      Cervical: No cervical adenopathy  Skin:     General: Skin is warm and dry  Neurological:      Mental Status: She is alert and oriented to person, place, and time  Cranial Nerves: No cranial nerve deficit     Psychiatric:         Mood and Affect: Mood normal          Behavior: Behavior normal        Gearld Blizzard, CRNP  "

## 2023-05-18 ENCOUNTER — OFFICE VISIT (OUTPATIENT)
Dept: FAMILY MEDICINE CLINIC | Facility: CLINIC | Age: 63
End: 2023-05-18

## 2023-05-18 VITALS
DIASTOLIC BLOOD PRESSURE: 72 MMHG | SYSTOLIC BLOOD PRESSURE: 114 MMHG | TEMPERATURE: 97.5 F | WEIGHT: 187 LBS | HEART RATE: 77 BPM | RESPIRATION RATE: 16 BRPM | BODY MASS INDEX: 31.92 KG/M2 | OXYGEN SATURATION: 96 % | HEIGHT: 64 IN

## 2023-05-18 DIAGNOSIS — Z12.31 ENCOUNTER FOR SCREENING MAMMOGRAM FOR MALIGNANT NEOPLASM OF BREAST: ICD-10-CM

## 2023-05-18 DIAGNOSIS — G47.00 INSOMNIA, UNSPECIFIED TYPE: ICD-10-CM

## 2023-05-18 DIAGNOSIS — R10.84 GENERALIZED ABDOMINAL PAIN: Primary | ICD-10-CM

## 2023-05-18 DIAGNOSIS — R42 DIZZINESS: ICD-10-CM

## 2023-05-18 RX ORDER — PANTOPRAZOLE SODIUM 20 MG/1
20 TABLET, DELAYED RELEASE ORAL DAILY
Qty: 90 TABLET | Refills: 1 | Status: SHIPPED | OUTPATIENT
Start: 2023-05-18

## 2023-05-18 RX ORDER — MECLIZINE HYDROCHLORIDE 25 MG/1
25 TABLET ORAL 3 TIMES DAILY PRN
Qty: 60 TABLET | Refills: 0 | Status: SHIPPED | OUTPATIENT
Start: 2023-05-18

## 2023-05-18 NOTE — PROGRESS NOTES
Name: Fredi Howard      : 1960      MRN: [id number]  Encounter Provider: DANIELA Linares  Encounter Date: 2023   Encounter department: [address] [place] [address]  Generalized abdominal pain  Assessment & Plan:  - Will start pantoprazole 20 mg daily before breakfast    - Avoid triggering food and beverage   - Remain upright for at least 30-60 minutes after eating    - Recommend follow up in 6 weeks  Orders:  -     pantoprazole (PROTONIX) 20 mg tablet; Take 1 tablet (20 mg total) by mouth daily    2  Dizziness  Assessment & Plan:  - Exam and recent labs fairly unremarkable  - Prescription sent for meclizine as needed  - Increase oral hydration    - Consider referral to ENT or PT if no improvement  Orders:  -     meclizine (ANTIVERT) 25 mg tablet; Take 1 tablet (25 mg total) by mouth 3 (three) times a day as needed for dizziness    3  Insomnia, unspecified type  Assessment & Plan:  - Well controlled on trazodone 50 mg daily at bedtime  Continue same    - Will continue to monitor  4  Encounter for screening mammogram for malignant neoplasm of breast  -     Mammo screening bilateral w 3d & cad; Future; Expected date: 2023           Subjective     Patient presents today with complaints of burning abdominal pain for the past few weeks  Was seen in the ER for abdominal pain in April and was prescribed pantoprazole and carafate  She has not been taking the  The pain occurs over her whole abdomen  Occurs more after eating, especially after eating fruit  Also complains of some dizziness and ear echoing  Was recently treated for otitis media  Her recent labs have been stable  She is also seeing GYN due to postmenopausal bleeding  Just had biopsy and results are pending  Denies any other concerns or complaints today  Review of Systems   Constitutional: Negative for fatigue and fever  HENT: Negative for trouble swallowing      Eyes: Negative for visual disturbance  Respiratory: Negative for cough and shortness of breath  Cardiovascular: Negative for chest pain and palpitations  Gastrointestinal: Positive for abdominal pain  Negative for blood in stool  Endocrine: Negative for cold intolerance and heat intolerance  Genitourinary: Negative for difficulty urinating and dysuria  Musculoskeletal: Negative for gait problem  Skin: Negative for rash  Neurological: Positive for dizziness  Negative for syncope and headaches  Hematological: Negative for adenopathy  Psychiatric/Behavioral: Negative for behavioral problems  Past Medical History:   Diagnosis Date   • Anemia     iron def   • Arthritis     knees   • Carpal tunnel syndrome of right wrist    • Chest pain    • Colon polyp    • Deep vein thrombosis (DVT) of proximal lower extremity ([facility] [address] ) 2012   • Depression     at times   • DVT (deep venous thrombosis) ([facility] [address] )    • Dysphagia    • Encounter for surgical aftercare following surgery of digestive system 2020    s/p Raghav-En-Y Gastric Bypass with Dr Nasreen Cantrell in   INITIAL weight:241 lb INITIAL weight with start of topirmate: 176 lb Goal weight loss of 5-10 %-159 lb-167 lb    • Esophageal reflux 2011   • Grade 1 out of 6 intensity murmur 2019   • Heart murmur    • History of transfusion 1980   • Pacemaker 2018   • Pneumonia     age 15   • Psychiatric disorder    • Trigger finger, left    • Visit for suture removal 2021     Past Surgical History:   Procedure Laterality Date   • BACK SURGERY     • CARDIAC PACEMAKER PLACEMENT  2018   • CERVICAL DISC SURGERY  2009    PLATES & SCREWS   •  SECTION      X2   • COLONOSCOPY     • EPIDURAL BLOCK INJECTION Right 2022    Procedure: RIGHT [id number] EPIDURAL STEROID INJECTION (27689);   Surgeon: Robert Dong DO;  Location:  MAIN OR;  Service: Pain Management    • GA[facility]PASS  [facility]   • KNEE ARTHROSCOPY     • KNEE SURGERY Bilateral    • KNEE SURGERY Left 08/13/2020   • OTHER SURGICAL HISTORY      ARM SURGERIES   • WA NDSC WRST SURG W/RLS TRANSVRS CARPL LIGM Right 9/18/2018    Procedure: ENDOSCOPIC CARPAL TUNNEL RELEASE;  Surgeon: Ken Lagunas MD;  Location: [facility];  Service: Orthopedics   • WA Manjeet Alexis Sarthak 83 DX/THER AGT PVRT FACET JT LMBR/SAC 1 LEVEL Bilateral 10/25/2022    Procedure: L3-L4,L4-5,L5-S1 BILATERAL BRANCH BLOCK;  Surgeon: Jenifer Whitaker DO;  Location: [facility];  Service: Pain Management    • WA NJX DX/THER AGT PVRT FACET JT LMBR/SAC 1 LEVEL Bilateral 11/15/2022    Procedure: BILATERAL L3-S1 MEDICAL BRANCH BLOCK (41690,36848,62326); Surgeon: Jenifer Whitaker DO;  Location: [facility];  Service: Pain Management    • WA TENDON SHEATH INCISION Left 9/18/2018    Procedure: LONG TRIGGER FINGER RELEASE;  Surgeon: Ken Lagunas MD;  Location: [facility];  Service: Orthopedics   • RADIOFREQUENCY ABLATION Right 12/6/2022    Procedure: RIGHT L3-S1 RADIO FREQUENCY ABLATION (36471,12008); Surgeon: Jenifer Whitaker DO;  Location: [facility];  Service: Pain Management    • RADIOFREQUENCY ABLATION Left 12/20/2022    Procedure: LEFT L3-S1 RADIO FREQUENCY ABLATION (61504,52536);   Surgeon: Jenifer Whitaker DO;  Location: [facility];  Service: Pain Management    • TONSILLECTOMY     • UPPER GASTROINTESTINAL ENDOSCOPY       Family History   Problem Relation Age of Onset   • Stroke Mother    • Alzheimer's disease Mother    • Arthritis Mother    • Coronary artery disease Mother    • Breast cancer Mother 77   • Cancer Mother    • Heart disease Mother    • Hypertension Father    • Gout Father    • Diabetes type II Father    • Coronary artery disease Father    • Heart disease Father    • No Known Problems Sister    • No Known Problems Sister    • No Known Problems Sister    • No Known Problems Daughter    • No Known Problems Maternal Grandmother    • No Known Problems Maternal Grandfather    • No Known Problems Paternal Grandmother    • No Known Problems Paternal Grandfather    • Prostate cancer Brother    • Pancreatic cancer Brother    • No Known Problems Maternal Aunt    • No Known Problems Maternal Aunt    • No Known Problems Maternal Aunt    • No Known Problems Maternal Aunt    • Cancer Maternal Aunt    • No Known Problems Paternal Aunt    • No Known Problems Paternal Aunt      Social History     Socioeconomic History   • Marital status: /Civil Union     Spouse name: None   • Number of children: None   • Years of education: None   • Highest education level: None   Occupational History   • None   Tobacco Use   • Smoking status: Former     Packs/day: 0 25     Types: Cigarettes   • Smokeless tobacco: Never   Vaping Use   • Vaping Use: Never used   Substance and Sexual Activity   • Alcohol use:  Yes     Alcohol/week: 2 0 standard drinks     Types: 1 Glasses of wine, 1 Standard drinks or equivalent per week     Comment: very rare social   • Drug use: No   • Sexual activity: Not Currently   Other Topics Concern   • None   Social History Narrative   • None     Social Determinants of Health     Financial Resource Strain: Not on file   Food Insecurity: Not on file   Transportation Needs: Not on file   Physical Activity: Not on file   Stress: Not on file   Social Connections: Not on file   Intimate Partner Violence: Not on file   Housing Stability: Not on file     Current Outpatient Medications on File Prior to Visit   Medication Sig   • acetaminophen (TYLENOL) 650 mg CR tablet Take 650 mg by mouth every 8 (eight) hours as needed for mild pain Take 2 tablets in the AM and 2 tablets in the late evening   • Ascorbic Acid (VITAMIN C) 100 MG CHEW Chew   • atorvastatin (LIPITOR) 10 mg tablet take 1 tablet by mouth once daily   • Biotin 38475 MCG TABS Take by mouth daily     • Calcium Carbonate (CALCI-CHEW PO) Take 500 mg by mouth 2 (two) times a day   • cholecalciferol (VITAMIN D3) 1,000 units tablet Take 5,000 Units by mouth in the morning   • clobetasol (TEMOVATE) 0 05 % cream Apply to affected areas daily x 12 weeks, then twice weekly for maintenance   • dicyclomine (BENTYL) 10 mg capsule take 1 capsule by mouth four times a day before meals at bedtime   • Eliquis 2 5 MG TAKE 1 TABLET BY MOUTH TWICE A DAY   • Ferrous Sulfate  (45 Fe) MG TBCR Take 1 tablet by mouth daily   • fluticasone (FLONASE) 50 mcg/act nasal spray instill 1 spray into each nostril once daily   • gabapentin (NEURONTIN) 100 mg capsule TAKE 1 CAPSULE BY MOUTH THREE TIMES A DAY AS NEEDED FOR SHOOTING PAIN   • gabapentin (Neurontin) 300 mg capsule Take 1 capsule (300 mg total) by mouth 3 (three) times a day for 14 days For post-herpetic neuralgia: Take 1 tablet on day 1,  Then take 2 tablets on day 2, Then take 3 tablets on day 3 and every day after that as instructed by your doctor  (Patient taking differently: Take 100 mg by mouth 3 (three) times a day)   • metoprolol succinate (TOPROL-XL) 25 mg 24 hr tablet take 1 tablet by mouth once daily   • Multiple Vitamin (MULTIVITAMIN) capsule Take 1 capsule by mouth daily 2 chewies daily   • nystatin (MYCOSTATIN) powder Apply topically 2 (two) times a day   • ondansetron (ZOFRAN) 8 mg tablet TAKE 1 TABLET BY MOUTH EVERY 8 HOURS AS NEEDED FOR NAUSEA OR VOMITING     • sucralfate (CARAFATE) 1 g tablet Take 1 tablet (1 g total) by mouth 4 (four) times a day   • tiZANidine (ZANAFLEX) 2 mg tablet TAKE 1 TABLET BY MOUTH EVERY 8 HOURS AS NEEDED FOR MUSCLE SPASM   • traZODone (DESYREL) 50 mg tablet TAKE 1 TABLET BY MOUTH DAILY AT BEDTIME   • [DISCONTINUED] pantoprazole (PROTONIX) 20 mg tablet Take 20 mg by mouth daily   • albuterol (ProAir HFA) 90 mcg/act inhaler Inhale 2 puffs every 6 (six) hours as needed for wheezing (Patient not taking: Reported on 4/25/2023)   • aluminum-magnesium hydroxide-simethicone (MAALOX MAX) 400-400-40 MG/5ML suspension Take 10 mL by mouth every 6 (six) hours as needed for indigestion or "heartburn (Patient not taking: Reported on 4/25/2023)   • benzonatate (TESSALON) 200 MG capsule Take 1 capsule (200 mg total) by mouth 3 (three) times a day as needed for cough (Patient not taking: Reported on 4/25/2023)   • Cyanocobalamin (VITAMIN B 12 PO) Take 1,000 mcg by mouth (Patient not taking: Reported on 4/25/2023)   • Diclofenac Sodium (VOLTAREN) 1 % Apply 2 g topically 4 (four) times a day (Patient not taking: Reported on 10/5/2022)   • famotidine (PEPCID) 20 mg tablet Take 1 tablet (20 mg total) by mouth 2 (two) times a day (Patient not taking: Reported on 5/18/2023)   • Lysine HCl POWD Take by mouth (Patient not taking: Reported on 5/18/2023)   • methocarbamol (ROBAXIN) 500 mg tablet Take 1 tablet (500 mg total) by mouth 2 (two) times a day (Patient not taking: Reported on 4/25/2023)     No Known Allergies  Immunization History   Administered Date(s) Administered   • COVID-19 PFIZER VACCINE 0 3 ML IM 04/03/2021, 04/28/2021, 11/01/2021   • COVID-19 Pfizer Vac BIVALENT Sudhakar-sucrose 12 Yr+ IM (BOOSTER ONLY) 10/15/2022   • Hep A, adult 08/08/2017   • Hep A, ped/adol, 2 dose 01/04/2017, 08/08/2017   • Hepatitis A 01/04/2017   • INFLUENZA 10/05/2011, 11/22/2016, 11/22/2016, 10/23/2017, 10/23/2017, 10/23/2017, 08/18/2021, 08/18/2021   • Influenza Quadrivalent Preservative Free 3 years and older IM 10/15/2022   • Influenza, injectable, quadrivalent, preservative free 0 5 mL 10/30/2020   • Influenza, recombinant, quadrivalent,injectable, preservative free 10/31/2018, 10/15/2019   • Influenza, seasonal, injectable 12/22/2014, 10/13/2015   • Pneumococcal Polysaccharide PPV23 02/25/2013, 01/17/2016   • Tdap 04/21/2011, 04/10/2017, 06/18/2021   • Tuberculin Skin Test-PPD Intradermal 07/28/2021   • Zoster 01/17/2016       Objective     /72 (BP Location: Left arm, Patient Position: Sitting, Cuff Size: Standard)   Pulse 77   Temp 97 5 °F (36 4 °C) (Tympanic)   Resp 16   Ht 5' 4\" (1 626 m)   Wt 84 8 kg (187 " lb)   LMP 09/18/2014   SpO2 96%   BMI 32 10 kg/m²     Physical Exam  Vitals and nursing note reviewed  Constitutional:       Appearance: Normal appearance  HENT:      Head: Normocephalic and atraumatic  Right Ear: Ear canal and external ear normal  A middle ear effusion is present  Left Ear: Tympanic membrane, ear canal and external ear normal       Nose: Nose normal       Mouth/Throat:      Mouth: Mucous membranes are moist    Eyes:      Conjunctiva/sclera: Conjunctivae normal    Cardiovascular:      Rate and Rhythm: Normal rate and regular rhythm  Heart sounds: Normal heart sounds  Pulmonary:      Effort: Pulmonary effort is normal       Breath sounds: Normal breath sounds  Abdominal:      General: Bowel sounds are normal       Palpations: Abdomen is soft  Tenderness: There is no abdominal tenderness  There is no guarding  Musculoskeletal:         General: Normal range of motion  Cervical back: Normal range of motion  Skin:     General: Skin is warm and dry  Neurological:      Mental Status: She is alert and oriented to person, place, and time  Cranial Nerves: No cranial nerve deficit     Psychiatric:         Mood and Affect: Mood normal          Behavior: Behavior normal        DANIELA Mahan

## 2023-05-18 NOTE — ASSESSMENT & PLAN NOTE
- Exam and recent labs fairly unremarkable  - Prescription sent for meclizine as needed  - Increase oral hydration    - Consider referral to ENT or PT if no improvement

## 2023-05-18 NOTE — ASSESSMENT & PLAN NOTE
- Will start pantoprazole 20 mg daily before breakfast    - Avoid triggering food and beverage   - Remain upright for at least 30-60 minutes after eating    - Recommend follow up in 6 weeks

## 2023-05-23 ENCOUNTER — TELEPHONE (OUTPATIENT)
Dept: OBGYN CLINIC | Facility: HOSPITAL | Age: 63
End: 2023-05-23

## 2023-05-23 NOTE — TELEPHONE ENCOUNTER
Pt contacted Call Center requested refill of their medication  Medication Name: tiZANidine (ZANAFLEX      Dosage of Med: 2mg       Frequency of Med:   TAKE 1 TABLET BY MOUTH EVERY 8 HOURS AS NEEDED FOR MUSCLE SPASM            Pharmacy and Location:  [facility]/pharmacy #[id number]- Ethan Amor, [address] [place] [address]be [address],   [facility] , [address] [place] [address]   Phone:  [phone]  Fax:  [phone]   KELLY #:  [id number]      Pt  Preferred Callback Phone Number: [phone]      Thank you  Patient also asked about her Gabapentin being changed from the hospital to 300mg three times a day instead of the 100mg   Patient will need a refill for this as well  Patient also asked about the injection and when is she due?

## 2023-05-23 NOTE — TELEPHONE ENCOUNTER
Caller: Patient    Doctor: Javier Zimmerman    Reason for call: Patient calling to check the status of her message regarding medication      Call back#: [phone]

## 2023-05-24 DIAGNOSIS — M62.838 NECK MUSCLE SPASM: ICD-10-CM

## 2023-05-24 DIAGNOSIS — M54.31 SCIATIC PAIN, RIGHT: ICD-10-CM

## 2023-05-24 DIAGNOSIS — M54.2 NECK PAIN: ICD-10-CM

## 2023-05-24 RX ORDER — TIZANIDINE 2 MG/1
2 TABLET ORAL EVERY 8 HOURS PRN
Qty: 90 TABLET | Refills: 6 | Status: SHIPPED | OUTPATIENT
Start: 2023-05-24

## 2023-05-24 RX ORDER — GABAPENTIN 300 MG/1
300 CAPSULE ORAL 3 TIMES DAILY
Qty: 90 CAPSULE | Refills: 6 | Status: SHIPPED | OUTPATIENT
Start: 2023-05-24

## 2023-05-24 NOTE — TELEPHONE ENCOUNTER
Please let her know that I will refill her tizanidine and higher dose of gabapentin  She is due for her next Prolia injection around July 25th  Please schedule her for Prolia only injection visit at [facility]

## 2023-06-01 DIAGNOSIS — I82.4Y9 DEEP VEIN THROMBOSIS (DVT) OF PROXIMAL LOWER EXTREMITY, UNSPECIFIED CHRONICITY, UNSPECIFIED LATERALITY (HCC): ICD-10-CM

## 2023-06-01 RX ORDER — APIXABAN 2.5 MG/1
TABLET, FILM COATED ORAL
Qty: 60 TABLET | Refills: 3 | Status: SHIPPED | OUTPATIENT
Start: 2023-06-01

## 2023-06-09 DIAGNOSIS — R42 DIZZINESS: ICD-10-CM

## 2023-06-09 RX ORDER — MECLIZINE HYDROCHLORIDE 25 MG/1
25 TABLET ORAL 3 TIMES DAILY PRN
Qty: 60 TABLET | Refills: 0 | Status: CANCELLED | OUTPATIENT
Start: 2023-06-09

## 2023-06-12 DIAGNOSIS — R42 DIZZINESS: ICD-10-CM

## 2023-06-12 RX ORDER — MECLIZINE HYDROCHLORIDE 25 MG/1
25 TABLET ORAL 3 TIMES DAILY PRN
Qty: 60 TABLET | Refills: 0 | Status: SHIPPED | OUTPATIENT
Start: 2023-06-12

## 2023-06-17 ENCOUNTER — APPOINTMENT (OUTPATIENT)
Dept: LAB | Facility: IMAGING CENTER | Age: 63
End: 2023-06-17
Payer: COMMERCIAL

## 2023-06-17 DIAGNOSIS — E78.5 HYPERLIPIDEMIA, UNSPECIFIED HYPERLIPIDEMIA TYPE: ICD-10-CM

## 2023-06-17 DIAGNOSIS — R73.9 HYPERGLYCEMIA: ICD-10-CM

## 2023-06-17 LAB
ALBUMIN SERPL BCP-MCNC: 3.9 G/DL (ref 3.5–5)
ALP SERPL-CCNC: 67 U/L (ref 46–116)
ALT SERPL W P-5'-P-CCNC: 43 U/L (ref 12–78)
ANION GAP SERPL CALCULATED.3IONS-SCNC: 0 MMOL/L (ref 4–13)
AST SERPL W P-5'-P-CCNC: 35 U/L (ref 5–45)
BASOPHILS # BLD AUTO: 0.07 THOUSANDS/ÂΜL (ref 0–0.1)
BASOPHILS NFR BLD AUTO: 2 % (ref 0–1)
BILIRUB SERPL-MCNC: 0.6 MG/DL (ref 0.2–1)
BUN SERPL-MCNC: 21 MG/DL (ref 5–25)
CALCIUM SERPL-MCNC: 9.6 MG/DL (ref 8.3–10.1)
CHLORIDE SERPL-SCNC: 110 MMOL/L (ref 96–108)
CHOLEST SERPL-MCNC: 219 MG/DL
CO2 SERPL-SCNC: 30 MMOL/L (ref 21–32)
CREAT SERPL-MCNC: 0.7 MG/DL (ref 0.6–1.3)
EOSINOPHIL # BLD AUTO: 0.22 THOUSAND/ÂΜL (ref 0–0.61)
EOSINOPHIL NFR BLD AUTO: 5 % (ref 0–6)
ERYTHROCYTE [DISTWIDTH] IN BLOOD BY AUTOMATED COUNT: 13.2 % (ref 11.6–15.1)
EST. AVERAGE GLUCOSE BLD GHB EST-MCNC: 126 MG/DL
GFR SERPL CREATININE-BSD FRML MDRD: 91 ML/MIN/1.73SQ M
GLUCOSE P FAST SERPL-MCNC: 106 MG/DL (ref 65–99)
HBA1C MFR BLD: 6 %
HCT VFR BLD AUTO: 37.8 % (ref 34.8–46.1)
HDLC SERPL-MCNC: 84 MG/DL
HGB BLD-MCNC: 12.2 G/DL (ref 11.5–15.4)
IMM GRANULOCYTES # BLD AUTO: 0.01 THOUSAND/UL (ref 0–0.2)
IMM GRANULOCYTES NFR BLD AUTO: 0 % (ref 0–2)
LDLC SERPL CALC-MCNC: 118 MG/DL (ref 0–100)
LYMPHOCYTES # BLD AUTO: 1.19 THOUSANDS/ÂΜL (ref 0.6–4.47)
LYMPHOCYTES NFR BLD AUTO: 26 % (ref 14–44)
MCH RBC QN AUTO: 32.4 PG (ref 26.8–34.3)
MCHC RBC AUTO-ENTMCNC: 32.3 G/DL (ref 31.4–37.4)
MCV RBC AUTO: 100 FL (ref 82–98)
MONOCYTES # BLD AUTO: 0.3 THOUSAND/ÂΜL (ref 0.17–1.22)
MONOCYTES NFR BLD AUTO: 7 % (ref 4–12)
NEUTROPHILS # BLD AUTO: 2.86 THOUSANDS/ÂΜL (ref 1.85–7.62)
NEUTS SEG NFR BLD AUTO: 60 % (ref 43–75)
NRBC BLD AUTO-RTO: 0 /100 WBCS
PLATELET # BLD AUTO: 220 THOUSANDS/UL (ref 149–390)
PMV BLD AUTO: 12.1 FL (ref 8.9–12.7)
POTASSIUM SERPL-SCNC: 4.5 MMOL/L (ref 3.5–5.3)
PROT SERPL-MCNC: 7.6 G/DL (ref 6.4–8.4)
RBC # BLD AUTO: 3.77 MILLION/UL (ref 3.81–5.12)
SODIUM SERPL-SCNC: 140 MMOL/L (ref 135–147)
TRIGL SERPL-MCNC: 87 MG/DL
TSH SERPL DL<=0.05 MIU/L-ACNC: 0.9 UIU/ML (ref 0.45–4.5)
WBC # BLD AUTO: 4.65 THOUSAND/UL (ref 4.31–10.16)

## 2023-06-17 PROCEDURE — 84443 ASSAY THYROID STIM HORMONE: CPT

## 2023-06-17 PROCEDURE — 80061 LIPID PANEL: CPT

## 2023-06-17 PROCEDURE — 85025 COMPLETE CBC W/AUTO DIFF WBC: CPT

## 2023-06-17 PROCEDURE — 36415 COLL VENOUS BLD VENIPUNCTURE: CPT

## 2023-06-17 PROCEDURE — 80053 COMPREHEN METABOLIC PANEL: CPT

## 2023-06-17 PROCEDURE — 83036 HEMOGLOBIN GLYCOSYLATED A1C: CPT

## 2023-06-18 DIAGNOSIS — E78.5 HYPERLIPIDEMIA, UNSPECIFIED HYPERLIPIDEMIA TYPE: ICD-10-CM

## 2023-06-19 RX ORDER — ATORVASTATIN CALCIUM 10 MG/1
TABLET, FILM COATED ORAL
Qty: 90 TABLET | Refills: 0 | Status: SHIPPED | OUTPATIENT
Start: 2023-06-19 | End: 2023-06-20 | Stop reason: SDUPTHER

## 2023-06-20 ENCOUNTER — REMOTE DEVICE CLINIC VISIT (OUTPATIENT)
Dept: CARDIOLOGY CLINIC | Facility: CLINIC | Age: 63
End: 2023-06-20
Payer: COMMERCIAL

## 2023-06-20 DIAGNOSIS — E78.5 HYPERLIPIDEMIA, UNSPECIFIED HYPERLIPIDEMIA TYPE: ICD-10-CM

## 2023-06-20 DIAGNOSIS — Z95.0 CARDIAC PACEMAKER IN SITU: Primary | ICD-10-CM

## 2023-06-20 PROCEDURE — 93294 REM INTERROG EVL PM/LDLS PM: CPT | Performed by: INTERNAL MEDICINE

## 2023-06-20 PROCEDURE — 93296 REM INTERROG EVL PM/IDS: CPT | Performed by: INTERNAL MEDICINE

## 2023-06-20 NOTE — PROGRESS NOTES
"Results for orders placed or performed in visit on 06/20/23   Cardiac EP device report    Narrative    MDT-DUAL PPM (AAIR-DDDR MODE)/ ACTIVE SYSTEM IS MRI CONDITIONAL  CARELINK TRANSMISSION: BATTERY STATUS \"10 YRS  \" AP 53%  0%  ALL AVAILABLE LEAD PARAMETERS WITHIN NORMAL LIMITS  NO SIGNIFICANT HIGH RATE EPISODES  NORMAL DEVICE FUNCTION   NC         "

## 2023-06-21 ENCOUNTER — TELEPHONE (OUTPATIENT)
Dept: FAMILY MEDICINE CLINIC | Facility: CLINIC | Age: 63
End: 2023-06-21

## 2023-06-21 RX ORDER — ATORVASTATIN CALCIUM 10 MG/1
10 TABLET, FILM COATED ORAL DAILY
Qty: 90 TABLET | Refills: 1 | Status: SHIPPED | OUTPATIENT
Start: 2023-06-21

## 2023-06-21 NOTE — TELEPHONE ENCOUNTER
----- Message from [address] [employer] sent at 6/21/2023  7:28 AM EDT -----  Labs show elevated cholesterol and a1c  I recommend healthy diet and regular exercise  The rest of her labs are stable

## 2023-06-24 PROBLEM — H66.001 NON-RECURRENT ACUTE SUPPURATIVE OTITIS MEDIA OF RIGHT EAR WITHOUT SPONTANEOUS RUPTURE OF TYMPANIC MEMBRANE: Status: RESOLVED | Noted: 2023-04-25 | Resolved: 2023-06-24

## 2023-06-28 ENCOUNTER — OFFICE VISIT (OUTPATIENT)
Dept: FAMILY MEDICINE CLINIC | Facility: CLINIC | Age: 63
End: 2023-06-28
Payer: COMMERCIAL

## 2023-06-28 VITALS
RESPIRATION RATE: 16 BRPM | HEIGHT: 64 IN | DIASTOLIC BLOOD PRESSURE: 70 MMHG | SYSTOLIC BLOOD PRESSURE: 118 MMHG | WEIGHT: 185.6 LBS | BODY MASS INDEX: 31.69 KG/M2 | HEART RATE: 93 BPM | TEMPERATURE: 99.8 F | OXYGEN SATURATION: 98 %

## 2023-06-28 DIAGNOSIS — Z11.3 SCREENING FOR STDS (SEXUALLY TRANSMITTED DISEASES): ICD-10-CM

## 2023-06-28 DIAGNOSIS — I47.1 PSVT (PAROXYSMAL SUPRAVENTRICULAR TACHYCARDIA) (HCC): ICD-10-CM

## 2023-06-28 DIAGNOSIS — Z12.11 COLON CANCER SCREENING: ICD-10-CM

## 2023-06-28 DIAGNOSIS — B37.2 CANDIDA INFECTION OF FLEXURAL SKIN: ICD-10-CM

## 2023-06-28 DIAGNOSIS — R42 DIZZINESS: ICD-10-CM

## 2023-06-28 DIAGNOSIS — E78.49 OTHER HYPERLIPIDEMIA: ICD-10-CM

## 2023-06-28 DIAGNOSIS — H92.01 RIGHT EAR PAIN: ICD-10-CM

## 2023-06-28 DIAGNOSIS — K21.9 GASTROESOPHAGEAL REFLUX DISEASE, UNSPECIFIED WHETHER ESOPHAGITIS PRESENT: Primary | ICD-10-CM

## 2023-06-28 RX ORDER — MECLIZINE HYDROCHLORIDE 25 MG/1
25 TABLET ORAL 3 TIMES DAILY PRN
Qty: 60 TABLET | Refills: 0 | Status: SHIPPED | OUTPATIENT
Start: 2023-06-28

## 2023-06-28 RX ORDER — NYSTATIN 100000 [USP'U]/G
POWDER TOPICAL 2 TIMES DAILY
Qty: 30 G | Refills: 0 | Status: SHIPPED | OUTPATIENT
Start: 2023-06-28

## 2023-06-28 RX ORDER — METOPROLOL SUCCINATE 25 MG/1
TABLET, EXTENDED RELEASE ORAL
Qty: 90 TABLET | Refills: 1 | Status: SHIPPED | OUTPATIENT
Start: 2023-06-28

## 2023-06-28 NOTE — PROGRESS NOTES
Name: Lake Reza      : 1960      MRN: [id number]  Encounter Provider: DANIELA Elizabeth  Encounter Date: 2023   Encounter department: [address] [place] [address]  Gastroesophageal reflux disease, unspecified whether esophagitis present  Assessment & Plan:  - Improving    - Continue pantoprazole 20 mg daily  - Avoid triggering food and beverage   - Will continue to monitor  2  Candida infection of flexural skin  Comments: Will renew Nystatin powder  Assessment & Plan:  - Prescription sent for Nystatin powder  Orders:  -     nystatin (MYCOSTATIN) powder; Apply topically 2 (two) times a day    3  Right ear pain  Assessment & Plan:  - Continues to have right ear discomfort, echoing, decreasing hearing, and occasional dizziness  Exam and labs unremarkable  - Referred to ENT for further evaluation  Orders:  -     Ambulatory Referral to Otolaryngology; Future    4  Other hyperlipidemia  Assessment & Plan:  Component      Latest Ref Rng & Units 9/3/2022 2023   Cholesterol      See Comment mg/dL 193 219 (H)   Triglycerides      See Comment mg/dL 83 87   HDL      >=50 mg/dL 80 84   LDL Calculated      0 - 100 mg/dL 96 118 (H)     - Not well controlled  - Encouraged healthy diet and regular exercise  - Continue Lipitor 10 mg daily  - Will continue to monitor fasting lipid panel  5  Dizziness  -     meclizine (ANTIVERT) 25 mg tablet; Take 1 tablet (25 mg total) by mouth 3 (three) times a day as needed for dizziness    6  Screening for STDs (sexually transmitted diseases)  -     Hepatitis panel, acute; Future  -     Chlamydia/GC amplified DNA by PCR; Future  -     HIV 1/2 AG/AB w Reflex [facility] for 2 yr old and above; Future  -     Herpes I/II IgG Antibodies; Future  -     RPR-Syphilis Screening (Total Syphilis IGG/IGM); Future    7  Colon cancer screening  -     Ambulatory Referral to General Surgery;  Future           Subjective Patient presents today for follow up  She was started on pantoprazole for GERD symptoms  She reports her symptoms are improving  She continues to have right ear discomfort, echoing, and occasional dizziness  Dizziness is relieved with use of meclizine as needed  Her exam and recent labs have been unremarkable  She is requesting STD testing today  Denies any concerning symptoms  Denies any other concerns or complaints today  Review of Systems   Constitutional: Negative for fatigue and fever  HENT: Positive for ear pain (right)  Negative for trouble swallowing  Eyes: Negative for visual disturbance  Respiratory: Negative for cough and shortness of breath  Cardiovascular: Negative for chest pain and palpitations  Gastrointestinal: Negative for abdominal pain and blood in stool  Endocrine: Negative for cold intolerance and heat intolerance  Genitourinary: Negative for difficulty urinating and dysuria  Musculoskeletal: Negative for gait problem  Skin: Negative for rash  Neurological: Positive for dizziness  Negative for syncope and headaches  Hematological: Negative for adenopathy  Psychiatric/Behavioral: Negative for behavioral problems  Past Medical History:   Diagnosis Date   • Anemia     iron def   • Arthritis     knees   • Carpal tunnel syndrome of right wrist    • Chest pain    • Colon polyp    • Deep vein thrombosis (DVT) of proximal lower extremity ([facility] [address] ) 1/1/2012   • Depression     at times   • DVT (deep venous thrombosis) ([facility] [address] ) 2012   • Dysphagia    • Encounter for surgical aftercare following surgery of digestive system 9/24/2020    s/p Raghav-En-Y Gastric Bypass with Dr Jenifer Skiff in 2017     INITIAL weight:241 lb INITIAL weight with start of topirmate: 176 lb Goal weight loss of 5-10 %-159 lb-167 lb    • Esophageal reflux 5/23/2011   • Grade 1 out of 6 intensity murmur 6/24/2019   • Heart murmur    • History of transfusion 1980   • Pacemaker 11/01/2018   • Pneumonia     age 15   • Psychiatric disorder    • Trigger finger, left    • Visit for suture removal 2021     Past Surgical History:   Procedure Laterality Date   • BACK SURGERY     • CARDIAC PACEMAKER PLACEMENT  2018   • CERVICAL DISC SURGERY  2009    PLATES & SCREWS   •  SECTION      X2   • COLONOSCOPY     • EPIDURAL BLOCK INJECTION Right 2022    Procedure: RIGHT [id number] EPIDURAL STEROID INJECTION (47847); Surgeon: Arsenio Metcalf DO;  Location: [facility];  Service: Pain Management    • GASTRIC BYPASS  2017    LAP RINYGB SURGERY   • KNEE ARTHROSCOPY     • KNEE SURGERY Bilateral    • KNEE SURGERY Left 2020   • OTHER SURGICAL HISTORY      ARM SURGERIES   • [facility] NDSC WRST SURG W/RLS TRANSVRS CARPL LIGM Right 2018    Procedure: ENDOSCOPIC CARPAL TUNNEL RELEASE;  Surgeon: Jael Interiano MD;  Location: [facility];  Service: Orthopedics   • [facility] Manjeet [address] DX/THER AGT PVRT FACET JT LMBR/SAC 1 LEVEL Bilateral 10/25/2022    Procedure: L3-L4,L4-5,L5-S1 BILATERAL BRANCH BLOCK;  Surgeon: Arsenio Metcalf DO;  Location: [facility];  Service: Pain Management    • VA NJX DX/THER AGT PVRT FACET JT LMBR/SAC 1 LEVEL Bilateral 11/15/2022    Procedure: BILATERAL L3-S1 MEDICAL BRANCH BLOCK (83667,[id number]); Surgeon: Arsenio Metcalf DO;  Location: [facility];  Service: Pain Management    • VA TENDON SHEATH INCISION Left 2018    Procedure: LONG TRIGGER FINGER RELEASE;  Surgeon: Jael Interiano MD;  [facility];  Service: Orthopedics   • RADIOFREQUENCY ABLATION Right 2022    Procedure: RIGHT L3-S1 RADIO FREQUENCY ABLATION (39815,[id number]); Surgeon: Arsenio Metcalf DO;  Location: [facility];  Service: Pain Management    • RADIOFREQUENCY ABLATION Left 2022    Procedure: LEFT L3-S1 RADIO FREQUENCY ABLATION ([id number],51674);   Surgeon: Arsenio Metcalf DO;  Location: [facility];  Service: Pain Management    • TONSILLECTOMY     • UPPER GASTROINTESTINAL ENDOSCOPY       Family History Problem Relation Age of Onset   • Stroke Mother    • Alzheimer's disease Mother    • Arthritis Mother    • Coronary artery disease Mother    • Breast cancer Mother 77   • Cancer Mother    • Heart disease Mother    • Hypertension Father    • Gout Father    • Diabetes type II Father    • Coronary artery disease Father    • Heart disease Father    • No Known Problems Sister    • No Known Problems Sister    • No Known Problems Sister    • No Known Problems Daughter    • No Known Problems Maternal Grandmother    • No Known Problems Maternal Grandfather    • No Known Problems Paternal Grandmother    • No Known Problems Paternal Grandfather    • Prostate cancer Brother    • Pancreatic cancer Brother    • No Known Problems Maternal Aunt    • No Known Problems Maternal Aunt    • No Known Problems Maternal Aunt    • No Known Problems Maternal Aunt    • Cancer Maternal Aunt    • No Known Problems Paternal Aunt    • No Known Problems Paternal Aunt      Social History     Socioeconomic History   • Marital status: /Civil Union     Spouse name: None   • Number of children: None   • Years of education: None   • Highest education level: None   Occupational History   • None   Tobacco Use   • Smoking status: Former     Packs/day: 0 25     Types: Cigarettes   • Smokeless tobacco: Never   Vaping Use   • Vaping Use: Never used   Substance and Sexual Activity   • Alcohol use:  Yes     Alcohol/week: 2 0 standard drinks of alcohol     Types: 1 Glasses of wine, 1 Standard drinks or equivalent per week     Comment: very rare social   • Drug use: No   • Sexual activity: Not Currently   Other Topics Concern   • None   Social History Narrative   • None     Social Determinants of Health     Financial Resource Strain: Not on file   Food Insecurity: Not on file   Transportation Needs: Not on file   Physical Activity: Not on file   Stress: Not on file   Social Connections: Not on file   Intimate Partner Violence: Not on file   Housing Stability: Not on file     Current Outpatient Medications on File Prior to Visit   Medication Sig   • acetaminophen (TYLENOL) 650 mg CR tablet Take 650 mg by mouth every 8 (eight) hours as needed for mild pain Take 2 tablets in the AM and 2 tablets in the late evening   • aluminum-magnesium hydroxide-simethicone (MAALOX MAX) 400-400-40 MG/5ML suspension Take 10 mL by mouth every 6 (six) hours as needed for indigestion or heartburn   • Ascorbic Acid (VITAMIN C) 100 MG CHEW Chew   • atorvastatin (LIPITOR) 10 mg tablet Take 1 tablet (10 mg total) by mouth daily   • benzonatate (TESSALON) 200 MG capsule Take 1 capsule (200 mg total) by mouth 3 (three) times a day as needed for cough   • Biotin 60873 MCG TABS Take by mouth daily     • Calcium Carbonate (CALCI-CHEW PO) Take 500 mg by mouth 2 (two) times a day   • cholecalciferol (VITAMIN D3) 1,000 units tablet Take 5,000 Units by mouth in the morning   • clobetasol (TEMOVATE) 0 05 % cream Apply to affected areas daily x 12 weeks, then twice weekly for maintenance   • dicyclomine (BENTYL) 10 mg capsule take 1 capsule by mouth four times a day before meals at bedtime   • Eliquis 2 5 MG TAKE 1 TABLET BY MOUTH TWICE A DAY   • famotidine (PEPCID) 20 mg tablet Take 1 tablet (20 mg total) by mouth 2 (two) times a day   • fluticasone (FLONASE) 50 mcg/act nasal spray instill 1 spray into each nostril once daily   • gabapentin (Neurontin) 300 mg capsule Take 1 capsule (300 mg total) by mouth 3 (three) times a day   • methocarbamol (ROBAXIN) 500 mg tablet Take 1 tablet (500 mg total) by mouth 2 (two) times a day   • metoprolol succinate (TOPROL-XL) 25 mg 24 hr tablet take 1 tablet by mouth once daily   • Multiple Vitamin (MULTIVITAMIN) capsule Take 1 capsule by mouth daily 2 chewies daily   • ondansetron (ZOFRAN) 8 mg tablet TAKE 1 TABLET BY MOUTH EVERY 8 HOURS AS NEEDED FOR NAUSEA OR VOMITING     • pantoprazole (PROTONIX) 20 mg tablet Take 1 tablet (20 mg total) by mouth daily   • tiZANidine (ZANAFLEX) 2 mg tablet TAKE 1 TABLET BY MOUTH EVERY 8 HOURS AS NEEDED FOR MUSCLE SPASMS     • traZODone (DESYREL) 50 mg tablet TAKE 1 TABLET BY MOUTH DAILY AT BEDTIME   • [DISCONTINUED] meclizine (ANTIVERT) 25 mg tablet Take 1 tablet (25 mg total) by mouth 3 (three) times a day as needed for dizziness   • [DISCONTINUED] nystatin (MYCOSTATIN) powder Apply topically 2 (two) times a day   • albuterol (ProAir HFA) 90 mcg/act inhaler Inhale 2 puffs every 6 (six) hours as needed for wheezing (Patient not taking: Reported on 4/25/2023)   • Cyanocobalamin (VITAMIN B 12 PO) Take 1,000 mcg by mouth (Patient not taking: Reported on 4/25/2023)   • Diclofenac Sodium (VOLTAREN) 1 % Apply 2 g topically 4 (four) times a day (Patient not taking: Reported on 10/5/2022)   • Ferrous Sulfate  (45 Fe) MG TBCR Take 1 tablet by mouth daily (Patient not taking: Reported on 6/28/2023)   • Lysine HCl POWD Take by mouth (Patient not taking: Reported on 5/18/2023)   • sucralfate (CARAFATE) 1 g tablet Take 1 tablet (1 g total) by mouth 4 (four) times a day (Patient not taking: Reported on 6/28/2023)     No Known Allergies  Immunization History   Administered Date(s) Administered   • COVID-19 PFIZER VACCINE 0 3 ML IM 04/03/2021, 04/28/2021, 11/01/2021   • COVID-19 Pfizer Vac BIVALENT Sudhakar-sucrose 12 Yr+ IM (BOOSTER ONLY) 10/15/2022   • Hep A, adult 08/08/2017   • Hep A, ped/adol, 2 dose 01/04/2017, 08/08/2017   • Hepatitis A 01/04/2017   • INFLUENZA 10/05/2011, 11/22/2016, 11/22/2016, 10/23/2017, 10/23/2017, 10/23/2017, 08/18/2021, 08/18/2021   • Influenza Quadrivalent Preservative Free 3 years and older IM 10/15/2022   • Influenza, injectable, quadrivalent, preservative free 0 5 mL 10/30/2020   • Influenza, recombinant, quadrivalent,injectable, preservative free 10/31/2018, 10/15/2019   • Influenza, seasonal, injectable 12/22/2014, 10/13/2015   • Pneumococcal Polysaccharide PPV23 02/25/2013, 01/17/2016   • Tdap 04/21/2011, "04/10/2017, 06/18/2021   • Tuberculin Skin Test-PPD Intradermal 07/28/2021   • Zoster 01/17/2016       Objective     /70 (BP Location: Left arm, Patient Position: Sitting, Cuff Size: Large)   Pulse 93   Temp 99 8 °F (37 7 °C) (Tympanic)   Resp 16   Ht 5' 4\" (1 626 m)   Wt 84 2 kg (185 lb 9 6 oz)   LMP 09/18/2014   SpO2 98%   BMI 31 86 kg/m²     Physical Exam  Vitals and nursing note reviewed  Constitutional:       General: She is not in acute distress  Appearance: Normal appearance  She is well-developed  She is not ill-appearing  HENT:      Head: Normocephalic and atraumatic  Right Ear: Tympanic membrane, ear canal and external ear normal       Left Ear: Tympanic membrane, ear canal and external ear normal    Eyes:      Conjunctiva/sclera: Conjunctivae normal    Cardiovascular:      Rate and Rhythm: Normal rate and regular rhythm  Heart sounds: Normal heart sounds  Pulmonary:      Effort: Pulmonary effort is normal       Breath sounds: Normal breath sounds  Musculoskeletal:         General: Normal range of motion  Cervical back: Normal range of motion  Skin:     General: Skin is warm and dry  Neurological:      Mental Status: She is alert and oriented to person, place, and time  Cranial Nerves: No cranial nerve deficit     Psychiatric:         Mood and Affect: Mood normal          Behavior: Behavior normal        DANIELA Hernandez  "

## 2023-06-28 NOTE — ASSESSMENT & PLAN NOTE
- Continues to have right ear discomfort, echoing, decreasing hearing, and occasional dizziness  Exam and labs unremarkable  - Referred to ENT for further evaluation

## 2023-06-28 NOTE — ASSESSMENT & PLAN NOTE
Component      Latest Ref Rng & Units 9/3/2022 6/17/2023   Cholesterol      See Comment mg/dL 193 219 (H)   Triglycerides      See Comment mg/dL 83 87   HDL      >=50 mg/dL 80 84   LDL Calculated      0 - 100 mg/dL 96 118 (H)     - Not well controlled  - Encouraged healthy diet and regular exercise  - Continue Lipitor 10 mg daily  - Will continue to monitor fasting lipid panel

## 2023-06-28 NOTE — ASSESSMENT & PLAN NOTE
- Improving    - Continue pantoprazole 20 mg daily  - Avoid triggering food and beverage   - Will continue to monitor

## 2023-07-21 ENCOUNTER — APPOINTMENT (OUTPATIENT)
Dept: LAB | Facility: MEDICAL CENTER | Age: 63
End: 2023-07-21
Payer: COMMERCIAL

## 2023-07-21 DIAGNOSIS — Z11.3 SCREENING FOR STDS (SEXUALLY TRANSMITTED DISEASES): ICD-10-CM

## 2023-07-21 PROCEDURE — 86695 HERPES SIMPLEX TYPE 1 TEST: CPT

## 2023-07-21 PROCEDURE — 80074 ACUTE HEPATITIS PANEL: CPT

## 2023-07-21 PROCEDURE — 87491 CHLMYD TRACH DNA AMP PROBE: CPT

## 2023-07-21 PROCEDURE — 87591 N.GONORRHOEAE DNA AMP PROB: CPT

## 2023-07-21 PROCEDURE — 36415 COLL VENOUS BLD VENIPUNCTURE: CPT

## 2023-07-21 PROCEDURE — 87389 HIV-1 AG W/HIV-1&-2 AB AG IA: CPT

## 2023-07-21 PROCEDURE — 86780 TREPONEMA PALLIDUM: CPT

## 2023-07-21 PROCEDURE — 86696 HERPES SIMPLEX TYPE 2 TEST: CPT

## 2023-07-22 LAB
HAV IGM SER QL: NORMAL
HBV CORE IGM SER QL: NORMAL
HBV SURFACE AG SER QL: NORMAL
HCV AB SER QL: NORMAL
HIV 1+2 AB+HIV1 P24 AG SERPL QL IA: NORMAL
HIV 2 AB SERPL QL IA: NORMAL
HIV1 AB SERPL QL IA: NORMAL
HIV1 P24 AG SERPL QL IA: NORMAL
TREPONEMA PALLIDUM IGG+IGM AB [PRESENCE] IN SERUM OR PLASMA BY IMMUNOASSAY: NORMAL

## 2023-07-24 LAB
C TRACH DNA SPEC QL NAA+PROBE: NEGATIVE
HSV1 IGG SER IA-ACNC: 30.1 INDEX (ref 0–0.9)
HSV2 IGG SER IA-ACNC: >23.6 INDEX (ref 0–0.9)
N GONORRHOEA DNA SPEC QL NAA+PROBE: NEGATIVE

## 2023-07-26 ENCOUNTER — OFFICE VISIT (OUTPATIENT)
Dept: RHEUMATOLOGY | Facility: CLINIC | Age: 63
End: 2023-07-26
Payer: COMMERCIAL

## 2023-07-26 DIAGNOSIS — M81.0 AGE-RELATED OSTEOPOROSIS WITHOUT CURRENT PATHOLOGICAL FRACTURE: Primary | ICD-10-CM

## 2023-07-26 PROCEDURE — 96372 THER/PROPH/DIAG INJ SC/IM: CPT

## 2023-07-26 NOTE — PROGRESS NOTES
Prolia injection was successfully administered subcutaneously in patient's left arm, which was tolerated without any adverse event.

## 2023-07-28 ENCOUNTER — TELEPHONE (OUTPATIENT)
Dept: FAMILY MEDICINE CLINIC | Facility: CLINIC | Age: 63
End: 2023-07-28

## 2023-07-28 NOTE — TELEPHONE ENCOUNTER
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 Rest of STD testing was negative.

## 2023-07-28 NOTE — TELEPHONE ENCOUNTER
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 Rest of STD testing was negative.

## 2023-07-31 DIAGNOSIS — R42 DIZZINESS: ICD-10-CM

## 2023-07-31 RX ORDER — MECLIZINE HYDROCHLORIDE 25 MG/1
TABLET ORAL
Qty: 60 TABLET | Refills: 0 | Status: SHIPPED | OUTPATIENT
Start: 2023-07-31

## 2023-08-07 DIAGNOSIS — B37.2 CANDIDA INFECTION OF FLEXURAL SKIN: ICD-10-CM

## 2023-08-07 RX ORDER — NYSTATIN 100000 [USP'U]/G
1 POWDER TOPICAL 2 TIMES DAILY
Qty: 30 G | Refills: 0 | Status: SHIPPED | OUTPATIENT
Start: 2023-08-07

## 2023-08-16 ENCOUNTER — RA CDI HCC (OUTPATIENT)
Dept: OTHER | Facility: HOSPITAL | Age: 63
End: 2023-08-16

## 2023-08-16 NOTE — PROGRESS NOTES
[address] [facility] coding opportunities       Chart reviewed, no opportunity found: [id number]        Patients Insurance     Medicare Insurance: [employer]age

## 2023-08-17 ENCOUNTER — OFFICE VISIT (OUTPATIENT)
Dept: CARDIOLOGY CLINIC | Facility: CLINIC | Age: 63
End: 2023-08-17
Payer: COMMERCIAL

## 2023-08-17 VITALS
WEIGHT: 181.2 LBS | DIASTOLIC BLOOD PRESSURE: 52 MMHG | SYSTOLIC BLOOD PRESSURE: 100 MMHG | BODY MASS INDEX: 30.93 KG/M2 | HEART RATE: 66 BPM | HEIGHT: 64 IN

## 2023-08-17 DIAGNOSIS — I49.5 SICK SINUS SYNDROME (HCC): ICD-10-CM

## 2023-08-17 DIAGNOSIS — Z95.0 PACEMAKER: ICD-10-CM

## 2023-08-17 DIAGNOSIS — I47.1 PSVT (PAROXYSMAL SUPRAVENTRICULAR TACHYCARDIA) (HCC): Primary | ICD-10-CM

## 2023-08-17 PROCEDURE — 99214 OFFICE O/P EST MOD 30 MIN: CPT | Performed by: INTERNAL MEDICINE

## 2023-08-17 RX ORDER — ESTRADIOL 0.1 MG/G
CREAM VAGINAL
COMMUNITY
Start: 2023-08-02

## 2023-08-17 RX ORDER — CLOBETASOL PROPIONATE 0.5 MG/G
1 OINTMENT TOPICAL 2 TIMES DAILY
COMMUNITY
Start: 2023-08-08

## 2023-08-17 NOTE — PROGRESS NOTES
Cardiology Follow Up    Elieser Abbott  1960  [id number]  [facility]  [address]  [place] [address] [id number] [phone]    1. PSVT (paroxysmal supraventricular tachycardia) ([address])        2. Sick sinus syndrome ([address])        3. Pacemaker          Discussion/Summary:    History of sick sinus syndrome. Pacemaker in place with normal function. She follows in the device clinic. Device interrogations are reviewed. She previously had paroxysmal SVT. This is controlled with metoprolol. She remains asymptomatic. No changes are needed. Follow up 1 year. Previous History:  Sick sinus syndrome. Syncope with long pauses discovered in October, 2018. She had a pacemaker placed. Other history includes DVT for which she is on Eliquis. Gastric bypass previously. Previously, had atypical chest pain. Ultimately had cardiac catheterization at [facility], only mild plaque was seen, no obstructive lesions. PSVT was seen on device interrogations in the past, slightly symptomatic and was started on beta blocker. Interval History:    From a cardiac standpoint, she continues to do well. Her device interrogation showed no PSVT. Denies any palpitations. Blood pressure is on the lower side, but no dizziness or lightheadedness. She remains on metoprolol more for the arrhythmia than for hypertension. No recurrence of the chest pain that she has had previously.     Problem List     Deep vein thrombosis (DVT) of lower extremity (HCC)    Trigger middle finger of left hand    Carpal tunnel syndrome on right    Abnormal CT scan    Syncope    Sick sinus syndrome (HCC)    Leukocytosis        Past Medical History:   Diagnosis Date   • Anemia     iron def   • Arthritis     knees   • Carpal tunnel syndrome of right wrist    • Chest pain    • Colon polyp    • Deep vein thrombosis (DVT) of proximal lower extremity ([address]) 1/1/2012 • Depression     at times   • DVT (deep venous thrombosis) ([address]) 2012   • Dysphagia    • Encounter for surgical aftercare following surgery of digestive system 9/24/2020    s/p Raghav-En-Y Gastric Bypass with Dr. Domonique Ramirez in 2017. INITIAL weight:241 lb INITIAL weight with start of topirmate: 176 lb Goal weight loss of 5-10 %-159 lb-167 lb    • Esophageal reflux 5/23/2011   • Grade 1 out of 6 intensity murmur 6/24/2019   • Heart murmur    • History of transfusion 1980   • Pacemaker 11/01/2018   • Pneumonia     age 15   • Psychiatric disorder    • Trigger finger, left    • Visit for suture removal 6/25/2021     Social History     Tobacco Use   • Smoking status: Former     Packs/day: 0.25     Types: Cigarettes   • Smokeless tobacco: Never   Substance Use Topics   • Alcohol use:  Yes     Alcohol/week: 2.0 standard drinks of alcohol     Types: 1 Glasses of wine, 1 Standard drinks or equivalent per week     Comment: very rare social     Family History   Problem Relation Age of Onset   • Stroke Mother    • Alzheimer's disease Mother    • Arthritis Mother    • Coronary artery disease Mother    • Breast cancer Mother 77   • Cancer Mother    • Heart disease Mother    • Hypertension Father    • Gout Father    • Diabetes type II Father    • Coronary artery disease Father    • Heart disease Father    • No Known Problems Sister    • No Known Problems Sister    • No Known Problems Sister    • No Known Problems Daughter    • No Known Problems Maternal Grandmother    • No Known Problems Maternal Grandfather    • No Known Problems Paternal Grandmother    • No Known Problems Paternal Grandfather    • Prostate cancer Brother    • Pancreatic cancer Brother    • No Known Problems Maternal Aunt    • No Known Problems Maternal Aunt    • No Known Problems Maternal Aunt    • No Known Problems Maternal Aunt    • Cancer Maternal Aunt    • No Known Problems Paternal Aunt    • No Known Problems Paternal Aunt      Past Surgical History: Procedure Laterality Date   • BACK SURGERY     • CARDIAC PACEMAKER PLACEMENT  2018   • CERVICAL DISC SURGERY  2009    PLATES & SCREWS   •  SECTION      X2   • COLONOSCOPY     • EPIDURAL BLOCK INJECTION Right 2022    Procedure: RIGHT L3-[id number] EPIDURAL STEROID INJECTION (73691); Surgeon: Heavenly Jarquin DO;  Location: [facility];  Service: Pain Management    • GASTRIC BYPASS  2017    LAP RINYGB SURGERY   • KNEE ARTHROSCOPY     • KNEE SURGERY Bilateral    • KNEE SURGERY Left 2020   • OTHER SURGICAL HISTORY      ARM SURGERIES   • [place] NDSC WRST SURG W/RLS TRANSVRS CARPL LIGM Right 2018    Procedure: ENDOSCOPIC CARPAL TUNNEL RELEASE;  Surgeon: Sophia James MD;  Location: [facility];  Service: Orthopedics   • [place] DX/THER AGT PVRT FACET JT LMBR/SAC 1 LEVEL Bilateral 10/25/2022    Procedure: L3-L4,L4-5,L5-S1 BILATERAL BRANCH BLOCK;  Surgeon: Heavenly Jarquin DO;  Location: [facility];  Service: Pain Management    • [place] NJX DX/THER AGT PVRT FACET JT LMBR/SAC 1 LEVEL Bilateral 11/15/2022    Procedure: BILATERAL L3-S1 MEDICAL BRANCH BLOCK (85517,[id number]); Surgeon: Heavenly Jarquin DO;  Location: [facility];  Service: Pain Management    • [place] TENDON SHEATH INCISION Left 2018    Procedure: LONG TRIGGER FINGER RELEASE;  Surgeon: Sophia James MD;  [facility]: [facility];  Service: Orthopedics   • RADIOFREQUENCY ABLATION Right 2022    Procedure: RIGHT L3-S1 RADIO FREQUENCY ABLATION (84039,35662); Surgeon: Heavenly Jarquin DO;  Location: [facility];  Service: Pain Management    • RADIOFREQUENCY ABLATION Left 2022    Procedure: LEFT L3-S1 RADIO FREQUENCY ABLATION (18477,61362);   Surgeon: Heavenly Jarquin DO;  Location: [facility];  Service: Pain Management    • TONSILLECTOMY     • UPPER GASTROINTESTINAL ENDOSCOPY         Current Outpatient Medications:   •  acetaminophen (TYLENOL) 650 mg CR tablet, Take 650 mg by mouth every 8 (eight) hours as needed for mild pain Take 2 tablets in the AM and 2 tablets in the late evening, Disp: , Rfl:   •  aluminum-magnesium hydroxide-simethicone (MAALOX MAX) 400-400-40 MG/5ML suspension, Take 10 mL by mouth every 6 (six) hours as needed for indigestion or heartburn, Disp: 355 mL, Rfl: 0  •  Ascorbic Acid (VITAMIN C) 100 MG CHEW, Chew, Disp: , Rfl:   •  atorvastatin (LIPITOR) 10 mg tablet, Take 1 tablet (10 mg total) by mouth daily, Disp: 90 tablet, Rfl: 1  •  Biotin 76207 MCG TABS, Take by mouth daily  , Disp: , Rfl:   •  Calcium Carbonate (CALCI-CHEW PO), Take 650 mg by mouth 2 (two) times a day, Disp: , Rfl:   •  cholecalciferol (VITAMIN D3) 1,000 units tablet, Take 5,000 Units by mouth in the morning, Disp: , Rfl:   •  clobetasol (TEMOVATE) 0.05 % cream, Apply to affected areas daily x 12 weeks, then twice weekly for maintenance, Disp: , Rfl:   •  Cyanocobalamin (VITAMIN B 12 PO), Take 1,000 mcg by mouth, Disp: , Rfl:   •  dicyclomine (BENTYL) 10 mg capsule, take 1 capsule by mouth four times a day before meals at bedtime, Disp: 120 capsule, Rfl: 2  •  Eliquis 2.5 MG, TAKE 1 TABLET BY MOUTH TWICE A DAY, Disp: 60 tablet, Rfl: 3  •  estradiol (ESTRACE) 0.1 mg/g vaginal cream, USE PEA SIZED AMOUNT VAGINALLY NIGHTLY X 2 WEEKS, THEN TWICE WEEKLY, Disp: , Rfl:   •  Ferrous Sulfate  (45 Fe) MG TBCR, Take 1 tablet by mouth daily, Disp: , Rfl:   •  fluticasone (FLONASE) 50 mcg/act nasal spray, instill 1 spray into each nostril once daily, Disp: 32 g, Rfl: 1  •  gabapentin (Neurontin) 300 mg capsule, Take 1 capsule (300 mg total) by mouth 3 (three) times a day, Disp: 90 capsule, Rfl: 6  •  meclizine (ANTIVERT) 25 mg tablet, TAKE 1 TABLET BY MOUTH THREE TIMES A DAY AS NEEDED FOR DIZZINESS, Disp: 60 tablet, Rfl: 0  •  methocarbamol (ROBAXIN) 500 mg tablet, Take 1 tablet (500 mg total) by mouth 2 (two) times a day, Disp: 20 tablet, Rfl: 0  •  Multiple Vitamin (MULTIVITAMIN) capsule, Take 1 capsule by mouth daily 2 chewies daily, Disp: , Rfl:   • nystatin (MYCOSTATIN) powder, APPLY TO AFFECTED AREA TWICE A DAY, Disp: 30 g, Rfl: 0  •  ondansetron (ZOFRAN) 8 mg tablet, TAKE 1 TABLET BY MOUTH EVERY 8 HOURS AS NEEDED FOR NAUSEA OR VOMITING., Disp: , Rfl:   •  pantoprazole (PROTONIX) 20 mg tablet, Take 1 tablet (20 mg total) by mouth daily, Disp: 90 tablet, Rfl: 1  •  sertraline (ZOLOFT) 50 mg tablet, TAKE 1/2 TABLET BY MOUTH FOR 3 DAYS, THEN 1 TAB DAILY, Disp: , Rfl:   •  tiZANidine (ZANAFLEX) 2 mg tablet, TAKE 1 TABLET BY MOUTH EVERY 8 HOURS AS NEEDED FOR MUSCLE SPASMS., Disp: 270 tablet, Rfl: 3  •  traZODone (DESYREL) 50 mg tablet, TAKE 1 TABLET BY MOUTH DAILY AT BEDTIME, Disp: 90 tablet, Rfl: 1  •  albuterol (ProAir HFA) 90 mcg/act inhaler, Inhale 2 puffs every 6 (six) hours as needed for wheezing (Patient not taking: Reported on 4/25/2023), Disp: 8.5 g, Rfl: 0  •  benzonatate (TESSALON) 200 MG capsule, Take 1 capsule (200 mg total) by mouth 3 (three) times a day as needed for cough (Patient not taking: Reported on 8/17/2023), Disp: 20 capsule, Rfl: 0  •  clobetasol (TEMOVATE) 0.05 % ointment, Apply 1 Application topically 2 (two) times a day To affected area (Patient not taking: Reported on 8/17/2023), Disp: , Rfl:   •  Diclofenac Sodium (VOLTAREN) 1 %, Apply 2 g topically 4 (four) times a day (Patient not taking: Reported on 10/5/2022), Disp: 100 g, Rfl: 6  •  famotidine (PEPCID) 20 mg tablet, Take 1 tablet (20 mg total) by mouth 2 (two) times a day (Patient not taking: Reported on 8/17/2023), Disp: 30 tablet, Rfl: 0  •  metoprolol succinate (TOPROL-XL) 25 mg 24 hr tablet, TAKE 1 TABLET BY MOUTH EVERY DAY, Disp: 90 tablet, Rfl: 1  No Known Allergies    Vitals:    08/17/23 0806   BP: 100/52   BP Location: Right arm   Patient Position: Sitting   Cuff Size: Large   Pulse: 66   Weight: 82.2 kg (181 lb 3.2 oz)   Height: 5' 4" (1.626 m)     Vitals:    08/17/23 0806   Weight: 82.2 kg (181 lb 3.2 oz)      Height: 5' 4" (162.6 cm)   Body mass index is 31.1 kg/m². Physical Exam:  GEN: Britni Figures appears well, alert and oriented x 3, pleasant and cooperative   HEENT: pupils equal, round, and reactive to light; extraocular muscles intact  NECK: supple, no carotid bruits   HEART: regular rhythm, normal S1 and S2, no murmurs, clicks, gallops or rubs   LUNGS: clear to auscultation bilaterally; no wheezes, rales, or rhonchi   ABDOMEN: normal bowel sounds, soft, no tenderness, no distention  EXTREMITIES: peripheral pulses normal; no clubbing, cyanosis, or edema  NEURO: no focal findings   SKIN: normal without suspicious lesions on exposed skin    ROS:  Positive for anxiety, joint pain, back pain, nausea, vomiting, palpitations. Chest pain. Except as noted in HPI, is otherwise reviewed in detail and a 12 point review of systems is negative. ROS reviewed and is unchanged    Labs:  Lab Results   Component Value Date    K 4.5 06/17/2023     (H) 06/17/2023    CREATININE 0.70 06/17/2023    BUN 21 06/17/2023    CO2 30 06/17/2023    ALT 43 06/17/2023    AST 35 06/17/2023    INR 0.96 06/18/2021    GLUF 106 (H) 06/17/2023    HGBA1C 6.0 (H) 06/17/2023    WBC 4.65 06/17/2023    HGB 12.2 06/17/2023    HCT 37.8 06/17/2023     06/17/2023       No results found for: "CHOL"  Lab Results   Component Value Date    LDLCALC 118 (H) 06/17/2023    LDLCALC 96 09/03/2022    LDLCALC 98 01/05/2022     Lab Results   Component Value Date    HDL 84 06/17/2023    HDL 80 09/03/2022    HDL 92 01/05/2022     Lab Results   Component Value Date    TRIG 87 06/17/2023    TRIG 83 09/03/2022    TRIG 77 01/05/2022     Testing:  Stress test 8/6/19:  MYOCARDIAL PERFUSION IMAGING:  The image quality was fair. Rotating projection images reveal mild breast attenuation, mild diaphragmatic attenuation, and mild subdiaphragmatic activity.  The TID ratio was 1.06.     PERFUSION DEFECTS:  -  There was a moderate-sized, mildly severe, fixed myocardial perfusion defect of the basal inferior wall likely due to diaphragm attenuation.     GATED SPECT:  The calculated left ventricular ejection fraction was 67 %. There was no diagnostic evidence for left ventricular regional abnormality.     SUMMARY:  -  Stress results: There was no chest pain during stress. -  ECG conclusions: The stress ECG was equivocal for ischemia. -  Perfusion imaging: There was a moderate-sized, mildly severe, fixed myocardial perfusion defect of the basal inferior wall likely due to diaphragm attenuation.  -  Gated SPECT: The calculated left ventricular ejection fraction was 67 %. There was no diagnostic evidence for left ventricular regional abnormality.     IMPRESSIONS: Normal study after pharmacologic vasodilation without reproduction of symptoms. Myocardial perfusion imaging was normal at rest and with stress. Echo 11/1/18:  LEFT VENTRICLE: Size was normal. Systolic function was at the lower limits of normal. Ejection fraction was estimated to be 50 %. There were no regional wall motion abnormalities. Wall thickness was normal. There was no evidence of  concentric hypertrophy. DOPPLER: Left ventricular diastolic function parameters were normal.     RIGHT VENTRICLE: The size was normal. Systolic function was normal. Wall thickness was normal.     LEFT ATRIUM: Size was normal.     RIGHT ATRIUM: Size was normal.     MITRAL VALVE: Valve structure was normal. There was normal leaflet separation. DOPPLER: The transmitral velocity was within the normal range. There was no evidence for stenosis. There was mild regurgitation.     AORTIC VALVE: The valve was trileaflet. Leaflets exhibited normal cuspal separation and sclerosis. DOPPLER: Transaortic velocity was within the normal range. There was no evidence for stenosis. There was no regurgitation.     TRICUSPID VALVE: The valve structure was normal. There was normal leaflet separation. DOPPLER: The transtricuspid velocity was within the normal range. There was no evidence for stenosis.  There was mild regurgitation. Pulmonary artery  systolic pressure was within the normal range.     PULMONIC VALVE: Leaflets exhibited normal thickness, no calcification, and normal cuspal separation. DOPPLER: The transpulmonic velocity was within the normal range. There was no regurgitation.     PERICARDIUM: There was no pericardial effusion. The pericardium was normal in appearance.     AORTA: The root exhibited normal size.

## 2023-08-20 DIAGNOSIS — R42 DIZZINESS: ICD-10-CM

## 2023-08-21 RX ORDER — MECLIZINE HYDROCHLORIDE 25 MG/1
25 TABLET ORAL EVERY 8 HOURS PRN
Qty: 60 TABLET | Refills: 0 | Status: SHIPPED | OUTPATIENT
Start: 2023-08-21

## 2023-08-22 ENCOUNTER — OFFICE VISIT (OUTPATIENT)
Dept: FAMILY MEDICINE CLINIC | Facility: CLINIC | Age: 63
End: 2023-08-22
Payer: COMMERCIAL

## 2023-08-22 VITALS
TEMPERATURE: 97.9 F | RESPIRATION RATE: 16 BRPM | HEIGHT: 64 IN | SYSTOLIC BLOOD PRESSURE: 110 MMHG | DIASTOLIC BLOOD PRESSURE: 60 MMHG | BODY MASS INDEX: 30.73 KG/M2 | OXYGEN SATURATION: 98 % | WEIGHT: 180 LBS | HEART RATE: 65 BPM

## 2023-08-22 DIAGNOSIS — I49.5 SICK SINUS SYNDROME (HCC): ICD-10-CM

## 2023-08-22 DIAGNOSIS — F41.9 ANXIETY: ICD-10-CM

## 2023-08-22 DIAGNOSIS — Z12.11 COLON CANCER SCREENING: ICD-10-CM

## 2023-08-22 DIAGNOSIS — K21.9 GASTROESOPHAGEAL REFLUX DISEASE, UNSPECIFIED WHETHER ESOPHAGITIS PRESENT: ICD-10-CM

## 2023-08-22 DIAGNOSIS — I47.1 PSVT (PAROXYSMAL SUPRAVENTRICULAR TACHYCARDIA) (HCC): ICD-10-CM

## 2023-08-22 DIAGNOSIS — E78.49 OTHER HYPERLIPIDEMIA: ICD-10-CM

## 2023-08-22 DIAGNOSIS — F32.A DEPRESSION, UNSPECIFIED DEPRESSION TYPE: Primary | ICD-10-CM

## 2023-08-22 PROCEDURE — 99214 OFFICE O/P EST MOD 30 MIN: CPT | Performed by: NURSE PRACTITIONER
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
## 2023-08-22 NOTE — ASSESSMENT & PLAN NOTE
Component      Latest Ref Rng 1/5/2022 9/3/2022 6/17/2023   Cholesterol      See Comment mg/dL 205 (H)  193  219 (H)    Triglycerides      See Comment mg/dL 77  83  87    HDL      >=50 mg/dL 92  80  84    LDL Calculated      0 - 100 mg/dL 98  96  118 (H)       - Not well controlled. - Continue Lipitor 10 mg daily.   - Encourage healthy diet and regular exercise. - Will continue to monitor fasting lipid panel.

## 2023-08-22 NOTE — ASSESSMENT & PLAN NOTE
- Stable on pantoprazole 20 mg daily. Continue same.   - Avoid triggering food and beverage.  - Will continue to monitor.

## 2023-08-27 PROBLEM — Z12.11 COLON CANCER SCREENING: Status: RESOLVED | Noted: 2023-06-28 | Resolved: 2023-08-27

## 2023-08-27 PROBLEM — Z11.3 SCREENING FOR STDS (SEXUALLY TRANSMITTED DISEASES): Status: RESOLVED | Noted: 2023-06-28 | Resolved: 2023-08-27

## 2023-09-06 ENCOUNTER — TELEPHONE (OUTPATIENT)
Age: 63
End: 2023-09-06

## 2023-09-06 NOTE — TELEPHONE ENCOUNTER
"Physical Therapy Treatment completed.   Bed Mobility:  Supine to Sit: Stand by Assist  Transfers: Sit to Stand: Stand by Assist  Gait: Level Of Assist: Stand by Assist with Front-Wheel Walker       Plan of Care: Will benefit from Physical Therapy 3 times per week  Discharge Recommendations: Equipment: No Equipment Needed.     See \"Rehab Therapy-Acute\" Patient Summary Report for complete documentation.     Pt tx primarily focused on stairs. Pt has a flight of stairs in home w/ only 1 HR. Discussed options for stairs w/ bumping techniques decided as safest option for pt at this time. Pt able to mimic bumping technique in the bed and verbally discuss the steps necessary to perform. Pt was able to perform 2 steps using the hopping technique however fatigued very quickly. Pt also reminded of HEP to address weakness in both LE's. Pt also mindful of hip precautions when putting on boot and was aware that she will need her roommates help for the bottwom straps. Pt will benefit from HH to address pts functional limitation w/in her house w/ a transition to outpatient physical therapy.  " Caller: Patient    Doctor: Mare Duckworth    Reason for call:     Patient is calling about muscle pain on her right leg in the back and front of the leg (6-10). She is asking for a   Stronger medication to help with the pain. She uses [facility] in [facility] on file. Please advise patient if a medication can be called in.     Call back#: [phone]

## 2023-09-07 NOTE — TELEPHONE ENCOUNTER
Spoke with patient and clarified what Dr Gisele Perea had suggested she take for her pain so she will now take 4mg every 8 hrs for pain

## 2023-09-07 NOTE — TELEPHONE ENCOUNTER
Caller: Patient     Doctor/Office: Sonny     Call regarding :  Calling about this and wanting some clarification      Call was transferred to: Medical Assistant

## 2023-09-07 NOTE — TELEPHONE ENCOUNTER
Please let her know that I would rather her try doubling, tripling, or even quadrupling her current tizanidine 2mg tabs before I switch her to another muscle relaxer

## 2023-09-12 ENCOUNTER — TELEPHONE (OUTPATIENT)
Age: 63
End: 2023-09-12

## 2023-09-12 NOTE — TELEPHONE ENCOUNTER
Caller: Patient    Doctor: Paz Mario    Reason for call: Patient is calling to advise Dr Paz Mario she went to the [facility] ER due to knee pain and they set her up with a sports medicine doctor through [facility] they gave her aspercreme, robaxin, and tylenol.     Call back#: [phone]

## 2023-09-13 ENCOUNTER — TELEPHONE (OUTPATIENT)
Age: 63
End: 2023-09-13

## 2023-09-13 NOTE — TELEPHONE ENCOUNTER
Caller: patient    Doctor: Albertina Almonte    Reason for call: patient called in asking if a [place] Body massage will help patient with their Arthritis.     Call back#: [phone]

## 2023-09-16 DIAGNOSIS — R42 DIZZINESS: ICD-10-CM

## 2023-09-16 DIAGNOSIS — G47.00 INSOMNIA, UNSPECIFIED TYPE: ICD-10-CM

## 2023-09-16 DIAGNOSIS — B37.2 CANDIDA INFECTION OF FLEXURAL SKIN: ICD-10-CM

## 2023-09-16 RX ORDER — MECLIZINE HYDROCHLORIDE 25 MG/1
25 TABLET ORAL EVERY 8 HOURS PRN
Qty: 60 TABLET | Refills: 0 | Status: CANCELLED | OUTPATIENT
Start: 2023-09-16

## 2023-09-17 DIAGNOSIS — R10.84 GENERALIZED ABDOMINAL PAIN: ICD-10-CM

## 2023-09-17 DIAGNOSIS — I82.4Y9 DEEP VEIN THROMBOSIS (DVT) OF PROXIMAL LOWER EXTREMITY, UNSPECIFIED CHRONICITY, UNSPECIFIED LATERALITY (HCC): ICD-10-CM

## 2023-09-17 DIAGNOSIS — R42 DIZZINESS: ICD-10-CM

## 2023-09-18 RX ORDER — NYSTATIN 100000 [USP'U]/G
POWDER TOPICAL 2 TIMES DAILY
Qty: 30 G | Refills: 0 | Status: SHIPPED | OUTPATIENT
Start: 2023-09-18

## 2023-09-18 RX ORDER — MECLIZINE HYDROCHLORIDE 25 MG/1
25 TABLET ORAL EVERY 8 HOURS PRN
Qty: 60 TABLET | Refills: 1 | Status: SHIPPED | OUTPATIENT
Start: 2023-09-18

## 2023-09-18 RX ORDER — APIXABAN 2.5 MG/1
TABLET, FILM COATED ORAL
Qty: 60 TABLET | Refills: 3 | Status: SHIPPED | OUTPATIENT
Start: 2023-09-18

## 2023-09-18 RX ORDER — PANTOPRAZOLE SODIUM 20 MG/1
20 TABLET, DELAYED RELEASE ORAL DAILY
Qty: 90 TABLET | Refills: 1 | Status: SHIPPED | OUTPATIENT
Start: 2023-09-18

## 2023-09-18 RX ORDER — TRAZODONE HYDROCHLORIDE 50 MG/1
50 TABLET ORAL
Qty: 90 TABLET | Refills: 1 | Status: SHIPPED | OUTPATIENT
Start: 2023-09-18

## 2023-09-18 NOTE — TELEPHONE ENCOUNTER
Refills have been requested for the following medications:         meclizine (ANTIVERT) 25 mg tablet [Ana Cristina Castillo]     Preferred pharmacy: [facility]Y #[id number]- [place], [address] request- medication was sent to pharmacy in a different encounter.    Order removed

## 2023-09-18 NOTE — TELEPHONE ENCOUNTER
Pharmacy requesting refill on Eliquis, meclizine, and pantoprazole     Last seen 8/22/23  Medication sent to pharmacy

## 2023-09-18 NOTE — TELEPHONE ENCOUNTER
Pharmacy requesting refill on nystatin powder and trazodone   Last seen 8/22/23  Medication sent to pharmacy
Patient

## 2023-10-26 ENCOUNTER — REMOTE DEVICE CLINIC VISIT (OUTPATIENT)
Dept: CARDIOLOGY CLINIC | Facility: CLINIC | Age: 63
End: 2023-10-26
Payer: COMMERCIAL

## 2023-10-26 DIAGNOSIS — Z95.0 CARDIAC PACEMAKER IN SITU: Primary | ICD-10-CM

## 2023-10-26 PROCEDURE — 93296 REM INTERROG EVL PM/IDS: CPT | Performed by: INTERNAL MEDICINE

## 2023-10-26 PROCEDURE — 93294 REM INTERROG EVL PM/LDLS PM: CPT | Performed by: INTERNAL MEDICINE

## 2023-10-26 NOTE — PROGRESS NOTES
Results for orders placed or performed in visit on 10/26/23   Cardiac EP device report    Narrative    MDT-DUAL PPM (AAIR-DDDR MODE)/ ACTIVE SYSTEM IS MRI CONDITIONAL  CARELINK TRANSMISSION: BATTERY STATUS "9 YRS." AP 65%  1%. ALL AVAILABLE LEAD PARAMETERS WITHIN NORMAL LIMITS. NO SIGNIFICANT HIGH RATE EPISODES. NORMAL DEVICE FUNCTION.  NC

## 2023-11-09 DIAGNOSIS — R42 DIZZINESS: ICD-10-CM

## 2023-11-10 RX ORDER — MECLIZINE HYDROCHLORIDE 25 MG/1
25 TABLET ORAL EVERY 8 HOURS PRN
Qty: 60 TABLET | Refills: 0 | Status: SHIPPED | OUTPATIENT
Start: 2023-11-10

## 2023-11-10 NOTE — TELEPHONE ENCOUNTER
Refills have been requested for the following medications:         meclizine (ANTIVERT) 25 mg tablet [Ana Cristina Castillo]      Patient Comment: For dizziness      Preferred pharmacy: [facility]ACY #[id number]- [place], [address] [place] [address]    Last seen 8/22/23  Has appt 11/14/23  Medication sent to pharmacy

## 2023-11-14 ENCOUNTER — OFFICE VISIT (OUTPATIENT)
Dept: FAMILY MEDICINE CLINIC | Facility: CLINIC | Age: 63
End: 2023-11-14
Payer: COMMERCIAL

## 2023-11-14 VITALS
OXYGEN SATURATION: 97 % | BODY MASS INDEX: 31.76 KG/M2 | DIASTOLIC BLOOD PRESSURE: 78 MMHG | RESPIRATION RATE: 16 BRPM | HEART RATE: 70 BPM | HEIGHT: 64 IN | SYSTOLIC BLOOD PRESSURE: 136 MMHG | TEMPERATURE: 97.9 F | WEIGHT: 186 LBS

## 2023-11-14 DIAGNOSIS — F41.9 ANXIETY: ICD-10-CM

## 2023-11-14 DIAGNOSIS — I49.5 SICK SINUS SYNDROME (HCC): ICD-10-CM

## 2023-11-14 DIAGNOSIS — M16.11 PRIMARY OSTEOARTHRITIS OF RIGHT HIP: ICD-10-CM

## 2023-11-14 DIAGNOSIS — Z86.718 HISTORY OF DVT (DEEP VEIN THROMBOSIS): ICD-10-CM

## 2023-11-14 DIAGNOSIS — K21.9 GASTROESOPHAGEAL REFLUX DISEASE, UNSPECIFIED WHETHER ESOPHAGITIS PRESENT: ICD-10-CM

## 2023-11-14 DIAGNOSIS — E78.49 OTHER HYPERLIPIDEMIA: ICD-10-CM

## 2023-11-14 DIAGNOSIS — G47.00 INSOMNIA, UNSPECIFIED TYPE: ICD-10-CM

## 2023-11-14 DIAGNOSIS — I47.10 PSVT (PAROXYSMAL SUPRAVENTRICULAR TACHYCARDIA): ICD-10-CM

## 2023-11-14 DIAGNOSIS — R73.9 HYPERGLYCEMIA: ICD-10-CM

## 2023-11-14 DIAGNOSIS — F32.A DEPRESSION, UNSPECIFIED DEPRESSION TYPE: ICD-10-CM

## 2023-11-14 DIAGNOSIS — Z01.818 PREOPERATIVE CLEARANCE: Primary | ICD-10-CM

## 2023-11-14 PROCEDURE — 99214 OFFICE O/P EST MOD 30 MIN: CPT | Performed by: NURSE PRACTITIONER

## 2023-11-14 PROCEDURE — 93000 ELECTROCARDIOGRAM COMPLETE: CPT | Performed by: NURSE PRACTITIONER
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
## 2023-11-14 NOTE — ASSESSMENT & PLAN NOTE
Component      Latest Ref Rng 9/3/2022 6/17/2023   Cholesterol      See Comment mg/dL 193  219 (H)    Triglycerides      See Comment mg/dL 83  87    HDL      >=50 mg/dL 80  84    LDL Calculated      0 - 100 mg/dL 96  118 (H)      - LDL elevated. - Continue Lipitor 10 mg daily.   - Encourage healthy diet and regular exercise. - Will continue to monitor fasting lipid panel.

## 2023-11-14 NOTE — ASSESSMENT & PLAN NOTE
- s/p permanent pacemaker.   - EKG shows atrial paced rhythm which is stable since prior EKG. - Continue routine follow up with Cardiology.

## 2023-11-14 NOTE — ASSESSMENT & PLAN NOTE
- Patient to undergo right total hip replacement on 11/28.   - Continue routine follow up with Ortho.

## 2023-11-14 NOTE — ASSESSMENT & PLAN NOTE
- EKG shows atrial paced rhythm which is stable since prior EKG. - She is getting pre-op labs drawn tomorrow. - Stop Eliquis 5 days prior to surgery as advised by Cardiologist.   - Patient is an acceptable risk for the proposed procedure pending blood work results.

## 2023-11-14 NOTE — ASSESSMENT & PLAN NOTE
- Hemoglobin A1c elevated at 6.0.  - Encourage healthy diet and regular exercise. - Will continue to monitor.

## 2023-11-17 ENCOUNTER — TELEPHONE (OUTPATIENT)
Age: 63
End: 2023-11-17

## 2023-11-17 NOTE — TELEPHONE ENCOUNTER
Caller: Patient     Doctor: Sonny     Reason for call: Patient calling in regards to her next injection follow which is due 1/26/24  Please advise     Call back#: [phone]

## 2023-11-17 NOTE — TELEPHONE ENCOUNTER
Pt contacted Call Center requested refill of their medication.        Medication Name: tiZANidine          Dosage of Med: 270      Frequency of Med: 1 TAB Every 8 hours      Remaining Medication: 2 quantity      Pharmacy and Location: [id number]        Pt. Preferred Callback Phone Number: [phone]       Thank you.       PLEASE ADVISE PATIENTS:    REFILL REQUESTS WILL BE PROCESSED WITHIN 24-48 HOURS.

## 2023-11-17 NOTE — TELEPHONE ENCOUNTER
Caller: Patient     Doctor: Sonny     Reason for call: Patient states there is suppose to be an appointment for ruth for an injection, she was under the impression that she already had an appointment.     Call back#: [phone]

## 2023-11-20 ENCOUNTER — TELEPHONE (OUTPATIENT)
Dept: FAMILY MEDICINE CLINIC | Facility: CLINIC | Age: 63
End: 2023-11-20

## 2023-11-20 NOTE — TELEPHONE ENCOUNTER
Hi, this patient does not PA on file for an injection should I do the PA than schedule the patient please advise
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
## 2023-11-20 NOTE — TELEPHONE ENCOUNTER
Andi Doherty,    Yes please get patient scheduled and obtain prior authorization for Prolia.       If you need any help with the authorization please feel free to reach out!    Thank you!

## 2023-11-27 ENCOUNTER — TELEPHONE (OUTPATIENT)
Age: 63
End: 2023-11-27

## 2023-11-27 NOTE — TELEPHONE ENCOUNTER
Caller: Sarkis Loaiza     Doctor: Faby Woodward     Reason for call: Patient called regarding question on her FU appointment with Dr. Faby Woodward advise the patient she will need a clearance letter to get her back on track with her injection .      Patient is also requesting not to provide any information to her  Gail Serna     Call back#: NA

## 2023-11-28 NOTE — TELEPHONE ENCOUNTER
Called pt to schedule an appointment for her Prolia, however, she is in the hospital. Will follow up.

## 2023-12-12 DIAGNOSIS — I47.10 PSVT (PAROXYSMAL SUPRAVENTRICULAR TACHYCARDIA): ICD-10-CM

## 2023-12-12 RX ORDER — METOPROLOL SUCCINATE 25 MG/1
TABLET, EXTENDED RELEASE ORAL
Qty: 90 TABLET | Refills: 1 | Status: SHIPPED | OUTPATIENT
Start: 2023-12-12

## 2023-12-18 ENCOUNTER — REMOTE DEVICE CLINIC VISIT (OUTPATIENT)
Dept: CARDIOLOGY CLINIC | Facility: CLINIC | Age: 63
End: 2023-12-18
Payer: COMMERCIAL

## 2023-12-18 DIAGNOSIS — Z95.0 CARDIAC PACEMAKER IN SITU: Primary | ICD-10-CM

## 2023-12-18 PROCEDURE — 93296 REM INTERROG EVL PM/IDS: CPT | Performed by: INTERNAL MEDICINE

## 2023-12-18 PROCEDURE — 93294 REM INTERROG EVL PM/LDLS PM: CPT | Performed by: INTERNAL MEDICINE

## 2023-12-18 NOTE — PROGRESS NOTES
"Results for orders placed or performed in visit on 12/18/23   Cardiac EP device report    Narrative    MDT-DUAL PPM (AAIR-DDDR MODE)/ ACTIVE SYSTEM IS MRI CONDITIONAL  CARELINK TRANSMISSION: BATTERY STATUS \"9 YRS\" AP 40%  0%. ALL AVAILABLE LEAD PARAMETERS WITHIN NORMAL LIMITS. 1 FAST A/V NOTED@ 158 BPM. STACH VS SVT CAN'T BE EXCLUDED. PT ON METO SUCC & ELIQUIS. EF 67% (NUC 2019). NORMAL DEVICE FUNCTION. NC         "

## 2023-12-19 ENCOUNTER — OFFICE VISIT (OUTPATIENT)
Dept: FAMILY MEDICINE CLINIC | Facility: CLINIC | Age: 63
End: 2023-12-19
Payer: COMMERCIAL

## 2023-12-19 VITALS
TEMPERATURE: 97.9 F | DIASTOLIC BLOOD PRESSURE: 70 MMHG | WEIGHT: 182 LBS | SYSTOLIC BLOOD PRESSURE: 128 MMHG | BODY MASS INDEX: 31.07 KG/M2 | HEART RATE: 78 BPM | HEIGHT: 64 IN | OXYGEN SATURATION: 99 % | RESPIRATION RATE: 16 BRPM

## 2023-12-19 DIAGNOSIS — F32.A DEPRESSION, UNSPECIFIED DEPRESSION TYPE: Primary | ICD-10-CM

## 2023-12-19 DIAGNOSIS — M16.11 PRIMARY OSTEOARTHRITIS OF RIGHT HIP: ICD-10-CM

## 2023-12-19 PROCEDURE — 99213 OFFICE O/P EST LOW 20 MIN: CPT | Performed by: NURSE PRACTITIONER

## 2023-12-19 RX ORDER — SERTRALINE HYDROCHLORIDE 100 MG/1
100 TABLET, FILM COATED ORAL DAILY
Qty: 90 TABLET | Refills: 0 | Status: SHIPPED | OUTPATIENT
Start: 2023-12-19
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
## 2023-12-19 NOTE — ASSESSMENT & PLAN NOTE
- Not well controlled.  - Will increase Zoloft to 100 mg daily. Discussed side effects.  - Will continue to follow up.

## 2024-01-08 ENCOUNTER — TELEPHONE (OUTPATIENT)
Age: 64
End: 2024-01-08

## 2024-01-15 DIAGNOSIS — M62.838 NECK MUSCLE SPASM: ICD-10-CM

## 2024-01-15 DIAGNOSIS — M54.2 NECK PAIN: ICD-10-CM

## 2024-01-15 RX ORDER — GABAPENTIN 300 MG/1
300 CAPSULE ORAL 3 TIMES DAILY
Qty: 90 CAPSULE | Refills: 5 | Status: SHIPPED | OUTPATIENT
Start: 2024-01-15

## 2024-01-19 ENCOUNTER — TELEPHONE (OUTPATIENT)
Dept: RHEUMATOLOGY | Facility: CLINIC | Age: 64
End: 2024-01-19

## 2024-01-24 DIAGNOSIS — E78.5 HYPERLIPIDEMIA, UNSPECIFIED HYPERLIPIDEMIA TYPE: ICD-10-CM

## 2024-01-24 RX ORDER — ATORVASTATIN CALCIUM 10 MG/1
10 TABLET, FILM COATED ORAL DAILY
Qty: 90 TABLET | Refills: 1 | Status: SHIPPED | OUTPATIENT
Start: 2024-01-24

## 2024-01-25 ENCOUNTER — TELEPHONE (OUTPATIENT)
Age: 64
End: 2024-01-25

## 2024-01-25 NOTE — TELEPHONE ENCOUNTER
atient is calling to reschedule their injection that they missed on Monday Please advise thank you.

## 2024-01-29 ENCOUNTER — OFFICE VISIT (OUTPATIENT)
Dept: RHEUMATOLOGY | Facility: CLINIC | Age: 64
End: 2024-01-29
Payer: COMMERCIAL

## 2024-01-29 ENCOUNTER — IN-CLINIC DEVICE VISIT (OUTPATIENT)
Dept: CARDIOLOGY CLINIC | Facility: CLINIC | Age: 64
End: 2024-01-29
Payer: COMMERCIAL

## 2024-01-29 DIAGNOSIS — M81.0 AGE-RELATED OSTEOPOROSIS WITHOUT CURRENT PATHOLOGICAL FRACTURE: Primary | ICD-10-CM

## 2024-01-29 DIAGNOSIS — J32.9 SINUSITIS, UNSPECIFIED CHRONICITY, UNSPECIFIED LOCATION: ICD-10-CM

## 2024-01-29 DIAGNOSIS — Z95.0 PRESENCE OF PERMANENT CARDIAC PACEMAKER: Primary | ICD-10-CM

## 2024-01-29 PROCEDURE — 93280 PM DEVICE PROGR EVAL DUAL: CPT | Performed by: INTERNAL MEDICINE

## 2024-01-29 PROCEDURE — 96372 THER/PROPH/DIAG INJ SC/IM: CPT

## 2024-01-29 PROCEDURE — 99211 OFF/OP EST MAY X REQ PHY/QHP: CPT | Performed by: PHYSICIAN ASSISTANT

## 2024-01-29 NOTE — PROGRESS NOTES
The patient received Prolia injection today without any immediate complications.  Return to the clinic in 6 months for next injection.

## 2024-01-29 NOTE — PROGRESS NOTES
Results for orders placed or performed in visit on 01/29/24   Cardiac EP device report    Narrative    MDT-DUAL PPM (AAIR-DDDR MODE)/ ACTIVE SYSTEM IS MRI CONDITIONAL  DEVICE INTERROGATED IN THE [place] OFFICE.BATTERY VOLTAGE ADEQUATE. (9.3 YRS) AP 53%  1%. ALL LEAD PARAMETERS WITHIN NORMAL LIMITS. NO SIGNIFICANT HIGH RATE EPISODES. NO PROGRAMMING CHANGES MADE TO DEVICE PARAMETERS. NORMAL DEVICE FUNCTION.---GOLDBERG

## 2024-01-29 NOTE — PROGRESS NOTES
Assessment/Plan:    Loretta Avalos came into the [facility] Office today 01/29/24 to receive Prolia injection.      Verbal consent obtained.  Consent given by: patient    patient states patient has been medically healthy with no underlining concerns/complications.      Loretta Avalos presents with no symptoms today.       All insturctions were reviewed with the patient.    If the patient should have any questions/concerns, advised patient to contacted [facility].       Subjective:     History provided by: patient    Patient ID: Loretta Avalos is a 63 y.o. female      Objective:    There were no vitals filed for this visit.    Patient tolerated the injection well without any complications.  Injection site/s left.  Medication was provided by rheumatology.    Patient signed consent form no   Patient signed ABN form no (If no patient is not a medicare patient).   Patient waited 15 minutes after injection no (This only applies to patient's receiving first time injection).       Last Visit: 1/22/2024  Next visit:Visit date not found

## 2024-01-29 NOTE — TELEPHONE ENCOUNTER
Reason for call: patient would like pcp office to refill this rx  [x] Refill   [] Prior Auth  [] Other:     Office:   [x] PCP/Provider -   [] Specialty/Provider -     Medication: flonase 50 mcg      [facility]    Does the patient have enough for 3 days?   [] Yes   [x] No - Send as HP to POD

## 2024-01-30 ENCOUNTER — OFFICE VISIT (OUTPATIENT)
Dept: BARIATRICS | Facility: CLINIC | Age: 64
End: 2024-01-30
Payer: COMMERCIAL

## 2024-01-30 VITALS
HEIGHT: 64 IN | DIASTOLIC BLOOD PRESSURE: 70 MMHG | WEIGHT: 176 LBS | BODY MASS INDEX: 30.05 KG/M2 | HEART RATE: 75 BPM | SYSTOLIC BLOOD PRESSURE: 106 MMHG | TEMPERATURE: 97.6 F

## 2024-01-30 DIAGNOSIS — F17.200 SMOKER: ICD-10-CM

## 2024-01-30 DIAGNOSIS — E66.9 OBESITY, CLASS I, BMI 30-34.9: ICD-10-CM

## 2024-01-30 DIAGNOSIS — Z48.815 ENCOUNTER FOR SURGICAL AFTERCARE FOLLOWING SURGERY OF DIGESTIVE SYSTEM: Primary | ICD-10-CM

## 2024-01-30 DIAGNOSIS — Z12.11 COLON CANCER SCREENING: ICD-10-CM

## 2024-01-30 DIAGNOSIS — K91.2 POSTSURGICAL MALABSORPTION: ICD-10-CM

## 2024-01-30 DIAGNOSIS — Z98.84 BARIATRIC SURGERY STATUS: ICD-10-CM

## 2024-01-30 PROCEDURE — 99214 OFFICE O/P EST MOD 30 MIN: CPT | Performed by: NURSE PRACTITIONER

## 2024-01-30 RX ORDER — FLUTICASONE PROPIONATE 50 MCG
1 SPRAY, SUSPENSION (ML) NASAL DAILY
Qty: 32 G | Refills: 3 | Status: SHIPPED | OUTPATIENT
Start: 2024-01-30

## 2024-01-30 NOTE — PROGRESS NOTES
Assessment/Plan:     Patient ID: Loretta Avalos is a 63 y.o. female.     Bariatric Surgery Status/Smoker    -s/p Raghav-En-Y Gastric Bypass with Dr. Oneil in 2017. Presents to the office today for annual visit. Overall doing well- lost about 20 lbs since last seen. Has made changes to her diet and eats overall healthy. Had her hip replace; now more physically active. Tolerating a regular diet. Denies having any abdominal pain, N/V/D/C, regurgitation, reflux or dysphagia. Taking her MVI daily. She is currently smoking 4-5 cigarettes due to social stressors - She is taking pantoprazole daily. Not ready to quit.     PLAN:     - emotional support provided.   - continue with PPI for now. Smoking cessation education provided. Discussed risk of ulcers.   - Routine follow up in 1 year for annual visit  - Continue with healthy lifestyle, adequate protein intake of 60 gm, fluid intake of at least 64 oz.   - Continue with MVI daily.   - Activity as tolerated.   - Labs ordered and will adjust accordingly if any deficiency.   - Follow up with RD and SW as needed.       Continued/Maintain healthy weight loss with good nutrition intakes.  Adequate hydration with at least 64oz. fluid intake.  Follow diet as discussed.  Follow vitamin and mineral recommendations as reviewed with you.  Exercise as tolerated.    Colonoscopy referral made: due - encouraged to have done- referral to GI placed.   Mammogram - has an order - encouraged to get done.     Follow-up in 1 year for annual visit. We kindly ask that your arrive 15 minutes before your scheduled appointment time with your provider to allow our staff to room you, get your vital signs and update your chart.    Get lab work done prior to annual visit. Please call the office if you need a script.  It is recommended to check with your insurance BEFORE getting labs done to make sure they are covered by your policy.      Call our office if you have any problems with abdominal  pain especially associated with fever, chills, nausea, vomiting or any other concerns.    All  Post-bariatric surgery patients should be aware that very small quantities of any alcohol can cause impairment and it is very possible not to feel the effect. The effect can be in the system for several hours.  It is also a stomach irritant.     It is advised to AVOID alcohol, Nonsteroidal antiinflammatory drugs (NSAIDS) and nicotine of all forms . Any of these can cause stomach irritation/pain.    Discussed the effects of alcohol on a bariatric patient and the increased impairment risk.     Keep up the good work!     Postsurgical Malabsorption   -At risk for malabsorption of vitamins/minerals secondary to malabsorption and restriction of intake from bariatric surgery  -Currently taking adequate postop bariatric surgery vitamin supplementation  -Last set of bariatric labs completed on 01/2023 and showed WNL   -Next set of bariatric labs ordered for approximately 2 weeks  -Patient received education about the importance of adhering to a lifelong supplementation regimen to avoid vitamin/mineral deficiencies      Diagnoses and all orders for this visit:    Encounter for surgical aftercare following surgery of digestive system  -     CBC; Future  -     Comprehensive metabolic panel; Future  -     Folate; Future  -     Iron Panel (Includes Ferritin, Iron Sat%, Iron, and TIBC); Future  -     PTH, intact; Future  -     Vitamin A; Future  -     Vitamin B1, whole blood; Future  -     Vitamin B12; Future  -     Vitamin D 25 hydroxy; Future  -     Zinc; Future    Bariatric surgery status  -     CBC; Future  -     Comprehensive metabolic panel; Future  -     Folate; Future  -     Iron Panel (Includes Ferritin, Iron Sat%, Iron, and TIBC); Future  -     PTH, intact; Future  -     Vitamin A; Future  -     Vitamin B1, whole blood; Future  -     Vitamin B12; Future  -     Vitamin D 25 hydroxy; Future  -     Zinc; Future  -     Ambulatory  referral to Gastroenterology; Future    Postsurgical malabsorption  -     CBC; Future  -     Comprehensive metabolic panel; Future  -     Folate; Future  -     Iron Panel (Includes Ferritin, Iron Sat%, Iron, and TIBC); Future  -     PTH, intact; Future  -     Vitamin A; Future  -     Vitamin B1, whole blood; Future  -     Vitamin B12; Future  -     Vitamin D 25 hydroxy; Future  -     Zinc; Future    Obesity, Class I, BMI 30-34.9  -     CBC; Future  -     Comprehensive metabolic panel; Future  -     Folate; Future  -     Iron Panel (Includes Ferritin, Iron Sat%, Iron, and TIBC); Future  -     PTH, intact; Future  -     Vitamin A; Future  -     Vitamin B1, whole blood; Future  -     Vitamin B12; Future  -     Vitamin D 25 hydroxy; Future  -     Zinc; Future    Colon cancer screening  -     Ambulatory referral to Gastroenterology; Future    Smoker         Subjective:      Patient ID: Loretta Avalos is a 63 y.o. female.  -s/p Raghav-En-Y Gastric Bypass with Dr. Oneil in 2017. Presents to the office today for annual visit. Overall doing well- lost about 20 lbs since last seen. Has made changes to her diet and eats overall healthy. Had her hip replace; now more physically active. Tolerating a regular diet. Denies having any abdominal pain, N/V/D/C, regurgitation, reflux or dysphagia. Taking her MVI daily. She is currently smoking 4-5 cigarettes due to social stressors - She is taking pantoprazole daily. Not ready to quit.       Initial: 241 lbs  Current: 176 lbs   EWL: (Weight loss is ahead of schedule at this post surgical period.)  Narendra: 145 lbs  Current BMI is Body mass index is 30.21 kg/m².    Tolerating a regular diet-yes  Eating at least 60 grams of protein per day-yes  Following 30/60 minute rule with liquids-no  Drinking at least 64 ounces of fluid per day-yes  Drinking carbonated beverages-no  Sufficient exercise- tries to walk since her hip surgery.   Using NSAIDs regularly-no  Using nicotine- yes -  "restarted smoking in Nov 2023 - smoking cessation education provided. 4-5 cigarettes.   Using alcohol-no  Supplements: Multivitamins, Iron, B-12, Calcium , and vitamin C    EWL is 66%, which places the patient ahead of schedule for expected post surgical weight loss at this time.     The following portions of the patient's history were reviewed and updated as appropriate: allergies, current medications, past family history, past medical history, past social history, past surgical history and problem list.    Review of Systems   Constitutional: Negative.    Respiratory: Negative.     Cardiovascular: Negative.    Gastrointestinal: Negative.    Musculoskeletal: Negative.    Neurological: Negative.    Psychiatric/Behavioral: Negative.           Objective:    /70   Pulse 75   Temp 97.6 °F (36.4 °C) (Tympanic)   Ht 5' 4\" (1.626 m)   Wt 79.8 kg (176 lb)   LMP 09/18/2014   BMI 30.21 kg/m²      Physical Exam  Vitals and nursing note reviewed.   Constitutional:       Appearance: Normal appearance. She is obese.   Cardiovascular:      Rate and Rhythm: Normal rate and regular rhythm.      Pulses: Normal pulses.      Heart sounds: Normal heart sounds.   Pulmonary:      Effort: Pulmonary effort is normal.      Breath sounds: Normal breath sounds.   Abdominal:      General: Bowel sounds are normal.      Palpations: Abdomen is soft.      Tenderness: There is no abdominal tenderness.   Musculoskeletal:         General: Normal range of motion.   Skin:     General: Skin is warm and dry.   Neurological:      General: No focal deficit present.      Mental Status: She is alert and oriented to person, place, and time.   Psychiatric:         Mood and Affect: Mood normal.         Behavior: Behavior normal.         Thought Content: Thought content normal.         Judgment: Judgment normal.             "

## 2024-02-06 ENCOUNTER — RA CDI HCC (OUTPATIENT)
Dept: OTHER | Facility: HOSPITAL | Age: 64
End: 2024-02-06

## 2024-02-06 PROBLEM — H92.01 RIGHT EAR PAIN: Status: RESOLVED | Noted: 2020-01-21 | Resolved: 2024-02-06

## 2024-02-07 ENCOUNTER — OFFICE VISIT (OUTPATIENT)
Dept: FAMILY MEDICINE CLINIC | Facility: CLINIC | Age: 64
End: 2024-02-07
Payer: COMMERCIAL

## 2024-02-07 ENCOUNTER — APPOINTMENT (OUTPATIENT)
Dept: LAB | Age: 64
End: 2024-02-07
Payer: COMMERCIAL

## 2024-02-07 VITALS
HEIGHT: 64 IN | BODY MASS INDEX: 29.53 KG/M2 | DIASTOLIC BLOOD PRESSURE: 70 MMHG | RESPIRATION RATE: 16 BRPM | SYSTOLIC BLOOD PRESSURE: 112 MMHG | OXYGEN SATURATION: 96 % | WEIGHT: 173 LBS | HEART RATE: 87 BPM | TEMPERATURE: 97.9 F

## 2024-02-07 DIAGNOSIS — E78.49 OTHER HYPERLIPIDEMIA: ICD-10-CM

## 2024-02-07 DIAGNOSIS — J01.00 ACUTE NON-RECURRENT MAXILLARY SINUSITIS: Primary | ICD-10-CM

## 2024-02-07 DIAGNOSIS — R73.9 HYPERGLYCEMIA: ICD-10-CM

## 2024-02-07 DIAGNOSIS — Z23 ENCOUNTER FOR IMMUNIZATION: ICD-10-CM

## 2024-02-07 DIAGNOSIS — K91.2 POSTSURGICAL MALABSORPTION: ICD-10-CM

## 2024-02-07 DIAGNOSIS — I49.5 SICK SINUS SYNDROME (HCC): ICD-10-CM

## 2024-02-07 DIAGNOSIS — F41.9 ANXIETY: ICD-10-CM

## 2024-02-07 DIAGNOSIS — E66.9 OBESITY, CLASS I, BMI 30-34.9: ICD-10-CM

## 2024-02-07 DIAGNOSIS — Z98.84 BARIATRIC SURGERY STATUS: ICD-10-CM

## 2024-02-07 DIAGNOSIS — Z00.00 MEDICARE ANNUAL WELLNESS VISIT, SUBSEQUENT: ICD-10-CM

## 2024-02-07 DIAGNOSIS — Z48.815 ENCOUNTER FOR SURGICAL AFTERCARE FOLLOWING SURGERY OF DIGESTIVE SYSTEM: ICD-10-CM

## 2024-02-07 DIAGNOSIS — F32.A DEPRESSION, UNSPECIFIED DEPRESSION TYPE: ICD-10-CM

## 2024-02-07 DIAGNOSIS — K21.9 GASTROESOPHAGEAL REFLUX DISEASE, UNSPECIFIED WHETHER ESOPHAGITIS PRESENT: ICD-10-CM

## 2024-02-07 LAB
25(OH)D3 SERPL-MCNC: 52.1 NG/ML (ref 30–100)
ALBUMIN SERPL BCP-MCNC: 4.3 G/DL (ref 3.5–5)
ALP SERPL-CCNC: 85 U/L (ref 34–104)
ALT SERPL W P-5'-P-CCNC: 23 U/L (ref 7–52)
ANION GAP SERPL CALCULATED.3IONS-SCNC: 9 MMOL/L
AST SERPL W P-5'-P-CCNC: 28 U/L (ref 13–39)
BASOPHILS # BLD AUTO: 0.07 THOUSANDS/ÂΜL (ref 0–0.1)
BASOPHILS NFR BLD AUTO: 1 % (ref 0–1)
BILIRUB SERPL-MCNC: 0.44 MG/DL (ref 0.2–1)
BUN SERPL-MCNC: 12 MG/DL (ref 5–25)
CALCIUM SERPL-MCNC: 8.7 MG/DL (ref 8.4–10.2)
CHLORIDE SERPL-SCNC: 108 MMOL/L (ref 96–108)
CHOLEST SERPL-MCNC: 183 MG/DL
CO2 SERPL-SCNC: 26 MMOL/L (ref 21–32)
CREAT SERPL-MCNC: 0.54 MG/DL (ref 0.6–1.3)
EOSINOPHIL # BLD AUTO: 0.21 THOUSAND/ÂΜL (ref 0–0.61)
EOSINOPHIL NFR BLD AUTO: 3 % (ref 0–6)
ERYTHROCYTE [DISTWIDTH] IN BLOOD BY AUTOMATED COUNT: 12.8 % (ref 11.6–15.1)
EST. AVERAGE GLUCOSE BLD GHB EST-MCNC: 131 MG/DL
FERRITIN SERPL-MCNC: 14 NG/ML (ref 11–307)
FOLATE SERPL-MCNC: >22.3 NG/ML
GFR SERPL CREATININE-BSD FRML MDRD: 100 ML/MIN/1.73SQ M
GLUCOSE P FAST SERPL-MCNC: 102 MG/DL (ref 65–99)
HBA1C MFR BLD: 6.2 %
HCT VFR BLD AUTO: 36 % (ref 34.8–46.1)
HDLC SERPL-MCNC: 79 MG/DL
HGB BLD-MCNC: 11.3 G/DL (ref 11.5–15.4)
IMM GRANULOCYTES # BLD AUTO: 0.03 THOUSAND/UL (ref 0–0.2)
IMM GRANULOCYTES NFR BLD AUTO: 1 % (ref 0–2)
IRON SATN MFR SERPL: 12 % (ref 15–50)
IRON SERPL-MCNC: 57 UG/DL (ref 50–212)
LDLC SERPL CALC-MCNC: 87 MG/DL (ref 0–100)
LYMPHOCYTES # BLD AUTO: 1.27 THOUSANDS/ÂΜL (ref 0.6–4.47)
LYMPHOCYTES NFR BLD AUTO: 20 % (ref 14–44)
MCH RBC QN AUTO: 30.1 PG (ref 26.8–34.3)
MCHC RBC AUTO-ENTMCNC: 31.4 G/DL (ref 31.4–37.4)
MCV RBC AUTO: 96 FL (ref 82–98)
MONOCYTES # BLD AUTO: 0.32 THOUSAND/ÂΜL (ref 0.17–1.22)
MONOCYTES NFR BLD AUTO: 5 % (ref 4–12)
NEUTROPHILS # BLD AUTO: 4.59 THOUSANDS/ÂΜL (ref 1.85–7.62)
NEUTS SEG NFR BLD AUTO: 70 % (ref 43–75)
NRBC BLD AUTO-RTO: 0 /100 WBCS
PLATELET # BLD AUTO: 238 THOUSANDS/UL (ref 149–390)
PMV BLD AUTO: 11.6 FL (ref 8.9–12.7)
POTASSIUM SERPL-SCNC: 3.9 MMOL/L (ref 3.5–5.3)
PROT SERPL-MCNC: 7.1 G/DL (ref 6.4–8.4)
PTH-INTACT SERPL-MCNC: 155.3 PG/ML (ref 12–88)
RBC # BLD AUTO: 3.75 MILLION/UL (ref 3.81–5.12)
SODIUM SERPL-SCNC: 143 MMOL/L (ref 135–147)
TIBC SERPL-MCNC: 469 UG/DL (ref 250–450)
TRIGL SERPL-MCNC: 86 MG/DL
TSH SERPL DL<=0.05 MIU/L-ACNC: 0.92 UIU/ML (ref 0.45–4.5)
UIBC SERPL-MCNC: 412 UG/DL (ref 155–355)
VIT B12 SERPL-MCNC: 835 PG/ML (ref 180–914)
WBC # BLD AUTO: 6.49 THOUSAND/UL (ref 4.31–10.16)

## 2024-02-07 PROCEDURE — 80061 LIPID PANEL: CPT

## 2024-02-07 PROCEDURE — 84443 ASSAY THYROID STIM HORMONE: CPT

## 2024-02-07 PROCEDURE — 36415 COLL VENOUS BLD VENIPUNCTURE: CPT

## 2024-02-07 PROCEDURE — 90471 IMMUNIZATION ADMIN: CPT

## 2024-02-07 PROCEDURE — 83540 ASSAY OF IRON: CPT

## 2024-02-07 PROCEDURE — 83036 HEMOGLOBIN GLYCOSYLATED A1C: CPT

## 2024-02-07 PROCEDURE — 84590 ASSAY OF VITAMIN A: CPT

## 2024-02-07 PROCEDURE — 84425 ASSAY OF VITAMIN B-1: CPT

## 2024-02-07 PROCEDURE — 99214 OFFICE O/P EST MOD 30 MIN: CPT | Performed by: NURSE PRACTITIONER

## 2024-02-07 PROCEDURE — 82728 ASSAY OF FERRITIN: CPT

## 2024-02-07 PROCEDURE — G0439 PPPS, SUBSEQ VISIT: HCPCS | Performed by: NURSE PRACTITIONER

## 2024-02-07 PROCEDURE — 83550 IRON BINDING TEST: CPT

## 2024-02-07 PROCEDURE — 83970 ASSAY OF PARATHORMONE: CPT

## 2024-02-07 PROCEDURE — 84630 ASSAY OF ZINC: CPT

## 2024-02-07 PROCEDURE — 82607 VITAMIN B-12: CPT

## 2024-02-07 PROCEDURE — 80053 COMPREHEN METABOLIC PANEL: CPT

## 2024-02-07 PROCEDURE — 85025 COMPLETE CBC W/AUTO DIFF WBC: CPT

## 2024-02-07 PROCEDURE — 82746 ASSAY OF FOLIC ACID SERUM: CPT

## 2024-02-07 PROCEDURE — 90678 RSV VACC PREF BIVALENT IM: CPT

## 2024-02-07 PROCEDURE — 82306 VITAMIN D 25 HYDROXY: CPT

## 2024-02-07 RX ORDER — BENZONATATE 200 MG/1
200 CAPSULE ORAL 3 TIMES DAILY PRN
Qty: 20 CAPSULE | Refills: 0 | Status: SHIPPED | OUTPATIENT
Start: 2024-02-07

## 2024-02-07 RX ORDER — AZITHROMYCIN 250 MG/1
TABLET, FILM COATED ORAL DAILY
Qty: 6 TABLET | Refills: 0 | Status: SHIPPED | OUTPATIENT
Start: 2024-02-07 | End: 2024-02-11

## 2024-02-07 RX ORDER — CLOBETASOL PROPIONATE 0.5 MG/G
OINTMENT TOPICAL
COMMUNITY
Start: 2024-02-02

## 2024-02-07 NOTE — ASSESSMENT & PLAN NOTE
- Reviewed immunizations and screenings. RSV vaccine given in office today.  - UTD with dental, vision, and GYN exams.   - Due for mammogram and colonoscopy.

## 2024-02-07 NOTE — PROGRESS NOTES
Assessment and Plan:     Problem List Items Addressed This Visit       Sick sinus syndrome (HCC)     - s/p permanent pacemaker.   - Continue routine follow up with Cardiology.          Hyperlipidemia     Component      Latest Ref Rng 9/3/2022 6/17/2023   Cholesterol      See Comment mg/dL 193  219 (H)    Triglycerides      See Comment mg/dL 83  87    HDL      >=50 mg/dL 80  84    LDL Calculated      0 - 100 mg/dL 96  118 (H)       - LDL elevated.  - Continue Lipitor 10 mg daily.  - Encourage healthy diet and regular exercise.  - Will continue to monitor fasting lipid panel.          Medicare annual wellness visit, subsequent     - Reviewed immunizations and screenings. RSV vaccine given in office today.  - UTD with dental, vision, and GYN exams.   - Due for mammogram and colonoscopy.           Depression     - Improving.   - Continue Zoloft 100 mg daily.   - Will continue to monitor.          Hyperglycemia     - Hemoglobin A1c elevated at 5.8.   - Encourage healthy diet and regular exercise.  - Will continue to monitor.          Gastroesophageal reflux disease     - Stable on pantoprazole 20 mg daily. Continue same.   - Avoid triggering food and beverage.  - Will continue to monitor.          Anxiety     - Improving.   - Continue Zoloft 100 mg daily.   - Will continue to monitor.          Acute non-recurrent maxillary sinusitis - Primary     - Prescriptions sent for Z-hernandez and tessalon perles.   - Continue Flonase.   - Increase oral hydration and use humidifier.  - Contact office if symptoms do not improve.         Relevant Medications    azithromycin (Zithromax) 250 mg tablet    benzonatate (TESSALON) 200 MG capsule    Encounter for immunization    Relevant Orders    Respiratory Syncytial Virus (RSV) vaccine (recombinant) (Abrysvo) (Completed)       Depression Screening and Follow-up Plan: Patient was screened for depression during today's encounter. They screened negative with a PHQ-9 score of 0.    Tobacco  Cessation Counseling: Tobacco cessation counseling was provided. The patient is sincerely urged to quit consumption of tobacco. She is not ready to quit tobacco.       Preventive health issues were discussed with patient, and age appropriate screening tests were ordered as noted in patient's After Visit Summary.  Personalized health advice and appropriate referrals for health education or preventive services given if needed, as noted in patient's After Visit Summary.     History of Present Illness:     Patient presents for a Medicare Wellness Visit    Patient with PMH of GERD, hyperlipidemia,  SVT, sick sinus syndrome, anxiety, depression and insomnia presents today for follow up. She is taking her prescribed medications and reports no side effects. His Zoloft was increased last visit for increased depression symptoms. Feels her depression is improving. She has complaints today of congestion, cough, headache, and sinus pain/pressure. Has been using OTC medication with no improvement. She is due for updated blood work. Would like the RSV vaccine today. Denies any other concerns or complaints today.            Patient Care Team:  DANIELA Stinson as PCP - General (Nurse Practitioner)  Coleen Rosas MD as PCP - PCP-[facility] ([facility])  Coleen Rosas MD as PCP - PCP-[facility] ([facility])     Review of Systems:     Review of Systems   Constitutional:  Negative for fatigue and fever.   HENT:  Positive for congestion, sinus pressure and sinus pain. Negative for postnasal drip and trouble swallowing.    Eyes:  Negative for visual disturbance.   Respiratory:  Positive for cough. Negative for shortness of breath.    Cardiovascular:  Negative for chest pain and palpitations.   Gastrointestinal:  Negative for abdominal pain and blood in stool.   Endocrine: Negative for cold intolerance and heat intolerance.   Genitourinary:  Negative for difficulty urinating, dysuria and frequency.   Musculoskeletal:  Negative for  gait problem.   Skin:  Negative for rash.   Neurological:  Positive for headaches. Negative for dizziness and syncope.   Hematological:  Negative for adenopathy.   Psychiatric/Behavioral:  Negative for behavioral problems.         Problem List:     Patient Active Problem List   Diagnosis    Abnormal CT scan    Sick sinus syndrome (HCC)    HSV-1 infection    Status post carpal tunnel release    Fibrocystic breast disease    Hyperlipidemia    Knee osteoarthritis    Lichen sclerosus    Menopausal and postmenopausal disorder    Candida infection of flexural skin    Grade 1 out of 6 intensity murmur    Encounter for screening mammogram for malignant neoplasm of breast    History of DVT (deep vein thrombosis)    Iron deficiency anemia    Vitamin deficiency    Medicare annual wellness visit, subsequent    Mass of soft tissue of right upper extremity    Chronic right shoulder pain    Work related injury    Leukopenia    Anemia    Dysphagia    Acute gastric ulcer without hemorrhage or perforation    History of colon polyps    Overweight    Bariatric surgery status    Depression    Nondisplaced fracture of medial malleolus of right tibia, initial encounter for closed fracture    Closed nondisplaced Maisonneuve's fracture of right leg    Closed fracture of upper end of right fibula    Closed fracture of left distal radius and ulna    Fall    Sinus pressure    Pacemaker    PSVT (paroxysmal supraventricular tachycardia)    Insomnia    Back pain without sciatica    LLQ pain    Stomach cramps    Hyperglycemia    Urge incontinence of urine    Dizziness    Generalized abdominal pain    Gastroesophageal reflux disease    Anxiety    Primary osteoarthritis of right hip    Preoperative clearance    Acute non-recurrent maxillary sinusitis    Encounter for immunization      Past Medical and Surgical History:     Past Medical History:   Diagnosis Date    Anemia     iron def    Arthritis     knees    Carpal tunnel syndrome of right wrist      Chest pain     Colon polyp     Deep vein thrombosis (DVT) of proximal lower extremity (HCC) 2012    Depression     at times    DVT (deep venous thrombosis) ([facility])     Dysphagia     Encounter for surgical aftercare following surgery of digestive system 2020    s/p Raghav-En-Y Gastric Bypass with Dr. Oneil in .   INITIAL weight:241 lb INITIAL weight with start of topirmate: 176 lb Goal weight loss of 5-10 %-159 lb-167 lb     Esophageal reflux 2011    Grade 1 out of 6 intensity murmur 2019    Heart murmur     History of transfusion 1980    Neck pain     Pacemaker 2018    Pneumonia     age 16    Psychiatric disorder     Right ear pain     Trigger finger, left     Visit for suture removal 2021     Past Surgical History:   Procedure Laterality Date    BACK SURGERY      CARDIAC PACEMAKER PLACEMENT  2018    CERVICAL DISC SURGERY  2009    PLATES & SCREWS     SECTION      X2    COLONOSCOPY      EPIDURAL BLOCK INJECTION Right 2022    Procedure: RIGHT [id number] EPIDURAL STEROID INJECTION (15582);  Surgeon: Bairon Kamara DO;  Location: [facility];  Service: Pain Management     GASTRIC BYPASS  2017    LAP RINYGB SURGERY    KNEE ARTHROSCOPY      KNEE SURGERY Bilateral     KNEE SURGERY Left 2020    OTHER SURGICAL HISTORY      ARM SURGERIES    WI NDSC WRST SURG W/RLS TRANSVRS CARPL LIGM Right 2018    Procedure: ENDOSCOPIC CARPAL TUNNEL RELEASE;  Surgeon: Leroy Macdonald MD;  Location: [facility];  Service: Orthopedics    WI NJX DX/THER AGT PVRT FACET JT LMBR/SAC 1 LEVEL Bilateral 10/25/2022    Procedure: L3-L4,L4-5,L5-S1 BILATERAL BRANCH BLOCK;  Surgeon: Bairon Kamara DO;  Location: [facility];  Service: Pain Management     WI NJX DX/THER AGT PVRT FACET JT LMBR/SAC 1 LEVEL Bilateral 11/15/2022    Procedure: BILATERAL L3-S1 MEDICAL BRANCH BLOCK (95745,[id number]);  Surgeon: Bairon Kamara DO;  Location: [facility];  Service: Pain  Management     CT TENDON SHEATH INCISION Left 9/18/2018    Procedure: LONG TRIGGER FINGER RELEASE;  Surgeon: Leroy Macdonald MD;  Location: [facility];  Service: Orthopedics    RADIOFREQUENCY ABLATION Right 12/6/2022    Procedure: RIGHT L3-S1 RADIO FREQUENCY ABLATION (28072,78231);  Surgeon: Bairon Kamara DO;  Location: [facility];  Service: Pain Management     RADIOFREQUENCY ABLATION Left 12/20/2022    Procedure: LEFT L3-S1 RADIO FREQUENCY ABLATION (46602,[id number]);  Surgeon: Bairon Kamara DO;  Location: [facility];  Service: Pain Management     TONSILLECTOMY      UPPER GASTROINTESTINAL ENDOSCOPY        Family History:     Family History   Problem Relation Age of Onset    Stroke Mother     Alzheimer's disease Mother     Arthritis Mother     Coronary artery disease Mother     Breast cancer Mother 66    Cancer Mother     Heart disease Mother     Hypertension Father     Gout Father     Diabetes type II Father     Coronary artery disease Father     Heart disease Father     No Known Problems Sister     No Known Problems Sister     No Known Problems Sister     No Known Problems Daughter     No Known Problems Maternal Grandmother     No Known Problems Maternal Grandfather     No Known Problems Paternal Grandmother     No Known Problems Paternal Grandfather     Prostate cancer Brother     Pancreatic cancer Brother     No Known Problems Maternal Aunt     No Known Problems Maternal Aunt     No Known Problems Maternal Aunt     No Known Problems Maternal Aunt     Cancer Maternal Aunt     No Known Problems Paternal Aunt     No Known Problems Paternal Aunt       Social History:     Social History     Socioeconomic History    Marital status: /Civil Union     Spouse name: None    Number of children: None    Years of education: None    Highest education level: None   Occupational History    None   Tobacco Use    Smoking status: Every Day     Types: Cigarettes    Smokeless tobacco: Never   Vaping Use    Vaping status: Never Used    Substance and Sexual Activity    Alcohol use: Yes     Alcohol/week: 2.0 standard drinks of alcohol     Types: 1 Glasses of wine, 1 Standard drinks or equivalent per week     Comment: very rare social    Drug use: No    Sexual activity: Not Currently   Other Topics Concern    None   Social History Narrative    None     Social Determinants of Health     Financial Resource Strain: Patient Declined (11/28/2023)    Received from [facility]    Overall Financial Resource Strain (CARDIA)     Difficulty of Paying Living Expenses: Patient declined   Food Insecurity: Patient Declined (11/28/2023)    Received from [facility]    Hunger Vital Sign     Worried About Running Out of Food in the Last Year: Patient declined     Ran Out of Food in the Last Year: Patient declined   Transportation Needs: Patient Declined (11/28/2023)    Received from [facility]    PRAPARE - Transportation     Lack of Transportation (Medical): Patient declined     Lack of Transportation (Non-Medical): Patient declined   Physical Activity: Not on file   Stress: Not on file   Social Connections: Not on file   Intimate Partner Violence: Patient Declined (11/28/2023)    Received from [facility]    Humiliation, Afraid, Rape, and Kick questionnaire     Fear of Current or Ex-Partner: Patient declined     Emotionally Abused: Patient declined     Physically Abused: Patient declined     Sexually Abused: Patient declined   Housing Stability: Unknown (11/28/2023)    Received from [facility]    Housing Stability Vital Sign     Unable to Pay for Housing in the Last Year: Patient refused     Number of Places Lived in the Last Year: 1     Unstable Housing in the Last Year: Patient refused      Medications and Allergies:     Current Outpatient Medications   Medication Sig Dispense Refill    acetaminophen (TYLENOL) 650 mg CR tablet Take 650 mg by mouth every 8 (eight) hours as  needed for mild pain Take 2 tablets in the AM and 2 tablets in the late evening      aluminum-magnesium hydroxide-simethicone (MAALOX MAX) 400-400-40 MG/5ML suspension Take 10 mL by mouth every 6 (six) hours as needed for indigestion or heartburn 355 mL 0    Ascorbic Acid (VITAMIN C) 100 MG CHEW Chew      atorvastatin (LIPITOR) 10 mg tablet TAKE 1 TABLET BY MOUTH EVERY DAY 90 tablet 1    azithromycin (Zithromax) 250 mg tablet Take 2 tablets (500 mg total) by mouth daily for 1 day, THEN 1 tablet (250 mg total) daily for 4 days. 6 tablet 0    benzonatate (TESSALON) 200 MG capsule Take 1 capsule (200 mg total) by mouth 3 (three) times a day as needed for cough 20 capsule 0    Biotin 09255 MCG TABS Take by mouth daily        Calcium Carbonate (CALCI-CHEW PO) Take 650 mg by mouth 2 (two) times a day      cholecalciferol (VITAMIN D3) 1,000 units tablet Take 5,000 Units by mouth in the morning      clobetasol (TEMOVATE) 0.05 % ointment       Cyanocobalamin (VITAMIN B 12 PO) Take 1,000 mcg by mouth      dicyclomine (BENTYL) 10 mg capsule take 1 capsule by mouth four times a day before meals at bedtime 120 capsule 2    Eliquis 2.5 MG TAKE 1 TABLET BY MOUTH TWICE A DAY 60 tablet 3    estradiol (ESTRACE) 0.1 mg/g vaginal cream USE PEA SIZED AMOUNT VAGINALLY NIGHTLY X 2 WEEKS, THEN TWICE WEEKLY      Ferrous Sulfate  (45 Fe) MG TBCR Take 1 tablet by mouth daily      fluticasone (FLONASE) 50 mcg/act nasal spray 1 spray into each nostril daily 32 g 3    gabapentin (NEURONTIN) 300 mg capsule TAKE 1 CAPSULE BY MOUTH THREE TIMES A DAY 90 capsule 5    meclizine (ANTIVERT) 25 mg tablet Take 1 tablet (25 mg total) by mouth every 8 (eight) hours as needed for dizziness 60 tablet 0    methocarbamol (ROBAXIN) 500 mg tablet Take 1 tablet (500 mg total) by mouth 2 (two) times a day 20 tablet 0    metoprolol succinate (TOPROL-XL) 25 mg 24 hr tablet TAKE 1 TABLET BY MOUTH EVERY DAY 90 tablet 1    Multiple Vitamin (MULTIVITAMIN) capsule  Take 1 capsule by mouth daily 2 chewies daily      nystatin (MYCOSTATIN) powder APPLY TO AFFECTED AREA TWICE A DAY 30 g 0    ondansetron (ZOFRAN) 8 mg tablet TAKE 1 TABLET BY MOUTH EVERY 8 HOURS AS NEEDED FOR NAUSEA OR VOMITING.      pantoprazole (PROTONIX) 20 mg tablet TAKE 1 TABLET BY MOUTH EVERY DAY 90 tablet 1    sertraline (ZOLOFT) 100 mg tablet Take 1 tablet (100 mg total) by mouth daily 90 tablet 0    tiZANidine (ZANAFLEX) 2 mg tablet TAKE 1 TABLET BY MOUTH EVERY 8 HOURS AS NEEDED FOR MUSCLE SPASMS. 270 tablet 3    traZODone (DESYREL) 50 mg tablet TAKE 1 TABLET BY MOUTH EVERYDAY AT BEDTIME 90 tablet 1    albuterol (ProAir HFA) 90 mcg/act inhaler Inhale 2 puffs every 6 (six) hours as needed for wheezing (Patient not taking: Reported on 1/30/2024) 8.5 g 0     No current facility-administered medications for this visit.     No Known Allergies   Immunizations:     Immunization History   Administered Date(s) Administered    COVID-19 PFIZER VACCINE 0.3 ML IM 04/03/2021, 04/28/2021, 11/01/2021    COVID-19 Pfizer Vac BIVALENT Sudhakar-sucrose 12 Yr+ IM 10/15/2022    COVID-19 Pfizer mRNA vacc PF sudhakar-sucrose 12 yr and older (Comirnaty) 12/22/2023    Hep A, adult 08/08/2017    Hep A, ped/adol, 2 dose 01/04/2017, 08/08/2017    Hepatitis A 01/04/2017    INFLUENZA 10/05/2011, 11/22/2016, 11/22/2016, 10/23/2017, 10/23/2017, 10/23/2017, 10/15/2019, 10/30/2020, 08/18/2021, 08/18/2021, 10/15/2022, 09/11/2023    Influenza Quadrivalent Preservative Free 3 years and older IM 10/15/2022    Influenza, injectable, quadrivalent, preservative free 0.5 mL 10/30/2020    Influenza, recombinant, quadrivalent,injectable, preservative free 10/31/2018, 10/15/2019    Influenza, seasonal, injectable 12/22/2014, 10/13/2015    Pneumococcal Polysaccharide PPV23 02/25/2013, 01/17/2016    Respiratory Syncytial Virus Vaccine (Recombinant) 02/07/2024    Tdap 04/21/2011, 04/10/2017, 06/18/2021    Tuberculin Skin Test-PPD Intradermal 07/28/2021    Zoster  01/17/2016      Health Maintenance:         Topic Date Due    Cervical Cancer Screening  Never done    Colorectal Cancer Screening  06/23/2023    Breast Cancer Screening: Mammogram  08/17/2023    HIV Screening  Completed    Hepatitis C Screening  Completed         Topic Date Due    Pneumococcal Vaccine: Pediatrics (0 to 5 Years) and At-Risk Patients (6 to 64 Years) (2 - PCV) 01/17/2017      Medicare Screening Tests and Risk Assessments:     Loretta is here for her Subsequent Wellness visit. Last Medicare Wellness visit information reviewed, patient interviewed and updates made to the record today.      Health Risk Assessment:   Patient rates overall health as good. Patient feels that their physical health rating is same. Patient is satisfied with their life. Eyesight was rated as same. Hearing was rated as same. Patient feels that their emotional and mental health rating is same. Patients states they are never, rarely angry. Patient states they are never, rarely unusually tired/fatigued. Pain experienced in the last 7 days has been none. Patient states that she has experienced no weight loss or gain in last 6 months.     Depression Screening:   PHQ-9 Score: 0      Urinary Incontinence Screening:   Patient has not leaked urine accidently in the last six months.     Home Safety:  Patient does not have trouble with stairs inside or outside of their home. Patient has working smoke alarms Home safety hazards include: none.     Nutrition:   Current diet is Regular.     Medications:   Patient is currently taking over-the-counter supplements. OTC medications include: see medication list. Patient is able to manage medications.     Activities of Daily Living (ADLs)/Instrumental Activities of Daily Living (IADLs):   Walk and transfer into and out of bed and chair?: Yes  Dress and groom yourself?: Yes    Bathe or shower yourself?: Yes    Feed yourself? Yes  Do your laundry/housekeeping?: Yes  Manage your money, pay your bills  "and track your expenses?: Yes  Make your own meals?: Yes    Do your own shopping?: Yes    Previous Hospitalizations:   Any hospitalizations or ED visits within the last 12 months?: Yes    How many hospitalizations have you had in the last year?: 1-2    Advance Care Planning:   Living will: No    Durable POA for healthcare: No    Advanced directive: No      Cognitive Screening:   Provider or family/friend/caregiver concerned regarding cognition?: No    PREVENTIVE SCREENINGS      Cardiovascular Screening:    General: Screening Not Indicated and History Lipid Disorder      Diabetes Screening:     General: Screening Current      Colorectal Cancer Screening:     General: Screening Current      Breast Cancer Screening:     General: Screening Current      Cervical Cancer Screening:    General: Risks and Benefits Discussed    Due for: Cervical Pap Smear      Osteoporosis Screening:    General: Screening Not Indicated and History Osteoporosis      Abdominal Aortic Aneurysm (AAA) Screening:        General: Screening Not Indicated      Lung Cancer Screening:     General: Screening Not Indicated      Hepatitis C Screening:    General: Screening Current    Screening, Brief Intervention, and Referral to Treatment (SBIRT)    Screening  Typical number of drinks in a day: 0  Typical number of drinks in a week: 0  Interpretation: Low risk drinking behavior.    Single Item Drug Screening:  How often have you used an illegal drug (including marijuana) or a prescription medication for non-medical reasons in the past year? never    Single Item Drug Screen Score: 0  Interpretation: Negative screen for possible drug use disorder    Other Counseling Topics:   Calcium and vitamin D intake and regular weightbearing exercise.     No results found.     Physical Exam:     /70 (BP Location: Left arm, Patient Position: Sitting, Cuff Size: Adult)   Pulse 87   Temp 97.9 °F (36.6 °C) (Tympanic)   Resp 16   Ht 5' 4\" (1.626 m)   Wt 78.5 kg " (173 lb)   LMP 09/18/2014   SpO2 96%   BMI 29.70 kg/m²     Physical Exam  Vitals and nursing note reviewed.   Constitutional:       General: She is not in acute distress.     Appearance: Normal appearance. She is well-developed.   HENT:      Head: Normocephalic and atraumatic.      Right Ear: Tympanic membrane, ear canal and external ear normal.      Left Ear: Tympanic membrane, ear canal and external ear normal.      Nose: Congestion present.      Mouth/Throat:      Pharynx: Posterior oropharyngeal erythema present.   Eyes:      Conjunctiva/sclera: Conjunctivae normal.   Cardiovascular:      Rate and Rhythm: Normal rate and regular rhythm.      Heart sounds: Normal heart sounds.   Pulmonary:      Effort: Pulmonary effort is normal.      Breath sounds: Normal breath sounds.   Abdominal:      General: Bowel sounds are normal.      Palpations: Abdomen is soft.   Musculoskeletal:         General: Normal range of motion.      Cervical back: Normal range of motion.   Lymphadenopathy:      Cervical: No cervical adenopathy.   Skin:     General: Skin is warm and dry.   Neurological:      Mental Status: She is alert and oriented to person, place, and time.   Psychiatric:         Mood and Affect: Mood normal.         Behavior: Behavior normal.          DANIELA Stinson

## 2024-02-07 NOTE — ASSESSMENT & PLAN NOTE
- Hemoglobin A1c elevated at 5.8.   - Encourage healthy diet and regular exercise.  - Will continue to monitor.

## 2024-02-07 NOTE — ASSESSMENT & PLAN NOTE
- Prescriptions sent for Jean-Claude and tessalon perles.   - Continue Flonase.   - Increase oral hydration and use humidifier.  - Contact office if symptoms do not improve.

## 2024-02-12 LAB — VIT A SERPL-MCNC: 34.6 UG/DL (ref 22–69.5)

## 2024-02-13 LAB — VIT B1 BLD-SCNC: 112.8 NMOL/L (ref 66.5–200)

## 2024-02-14 ENCOUNTER — TELEPHONE (OUTPATIENT)
Dept: BARIATRICS | Facility: CLINIC | Age: 64
End: 2024-02-14

## 2024-02-14 DIAGNOSIS — R79.89 HIGH SERUM PARATHYROID HORMONE (PTH): ICD-10-CM

## 2024-02-14 DIAGNOSIS — E61.1 IRON DEFICIENCY: ICD-10-CM

## 2024-02-14 DIAGNOSIS — Z98.84 BARIATRIC SURGERY STATUS: Primary | ICD-10-CM

## 2024-02-14 LAB — ZINC SERPL-MCNC: 91 UG/DL (ref 44–115)

## 2024-02-14 NOTE — TELEPHONE ENCOUNTER
----- Message from DANIELA Singh sent at 2/14/2024 11:54 AM EST -----  Please call pt to let her know her we have received her labs.    All are within limits EXCEPT FOR THE FOLLOWING:   - Her iron levels are low - I know she is taking iron, I recommend to take iron twice daily along with vitamin C for better absorption. Iron can be constipating, hence please start on stool softeners to help prevent this.     - PTH which is a hormone that regulates vitamin D and calcium - this level is high. I recommend checking her calcium supplements. She needs 500-600 mg three times per day. If she hasn't been doing this, please do so and we will repeat her levels in 3 months to see if her levels will normalize.     The rest are within limits. Keep up the great work!     Cristina

## 2024-02-15 ENCOUNTER — TELEPHONE (OUTPATIENT)
Dept: FAMILY MEDICINE CLINIC | Facility: CLINIC | Age: 64
End: 2024-02-15

## 2024-02-15 NOTE — TELEPHONE ENCOUNTER
----- Message from DANIELA Stinson sent at 2/15/2024  8:21 AM EST -----  Labs show slightly low hemoglobin. Continue iron supplements. Her hemoglobin A1c is elevated. Recommend low carb diet and regular exercise. The rest of her labs are stable.

## 2024-02-21 PROBLEM — Z00.00 MEDICARE ANNUAL WELLNESS VISIT, SUBSEQUENT: Status: RESOLVED | Noted: 2019-10-15 | Resolved: 2024-02-21

## 2024-02-21 PROBLEM — J01.00 ACUTE NON-RECURRENT MAXILLARY SINUSITIS: Status: RESOLVED | Noted: 2024-02-07 | Resolved: 2024-02-21

## 2024-03-06 ENCOUNTER — OFFICE VISIT (OUTPATIENT)
Dept: FAMILY MEDICINE CLINIC | Facility: CLINIC | Age: 64
End: 2024-03-06
Payer: COMMERCIAL

## 2024-03-06 VITALS
SYSTOLIC BLOOD PRESSURE: 122 MMHG | TEMPERATURE: 97.7 F | RESPIRATION RATE: 16 BRPM | OXYGEN SATURATION: 97 % | HEART RATE: 82 BPM | WEIGHT: 173.6 LBS | DIASTOLIC BLOOD PRESSURE: 74 MMHG | HEIGHT: 64 IN | BODY MASS INDEX: 29.64 KG/M2

## 2024-03-06 DIAGNOSIS — R05.1 ACUTE COUGH: ICD-10-CM

## 2024-03-06 DIAGNOSIS — J01.00 ACUTE NON-RECURRENT MAXILLARY SINUSITIS: Primary | ICD-10-CM

## 2024-03-06 PROCEDURE — 87636 SARSCOV2 & INF A&B AMP PRB: CPT | Performed by: FAMILY MEDICINE

## 2024-03-06 PROCEDURE — G2211 COMPLEX E/M VISIT ADD ON: HCPCS | Performed by: FAMILY MEDICINE

## 2024-03-06 PROCEDURE — 99213 OFFICE O/P EST LOW 20 MIN: CPT | Performed by: FAMILY MEDICINE

## 2024-03-06 RX ORDER — PREDNISONE 50 MG/1
50 TABLET ORAL DAILY
Qty: 5 TABLET | Refills: 0 | Status: SHIPPED | OUTPATIENT
Start: 2024-03-06 | End: 2024-03-11

## 2024-03-06 RX ORDER — CEPHALEXIN 500 MG/1
500 CAPSULE ORAL 3 TIMES DAILY
Qty: 21 CAPSULE | Refills: 0 | Status: SHIPPED | OUTPATIENT
Start: 2024-03-06 | End: 2024-03-12

## 2024-03-06 NOTE — PROGRESS NOTES
Name: Loretta Avalos      : 1960      MRN: [id number]  Encounter Provider: Coleen Rosas MD  Encounter Date: 3/6/2024   Encounter department: [facility]    Assessment & Plan     1. Acute non-recurrent maxillary sinusitis  Assessment & Plan:  She was given prescriptions for prednisone and Keflex.  Increase oral hydration and use Flonase nasal spray.  She was told if symptoms are not improving or any worsening to go to the emergency room.    Orders:  -     predniSONE 50 mg tablet; Take 1 tablet (50 mg total) by mouth daily for 5 days  -     cephalexin (KEFLEX) 500 mg capsule; Take 1 capsule (500 mg total) by mouth 3 (three) times a day for 7 days    2. Acute cough  -     Covid/Flu- Office Collect Normal; Future  -     Covid/Flu- Office Collect Normal           Subjective     She is here today with multiple complaint including sore throat, sinus pressure with postnasal drip.  Also complaining of headache, ear pain, cough and chest discomfort.  She denies any fever or chills.  She denies any contact with people with the same symptoms.    Chest Pain   Associated symptoms include a cough and headaches. Pertinent negatives include no dizziness, fever or vomiting.   Fatigue  Associated symptoms include congestion, coughing, fatigue and headaches. Pertinent negatives include no chills, fever, rash or vomiting.   Headache    Review of Systems   Constitutional:  Positive for fatigue. Negative for activity change, chills and fever.   HENT:  Positive for congestion, postnasal drip, rhinorrhea and sinus pressure.    Respiratory:  Positive for cough. Negative for apnea.    Gastrointestinal:  Negative for diarrhea and vomiting.   Skin:  Negative for rash.   Neurological:  Positive for headaches. Negative for dizziness.       Past Medical History:   Diagnosis Date   • Anemia     iron def   • Arthritis     knees   • Carpal tunnel syndrome of right wrist    • Chest pain    • Colon polyp    •  Deep vein thrombosis (DVT) of proximal lower extremity ([facility]) 2012   • Depression     at times   • DVT (deep venous thrombosis) ([facility])    • Dysphagia    • Encounter for surgical aftercare following surgery of digestive system 2020    s/p Raghav-En-Y Gastric Bypass with Dr. Oneil in .   INITIAL weight:241 lb INITIAL weight with start of topirmate: 176 lb Goal weight loss of 5-10 %-159 lb-167 lb    • Esophageal reflux 2011   • Grade 1 out of 6 intensity murmur 2019   • Heart murmur    • History of transfusion 1980   • Neck pain    • Pacemaker 2018   • Pneumonia     age 16   • Psychiatric disorder    • Right ear pain    • Trigger finger, left    • Visit for suture removal 2021     Past Surgical History:   Procedure Laterality Date   • BACK SURGERY     • CARDIAC PACEMAKER PLACEMENT  2018   • CERVICAL DISC SURGERY  2009    PLATES & SCREWS   •  SECTION      X2   • COLONOSCOPY     • EPIDURAL BLOCK INJECTION Right 2022    Procedure: RIGHT L3-[id number] EPIDURAL STEROID INJECTION (72965);  Surgeon: Bairon Kamara DO;  Location: [facility];  Service: Pain Management    • GASTRIC BYPASS  2017    LAP RINYGB SURGERY   • KNEE ARTHROSCOPY     • KNEE SURGERY Bilateral    • KNEE SURGERY Left 2020   • OTHER SURGICAL HISTORY      ARM SURGERIES   • NC NDSC WRST SURG W/RLS TRANSVRS CARPL LIGM Right 2018    Procedure: ENDOSCOPIC CARPAL TUNNEL RELEASE;  Surgeon: Leroy Macdonald MD;  Location: [facility];  Service: Orthopedics   • NC NJX DX/THER AGT PVRT FACET JT LMBR/SAC 1 LEVEL Bilateral 10/25/2022    Procedure: L3-L4,L4-5,L5-S1 BILATERAL BRANCH BLOCK;  Surgeon: Bairon Kamara DO;  Location: [facility];  Service: Pain Management    • NC NJX DX/THER AGT PVRT FACET JT LMBR/SAC 1 LEVEL Bilateral 11/15/2022    Procedure: BILATERAL L3-S1 MEDICAL BRANCH BLOCK (93120,12866,12550);  Surgeon: Bairon Kamara DO;  Location: [facility];  Service: Pain  Management    • MA TENDON SHEATH INCISION Left 9/18/2018    Procedure: LONG TRIGGER FINGER RELEASE;  Surgeon: Leroy Macdonald MD;  [facility];  Service: Orthopedics   • RADIOFREQUENCY ABLATION Right 12/6/2022    Procedure: RIGHT L3-S1 RADIO FREQUENCY ABLATION (22598,51747);  Surgeon: Bairon Kamara DO;  [facility]: [facility];  Service: Pain Management    • RADIOFREQUENCY ABLATION Left 12/20/2022    Procedure: LEFT L3-S1 RADIO FREQUENCY ABLATION (27657,37657);  Surgeon: Bairon Kamara DO;  Location: [facility];  Service: Pain Management    • TONSILLECTOMY     • UPPER GASTROINTESTINAL ENDOSCOPY       Family History   Problem Relation Age of Onset   • Stroke Mother    • Alzheimer's disease Mother    • Arthritis Mother    • Coronary artery disease Mother    • Breast cancer Mother 66   • Cancer Mother    • Heart disease Mother    • Hypertension Father    • Gout Father    • Diabetes type II Father    • Coronary artery disease Father    • Heart disease Father    • No Known Problems Sister    • No Known Problems Sister    • No Known Problems Sister    • No Known Problems Daughter    • No Known Problems Maternal Grandmother    • No Known Problems Maternal Grandfather    • No Known Problems Paternal Grandmother    • No Known Problems Paternal Grandfather    • Prostate cancer Brother    • Pancreatic cancer Brother    • No Known Problems Maternal Aunt    • No Known Problems Maternal Aunt    • No Known Problems Maternal Aunt    • No Known Problems Maternal Aunt    • Cancer Maternal Aunt    • No Known Problems Paternal Aunt    • No Known Problems Paternal Aunt      Social History     Socioeconomic History   • Marital status: /Civil Union     Spouse name: None   • Number of children: None   • Years of education: None   • Highest education level: None   Occupational History   • None   Tobacco Use   • Smoking status: Every Day     Types: Cigarettes   • Smokeless tobacco: Never   Vaping Use   • Vaping status: Never Used    Substance and Sexual Activity   • Alcohol use: Yes     Alcohol/week: 2.0 standard drinks of alcohol     Types: 1 Glasses of wine, 1 Standard drinks or equivalent per week     Comment: very rare social   • Drug use: No   • Sexual activity: Not Currently   Other Topics Concern   • None   Social History Narrative   • None     Social Determinants of Health     Financial Resource Strain: Patient Declined (11/28/2023)    Received from [facility]    Overall Financial Resource Strain (CARDIA)    • Difficulty of Paying Living Expenses: Patient declined   Food Insecurity: Patient Declined (11/28/2023)    Received from [facility]    Hunger Vital Sign    • Worried About Running Out of Food in the Last Year: Patient declined    • Ran Out of Food in the Last Year: Patient declined   Transportation Needs: Patient Declined (11/28/2023)    Received from [facility]    PRAPARE - Transportation    • Lack of Transportation (Medical): Patient declined    • Lack of Transportation (Non-Medical): Patient declined   Physical Activity: Not on file   Stress: Not on file   Social Connections: Not on file   Intimate Partner Violence: Patient Declined (11/28/2023)    Received from [facility]    Humiliation, Afraid, Rape, and Kick questionnaire    • Fear of Current or Ex-Partner: Patient declined    • Emotionally Abused: Patient declined    • Physically Abused: Patient declined    • Sexually Abused: Patient declined   Housing Stability: Unknown (11/28/2023)    Received from [facility]    Housing Stability Vital Sign    • Unable to Pay for Housing in the Last Year: Patient refused    • Number of Places Lived in the Last Year: 1    • Unstable Housing in the Last Year: Patient refused     Current Outpatient Medications on File Prior to Visit   Medication Sig   • acetaminophen (TYLENOL) 650 mg CR tablet Take 650 mg by mouth every 8 (eight) hours as needed for  mild pain Take 2 tablets in the AM and 2 tablets in the late evening   • aluminum-magnesium hydroxide-simethicone (MAALOX MAX) 400-400-40 MG/5ML suspension Take 10 mL by mouth every 6 (six) hours as needed for indigestion or heartburn   • Ascorbic Acid (VITAMIN C) 100 MG CHEW Chew   • atorvastatin (LIPITOR) 10 mg tablet TAKE 1 TABLET BY MOUTH EVERY DAY   • benzonatate (TESSALON) 200 MG capsule Take 1 capsule (200 mg total) by mouth 3 (three) times a day as needed for cough   • Biotin 62142 MCG TABS Take by mouth daily     • Calcium Carbonate (CALCI-CHEW PO) Take 650 mg by mouth 2 (two) times a day   • cholecalciferol (VITAMIN D3) 1,000 units tablet Take 5,000 Units by mouth in the morning   • clobetasol (TEMOVATE) 0.05 % ointment    • Cyanocobalamin (VITAMIN B 12 PO) Take 1,000 mcg by mouth   • dicyclomine (BENTYL) 10 mg capsule take 1 capsule by mouth four times a day before meals at bedtime   • Eliquis 2.5 MG TAKE 1 TABLET BY MOUTH TWICE A DAY   • estradiol (ESTRACE) 0.1 mg/g vaginal cream USE PEA SIZED AMOUNT VAGINALLY NIGHTLY X 2 WEEKS, THEN TWICE WEEKLY   • Ferrous Sulfate  (45 Fe) MG TBCR Take 1 tablet by mouth daily   • fluticasone (FLONASE) 50 mcg/act nasal spray 1 spray into each nostril daily   • gabapentin (NEURONTIN) 300 mg capsule TAKE 1 CAPSULE BY MOUTH THREE TIMES A DAY   • meclizine (ANTIVERT) 25 mg tablet Take 1 tablet (25 mg total) by mouth every 8 (eight) hours as needed for dizziness   • methocarbamol (ROBAXIN) 500 mg tablet Take 1 tablet (500 mg total) by mouth 2 (two) times a day   • metoprolol succinate (TOPROL-XL) 25 mg 24 hr tablet TAKE 1 TABLET BY MOUTH EVERY DAY   • Multiple Vitamin (MULTIVITAMIN) capsule Take 1 capsule by mouth daily 2 chewies daily   • nystatin (MYCOSTATIN) powder APPLY TO AFFECTED AREA TWICE A DAY   • ondansetron (ZOFRAN) 8 mg tablet TAKE 1 TABLET BY MOUTH EVERY 8 HOURS AS NEEDED FOR NAUSEA OR VOMITING.   • pantoprazole (PROTONIX) 20 mg tablet TAKE 1 TABLET BY  "MOUTH EVERY DAY   • sertraline (ZOLOFT) 100 mg tablet Take 1 tablet (100 mg total) by mouth daily   • tiZANidine (ZANAFLEX) 2 mg tablet TAKE 1 TABLET BY MOUTH EVERY 8 HOURS AS NEEDED FOR MUSCLE SPASMS.   • traZODone (DESYREL) 50 mg tablet TAKE 1 TABLET BY MOUTH EVERYDAY AT BEDTIME   • albuterol (ProAir HFA) 90 mcg/act inhaler Inhale 2 puffs every 6 (six) hours as needed for wheezing (Patient not taking: Reported on 1/30/2024)     No Known Allergies  Immunization History   Administered Date(s) Administered   • COVID-19 PFIZER VACCINE 0.3 ML IM 04/03/2021, 04/28/2021, 11/01/2021   • COVID-19 Pfizer Vac BIVALENT Sudhakar-sucrose 12 Yr+ IM 10/15/2022   • COVID-19 Pfizer mRNA vacc PF sudhakar-sucrose 12 yr and older (Comirnaty) 12/22/2023   • Hep A, adult 08/08/2017   • Hep A, ped/adol, 2 dose 01/04/2017, 08/08/2017   • Hepatitis A 01/04/2017   • INFLUENZA 10/05/2011, 11/22/2016, 11/22/2016, 10/23/2017, 10/23/2017, 10/23/2017, 10/15/2019, 10/30/2020, 08/18/2021, 08/18/2021, 10/15/2022, 09/11/2023   • Influenza Quadrivalent Preservative Free 3 years and older IM 10/15/2022   • Influenza, injectable, quadrivalent, preservative free 0.5 mL 10/30/2020   • Influenza, recombinant, quadrivalent,injectable, preservative free 10/31/2018, 10/15/2019   • Influenza, seasonal, injectable 12/22/2014, 10/13/2015   • Pneumococcal Polysaccharide PPV23 02/25/2013, 01/17/2016   • Respiratory Syncytial Virus Vaccine (Recombinant) 02/07/2024   • Tdap 04/21/2011, 04/10/2017, 06/18/2021   • Tuberculin Skin Test-PPD Intradermal 07/28/2021   • Zoster 01/17/2016       Objective     /74 (BP Location: Left arm, Patient Position: Sitting, Cuff Size: Large)   Pulse 82   Temp 97.7 °F (36.5 °C) (Tympanic)   Resp 16   Ht 5' 4\" (1.626 m)   Wt 78.7 kg (173 lb 9.6 oz)   LMP 09/18/2014   SpO2 97%   BMI 29.80 kg/m²     Physical Exam  Vitals and nursing note reviewed.   Constitutional:       Appearance: She is well-developed.   HENT:      Head: " Normocephalic and atraumatic.      Right Ear: A middle ear effusion is present.      Left Ear: A middle ear effusion is present.      Mouth/Throat:      Pharynx: Posterior oropharyngeal erythema present.   Eyes:      Pupils: Pupils are equal, round, and reactive to light.   Cardiovascular:      Rate and Rhythm: Normal rate and regular rhythm.      Heart sounds: Normal heart sounds.   Pulmonary:      Effort: Pulmonary effort is normal.      Breath sounds: Normal breath sounds.   Abdominal:      General: Bowel sounds are normal.      Palpations: Abdomen is soft.   Musculoskeletal:      Cervical back: Normal range of motion and neck supple.   Lymphadenopathy:      Cervical: No cervical adenopathy.   Skin:     General: Skin is warm and dry.   Neurological:      Mental Status: She is alert and oriented to person, place, and time.       Coleen Rosas MD

## 2024-03-06 NOTE — ASSESSMENT & PLAN NOTE
She was given prescriptions for prednisone and Keflex.  Increase oral hydration and use Flonase nasal spray.  She was told if symptoms are not improving or any worsening to go to the emergency room.

## 2024-03-08 DIAGNOSIS — M54.2 NECK PAIN: ICD-10-CM

## 2024-03-08 DIAGNOSIS — M62.838 NECK MUSCLE SPASM: ICD-10-CM

## 2024-03-09 DIAGNOSIS — G47.00 INSOMNIA, UNSPECIFIED TYPE: ICD-10-CM

## 2024-03-09 RX ORDER — TRAZODONE HYDROCHLORIDE 50 MG/1
50 TABLET ORAL
Qty: 90 TABLET | Refills: 1 | Status: SHIPPED | OUTPATIENT
Start: 2024-03-09

## 2024-03-09 RX ORDER — TIZANIDINE 2 MG/1
2 TABLET ORAL EVERY 8 HOURS PRN
Qty: 270 TABLET | Refills: 1 | Status: SHIPPED | OUTPATIENT
Start: 2024-03-09

## 2024-03-11 ENCOUNTER — APPOINTMENT (OUTPATIENT)
Dept: RADIOLOGY | Age: 64
End: 2024-03-11
Payer: COMMERCIAL

## 2024-03-11 ENCOUNTER — TELEPHONE (OUTPATIENT)
Age: 64
End: 2024-03-11

## 2024-03-11 DIAGNOSIS — R05.1 ACUTE COUGH: Primary | ICD-10-CM

## 2024-03-11 DIAGNOSIS — R05.1 ACUTE COUGH: ICD-10-CM

## 2024-03-11 PROCEDURE — 71046 X-RAY EXAM CHEST 2 VIEWS: CPT

## 2024-03-11 NOTE — TELEPHONE ENCOUNTER
"Pt states she has eye pain and vomiting since starting Cephalexin.   Pt states her chest hurts,but she had that prior to starting medication.she is not sure if it's related to chest congestion. She reports very little cough but when she does cough her \"hurts bad\". Pt states she can not take mucinex.  Please advise.   "

## 2024-03-11 NOTE — TELEPHONE ENCOUNTER
Pt obtained CXR, pt is inquiring as to what she should do next and how long she should be out of work. Pt is still experiencing symptoms. Please call pt back and advise

## 2024-03-11 NOTE — TELEPHONE ENCOUNTER
Patient reports her ABT is not helping her symptoms and they are becoming worse. Patient reports eye pain and chest pain and would like a call back to advise.

## 2024-03-12 DIAGNOSIS — R05.1 ACUTE COUGH: Primary | ICD-10-CM

## 2024-03-12 RX ORDER — AZITHROMYCIN 250 MG/1
TABLET, FILM COATED ORAL
Qty: 6 TABLET | Refills: 0 | Status: SHIPPED | OUTPATIENT
Start: 2024-03-12 | End: 2024-03-16

## 2024-03-12 NOTE — TELEPHONE ENCOUNTER
Pt is aware the xray results are still pending and she is aware to go to ER because of the chest pain.  Pt feels the z hernandez would help because she has similar symptoms before.  Please advise

## 2024-03-12 NOTE — TELEPHONE ENCOUNTER
Pt chest xray results are not back yet but continues to have the cough. See below message regarding work

## 2024-03-12 NOTE — TELEPHONE ENCOUNTER
I called pt to see what symptoms she is still experiencing. She has a slight cough but the pain in her chest is what is the most painful.  The chest xray results are still pending.  See below message regarding returning to work

## 2024-03-12 NOTE — TELEPHONE ENCOUNTER
Chest x-ray results still pending.  If patient still experiencing the chest pain she needs to go to the emergency room

## 2024-03-13 NOTE — TELEPHONE ENCOUNTER
Her chest x-ray did not show any acute findings.  I sent a prescription for Z-Robel.  She is to stop her Keflex.

## 2024-03-16 DIAGNOSIS — I82.4Y9 DEEP VEIN THROMBOSIS (DVT) OF PROXIMAL LOWER EXTREMITY, UNSPECIFIED CHRONICITY, UNSPECIFIED LATERALITY (HCC): ICD-10-CM

## 2024-03-17 DIAGNOSIS — F32.A DEPRESSION, UNSPECIFIED DEPRESSION TYPE: ICD-10-CM

## 2024-03-17 RX ORDER — SERTRALINE HYDROCHLORIDE 100 MG/1
100 TABLET, FILM COATED ORAL DAILY
Qty: 90 TABLET | Refills: 0 | Status: SHIPPED | OUTPATIENT
Start: 2024-03-17

## 2024-03-18 RX ORDER — APIXABAN 2.5 MG/1
TABLET, FILM COATED ORAL
Qty: 60 TABLET | Refills: 3 | Status: SHIPPED | OUTPATIENT
Start: 2024-03-18

## 2024-04-03 DIAGNOSIS — R10.84 GENERALIZED ABDOMINAL PAIN: ICD-10-CM

## 2024-04-03 RX ORDER — PANTOPRAZOLE SODIUM 20 MG/1
20 TABLET, DELAYED RELEASE ORAL DAILY
Qty: 90 TABLET | Refills: 1 | Status: SHIPPED | OUTPATIENT
Start: 2024-04-03

## 2024-04-13 DIAGNOSIS — F32.A DEPRESSION, UNSPECIFIED DEPRESSION TYPE: ICD-10-CM

## 2024-04-13 RX ORDER — SERTRALINE HYDROCHLORIDE 100 MG/1
100 TABLET, FILM COATED ORAL DAILY
Qty: 90 TABLET | Refills: 0 | Status: SHIPPED | OUTPATIENT
Start: 2024-04-13

## 2024-05-01 ENCOUNTER — REMOTE DEVICE CLINIC VISIT (OUTPATIENT)
Dept: CARDIOLOGY CLINIC | Facility: CLINIC | Age: 64
End: 2024-05-01
Payer: COMMERCIAL

## 2024-05-01 DIAGNOSIS — Z95.0 PRESENCE OF PERMANENT CARDIAC PACEMAKER: Primary | ICD-10-CM

## 2024-05-01 PROBLEM — J01.00 ACUTE NON-RECURRENT MAXILLARY SINUSITIS: Status: RESOLVED | Noted: 2018-12-24 | Resolved: 2024-05-01

## 2024-05-01 PROCEDURE — 93296 REM INTERROG EVL PM/IDS: CPT | Performed by: INTERNAL MEDICINE

## 2024-05-01 PROCEDURE — 93294 REM INTERROG EVL PM/LDLS PM: CPT | Performed by: INTERNAL MEDICINE

## 2024-05-01 NOTE — PROGRESS NOTES
MDT DC PM/ACTIVE SYSTEM IS MRI CONDITIONAL   CARELINK TRANSMISSION:  BATTERY VOLTAGE ADEQUATE (9.2 YR.).  AP 59.2%  0.4%.  ALL LEAD PARAMETERS WITHIN NORMAL LIMITS.  1 SVT-ST EPISODE WITH  BPM.  CURRENT EGM SHOWS AS/VS @ 108 BPM.  NORMAL DEVICE FUNCTION.  RG

## 2024-05-10 DIAGNOSIS — I47.10 PSVT (PAROXYSMAL SUPRAVENTRICULAR TACHYCARDIA): ICD-10-CM

## 2024-05-13 RX ORDER — METOPROLOL SUCCINATE 25 MG/1
25 TABLET, EXTENDED RELEASE ORAL DAILY
Qty: 90 TABLET | Refills: 0 | Status: SHIPPED | OUTPATIENT
Start: 2024-05-13

## 2024-05-29 ENCOUNTER — OFFICE VISIT (OUTPATIENT)
Dept: FAMILY MEDICINE CLINIC | Facility: CLINIC | Age: 64
End: 2024-05-29
Payer: COMMERCIAL

## 2024-05-29 VITALS
TEMPERATURE: 98.3 F | RESPIRATION RATE: 16 BRPM | HEART RATE: 76 BPM | WEIGHT: 174 LBS | SYSTOLIC BLOOD PRESSURE: 130 MMHG | OXYGEN SATURATION: 98 % | HEIGHT: 64 IN | DIASTOLIC BLOOD PRESSURE: 80 MMHG | BODY MASS INDEX: 29.71 KG/M2

## 2024-05-29 DIAGNOSIS — E78.49 OTHER HYPERLIPIDEMIA: ICD-10-CM

## 2024-05-29 DIAGNOSIS — Z12.31 ENCOUNTER FOR SCREENING MAMMOGRAM FOR MALIGNANT NEOPLASM OF BREAST: ICD-10-CM

## 2024-05-29 DIAGNOSIS — R73.9 HYPERGLYCEMIA: ICD-10-CM

## 2024-05-29 DIAGNOSIS — I49.5 SICK SINUS SYNDROME (HCC): ICD-10-CM

## 2024-05-29 DIAGNOSIS — F41.9 ANXIETY: ICD-10-CM

## 2024-05-29 DIAGNOSIS — F32.A DEPRESSION, UNSPECIFIED DEPRESSION TYPE: Primary | ICD-10-CM

## 2024-05-29 DIAGNOSIS — K21.9 GASTROESOPHAGEAL REFLUX DISEASE, UNSPECIFIED WHETHER ESOPHAGITIS PRESENT: ICD-10-CM

## 2024-05-29 PROCEDURE — 99214 OFFICE O/P EST MOD 30 MIN: CPT | Performed by: NURSE PRACTITIONER

## 2024-05-29 NOTE — PROGRESS NOTES
Ambulatory Visit  Name: Loretta Avalos      : 1960      MRN: [id number]  Encounter Provider: DANIELA Stinson  Encounter Date: 2024   Encounter department: [facility]    Assessment & Plan   {There are no diagnoses linked to this encounter. (Refresh or delete this SmartLink)}     Preventive health issues were discussed with patient, and age appropriate screening tests were ordered as noted in patient's After Visit Summary. Personalized health advice and appropriate referrals for health education or preventive services given if needed, as noted in patient's After Visit Summary.    History of Present Illness   {Disappearing Hyperlinks I Encounters * My Last Note * Since Last Visit * History :45352}  [facility]   Patient Care Team:  DANIELA Stinson as PCP - General (Nurse Practitioner)  Coleen Rosas MD as PCP - PCP-[facility])  [facility]    Review of Systems  Medical History Reviewed by provider this encounter:       Annual Wellness Visit Questionnaire   Loretta is here for her Subsequent Wellness visit.     Health Risk Assessment:   Patient rates overall health as good. Patient feels that their physical health rating is slightly better. Patient is satisfied with their life. Eyesight was rated as slightly worse. Hearing was rated as same. Patient feels that their emotional and mental health rating is slightly better. Patients states they are never, rarely angry. Patient states they are never, rarely unusually tired/fatigued. Pain experienced in the last 7 days has been none. Patient states that she has experienced no weight loss or gain in last 6 months.     Fall Risk Screening:   In the past year, patient has experienced: history of falling in past year    Number of falls: 1  Injured during fall?: Yes    Feels unsteady when standing or walking?: No    Worried about falling?: No      Urinary Incontinence Screening:    Patient has not leaked urine accidently in the last six months.     Home Safety:  Patient does not have trouble with stairs inside or outside of their home. Patient has working smoke alarms and has no working carbon monoxide detector. Home safety hazards include: none.     Nutrition:   Current diet is Regular.     Medications:   Patient is currently taking over-the-counter supplements. OTC medications include: see medication list. Patient is able to manage medications.     Activities of Daily Living (ADLs)/Instrumental Activities of Daily Living (IADLs):   Walk and transfer into and out of bed and chair?: Yes  Dress and groom yourself?: Yes    Bathe or shower yourself?: Yes    Feed yourself? Yes  Do your laundry/housekeeping?: Yes  Manage your money, pay your bills and track your expenses?: Yes  Make your own meals?: Yes    Do your own shopping?: Yes    Previous Hospitalizations:   Any hospitalizations or ED visits within the last 12 months?: Yes    How many hospitalizations have you had in the last year?: 1-2    Advance Care Planning:   Living will: No      PREVENTIVE SCREENINGS      Cardiovascular Screening:    General: Screening Not Indicated and History Lipid Disorder      Diabetes Screening:     General: Screening Current      Colorectal Cancer Screening:     General: Screening Current      Breast Cancer Screening:     General: Screening Current      Cervical Cancer Screening:    General: Screening Not Indicated      Osteoporosis Screening:    General: Screening Not Indicated and History Osteoporosis      Lung Cancer Screening:     General: Screening Not Indicated      Hepatitis C Screening:    General: Screening Current    Screening, Brief Intervention, and Referral to Treatment (SBIRT)    Screening  Typical number of drinks in a day: 0  Typical number of drinks in a week: 0  Interpretation: Low risk drinking behavior.    Single Item Drug Screening:  How often have you used an illegal drug (including  "marijuana) or a prescription medication for non-medical reasons in the past year? never    Single Item Drug Screen Score: 0  Interpretation: Negative screen for possible drug use disorder    Social Determinants of Health     Financial Resource Strain: Patient Declined (11/28/2023)    [facility]    Overall Financial Resource Strain (CARDIA)     Difficulty of Paying Living Expenses: Patient declined   Food Insecurity: Patient Declined (11/28/2023)    [facility]    Hunger Vital Sign     Worried About Running Out of Food in the Last Year: Patient declined     Ran Out of Food in the Last Year: Patient declined   Transportation Needs: Patient Declined (11/28/2023)    [facility]    PRAPARE - Transportation     Lack of Transportation (Medical): Patient declined     Lack of Transportation (Non-Medical): Patient declined   Housing Stability: Unknown (11/28/2023)    [facility]    Housing Stability Vital Sign     Unable to Pay for Housing in the Last Year: Patient refused     Number of Places Lived in the Last Year: 1     Unstable Housing in the Last Year: Patient refused     No results found.    Objective   {Disappearing Hyperlinks   Review Vitals * Enter New Vitals * Results Review * Labs * Imaging * Cardiology * Procedures * Lung Cancer Screening :71567}  Resp 16   Ht 5' 4\" (1.626 m)   LMP 09/18/2014   BMI 29.80 kg/m²     Physical Exam  Administrative Statements {Disappearing Hyperlinks I  Level of Service * PCMH/PCSP:80058}  {Time Spent Statement (Optional):14220}        "

## 2024-05-29 NOTE — ASSESSMENT & PLAN NOTE
- Hemoglobin A1c elevated at 6.2  - Encourage healthy diet and regular exercise.  - Will continue to monitor fasting glucose and A1c.

## 2024-05-29 NOTE — PROGRESS NOTES
Ambulatory Visit  Name: Loretta Avalos      : 1960      MRN: [id number]  Encounter Provider: DANIELA Stinson  Encounter Date: 2024   Encounter department: [facility]    Assessment & Plan   1. Depression, unspecified depression type  Assessment & Plan:  - Stable on Zoloft 100 mg daily. Continue same.   - Will continue to monitor.   2. Anxiety  Assessment & Plan:  - Stable on Zoloft 100 mg daily. Continue same.   - Will continue to monitor.   3. Other hyperlipidemia  Assessment & Plan:  Component      Latest Ref Rng 2023   Cholesterol      See Comment mg/dL 219 (H)  183    Triglycerides      See Comment mg/dL 87  86    HDL      >=50 mg/dL 84  79    LDL Calculated      0 - 100 mg/dL 118 (H)  87       - Well controlled.  - Continue Lipitor 10 mg daily.  - Encourage healthy diet and regular exercise.  - Will continue to monitor fasting lipid panel.   Orders:  -     CBC and differential; Future  -     Comprehensive metabolic panel; Future  -     Hemoglobin A1C; Future  -     Lipid Panel with Direct LDL reflex; Future  -     TSH, 3rd generation with Free T4 reflex; Future  4. Hyperglycemia  Assessment & Plan:  - Hemoglobin A1c elevated at 6.2  - Encourage healthy diet and regular exercise.  - Will continue to monitor fasting glucose and A1c.  Orders:  -     CBC and differential; Future  -     Comprehensive metabolic panel; Future  -     Hemoglobin A1C; Future  -     Lipid Panel with Direct LDL reflex; Future  -     TSH, 3rd generation with Free T4 reflex; Future  5. Sick sinus syndrome (HCC)  Assessment & Plan:  - s/p permanent pacemaker.   - Continue routine follow up with Cardiology.   6. Gastroesophageal reflux disease, unspecified whether esophagitis present  Assessment & Plan:  - Stable on pantoprazole 20 mg daily. Continue same.   - Avoid triggering food and beverage.  - Will continue to monitor.   7. Encounter for screening mammogram for malignant  Initial SW/CM Assessment/Plan of Care Note     Baseline Assessment  57year old admitted 8/4/2023 as Outpatient in Bed with a diagnosis of RIGHT ANTERIOR TOTAL HIP ARTHROPLASTY. Prior to admission patient was living with Spouse and residing at [employer] [facility]    . Patient does not  have a Power of  for [employer]. Patientâs Primary Care Provider is Julio Beckman MD.     Progress Note  SW consulted for discharge planning. SW reviewed chart. SW aware PT/OT consulted, recommendations pending. SW met with pt at bedside. Pt reports he resides at home with his spouse with a ranch style home with 3 steps to enter the home. Pt reports he was independent in mobility and self cares prior to admission. Pt reports he has a walker to use at discharge, which was at bedside. Pt reports his spouse is able to bring him home at discharge. SW discussed PT/OT assessments pending at this time. Pt agreeable to SW follow up following PT/OT recommendations. SW following. Plan  Patient/Family Discharge Goal: Home        SW/CM - Recommendations for Discharge: Home   PT - Recommendations for Discharge:      Last Filed Values     None        OT - Recommendations for Discharge:      Last Filed Values     None        SLP - Recommendations for Discharge:    Last Filed Values     None          Barriers to Discharge  Identified Barriers to Discharge/Transition Planning:          Anticipate patient will not need post-hospital services. Necessary services are available. Anticipate patient can return to the environment from which patient entered the hospital.   Anticipate patient can provide self-care at discharge. Refer to /CM Flowsheet for objective data.      Medical History  Past Medical History:   Diagnosis Date   â¢ Colon polyp    â¢ Esophageal reflux disease    â¢ Essential (primary) hypertension    â¢ Hyperlipemia    â¢ Urinary tract infection        Prior to Admission Status  Functional Status  Ambulation: Independent/Self  Bathing: Independent/Self  Dressing: Independent/Self  Toileting: Independent/Self  Meal Preparation: Independent/Self  Shopping: Independent/Self  Medication Preparation: Independent/Self  Medication Administration: Independent/Self  Housekeeping: Independent/Self  Laundry: Independent/Self  Laundry Location: Main Level  Transportation: Independent/Self, Significant Other    Agency/Support  Type of Services Prior to Hospitalization: None ,   ,   ,   ,    ,     Support Systems: Spouse   Home Devices/Equipment: None ,   ,    ,      Mobility Assist Devices: None  Sensory Support Devices: None      Current Status  PT Ambulation Tips:    PT Transfer Tips:     OT Bathing Tips:    OT Dressing Tips:    OT Toileting Tips:    OT Feeding Tips:    SLP Swallow/Feeding Tips:    SLP Comm/Cog Tips:    Current Mental Status: Alert, Oriented to Person, Oriented to Place, Oriented to Reason for Hospitalization, Oriented to Time  Stressors:      Insurance  Primary: BLUE [facility]  Secondary: N/A    Disposition Recommendations:  SW/CM recommendation for discharge: Home neoplasm of breast  -     Mammo screening bilateral w 3d & cad; Future         History of Present Illness   {Disappearing Hyperlinks I Encounters * My Last Note * Since Last Visit * History :53639}  Patient with PMH of GERD, hyperlipidemia,  SVT, sick sinus syndrome, anxiety, depression and insomnia presents today for follow up. She is taking her prescribed medications and reports no side effects. Needs form filled out for work. Denies any significant concerns or complaints today.      Review of Systems   Constitutional:  Negative for fatigue and fever.   HENT:  Negative for trouble swallowing.    Eyes:  Negative for visual disturbance.   Respiratory:  Negative for cough and shortness of breath.    Cardiovascular:  Negative for chest pain and palpitations.   Gastrointestinal:  Negative for abdominal pain and blood in stool.   Endocrine: Negative for cold intolerance and heat intolerance.   Genitourinary:  Negative for difficulty urinating and dysuria.   Musculoskeletal:  Negative for gait problem.   Skin:  Negative for rash.   Neurological:  Negative for dizziness, syncope and headaches.   Hematological:  Negative for adenopathy.   Psychiatric/Behavioral:  Negative for behavioral problems.      Past Medical History:   Diagnosis Date   • Anemia     iron def   • Arthritis     knees   • Carpal tunnel syndrome of right wrist    • Chest pain    • Colon polyp    • Deep vein thrombosis (DVT) of proximal lower extremity (HCC) 01/01/2012   • Depression     at times   • DVT (deep venous thrombosis) ([facility]) 2012   • Dysphagia    • Encounter for surgical aftercare following surgery of digestive system 09/24/2020    s/p Raghav-En-Y Gastric Bypass with Dr. Oneil in 2017.   INITIAL weight:241 lb INITIAL weight with start of topirmate: 176 lb Goal weight loss of 5-10 %-159 lb-167 lb    • Esophageal reflux 05/23/2011   • Grade 1 out of 6 intensity murmur 06/24/2019   • Heart murmur    • History of transfusion 1980   • Neck  pain    • Pacemaker 2018   • Pneumonia     age 16   • Psychiatric disorder    • Right ear pain    • Trigger finger, left    • Visit for suture removal 2021     Past Surgical History:   Procedure Laterality Date   • BACK SURGERY     • CARDIAC PACEMAKER PLACEMENT  2018   • CERVICAL DISC SURGERY  2009    PLATES & SCREWS   •  SECTION      X2   • COLONOSCOPY     • EPIDURAL BLOCK INJECTION Right 2022    Procedure: RIGHT L3-[id number] EPIDURAL STEROID INJECTION (54572);  Surgeon: Bairon Kamara DO;  Location: [facility];  Service: Pain Management    • GASTRIC BYPASS  2017    LAP RINYGB SURGERY   • KNEE ARTHROSCOPY     • KNEE SURGERY Bilateral    • KNEE SURGERY Left 2020   • OTHER SURGICAL HISTORY      ARM SURGERIES   • HI NDSC WRST SURG W/RLS TRANSVRS CARPL LIGM Right 2018    Procedure: ENDOSCOPIC CARPAL TUNNEL RELEASE;  Surgeon: Leroy Macdonald MD;  Location: [facility];  Service: Orthopedics   • HI NJX DX/THER AGT PVRT FACET JT LMBR/SAC 1 LEVEL Bilateral 10/25/2022    Procedure: L3-L4,L4-5,L5-S1 BILATERAL BRANCH BLOCK;  Surgeon: Bairon Kamara DO;  Location: [facility];  Service: Pain Management    • HI NJX DX/THER AGT PVRT FACET JT LMBR/SAC 1 LEVEL Bilateral 11/15/2022    Procedure: BILATERAL L3-S1 MEDICAL BRANCH BLOCK (52851,31463,70131);  Surgeon: Bairon Kamara DO;  Location: [facility];  Service: Pain Management    • HI TENDON SHEATH INCISION Left 2018    Procedure: LONG TRIGGER FINGER RELEASE;  Surgeon: Leroy Macdonald MD;  Location: [facility];  Service: Orthopedics   • RADIOFREQUENCY ABLATION Right 2022    Procedure: RIGHT L3-S1 RADIO FREQUENCY ABLATION (13025,01168);  Surgeon: Bairon Kamara DO;  Location: [facility];  Service: Pain Management    • RADIOFREQUENCY ABLATION Left 2022    Procedure: LEFT L3-S1 RADIO FREQUENCY ABLATION (17831,52297);  Surgeon: Bairon Kamara DO;  Location: [facility];  Service: Pain Management    • TONSILLECTOMY      • UPPER GASTROINTESTINAL ENDOSCOPY       Family History   Problem Relation Age of Onset   • Stroke Mother    • Alzheimer's disease Mother    • Arthritis Mother    • Coronary artery disease Mother    • Breast cancer Mother 66   • Cancer Mother    • Heart disease Mother    • Hypertension Father    • Gout Father    • Diabetes type II Father    • Coronary artery disease Father    • Heart disease Father    • No Known Problems Sister    • No Known Problems Sister    • No Known Problems Sister    • No Known Problems Daughter    • No Known Problems Maternal Grandmother    • No Known Problems Maternal Grandfather    • No Known Problems Paternal Grandmother    • No Known Problems Paternal Grandfather    • Prostate cancer Brother    • Pancreatic cancer Brother    • No Known Problems Maternal Aunt    • No Known Problems Maternal Aunt    • No Known Problems Maternal Aunt    • No Known Problems Maternal Aunt    • Cancer Maternal Aunt    • No Known Problems Paternal Aunt    • No Known Problems Paternal Aunt      Social History     Tobacco Use   • Smoking status: Every Day     Types: Cigarettes   • Smokeless tobacco: Never   Vaping Use   • Vaping status: Never Used   Substance and Sexual Activity   • Alcohol use: Yes     Alcohol/week: 2.0 standard drinks of alcohol     Types: 1 Glasses of wine, 1 Standard drinks or equivalent per week     Comment: very rare social   • Drug use: No   • Sexual activity: Not Currently     Current Outpatient Medications on File Prior to Visit   Medication Sig   • acetaminophen (TYLENOL) 650 mg CR tablet Take 650 mg by mouth every 8 (eight) hours as needed for mild pain Take 2 tablets in the AM and 2 tablets in the late evening   • aluminum-magnesium hydroxide-simethicone (MAALOX MAX) 400-400-40 MG/5ML suspension Take 10 mL by mouth every 6 (six) hours as needed for indigestion or heartburn   • Ascorbic Acid (VITAMIN C) 100 MG CHEW Chew   • atorvastatin (LIPITOR) 10 mg tablet TAKE 1 TABLET BY MOUTH  EVERY DAY   • Biotin 46458 MCG TABS Take by mouth daily     • Calcium Carbonate (CALCI-CHEW PO) Take 650 mg by mouth 2 (two) times a day   • cholecalciferol (VITAMIN D3) 1,000 units tablet Take 5,000 Units by mouth in the morning   • clobetasol (TEMOVATE) 0.05 % ointment    • Cyanocobalamin (VITAMIN B 12 PO) Take 1,000 mcg by mouth   • dicyclomine (BENTYL) 10 mg capsule take 1 capsule by mouth four times a day before meals at bedtime   • Eliquis 2.5 MG TAKE 1 TABLET BY MOUTH TWICE A DAY   • estradiol (ESTRACE) 0.1 mg/g vaginal cream USE PEA SIZED AMOUNT VAGINALLY NIGHTLY X 2 WEEKS, THEN TWICE WEEKLY   • Ferrous Sulfate  (45 Fe) MG TBCR Take 1 tablet by mouth daily   • fluticasone (FLONASE) 50 mcg/act nasal spray 1 spray into each nostril daily   • gabapentin (NEURONTIN) 300 mg capsule TAKE 1 CAPSULE BY MOUTH THREE TIMES A DAY   • meclizine (ANTIVERT) 25 mg tablet Take 1 tablet (25 mg total) by mouth every 8 (eight) hours as needed for dizziness   • methocarbamol (ROBAXIN) 500 mg tablet Take 1 tablet (500 mg total) by mouth 2 (two) times a day   • metoprolol succinate (TOPROL-XL) 25 mg 24 hr tablet Take 1 tablet (25 mg total) by mouth daily   • Multiple Vitamin (MULTIVITAMIN) capsule Take 1 capsule by mouth daily 2 chewies daily   • nystatin (MYCOSTATIN) powder APPLY TO AFFECTED AREA TWICE A DAY   • ondansetron (ZOFRAN) 8 mg tablet TAKE 1 TABLET BY MOUTH EVERY 8 HOURS AS NEEDED FOR NAUSEA OR VOMITING.   • pantoprazole (PROTONIX) 20 mg tablet TAKE 1 TABLET BY MOUTH EVERY DAY   • sertraline (ZOLOFT) 100 mg tablet Take 1 tablet (100 mg total) by mouth daily   • tiZANidine (ZANAFLEX) 2 mg tablet Take 1 tablet (2 mg total) by mouth every 8 (eight) hours as needed for muscle spasms   • traZODone (DESYREL) 50 mg tablet TAKE 1 TABLET BY MOUTH EVERYDAY AT BEDTIME   • albuterol (ProAir HFA) 90 mcg/act inhaler Inhale 2 puffs every 6 (six) hours as needed for wheezing (Patient not taking: Reported on 1/30/2024)   •  "benzonatate (TESSALON) 200 MG capsule Take 1 capsule (200 mg total) by mouth 3 (three) times a day as needed for cough (Patient not taking: Reported on 5/29/2024)     No Known Allergies  Immunization History   Administered Date(s) Administered   • COVID-19 PFIZER VACCINE 0.3 ML IM 04/03/2021, 04/28/2021, 11/01/2021   • COVID-19 Pfizer Vac BIVALENT Sudhakar-sucrose 12 Yr+ IM 10/15/2022   • COVID-19 Pfizer mRNA vacc PF sudhakar-sucrose 12 yr and older (Comirnaty) 12/22/2023   • Hep A, adult 08/08/2017   • Hep A, ped/adol, 2 dose 01/04/2017, 08/08/2017   • Hepatitis A 01/04/2017   • INFLUENZA 10/05/2011, 11/22/2016, 11/22/2016, 10/23/2017, 10/23/2017, 10/23/2017, 10/15/2019, 10/30/2020, 08/18/2021, 08/18/2021, 10/15/2022, 09/11/2023   • Influenza Quadrivalent Preservative Free 3 years and older IM 10/15/2022   • Influenza, injectable, quadrivalent, preservative free 0.5 mL 10/30/2020   • Influenza, recombinant, quadrivalent,injectable, preservative free 10/31/2018, 10/15/2019   • Influenza, seasonal, injectable 12/22/2014, 10/13/2015   • Pneumococcal Polysaccharide PPV23 02/25/2013, 01/17/2016   • Respiratory Syncytial Virus Vaccine (Recombinant) 02/07/2024   • Tdap 04/21/2011, 04/10/2017, 06/18/2021   • Tuberculin Skin Test-PPD Intradermal 07/28/2021   • Zoster 01/17/2016     Objective   {Disappearing Hyperlinks   Review Vitals * Enter New Vitals * Results Review * Labs * Imaging * Cardiology * Procedures * Lung Cancer Screening :09603}  /80 (BP Location: Left arm, Patient Position: Sitting, Cuff Size: Large)   Pulse 76   Temp 98.3 °F (36.8 °C) (Tympanic)   Resp 16   Ht 5' 4\" (1.626 m)   Wt 78.9 kg (174 lb)   LMP 09/18/2014   SpO2 98%   BMI 29.87 kg/m²     Physical Exam  Vitals and nursing note reviewed.   Constitutional:       Appearance: Normal appearance. She is well-developed.   HENT:      Head: Normocephalic and atraumatic.      Right Ear: External ear normal.      Left Ear: External ear normal.   Eyes:    "   Conjunctiva/sclera: Conjunctivae normal.   Cardiovascular:      Rate and Rhythm: Normal rate and regular rhythm.      Heart sounds: Normal heart sounds.   Pulmonary:      Effort: Pulmonary effort is normal.      Breath sounds: Normal breath sounds.   Musculoskeletal:         General: Normal range of motion.      Cervical back: Normal range of motion.   Skin:     General: Skin is warm and dry.   Neurological:      Mental Status: She is alert and oriented to person, place, and time.   Psychiatric:         Mood and Affect: Mood normal.         Behavior: Behavior normal.       Administrative Statements {Disappearing Hyperlinks I  [facility]}

## 2024-05-29 NOTE — ASSESSMENT & PLAN NOTE
Component      Latest Ref Rng 6/17/2023 2/7/2024   Cholesterol      See Comment mg/dL 219 (H)  183    Triglycerides      See Comment mg/dL 87  86    HDL      >=50 mg/dL 84  79    LDL Calculated      0 - 100 mg/dL 118 (H)  87       - Well controlled.  - Continue Lipitor 10 mg daily.  - Encourage healthy diet and regular exercise.  - Will continue to monitor fasting lipid panel.

## 2024-06-26 ENCOUNTER — OFFICE VISIT (OUTPATIENT)
Dept: FAMILY MEDICINE CLINIC | Facility: CLINIC | Age: 64
End: 2024-06-26
Payer: COMMERCIAL

## 2024-06-26 VITALS
RESPIRATION RATE: 16 BRPM | DIASTOLIC BLOOD PRESSURE: 86 MMHG | WEIGHT: 169.8 LBS | HEART RATE: 72 BPM | OXYGEN SATURATION: 97 % | SYSTOLIC BLOOD PRESSURE: 112 MMHG | BODY MASS INDEX: 28.99 KG/M2 | TEMPERATURE: 98.1 F | HEIGHT: 64 IN

## 2024-06-26 DIAGNOSIS — M79.652 LEFT THIGH PAIN: Primary | ICD-10-CM

## 2024-06-26 DIAGNOSIS — Z86.718 HISTORY OF DVT (DEEP VEIN THROMBOSIS): ICD-10-CM

## 2024-06-26 DIAGNOSIS — Z12.11 COLON CANCER SCREENING: ICD-10-CM

## 2024-06-26 PROCEDURE — 99214 OFFICE O/P EST MOD 30 MIN: CPT | Performed by: NURSE PRACTITIONER

## 2024-06-26 NOTE — ASSESSMENT & PLAN NOTE
- Pain and tenderness to left posterior thigh x few days. No redness, swelling, or warmth.   - Likely musculoskeletal pain but with PMH of DVT will obtain venous duplex for completeness.   - Can try OTC Tylenol, ice/heat for the pain.   - Continue Eliquis 2.5 mg twice daily.

## 2024-06-26 NOTE — PROGRESS NOTES
Ambulatory Visit  Name: Loretta Avalos      : 1960      MRN: [id number]  Encounter Provider: DANIELA Stinson  Encounter Date: 2024   Encounter department: [facility]    Assessment & Plan   1. Left thigh pain  Assessment & Plan:  - Pain and tenderness to left posterior thigh x few days. No redness, swelling, or warmth.   - Likely musculoskeletal pain but with PMH of DVT will obtain venous duplex for completeness.   - Can try OTC Tylenol, ice/heat for the pain.   - Continue Eliquis 2.5 mg twice daily.   Orders:  -     VAS VENOUS DUPLEX -LOWER LIMB UNILATERAL; Future; Expected date: 2024  2. History of DVT (deep vein thrombosis)  Assessment & Plan:  - Continue Eliquis 2.5 mg twice daily.  - Will continue to monitor.   Orders:  -     VAS VENOUS DUPLEX -LOWER LIMB UNILATERAL; Future; Expected date: 2024  3. Colon cancer screening  -     Cologuard         History of Present Illness   {Disappearing Hyperlinks I Encounters * My Last Note * Since Last Visit * History :37292}  Patient with PMH of GERD, hyperlipidemia,  SVT, sick sinus syndrome, anxiety, depression,  insomnia and hx of DVT presents to office today with complaints of left posterior thigh pain that has been occurring for the past few days. States it hurts to sit down on a chair when it presses against her leg. Hurts to stand up or get out of the car. She was mowing her lawn recently but denies any known injury or trauma. No increase in activity. She denies any redness, swelling, or warmth to the area. She does have a history of lower extremity DVT. Currently on Eliquis 2.5 mg twice daily and reports compliance. She denies any other concerns or complaints today.       Review of Systems   Constitutional:  Negative for fatigue and fever.   HENT:  Negative for trouble swallowing.    Eyes:  Negative for visual disturbance.   Respiratory:  Negative for cough and shortness of breath.    Cardiovascular:   Negative for chest pain and palpitations.   Gastrointestinal:  Negative for abdominal pain and blood in stool.   Endocrine: Negative for cold intolerance and heat intolerance.   Genitourinary:  Negative for difficulty urinating and dysuria.   Musculoskeletal:  Positive for myalgias. Negative for gait problem.   Skin:  Negative for rash.   Neurological:  Negative for dizziness, syncope and headaches.   Hematological:  Negative for adenopathy.   Psychiatric/Behavioral:  Negative for behavioral problems.      Past Medical History:   Diagnosis Date   • Anemia     iron def   • Arthritis     knees   • Carpal tunnel syndrome of right wrist    • Chest pain    • Colon polyp    • Deep vein thrombosis (DVT) of proximal lower extremity ([facility]) 2012   • Depression     at times   • DVT (deep venous thrombosis) ([facility])    • Dysphagia    • Encounter for surgical aftercare following surgery of digestive system 2020    s/p Raghav-En-Y Gastric Bypass with Dr. Oneil in .   INITIAL weight:241 lb INITIAL weight with start of topirmate: 176 lb Goal weight loss of 5-10 %-159 lb-167 lb    • Esophageal reflux 2011   • Grade 1 out of 6 intensity murmur 2019   • Heart murmur    • History of transfusion    • Neck pain    • Pacemaker 2018   • Pneumonia     age 16   • Psychiatric disorder    • Right ear pain    • Trigger finger, left    • Visit for suture removal 2021     Past Surgical History:   Procedure Laterality Date   • BACK SURGERY     • CARDIAC PACEMAKER PLACEMENT  2018   • CERVICAL DISC SURGERY  2009    PLATES & SCREWS   •  SECTION      X2   • COLONOSCOPY     • EPIDURAL BLOCK INJECTION Right 2022    Procedure: RIGHT L3-[id number] EPIDURAL STEROID INJECTION (96680);  Surgeon: Bairon Kamara DO;  Location:  MAIN OR;  Service: Pain Management    • GASTRIC BYPASS  2017    LAP RINYGB SURGERY   • KNEE ARTHROSCOPY     • KNEE SURGERY Bilateral    • KNEE  SURGERY Left 08/13/2020   • OTHER SURGICAL HISTORY      ARM SURGERIES   • NH NDSC WRST SURG W/RLS TRANSVRS CARPL LIGM Right 9/18/2018    Procedure: ENDOSCOPIC CARPAL TUNNEL RELEASE;  Surgeon: Leroy Macdonald MD;  Location: [facility];  Service: Orthopedics   • NH NJX DX/THER AGT PVRT FACET JT LMBR/SAC 1 LEVEL Bilateral 10/25/2022    Procedure: L3-L4,L4-5,L5-S1 BILATERAL BRANCH BLOCK;  Surgeon: Bairon Kamara DO;  Location: [facility];  Service: Pain Management    • NH NJX DX/THER AGT PVRT FACET JT LMBR/SAC 1 LEVEL Bilateral 11/15/2022    Procedure: BILATERAL L3-S1 MEDICAL BRANCH BLOCK (73039,15135,15885);  Surgeon: Bairon Kamara DO;  Location: [facility];  Service: Pain Management    • NH TENDON SHEATH INCISION Left 9/18/2018    Procedure: LONG TRIGGER FINGER RELEASE;  Surgeon: Leroy Macdonald MD;  Location: [facility];  Service: Orthopedics   • RADIOFREQUENCY ABLATION Right 12/6/2022    Procedure: RIGHT L3-S1 RADIO FREQUENCY ABLATION (08027,61949);  Surgeon: Bairon Kamara DO;  Location: [facility];  Service: Pain Management    • RADIOFREQUENCY ABLATION Left 12/20/2022    Procedure: LEFT L3-S1 RADIO FREQUENCY ABLATION (55911,93646);  Surgeon: Bairon Kamara DO;  Location: [facility];  Service: Pain Management    • TONSILLECTOMY     • UPPER GASTROINTESTINAL ENDOSCOPY       Family History   Problem Relation Age of Onset   • Stroke Mother    • Alzheimer's disease Mother    • Arthritis Mother    • Coronary artery disease Mother    • Breast cancer Mother 66   • Cancer Mother    • Heart disease Mother    • Hypertension Father    • Gout Father    • Diabetes type II Father    • Coronary artery disease Father    • Heart disease Father    • No Known Problems Sister    • No Known Problems Sister    • No Known Problems Sister    • No Known Problems Daughter    • No Known Problems Maternal Grandmother    • No Known Problems Maternal Grandfather    • No Known Problems Paternal Grandmother    • No Known Problems Paternal Grandfather     • Prostate cancer Brother    • Pancreatic cancer Brother    • No Known Problems Maternal Aunt    • No Known Problems Maternal Aunt    • No Known Problems Maternal Aunt    • No Known Problems Maternal Aunt    • Cancer Maternal Aunt    • No Known Problems Paternal Aunt    • No Known Problems Paternal Aunt      Social History     Tobacco Use   • Smoking status: Every Day     Types: Cigarettes   • Smokeless tobacco: Never   Vaping Use   • Vaping status: Never Used   Substance and Sexual Activity   • Alcohol use: Yes     Alcohol/week: 2.0 standard drinks of alcohol     Types: 1 Glasses of wine, 1 Standard drinks or equivalent per week     Comment: very rare social   • Drug use: No   • Sexual activity: Not Currently     Current Outpatient Medications on File Prior to Visit   Medication Sig   • acetaminophen (TYLENOL) 650 mg CR tablet Take 650 mg by mouth every 8 (eight) hours as needed for mild pain Take 2 tablets in the AM and 2 tablets in the late evening   • aluminum-magnesium hydroxide-simethicone (MAALOX MAX) 400-400-40 MG/5ML suspension Take 10 mL by mouth every 6 (six) hours as needed for indigestion or heartburn   • Ascorbic Acid (VITAMIN C) 100 MG CHEW Chew   • atorvastatin (LIPITOR) 10 mg tablet TAKE 1 TABLET BY MOUTH EVERY DAY   • Biotin 77423 MCG TABS Take by mouth daily     • Calcium Carbonate (CALCI-CHEW PO) Take 650 mg by mouth 2 (two) times a day   • cholecalciferol (VITAMIN D3) 1,000 units tablet Take 5,000 Units by mouth in the morning   • clobetasol (TEMOVATE) 0.05 % ointment    • Cyanocobalamin (VITAMIN B 12 PO) Take 1,000 mcg by mouth   • dicyclomine (BENTYL) 10 mg capsule take 1 capsule by mouth four times a day before meals at bedtime   • Eliquis 2.5 MG TAKE 1 TABLET BY MOUTH TWICE A DAY   • estradiol (ESTRACE) 0.1 mg/g vaginal cream USE PEA SIZED AMOUNT VAGINALLY NIGHTLY X 2 WEEKS, THEN TWICE WEEKLY   • Ferrous Sulfate  (45 Fe) MG TBCR Take 1 tablet by mouth daily   • fluticasone (FLONASE)  50 mcg/act nasal spray 1 spray into each nostril daily   • gabapentin (NEURONTIN) 300 mg capsule TAKE 1 CAPSULE BY MOUTH THREE TIMES A DAY   • meclizine (ANTIVERT) 25 mg tablet Take 1 tablet (25 mg total) by mouth every 8 (eight) hours as needed for dizziness   • methocarbamol (ROBAXIN) 500 mg tablet Take 1 tablet (500 mg total) by mouth 2 (two) times a day   • metoprolol succinate (TOPROL-XL) 25 mg 24 hr tablet Take 1 tablet (25 mg total) by mouth daily   • Multiple Vitamin (MULTIVITAMIN) capsule Take 1 capsule by mouth daily 2 chewies daily   • nystatin (MYCOSTATIN) powder APPLY TO AFFECTED AREA TWICE A DAY   • ondansetron (ZOFRAN) 8 mg tablet TAKE 1 TABLET BY MOUTH EVERY 8 HOURS AS NEEDED FOR NAUSEA OR VOMITING.   • pantoprazole (PROTONIX) 20 mg tablet TAKE 1 TABLET BY MOUTH EVERY DAY   • sertraline (ZOLOFT) 100 mg tablet Take 1 tablet (100 mg total) by mouth daily   • tiZANidine (ZANAFLEX) 2 mg tablet Take 1 tablet (2 mg total) by mouth every 8 (eight) hours as needed for muscle spasms   • traZODone (DESYREL) 50 mg tablet TAKE 1 TABLET BY MOUTH EVERYDAY AT BEDTIME   • albuterol (ProAir HFA) 90 mcg/act inhaler Inhale 2 puffs every 6 (six) hours as needed for wheezing (Patient not taking: Reported on 1/30/2024)   • benzonatate (TESSALON) 200 MG capsule Take 1 capsule (200 mg total) by mouth 3 (three) times a day as needed for cough (Patient not taking: Reported on 5/29/2024)     No Known Allergies  Immunization History   Administered Date(s) Administered   • COVID-19 PFIZER VACCINE 0.3 ML IM 04/03/2021, 04/28/2021, 11/01/2021   • COVID-19 Pfizer Vac BIVALENT Sudhakar-sucrose 12 Yr+ IM 10/15/2022   • COVID-19 Pfizer mRNA vacc PF sudhakar-sucrose 12 yr and older (Comirnaty) 12/22/2023   • Hep A, adult 08/08/2017   • Hep A, ped/adol, 2 dose 01/04/2017, 08/08/2017   • Hepatitis A 01/04/2017   • INFLUENZA 10/05/2011, 11/22/2016, 11/22/2016, 10/23/2017, 10/23/2017, 10/23/2017, 10/15/2019, 10/30/2020, 08/18/2021, 08/18/2021,  "10/15/2022, 09/11/2023   • Influenza Quadrivalent Preservative Free 3 years and older IM 10/15/2022   • Influenza, injectable, quadrivalent, preservative free 0.5 mL 10/30/2020   • Influenza, recombinant, quadrivalent,injectable, preservative free 10/31/2018, 10/15/2019   • Influenza, seasonal, injectable 12/22/2014, 10/13/2015   • Pneumococcal Polysaccharide PPV23 02/25/2013, 01/17/2016   • Respiratory Syncytial Virus Vaccine (Recombinant) 02/07/2024   • Tdap 04/21/2011, 04/10/2017, 06/18/2021   • Tuberculin Skin Test-PPD Intradermal 07/28/2021   • Zoster 01/17/2016     Objective   {Disappearing Hyperlinks   Review Vitals * Enter New Vitals * Results Review * Labs * Imaging * Cardiology * Procedures * Lung Cancer Screening :71669}  /86 (BP Location: Left arm, Patient Position: Sitting, Cuff Size: Large)   Pulse 72   Temp 98.1 °F (36.7 °C) (Tympanic)   Resp 16   Ht 5' 4\" (1.626 m)   Wt 77 kg (169 lb 12.8 oz)   LMP 09/18/2014   SpO2 97%   BMI 29.15 kg/m²     Physical Exam  Vitals and nursing note reviewed.   Constitutional:       Appearance: Normal appearance. She is well-developed.   HENT:      Head: Normocephalic and atraumatic.      Right Ear: External ear normal.      Left Ear: External ear normal.   Eyes:      Conjunctiva/sclera: Conjunctivae normal.   Cardiovascular:      Rate and Rhythm: Normal rate and regular rhythm.      Heart sounds: Normal heart sounds.   Pulmonary:      Effort: Pulmonary effort is normal.      Breath sounds: Normal breath sounds.   Musculoskeletal:         General: Normal range of motion.      Cervical back: Normal range of motion.      Right lower leg: No edema.      Left lower leg: No edema.        Legs:       Comments: Varicose veins b/l legs    Lymphadenopathy:      Cervical: No cervical adenopathy.   Skin:     General: Skin is warm and dry.   Neurological:      Mental Status: She is alert and oriented to person, place, and time.   Psychiatric:         Mood and Affect: " Mood normal.         Behavior: Behavior normal.       Administrative Statements {Disappearing Hyperlinks I  Level of Service * [facility]}

## 2024-06-26 NOTE — LETTER
June 26, 2024     Patient: Loretta Avalos  YOB: 1960  Date of Visit: 6/26/2024      To Whom it May Concern:    Loretta Avalos is under my professional care. Loretta was seen in my office on 6/26/2024. Loretta may return to work on 6/27/2024 .    If you have any questions or concerns, please don't hesitate to call.         Sincerely,          DANIELA Stinson        CC: No Recipients

## 2024-06-27 ENCOUNTER — HOSPITAL ENCOUNTER (OUTPATIENT)
Dept: VASCULAR ULTRASOUND | Facility: HOSPITAL | Age: 64
Discharge: HOME | End: 2024-06-27
Payer: COMMERCIAL

## 2024-06-27 DIAGNOSIS — Z86.718 HISTORY OF DVT (DEEP VEIN THROMBOSIS): ICD-10-CM

## 2024-06-27 DIAGNOSIS — M79.652 LEFT THIGH PAIN: ICD-10-CM

## 2024-06-27 PROCEDURE — 93971 EXTREMITY STUDY: CPT

## 2024-06-27 PROCEDURE — 93971 EXTREMITY STUDY: CPT | Performed by: INTERNAL MEDICINE

## 2024-07-11 DIAGNOSIS — F32.A DEPRESSION, UNSPECIFIED DEPRESSION TYPE: ICD-10-CM

## 2024-07-11 RX ORDER — SERTRALINE HYDROCHLORIDE 100 MG/1
100 TABLET, FILM COATED ORAL DAILY
Qty: 100 TABLET | Refills: 1 | Status: SHIPPED | OUTPATIENT
Start: 2024-07-11

## 2024-07-12 DIAGNOSIS — E78.5 HYPERLIPIDEMIA, UNSPECIFIED HYPERLIPIDEMIA TYPE: ICD-10-CM

## 2024-07-12 RX ORDER — ATORVASTATIN CALCIUM 10 MG/1
10 TABLET, FILM COATED ORAL DAILY
Qty: 100 TABLET | Refills: 1 | Status: SHIPPED | OUTPATIENT
Start: 2024-07-12

## 2024-07-24 ENCOUNTER — TELEPHONE (OUTPATIENT)
Age: 64
End: 2024-07-24

## 2024-07-24 NOTE — TELEPHONE ENCOUNTER
Patient called, states she will be having tattoo removal procedure, questions if she should discontinue Eliquis 2.5 MG [[id number]] , patient procedure is next Monday. Patient request a callback with suggestions, Please advise Patient at [phone], if any further questions.

## 2024-07-26 PROBLEM — Z12.11 COLON CANCER SCREENING: Status: RESOLVED | Noted: 2024-06-26 | Resolved: 2024-07-26

## 2024-07-28 DIAGNOSIS — I82.4Y9 DEEP VEIN THROMBOSIS (DVT) OF PROXIMAL LOWER EXTREMITY, UNSPECIFIED CHRONICITY, UNSPECIFIED LATERALITY (HCC): ICD-10-CM

## 2024-07-29 DIAGNOSIS — M62.838 NECK MUSCLE SPASM: ICD-10-CM

## 2024-07-29 DIAGNOSIS — M54.2 NECK PAIN: ICD-10-CM

## 2024-07-29 RX ORDER — APIXABAN 2.5 MG/1
TABLET, FILM COATED ORAL
Qty: 60 TABLET | Refills: 3 | Status: SHIPPED | OUTPATIENT
Start: 2024-07-29

## 2024-07-30 RX ORDER — TIZANIDINE 2 MG/1
2 TABLET ORAL EVERY 8 HOURS PRN
Qty: 270 TABLET | Refills: 1 | Status: SHIPPED | OUTPATIENT
Start: 2024-07-30

## 2024-07-31 ENCOUNTER — REMOTE DEVICE CLINIC VISIT (OUTPATIENT)
Dept: CARDIOLOGY CLINIC | Facility: CLINIC | Age: 64
End: 2024-07-31
Payer: COMMERCIAL

## 2024-07-31 DIAGNOSIS — Z95.0 CARDIAC PACEMAKER IN SITU: Primary | ICD-10-CM

## 2024-07-31 PROCEDURE — 93296 REM INTERROG EVL PM/IDS: CPT | Performed by: INTERNAL MEDICINE

## 2024-07-31 PROCEDURE — 93294 REM INTERROG EVL PM/LDLS PM: CPT | Performed by: INTERNAL MEDICINE

## 2024-07-31 NOTE — PROGRESS NOTES
"Results for orders placed or performed in visit on 07/31/24   Cardiac EP device report    Narrative    MDT-DUAL PPM (AAIR-DDDR MODE)/ ACTIVE SYSTEM IS MRI CONDITIONAL  CARELINK TRANSMISSION: BATTERY STATUS \"9 YRS.\" AP 53%  0%. ALL AVAILABLE LEAD PARAMETERS WITHIN NORMAL LIMITS. NO SIGNIFICANT HIGH RATE EPISODES. NORMAL DEVICE FUNCTION. NC         "

## 2024-08-06 ENCOUNTER — OFFICE VISIT (OUTPATIENT)
Dept: RHEUMATOLOGY | Facility: CLINIC | Age: 64
End: 2024-08-06
Payer: COMMERCIAL

## 2024-08-06 VITALS
BODY MASS INDEX: 29.71 KG/M2 | DIASTOLIC BLOOD PRESSURE: 64 MMHG | HEIGHT: 64 IN | SYSTOLIC BLOOD PRESSURE: 116 MMHG | WEIGHT: 174 LBS

## 2024-08-06 DIAGNOSIS — M81.0 AGE-RELATED OSTEOPOROSIS WITHOUT CURRENT PATHOLOGICAL FRACTURE: Primary | ICD-10-CM

## 2024-08-06 DIAGNOSIS — M54.2 NECK PAIN: ICD-10-CM

## 2024-08-06 DIAGNOSIS — Z79.899 HIGH RISK MEDICATION USE: ICD-10-CM

## 2024-08-06 DIAGNOSIS — M62.838 NECK MUSCLE SPASM: ICD-10-CM

## 2024-08-06 PROCEDURE — 96372 THER/PROPH/DIAG INJ SC/IM: CPT | Performed by: INTERNAL MEDICINE

## 2024-08-06 PROCEDURE — 99214 OFFICE O/P EST MOD 30 MIN: CPT | Performed by: INTERNAL MEDICINE

## 2024-08-06 RX ORDER — GABAPENTIN 300 MG/1
300 CAPSULE ORAL 3 TIMES DAILY
Qty: 270 CAPSULE | Refills: 3 | Status: SHIPPED | OUTPATIENT
Start: 2024-08-06

## 2024-08-06 RX ORDER — TIZANIDINE 2 MG/1
2 TABLET ORAL EVERY 8 HOURS PRN
Qty: 270 TABLET | Refills: 3 | Status: SHIPPED | OUTPATIENT
Start: 2024-08-06

## 2024-08-06 NOTE — PATIENT INSTRUCTIONS
Schedule DEXA scan for 4/2025  Continue 1,200 mg of calcium daily  Continue 5,000 units of Vit. D daily  Continue Prolia every 6 month injections, next one due in 2/2025  Continue tizanidine up to 3 times a day as needed for back muscle pain  Continue gabapentin up to 3 times a day as needed for back nerve pain     Return to clinic in 6 months for Prolia only injection and 1 year for provider visit plus Prolia      
(2) well flexed

## 2024-08-06 NOTE — PROGRESS NOTES
Assessment/Plan:    Loretta Avalos came into the [facility] Office today 08/06/24 to receive Prolia injection.      Verbal consent obtained.  Consent given by: patient    patient states patient has been medically healthy with no underlining concerns/complications.      Loretta Avalos presents with no symptoms today.       All insturctions were reviewed with the patient.    If the patient should have any questions/concerns, advised patient to contacted [facility].       Subjective:     History provided by: patient    Patient ID: Loretta Avalos is a 64 y.o. female      Objective:    There were no vitals filed for this visit.    Patient tolerated the injection well without any complications.  Injection site/s left arm .  Medication was provided by providers stock.    Patient signed consent form no   Patient signed ABN form no (If no patient is not a medicare patient).   Patient waited 15 minutes after injection yes (This only applies to patient's receiving first time injection).       Last Visit: Visit date not found  Next visit:Visit date not found

## 2024-08-14 ENCOUNTER — OFFICE VISIT (OUTPATIENT)
Dept: FAMILY MEDICINE CLINIC | Facility: CLINIC | Age: 64
End: 2024-08-14
Payer: COMMERCIAL

## 2024-08-14 VITALS
SYSTOLIC BLOOD PRESSURE: 122 MMHG | BODY MASS INDEX: 28.92 KG/M2 | TEMPERATURE: 99.6 F | RESPIRATION RATE: 16 BRPM | HEART RATE: 75 BPM | DIASTOLIC BLOOD PRESSURE: 66 MMHG | HEIGHT: 64 IN | WEIGHT: 169.4 LBS | OXYGEN SATURATION: 97 %

## 2024-08-14 DIAGNOSIS — J02.9 SORE THROAT: ICD-10-CM

## 2024-08-14 DIAGNOSIS — I47.10 PSVT (PAROXYSMAL SUPRAVENTRICULAR TACHYCARDIA): ICD-10-CM

## 2024-08-14 DIAGNOSIS — J01.00 ACUTE NON-RECURRENT MAXILLARY SINUSITIS: Primary | ICD-10-CM

## 2024-08-14 PROCEDURE — 87636 SARSCOV2 & INF A&B AMP PRB: CPT | Performed by: NURSE PRACTITIONER

## 2024-08-14 PROCEDURE — 99213 OFFICE O/P EST LOW 20 MIN: CPT | Performed by: NURSE PRACTITIONER

## 2024-08-14 RX ORDER — BENZONATATE 200 MG/1
200 CAPSULE ORAL 3 TIMES DAILY PRN
Qty: 20 CAPSULE | Refills: 0 | Status: SHIPPED | OUTPATIENT
Start: 2024-08-14

## 2024-08-14 RX ORDER — BENZONATATE 200 MG/1
200 CAPSULE ORAL 3 TIMES DAILY PRN
Qty: 20 CAPSULE | Refills: 0 | Status: SHIPPED | OUTPATIENT
Start: 2024-08-14 | End: 2024-08-14 | Stop reason: SDUPTHER

## 2024-08-14 RX ORDER — AZITHROMYCIN 250 MG/1
TABLET, FILM COATED ORAL
Qty: 6 TABLET | Refills: 0 | Status: SHIPPED | OUTPATIENT
Start: 2024-08-14 | End: 2024-08-18

## 2024-08-14 RX ORDER — AZITHROMYCIN 250 MG/1
TABLET, FILM COATED ORAL
Qty: 6 TABLET | Refills: 0 | Status: SHIPPED | OUTPATIENT
Start: 2024-08-14 | End: 2024-08-14 | Stop reason: SDUPTHER

## 2024-08-14 RX ORDER — METOPROLOL SUCCINATE 25 MG/1
25 TABLET, EXTENDED RELEASE ORAL DAILY
Qty: 30 TABLET | Refills: 0 | Status: SHIPPED | OUTPATIENT
Start: 2024-08-14

## 2024-08-14 NOTE — ASSESSMENT & PLAN NOTE
- Prescriptions sent for Z-hernandez and tessalon perles. Discussed side effects.  - Continue Flonase.  - Increase oral hydration and use humidifier.  - Contact office if symptoms do not improve.

## 2024-08-14 NOTE — PROGRESS NOTES
Ambulatory Visit  Name: Loretta Avalos      : 1960      MRN: [id number]  Encounter Provider: DANIELA Stinson  Encounter Date: 2024   Encounter department: [facility]    Assessment & Plan   1. Acute non-recurrent maxillary sinusitis  Assessment & Plan:  - Prescriptions sent for Z-hernandez and tessalon perles. Discussed side effects.  - Continue Flonase.  - Increase oral hydration and use humidifier.  - Contact office if symptoms do not improve.   Orders:  -     azithromycin (Zithromax) 250 mg tablet; Take 2 tablets (500 mg total) by mouth daily for 1 day, THEN 1 tablet (250 mg total) daily for 4 days.  -     benzonatate (TESSALON) 200 MG capsule; Take 1 capsule (200 mg total) by mouth 3 (three) times a day as needed for cough  2. Sore throat  -     Covid/Flu- Office Collect Normal; Future         History of Present Illness     Patient presents to office today with complaints of cough, congestion, ear pain, sore throat, and sinus pain/pressure. Has been taking OTC cough and cold medicine with no relief. She denies any other concerns or complaints today.        Review of Systems   Constitutional:  Negative for fatigue and fever.   HENT:  Positive for congestion, ear pain, sinus pressure, sinus pain and sore throat. Negative for trouble swallowing.    Eyes:  Negative for visual disturbance.   Respiratory:  Positive for cough. Negative for shortness of breath.    Cardiovascular:  Negative for chest pain and palpitations.   Gastrointestinal:  Negative for abdominal pain and blood in stool.   Endocrine: Negative for cold intolerance and heat intolerance.   Genitourinary:  Negative for difficulty urinating and dysuria.   Musculoskeletal:  Negative for gait problem.   Skin:  Negative for rash.   Neurological:  Negative for dizziness, syncope and headaches.   Hematological:  Negative for adenopathy.   Psychiatric/Behavioral:  Negative for behavioral problems.      Past Medical  History:   Diagnosis Date   • Anemia     iron def   • Arthritis     knees   • Carpal tunnel syndrome of right wrist    • Chest pain    • Colon polyp    • Deep vein thrombosis (DVT) of proximal lower extremity (HCC) 2012   • Depression     at times   • DVT (deep venous thrombosis) ([facility])    • Dysphagia    • Encounter for surgical aftercare following surgery of digestive system 2020    s/p Raghav-En-Y Gastric Bypass with Dr. Oneil in .   INITIAL weight:241 lb INITIAL weight with start of topirmate: 176 lb Goal weight loss of 5-10 %-159 lb-167 lb    • Esophageal reflux 2011   • Grade 1 out of 6 intensity murmur 2019   • Heart murmur    • History of transfusion 1980   • Neck pain    • Pacemaker 2018   • Pneumonia     age 16   • Psychiatric disorder    • Right ear pain    • Trigger finger, left    • Visit for suture removal 2021     Past Surgical History:   Procedure Laterality Date   • BACK SURGERY     • CARDIAC PACEMAKER PLACEMENT  2018   • CERVICAL DISC SURGERY  2009    PLATES & SCREWS   •  SECTION      X2   • COLONOSCOPY     • EPIDURAL BLOCK INJECTION Right 2022    Procedure: RIGHT L3-[id number] EPIDURAL STEROID INJECTION (55880);  Surgeon: Bairon Kamara DO;  Location: [facility];  Service: Pain Management    • GASTRIC BYPASS  2017    LAP RINYGB SURGERY   • KNEE ARTHROSCOPY     • KNEE SURGERY Bilateral    • KNEE SURGERY Left 2020   • OTHER SURGICAL HISTORY      ARM SURGERIES   • NC NDSC WRST SURG W/RLS TRANSVRS CARPL LIGM Right 2018    Procedure: ENDOSCOPIC CARPAL TUNNEL RELEASE;  Surgeon: Leroy Macdonald MD;  Location: [facility];  Service: Orthopedics   • NC NJX DX/THER AGT PVRT FACET JT LMBR/SAC 1 LEVEL Bilateral 10/25/2022    Procedure: L3-L4,L4-5,L5-S1 BILATERAL BRANCH BLOCK;  Surgeon: Bairon Kamara DO;  Location: [facility];  Service: Pain Management    • NC NJX DX/THER AGT PVRT FACET JT LMBR/SAC 1 LEVEL Bilateral  11/15/2022    Procedure: BILATERAL L3-S1 MEDICAL BRANCH BLOCK (13564,51790,82328);  Surgeon: Bairon Kamara DO;  Location: [facility];  Service: Pain Management    • VA TENDON SHEATH INCISION Left 9/18/2018    Procedure: LONG TRIGGER FINGER RELEASE;  Surgeon: Leroy Macdonald MD;  Location: [facility];  Service: Orthopedics   • RADIOFREQUENCY ABLATION Right 12/6/2022    Procedure: RIGHT L3-S1 RADIO FREQUENCY ABLATION (69925,05125);  Surgeon: Bairon Kamara DO;  Location: [facility];  Service: Pain Management    • RADIOFREQUENCY ABLATION Left 12/20/2022    Procedure: LEFT L3-S1 RADIO FREQUENCY ABLATION (05595,60627);  Surgeon: Bairon Kamara DO;  Location: [facility];  Service: Pain Management    • TONSILLECTOMY     • UPPER GASTROINTESTINAL ENDOSCOPY       Family History   Problem Relation Age of Onset   • Stroke Mother    • Alzheimer's disease Mother    • Arthritis Mother    • Coronary artery disease Mother    • Breast cancer Mother 66   • Cancer Mother    • Heart disease Mother    • Hypertension Father    • Gout Father    • Diabetes type II Father    • Coronary artery disease Father    • Heart disease Father    • No Known Problems Sister    • No Known Problems Sister    • No Known Problems Sister    • No Known Problems Daughter    • No Known Problems Maternal Grandmother    • No Known Problems Maternal Grandfather    • No Known Problems Paternal Grandmother    • No Known Problems Paternal Grandfather    • Prostate cancer Brother    • Pancreatic cancer Brother    • No Known Problems Maternal Aunt    • No Known Problems Maternal Aunt    • No Known Problems Maternal Aunt    • No Known Problems Maternal Aunt    • Cancer Maternal Aunt    • No Known Problems Paternal Aunt    • No Known Problems Paternal Aunt      Social History     Tobacco Use   • Smoking status: Every Day     Types: Cigarettes   • Smokeless tobacco: Never   Vaping Use   • Vaping status: Never Used   Substance and Sexual Activity   • Alcohol use: Yes      Alcohol/week: 2.0 standard drinks of alcohol     Types: 1 Glasses of wine, 1 Standard drinks or equivalent per week     Comment: very rare social   • Drug use: No   • Sexual activity: Not Currently     Current Outpatient Medications on File Prior to Visit   Medication Sig   • acetaminophen (TYLENOL) 650 mg CR tablet Take 650 mg by mouth every 8 (eight) hours as needed for mild pain Take 2 tablets in the AM and 2 tablets in the late evening   • aluminum-magnesium hydroxide-simethicone (MAALOX MAX) 400-400-40 MG/5ML suspension Take 10 mL by mouth every 6 (six) hours as needed for indigestion or heartburn   • Ascorbic Acid (VITAMIN C) 100 MG CHEW Chew   • atorvastatin (LIPITOR) 10 mg tablet TAKE 1 TABLET BY MOUTH EVERY DAY   • Biotin 25501 MCG TABS Take by mouth daily     • Calcium Carbonate (CALCI-CHEW PO) Take 650 mg by mouth 2 (two) times a day   • cholecalciferol (VITAMIN D3) 1,000 units tablet Take 5,000 Units by mouth in the morning   • clobetasol (TEMOVATE) 0.05 % ointment    • Cyanocobalamin (VITAMIN B 12 PO) Take 1,000 mcg by mouth   • dicyclomine (BENTYL) 10 mg capsule take 1 capsule by mouth four times a day before meals at bedtime   • Eliquis 2.5 MG TAKE 1 TABLET BY MOUTH TWICE A DAY   • estradiol (ESTRACE) 0.1 mg/g vaginal cream USE PEA SIZED AMOUNT VAGINALLY NIGHTLY X 2 WEEKS, THEN TWICE WEEKLY   • Ferrous Sulfate  (45 Fe) MG TBCR Take 1 tablet by mouth daily   • fluticasone (FLONASE) 50 mcg/act nasal spray 1 spray into each nostril daily   • gabapentin (NEURONTIN) 300 mg capsule Take 1 capsule (300 mg total) by mouth 3 (three) times a day   • meclizine (ANTIVERT) 25 mg tablet Take 1 tablet (25 mg total) by mouth every 8 (eight) hours as needed for dizziness   • metoprolol succinate (TOPROL-XL) 25 mg 24 hr tablet Take 1 tablet (25 mg total) by mouth daily   • Multiple Vitamin (MULTIVITAMIN) capsule Take 1 capsule by mouth daily 2 chewies daily   • nystatin (MYCOSTATIN) powder APPLY TO AFFECTED AREA  TWICE A DAY   • ondansetron (ZOFRAN) 8 mg tablet TAKE 1 TABLET BY MOUTH EVERY 8 HOURS AS NEEDED FOR NAUSEA OR VOMITING.   • pantoprazole (PROTONIX) 20 mg tablet TAKE 1 TABLET BY MOUTH EVERY DAY   • sertraline (ZOLOFT) 100 mg tablet TAKE 1 TABLET BY MOUTH EVERY DAY   • tiZANidine (ZANAFLEX) 2 mg tablet Take 1 tablet (2 mg total) by mouth every 8 (eight) hours as needed for muscle spasms   • traZODone (DESYREL) 50 mg tablet TAKE 1 TABLET BY MOUTH EVERYDAY AT BEDTIME   • albuterol (ProAir HFA) 90 mcg/act inhaler Inhale 2 puffs every 6 (six) hours as needed for wheezing (Patient not taking: Reported on 1/30/2024)   • [DISCONTINUED] benzonatate (TESSALON) 200 MG capsule Take 1 capsule (200 mg total) by mouth 3 (three) times a day as needed for cough (Patient not taking: Reported on 5/29/2024)     No Known Allergies  Immunization History   Administered Date(s) Administered   • COVID-19 PFIZER VACCINE 0.3 ML IM 04/03/2021, 04/28/2021, 11/01/2021   • COVID-19 Pfizer Vac BIVALENT Sudhakar-sucrose 12 Yr+ IM 10/15/2022   • COVID-19 Pfizer mRNA vacc PF sudhakar-sucrose 12 yr and older (Comirnaty) 12/22/2023   • Hep A, adult 08/08/2017   • Hep A, ped/adol, 2 dose 01/04/2017, 08/08/2017   • Hepatitis A 01/04/2017   • INFLUENZA 10/05/2011, 11/22/2016, 11/22/2016, 10/23/2017, 10/23/2017, 10/23/2017, 10/15/2019, 10/30/2020, 08/18/2021, 08/18/2021, 10/15/2022, 09/11/2023   • Influenza Quadrivalent Preservative Free 3 years and older IM 10/15/2022   • Influenza, injectable, quadrivalent, preservative free 0.5 mL 10/30/2020   • Influenza, recombinant, quadrivalent,injectable, preservative free 10/31/2018, 10/15/2019   • Influenza, seasonal, injectable 12/22/2014, 10/13/2015   • Pneumococcal Polysaccharide PPV23 02/25/2013, 01/17/2016   • Respiratory Syncytial Virus Vaccine (Recombinant) 02/07/2024   • Tdap 04/21/2011, 04/10/2017, 06/18/2021   • Tuberculin Skin Test-PPD Intradermal 07/28/2021   • Zoster 01/17/2016     Objective     /66  "(BP Location: Left arm, Patient Position: Sitting, Cuff Size: Large)   Pulse 75   Temp 99.6 °F (37.6 °C) (Tympanic)   Resp 16   Ht 5' 4\" (1.626 m)   Wt 76.8 kg (169 lb 6.4 oz)   LMP 09/18/2014   SpO2 97%   BMI 29.08 kg/m²     Physical Exam  Vitals and nursing note reviewed.   Constitutional:       General: She is not in acute distress.     Appearance: Normal appearance. She is well-developed. She is ill-appearing.   HENT:      Head: Normocephalic and atraumatic.      Right Ear: Tympanic membrane, ear canal and external ear normal.      Left Ear: Tympanic membrane, ear canal and external ear normal.      Nose: Congestion present.      Mouth/Throat:      Pharynx: Posterior oropharyngeal erythema present.   Eyes:      Conjunctiva/sclera: Conjunctivae normal.   Cardiovascular:      Rate and Rhythm: Normal rate and regular rhythm.      Heart sounds: Normal heart sounds.   Pulmonary:      Effort: Pulmonary effort is normal.      Breath sounds: Normal breath sounds.   Musculoskeletal:         General: Normal range of motion.      Cervical back: Normal range of motion.   Skin:     General: Skin is warm and dry.   Neurological:      Mental Status: She is alert and oriented to person, place, and time.   Psychiatric:         Mood and Affect: Mood normal.         Behavior: Behavior normal.         "

## 2024-08-15 ENCOUNTER — TELEPHONE (OUTPATIENT)
Dept: FAMILY MEDICINE CLINIC | Facility: CLINIC | Age: 64
End: 2024-08-15

## 2024-08-15 ENCOUNTER — TELEPHONE (OUTPATIENT)
Age: 64
End: 2024-08-15

## 2024-08-15 LAB
FLUAV RNA RESP QL NAA+PROBE: NEGATIVE
FLUBV RNA RESP QL NAA+PROBE: NEGATIVE
SARS-COV-2 RNA RESP QL NAA+PROBE: POSITIVE

## 2024-08-15 NOTE — TELEPHONE ENCOUNTER
----- Message from DANIELA Lyons sent at 8/15/2024 11:24 AM EDT -----  Test is positive for covid. She can stop antibiotic.

## 2024-08-15 NOTE — TELEPHONE ENCOUNTER
Pt got receive her Covid test results on [employer] and called in warm transferred the call to practice was answered by Klaudia she took the call. Thanks

## 2024-08-15 NOTE — TELEPHONE ENCOUNTER
LM for pt to call office back. Please relay result to pt if she calls back:    Test is positive for covid. She can stop antibiotic.

## 2024-08-15 NOTE — TELEPHONE ENCOUNTER
Patient saw results in My Chart that she was covid positive.  We did not get these results yet.  She will continue OTC symptomatic treatment and said she thinks the ZPAK is helping her cough so she wants to continue that.
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
## 2024-08-20 NOTE — TELEPHONE ENCOUNTER
Patient called stating she missed a call from us, maybe from Friday.  I advised it was in regards to the information she viewed in her MyChart.  She reports feeling much better now.

## 2024-08-26 ENCOUNTER — APPOINTMENT (OUTPATIENT)
Dept: LAB | Facility: IMAGING CENTER | Age: 64
End: 2024-08-26
Payer: COMMERCIAL

## 2024-08-26 DIAGNOSIS — R79.89 HIGH SERUM PARATHYROID HORMONE (PTH): ICD-10-CM

## 2024-08-26 DIAGNOSIS — E61.1 IRON DEFICIENCY: ICD-10-CM

## 2024-08-26 DIAGNOSIS — R73.9 HYPERGLYCEMIA: ICD-10-CM

## 2024-08-26 DIAGNOSIS — Z98.84 BARIATRIC SURGERY STATUS: ICD-10-CM

## 2024-08-26 DIAGNOSIS — Z48.815 ENCOUNTER FOR SURGICAL AFTERCARE FOLLOWING SURGERY OF DIGESTIVE SYSTEM: ICD-10-CM

## 2024-08-26 DIAGNOSIS — K91.2 POSTSURGICAL MALABSORPTION: ICD-10-CM

## 2024-08-26 DIAGNOSIS — E78.49 OTHER HYPERLIPIDEMIA: ICD-10-CM

## 2024-08-26 DIAGNOSIS — E66.9 OBESITY, CLASS I, BMI 30-34.9: ICD-10-CM

## 2024-08-26 LAB
ALBUMIN SERPL BCG-MCNC: 4 G/DL (ref 3.5–5)
ALP SERPL-CCNC: 73 U/L (ref 34–104)
ALT SERPL W P-5'-P-CCNC: 23 U/L (ref 7–52)
ANION GAP SERPL CALCULATED.3IONS-SCNC: 11 MMOL/L (ref 4–13)
AST SERPL W P-5'-P-CCNC: 25 U/L (ref 13–39)
BASOPHILS # BLD AUTO: 0.06 THOUSANDS/ÂΜL (ref 0–0.1)
BASOPHILS NFR BLD AUTO: 1 % (ref 0–1)
BILIRUB SERPL-MCNC: 0.62 MG/DL (ref 0.2–1)
BUN SERPL-MCNC: 16 MG/DL (ref 5–25)
CALCIUM SERPL-MCNC: 8.7 MG/DL (ref 8.4–10.2)
CHLORIDE SERPL-SCNC: 106 MMOL/L (ref 96–108)
CHOLEST SERPL-MCNC: 172 MG/DL
CO2 SERPL-SCNC: 25 MMOL/L (ref 21–32)
CREAT SERPL-MCNC: 0.62 MG/DL (ref 0.6–1.3)
EOSINOPHIL # BLD AUTO: 0.13 THOUSAND/ÂΜL (ref 0–0.61)
EOSINOPHIL NFR BLD AUTO: 2 % (ref 0–6)
ERYTHROCYTE [DISTWIDTH] IN BLOOD BY AUTOMATED COUNT: 12.8 % (ref 11.6–15.1)
EST. AVERAGE GLUCOSE BLD GHB EST-MCNC: 134 MG/DL
FERRITIN SERPL-MCNC: 58 NG/ML (ref 11–307)
GFR SERPL CREATININE-BSD FRML MDRD: 94 ML/MIN/1.73SQ M
GLUCOSE P FAST SERPL-MCNC: 100 MG/DL (ref 65–99)
HBA1C MFR BLD: 6.3 %
HCT VFR BLD AUTO: 36.7 % (ref 34.8–46.1)
HDLC SERPL-MCNC: 67 MG/DL
HGB BLD-MCNC: 11.9 G/DL (ref 11.5–15.4)
IMM GRANULOCYTES # BLD AUTO: 0.02 THOUSAND/UL (ref 0–0.2)
IMM GRANULOCYTES NFR BLD AUTO: 0 % (ref 0–2)
IRON SATN MFR SERPL: 25 % (ref 15–50)
IRON SERPL-MCNC: 97 UG/DL (ref 50–212)
LDLC SERPL CALC-MCNC: 90 MG/DL (ref 0–100)
LYMPHOCYTES # BLD AUTO: 1.19 THOUSANDS/ÂΜL (ref 0.6–4.47)
LYMPHOCYTES NFR BLD AUTO: 17 % (ref 14–44)
MCH RBC QN AUTO: 33.1 PG (ref 26.8–34.3)
MCHC RBC AUTO-ENTMCNC: 32.4 G/DL (ref 31.4–37.4)
MCV RBC AUTO: 102 FL (ref 82–98)
MONOCYTES # BLD AUTO: 0.42 THOUSAND/ÂΜL (ref 0.17–1.22)
MONOCYTES NFR BLD AUTO: 6 % (ref 4–12)
NEUTROPHILS # BLD AUTO: 5.31 THOUSANDS/ÂΜL (ref 1.85–7.62)
NEUTS SEG NFR BLD AUTO: 74 % (ref 43–75)
NRBC BLD AUTO-RTO: 0 /100 WBCS
PLATELET # BLD AUTO: 230 THOUSANDS/UL (ref 149–390)
PMV BLD AUTO: 11.7 FL (ref 8.9–12.7)
POTASSIUM SERPL-SCNC: 3.7 MMOL/L (ref 3.5–5.3)
PROT SERPL-MCNC: 6.9 G/DL (ref 6.4–8.4)
PTH-INTACT SERPL-MCNC: 151.5 PG/ML (ref 12–88)
RBC # BLD AUTO: 3.6 MILLION/UL (ref 3.81–5.12)
SODIUM SERPL-SCNC: 142 MMOL/L (ref 135–147)
TIBC SERPL-MCNC: 382 UG/DL (ref 250–450)
TRIGL SERPL-MCNC: 76 MG/DL
TSH SERPL DL<=0.05 MIU/L-ACNC: 0.89 UIU/ML (ref 0.45–4.5)
UIBC SERPL-MCNC: 285 UG/DL (ref 155–355)
WBC # BLD AUTO: 7.13 THOUSAND/UL (ref 4.31–10.16)

## 2024-08-26 PROCEDURE — 80053 COMPREHEN METABOLIC PANEL: CPT

## 2024-08-26 PROCEDURE — 85025 COMPLETE CBC W/AUTO DIFF WBC: CPT

## 2024-08-26 PROCEDURE — 82728 ASSAY OF FERRITIN: CPT

## 2024-08-26 PROCEDURE — 83970 ASSAY OF PARATHORMONE: CPT

## 2024-08-26 PROCEDURE — 36415 COLL VENOUS BLD VENIPUNCTURE: CPT

## 2024-08-26 PROCEDURE — 84443 ASSAY THYROID STIM HORMONE: CPT

## 2024-08-26 PROCEDURE — 80061 LIPID PANEL: CPT

## 2024-08-26 PROCEDURE — 83550 IRON BINDING TEST: CPT

## 2024-08-26 PROCEDURE — 83540 ASSAY OF IRON: CPT

## 2024-08-26 PROCEDURE — 83036 HEMOGLOBIN GLYCOSYLATED A1C: CPT

## 2024-08-27 ENCOUNTER — TELEPHONE (OUTPATIENT)
Age: 64
End: 2024-08-27

## 2024-08-27 DIAGNOSIS — R79.89 HIGH SERUM PARATHYROID HORMONE (PTH): ICD-10-CM

## 2024-08-27 DIAGNOSIS — Z98.84 BARIATRIC SURGERY STATUS: Primary | ICD-10-CM

## 2024-08-27 NOTE — TELEPHONE ENCOUNTER
Patient called looking for the lab results that Ana Cristina ordered. Patient was advised that all labs were ordered by weight management.  Patient insisted she had labs ordered by Ana Cristina. Warm transfer to clinical Mary successful.

## 2024-08-27 NOTE — TELEPHONE ENCOUNTER
Pt called in regarding lab results. Provider's note was read to the pt. Pt shared her confusion about may being pregnant. Per one of the nurses advise pt was asked to have a pregnancy test. Pt will call her PCP.

## 2024-08-28 ENCOUNTER — TELEPHONE (OUTPATIENT)
Dept: BARIATRICS | Facility: CLINIC | Age: 64
End: 2024-08-28

## 2024-08-28 NOTE — TELEPHONE ENCOUNTER
LVM - informed pt that have pasted the message into her my chart and if she had any question she can call the office back.

## 2024-08-28 NOTE — TELEPHONE ENCOUNTER
----- Message from DANIELA Remy sent at 8/27/2024  1:41 PM EDT -----  Parathyroid hormone is still elevated. Can we please call pt to ask her how much calcium is she on now? If she is reaching 1200 mg goal only then I would recommend her to increase it to 5218-3267 mg for now since she is also on prolia.     The other labs are within limits, please continue with her iron supplements as is. I would like her to repeat her PTH levels in 3 months after she makes those changes with her calcium intake.     DANIELA Evans

## 2024-08-29 DIAGNOSIS — G47.00 INSOMNIA, UNSPECIFIED TYPE: ICD-10-CM

## 2024-08-29 RX ORDER — TRAZODONE HYDROCHLORIDE 50 MG/1
50 TABLET, FILM COATED ORAL
Qty: 90 TABLET | Refills: 1 | Status: SHIPPED | OUTPATIENT
Start: 2024-08-29

## 2024-09-02 DIAGNOSIS — F32.A DEPRESSION, UNSPECIFIED DEPRESSION TYPE: ICD-10-CM

## 2024-09-02 DIAGNOSIS — I47.10 PSVT (PAROXYSMAL SUPRAVENTRICULAR TACHYCARDIA): ICD-10-CM

## 2024-09-02 RX ORDER — SERTRALINE HYDROCHLORIDE 100 MG/1
100 TABLET, FILM COATED ORAL DAILY
Qty: 100 TABLET | Refills: 0 | Status: CANCELLED | OUTPATIENT
Start: 2024-09-02

## 2024-09-03 ENCOUNTER — TELEPHONE (OUTPATIENT)
Dept: FAMILY MEDICINE CLINIC | Facility: CLINIC | Age: 64
End: 2024-09-03

## 2024-09-03 RX ORDER — METOPROLOL SUCCINATE 25 MG/1
25 TABLET, EXTENDED RELEASE ORAL DAILY
Qty: 100 TABLET | Refills: 3 | Status: SHIPPED | OUTPATIENT
Start: 2024-09-03

## 2024-09-03 NOTE — TELEPHONE ENCOUNTER
----- Message from DANIELA Lyons sent at 8/30/2024  2:33 PM EDT -----  Labs show elevated hemoglobin A1c in prediabetic range. I recommend low carb diet and regular exercise. The rest of her labs are stable.

## 2024-09-04 ENCOUNTER — OFFICE VISIT (OUTPATIENT)
Dept: FAMILY MEDICINE CLINIC | Facility: CLINIC | Age: 64
End: 2024-09-04
Payer: COMMERCIAL

## 2024-09-04 VITALS
HEIGHT: 64 IN | SYSTOLIC BLOOD PRESSURE: 124 MMHG | HEART RATE: 66 BPM | RESPIRATION RATE: 16 BRPM | TEMPERATURE: 98 F | WEIGHT: 169 LBS | BODY MASS INDEX: 28.85 KG/M2 | DIASTOLIC BLOOD PRESSURE: 62 MMHG | OXYGEN SATURATION: 97 %

## 2024-09-04 DIAGNOSIS — F32.A DEPRESSION, UNSPECIFIED DEPRESSION TYPE: ICD-10-CM
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
## 2024-09-04 PROCEDURE — 99213 OFFICE O/P EST LOW 20 MIN: CPT | Performed by: NURSE PRACTITIONER

## 2024-09-04 RX ORDER — SERTRALINE HYDROCHLORIDE 100 MG/1
100 TABLET, FILM COATED ORAL DAILY
Qty: 100 TABLET | Refills: 1 | Status: SHIPPED | OUTPATIENT
Start: 2024-09-04 | End: 2024-09-04
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
## 2024-09-04 NOTE — LETTER
September 4, 2024     Patient: Loretta Avalos  YOB: 1960  Date of Visit: 9/4/2024      To Whom it May Concern:    Loretta Avalos is under my professional care. Loretta was seen in my office on 9/4/2024. Loretta may return to work on 9/9/2024 .    If you have any questions or concerns, please don't hesitate to call.         Sincerely,          DANIELA Stinson        CC: No Recipients

## 2024-09-13 PROBLEM — J01.00 ACUTE NON-RECURRENT MAXILLARY SINUSITIS: Status: RESOLVED | Noted: 2024-08-14 | Resolved: 2024-09-13

## 2024-10-01 DIAGNOSIS — R10.84 GENERALIZED ABDOMINAL PAIN: ICD-10-CM

## 2024-10-01 RX ORDER — PANTOPRAZOLE SODIUM 20 MG/1
20 TABLET, DELAYED RELEASE ORAL DAILY
Qty: 90 TABLET | Refills: 1 | Status: SHIPPED | OUTPATIENT
Start: 2024-10-01
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
## 2024-10-16 ENCOUNTER — APPOINTMENT (EMERGENCY)
Dept: RADIOLOGY | Facility: HOSPITAL | Age: 64
End: 2024-10-16
Payer: COMMERCIAL

## 2024-10-16 ENCOUNTER — HOSPITAL ENCOUNTER (EMERGENCY)
Facility: HOSPITAL | Age: 64
Discharge: HOME | End: 2024-10-17
Attending: EMERGENCY MEDICINE
Payer: COMMERCIAL

## 2024-10-16 VITALS
SYSTOLIC BLOOD PRESSURE: 113 MMHG | RESPIRATION RATE: 18 BRPM | TEMPERATURE: 98 F | OXYGEN SATURATION: 98 % | DIASTOLIC BLOOD PRESSURE: 61 MMHG | HEART RATE: 60 BPM

## 2024-10-16 DIAGNOSIS — R07.9 CHEST PAIN: Primary | ICD-10-CM

## 2024-10-16 DIAGNOSIS — E86.0 DEHYDRATION: ICD-10-CM

## 2024-10-16 DIAGNOSIS — R55 POSTURAL DIZZINESS WITH PRESYNCOPE: ICD-10-CM

## 2024-10-16 DIAGNOSIS — R42 POSTURAL DIZZINESS WITH PRESYNCOPE: ICD-10-CM

## 2024-10-16 LAB
2HR DELTA HS TROPONIN: 0 NG/L
ALBUMIN SERPL BCG-MCNC: 3.8 G/DL (ref 3.5–5)
ALP SERPL-CCNC: 61 U/L (ref 34–104)
ALT SERPL W P-5'-P-CCNC: 19 U/L (ref 7–52)
ANION GAP SERPL CALCULATED.3IONS-SCNC: 4 MMOL/L (ref 4–13)
AST SERPL W P-5'-P-CCNC: 23 U/L (ref 13–39)
BASOPHILS # BLD AUTO: 0.06 THOUSANDS/ΜL (ref 0–0.1)
BASOPHILS NFR BLD AUTO: 1 % (ref 0–1)
BILIRUB SERPL-MCNC: 0.42 MG/DL (ref 0.2–1)
BUN SERPL-MCNC: 17 MG/DL (ref 5–25)
CALCIUM SERPL-MCNC: 8.3 MG/DL (ref 8.4–10.2)
CARDIAC TROPONIN I PNL SERPL HS: 3 NG/L
CARDIAC TROPONIN I PNL SERPL HS: 3 NG/L
CHLORIDE SERPL-SCNC: 108 MMOL/L (ref 96–108)
CO2 SERPL-SCNC: 29 MMOL/L (ref 21–32)
CREAT SERPL-MCNC: 0.56 MG/DL (ref 0.6–1.3)
EOSINOPHIL # BLD AUTO: 0.23 THOUSAND/ΜL (ref 0–0.61)
EOSINOPHIL NFR BLD AUTO: 4 % (ref 0–6)
ERYTHROCYTE [DISTWIDTH] IN BLOOD BY AUTOMATED COUNT: 12.9 % (ref 11.6–15.1)
GFR SERPL CREATININE-BSD FRML MDRD: 98 ML/MIN/1.73SQ M
GLUCOSE SERPL-MCNC: 104 MG/DL (ref 65–140)
GLUCOSE SERPL-MCNC: 98 MG/DL (ref 65–140)
HCT VFR BLD AUTO: 33.8 % (ref 34.8–46.1)
HGB BLD-MCNC: 10.8 G/DL (ref 11.5–15.4)
IMM GRANULOCYTES # BLD AUTO: 0.02 THOUSAND/UL (ref 0–0.2)
IMM GRANULOCYTES NFR BLD AUTO: 0 % (ref 0–2)
LYMPHOCYTES # BLD AUTO: 1.83 THOUSANDS/ΜL (ref 0.6–4.47)
LYMPHOCYTES NFR BLD AUTO: 31 % (ref 14–44)
MCH RBC QN AUTO: 32.6 PG (ref 26.8–34.3)
MCHC RBC AUTO-ENTMCNC: 32 G/DL (ref 31.4–37.4)
MCV RBC AUTO: 102 FL (ref 82–98)
MONOCYTES # BLD AUTO: 0.42 THOUSAND/ΜL (ref 0.17–1.22)
MONOCYTES NFR BLD AUTO: 7 % (ref 4–12)
NEUTROPHILS # BLD AUTO: 3.4 THOUSANDS/ΜL (ref 1.85–7.62)
NEUTS SEG NFR BLD AUTO: 57 % (ref 43–75)
NRBC BLD AUTO-RTO: 0 /100 WBCS
PLATELET # BLD AUTO: 193 THOUSANDS/UL (ref 149–390)
PMV BLD AUTO: 11.2 FL (ref 8.9–12.7)
POTASSIUM SERPL-SCNC: 4.2 MMOL/L (ref 3.5–5.3)
PROT SERPL-MCNC: 6.2 G/DL (ref 6.4–8.4)
RBC # BLD AUTO: 3.31 MILLION/UL (ref 3.81–5.12)
SODIUM SERPL-SCNC: 141 MMOL/L (ref 135–147)
WBC # BLD AUTO: 5.96 THOUSAND/UL (ref 4.31–10.16)

## 2024-10-16 PROCEDURE — 85025 COMPLETE CBC W/AUTO DIFF WBC: CPT | Performed by: EMERGENCY MEDICINE

## 2024-10-16 PROCEDURE — 71045 X-RAY EXAM CHEST 1 VIEW: CPT

## 2024-10-16 PROCEDURE — 84484 ASSAY OF TROPONIN QUANT: CPT | Performed by: EMERGENCY MEDICINE

## 2024-10-16 PROCEDURE — 93005 ELECTROCARDIOGRAM TRACING: CPT

## 2024-10-16 PROCEDURE — 99285 EMERGENCY DEPT VISIT HI MDM: CPT

## 2024-10-16 PROCEDURE — 36415 COLL VENOUS BLD VENIPUNCTURE: CPT

## 2024-10-16 PROCEDURE — 80053 COMPREHEN METABOLIC PANEL: CPT | Performed by: EMERGENCY MEDICINE

## 2024-10-16 PROCEDURE — 82948 REAGENT STRIP/BLOOD GLUCOSE: CPT

## 2024-10-16 PROCEDURE — 96360 HYDRATION IV INFUSION INIT: CPT

## 2024-10-16 RX ADMIN — SODIUM CHLORIDE 1000 ML: 0.9 INJECTION, SOLUTION INTRAVENOUS at 22:14

## 2024-10-16 NOTE — Clinical Note
Loretta Avalos was seen and treated in our emergency department on 10/16/2024.                Diagnosis:     Loretta  may return to work on return date.    She may return on this date: 10/18/2024         If you have any questions or concerns, please don't hesitate to call.      Seven Brooks, DO    ______________________________           _______________          _______________  Hospital Representative                              Date                                Time

## 2024-10-17 LAB
ATRIAL RATE: 59 BPM
P AXIS: 56 DEGREES
PR INTERVAL: 264 MS
QRS AXIS: 48 DEGREES
QRSD INTERVAL: 78 MS
QT INTERVAL: 428 MS
QTC INTERVAL: 423 MS
T WAVE AXIS: 40 DEGREES
VENTRICULAR RATE: 59 BPM

## 2024-10-17 PROCEDURE — 99285 EMERGENCY DEPT VISIT HI MDM: CPT | Performed by: EMERGENCY MEDICINE

## 2024-10-17 PROCEDURE — 93010 ELECTROCARDIOGRAM REPORT: CPT | Performed by: STUDENT IN AN ORGANIZED HEALTH CARE EDUCATION/TRAINING PROGRAM

## 2024-10-17 NOTE — ED PROVIDER NOTES
Time reflects when diagnosis was documented in both MDM as applicable and the Disposition within this note       Time User Action Codes Description Comment    10/17/2024 12:09 AM Seven Brooks [R07.9] Chest pain     10/17/2024 12:09 AM Seven Brooks [E86.0] Dehydration     10/17/2024 12:10 AM Seven Brooks [R42,  R55] Postural dizziness with presyncope           ED Disposition       ED Disposition   Discharge    Condition   Stable    Date/Time   Thu Oct 17, 2024 12:06 AM    Comment   Loretta Bailey discharge to home/self care.                   Assessment & Plan       Medical Decision Making  64-year-old female with pacemaker with presyncopal episode now with chest pressure. Patient with normal VS on presentation. Appears well and NAD. HEENT exam unremarkable with moist mucous membranes. Lungs CTA with good air movement. Heart sounds normal with RRR. Abdominal exam benign. Normal cap refill and equal pulses. No LE edema.  Concern for dehydration causing presyncope, ACS, pacemaker malfunction, electrolyte abnormality, LOLIS, anemia, viral syndrome.  I will get cardiac workup and pacemaker interrogation and give fluids.    Amount and/or Complexity of Data Reviewed  Labs: ordered. Decision-making details documented in ED Course.  Radiology: ordered and independent interpretation performed.        ED Course as of 10/17/24 0033   Wed Oct 16, 2024   2304 hs TnI 0hr: 3   Thu Oct 17, 2024   0005 hs TnI 2hr: 3   0006 CBC and differential(!)  Stable macrocytic anemia.  No leukocytosis or thrombocytopenia.   0006 Comprehensive metabolic panel(!)  Normal electrolytes and kidney function.  Normal LFTs.   0006 Reassessed patient and discussed reassuring workup and likely diagnosis of dehydration causing symptoms.  I did discuss incidental finding of nonsustained V. tach and plan for cardiology follow-up as scheduled.  I also stressed importance of staying hydrated and eating food during the day. Patient  voiced understanding of the plan and all questions were answered. Strict return precautions given. Patient is hemodynamically stable and safe for discharge at this time.       Medications   sodium chloride 0.9 % bolus 1,000 mL (0 mL Intravenous Stopped 10/16/24 2321)       ED Risk Strat Scores   HEART Risk Score      Flowsheet Row Most Recent Value   Heart Score Risk Calculator    History 0 Filed at: 10/17/2024 0007   ECG 0 Filed at: 10/17/2024 0007   Age 2 Filed at: 10/17/2024 0007   Risk Factors 1 Filed at: 10/17/2024 0007   Troponin 0 Filed at: 10/17/2024 0007   HEART Score 3 Filed at: 10/17/2024 0007                                                   History of Present Illness       Chief Complaint   Patient presents with    Chest Pain     Pt brought in via EMS, started this morning with fatigue and weakness. Went to the mall and had a near syncopal episode with right sided chest pain radiating to the right arm.        Past Medical History:   Diagnosis Date    Anemia     iron def    Arthritis     knees    Carpal tunnel syndrome of right wrist     Chest pain     Colon polyp     Deep vein thrombosis (DVT) of proximal lower extremity (HCC) 01/01/2012    Depression     at times    DVT (deep venous thrombosis) (HCC) 2012    Dysphagia     Encounter for surgical aftercare following surgery of digestive system 09/24/2020    s/p Raghav-En-Y Gastric Bypass with Dr. Oneil in 2017.   INITIAL weight:241 lb INITIAL weight with start of topirmate: 176 lb Goal weight loss of 5-10 %-159 lb-167 lb     Esophageal reflux 05/23/2011    Grade 1 out of 6 intensity murmur 06/24/2019    Heart murmur     History of transfusion 1980    Neck pain     Pacemaker 11/01/2018    Pneumonia     age 16    Psychiatric disorder     Right ear pain     Trigger finger, left     Visit for suture removal 06/25/2021      Past Surgical History:   Procedure Laterality Date    BACK SURGERY      CARDIAC PACEMAKER PLACEMENT  11/01/2018    CERVICAL  DISC SURGERY  2009    PLATES & SCREWS     SECTION      X2    COLONOSCOPY      EPIDURAL BLOCK INJECTION Right 2022    Procedure: RIGHT L3-[id number] EPIDURAL STEROID INJECTION (26470);  Surgeon: Bairon Kamara DO;  Location: [facility];  Service: Pain Management     GASTRIC BYPASS  2017    LAP RINYGB SURGERY    KNEE ARTHROSCOPY      KNEE SURGERY Bilateral     KNEE SURGERY Left 2020    OTHER SURGICAL HISTORY      ARM SURGERIES    NH NDSC WRST SURG W/RLS TRANSVRS CARPL LIGM Right 2018    Procedure: ENDOSCOPIC CARPAL TUNNEL RELEASE;  Surgeon: Leroy Macdonald MD;  Location: [facility];  Service: Orthopedics    NH NJX DX/THER AGT PVRT FACET JT LMBR/SAC 1 LEVEL Bilateral 10/25/2022    Procedure: L3-L4,L4-5,L5-S1 BILATERAL BRANCH BLOCK;  Surgeon: Bairon Kamara DO;  Location: [facility];  Service: Pain Management     NH NJX DX/THER AGT PVRT FACET JT LMBR/SAC 1 LEVEL Bilateral 11/15/2022    Procedure: BILATERAL L3-S1 MEDICAL BRANCH BLOCK (37630,19475,71028);  Surgeon: Bairon Kamara DO;  Location: [facility];  Service: Pain Management     NH TENDON SHEATH INCISION Left 2018    Procedure: LONG TRIGGER FINGER RELEASE;  Surgeon: Leroy Macdonald MD;  Location: [facility];  Service: Orthopedics    RADIOFREQUENCY ABLATION Right 2022    Procedure: RIGHT L3-S1 RADIO FREQUENCY ABLATION (65629,06265);  Surgeon: Bairon Kamara DO;  Location: [facility];  Service: Pain Management     RADIOFREQUENCY ABLATION Left 2022    Procedure: LEFT L3-S1 RADIO FREQUENCY ABLATION (02517,04241);  Surgeon: Bairon Kamara DO;  Location: [facility];  Service: Pain Management     TONSILLECTOMY      UPPER GASTROINTESTINAL ENDOSCOPY        Family History   Problem Relation Age of Onset    Stroke Mother     Alzheimer's disease Mother     Arthritis Mother     Coronary artery disease Mother     Breast cancer Mother 66    Cancer Mother     Heart disease Mother     Hypertension Father     Gout Father     Diabetes  type II Father     Coronary artery disease Father     Heart disease Father     No Known Problems Sister     No Known Problems Sister     No Known Problems Sister     No Known Problems Daughter     No Known Problems Maternal Grandmother     No Known Problems Maternal Grandfather     No Known Problems Paternal Grandmother     No Known Problems Paternal Grandfather     Prostate cancer Brother     Pancreatic cancer Brother     No Known Problems Maternal Aunt     No Known Problems Maternal Aunt     No Known Problems Maternal Aunt     No Known Problems Maternal Aunt     Cancer Maternal Aunt     No Known Problems Paternal Aunt     No Known Problems Paternal Aunt       Social History     Tobacco Use    Smoking status: Every Day     Types: Cigarettes    Smokeless tobacco: Never   Vaping Use    Vaping status: Never Used   Substance Use Topics    Alcohol use: Yes     Alcohol/week: 2.0 standard drinks of alcohol     Types: 1 Glasses of wine, 1 Standard drinks or equivalent per week     Comment: very rare social    Drug use: No      E-Cigarette/Vaping    E-Cigarette Use Never User       E-Cigarette/Vaping Substances    Nicotine No     THC No     CBD No     Flavoring No     Other No     Unknown No       I have reviewed and agree with the history as documented.     HPI  84-year-old female with history of sick sinus syndrome status post pacemaker, DVTs on Eliquis 2.5 mg twice daily presents the ED for evaluation of central chest pressure.  She states that she has been feeling well all day and has had decreased energy and decreased appetite and has barely drink any fluids or ate much.  She was at the mall this evening with her daughter when she started to feel faint and nauseous and her daughter noted that she got very pale and she had to sit down.  She did not fully pass out or fall or hit her head.  She also started feeling the chest pressure around this time.  Currently she still has the chest pressure but the other symptoms have  subsided.  She does endorse nasal congestion and rhinorrhea that is worse than her baseline.  She denies fevers, chills, headache, palpitations, shortness of breath, abdominal pain, vomiting, diarrhea, decreased urine output.  Review of Systems  See HPI      Objective       ED Triage Vitals [10/16/24 2038]   Temperature Pulse Blood Pressure Respirations SpO2 Patient Position - Orthostatic VS   98 °F (36.7 °C) 63 98/55 18 100 % Lying      Temp Source Heart Rate Source BP Location FiO2 (%) Pain Score    Oral Monitor Right arm -- --      Vitals      Date and Time Temp Pulse SpO2 Resp BP Pain Score FACES Pain Rating User   10/16/24 2300 -- 60 98 % 18 113/61 -- -- MW   10/16/24 2230 -- 60 98 % 21 129/58 -- -- MW   10/16/24 2038 98 °F (36.7 °C) 63 100 % 18 98/55 -- -- MARIO            Physical Exam  Constitutional:       Appearance: Normal appearance.   HENT:      Head: Normocephalic and atraumatic.      Mouth/Throat:      Mouth: Mucous membranes are moist.      Pharynx: Oropharynx is clear.   Eyes:      Extraocular Movements: Extraocular movements intact.      Conjunctiva/sclera: Conjunctivae normal.   Cardiovascular:      Rate and Rhythm: Normal rate and regular rhythm.      Pulses: Normal pulses.      Heart sounds: Normal heart sounds.   Pulmonary:      Effort: Pulmonary effort is normal.      Breath sounds: Normal breath sounds.   Abdominal:      General: There is no distension.      Palpations: Abdomen is soft.      Tenderness: There is no abdominal tenderness.   Musculoskeletal:      Cervical back: Normal range of motion and neck supple.      Right lower leg: No edema.      Left lower leg: No edema.   Skin:     General: Skin is warm and dry.      Capillary Refill: Capillary refill takes less than 2 seconds.   Neurological:      General: No focal deficit present.      Mental Status: She is alert and oriented to person, place, and time.   Psychiatric:         Mood and Affect: Mood normal.         Behavior: Behavior  normal.         Thought Content: Thought content normal.         Results Reviewed       Procedure Component Value Units Date/Time    HS Troponin I 2hr [[id number]]  (Normal) Collected: 10/16/24 2321    Lab Status: Final result Specimen: Blood from Arm, Right Updated: 10/16/24 2357     hs TnI 2hr 3 ng/L      Delta 2hr hsTnI 0 ng/L     HS Troponin I 4hr [[id number]]     Lab Status: No result Specimen: Blood     Comprehensive metabolic panel [[id number]]  (Abnormal) Collected: 10/16/24 2125    Lab Status: Final result Specimen: Blood from Arm, Left Updated: 10/16/24 2257     Sodium 141 mmol/L      Potassium 4.2 mmol/L      Chloride 108 mmol/L      CO2 29 mmol/L      ANION GAP 4 mmol/L      BUN 17 mg/dL      Creatinine 0.56 mg/dL      Glucose 98 mg/dL      Calcium 8.3 mg/dL      AST 23 U/L      ALT 19 U/L      Alkaline Phosphatase 61 U/L      Total Protein 6.2 g/dL      Albumin 3.8 g/dL      Total Bilirubin 0.42 mg/dL      eGFR 98 ml/min/1.73sq m     Narrative:      [employer] guidelines for Chronic Kidney Disease (CKD):     Stage 1 with normal or high GFR (GFR > 90 mL/min/1.73 square meters)    Stage 2 Mild CKD (GFR = 60-89 mL/min/1.73 square meters)    Stage 3A Moderate CKD (GFR = 45-59 mL/min/1.73 square meters)    Stage 3B Moderate CKD (GFR = 30-44 mL/min/1.73 square meters)    Stage 4 Severe CKD (GFR = 15-29 mL/min/1.73 square meters)    Stage 5 End Stage CKD (GFR <15 mL/min/1.73 square meters)  Note: GFR calculation is accurate only with a steady state creatinine    HS Troponin 0hr (reflex protocol) [[id number]]  (Normal) Collected: 10/16/24 2125    Lab Status: Final result Specimen: Blood from Arm, Left Updated: 10/16/24 2208     hs TnI 0hr 3 ng/L     CBC and differential [[id number]]  (Abnormal) Collected: 10/16/24 2125    Lab Status: Final result Specimen: Blood from Arm, Left Updated: 10/16/24 2140     WBC 5.96 Thousand/uL      RBC 3.31 Million/uL      Hemoglobin 10.8 g/dL      Hematocrit  33.8 %       fL      MCH 32.6 pg      MCHC 32.0 g/dL      RDW 12.9 %      MPV 11.2 fL      Platelets 193 Thousands/uL      nRBC 0 /100 WBCs      Segmented % 57 %      Immature Grans % 0 %      Lymphocytes % 31 %      Monocytes % 7 %      Eosinophils Relative 4 %      Basophils Relative 1 %      Absolute Neutrophils 3.40 Thousands/µL      Absolute Immature Grans 0.02 Thousand/uL      Absolute Lymphocytes 1.83 Thousands/µL      Absolute Monocytes 0.42 Thousand/µL      Eosinophils Absolute 0.23 Thousand/µL      Basophils Absolute 0.06 Thousands/µL     Fingerstick Glucose (POCT) [[id number]]  (Normal) Collected: 10/16/24 2044    Lab Status: Final result Specimen: Blood Updated: 10/16/24 2044     POC Glucose 104 mg/dl             XR chest 1 view portable   ED Interpretation by Seven Brooks DO (10/16 2249)   No acute findings.          Procedures    ED Medication and Procedure Management   Prior to Admission Medications   Prescriptions Last Dose Informant Patient Reported? Taking?   Ascorbic Acid (VITAMIN C) 100 MG CHEW  Self Yes No   Sig: Chew   Biotin 98869 MCG TABS  Self Yes No   Sig: Take by mouth daily     Calcium Carbonate (CALCI-CHEW PO)  Self Yes No   Sig: Take 650 mg by mouth 2 (two) times a day   Cyanocobalamin (VITAMIN B 12 PO)  Self Yes No   Sig: Take 1,000 mcg by mouth   Eliquis 2.5 MG   No No   Sig: TAKE 1 TABLET BY MOUTH TWICE A DAY   Ferrous Sulfate  (45 Fe) MG TBCR  Self Yes No   Sig: Take 1 tablet by mouth daily   Multiple Vitamin (MULTIVITAMIN) capsule  Self Yes No   Sig: Take 1 capsule by mouth daily 2 chewies daily   acetaminophen (TYLENOL) 650 mg CR tablet  Self Yes No   Sig: Take 650 mg by mouth every 8 (eight) hours as needed for mild pain Take 2 tablets in the AM and 2 tablets in the late evening   albuterol (ProAir HFA) 90 mcg/act inhaler   No No   Sig: Inhale 2 puffs every 6 (six) hours as needed for wheezing   Patient not taking: Reported on 1/30/2024   aluminum-magnesium  hydroxide-simethicone (MAALOX MAX) 400-400-40 MG/5ML suspension   No No   Sig: Take 10 mL by mouth every 6 (six) hours as needed for indigestion or heartburn   atorvastatin (LIPITOR) 10 mg tablet   No No   Sig: TAKE 1 TABLET BY MOUTH EVERY DAY   benzonatate (TESSALON) 200 MG capsule   No No   Sig: Take 1 capsule (200 mg total) by mouth 3 (three) times a day as needed for cough   cholecalciferol (VITAMIN D3) 1,000 units tablet  Self Yes No   Sig: Take 5,000 Units by mouth in the morning   clobetasol (TEMOVATE) 0.05 % ointment   Yes No   dicyclomine (BENTYL) 10 mg capsule  Self No No   Sig: take 1 capsule by mouth four times a day before meals at bedtime   estradiol (ESTRACE) 0.1 mg/g vaginal cream   Yes No   Sig: USE PEA SIZED AMOUNT VAGINALLY NIGHTLY X 2 WEEKS, THEN TWICE WEEKLY   fluticasone (FLONASE) 50 mcg/act nasal spray   No No   Si spray into each nostril daily   gabapentin (NEURONTIN) 300 mg capsule   No No   Sig: Take 1 capsule (300 mg total) by mouth 3 (three) times a day   meclizine (ANTIVERT) 25 mg tablet   No No   Sig: Take 1 tablet (25 mg total) by mouth every 8 (eight) hours as needed for dizziness   metoprolol succinate (TOPROL-XL) 25 mg 24 hr tablet   No No   Sig: TAKE 1 TABLET (25 MG TOTAL) BY MOUTH DAILY.   nystatin (MYCOSTATIN) powder   No No   Sig: APPLY TO AFFECTED AREA TWICE A DAY   ondansetron (ZOFRAN) 8 mg tablet   Yes No   Sig: TAKE 1 TABLET BY MOUTH EVERY 8 HOURS AS NEEDED FOR NAUSEA OR VOMITING.   pantoprazole (PROTONIX) 20 mg tablet   No No   Sig: TAKE 1 TABLET BY MOUTH EVERY DAY   sertraline (ZOLOFT) 50 mg tablet   No No   Sig: Take 1 tablet (50 mg total) by mouth daily   tiZANidine (ZANAFLEX) 2 mg tablet   No No   Sig: Take 1 tablet (2 mg total) by mouth every 8 (eight) hours as needed for muscle spasms   traZODone (DESYREL) 50 mg tablet   No No   Sig: TAKE 1 TABLET BY MOUTH EVERYDAY AT BEDTIME      Facility-Administered Medications Last Administration Doses Remaining   denosumab  (PROLIA) subcutaneous injection 60 mg 8/6/2024  4:44 PM         Patient's Medications   Discharge Prescriptions    No medications on file     No discharge procedures on file.  ED SEPSIS DOCUMENTATION   Time reflects when diagnosis was documented in both MDM as applicable and the Disposition within this note       Time User Action Codes Description Comment    10/17/2024 12:09 AM Seven Brooks [R07.9] Chest pain     10/17/2024 12:09 AM Seven Brooks [E86.0] Dehydration     10/17/2024 12:10 AM Seven Brooks [R42,  R55] Postural dizziness with presyncope                  Seven Brooks DO  10/17/24 0033

## 2024-10-17 NOTE — ED ATTENDING ATTESTATION
10/16/2024  I, Guido Irwin MD, saw and evaluated the patient. I have discussed the patient with the resident/non-physician practitioner and agree with the resident's/non-physician practitioner's findings, Plan of Care, and MDM as documented in the resident's/non-physician practitioner's note, except where noted. All available labs and Radiology studies were reviewed.  I was present for key portions of any procedure(s) performed by the resident/non-physician practitioner and I was immediately available to provide assistance.       At this point I agree with the current assessment done in the Emergency Department.  I have conducted an independent evaluation of this patient a history and physical is as follows:    ED Course         Critical Care Time  Procedures    65 yo female with hx of pacemaker, dvt on eliquis, hypotension chronically, here for generalized malaise, decreased appetite, while at mall today, became pale, more nausea per daughter. No syncope, no trauma.  Pt with nasal congestion and rhinorrhea. No fever.  Pt also with central chest pressure started at same time.  No dizziness.  No palpitations, no sob.  No urinary complaints.  Vss, afebrile, lungs cta, rrr, abdomen soft nontender, no neuro deficits.  No pedal edema.  Cardiac workup, interrogate pacer, ivf.

## 2024-10-17 NOTE — DISCHARGE INSTRUCTIONS
Your labs and tests did not show anything concerning.  Your pacemaker report did show that you experienced a very short arrhythmia several months ago.  You should follow-up with your cardiologist as scheduled.  You should be sure you are staying hydrated and eating enough food during the day as this was likely the cause of your symptoms.  You should return to the emergency room if you have severe chest pain, if you cannot catch your breath even at rest, or if you pass out.

## 2024-10-25 DIAGNOSIS — E78.5 HYPERLIPIDEMIA, UNSPECIFIED HYPERLIPIDEMIA TYPE: ICD-10-CM

## 2024-10-25 RX ORDER — ATORVASTATIN CALCIUM 10 MG/1
10 TABLET, FILM COATED ORAL DAILY
Qty: 100 TABLET | Refills: 1 | Status: SHIPPED | OUTPATIENT
Start: 2024-10-25

## 2024-10-30 ENCOUNTER — REMOTE DEVICE CLINIC VISIT (OUTPATIENT)
Dept: CARDIOLOGY CLINIC | Facility: CLINIC | Age: 64
End: 2024-10-30
Payer: COMMERCIAL

## 2024-10-30 DIAGNOSIS — Z95.0 PRESENCE OF PERMANENT CARDIAC PACEMAKER: Primary | ICD-10-CM

## 2024-10-30 PROCEDURE — 93296 REM INTERROG EVL PM/IDS: CPT | Performed by: INTERNAL MEDICINE

## 2024-10-30 PROCEDURE — 93294 REM INTERROG EVL PM/LDLS PM: CPT | Performed by: INTERNAL MEDICINE

## 2024-10-30 NOTE — PROGRESS NOTES
MDT DC PM/ACTIVE SYSTEM IS MRI CONDITIONAL   CARELINK TRANSMISSION:  BATTERY VOLTAGE ADEQUATE (8.9 YR.).  AP 41.5%  0.3%.  ALL LEAD PARAMETERS WITHIN NORMAL LIMITS.  NO NEW HIGH RATE EPISODES.  NORMAL DEVICE FUNCTION.  RG

## 2024-11-13 ENCOUNTER — TELEPHONE (OUTPATIENT)
Age: 64
End: 2024-11-13

## 2024-11-18 ENCOUNTER — APPOINTMENT (OUTPATIENT)
Dept: LAB | Facility: IMAGING CENTER | Age: 64
End: 2024-11-18
Payer: COMMERCIAL

## 2024-11-18 DIAGNOSIS — R79.89 HIGH SERUM PARATHYROID HORMONE (PTH): ICD-10-CM

## 2024-11-18 DIAGNOSIS — Z98.84 BARIATRIC SURGERY STATUS: ICD-10-CM

## 2024-11-18 LAB
25(OH)D3 SERPL-MCNC: 53.3 NG/ML (ref 30–100)
CALCIUM SERPL-MCNC: 9.1 MG/DL (ref 8.4–10.2)
PTH-INTACT SERPL-MCNC: 61.4 PG/ML (ref 12–88)

## 2024-11-18 PROCEDURE — 82306 VITAMIN D 25 HYDROXY: CPT

## 2024-11-18 PROCEDURE — 83970 ASSAY OF PARATHORMONE: CPT

## 2024-11-18 PROCEDURE — 36415 COLL VENOUS BLD VENIPUNCTURE: CPT

## 2024-11-18 NOTE — TELEPHONE ENCOUNTER
Patient called and was asking if pcp would like her to have labs done before her visit on Wednesday 11/20. If pcp would like patient to have labs done please return patient call to notify once labs have been placed.

## 2024-11-19 ENCOUNTER — RESULTS FOLLOW-UP (OUTPATIENT)
Dept: BARIATRICS | Facility: CLINIC | Age: 64
End: 2024-11-19

## 2024-11-20 ENCOUNTER — OFFICE VISIT (OUTPATIENT)
Dept: FAMILY MEDICINE CLINIC | Facility: CLINIC | Age: 64
End: 2024-11-20
Payer: COMMERCIAL

## 2024-11-20 VITALS
RESPIRATION RATE: 16 BRPM | HEART RATE: 95 BPM | BODY MASS INDEX: 29.02 KG/M2 | OXYGEN SATURATION: 98 % | DIASTOLIC BLOOD PRESSURE: 80 MMHG | TEMPERATURE: 97.1 F | SYSTOLIC BLOOD PRESSURE: 140 MMHG | HEIGHT: 64 IN | WEIGHT: 170 LBS

## 2024-11-20 DIAGNOSIS — Z11.3 SCREENING EXAMINATION FOR STD (SEXUALLY TRANSMITTED DISEASE): ICD-10-CM

## 2024-11-20 DIAGNOSIS — Z23 ENCOUNTER FOR IMMUNIZATION: ICD-10-CM

## 2024-11-20 DIAGNOSIS — F41.9 ANXIETY: ICD-10-CM

## 2024-11-20 DIAGNOSIS — K21.9 GASTROESOPHAGEAL REFLUX DISEASE, UNSPECIFIED WHETHER ESOPHAGITIS PRESENT: ICD-10-CM

## 2024-11-20 DIAGNOSIS — G47.00 INSOMNIA, UNSPECIFIED TYPE: ICD-10-CM

## 2024-11-20 DIAGNOSIS — R73.9 HYPERGLYCEMIA: ICD-10-CM

## 2024-11-20 DIAGNOSIS — I47.10 PSVT (PAROXYSMAL SUPRAVENTRICULAR TACHYCARDIA) (HCC): Primary | ICD-10-CM

## 2024-11-20 DIAGNOSIS — F32.A DEPRESSION, UNSPECIFIED DEPRESSION TYPE: ICD-10-CM

## 2024-11-20 DIAGNOSIS — E78.49 OTHER HYPERLIPIDEMIA: ICD-10-CM

## 2024-11-20 PROCEDURE — G0008 ADMIN INFLUENZA VIRUS VAC: HCPCS

## 2024-11-20 PROCEDURE — 90662 IIV NO PRSV INCREASED AG IM: CPT

## 2024-11-20 PROCEDURE — 99214 OFFICE O/P EST MOD 30 MIN: CPT | Performed by: NURSE PRACTITIONER

## 2024-11-20 NOTE — ASSESSMENT & PLAN NOTE
- Hemoglobin A1c elevated at 6.2  - Encourage healthy diet and regular exercise.  - Will continue to monitor fasting glucose and A1c.  Orders:    Comprehensive metabolic panel; Future    Hemoglobin A1C; Future

## 2024-11-20 NOTE — ASSESSMENT & PLAN NOTE
Component      Latest Ref Rng 6/17/2023 2/7/2024   Cholesterol      See Comment mg/dL 219 (H)  183    Triglycerides      See Comment mg/dL 87  86    HDL      >=50 mg/dL 84  79    LDL Calculated      0 - 100 mg/dL 118 (H)  87       - Well controlled.  - Continue Lipitor 10 mg daily.  - Encourage healthy diet and regular exercise.  - Will continue to monitor fasting lipid panel.   Orders:    CBC and differential; Future    Comprehensive metabolic panel; Future    Hemoglobin A1C; Future    Lipid Panel with Direct LDL reflex; Future    TSH, 3rd generation with Free T4 reflex; Future

## 2024-11-20 NOTE — ASSESSMENT & PLAN NOTE
- Well controlled on trazodone 50 mg at bedtime. Continue same.  - Will continue to monitor.

## 2024-11-20 NOTE — ASSESSMENT & PLAN NOTE
Orders:    [facility]-Syphilis Screening (Total Syphilis IGG/IGM); Future    Hepatitis panel, acute; Future    Chlamydia/GC amplified DNA by PCR; Future    HIV 1/2 AG/AB w Reflex [facility] for 2 yr old and above; Future

## 2024-11-20 NOTE — PROGRESS NOTES
Name: Loretta Avalos      : 1960      MRN: [id number]  Encounter Provider: DANIELA Stinson  Encounter Date: 2024   Encounter department: [facility]    Assessment & Plan  PSVT (paroxysmal supraventricular tachycardia) (HCC)  - Continue Toprol XL.   - Continue routine follow up with Cardiology.        Gastroesophageal reflux disease, unspecified whether esophagitis present    - Stable on pantoprazole 20 mg daily. Continue same.   - Avoid triggering food and beverage.  - Will continue to monitor.     Depression, unspecified depression type  - Stable on Zoloft 100 mg daily. Continue same.   - Will continue to monitor.          Anxiety  - Stable on Zoloft 100 mg daily. Continue same.   - Will continue to monitor.        Insomnia, unspecified type  - Well controlled on trazodone 50 mg at bedtime. Continue same.  - Will continue to monitor.        Other hyperlipidemia  Component      Latest Ref Rng 2023   Cholesterol      See Comment mg/dL 219 (H)  183    Triglycerides      See Comment mg/dL 87  86    HDL      >=50 mg/dL 84  79    LDL Calculated      0 - 100 mg/dL 118 (H)  87       - Well controlled.  - Continue Lipitor 10 mg daily.  - Encourage healthy diet and regular exercise.  - Will continue to monitor fasting lipid panel.   Orders:    CBC and differential; Future    Comprehensive metabolic panel; Future    Hemoglobin A1C; Future    Lipid Panel with Direct LDL reflex; Future    TSH, 3rd generation with Free T4 reflex; Future    Hyperglycemia  - Hemoglobin A1c elevated at 6.2  - Encourage healthy diet and regular exercise.  - Will continue to monitor fasting glucose and A1c.  Orders:    Comprehensive metabolic panel; Future    Hemoglobin A1C; Future    Screening examination for STD (sexually transmitted disease)    Orders:    RPR-Syphilis Screening (Total Syphilis IGG/IGM); Future    Hepatitis panel, acute; Future    Chlamydia/GC amplified DNA by PCR;  Future    HIV 1/2 AG/AB w Reflex [facility] for 2 yr old and above; Future    Encounter for immunization    Orders:    influenza vaccine, high-dose, PF 0.5 mL (Fluzone High Dose)         History of Present Illness     Patient with PMH of GERD, hyperlipidemia,  SVT, sick sinus syndrome, anxiety, depression,  insomnia and hx of DVT presents to office today for follow up. She is taking her prescribed medications and reports no side effects. She is due for mammogram. Also requesting STD screening. She denies any other concerns or complaints today.           Review of Systems   Constitutional:  Negative for fatigue and fever.   HENT:  Negative for trouble swallowing.    Eyes:  Negative for visual disturbance.   Respiratory:  Negative for cough and shortness of breath.    Cardiovascular:  Negative for chest pain and palpitations.   Gastrointestinal:  Negative for abdominal pain and blood in stool.   Endocrine: Negative for cold intolerance and heat intolerance.   Genitourinary:  Negative for difficulty urinating and dysuria.   Musculoskeletal:  Negative for gait problem.   Skin:  Negative for rash.   Neurological:  Negative for dizziness, syncope and headaches.   Hematological:  Negative for adenopathy.   Psychiatric/Behavioral:  Negative for behavioral problems.      Past Medical History:   Diagnosis Date    Anemia     iron def    Arthritis     knees    Carpal tunnel syndrome of right wrist     Chest pain     Colon polyp     Deep vein thrombosis (DVT) of proximal lower extremity (HCC) 01/01/2012    Depression     at times    DVT (deep venous thrombosis) ([facility]) 2012    Dysphagia     Encounter for surgical aftercare following surgery of digestive system 09/24/2020    s/p Raghav-En-Y Gastric Bypass with Dr. Oneil in 2017.   INITIAL weight:241 lb INITIAL weight with start of topirmate: 176 lb Goal weight loss of 5-10 %-159 lb-167 lb     Esophageal reflux 05/23/2011    Grade 1 out of 6 intensity murmur 06/24/2019    Heart  murmur     History of transfusion 1980    Neck pain 2014    Pacemaker 2018    Pneumonia     age 16    Psychiatric disorder     Right ear pain 2020    Trigger finger, left     Visit for suture removal 2021     Past Surgical History:   Procedure Laterality Date    BACK SURGERY      CARDIAC PACEMAKER PLACEMENT  2018    CERVICAL DISC SURGERY  2009    PLATES & SCREWS     SECTION      X2    COLONOSCOPY      EPIDURAL BLOCK INJECTION Right 2022    Procedure: RIGHT [id number] EPIDURAL STEROID INJECTION (37920);  Surgeon: Bairon Kamara DO;  [facility];  Service: Pain Management     GASTRIC BYPASS  2017    LAP RINYGB SURGERY    KNEE ARTHROSCOPY      KNEE SURGERY Bilateral     KNEE SURGERY Left 2020    OTHER SURGICAL HISTORY      ARM SURGERIES    WA NDSC WRST SURG W/RLS TRANSVRS CARPL LIGM Right 2018    Procedure: ENDOSCOPIC CARPAL TUNNEL RELEASE;  Surgeon: Leroy Macdonald MD;  [facility];  Service: Orthopedics    WA NJX DX/THER AGT PVRT FACET JT LMBR/SAC 1 LEVEL Bilateral 10/25/2022    Procedure: L3-L4,L4-5,L5-S1 BILATERAL BRANCH BLOCK;  Surgeon: Bairon Kamara DO;  [facility];  Service: Pain Management     WA NJX DX/THER AGT PVRT FACET JT LMBR/SAC 1 LEVEL Bilateral 11/15/2022    Procedure: BILATERAL L3-S1 MEDICAL BRANCH BLOCK (62818,[id number]);  Surgeon: Bairon Kamara DO;  [facility];  Service: Pain Management     WA TENDON SHEATH INCISION Left 2018    Procedure: LONG TRIGGER FINGER RELEASE;  Surgeon: Leroy Macdonald MD;  [facility];  Service: Orthopedics    RADIOFREQUENCY ABLATION Right 2022    Procedure: RIGHT L3-S1 RADIO FREQUENCY ABLATION (42418,62653);  Surgeon: Bairon Kamara DO;  [facility];  Service: Pain Management     RADIOFREQUENCY ABLATION Left 2022    Procedure: LEFT L3-S1 RADIO FREQUENCY ABLATION (80107,96511);  Surgeon: Bairon Kamara DO;  [facility];   Service: Pain Management     TONSILLECTOMY      UPPER GASTROINTESTINAL ENDOSCOPY       Family History   Problem Relation Age of Onset    Stroke Mother     Alzheimer's disease Mother     Arthritis Mother     Coronary artery disease Mother     Breast cancer Mother 66    Cancer Mother     Heart disease Mother     Hypertension Father     Gout Father     Diabetes type II Father     Coronary artery disease Father     Heart disease Father     No Known Problems Sister     No Known Problems Sister     No Known Problems Sister     No Known Problems Daughter     No Known Problems Maternal Grandmother     No Known Problems Maternal Grandfather     No Known Problems Paternal Grandmother     No Known Problems Paternal Grandfather     Prostate cancer Brother     Pancreatic cancer Brother     No Known Problems Maternal Aunt     No Known Problems Maternal Aunt     No Known Problems Maternal Aunt     No Known Problems Maternal Aunt     Cancer Maternal Aunt     No Known Problems Paternal Aunt     No Known Problems Paternal Aunt      Social History     Tobacco Use    Smoking status: Every Day     Types: Cigarettes    Smokeless tobacco: Never   Vaping Use    Vaping status: Never Used   Substance and Sexual Activity    Alcohol use: Yes     Alcohol/week: 2.0 standard drinks of alcohol     Types: 1 Glasses of wine, 1 Standard drinks or equivalent per week     Comment: very rare social    Drug use: No    Sexual activity: Not Currently     Current Outpatient Medications on File Prior to Visit   Medication Sig    acetaminophen (TYLENOL) 650 mg CR tablet Take 650 mg by mouth every 8 (eight) hours as needed for mild pain Take 2 tablets in the AM and 2 tablets in the late evening    aluminum-magnesium hydroxide-simethicone (MAALOX MAX) 400-400-40 MG/5ML suspension Take 10 mL by mouth every 6 (six) hours as needed for indigestion or heartburn    Ascorbic Acid (VITAMIN C) 100 MG CHEW Chew    atorvastatin (LIPITOR) 10 mg tablet TAKE 1 TABLET BY  MOUTH EVERY DAY    benzonatate (TESSALON) 200 MG capsule Take 1 capsule (200 mg total) by mouth 3 (three) times a day as needed for cough    Biotin 77293 MCG TABS Take by mouth daily      Calcium Carbonate (CALCI-CHEW PO) Take 650 mg by mouth 2 (two) times a day    cholecalciferol (VITAMIN D3) 1,000 units tablet Take 5,000 Units by mouth in the morning    clobetasol (TEMOVATE) 0.05 % ointment     Cyanocobalamin (VITAMIN B 12 PO) Take 1,000 mcg by mouth    dicyclomine (BENTYL) 10 mg capsule take 1 capsule by mouth four times a day before meals at bedtime    Eliquis 2.5 MG TAKE 1 TABLET BY MOUTH TWICE A DAY    estradiol (ESTRACE) 0.1 mg/g vaginal cream USE PEA SIZED AMOUNT VAGINALLY NIGHTLY X 2 WEEKS, THEN TWICE WEEKLY    Ferrous Sulfate  (45 Fe) MG TBCR Take 1 tablet by mouth daily    fluticasone (FLONASE) 50 mcg/act nasal spray 1 spray into each nostril daily    gabapentin (NEURONTIN) 300 mg capsule Take 1 capsule (300 mg total) by mouth 3 (three) times a day    meclizine (ANTIVERT) 25 mg tablet Take 1 tablet (25 mg total) by mouth every 8 (eight) hours as needed for dizziness    metoprolol succinate (TOPROL-XL) 25 mg 24 hr tablet TAKE 1 TABLET (25 MG TOTAL) BY MOUTH DAILY.    Multiple Vitamin (MULTIVITAMIN) capsule Take 1 capsule by mouth daily 2 chewies daily    nystatin (MYCOSTATIN) powder APPLY TO AFFECTED AREA TWICE A DAY    ondansetron (ZOFRAN) 8 mg tablet TAKE 1 TABLET BY MOUTH EVERY 8 HOURS AS NEEDED FOR NAUSEA OR VOMITING.    pantoprazole (PROTONIX) 20 mg tablet TAKE 1 TABLET BY MOUTH EVERY DAY    sertraline (ZOLOFT) 50 mg tablet Take 1 tablet (50 mg total) by mouth daily    tiZANidine (ZANAFLEX) 2 mg tablet Take 1 tablet (2 mg total) by mouth every 8 (eight) hours as needed for muscle spasms    traZODone (DESYREL) 50 mg tablet TAKE 1 TABLET BY MOUTH EVERYDAY AT BEDTIME    albuterol (ProAir HFA) 90 mcg/act inhaler Inhale 2 puffs every 6 (six) hours as needed for wheezing (Patient not taking: Reported  "on 1/30/2024)     No Known Allergies  Immunization History   Administered Date(s) Administered    COVID-19 PFIZER VACCINE 0.3 ML IM 04/03/2021, 04/28/2021, 11/01/2021    COVID-19 Pfizer Vac BIVALENT Sudhakar-sucrose 12 Yr+ IM 10/15/2022    COVID-19 Pfizer mRNA vacc PF sudhakar-sucrose 12 yr and older (Comirnaty) 12/22/2023    Hep A, adult 08/08/2017    Hep A, ped/adol, 2 dose 01/04/2017, 08/08/2017    Hepatitis A 01/04/2017    INFLUENZA 10/05/2011, 11/22/2016, 11/22/2016, 10/23/2017, 10/23/2017, 10/23/2017, 10/15/2019, 10/30/2020, 08/18/2021, 08/18/2021, 10/15/2022, 09/11/2023    Influenza Quadrivalent Preservative Free 3 years and older IM 10/15/2022    Influenza Split High Dose Preservative Free IM 11/20/2024    Influenza, injectable, quadrivalent, preservative free 0.5 mL 10/30/2020    Influenza, recombinant, quadrivalent,injectable, preservative free 10/31/2018, 10/15/2019    Influenza, seasonal, injectable 12/22/2014, 10/13/2015    Pneumococcal Polysaccharide PPV23 02/25/2013, 01/17/2016    Respiratory Syncytial Virus Vaccine (Recombinant) 02/07/2024    Tdap 04/21/2011, 04/10/2017, 06/18/2021    Tuberculin Skin Test-PPD Intradermal 07/28/2021    Zoster 01/17/2016     Objective   /80 (BP Location: Left arm, Patient Position: Sitting, Cuff Size: Standard)   Pulse 95   Temp (!) 97.1 °F (36.2 °C) (Tympanic)   Resp 16   Ht 5' 4\" (1.626 m)   Wt 77.1 kg (170 lb)   LMP 09/18/2014   SpO2 98%   BMI 29.18 kg/m²     Physical Exam  Vitals and nursing note reviewed.   Constitutional:       Appearance: Normal appearance. She is well-developed.   HENT:      Head: Normocephalic and atraumatic.      Right Ear: External ear normal.      Left Ear: External ear normal.   Eyes:      Conjunctiva/sclera: Conjunctivae normal.   Cardiovascular:      Rate and Rhythm: Normal rate and regular rhythm.      Heart sounds: Normal heart sounds.   Pulmonary:      Effort: Pulmonary effort is normal.      Breath sounds: Normal breath sounds. "   Musculoskeletal:         General: Normal range of motion.      Cervical back: Normal range of motion.   Skin:     General: Skin is warm and dry.   Neurological:      Mental Status: She is alert and oriented to person, place, and time.   Psychiatric:         Mood and Affect: Mood normal.         Behavior: Behavior normal.

## 2024-11-21 ENCOUNTER — APPOINTMENT (OUTPATIENT)
Dept: LAB | Facility: CLINIC | Age: 64
End: 2024-11-21
Payer: COMMERCIAL

## 2024-11-21 DIAGNOSIS — Z11.3 SCREENING EXAMINATION FOR STD (SEXUALLY TRANSMITTED DISEASE): ICD-10-CM

## 2024-11-21 PROCEDURE — 87389 HIV-1 AG W/HIV-1&-2 AB AG IA: CPT

## 2024-11-21 PROCEDURE — 87491 CHLMYD TRACH DNA AMP PROBE: CPT

## 2024-11-21 PROCEDURE — 36415 COLL VENOUS BLD VENIPUNCTURE: CPT

## 2024-11-21 PROCEDURE — 87591 N.GONORRHOEAE DNA AMP PROB: CPT

## 2024-11-21 PROCEDURE — 80074 ACUTE HEPATITIS PANEL: CPT

## 2024-11-21 PROCEDURE — 86780 TREPONEMA PALLIDUM: CPT

## 2024-11-22 LAB
C TRACH DNA SPEC QL NAA+PROBE: NEGATIVE
N GONORRHOEA DNA SPEC QL NAA+PROBE: NEGATIVE

## 2024-11-27 ENCOUNTER — OFFICE VISIT (OUTPATIENT)
Dept: FAMILY MEDICINE CLINIC | Facility: CLINIC | Age: 64
End: 2024-11-27
Payer: COMMERCIAL

## 2024-11-27 VITALS
SYSTOLIC BLOOD PRESSURE: 110 MMHG | OXYGEN SATURATION: 98 % | BODY MASS INDEX: 29.02 KG/M2 | DIASTOLIC BLOOD PRESSURE: 72 MMHG | RESPIRATION RATE: 16 BRPM | WEIGHT: 170 LBS | HEIGHT: 64 IN | TEMPERATURE: 96.2 F | HEART RATE: 77 BPM

## 2024-11-27 DIAGNOSIS — M25.511 ACUTE PAIN OF RIGHT SHOULDER: Primary | ICD-10-CM

## 2024-11-27 DIAGNOSIS — L98.9 SKIN LESION: ICD-10-CM

## 2024-11-27 PROCEDURE — G2211 COMPLEX E/M VISIT ADD ON: HCPCS | Performed by: NURSE PRACTITIONER

## 2024-11-27 PROCEDURE — 99213 OFFICE O/P EST LOW 20 MIN: CPT | Performed by: NURSE PRACTITIONER

## 2024-11-27 RX ORDER — PREDNISONE 50 MG/1
50 TABLET ORAL DAILY
Qty: 5 TABLET | Refills: 0 | Status: SHIPPED | OUTPATIENT
Start: 2024-11-27 | End: 2024-12-02

## 2024-11-27 NOTE — ASSESSMENT & PLAN NOTE
- Not well controlled.  - Prescription sent for prednisone 50 mg daily x 5 days. Advised of side effects.  - Referred to Orthopedic Surgery for further evaluation and management.   Orders:    Ambulatory Referral to Orthopedic Surgery; Future    predniSONE 50 mg tablet; Take 1 tablet (50 mg total) by mouth daily for 5 days

## 2024-11-27 NOTE — PROGRESS NOTES
Name: Loretta Avalos      : 1960      MRN: [id number]  Encounter Provider: DANIELA Stinson  Encounter Date: 2024   Encounter department: [facility]    Assessment & Plan  Acute pain of right shoulder  - Not well controlled.  - Prescription sent for prednisone 50 mg daily x 5 days. Advised of side effects.  - Referred to Orthopedic Surgery for further evaluation and management.   Orders:    Ambulatory Referral to Orthopedic Surgery; Future    predniSONE 50 mg tablet; Take 1 tablet (50 mg total) by mouth daily for 5 days    Skin lesion    Orders:    Ambulatory Referral to Dermatology; Future         History of Present Illness     Patient presents to office today with complaints of right shoulder paint hat started a few day ago. She has pain when trying to lift her arm. She denies any injury or trauma. She did have xray of her shoulder in 2023 which showed moderate degenerative changes at the right AC and right glenohumeral joint. She has been taking Tylenol with no improvement. Also reports 2 benign appearing lesions on her face she would like removed. She denies any other concerns or complaints today.        Review of Systems   Constitutional:  Negative for fatigue and fever.   HENT:  Negative for trouble swallowing.    Eyes:  Negative for visual disturbance.   Respiratory:  Negative for cough and shortness of breath.    Cardiovascular:  Negative for chest pain and palpitations.   Gastrointestinal:  Negative for abdominal pain and blood in stool.   Endocrine: Negative for cold intolerance and heat intolerance.   Genitourinary:  Negative for difficulty urinating and dysuria.   Musculoskeletal:  Positive for arthralgias (right shoulder). Negative for gait problem.   Skin:  Negative for rash.   Neurological:  Negative for dizziness, syncope and headaches.   Hematological:  Negative for adenopathy.   Psychiatric/Behavioral:  Negative for behavioral problems.       Past Medical History:   Diagnosis Date    Anemia     iron def    Arthritis     knees    Carpal tunnel syndrome of right wrist     Chest pain     Colon polyp     Deep vein thrombosis (DVT) of proximal lower extremity (HCC) 2012    Depression     at times    DVT (deep venous thrombosis) ([facility])     Dysphagia     Encounter for surgical aftercare following surgery of digestive system 2020    s/p Raghav-En-Y Gastric Bypass with Dr. Oneil in 2017.   INITIAL weight:241 lb INITIAL weight with start of topirmate: 176 lb Goal weight loss of 5-10 %-159 lb-167 lb     Esophageal reflux 2011    Grade 1 out of 6 intensity murmur 2019    Heart murmur     History of transfusion 1980    Neck pain 2014    Pacemaker 2018    Pneumonia     age 16    Psychiatric disorder     Right ear pain 2020    Trigger finger, left     Visit for suture removal 2021     Past Surgical History:   Procedure Laterality Date    BACK SURGERY      CARDIAC PACEMAKER PLACEMENT  2018    CERVICAL DISC SURGERY  2009    PLATES & SCREWS     SECTION      X2    COLONOSCOPY      EPIDURAL BLOCK INJECTION Right 2022    Procedure: RIGHT [id number] EPIDURAL STEROID INJECTION (34425);  Surgeon: Bairon Kamara DO;  [facility];  Service: Pain Management     GASTRIC BYPASS  2017    LAP RINYGB SURGERY    KNEE ARTHROSCOPY      KNEE SURGERY Bilateral     KNEE SURGERY Left 2020    OTHER SURGICAL HISTORY      ARM SURGERIES    GA NDSC WRST SURG W/RLS TRANSVRS CARPL LIGM Right 2018    Procedure: ENDOSCOPIC CARPAL TUNNEL RELEASE;  Surgeon: Leroy Macdonald MD;  [facility];  Service: Orthopedics    GA NJX DX/THER AGT PVRT FACET JT LMBR/SAC 1 LEVEL Bilateral 10/25/2022    Procedure: L3-L4,L4-5,L5-S1 BILATERAL BRANCH BLOCK;  Surgeon: Bairon Kamara DO;  [facility];  Service: Pain Management     GA NJX DX/THER AGT PVRT FACET JT LMBR/SAC 1 LEVEL Bilateral  11/15/2022    Procedure: BILATERAL L3-S1 MEDICAL BRANCH BLOCK ([id number],[id number]);  Surgeon: Bairon Kamara DO;  [facility];  Service: Pain Management     [place] TENDON SHEATH INCISION Left 9/18/2018    Procedure: LONG TRIGGER FINGER RELEASE;  Surgeon: Leroy Macdonald MD;  [facility];  Service: Orthopedics    RADIOFREQUENCY ABLATION Right 12/6/2022    Procedure: RIGHT L3-S1 RADIO FREQUENCY ABLATION (95611,12603);  Surgeon: Bairon Kamara DO;  [facility];  Service: Pain Management     RADIOFREQUENCY ABLATION Left 12/20/2022    Procedure: LEFT L3-S1 RADIO FREQUENCY ABLATION (81306,42393);  Surgeon: Bairon Kamara DO;  [facility];  Service: Pain Management     TONSILLECTOMY      UPPER GASTROINTESTINAL ENDOSCOPY       Family History   Problem Relation Age of Onset    Stroke Mother     Alzheimer's disease Mother     Arthritis Mother     Coronary artery disease Mother     Breast cancer Mother 66    Cancer Mother     Heart disease Mother     Hypertension Father     Gout Father     Diabetes type II Father     Coronary artery disease Father     Heart disease Father     No Known Problems Sister     No Known Problems Sister     No Known Problems Sister     No Known Problems Daughter     No Known Problems Maternal Grandmother     No Known Problems Maternal Grandfather     No Known Problems Paternal Grandmother     No Known Problems Paternal Grandfather     Prostate cancer Brother     Pancreatic cancer Brother     No Known Problems Maternal Aunt     No Known Problems Maternal Aunt     No Known Problems Maternal Aunt     No Known Problems Maternal Aunt     Cancer Maternal Aunt     No Known Problems Paternal Aunt     No Known Problems Paternal Aunt      Social History     Tobacco Use    Smoking status: Every Day     Types: Cigarettes    Smokeless tobacco: Never   Vaping Use    Vaping status: Never Used   Substance and Sexual Activity    Alcohol use: Yes     Alcohol/week: 2.0 standard drinks of  alcohol     Types: 1 Glasses of wine, 1 Standard drinks or equivalent per week     Comment: very rare social    Drug use: No    Sexual activity: Not Currently     Current Outpatient Medications on File Prior to Visit   Medication Sig    acetaminophen (TYLENOL) 650 mg CR tablet Take 650 mg by mouth every 8 (eight) hours as needed for mild pain Take 2 tablets in the AM and 2 tablets in the late evening    aluminum-magnesium hydroxide-simethicone (MAALOX MAX) 400-400-40 MG/5ML suspension Take 10 mL by mouth every 6 (six) hours as needed for indigestion or heartburn    Ascorbic Acid (VITAMIN C) 100 MG CHEW Chew    atorvastatin (LIPITOR) 10 mg tablet TAKE 1 TABLET BY MOUTH EVERY DAY    benzonatate (TESSALON) 200 MG capsule Take 1 capsule (200 mg total) by mouth 3 (three) times a day as needed for cough    Biotin 17772 MCG TABS Take by mouth daily      Calcium Carbonate (CALCI-CHEW PO) Take 650 mg by mouth 2 (two) times a day    cholecalciferol (VITAMIN D3) 1,000 units tablet Take 5,000 Units by mouth in the morning    clobetasol (TEMOVATE) 0.05 % ointment     Cyanocobalamin (VITAMIN B 12 PO) Take 1,000 mcg by mouth    dicyclomine (BENTYL) 10 mg capsule take 1 capsule by mouth four times a day before meals at bedtime    Eliquis 2.5 MG TAKE 1 TABLET BY MOUTH TWICE A DAY    estradiol (ESTRACE) 0.1 mg/g vaginal cream USE PEA SIZED AMOUNT VAGINALLY NIGHTLY X 2 WEEKS, THEN TWICE WEEKLY    Ferrous Sulfate  (45 Fe) MG TBCR Take 1 tablet by mouth daily    fluticasone (FLONASE) 50 mcg/act nasal spray 1 spray into each nostril daily    gabapentin (NEURONTIN) 300 mg capsule Take 1 capsule (300 mg total) by mouth 3 (three) times a day    meclizine (ANTIVERT) 25 mg tablet Take 1 tablet (25 mg total) by mouth every 8 (eight) hours as needed for dizziness    metoprolol succinate (TOPROL-XL) 25 mg 24 hr tablet TAKE 1 TABLET (25 MG TOTAL) BY MOUTH DAILY.    Multiple Vitamin (MULTIVITAMIN) capsule Take 1 capsule by mouth daily 2  chewies daily    nystatin (MYCOSTATIN) powder APPLY TO AFFECTED AREA TWICE A DAY    ondansetron (ZOFRAN) 8 mg tablet TAKE 1 TABLET BY MOUTH EVERY 8 HOURS AS NEEDED FOR NAUSEA OR VOMITING.    pantoprazole (PROTONIX) 20 mg tablet TAKE 1 TABLET BY MOUTH EVERY DAY    sertraline (ZOLOFT) 50 mg tablet Take 1 tablet (50 mg total) by mouth daily    tiZANidine (ZANAFLEX) 2 mg tablet Take 1 tablet (2 mg total) by mouth every 8 (eight) hours as needed for muscle spasms    traZODone (DESYREL) 50 mg tablet TAKE 1 TABLET BY MOUTH EVERYDAY AT BEDTIME    albuterol (ProAir HFA) 90 mcg/act inhaler Inhale 2 puffs every 6 (six) hours as needed for wheezing (Patient not taking: Reported on 11/27/2024)     No Known Allergies  Immunization History   Administered Date(s) Administered    COVID-19 PFIZER VACCINE 0.3 ML IM 04/03/2021, 04/28/2021, 11/01/2021    COVID-19 Pfizer Vac BIVALENT Sudhakar-sucrose 12 Yr+ IM 10/15/2022    COVID-19 Pfizer mRNA vacc PF sudhakar-sucrose 12 yr and older (Comirnaty) 12/22/2023    Hep A, adult 08/08/2017    Hep A, ped/adol, 2 dose 01/04/2017, 08/08/2017    Hepatitis A 01/04/2017    INFLUENZA 10/05/2011, 11/22/2016, 11/22/2016, 10/23/2017, 10/23/2017, 10/23/2017, 10/15/2019, 10/30/2020, 08/18/2021, 08/18/2021, 10/15/2022, 09/11/2023    Influenza Quadrivalent Preservative Free 3 years and older IM 10/15/2022    Influenza Split High Dose Preservative Free IM 11/20/2024    Influenza, injectable, quadrivalent, preservative free 0.5 mL 10/30/2020    Influenza, recombinant, quadrivalent,injectable, preservative free 10/31/2018, 10/15/2019    Influenza, seasonal, injectable 12/22/2014, 10/13/2015    Pneumococcal Polysaccharide PPV23 02/25/2013, 01/17/2016    Respiratory Syncytial Virus Vaccine (Recombinant) 02/07/2024    Tdap 04/21/2011, 04/10/2017, 06/18/2021    Tuberculin Skin Test-PPD Intradermal 07/28/2021    Zoster 01/17/2016     Objective   /72 (BP Location: Left arm, Patient Position: Sitting, Cuff Size:  "Standard)   Pulse 77   Temp (!) 96.2 °F (35.7 °C) (Tympanic)   Resp 16   Ht 5' 4\" (1.626 m)   Wt 77.1 kg (170 lb)   LMP 09/18/2014   SpO2 98%   BMI 29.18 kg/m²     Physical Exam  Vitals and nursing note reviewed.   Constitutional:       Appearance: Normal appearance. She is well-developed.   HENT:      Head: Normocephalic and atraumatic.      Right Ear: External ear normal.      Left Ear: External ear normal.   Eyes:      Conjunctiva/sclera: Conjunctivae normal.   Cardiovascular:      Rate and Rhythm: Normal rate and regular rhythm.      Heart sounds: Normal heart sounds.   Pulmonary:      Effort: Pulmonary effort is normal.      Breath sounds: Normal breath sounds.   Musculoskeletal:      Right shoulder: Tenderness present. Decreased range of motion.      Cervical back: Normal range of motion.   Skin:     General: Skin is warm and dry.   Neurological:      Mental Status: She is alert and oriented to person, place, and time.   Psychiatric:         Mood and Affect: Mood normal.         Behavior: Behavior normal.         "

## 2024-11-29 ENCOUNTER — RESULTS FOLLOW-UP (OUTPATIENT)
Dept: FAMILY MEDICINE CLINIC | Facility: CLINIC | Age: 64
End: 2024-11-29

## 2024-11-30 DIAGNOSIS — I82.4Y9 DEEP VEIN THROMBOSIS (DVT) OF PROXIMAL LOWER EXTREMITY, UNSPECIFIED CHRONICITY, UNSPECIFIED LATERALITY (HCC): ICD-10-CM

## 2024-12-02 DIAGNOSIS — M25.511 ACUTE PAIN OF RIGHT SHOULDER: ICD-10-CM

## 2024-12-02 RX ORDER — APIXABAN 2.5 MG/1
2.5 TABLET, FILM COATED ORAL 2 TIMES DAILY
Qty: 60 TABLET | Refills: 5 | Status: SHIPPED | OUTPATIENT
Start: 2024-12-02

## 2024-12-02 NOTE — TELEPHONE ENCOUNTER
Patient called in and stated that she would like a refill on the predniSONE 50 mg. Patient stated  that she doesn't see the orthopedic till 12/4. Patient has non left.    Per request please send to [facility] pharmacy on file.    Thank you.

## 2024-12-03 ENCOUNTER — TELEPHONE (OUTPATIENT)
Dept: FAMILY MEDICINE CLINIC | Facility: CLINIC | Age: 64
End: 2024-12-03

## 2024-12-03 RX ORDER — PREDNISONE 50 MG/1
50 TABLET ORAL DAILY
Qty: 5 TABLET | Refills: 0 | OUTPATIENT
Start: 2024-12-03 | End: 2024-12-08

## 2024-12-04 ENCOUNTER — OFFICE VISIT (OUTPATIENT)
Age: 64
End: 2024-12-04
Payer: COMMERCIAL

## 2024-12-04 ENCOUNTER — APPOINTMENT (OUTPATIENT)
Age: 64
End: 2024-12-04
Payer: COMMERCIAL

## 2024-12-04 VITALS
DIASTOLIC BLOOD PRESSURE: 64 MMHG | WEIGHT: 170 LBS | HEIGHT: 64 IN | BODY MASS INDEX: 29.02 KG/M2 | SYSTOLIC BLOOD PRESSURE: 104 MMHG

## 2024-12-04 DIAGNOSIS — M25.511 ACUTE PAIN OF RIGHT SHOULDER: ICD-10-CM

## 2024-12-04 DIAGNOSIS — S46.011A ROTATOR CUFF STRAIN, RIGHT, INITIAL ENCOUNTER: Primary | ICD-10-CM

## 2024-12-04 PROCEDURE — 99214 OFFICE O/P EST MOD 30 MIN: CPT | Performed by: PHYSICIAN ASSISTANT

## 2024-12-04 PROCEDURE — 73030 X-RAY EXAM OF SHOULDER: CPT

## 2024-12-04 PROCEDURE — 20610 DRAIN/INJ JOINT/BURSA W/O US: CPT | Performed by: PHYSICIAN ASSISTANT

## 2024-12-04 RX ORDER — BUPIVACAINE HYDROCHLORIDE 2.5 MG/ML
4 INJECTION, SOLUTION INFILTRATION; PERINEURAL
Status: COMPLETED | OUTPATIENT
Start: 2024-12-04 | End: 2024-12-04

## 2024-12-04 RX ORDER — BETAMETHASONE SODIUM PHOSPHATE AND BETAMETHASONE ACETATE 3; 3 MG/ML; MG/ML
6 INJECTION, SUSPENSION INTRA-ARTICULAR; INTRALESIONAL; INTRAMUSCULAR; SOFT TISSUE
Status: COMPLETED | OUTPATIENT
Start: 2024-12-04 | End: 2024-12-04

## 2024-12-04 RX ADMIN — BUPIVACAINE HYDROCHLORIDE 4 ML: 2.5 INJECTION, SOLUTION INFILTRATION; PERINEURAL at 10:30

## 2024-12-04 RX ADMIN — BETAMETHASONE SODIUM PHOSPHATE AND BETAMETHASONE ACETATE 6 MG: 3; 3 INJECTION, SUSPENSION INTRA-ARTICULAR; INTRALESIONAL; INTRAMUSCULAR; SOFT TISSUE at 10:30

## 2024-12-04 NOTE — PROGRESS NOTES
"Patient Name:  Loretta Avalos  MRN:  [id number]    Assessment & Plan     Right shoulder pain, likely rotator cuff strain.  Patient was offered and accepted a right shoulder subacromial space corticosteroid injection today.  Recommended formal physical therapy.  Patient declined due to her work schedule.  Handout provided with home exercises.  Continue Tylenol as needed for pain.  Patient is unable to take NSAIDs due to taking Eliquis.  Activities as tolerated with modification avoid pain.  Follow-up in 6 weeks.    Chief Complaint     Right shoulder pain    History of the Present Illness     Loretta Avalos is a 64 y.o. right-hand-dominant female who reports to the office today for evaluation of her right shoulder.  She notes an onset of pain a little over a week ago.  She denies any injury or trauma but states the pain began after reaching outward to grab her wash basket.  She states she felt a pulling sensation and noted an onset of significant pain.  Initially pain was in the posterior shoulder.  Her pain has now traveled to her anterior and lateral shoulder as well.  She notes pain with all use and movement primarily reaching overhead behind her back.  She also notes increased pain with lying on her right side at night.  She notes weakness due to the pain.  No instability or numbness and tingling.  Currently pain is 8 out of 10 in intensity.  She was initially evaluated by her PCP who prescribed a prednisone burst which did provide transient significant improvement.  She also takes Tylenol with limited improvement.  She is unable to take NSAIDs due to taking Eliquis.    Physical Exam     /64 (BP Location: Left arm, Patient Position: Sitting, Cuff Size: Large)   Ht 5' 4\" (1.626 m)   Wt 77.1 kg (170 lb)   LMP 09/18/2014   BMI 29.18 kg/m²     Right shoulder: Wrist deformity.  Skin intact.  No erythema ecchymosis or swelling.  There is tenderness anterior shoulder and lateral shoulder.  No tenderness " posterior shoulder and AC joint. PROM is , ER-abd 90, IR-abd 50.  Impingement signs are positive.  Empty can test is positive.  Speed's Test is positive.  Cross-body adduction test is negative.  5 out of 5 forward flexion strength.  Elbow wrist and digital range of motion are intact.  Sensation intact axillary, median, ulnar and radial nerves.  2+ radial pulse.    Eyes: Anicteric sclerae.  ENT: Trachea midline.  Lungs: Normal respiratory effort.  CV: Capillary refill is less than 2 seconds.  Skin: Intact without erythema.  Lymph: No palpable lymphadenopathy.  Neuro: Sensation is grossly intact to light touch.  Psych: Mood and affect are appropriate.    Data Review     I have personally reviewed pertinent films in PACS, and my interpretation follows:    X-rays right shoulder 12/4/24: No acute osseous abnormality.  No fracture or dislocation.  Degenerative changes appreciated about the AC(moderate) and glenohumeral joint(mild).    Past Medical History:   Diagnosis Date    Anemia     iron def    Arthritis     knees    Carpal tunnel syndrome of right wrist     Chest pain     Colon polyp     Deep vein thrombosis (DVT) of proximal lower extremity (HCC) 01/01/2012    Depression     at times    Diabetes mellitus (HCC) 12/2/2014    Disease of thyroid gland 12/2/2014    DVT (deep venous thrombosis) ([facility]) 2012    Dysphagia     Encounter for surgical aftercare following surgery of digestive system 09/24/2020    s/p Raghav-En-Y Gastric Bypass with Dr. Oneil in 2017.   INITIAL weight:241 lb INITIAL weight with start of topirmate: 176 lb Goal weight loss of 5-10 %-159 lb-167 lb     Esophageal reflux 05/23/2011    GERD (gastroesophageal reflux disease) 5/23/2011    Grade 1 out of 6 intensity murmur 06/24/2019    Heart murmur     History of transfusion 1980    Hypertension 12/2/2014    Neck pain 12/02/2014    Pacemaker 11/01/2018    Pneumonia     age 16    Psychiatric disorder     Right ear pain 01/21/2020    Trigger  finger, left     Visit for suture removal 2021       Past Surgical History:   Procedure Laterality Date    BACK SURGERY      CARDIAC PACEMAKER PLACEMENT  2018    CERVICAL DISC SURGERY  2009    PLATES & SCREWS     SECTION      X2    COLONOSCOPY      EPIDURAL BLOCK INJECTION Right 2022    Procedure: RIGHT L3-[id number] EPIDURAL STEROID INJECTION (52422);  Surgeon: Bairon Kamara DO;  Location: [facility];  Service: Pain Management     GASTRIC BYPASS  2017    LAP RINYGB SURGERY    INSERT / REPLACE / REMOVE PACEMAKER      KNEE ARTHROSCOPY      KNEE SURGERY Bilateral     KNEE SURGERY Left 2020    OTHER SURGICAL HISTORY      ARM SURGERIES    CA NDSC WRST SURG W/RLS TRANSVRS CARPL LIGM Right 2018    Procedure: ENDOSCOPIC CARPAL TUNNEL RELEASE;  Surgeon: Leroy Macdonald MD;  Location: [facility];  Service: Orthopedics    CA NJX DX/THER AGT PVRT FACET JT LMBR/SAC 1 LEVEL Bilateral 10/25/2022    Procedure: L3-L4,L4-5,L5-S1 BILATERAL BRANCH BLOCK;  Surgeon: Bairon Kamara DO;  Location: [facility];  Service: Pain Management     CA NJX DX/THER AGT PVRT FACET JT LMBR/SAC 1 LEVEL Bilateral 11/15/2022    Procedure: BILATERAL L3-S1 MEDICAL BRANCH BLOCK (24100,98509,45973);  Surgeon: Bairon Kamara DO;  Location: [facility];  Service: Pain Management     CA TENDON SHEATH INCISION Left 2018    Procedure: LONG TRIGGER FINGER RELEASE;  Surgeon: Leroy Macdonald MD;  [facility];  Service: Orthopedics    RADIOFREQUENCY ABLATION Right 2022    Procedure: RIGHT L3-S1 RADIO FREQUENCY ABLATION (33613,17715);  Surgeon: Bairon Kamara DO;  Location: [facility];  Service: Pain Management     RADIOFREQUENCY ABLATION Left 2022    Procedure: LEFT L3-S1 RADIO FREQUENCY ABLATION (44504,[id number]);  Surgeon: Bairon Kamara DO;  Location: [facility];  Service: Pain Management     TONSILLECTOMY      TOTAL HIP ARTHROPLASTY Right 2023    UPPER GASTROINTESTINAL ENDOSCOPY       WRIST SURGERY  2018       No Known Allergies    Current Outpatient Medications on File Prior to Visit   Medication Sig Dispense Refill    acetaminophen (TYLENOL) 650 mg CR tablet Take 650 mg by mouth every 8 (eight) hours as needed for mild pain Take 2 tablets in the AM and 2 tablets in the late evening      aluminum-magnesium hydroxide-simethicone (MAALOX MAX) 400-400-40 MG/5ML suspension Take 10 mL by mouth every 6 (six) hours as needed for indigestion or heartburn 355 mL 0    apixaban (Eliquis) 2.5 mg TAKE 1 TABLET BY MOUTH TWICE A DAY 60 tablet 5    Ascorbic Acid (VITAMIN C) 100 MG CHEW Chew      atorvastatin (LIPITOR) 10 mg tablet TAKE 1 TABLET BY MOUTH EVERY  tablet 1    benzonatate (TESSALON) 200 MG capsule Take 1 capsule (200 mg total) by mouth 3 (three) times a day as needed for cough 20 capsule 0    Biotin 03599 MCG TABS Take by mouth daily        Calcium Carbonate (CALCI-CHEW PO) Take 650 mg by mouth 2 (two) times a day      cholecalciferol (VITAMIN D3) 1,000 units tablet Take 5,000 Units by mouth in the morning      clobetasol (TEMOVATE) 0.05 % ointment       Cyanocobalamin (VITAMIN B 12 PO) Take 1,000 mcg by mouth      dicyclomine (BENTYL) 10 mg capsule take 1 capsule by mouth four times a day before meals at bedtime 120 capsule 2    estradiol (ESTRACE) 0.1 mg/g vaginal cream USE PEA SIZED AMOUNT VAGINALLY NIGHTLY X 2 WEEKS, THEN TWICE WEEKLY      Ferrous Sulfate  (45 Fe) MG TBCR Take 1 tablet by mouth daily      fluticasone (FLONASE) 50 mcg/act nasal spray 1 spray into each nostril daily 32 g 3    gabapentin (NEURONTIN) 300 mg capsule Take 1 capsule (300 mg total) by mouth 3 (three) times a day 270 capsule 3    meclizine (ANTIVERT) 25 mg tablet Take 1 tablet (25 mg total) by mouth every 8 (eight) hours as needed for dizziness 60 tablet 0    metoprolol succinate (TOPROL-XL) 25 mg 24 hr tablet TAKE 1 TABLET (25 MG TOTAL) BY MOUTH DAILY. 100 tablet 3    Multiple Vitamin (MULTIVITAMIN) capsule  Take 1 capsule by mouth daily 2 chewies daily      nystatin (MYCOSTATIN) powder APPLY TO AFFECTED AREA TWICE A DAY 30 g 0    ondansetron (ZOFRAN) 8 mg tablet TAKE 1 TABLET BY MOUTH EVERY 8 HOURS AS NEEDED FOR NAUSEA OR VOMITING.      pantoprazole (PROTONIX) 20 mg tablet TAKE 1 TABLET BY MOUTH EVERY DAY 90 tablet 1    sertraline (ZOLOFT) 50 mg tablet Take 1 tablet (50 mg total) by mouth daily 90 tablet 3    tiZANidine (ZANAFLEX) 2 mg tablet Take 1 tablet (2 mg total) by mouth every 8 (eight) hours as needed for muscle spasms 270 tablet 3    traZODone (DESYREL) 50 mg tablet TAKE 1 TABLET BY MOUTH EVERYDAY AT BEDTIME 90 tablet 1    albuterol (ProAir HFA) 90 mcg/act inhaler Inhale 2 puffs every 6 (six) hours as needed for wheezing (Patient not taking: Reported on 2024) 8.5 g 0     Current Facility-Administered Medications on File Prior to Visit   Medication Dose Route Frequency Provider Last Rate Last Admin    denosumab (PROLIA) subcutaneous injection 60 mg  60 mg Subcutaneous Q6 Months    60 mg at 24 1644       Social History     Tobacco Use    Smoking status: Former     Current packs/day: 0.00     Average packs/day: 0.5 packs/day for 30.0 years (15.0 ttl pk-yrs)     Types: Cigarettes     Quit date: 2016     Years since quittin.0    Smokeless tobacco: Never    Tobacco comments:     Started when I was in school   Vaping Use    Vaping status: Never Used   Substance Use Topics    Alcohol use: Yes     Alcohol/week: 2.0 standard drinks of alcohol     Types: 1 Glasses of wine, 1 Standard drinks or equivalent per week     Comment: very rare social    Drug use: No       Family History   Problem Relation Age of Onset    Stroke Mother     Alzheimer's disease Mother     Arthritis Mother     Coronary artery disease Mother     Breast cancer Mother 66    Cancer Mother     Heart disease Mother     Clotting disorder Mother     Dementia Mother     Hypertension Father     Gout Father     Diabetes type II Father      Coronary artery disease Father     Heart disease Father     No Known Problems Sister     No Known Problems Sister     No Known Problems Sister     No Known Problems Daughter     No Known Problems Maternal Grandmother     No Known Problems Maternal Grandfather     No Known Problems Paternal Grandmother     No Known Problems Paternal Grandfather     Prostate cancer Brother     Pancreatic cancer Brother     No Known Problems Maternal Aunt     No Known Problems Maternal Aunt     No Known Problems Maternal Aunt     No Known Problems Maternal Aunt     Cancer Maternal Aunt     No Known Problems Paternal Aunt     No Known Problems Paternal Aunt     Heart disease Brother     Cancer Brother         Kuldip       Review of Systems     As stated in the HPI. All other systems reviewed and are negative.      Large joint arthrocentesis: R subacromial bursa  Procedure Details  Location: shoulder - R subacromial bursa  Needle size: 22 G  Ultrasound guidance: no  Approach: posterior  Medications administered: 4 mL bupivacaine 0.25 %; 6 mg betamethasone acetate-betamethasone sodium phosphate 6 (3-3) mg/mL    Patient tolerance: patient tolerated the procedure well with no immediate complications  Dressing:  Sterile dressing applied

## 2024-12-08 DIAGNOSIS — J32.9 SINUSITIS, UNSPECIFIED CHRONICITY, UNSPECIFIED LOCATION: ICD-10-CM

## 2024-12-10 RX ORDER — FLUTICASONE PROPIONATE 50 MCG
SPRAY, SUSPENSION (ML) NASAL
Qty: 32 ML | Refills: 3 | Status: SHIPPED | OUTPATIENT
Start: 2024-12-10

## 2024-12-16 ENCOUNTER — TELEPHONE (OUTPATIENT)
Age: 64
End: 2024-12-16

## 2024-12-16 NOTE — TELEPHONE ENCOUNTER
Patient called to see if they could be seen with a sooner appointment date/time.      Advised there are no sooner appointments available.      Patient will call back to check again.  Patient kept the appointment and will be seen.

## 2024-12-20 PROBLEM — Z11.3 SCREENING EXAMINATION FOR STD (SEXUALLY TRANSMITTED DISEASE): Status: RESOLVED | Noted: 2024-11-20 | Resolved: 2024-12-20

## 2025-01-07 ENCOUNTER — TELEPHONE (OUTPATIENT)
Age: 65
End: 2025-01-07

## 2025-01-07 NOTE — TELEPHONE ENCOUNTER
LVM for pt regarding appt on 07/30 with Dr Ralph in our [place] office. Apologized and informed pt of schedule change and that we will need to r/s this appt to the first available new patient appt on 10/16 at 8 am with Dr DOTY in [place]. Advised pt to callback to r/s or cancel this appt if needed.

## 2025-01-09 ENCOUNTER — TELEPHONE (OUTPATIENT)
Dept: FAMILY MEDICINE CLINIC | Facility: CLINIC | Age: 65
End: 2025-01-09

## 2025-01-09 DIAGNOSIS — B00.1 COLD SORE: Primary | ICD-10-CM

## 2025-01-09 RX ORDER — VALACYCLOVIR HYDROCHLORIDE 1 G/1
2000 TABLET, FILM COATED ORAL 2 TIMES DAILY
Qty: 30 TABLET | Refills: 0 | Status: SHIPPED | OUTPATIENT
Start: 2025-01-09 | End: 2025-01-10

## 2025-01-09 NOTE — TELEPHONE ENCOUNTER
Patient called the RX Refill Line. Message is being forwarded to the office.     Patient is requesting a refill for valACYclovir (VALTREX) 1,000 mg tablet, The patient stated she has not gotten this medication in a long time. She also stated she did take two today from an old script but realized the script  in , she would like to know if there is anything she should look out for since she took the old medication. Please review and advise the patient.    Please contact patient at

## 2025-01-09 NOTE — TELEPHONE ENCOUNTER
I sent new prescription for valtrex.   Nothing to look out for since taking the  medication. She should be fine.

## 2025-01-20 ENCOUNTER — TELEPHONE (OUTPATIENT)
Age: 65
End: 2025-01-20

## 2025-01-20 NOTE — TELEPHONE ENCOUNTER
Pt ins was cancelled and she is in the process of getting it reestablished.  She R/S her appt for today so I gave her a quote for self pay just since she does not currently have ins and am asking the office to send her the letter.  I did let the pt know that once her ins starts to please give us a call so we can add the info to her chart

## 2025-02-03 ENCOUNTER — TELEPHONE (OUTPATIENT)
Age: 65
End: 2025-02-03

## 2025-02-03 NOTE — TELEPHONE ENCOUNTER
Pt asking how much would the Prolia injection be ?    And can she be billed for it / so she could set up a payment plan with [facility]?    Pt requesting a call back

## 2025-03-03 NOTE — OP NOTE
Airway       Patient location during procedure: OR       Procedure Start/Stop Times: 3/3/2025 12:15 PM and 3/3/2025 12:15 PM  Staff -        CRNA: Tre Goodman APRN CRNA       Performed By: CRNA  Consent for Airway        Urgency: elective  Indications and Patient Condition       Indications for airway management: consuelo-procedural       Induction type:intravenous       Mask difficulty assessment: 0 - not attempted    Final Airway Details       Final airway type: supraglottic airway    Supraglottic Airway Details        Type: LMA       Brand: LMA Unique       LMA size: 4    Post intubation assessment        Placement verified by: capnometry, equal breath sounds and chest rise        Number of attempts at approach: 1       Number of other approaches attempted: 0       Ease of procedure: easy       Dentition: Intact    Medication(s) Administered   Medication Administration Time: 3/3/2025 12:15 PM         ELECTROPHYSIOLOGY  OPERATIVE REPORT  PATIENT NAME: Chirag Geronimo   :  1960  MRN: [id number]  Date of surgery: 18  Surgeon: Ino Parnell DO Pt Location: Cath Lab    PROCEDURE PERFORMED:   1)Dual Chamber Permanent Pacemaker Implantation      Preoperative Medications: general  ANESTHESIA: ancef    PREOPERATIVE DIAGNOSIS:   Symptomatic Sick Sinus syndrome       POSTOPERATIVE DIAGNOSIS: Successful Dual Chamber Permanent Pacemaker implant  Same as Preop  Informed Consent: Risks, benefits, and alternatives discussed with patient and any family present  The patient understands risks, which include but are not limited to life threatening  bleeding, infection, air around lungs, blood around the heart and reoperation dislodged or malfunctioning device  Procedure Description:  After informed consent was obtained, the patient was brought to the electrophysiology laboratory NPO  A time out was called and the patient was properly identified  The patient was pre-medicated as above  The left infraclavicular area was prepped and draped in the usual sterile fashion  After local anesthetic infiltration with 1% lidocaine, an incision was made below clavicle  The incision was extended down to the level of the pectoral fascia  A pocket was formed above the pectoral fascia using cautery and blunt dissection  axillary approach was done  A safe sheath introducer was advanced over a wire into the axillary vein, the wire was confirmed to be in the right atrium on flouroscopy, the wire was removed  Through the introducer the pacing lead was passed into the right heart  Under fluoroscopic guidance the right ventricular lead was positioned on the right ventricular septum  After satisfactory ventricular sensing and pacing thresholds were confirmed, the lead was sewn to the pectoral fascia/muscle using 0 silk sutures       The right atrial lead was placed in the right atrial appendage with similar fashion using a preformed J stylet, the helix was deployed, tissue contact was confirmed and good lead parameters were seen, the Safe-sheath was removed and the lead was tied down with 0 silk sutures to the muscle  The lead and pulse generator were placed in the pre-pectoral pocket  The pocket was then closed with 3 layers of 2-0, 3-0, 4-0 -Vicryl suture was used to close the skin  EBL: Minimal  Complications: None  [id number] cc  Findings: normal pacemaker function    The patient tolerated the procedure well  Plan: Routine postoperative care, CXR, and overnight observation with telemetry  Follow-up in 2 weeks with incision check and interrogation

## 2025-03-06 DIAGNOSIS — G47.00 INSOMNIA, UNSPECIFIED TYPE: ICD-10-CM

## 2025-03-06 RX ORDER — TRAZODONE HYDROCHLORIDE 50 MG/1
50 TABLET ORAL
Qty: 90 TABLET | Refills: 1 | Status: SHIPPED | OUTPATIENT
Start: 2025-03-06

## 2025-03-17 ENCOUNTER — IN-CLINIC DEVICE VISIT (OUTPATIENT)
Dept: CARDIOLOGY CLINIC | Facility: CLINIC | Age: 65
End: 2025-03-17

## 2025-03-17 ENCOUNTER — RESULTS FOLLOW-UP (OUTPATIENT)
Dept: CARDIOLOGY CLINIC | Facility: CLINIC | Age: 65
End: 2025-03-17

## 2025-03-17 DIAGNOSIS — Z95.0 CARDIAC PACEMAKER IN SITU: Primary | ICD-10-CM

## 2025-03-17 PROCEDURE — 93280 PM DEVICE PROGR EVAL DUAL: CPT | Performed by: INTERNAL MEDICINE

## 2025-03-17 NOTE — PROGRESS NOTES
Results for orders placed or performed in visit on 03/17/25   Cardiac EP device report    Narrative    MDT-DUAL PPM (AAIR-DDDR MODE)/ ACTIVE SYSTEM IS MRI CONDITIONAL  DEVICE INTERROGATED IN THE [place] OFFICE. BATTERY VOLTAGE ADEQUATE (8.7 YRS). AP-52%, -0.2%. ALL LEAD PARAMETERS WITHIN NORMAL LIMITS. NO SIGNIFICANT HIGH RATE EPISODES. NORMAL DEVICE FUNCTION. GV

## 2025-03-18 ENCOUNTER — OFFICE VISIT (OUTPATIENT)
Dept: FAMILY MEDICINE CLINIC | Facility: CLINIC | Age: 65
End: 2025-03-18

## 2025-03-18 VITALS
HEART RATE: 97 BPM | BODY MASS INDEX: 30.05 KG/M2 | HEIGHT: 64 IN | OXYGEN SATURATION: 88 % | DIASTOLIC BLOOD PRESSURE: 68 MMHG | RESPIRATION RATE: 16 BRPM | TEMPERATURE: 97.8 F | WEIGHT: 176 LBS | SYSTOLIC BLOOD PRESSURE: 130 MMHG

## 2025-03-18 DIAGNOSIS — Z12.31 ENCOUNTER FOR SCREENING MAMMOGRAM FOR BREAST CANCER: ICD-10-CM

## 2025-03-18 DIAGNOSIS — G47.00 INSOMNIA, UNSPECIFIED TYPE: ICD-10-CM

## 2025-03-18 DIAGNOSIS — F32.A DEPRESSION, UNSPECIFIED DEPRESSION TYPE: ICD-10-CM

## 2025-03-18 DIAGNOSIS — F41.9 ANXIETY: ICD-10-CM

## 2025-03-18 DIAGNOSIS — Z12.11 COLON CANCER SCREENING: ICD-10-CM

## 2025-03-18 DIAGNOSIS — K21.9 GASTROESOPHAGEAL REFLUX DISEASE, UNSPECIFIED WHETHER ESOPHAGITIS PRESENT: ICD-10-CM

## 2025-03-18 DIAGNOSIS — Z00.00 HEALTHCARE MAINTENANCE: ICD-10-CM

## 2025-03-18 DIAGNOSIS — I49.5 SICK SINUS SYNDROME (HCC): ICD-10-CM

## 2025-03-18 DIAGNOSIS — E78.49 OTHER HYPERLIPIDEMIA: Primary | ICD-10-CM

## 2025-03-18 DIAGNOSIS — R73.9 HYPERGLYCEMIA: ICD-10-CM

## 2025-03-18 PROCEDURE — 99214 OFFICE O/P EST MOD 30 MIN: CPT | Performed by: NURSE PRACTITIONER

## 2025-03-18 PROCEDURE — 99397 PER PM REEVAL EST PAT 65+ YR: CPT | Performed by: NURSE PRACTITIONER

## 2025-03-18 NOTE — PROGRESS NOTES
Name: Loretta Avalos      : 1960      MRN: [id number]  Encounter Provider: DANIELA Stinson  Encounter Date: 3/18/2025   Encounter department: [facility]  PRIMARY CARE    Assessment & Plan  Other hyperlipidemia  Component      Latest Ref Rng 2023   Cholesterol      See Comment mg/dL 219 (H)  183    Triglycerides      See Comment mg/dL 87  86    HDL      >=50 mg/dL 84  79    LDL Calculated      0 - 100 mg/dL 118 (H)  87       - Well controlled.  - Continue Lipitor 10 mg daily.  - Encourage healthy diet and regular exercise.  - Will continue to monitor fasting lipid panel.        Hyperglycemia  - Hemoglobin A1c elevated at 6.2  - Encourage healthy diet and regular exercise.  - Will continue to monitor fasting glucose and A1c.       Sick sinus syndrome (HCC)  - s/p permanent pacemaker.   - Continue routine follow up with Cardiology.       Gastroesophageal reflux disease, unspecified whether esophagitis present  - Stable on pantoprazole 20 mg daily. Continue same.   - Avoid triggering food and beverage.  - Will continue to monitor.        Depression, unspecified depression type  - Well controlled on Zoloft 25 mg daily. Continue same.   - Will continue to monitor.        Anxiety  - Well controlled on Zoloft 25 mg daily. Continue same.   - Will continue to monitor.        Insomnia, unspecified type  - Well controlled with use of trazodone at bedtime. She is currently taking half (25 mg) as needed.  - Will continue to monitor.        Healthcare maintenance  - Discussed immunizations, screenings, healthy diet, exercise, and safety measures.  - She is due for updated blood work.   - Mammogram ordered.  - Referred to GI for colonoscopy.        Encounter for screening mammogram for breast cancer    Orders:  •  Mammo screening bilateral w 3d and cad; Future    Colon cancer screening    Orders:  •  Ambulatory Referral to Gastroenterology; Future         History of Present Illness    {?Quick Links Encounters * My Last Note * Last Note in Specialty * Snapshot * Since Last Visit * History :58804}  Patient with PMH of GERD, hyperlipidemia,  SVT, sick sinus syndrome, anxiety, depression,  insomnia and hx of DVT presents to office today for follow up. She is taking her prescribed medications and reports no side effects. She is due for updated blood work, mammogram, and colon cancer screening. She denies any significant concerns or complaints today.           Review of Systems   Constitutional:  Negative for fatigue and fever.   HENT:  Negative for nosebleeds and trouble swallowing.    Eyes:  Negative for visual disturbance.   Respiratory:  Negative for cough and shortness of breath.    Cardiovascular:  Negative for chest pain and palpitations.   Gastrointestinal:  Negative for abdominal pain and blood in stool.   Endocrine: Negative for cold intolerance and heat intolerance.   Genitourinary:  Negative for difficulty urinating, dysuria and frequency.   Musculoskeletal:  Negative for gait problem.   Skin:  Negative for rash.   Neurological:  Negative for dizziness, syncope and headaches.   Hematological:  Negative for adenopathy.   Psychiatric/Behavioral:  Negative for behavioral problems.      Past Medical History:   Diagnosis Date   • Anemia     iron def   • Arthritis     knees   • Carpal tunnel syndrome of right wrist    • Chest pain    • Colon polyp    • Deep vein thrombosis (DVT) of proximal lower extremity (HCC) 01/01/2012   • Depression     at times   • Diabetes mellitus ([facility]) 12/2/2014   • Disease of thyroid gland 12/2/2014   • DVT (deep venous thrombosis) (Former[facility]) 2012   • Dysphagia    • Encounter for surgical aftercare following surgery of digestive system 09/24/2020    s/p Raghav-En-Y Gastric Bypass with Dr. Oneil in 2017.   INITIAL weight:241 lb INITIAL weight with start of topirmate: 176 lb Goal weight loss of 5-10 %-159 lb-167 lb    • Esophageal reflux 05/23/2011   • GERD  (gastroesophageal reflux disease) 2011   • Grade 1 out of 6 intensity murmur 2019   • Heart murmur    • History of transfusion 1980   • Hypertension 2014   • Neck pain 2014   • Pacemaker 2018   • Pneumonia     age 16   • Psychiatric disorder    • Right ear pain 2020   • Trigger finger, left    • Visit for suture removal 2021     Past Surgical History:   Procedure Laterality Date   • BACK SURGERY     • CARDIAC PACEMAKER PLACEMENT  2018   • CERVICAL DISC SURGERY  2009    PLATES & SCREWS   •  SECTION      X2   • COLONOSCOPY     • EPIDURAL BLOCK INJECTION Right 2022    Procedure: RIGHT L3-[id number] EPIDURAL STEROID INJECTION (98546);  Surgeon: Bairon Kamara DO;  Location: [facility];  Service: Pain Management    • GASTRIC BYPASS  2017    LAP RINYGB SURGERY   • INSERT / REPLACE / REMOVE PACEMAKER     • KNEE ARTHROSCOPY     • KNEE SURGERY Bilateral    • KNEE SURGERY Left 2020   • OTHER SURGICAL HISTORY      ARM SURGERIES   • VT NDSC WRST SURG W/RLS TRANSVRS CARPL LIGM Right 2018    Procedure: ENDOSCOPIC CARPAL TUNNEL RELEASE;  Surgeon: Leroy Macdonald MD;  Location: [facility];  Service: Orthopedics   • VT NJX DX/THER AGT PVRT FACET JT LMBR/SAC 1 LEVEL Bilateral 10/25/2022    Procedure: L3-L4,L4-5,L5-S1 BILATERAL BRANCH BLOCK;  Surgeon: Bairon Kamara DO;  Location: [facility];  Service: Pain Management    • VT NJX DX/THER AGT PVRT FACET JT LMBR/SAC 1 LEVEL Bilateral 11/15/2022    Procedure: BILATERAL L3-S1 MEDICAL BRANCH BLOCK (46663,15955,20632);  Surgeon: Bairon Kamara DO;  Location: [facility];  Service: Pain Management    • VT TENDON SHEATH INCISION Left 2018    Procedure: LONG TRIGGER FINGER RELEASE;  Surgeon: Leroy Macdonald MD;  Location: [facility];  Service: Orthopedics   • RADIOFREQUENCY ABLATION Right 2022    Procedure: RIGHT L3-S1 RADIO FREQUENCY ABLATION ([id number],[id number]);  Surgeon: Bairon Kamara DO;  Location: [facility];  Service: Pain Management    • RADIOFREQUENCY ABLATION Left 2022    Procedure: LEFT L3-S1 RADIO FREQUENCY ABLATION ([id number]);  Surgeon: Bairon Kamara DO;  Location:  MAIN OR;  Service: Pain Management    • TONSILLECTOMY     • TOTAL HIP ARTHROPLASTY Right 2023   • UPPER GASTROINTESTINAL ENDOSCOPY     • WRIST SURGERY  2018     Family History   Problem Relation Age of Onset   • Stroke Mother    • Alzheimer's disease Mother    • Arthritis Mother    • Coronary artery disease Mother    • Breast cancer Mother 66   • Cancer Mother    • Heart disease Mother    • Clotting disorder Mother    • Dementia Mother    • Hypertension Father    • Gout Father    • Diabetes type II Father    • Coronary artery disease Father    • Heart disease Father    • No Known Problems Sister    • No Known Problems Sister    • No Known Problems Sister    • No Known Problems Daughter    • No Known Problems Maternal Grandmother    • No Known Problems Maternal Grandfather    • No Known Problems Paternal Grandmother    • No Known Problems Paternal Grandfather    • Prostate cancer Brother    • Pancreatic cancer Brother    • No Known Problems Maternal Aunt    • No Known Problems Maternal Aunt    • No Known Problems Maternal Aunt    • No Known Problems Maternal Aunt    • Cancer Maternal Aunt    • No Known Problems Paternal Aunt    • No Known Problems Paternal Aunt    • Heart disease Brother    • Cancer Brother         Kuldip     Social History     Tobacco Use   • Smoking status: Former     Current packs/day: 0.00     Average packs/day: 0.5 packs/day for 30.0 years (15.0 ttl pk-yrs)     Types: Cigarettes     Quit date: 2016     Years since quittin.3   • Smokeless tobacco: Never   • Tobacco comments:     Started when I was in school   Vaping Use   • Vaping status: Never Used   Substance and Sexual Activity   • Alcohol use: Yes     Alcohol/week: 2.0 standard drinks of alcohol     Types: 1 Glasses of wine, 1 Standard drinks or  equivalent per week     Comment: very rare social   • Drug use: No   • Sexual activity: Yes     Partners: Male     Current Outpatient Medications on File Prior to Visit   Medication Sig   • acetaminophen (TYLENOL) 650 mg CR tablet Take 650 mg by mouth every 8 (eight) hours as needed for mild pain Take 2 tablets in the AM and 2 tablets in the late evening   • aluminum-magnesium hydroxide-simethicone (MAALOX MAX) 400-400-40 MG/5ML suspension Take 10 mL by mouth every 6 (six) hours as needed for indigestion or heartburn   • apixaban (Eliquis) 2.5 mg TAKE 1 TABLET BY MOUTH TWICE A DAY   • Ascorbic Acid (VITAMIN C) 100 MG CHEW Chew   • atorvastatin (LIPITOR) 10 mg tablet TAKE 1 TABLET BY MOUTH EVERY DAY   • benzonatate (TESSALON) 200 MG capsule Take 1 capsule (200 mg total) by mouth 3 (three) times a day as needed for cough   • Biotin 59219 MCG TABS Take by mouth daily     • Calcium Carbonate (CALCI-CHEW PO) Take 650 mg by mouth 2 (two) times a day   • cholecalciferol (VITAMIN D3) 1,000 units tablet Take 5,000 Units by mouth in the morning   • clobetasol (TEMOVATE) 0.05 % ointment    • Cyanocobalamin (VITAMIN B 12 PO) Take 1,000 mcg by mouth   • dicyclomine (BENTYL) 10 mg capsule take 1 capsule by mouth four times a day before meals at bedtime   • estradiol (ESTRACE) 0.1 mg/g vaginal cream USE PEA SIZED AMOUNT VAGINALLY NIGHTLY X 2 WEEKS, THEN TWICE WEEKLY   • Ferrous Sulfate  (45 Fe) MG TBCR Take 1 tablet by mouth daily   • fluticasone (FLONASE) 50 mcg/act nasal spray SPRAY 1 SPRAY INTO EACH NOSTRIL EVERY DAY   • gabapentin (NEURONTIN) 300 mg capsule Take 1 capsule (300 mg total) by mouth 3 (three) times a day   • meclizine (ANTIVERT) 25 mg tablet Take 1 tablet (25 mg total) by mouth every 8 (eight) hours as needed for dizziness   • metoprolol succinate (TOPROL-XL) 25 mg 24 hr tablet TAKE 1 TABLET (25 MG TOTAL) BY MOUTH DAILY.   • Multiple Vitamin (MULTIVITAMIN) capsule Take 1 capsule by mouth daily 2 chewies  daily   • nystatin (MYCOSTATIN) powder APPLY TO AFFECTED AREA TWICE A DAY   • ondansetron (ZOFRAN) 8 mg tablet TAKE 1 TABLET BY MOUTH EVERY 8 HOURS AS NEEDED FOR NAUSEA OR VOMITING.   • pantoprazole (PROTONIX) 20 mg tablet TAKE 1 TABLET BY MOUTH EVERY DAY   • sertraline (ZOLOFT) 50 mg tablet Take 1 tablet (50 mg total) by mouth daily (Patient taking differently: Take 25 mg by mouth daily)   • tiZANidine (ZANAFLEX) 2 mg tablet Take 1 tablet (2 mg total) by mouth every 8 (eight) hours as needed for muscle spasms   • traZODone (DESYREL) 50 mg tablet TAKE 1 TABLET BY MOUTH EVERYDAY AT BEDTIME (Patient taking differently: Take 25 mg by mouth daily at bedtime)   • albuterol (ProAir HFA) 90 mcg/act inhaler Inhale 2 puffs every 6 (six) hours as needed for wheezing (Patient not taking: Reported on 1/30/2024)   • valACYclovir (VALTREX) 1,000 mg tablet Take 2 tablets (2,000 mg total) by mouth 2 (two) times a day for 1 day     No Known Allergies  Immunization History   Administered Date(s) Administered   • COVID-19 PFIZER VACCINE 0.3 ML IM 04/03/2021, 04/28/2021, 11/01/2021   • COVID-19 Pfizer Vac BIVALENT Sudhakar-sucrose 12 Yr+ IM 10/15/2022   • COVID-19 Pfizer mRNA vacc PF sudhakar-sucrose 12 yr and older (Comirnaty) 12/22/2023   • Hep A, adult 08/08/2017   • Hep A, ped/adol, 2 dose 01/04/2017, 08/08/2017   • Hepatitis A 01/04/2017   • INFLUENZA 10/05/2011, 11/22/2016, 11/22/2016, 10/23/2017, 10/23/2017, 10/23/2017, 10/15/2019, 10/30/2020, 08/18/2021, 08/18/2021, 10/15/2022, 09/11/2023   • Influenza Quadrivalent Preservative Free 3 years and older IM 10/15/2022   • Influenza Split High Dose Preservative Free IM 11/20/2024   • Influenza, injectable, quadrivalent, preservative free 0.5 mL 10/30/2020   • Influenza, recombinant, quadrivalent,injectable, preservative free 10/31/2018, 10/15/2019   • Influenza, seasonal, injectable 12/22/2014, 10/13/2015   • Pneumococcal Polysaccharide PPV23 02/25/2013, 01/17/2016   • Respiratory Syncytial  "Virus Vaccine (Recombinant) 02/07/2024   • Tdap 04/21/2011, 04/10/2017, 06/18/2021   • Tuberculin Skin Test-PPD Intradermal 07/28/2021   • Zoster 01/17/2016     Objective {?Quick Links Trend Vitals * Enter New Vitals * Results Review * Timeline (Adult) * Labs * Imaging * Cardiology * Procedures * Lung Cancer Screening * Surgical eConsent :97569}  /68 (BP Location: Left arm, Patient Position: Sitting, Cuff Size: Standard)   Pulse 97   Temp 97.8 °F (36.6 °C) (Tympanic)   Resp 16   Ht 5' 4\" (1.626 m)   Wt 79.8 kg (176 lb)   LMP 09/18/2014   SpO2 (!) 88%   BMI 30.21 kg/m²     Physical Exam  Vitals and nursing note reviewed.   Constitutional:       General: She is not in acute distress.     Appearance: Normal appearance. She is well-developed. She is not ill-appearing.   HENT:      Head: Normocephalic and atraumatic.      Right Ear: Tympanic membrane, ear canal and external ear normal.      Left Ear: Tympanic membrane, ear canal and external ear normal.      Nose: Nose normal.      Mouth/Throat:      Mouth: Mucous membranes are moist.      Pharynx: No posterior oropharyngeal erythema.   Eyes:      Conjunctiva/sclera: Conjunctivae normal.      Pupils: Pupils are equal, round, and reactive to light.   Cardiovascular:      Rate and Rhythm: Normal rate and regular rhythm.      Heart sounds: Normal heart sounds.   Pulmonary:      Effort: Pulmonary effort is normal.      Breath sounds: Normal breath sounds.   Abdominal:      General: Bowel sounds are normal.      Palpations: Abdomen is soft.   Musculoskeletal:         General: Normal range of motion.      Cervical back: Normal range of motion.   Lymphadenopathy:      Cervical: No cervical adenopathy.   Skin:     General: Skin is warm and dry.   Neurological:      Mental Status: She is alert and oriented to person, place, and time.   Psychiatric:         Mood and Affect: Mood normal.         Behavior: Behavior normal.       {Administrative / Billing Section " (Optional):32331}

## 2025-03-19 ENCOUNTER — TELEPHONE (OUTPATIENT)
Dept: RHEUMATOLOGY | Facility: CLINIC | Age: 65
End: 2025-03-19

## 2025-03-19 PROBLEM — Z12.11 COLON CANCER SCREENING: Status: ACTIVE | Noted: 2025-03-19

## 2025-03-19 PROBLEM — Z00.00 HEALTHCARE MAINTENANCE: Status: ACTIVE | Noted: 2025-03-19

## 2025-03-19 PROBLEM — Z12.31 ENCOUNTER FOR SCREENING MAMMOGRAM FOR BREAST CANCER: Status: ACTIVE | Noted: 2025-03-19

## 2025-03-19 NOTE — PATIENT INSTRUCTIONS
Medicare Preventive Visit Patient Instructions  Thank you for completing your Welcome to Medicare Visit or Medicare Annual Wellness Visit today. Your next wellness visit will be due in one year (3/20/2026).  The screening/preventive services that you may require over the next 5-10 years are detailed below. Some tests may not apply to you based off risk factors and/or age. Screening tests ordered at today's visit but not completed yet may show as past due. Also, please note that scanned in results may not display below.  Preventive Screenings:  Service Recommendations Previous Testing/Comments   Colorectal Cancer Screening  * Colonoscopy    * Fecal Occult Blood Test (FOBT)/Fecal Immunochemical Test (FIT)  * Fecal DNA/Cologuard Test  * Flexible Sigmoidoscopy Age: 45-75 years old   Colonoscopy: every 10 years (may be performed more frequently if at higher risk)  OR  FOBT/FIT: every 1 year  OR  Cologuard: every 3 years  OR  Sigmoidoscopy: every 5 years  Screening may be recommended earlier than age 45 if at higher risk for colorectal cancer. Also, an individualized decision between you and your healthcare provider will decide whether screening between the ages of 76-85 would be appropriate. Colonoscopy: 06/23/2020  FOBT/FIT: Not on file  Cologuard: Not on file  Sigmoidoscopy: Not on file          Breast Cancer Screening Age: 40+ years old  Frequency: every 1-2 years  Not required if history of left and right mastectomy Mammogram: 08/17/2022        Cervical Cancer Screening Between the ages of 21-29, pap smear recommended once every 3 years.   Between the ages of 30-65, can perform pap smear with HPV co-testing every 5 years.   Recommendations may differ for women with a history of total hysterectomy, cervical cancer, or abnormal pap smears in past. Pap Smear: Not on file        Hepatitis C Screening Once for adults born between 1945 and 1965  More frequently in patients at high risk for Hepatitis C Hep C Antibody:  11/21/2024        Diabetes Screening 1-2 times per year if you're at risk for diabetes or have pre-diabetes Fasting glucose: 100 mg/dL (8/26/2024)  A1C: 6.3 % (8/26/2024)      Cholesterol Screening Once every 5 years if you don't have a lipid disorder. May order more often based on risk factors. Lipid panel: 08/26/2024          Other Preventive Screenings Covered by Medicare:  Abdominal Aortic Aneurysm (AAA) Screening: covered once if your at risk. You're considered to be at risk if you have a family history of AAA.  Lung Cancer Screening: covers low dose CT scan once per year if you meet all of the following conditions: (1) Age 55-77; (2) No signs or symptoms of lung cancer; (3) Current smoker or have quit smoking within the last 15 years; (4) You have a tobacco smoking history of at least 20 pack years (packs per day multiplied by number of years you smoked); (5) You get a written order from a healthcare provider.  Glaucoma Screening: covered annually if you're considered high risk: (1) You have diabetes OR (2) Family history of glaucoma OR (3)  aged 50 and older OR (4)  American aged 65 and older  Osteoporosis Screening: covered every 2 years if you meet one of the following conditions: (1) You're estrogen deficient and at risk for osteoporosis based off medical history and other findings; (2) Have a vertebral abnormality; (3) On glucocorticoid therapy for more than 3 months; (4) Have primary hyperparathyroidism; (5) On osteoporosis medications and need to assess response to drug therapy.   Last bone density test (DXA Scan): 04/18/2023.  HIV Screening: covered annually if you're between the age of 15-65. Also covered annually if you are younger than 15 and older than 65 with risk factors for HIV infection. For pregnant patients, it is covered up to 3 times per pregnancy.    Immunizations:  Immunization Recommendations   Influenza Vaccine Annual influenza vaccination during flu season is  recommended for all persons aged >= 6 months who do not have contraindications   Pneumococcal Vaccine   * Pneumococcal conjugate vaccine = PCV13 (Prevnar 13), PCV15 (Vaxneuvance), PCV20 (Prevnar 20)  * Pneumococcal polysaccharide vaccine = PPSV23 (Pneumovax) Adults 19-63 yo with certain risk factors or if 65+ yo  If never received any pneumonia vaccine: recommend Prevnar 20 (PCV20)  Give PCV20 if previously received 1 dose of PCV13 or PPSV23   Hepatitis B Vaccine 3 dose series if at intermediate or high risk (ex: diabetes, end stage renal disease, liver disease)   Respiratory syncytial virus (RSV) Vaccine - COVERED BY MEDICARE PART D  * RSVPreF3 (Arexvy) CDC recommends that adults 60 years of age and older may receive a single dose of RSV vaccine using shared clinical decision-making (SCDM)   Tetanus (Td) Vaccine - COST NOT COVERED BY MEDICARE PART B Following completion of primary series, a booster dose should be given every 10 years to maintain immunity against tetanus. Td may also be given as tetanus wound prophylaxis.   Tdap Vaccine - COST NOT COVERED BY MEDICARE PART B Recommended at least once for all adults. For pregnant patients, recommended with each pregnancy.   Shingles Vaccine (Shingrix) - COST NOT COVERED BY MEDICARE PART B  2 shot series recommended in those 19 years and older who have or will have weakened immune systems or those 50 years and older     Health Maintenance Due:      Topic Date Due   • Colorectal Cancer Screening  06/23/2023   • Breast Cancer Screening: Mammogram  08/17/2023   • HIV Screening  Completed   • Hepatitis C Screening  Completed     Immunizations Due:      Topic Date Due   • Pneumococcal Vaccine: 65+ Years (2 of 2 - PCV) 01/17/2017   • COVID-19 Vaccine (6 - 2024-25 season) 09/01/2024     Advance Directives   What are advance directives?  Advance directives are legal documents that state your wishes and plans for medical care. These plans are made ahead of time in case you lose  your ability to make decisions for yourself. Advance directives can apply to any medical decision, such as the treatments you want, and if you want to donate organs.   What are the types of advance directives?  There are many types of advance directives, and each state has rules about how to use them. You may choose a combination of any of the following:  Living will:  This is a written record of the treatment you want. You can also choose which treatments you do not want, which to limit, and which to stop at a certain time. This includes surgery, medicine, IV fluid, and tube feedings.   Durable power of  for healthcare (DPAHC):  This is a written record that states who you want to make healthcare choices for you when you are unable to make them for yourself. This person, called a proxy, is usually a family member or a friend. You may choose more than 1 proxy.  Do not resuscitate (DNR) order:  A DNR order is used in case your heart stops beating or you stop breathing. It is a request not to have certain forms of treatment, such as CPR. A DNR order may be included in other types of advance directives.  Medical directive:  This covers the care that you want if you are in a coma, near death, or unable to make decisions for yourself. You can list the treatments you want for each condition. Treatment may include pain medicine, surgery, blood transfusions, dialysis, IV or tube feedings, and a ventilator (breathing machine).  Values history:  This document has questions about your views, beliefs, and how you feel and think about life. This information can help others choose the care that you would choose.  Why are advance directives important?  An advance directive helps you control your care. Although spoken wishes may be used, it is better to have your wishes written down. Spoken wishes can be misunderstood, or not followed. Treatments may be given even if you do not want them. An advance directive may make it  easier for your family to make difficult choices about your care.   Weight Management   Why it is important to manage your weight:  Being overweight increases your risk of health conditions such as heart disease, high blood pressure, type 2 diabetes, and certain types of cancer. It can also increase your risk for osteoarthritis, sleep apnea, and other respiratory problems. Aim for a slow, steady weight loss. Even a small amount of weight loss can lower your risk of health problems.  How to lose weight safely:  A safe and healthy way to lose weight is to eat fewer calories and get regular exercise. You can lose up about 1 pound a week by decreasing the number of calories you eat by 500 calories each day.   Healthy meal plan for weight management:  A healthy meal plan includes a variety of foods, contains fewer calories, and helps you stay healthy. A healthy meal plan includes the following:  Eat whole-grain foods more often.  A healthy meal plan should contain fiber. Fiber is the part of grains, fruits, and vegetables that is not broken down by your body. Whole-grain foods are healthy and provide extra fiber in your diet. Some examples of whole-grain foods are whole-wheat breads and pastas, oatmeal, brown rice, and bulgur.  Eat a variety of vegetables every day.  Include dark, leafy greens such as spinach, kale, joleen greens, and mustard greens. Eat yellow and orange vegetables such as carrots, sweet potatoes, and winter squash.   Eat a variety of fruits every day.  Choose fresh or canned fruit (canned in its own juice or light syrup) instead of juice. Fruit juice has very little or no fiber.  Eat low-fat dairy foods.  Drink fat-free (skim) milk or 1% milk. Eat fat-free yogurt and low-fat cottage cheese. Try low-fat cheeses such as mozzarella and other reduced-fat cheeses.  Choose meat and other protein foods that are low in fat.  Choose beans or other legumes such as split peas or lentils. Choose fish, skinless  poultry (chicken or turkey), or lean cuts of red meat (beef or pork). Before you cook meat or poultry, cut off any visible fat.   Use less fat and oil.  Try baking foods instead of frying them. Add less fat, such as margarine, sour cream, regular salad dressing and mayonnaise to foods. Eat fewer high-fat foods. Some examples of high-fat foods include french fries, doughnuts, ice cream, and cakes.  Eat fewer sweets.  Limit foods and drinks that are high in sugar. This includes candy, cookies, regular soda, and sweetened drinks.  Exercise:  Exercise at least 30 minutes per day on most days of the week. Some examples of exercise include walking, biking, dancing, and swimming. You can also fit in more physical activity by taking the stairs instead of the elevator or parking farther away from stores. Ask your healthcare provider about the best exercise plan for you.      © Copyright [employer] 2018 Information is for End User's use only and may not be sold, redistributed or otherwise used for commercial purposes. All illustrations and images included in CareNotes® are the copyrighted property of [employer] or [employer]

## 2025-03-19 NOTE — PROGRESS NOTES
Name: Loretta Avalos      : 1960      MRN: [id number]  Encounter Provider: DANIELA Stinson  Encounter Date: 3/18/2025   Encounter department: [facility]    Assessment & Plan       Preventive health issues were discussed with patient, and age appropriate screening tests were ordered as noted in patient's After Visit Summary. Personalized health advice and appropriate referrals for health education or preventive services given if needed, as noted in patient's After Visit Summary.    History of Present Illness     HPI   Patient Care Team:  DANIELA Stinson as PCP - General (Nurse Practitioner)  Coleen Rosas MD as PCP - PCP-[facility] ([facility])  Coleen Rosas MD as PCP - PCP-[facility] ([facility])    Review of Systems  Medical History Reviewed by provider this encounter:  Tobacco  Allergies  Meds  Problems  Med Hx  Surg Hx  Fam Hx       Annual Wellness Visit Questionnaire   Annual Wellness Visit  Social Drivers of Health     Financial Resource Strain: Patient Declined (2023)    Received from [facility]    Overall Financial Resource Strain (CARDIA)    • Difficulty of Paying Living Expenses: Patient declined   Food Insecurity: Patient Declined (2023)    Received from [facility]    Hunger Vital Sign    • Worried About Running Out of Food in the Last Year: Patient declined    • Ran Out of Food in the Last Year: Patient declined   Transportation Needs: Patient Declined (2023)    [facility]    PRAPARE - Transportation    • Lack of Transportation (Medical): Patient declined    • Lack of Transportation (Non-Medical): Patient declined   Housing Stability: Unknown (2023)    Received from [facility]    Housing Stability Vital Sign    • Unable  "to Pay for Housing in the Last Year: Patient refused    • Number of Places Lived in the Last Year: 1    • Unstable Housing in the Last Year: Patient refused     No results found.    Objective   /68 (BP Location: Left arm, Patient Position: Sitting, Cuff Size: Standard)   Pulse 97   Temp 97.8 °F (36.6 °C) (Tympanic)   Resp 16   Ht 5' 4\" (1.626 m)   Wt 79.8 kg (176 lb)   LMP 09/18/2014   SpO2 (!) 88%   BMI 30.21 kg/m²     Physical Exam    "

## 2025-03-19 NOTE — TELEPHONE ENCOUNTER
Spoke to patient about scheduling prolia apt and  is overdue for provider visit scheduled for April in [place] agreed to this and all other appts with be scheduled in [place]    Pt has medicare for insurance do they need a PA for proila, this weill be given on the 21st of April

## 2025-03-19 NOTE — PROGRESS NOTES
Name: Loretta Avalos      : 1960      MRN: [id number]  Encounter Provider: DANIELA Stinson  Encounter Date: 3/18/2025   Encounter department: [facility]    Assessment & Plan  Other hyperlipidemia  Component      Latest Ref Rng 2023   Cholesterol      See Comment mg/dL 219 (H)  183    Triglycerides      See Comment mg/dL 87  86    HDL      >=50 mg/dL 84  79    LDL Calculated      0 - 100 mg/dL 118 (H)  87       - Well controlled.  - Continue Lipitor 10 mg daily.  - Encourage healthy diet and regular exercise.  - Will continue to monitor fasting lipid panel.        Hyperglycemia  - Hemoglobin A1c elevated at 6.2  - Encourage healthy diet and regular exercise.  - Will continue to monitor fasting glucose and A1c.       Sick sinus syndrome (HCC)  - s/p permanent pacemaker.   - Continue routine follow up with Cardiology.        Gastroesophageal reflux disease, unspecified whether esophagitis present    - Stable on pantoprazole 20 mg daily. Continue same.   - Avoid triggering food and beverage.  - Will continue to monitor.          Depression, unspecified depression type  - Well controlled on Zoloft 25 mg daily. Continue same.   - Will continue to monitor.        Anxiety  - Well controlled on Zoloft 25 mg daily. Continue same.   - Will continue to monitor.        Insomnia, unspecified type  - Well controlled. She is taking half tablet (25 mg) of trazodone at bedtime as needed. Continue same.   - Will continue to monitor.        Healthcare maintenance  - Reviewed immunizations, screenings, healthy diet, exercise, and safety measures.  - She is due for updated blood work.  - Mammogram ordered.        Encounter for screening mammogram for breast cancer    Orders:  •  Mammo screening bilateral w 3d and cad; Future    Colon cancer screening    Orders:  •  Ambulatory Referral to Gastroenterology; Future      Falls Plan of Care: balance, strength, and gait training  instructions were provided.       Preventive health issues were discussed with patient, and age appropriate screening tests were ordered as noted in patient's After Visit Summary. Personalized health advice and appropriate referrals for health education or preventive services given if needed, as noted in patient's After Visit Summary.    History of Present Illness     Patient with PMH of GERD, hyperlipidemia,  SVT, sick sinus syndrome, anxiety, depression,  insomnia and hx of DVT presents to office today for follow up. She is taking her prescribed medications and reports no side effects. She is due for updated blood work, mammogram, and colon cancer screening. She denies any significant concerns or complaints today.          Patient Care Team:  DANIELA Stinson as PCP - General (Nurse Practitioner)  Coleen Rosas MD as PCP - PCP-[facility] ([facility])  Coleen Rosas MD as PCP - PCP-[facility] ([facility])    Review of Systems   Constitutional:  Negative for fatigue and fever.   HENT:  Negative for congestion, rhinorrhea and trouble swallowing.    Eyes:  Negative for visual disturbance.   Respiratory:  Negative for cough and shortness of breath.    Cardiovascular:  Negative for chest pain and palpitations.   Gastrointestinal:  Negative for abdominal pain and blood in stool.   Endocrine: Negative for cold intolerance and heat intolerance.   Genitourinary:  Negative for difficulty urinating, dysuria and frequency.   Musculoskeletal:  Negative for gait problem.   Skin:  Negative for rash.   Neurological:  Negative for dizziness, syncope and headaches.   Hematological:  Negative for adenopathy.   Psychiatric/Behavioral:  Negative for behavioral problems.      Medical History Reviewed by provider this encounter:  Tobacco  Allergies  Meds  Problems  Med Hx  Surg Hx  Fam Hx       Annual Wellness Visit Questionnaire   Loretta is here for her Subsequent Wellness visit. Last Medicare Wellness visit information  reviewed, patient interviewed and updates made to the record today.      Health Risk Assessment:   Patient rates overall health as very good. Patient feels that their physical health rating is much better. Patient is satisfied with their life. Eyesight was rated as same. Hearing was rated as same. Patient feels that their emotional and mental health rating is slightly better. Patients states they are never, rarely angry. Patient states they are sometimes unusually tired/fatigued. Pain experienced in the last 7 days has been some. Patient's pain rating has been 8/10. Patient states that she has experienced no weight loss or gain in last 6 months.     Fall Risk Screening:   In the past year, patient has experienced: no history of falling in past year      Urinary Incontinence Screening:   Patient has not leaked urine accidently in the last six months.     Home Safety:  Patient does not have trouble with stairs inside or outside of their home. Patient has working smoke alarms and has working carbon monoxide detector. Home safety hazards include: none.     Nutrition:   Current diet is Regular.     Medications:   Patient is currently taking over-the-counter supplements. OTC medications include: see medication list. Patient is able to manage medications.     Activities of Daily Living (ADLs)/Instrumental Activities of Daily Living (IADLs):   Walk and transfer into and out of bed and chair?: Yes  Dress and groom yourself?: Yes    Bathe or shower yourself?: Yes    Feed yourself? Yes  Do your laundry/housekeeping?: Yes  Manage your money, pay your bills and track your expenses?: Yes  Make your own meals?: Yes    Do your own shopping?: Yes    Previous Hospitalizations:   Any hospitalizations or ED visits within the last 12 months?: Yes    How many hospitalizations have you had in the last year?: 1-2    Advance Care Planning:   Living will: No    Durable POA for healthcare: No    Advanced directive: No      Cognitive  Screening:   Provider or family/friend/caregiver concerned regarding cognition?: No    PREVENTIVE SCREENINGS      Cardiovascular Screening:    General: Screening Not Indicated and History Lipid Disorder      Diabetes Screening:     General: Screening Current      Colorectal Cancer Screening:     General: Screening Current      Breast Cancer Screening:     General: Risks and Benefits Discussed    Due for: Mammogram        Cervical Cancer Screening:    General: Screening Not Indicated      Osteoporosis Screening:    General: Screening Not Indicated and History Osteoporosis      Abdominal Aortic Aneurysm (AAA) Screening:        General: Screening Not Indicated      Lung Cancer Screening:     General: Screening Not Indicated      Hepatitis C Screening:    General: Screening Current    Screening, Brief Intervention, and Referral to Treatment (SBIRT)     Screening  Typical number of drinks in a day: 0  Typical number of drinks in a week: 0  Interpretation: Low risk drinking behavior.    Brief Intervention  Alcohol & drug use screenings were reviewed. No concerns regarding substance use disorder identified.     Other Counseling Topics:   Calcium and vitamin D intake and regular weightbearing exercise.     Social Drivers of Health     Financial Resource Strain: Patient Declined (11/28/2023)    [facility]    Overall Financial Resource Strain (CARDIA)    • Difficulty of Paying Living Expenses: Patient declined   Food Insecurity: Patient Declined (11/28/2023)    [facility]    Hunger Vital Sign    • Worried About Running Out of Food in the Last Year: Patient declined    • Ran Out of Food in the Last Year: Patient declined   Transportation Needs: Patient Declined (11/28/2023)    [facility]    PRAPARE - Transportation    • Lack of Transportation  "(Medical): Patient declined    • Lack of Transportation (Non-Medical): Patient declined   Housing Stability: Unknown (11/28/2023)    [facility]    Housing Stability Vital Sign    • Unable to Pay for Housing in the Last Year: Patient refused    • Number of Places Lived in the Last Year: 1    • Unstable Housing in the Last Year: Patient refused     No results found.    Objective   /68 (BP Location: Left arm, Patient Position: Sitting, Cuff Size: Standard)   Pulse 97   Temp 97.8 °F (36.6 °C) (Tympanic)   Resp 16   Ht 5' 4\" (1.626 m)   Wt 79.8 kg (176 lb)   LMP 09/18/2014   SpO2 (!) 88%   BMI 30.21 kg/m²     Physical Exam  Vitals and nursing note reviewed.   Constitutional:       General: She is not in acute distress.     Appearance: Normal appearance. She is well-developed. She is not ill-appearing.   HENT:      Head: Normocephalic and atraumatic.      Right Ear: Tympanic membrane, ear canal and external ear normal.      Left Ear: Tympanic membrane, ear canal and external ear normal.      Nose: Nose normal.      Mouth/Throat:      Mouth: Mucous membranes are moist.      Pharynx: No posterior oropharyngeal erythema.   Eyes:      Conjunctiva/sclera: Conjunctivae normal.      Pupils: Pupils are equal, round, and reactive to light.   Cardiovascular:      Rate and Rhythm: Normal rate and regular rhythm.      Heart sounds: Normal heart sounds.   Pulmonary:      Effort: Pulmonary effort is normal.      Breath sounds: Normal breath sounds.   Abdominal:      General: Bowel sounds are normal.      Palpations: Abdomen is soft.   Musculoskeletal:         General: Normal range of motion.      Cervical back: Normal range of motion.   Lymphadenopathy:      Cervical: No cervical adenopathy.   Skin:     General: Skin is warm and dry.   Neurological:      Mental Status: She is alert and oriented to person, place, and time.   Psychiatric:         Mood and Affect: " Mood normal.         Behavior: Behavior normal.

## 2025-03-20 ENCOUNTER — APPOINTMENT (OUTPATIENT)
Dept: LAB | Facility: MEDICAL CENTER | Age: 65
End: 2025-03-20

## 2025-03-20 DIAGNOSIS — E78.49 OTHER HYPERLIPIDEMIA: ICD-10-CM

## 2025-03-20 DIAGNOSIS — R73.9 HYPERGLYCEMIA: ICD-10-CM

## 2025-03-20 LAB
ALBUMIN SERPL BCG-MCNC: 4.1 G/DL (ref 3.5–5)
ALP SERPL-CCNC: 63 U/L (ref 34–104)
ALT SERPL W P-5'-P-CCNC: 30 U/L (ref 7–52)
ANION GAP SERPL CALCULATED.3IONS-SCNC: 10 MMOL/L (ref 4–13)
AST SERPL W P-5'-P-CCNC: 34 U/L (ref 13–39)
BASOPHILS # BLD AUTO: 0.08 THOUSANDS/ÂΜL (ref 0–0.1)
BASOPHILS NFR BLD AUTO: 1 % (ref 0–1)
BILIRUB SERPL-MCNC: 0.67 MG/DL (ref 0.2–1)
BUN SERPL-MCNC: 24 MG/DL (ref 5–25)
CALCIUM SERPL-MCNC: 9.9 MG/DL (ref 8.4–10.2)
CHLORIDE SERPL-SCNC: 104 MMOL/L (ref 96–108)
CHOLEST SERPL-MCNC: 198 MG/DL (ref ?–200)
CO2 SERPL-SCNC: 28 MMOL/L (ref 21–32)
CREAT SERPL-MCNC: 0.67 MG/DL (ref 0.6–1.3)
EOSINOPHIL # BLD AUTO: 0.31 THOUSAND/ÂΜL (ref 0–0.61)
EOSINOPHIL NFR BLD AUTO: 5 % (ref 0–6)
ERYTHROCYTE [DISTWIDTH] IN BLOOD BY AUTOMATED COUNT: 11.9 % (ref 11.6–15.1)
EST. AVERAGE GLUCOSE BLD GHB EST-MCNC: 123 MG/DL
GFR SERPL CREATININE-BSD FRML MDRD: 92 ML/MIN/1.73SQ M
GLUCOSE P FAST SERPL-MCNC: 121 MG/DL (ref 65–99)
HBA1C MFR BLD: 5.9 %
HCT VFR BLD AUTO: 36.3 % (ref 34.8–46.1)
HDLC SERPL-MCNC: 79 MG/DL
HGB BLD-MCNC: 11.8 G/DL (ref 11.5–15.4)
IMM GRANULOCYTES # BLD AUTO: 0 THOUSAND/UL (ref 0–0.2)
IMM GRANULOCYTES NFR BLD AUTO: 0 % (ref 0–2)
LDLC SERPL CALC-MCNC: 102 MG/DL (ref 0–100)
LYMPHOCYTES # BLD AUTO: 1 THOUSANDS/ÂΜL (ref 0.6–4.47)
LYMPHOCYTES NFR BLD AUTO: 16 % (ref 14–44)
MCH RBC QN AUTO: 33.2 PG (ref 26.8–34.3)
MCHC RBC AUTO-ENTMCNC: 32.5 G/DL (ref 31.4–37.4)
MCV RBC AUTO: 102 FL (ref 82–98)
MONOCYTES # BLD AUTO: 0.35 THOUSAND/ÂΜL (ref 0.17–1.22)
MONOCYTES NFR BLD AUTO: 6 % (ref 4–12)
NEUTROPHILS # BLD AUTO: 4.45 THOUSANDS/ÂΜL (ref 1.85–7.62)
NEUTS SEG NFR BLD AUTO: 72 % (ref 43–75)
NRBC BLD AUTO-RTO: 0 /100 WBCS
PLATELET # BLD AUTO: 198 THOUSANDS/UL (ref 149–390)
PMV BLD AUTO: 11.3 FL (ref 8.9–12.7)
POTASSIUM SERPL-SCNC: 4.4 MMOL/L (ref 3.5–5.3)
PROT SERPL-MCNC: 6.7 G/DL (ref 6.4–8.4)
RBC # BLD AUTO: 3.55 MILLION/UL (ref 3.81–5.12)
SODIUM SERPL-SCNC: 142 MMOL/L (ref 135–147)
TRIGL SERPL-MCNC: 86 MG/DL (ref ?–150)
TSH SERPL DL<=0.05 MIU/L-ACNC: 0.87 UIU/ML (ref 0.45–4.5)
WBC # BLD AUTO: 6.19 THOUSAND/UL (ref 4.31–10.16)

## 2025-03-20 PROCEDURE — 85025 COMPLETE CBC W/AUTO DIFF WBC: CPT

## 2025-03-20 PROCEDURE — 83036 HEMOGLOBIN GLYCOSYLATED A1C: CPT

## 2025-03-20 PROCEDURE — 80053 COMPREHEN METABOLIC PANEL: CPT

## 2025-03-20 PROCEDURE — 80061 LIPID PANEL: CPT

## 2025-03-20 PROCEDURE — 84443 ASSAY THYROID STIM HORMONE: CPT

## 2025-03-20 PROCEDURE — 36415 COLL VENOUS BLD VENIPUNCTURE: CPT

## 2025-03-26 DIAGNOSIS — R10.84 GENERALIZED ABDOMINAL PAIN: ICD-10-CM

## 2025-03-28 RX ORDER — PANTOPRAZOLE SODIUM 20 MG/1
20 TABLET, DELAYED RELEASE ORAL DAILY
Qty: 90 TABLET | Refills: 1 | Status: SHIPPED | OUTPATIENT
Start: 2025-03-28

## 2025-04-18 PROBLEM — Z12.11 COLON CANCER SCREENING: Status: RESOLVED | Noted: 2025-03-19 | Resolved: 2025-04-18

## 2025-04-18 PROBLEM — Z00.00 HEALTHCARE MAINTENANCE: Status: RESOLVED | Noted: 2025-03-19 | Resolved: 2025-04-18

## 2025-05-27 DIAGNOSIS — B37.2 CANDIDA INFECTION OF FLEXURAL SKIN: ICD-10-CM

## 2025-05-27 RX ORDER — NYSTATIN 100000 [USP'U]/G
POWDER TOPICAL 2 TIMES DAILY
Qty: 30 G | Refills: 0 | Status: SHIPPED | OUTPATIENT
Start: 2025-05-27

## 2025-05-27 NOTE — TELEPHONE ENCOUNTER
Patient called for a refill of the nystatin powder. Patient stated her pharmacy had sent multiple requests last week. Patient advised unfortunately I did not see that any requests had been received at this time however I will send a request to pcp at this time.

## 2025-05-30 DIAGNOSIS — I82.4Y9 DEEP VEIN THROMBOSIS (DVT) OF PROXIMAL LOWER EXTREMITY, UNSPECIFIED CHRONICITY, UNSPECIFIED LATERALITY (HCC): ICD-10-CM

## 2025-05-30 RX ORDER — APIXABAN 2.5 MG/1
2.5 TABLET, FILM COATED ORAL 2 TIMES DAILY
Qty: 180 TABLET | Refills: 1 | Status: SHIPPED | OUTPATIENT
Start: 2025-05-30

## 2025-06-07 ENCOUNTER — NURSE TRIAGE (OUTPATIENT)
Dept: OTHER | Facility: OTHER | Age: 65
End: 2025-06-07

## 2025-06-07 ENCOUNTER — HOSPITAL ENCOUNTER (EMERGENCY)
Facility: HOSPITAL | Age: 65
Discharge: HOME | End: 2025-06-07
Attending: EMERGENCY MEDICINE

## 2025-06-07 ENCOUNTER — APPOINTMENT (EMERGENCY)
Dept: RADIOLOGY | Facility: HOSPITAL | Age: 65
End: 2025-06-07

## 2025-06-07 VITALS
HEART RATE: 72 BPM | WEIGHT: 182.7 LBS | DIASTOLIC BLOOD PRESSURE: 78 MMHG | TEMPERATURE: 97.6 F | RESPIRATION RATE: 18 BRPM | SYSTOLIC BLOOD PRESSURE: 132 MMHG | BODY MASS INDEX: 31.36 KG/M2 | OXYGEN SATURATION: 97 %

## 2025-06-07 DIAGNOSIS — M76.62 TENDONITIS, ACHILLES, LEFT: Primary | ICD-10-CM

## 2025-06-07 DIAGNOSIS — Z86.718 HISTORY OF DVT (DEEP VEIN THROMBOSIS): ICD-10-CM

## 2025-06-07 DIAGNOSIS — M79.672 PAIN OF LEFT HEEL: ICD-10-CM

## 2025-06-07 PROCEDURE — 99284 EMERGENCY DEPT VISIT MOD MDM: CPT | Performed by: EMERGENCY MEDICINE

## 2025-06-07 PROCEDURE — 76882 US LMTD JT/FCL EVL NVASC XTR: CPT | Performed by: EMERGENCY MEDICINE

## 2025-06-07 PROCEDURE — 99283 EMERGENCY DEPT VISIT LOW MDM: CPT

## 2025-06-07 PROCEDURE — 73650 X-RAY EXAM OF HEEL: CPT

## 2025-06-07 NOTE — TELEPHONE ENCOUNTER
"REASON FOR CONVERSATION: Foot Pain    SYMPTOMS: Severe 8/10 pain in left foot that is worse with movement. Taking Tylenol Arthritis and other pain meds prescribed for chronic pain which is not helping her pain. Denies redness to foot but does report some swelling.     OTHER HEALTH INFORMATION: None.    PROTOCOL DISPOSITION: See [facility] Within 4 Hours (Or PCP Triage)    CARE ADVICE PROVIDED:   Since pt is having severe uncontrolled pain recommended pt be seen at nearest  [facility] or ED.     Pt declines recommendation.    PRACTICE FOLLOW-UP: No follow up needed at this time.      Reason for Disposition   [1] SEVERE pain (e.g., excruciating, unable to do any normal activities) AND [2] not improved after 2 hours of pain medicine    Answer Assessment - Initial Assessment Questions  1. ONSET: \"When did the pain start?\"       Started 2 weeks ago.     2. LOCATION: \"Where is the pain located?\"       Left foot    3. PAIN: \"How bad is the pain?\"    (Scale 1-10; or mild, moderate, severe)      8/10 right now. Tylenol Arthritis that is not helping. Takes other pain meds for other chronic pain hx -Gabapentin, muscle relaxer- not helping.     4. WORK OR EXERCISE: \"Has there been any recent work or exercise that involved this part of the body?\"       Work of exercise makes pain worse.     5. CAUSE: \"What do you think is causing the foot pain?\"      Unknown    6. OTHER SYMPTOMS: \"Do you have any other symptoms?\" (e.g., leg pain, rash, fever, numbness)     Some swelling to the area. NO redness.    Protocols used: Foot Pain-Adult-AH    "

## 2025-06-07 NOTE — TELEPHONE ENCOUNTER
"Regarding: pain left foot  ----- Message from Diya TALBERT sent at 6/7/2025  3:27 PM EDT -----  \"I've been having a lot of pain in the back of left foot and I'd like to see the dr tomorrow, I didn't injure it but I've tried different things and nothings helping it\"    "

## 2025-06-08 NOTE — ED PROVIDER NOTES
Time reflects when diagnosis was documented in both MDM as applicable and the Disposition within this note       Time User Action Codes Description Comment    6/7/2025  9:15 PM Ruy Flores [M76.62] Tendonitis, Achilles, left     6/7/2025  9:16 PM Ruy Flores [M79.672] Pain of left heel     6/7/2025  9:16 PM Ruy Flores [Z86.718] History of DVT (deep vein thrombosis)           ED Disposition       ED Disposition   Discharge    Condition   Stable    Date/Time   Sat Jun 7, 2025  9:16 PM    Comment   Loretta Bailey discharge to home/self care.                   Assessment & Plan       Medical Decision Making  66 y/o female presents to the ED for evaluation of left posterior heel pain x 3 weeks.  She denies any recent injury or trauma to her left foot or heel.  She denies any new strenuous activity, new exercises, or new shoes.  She took Tylenol at home today with no relief of her pain.  She called her primary care provider's office and was advised to come to the ER for evaluation.  No numbness weakness.  No other symptoms or complaints.    Vital signs reviewed.  There is slight swelling and tenderness to palpation over the posterior aspect of the left heel, near the distal insertion of the Achilles tendon.  No palpable bony deformity.  No fluctuance.  No overlying skin changes.  Neurovascularly intact.  See physical exam documentation for full exam findings.  Differential diagnosis includes but is not limited to bony injury of the left heel, arthritis, Achilles tendinitis, strain, sprain, and/or musculoskeletal pain.  Will evaluate with x-rays and bedside point-of-care soft tissue/musculoskeletal ultrasound.  See ED course for independent interpretation of results.  On my interpretation, x ray shows degenerative changes of the dorsal heel spur with soft tissue swelling.  No acute fracture, dislocation, or other osseous abnormality on my interpretation.  My bedside point of care ultrasound shows  small amount of fluid at the distal insertion of the left Achilles tendon, suspicious for Achilles tendinitis.  Discussed with the patient regarding symptomatic management and outpatient follow-up with podiatry.  Referral provided. I discussed all findings, treatment, red flags/return precautions, and outpatient follow-up and the patient/family understand and agree. Stable for discharge.    Amount and/or Complexity of Data Reviewed  Radiology: ordered and independent interpretation performed. Decision-making details documented in ED Course.        ED Course as of 06/07/25 2117   Sat Jun 07, 2025 2103 XR heel / calcaneus 2+ views LEFT  Degenerative changes of the dorsal heel spur with soft tissue swelling.  No acute fracture, dislocation, or other osseous abnormality on my interpretation.       Medications - No data to display    ED Risk Strat Scores                    (ISAR) Identification of Seniors at Risk  Before the illness or injury that brought you to the Emergency, did you need someone to help you on a regular basis?: 0  In the last 24 hours, have you needed more help than usual?: 0  Have you been hospitalized for one or more nights during the past 6 months?: 0  In general, do you see well?: 0  In general, do you have serious problems with your memory?: 0  Do you take more than three different medications every day?: 0  ISAR Score: 0            SBIRT 20yo+      Flowsheet Row Most Recent Value   Initial Alcohol Screen: US AUDIT-C     1. How often do you have a drink containing alcohol? 0 Filed at: 06/07/2025 2031   2. How many drinks containing alcohol do you have on a typical day you are drinking?  0 Filed at: 06/07/2025 2031   3b. FEMALE Any Age, or MALE 65+: How often do you have 4 or more drinks on one occassion? 0 Filed at: 06/07/2025 2031   Audit-C Score 0 Filed at: 06/07/2025 2031   AISHA: How many times in the past year have you...    Used an illegal drug or used a prescription medication for  non-medical reasons? Never Filed at: 06/07/2025 2031                            History of Present Illness       Chief Complaint   Patient presents with    Foot Pain     Patient presents with L posterior heel pain. Pt reports point tenderness toward distal achilles site. Pain started approximately 3 weeks ago. No meds pta.        Past Medical History[1]   Past Surgical History[2]   Family History[3]   Social History[4]   E-Cigarette/Vaping    E-Cigarette Use Never User       E-Cigarette/Vaping Substances    Nicotine No     THC No     CBD No     Flavoring No     Other No     Unknown No       I have reviewed and agree with the history as documented.     66 y/o female presents to the ED for evaluation of left posterior heel pain x 3 weeks.  She denies any recent injury or trauma to her left foot or heel.  She denies any new strenuous activity, new exercises, or new shoes.  She took Tylenol at home today with no relief of her pain.  She called her primary care provider's office and was advised to come to the ER for evaluation.  No numbness weakness.  No other symptoms or complaints.        Review of Systems   Constitutional:  Negative for chills and fever.   HENT:  Negative for congestion, rhinorrhea and sore throat.    Respiratory:  Negative for cough and shortness of breath.    Cardiovascular:  Negative for chest pain and palpitations.   Gastrointestinal:  Negative for abdominal pain, diarrhea, nausea and vomiting.   Genitourinary:  Negative for dysuria and hematuria.   Musculoskeletal:  Negative for back pain and neck pain.        Left heel pain, see HPI   Neurological:  Negative for dizziness, weakness, light-headedness, numbness and headaches.   All other systems reviewed and are negative.          Objective       ED Triage Vitals [06/07/25 2029]   Temperature Pulse Blood Pressure Respirations SpO2 Patient Position - Orthostatic VS   97.6 °F (36.4 °C) 72 132/78 18 97 % Sitting      Temp Source Heart Rate Source BP  Location FiO2 (%) Pain Score    Oral Monitor Right arm -- --      Vitals      Date and Time Temp Pulse SpO2 Resp BP Pain Score FACES Pain Rating User   06/07/25 2029 97.6 °F (36.4 °C) 72 97 % 18 132/78 -- -- DG            Physical Exam  Vitals and nursing note reviewed.   Constitutional:       General: She is not in acute distress.     Appearance: Normal appearance. She is normal weight. She is not ill-appearing.   HENT:      Head: Normocephalic and atraumatic.      Right Ear: External ear normal.      Left Ear: External ear normal.      Nose: Nose normal. No congestion or rhinorrhea.      Mouth/Throat:      Mouth: Mucous membranes are moist.      Pharynx: Oropharynx is clear. No oropharyngeal exudate or posterior oropharyngeal erythema.     Eyes:      Extraocular Movements: Extraocular movements intact.      Conjunctiva/sclera: Conjunctivae normal.      Pupils: Pupils are equal, round, and reactive to light.       Cardiovascular:      Rate and Rhythm: Normal rate and regular rhythm.      Pulses: Normal pulses.      Heart sounds: Normal heart sounds. No murmur heard.  Pulmonary:      Effort: Pulmonary effort is normal. No respiratory distress.      Breath sounds: Normal breath sounds. No wheezing or rales.   Abdominal:      General: Abdomen is flat. Bowel sounds are normal. There is no distension.      Palpations: Abdomen is soft.      Tenderness: There is no abdominal tenderness. There is no right CVA tenderness, left CVA tenderness or guarding.     Musculoskeletal:         General: Swelling and tenderness present. Normal range of motion.      Cervical back: Normal range of motion and neck supple. No tenderness.      Comments: There is slight swelling and tenderness to palpation over the posterior aspect of the left heel, near the distal insertion of the Achilles tendon.  No palpable bony deformity.  No fluctuance.  No overlying skin changes.  Neurovascularly intact.     Skin:     General: Skin is warm and dry.       Capillary Refill: Capillary refill takes less than 2 seconds.     Neurological:      General: No focal deficit present.      Mental Status: She is alert and oriented to person, place, and time.      Sensory: No sensory deficit.      Motor: No weakness.         Results Reviewed       None            XR heel / calcaneus 2+ views LEFT   ED Interpretation by Ruy Flores MD (06/07 2103)   Degenerative changes of the dorsal heel spur with soft tissue swelling.  No acute fracture, dislocation, or other osseous abnormality on my interpretation.          POC MSK/Soft Tissue US    Date/Time: 6/7/2025 9:14 PM    Performed by: Ruy Flores MD  Authorized by: Ruy Flores MD    Patient location:  ED  Performed by:  Attending  Other Assisting Provider: No    Procedure:     Performed: musculoskeletal ultrasound    Procedure details:     Exam Type:  Diagnostic    Longitudinal view:  Obtained    Transverse view:  Obtained    Image quality: diagnostic      Image availability:  Images available in PACS  MSK ultrasound:     MSK indications: pain and swelling      MSK assessment of:  Tendon and muscle    MSK extremities evaluated: left lower extremity      MSK extremities evaluated comment:  Left heel/Achilles tendon    Fluid Collection: Absent      Joint Effusion: Absent      Tendon injury: Small amount of fluid at the distal insertion of the left Achilles tendon, suspicious for Achilles tendinitis.      Fractured bone: Absent      Muscle Injury: Absent    Interpretation:     MSK impressions: abnormal ultrasound (see above)    Comments:      Small amount of fluid at the distal insertion of the left Achilles tendon, suspicious for Achilles tendinitis      ED Medication and Procedure Management   Prior to Admission Medications   Prescriptions Last Dose Informant Patient Reported? Taking?   Ascorbic Acid (VITAMIN C) 100 MG CHEW  Self Yes No   Sig: Chew   Biotin 00144 MCG TABS  Self Yes No   Sig: Take by mouth daily     Calcium  Carbonate (CALCI-CHEW PO)  Self Yes No   Sig: Take 650 mg by mouth 2 (two) times a day   Cyanocobalamin (VITAMIN B 12 PO)  Self Yes No   Sig: Take 1,000 mcg by mouth   Ferrous Sulfate  (45 Fe) MG TBCR  Self Yes No   Sig: Take 1 tablet by mouth daily   Multiple Vitamin (MULTIVITAMIN) capsule  Self Yes No   Sig: Take 1 capsule by mouth daily 2 chewies daily   acetaminophen (TYLENOL) 650 mg CR tablet  Self Yes No   Sig: Take 650 mg by mouth every 8 (eight) hours as needed for mild pain Take 2 tablets in the AM and 2 tablets in the late evening   albuterol (ProAir HFA) 90 mcg/act inhaler   No No   Sig: Inhale 2 puffs every 6 (six) hours as needed for wheezing   Patient not taking: Reported on 1/30/2024   aluminum-magnesium hydroxide-simethicone (MAALOX MAX) 400-400-40 MG/5ML suspension   No No   Sig: Take 10 mL by mouth every 6 (six) hours as needed for indigestion or heartburn   apixaban (Eliquis) 2.5 mg   No No   Sig: TAKE 1 TABLET BY MOUTH TWICE A DAY   atorvastatin (LIPITOR) 10 mg tablet   No No   Sig: TAKE 1 TABLET BY MOUTH EVERY DAY   benzonatate (TESSALON) 200 MG capsule   No No   Sig: Take 1 capsule (200 mg total) by mouth 3 (three) times a day as needed for cough   cholecalciferol (VITAMIN D3) 1,000 units tablet  Self Yes No   Sig: Take 5,000 Units by mouth in the morning   clobetasol (TEMOVATE) 0.05 % ointment   Yes No   dicyclomine (BENTYL) 10 mg capsule  Self No No   Sig: take 1 capsule by mouth four times a day before meals at bedtime   estradiol (ESTRACE) 0.1 mg/g vaginal cream   Yes No   Sig: USE PEA SIZED AMOUNT VAGINALLY NIGHTLY X 2 WEEKS, THEN TWICE WEEKLY   fluticasone (FLONASE) 50 mcg/act nasal spray   No No   Sig: SPRAY 1 SPRAY INTO EACH NOSTRIL EVERY DAY   gabapentin (NEURONTIN) 300 mg capsule   No No   Sig: Take 1 capsule (300 mg total) by mouth 3 (three) times a day   meclizine (ANTIVERT) 25 mg tablet   No No   Sig: Take 1 tablet (25 mg total) by mouth every 8 (eight) hours as needed for  dizziness   metoprolol succinate (TOPROL-XL) 25 mg 24 hr tablet   No No   Sig: TAKE 1 TABLET (25 MG TOTAL) BY MOUTH DAILY.   nystatin (MYCOSTATIN) powder   No No   Sig: Apply topically 2 (two) times a day To affected area   ondansetron (ZOFRAN) 8 mg tablet   Yes No   Sig: TAKE 1 TABLET BY MOUTH EVERY 8 HOURS AS NEEDED FOR NAUSEA OR VOMITING.   pantoprazole (PROTONIX) 20 mg tablet   No No   Sig: TAKE 1 TABLET BY MOUTH EVERY DAY   sertraline (ZOLOFT) 50 mg tablet   No No   Sig: Take 1 tablet (50 mg total) by mouth daily   Patient taking differently: Take 25 mg by mouth daily   tiZANidine (ZANAFLEX) 2 mg tablet   No No   Sig: Take 1 tablet (2 mg total) by mouth every 8 (eight) hours as needed for muscle spasms   traZODone (DESYREL) 50 mg tablet   No No   Sig: TAKE 1 TABLET BY MOUTH EVERYDAY AT BEDTIME   Patient taking differently: Take 25 mg by mouth daily at bedtime   valACYclovir (VALTREX) 1,000 mg tablet   No No   Sig: Take 2 tablets (2,000 mg total) by mouth 2 (two) times a day for 1 day      Facility-Administered Medications Last Administration Doses Remaining   denosumab (PROLIA) subcutaneous injection 60 mg 8/6/2024  4:44 PM         Patient's Medications   Discharge Prescriptions    No medications on file       ED SEPSIS DOCUMENTATION   Time reflects when diagnosis was documented in both MDM as applicable and the Disposition within this note       Time User Action Codes Description Comment    6/7/2025  9:15 PM Ruy Flores [M76.62] Tendonitis, Achilles, left     6/7/2025  9:16 PM Ruy Flores [M79.672] Pain of left heel     6/7/2025  9:16 PM Ruy Flores [Z86.718] History of DVT (deep vein thrombosis)                      [1]   Past Medical History:  Diagnosis Date    Anemia     iron def    Arthritis     knees    Carpal tunnel syndrome of right wrist     Chest pain     Colon polyp     Deep vein thrombosis (DVT) of proximal lower extremity (HCC) 01/01/2012    Depression     at times    Diabetes  mellitus (HCC) 2014    Disease of thyroid gland 2014    DVT (deep venous thrombosis) (formerly [facility])     Dysphagia     Encounter for surgical aftercare following surgery of digestive system 2020    s/p Raghav-En-Y Gastric Bypass with Dr. Oneil in .   INITIAL weight:241 lb INITIAL weight with start of topirmate: 176 lb Goal weight loss of 5-10 %-159 lb-167 lb     Esophageal reflux 2011    GERD (gastroesophageal reflux disease) 2011    Grade 1 out of 6 intensity murmur 2019    Heart murmur     History of transfusion 1980    Hypertension 2014    Neck pain 2014    Pacemaker 2018    Pneumonia     age 16    Psychiatric disorder     Right ear pain 2020    Trigger finger, left     Visit for suture removal 2021   [2]   Past Surgical History:  Procedure Laterality Date    BACK SURGERY      CARDIAC PACEMAKER PLACEMENT  2018    CERVICAL DISC SURGERY  2009    PLATES & SCREWS     SECTION      X2    COLONOSCOPY      EPIDURAL BLOCK INJECTION Right 2022    Procedure: RIGHT [id number] EPIDURAL STEROID INJECTION (30706);  Surgeon: Bairon Kamara DO;  Location: [facility];  Service: Pain Management     GASTRIC BYPASS  2017    LAP RINYGB SURGERY    INSERT / REPLACE / REMOVE PACEMAKER      KNEE ARTHROSCOPY      KNEE SURGERY Bilateral     KNEE SURGERY Left 2020    OTHER SURGICAL HISTORY      ARM SURGERIES    NM NDSC WRST SURG W/RLS TRANSVRS CARPL LIGM Right 2018    Procedure: ENDOSCOPIC CARPAL TUNNEL RELEASE;  Surgeon: Leroy Macdonald MD;  Location: [facility];  Service: Orthopedics    NM NJX DX/THER AGT PVRT FACET JT LMBR/SAC 1 LEVEL Bilateral 10/25/2022    Procedure: L3-L4,L4-5,L5-S1 BILATERAL BRANCH BLOCK;  Surgeon: Bairon Kamara DO;  Location: [facility];  Service: Pain Management     NM NJX DX/THER AGT PVRT FACET JT LMBR/SAC 1 LEVEL Bilateral 11/15/2022    Procedure: BILATERAL L3-S1 MEDICAL BRANCH BLOCK  (75539,74563,48618);  Surgeon: Bairon Kamara DO;  Location: [facility];  Service: Pain Management     OK TENDON SHEATH INCISION Left 09/18/2018    Procedure: LONG TRIGGER FINGER RELEASE;  Surgeon: Leroy Macdonald MD;  Location: [facility];  Service: Orthopedics    RADIOFREQUENCY ABLATION Right 12/06/2022    Procedure: RIGHT L3-S1 RADIO FREQUENCY ABLATION (68220,11078);  Surgeon: Bairon Kamara DO;  Location: [facility];  Service: Pain Management     RADIOFREQUENCY ABLATION Left 12/20/2022    Procedure: LEFT L3-S1 RADIO FREQUENCY ABLATION (80718,59303);  Surgeon: Bairon Kamara DO;  Location: [facility];  Service: Pain Management     TONSILLECTOMY      TOTAL HIP ARTHROPLASTY Right 11/28/2023    UPPER GASTROINTESTINAL ENDOSCOPY      WRIST SURGERY  2018   [3]   Family History  Problem Relation Name Age of Onset    Stroke Mother Leilani     Alzheimer's disease Mother Leilani     Arthritis Mother Leilani     Coronary artery disease Mother Leilani     Breast cancer Mother Leilani 66    Cancer Mother Leilani     Heart disease Mother Leilani     Clotting disorder Mother Leilani     Dementia Mother Leilani     Hypertension Father Trent     Gout Father Trent     Diabetes type II Father Trent     Coronary artery disease Father Trent     Heart disease Father Trent     No Known Problems Sister Padmaja     No Known Problems Sister Heavenly     No Known Problems Sister Ann     No Known Problems Daughter Sophie     No Known Problems Maternal Grandmother      No Known Problems Maternal Grandfather      No Known Problems Paternal Grandmother      No Known Problems Paternal Grandfather      Prostate cancer Brother      Pancreatic cancer Brother      No Known Problems Maternal Aunt Merced     No Known Problems Maternal Aunt Janneth     No Known Problems Maternal Aunt Flora Gage     No Known Problems Maternal Aunt Karen     Cancer Maternal Aunt Sandra     No Known Problems Paternal Aunt Marielos     No Known Problems Paternal Aunt Fadia      Heart disease Brother Trent Rivera     Cancer Brother Ervin Santon   [4]   Social History  Tobacco Use    Smoking status: Former     Current packs/day: 0.00     Average packs/day: 0.5 packs/day for 30.0 years (15.0 ttl pk-yrs)     Types: Cigarettes     Quit date: 2016     Years since quittin.6    Smokeless tobacco: Never    Tobacco comments:     Started when I was in school   Vaping Use    Vaping status: Never Used   Substance Use Topics    Alcohol use: Yes     Alcohol/week: 2.0 standard drinks of alcohol     Types: 1 Glasses of wine, 1 Standard drinks or equivalent per week     Comment: very rare social    Drug use: No        Ruy Flores MD  [phone]

## 2025-06-11 ENCOUNTER — OFFICE VISIT (OUTPATIENT)
Dept: PODIATRY | Facility: CLINIC | Age: 65
End: 2025-06-11

## 2025-06-11 VITALS — WEIGHT: 182 LBS | HEIGHT: 64 IN | BODY MASS INDEX: 31.07 KG/M2

## 2025-06-11 DIAGNOSIS — M76.62 INSERTIONAL TENDINOPATHY OF LEFT ACHILLES TENDON: Primary | ICD-10-CM

## 2025-06-11 PROCEDURE — 99243 OFF/OP CNSLTJ NEW/EST LOW 30: CPT | Performed by: PODIATRIST

## 2025-06-11 NOTE — PATIENT INSTRUCTIONS
"Purchase \"adjustable heel lift\" off [employer].     Voltaren gel is an over the counter anti-inflammatory. Safe to apply to the back of the heel 2-3 times per day to reduce pain/inflammation  "

## 2025-06-11 NOTE — PROGRESS NOTES
"Name: Loretta Avalos      : 1960      MRN: [id number]  Encounter Provider: Naif Claire DPM  Encounter Date: 2025   Encounter department: [facility]  :  Assessment & Plan  Insertional tendinopathy of left Achilles tendon  Diagnosis and options discussed with patient  Patient agreeable to the plan as stated below    1. Discussed diagnosis and treatment options  2. Provided home therapy and stretching exercises  3. Educated eccentric exercises for the achilles enthesopathy.   4. Stressed compliance with home AND formal Physical Therapy  5. Purchase \"adjustable heel lift\" off [employer].   6. Voltaren gel is an over the counter anti-inflammatory. Safe to apply to the back of the heel 2-3 times per day to reduce pain/inflammation  7. RTC 6 weeks    Orders:  •  Ambulatory Referral to Physical Therapy; Future    Reviewed ED visit and XR from 2025. Large posterior heel spur, no acute findings    History of Present Illness   HPI  Loretta Avalos is a 65 y.o. female who presents with posterior left heel pain. She went to the ED a few days ago because it hurt so bad. She had an XR, no initial trauma. It hurts all the time on the back of the heel      Review of Systems       Objective   Ht 5' 4\" (1.626 m)   Wt 82.6 kg (182 lb)   LMP 2014   BMI 31.24 kg/m²      Physical Exam  Vitals reviewed.     Cardiovascular:      Rate and Rhythm: Normal rate.      Pulses: Normal pulses.   Pulmonary:      Effort: Pulmonary effort is normal. No respiratory distress.     Musculoskeletal:      Left ankle: Swelling and deformity present. Decreased range of motion.      Left Achilles Tendon: Tenderness (inseritonal pain) present. No defects. James's test negative.     Skin:     General: Skin is warm.      Findings: No erythema or rash.     Neurological:      Mental Status: She is alert.      Sensory: No sensory deficit.           "

## 2025-06-12 DIAGNOSIS — F32.A DEPRESSION, UNSPECIFIED DEPRESSION TYPE: ICD-10-CM

## 2025-06-20 ENCOUNTER — REMOTE DEVICE CLINIC VISIT (OUTPATIENT)
Dept: CARDIOLOGY CLINIC | Facility: CLINIC | Age: 65
End: 2025-06-20

## 2025-06-20 ENCOUNTER — RESULTS FOLLOW-UP (OUTPATIENT)
Dept: CARDIOLOGY CLINIC | Facility: CLINIC | Age: 65
End: 2025-06-20

## 2025-06-20 DIAGNOSIS — Z95.0 PRESENCE OF PERMANENT CARDIAC PACEMAKER: Primary | ICD-10-CM

## 2025-06-20 PROCEDURE — 93296 REM INTERROG EVL PM/IDS: CPT | Performed by: INTERNAL MEDICINE

## 2025-06-20 PROCEDURE — 93294 REM INTERROG EVL PM/LDLS PM: CPT | Performed by: INTERNAL MEDICINE

## 2025-06-20 NOTE — PROGRESS NOTES
Results for orders placed or performed in visit on 06/20/25   Cardiac EP device report    Narrative    MDT-DUAL PPM (AAIR-DDDR MODE)/ ACTIVE SYSTEM IS MRI CONDITIONAL  CARELINK TRANSMISSION: BATTERY VOLTAGE ADEQUATE. ( 8.2 YRS) AP 62%  <1. ALL AVAILABLE LEAD PARAMETERS WITHIN NORMAL LIMITS. NO SIGNIFICANT HIGH RATE EPISODES. SINGLE PVC COUNT 0.9/HR. NORMAL DEVICE FUNCTION.---GOLDBERG

## 2025-06-20 NOTE — PROGRESS NOTES
Results for orders placed or performed in visit on 06/20/25   Cardiac EP device report    Narrative    MDT-DUAL PPM (AAIR-DDDR MODE)/ ACTIVE SYSTEM IS MRI CONDITIONAL  CARELINK TRANSMISSION: BATTERY VOLTAGE ADEQUATE. ( YRS) AP 62%  <1. ALL AVAILABLE LEAD PARAMETERS WITHIN NORMAL LIMITS. NO SIGNIFICANT HIGH RATE EPISODES. SINGLE PVC COUNT 0.9/HR. NORMAL DEVICE FUNCTION.---GOLDBERG

## 2025-07-11 ENCOUNTER — TELEPHONE (OUTPATIENT)
Age: 65
End: 2025-07-11

## 2025-07-11 DIAGNOSIS — M79.673 HEEL PAIN, UNSPECIFIED LATERALITY: Primary | ICD-10-CM

## 2025-07-11 RX ORDER — PREDNISONE 50 MG/1
50 TABLET ORAL DAILY
Qty: 5 TABLET | Refills: 0 | Status: SHIPPED | OUTPATIENT
Start: 2025-07-11 | End: 2025-07-16

## 2025-07-11 NOTE — TELEPHONE ENCOUNTER
Pt aware she cannot take Aleve with Eliquis. Pt stating she already has tried tylenol/ tylenol arthritis with no relief. Pt is asking if there is anything else otc that she can take with Eliquis.

## 2025-07-11 NOTE — TELEPHONE ENCOUNTER
Per Irais she cannot take Aleve while on Eliquis. She can take Tylenol/ Tylenol arthritis. I LVM for pt to return call to office to inform her.

## 2025-07-11 NOTE — TELEPHONE ENCOUNTER
"LM for pt to return call. If pt returns call please relay Irais's message:  \"I sent prescription for prednisone. Take once daily for 5 days in the morning with food.\"  "

## 2025-07-11 NOTE — TELEPHONE ENCOUNTER
Patient returned call and was provided with the above information. Patient stated she has tried taking Tylenol and Tylenol Arthritis and patient stated it is not working. Patient is asking if there is other over the counter medications that can be taken with Eliquis for her heel. Patient stated it is painful and swollen. Please advise.

## 2025-07-11 NOTE — TELEPHONE ENCOUNTER
Patient called in wanted to know if she can take OTC: Aleve while on Rx: Eliquis    Please review and advice  Thank you

## 2025-07-25 DIAGNOSIS — B37.2 CANDIDA INFECTION OF FLEXURAL SKIN: ICD-10-CM

## 2025-07-25 DIAGNOSIS — B00.1 COLD SORE: ICD-10-CM

## 2025-07-28 RX ORDER — NYSTATIN 100000 [USP'U]/G
POWDER TOPICAL 2 TIMES DAILY
Qty: 30 G | Refills: 0 | Status: SHIPPED | OUTPATIENT
Start: 2025-07-28

## 2025-07-28 RX ORDER — VALACYCLOVIR HYDROCHLORIDE 1 G/1
2000 TABLET, FILM COATED ORAL 2 TIMES DAILY
Qty: 30 TABLET | Refills: 0 | Status: SHIPPED | OUTPATIENT
Start: 2025-07-28 | End: 2025-07-29

## 2025-07-29 DIAGNOSIS — E78.5 HYPERLIPIDEMIA, UNSPECIFIED HYPERLIPIDEMIA TYPE: ICD-10-CM

## 2025-07-29 DIAGNOSIS — M54.2 NECK PAIN: ICD-10-CM

## 2025-07-29 DIAGNOSIS — M62.838 NECK MUSCLE SPASM: ICD-10-CM

## 2025-07-30 ENCOUNTER — TELEPHONE (OUTPATIENT)
Age: 65
End: 2025-07-30

## 2025-07-30 RX ORDER — ATORVASTATIN CALCIUM 10 MG/1
10 TABLET, FILM COATED ORAL DAILY
Qty: 100 TABLET | Refills: 1 | Status: SHIPPED | OUTPATIENT
Start: 2025-07-30

## 2025-07-30 RX ORDER — TIZANIDINE 2 MG/1
2 TABLET ORAL EVERY 8 HOURS PRN
Qty: 270 TABLET | Refills: 1 | Status: SHIPPED | OUTPATIENT
Start: 2025-07-30

## 2025-08-01 ENCOUNTER — OFFICE VISIT (OUTPATIENT)
Age: 65
End: 2025-08-01

## 2025-08-01 DIAGNOSIS — S05.02XA ABRASION OF LEFT CORNEA, INITIAL ENCOUNTER: Primary | ICD-10-CM

## 2025-08-01 RX ORDER — CIPROFLOXACIN HYDROCHLORIDE 3.5 MG/ML
2 SOLUTION/ DROPS TOPICAL 4 TIMES DAILY
COMMUNITY
Start: 2025-07-30 | End: 2025-08-04

## 2025-08-08 ENCOUNTER — TELEPHONE (OUTPATIENT)
Dept: BARIATRICS | Facility: CLINIC | Age: 65
End: 2025-08-08

## (undated) DEVICE — SYRINGE 10ML LL

## (undated) DEVICE — COBAN 4 IN STERILE

## (undated) DEVICE — CHLORASCRUB PREP 1ML

## (undated) DEVICE — GLOVE SRG BIOGEL 6.5

## (undated) DEVICE — STERILE LATEX POWDER-FREE SURGICAL GLOVESWITH NITRILE COATING: Brand: PROTEXIS

## (undated) DEVICE — REM POLYHESIVE ADULT PATIENT RETURN ELECTRODE: Brand: VALLEYLAB

## (undated) DEVICE — NEEDLE SPINAL 25G X 3.5 IN QUINCKE

## (undated) DEVICE — BANDAGE, ESMARK LF STR 6"X9' (20/CS): Brand: CYPRESS

## (undated) DEVICE — TUBING SUCTION 5MM X 12 FT

## (undated) DEVICE — Device

## (undated) DEVICE — TOWEL SET X-RAY

## (undated) DEVICE — NEEDLE 18 G X 1 1/2 SAFETY

## (undated) DEVICE — INTENDED FOR TISSUE SEPARATION, AND OTHER PROCEDURES THAT REQUIRE A SHARP SURGICAL BLADE TO PUNCTURE OR CUT.: Brand: BARD-PARKER ® CARBON RIB-BACK BLADES

## (undated) DEVICE — PAD GROUNDING IONIC RF DISP

## (undated) DEVICE — COBAN 2 IN UNSTERILE

## (undated) DEVICE — DRAPE SHEET THREE QUARTER

## (undated) DEVICE — IV EXTENSION TUBING 33 IN

## (undated) DEVICE — SUT ETHILON 4-0 PS-2 18 IN 1667H

## (undated) DEVICE — PLASTIC ADHESIVE BANDAGE: Brand: CURITY

## (undated) DEVICE — SYRINGE 5ML LL

## (undated) DEVICE — GLOVE SRG BIOGEL 8

## (undated) DEVICE — LIGHT HANDLE COVER SLEEVE DISP BLUE STELLAR

## (undated) DEVICE — ELECTRODE 8227410 PAIRED 2 CH SET ROHS

## (undated) DEVICE — GAUZE SPONGES,16 PLY: Brand: CURITY

## (undated) DEVICE — NEEDLE 25G X 1 1/2

## (undated) DEVICE — ACE WRAP 3 IN UNSTERILE

## (undated) DEVICE — GLOVE INDICATOR PI UNDERGLOVE SZ 6.5 BLUE

## (undated) DEVICE — SYRINGE 3ML LL

## (undated) DEVICE — KNIFE RETROGRADE LIGAMENT

## (undated) DEVICE — STERILE BETHLEHEM PLASTIC HAND: Brand: CARDINAL HEALTH

## (undated) DEVICE — NON-STERILE REUSABLE TOURNIQUET CUFF SINGLE BLADDER, DUAL PORT AND QUICK CONNECT CONNECTOR: Brand: COLOR CUFF

## (undated) DEVICE — NEEDLE 22 G X 1 1/2 SAFETY

## (undated) DEVICE — OCCLUSIVE GAUZE STRIP,3% BISMUTH TRIBROMOPHENATE IN PETROLATUM BLEND: Brand: XEROFORM

## (undated) DEVICE — NEEDLE SPINAL 22G X 5IN QUINCKE

## (undated) DEVICE — STRETCH BANDAGE: Brand: CURITY